# Patient Record
Sex: FEMALE | Race: WHITE | NOT HISPANIC OR LATINO | Employment: OTHER | URBAN - METROPOLITAN AREA
[De-identification: names, ages, dates, MRNs, and addresses within clinical notes are randomized per-mention and may not be internally consistent; named-entity substitution may affect disease eponyms.]

---

## 2017-04-18 ENCOUNTER — ALLSCRIPTS OFFICE VISIT (OUTPATIENT)
Dept: OTHER | Facility: OTHER | Age: 72
End: 2017-04-18

## 2017-04-18 DIAGNOSIS — I51.9 HEART DISEASE: ICD-10-CM

## 2017-04-18 DIAGNOSIS — E78.00 PURE HYPERCHOLESTEROLEMIA: ICD-10-CM

## 2017-04-18 DIAGNOSIS — R06.02 SHORTNESS OF BREATH: ICD-10-CM

## 2017-04-18 DIAGNOSIS — I10 ESSENTIAL (PRIMARY) HYPERTENSION: ICD-10-CM

## 2017-04-18 DIAGNOSIS — I34.0 NONRHEUMATIC MITRAL VALVE INSUFFICIENCY: ICD-10-CM

## 2017-04-20 RX ORDER — PRAVASTATIN SODIUM 40 MG
40 TABLET ORAL DAILY
COMMUNITY
End: 2018-02-23 | Stop reason: SDUPTHER

## 2017-04-20 RX ORDER — OMEPRAZOLE 20 MG/1
20 CAPSULE, DELAYED RELEASE ORAL EVERY MORNING
COMMUNITY
End: 2020-11-20

## 2017-04-20 RX ORDER — ASPIRIN 81 MG/1
81 TABLET ORAL DAILY
COMMUNITY
End: 2019-02-06

## 2017-04-20 RX ORDER — DIPHENOXYLATE HYDROCHLORIDE AND ATROPINE SULFATE 2.5; .025 MG/1; MG/1
1 TABLET ORAL DAILY
COMMUNITY
End: 2019-02-06

## 2017-04-20 RX ORDER — ZOLPIDEM TARTRATE 10 MG/1
10 TABLET ORAL
COMMUNITY
End: 2019-02-06

## 2017-04-20 RX ORDER — BUTALBITAL/ASPIRIN/CAFFEINE 50-325-40
1 CAPSULE ORAL EVERY 4 HOURS PRN
COMMUNITY
End: 2020-11-17 | Stop reason: ALTCHOICE

## 2017-04-20 RX ORDER — MELOXICAM 15 MG/1
15 TABLET ORAL DAILY
Status: ON HOLD | COMMUNITY
End: 2017-04-24 | Stop reason: ALTCHOICE

## 2017-04-20 RX ORDER — CLONIDINE HYDROCHLORIDE 0.2 MG/1
0.1 TABLET ORAL
COMMUNITY
End: 2017-04-29

## 2017-04-20 RX ORDER — NEBIVOLOL 10 MG/1
5 TABLET ORAL 2 TIMES DAILY
COMMUNITY
End: 2019-02-06

## 2017-04-20 RX ORDER — ONDANSETRON 4 MG/1
4 TABLET, FILM COATED ORAL EVERY 8 HOURS PRN
Status: ON HOLD | COMMUNITY
End: 2017-04-24 | Stop reason: ALTCHOICE

## 2017-04-20 RX ORDER — LOSARTAN POTASSIUM 100 MG/1
100 TABLET ORAL DAILY
COMMUNITY
End: 2018-04-27 | Stop reason: SDUPTHER

## 2017-04-20 RX ORDER — PHENOL 1.4 %
600 AEROSOL, SPRAY (ML) MUCOUS MEMBRANE DAILY
COMMUNITY
End: 2020-06-22

## 2017-04-22 ENCOUNTER — ANESTHESIA EVENT (OUTPATIENT)
Dept: PERIOP | Facility: HOSPITAL | Age: 72
End: 2017-04-22
Payer: MEDICARE

## 2017-04-24 ENCOUNTER — ANESTHESIA (OUTPATIENT)
Dept: PERIOP | Facility: HOSPITAL | Age: 72
End: 2017-04-24
Payer: MEDICARE

## 2017-04-24 ENCOUNTER — HOSPITAL ENCOUNTER (OUTPATIENT)
Facility: HOSPITAL | Age: 72
Setting detail: OUTPATIENT SURGERY
Discharge: HOME | End: 2017-04-24
Attending: COLON & RECTAL SURGERY | Admitting: COLON & RECTAL SURGERY
Payer: MEDICARE

## 2017-04-24 VITALS
HEIGHT: 63 IN | SYSTOLIC BLOOD PRESSURE: 156 MMHG | DIASTOLIC BLOOD PRESSURE: 86 MMHG | HEART RATE: 68 BPM | WEIGHT: 165 LBS | RESPIRATION RATE: 16 BRPM | TEMPERATURE: 97.9 F | BODY MASS INDEX: 29.23 KG/M2 | OXYGEN SATURATION: 98 %

## 2017-04-24 RX ORDER — SODIUM CHLORIDE, SODIUM LACTATE, POTASSIUM CHLORIDE, CALCIUM CHLORIDE 600; 310; 30; 20 MG/100ML; MG/100ML; MG/100ML; MG/100ML
125 INJECTION, SOLUTION INTRAVENOUS CONTINUOUS
Status: DISCONTINUED | OUTPATIENT
Start: 2017-04-24 | End: 2017-04-24 | Stop reason: HOSPADM

## 2017-04-24 RX ORDER — ALBUTEROL SULFATE 2.5 MG/3ML
2.5 SOLUTION RESPIRATORY (INHALATION) ONCE AS NEEDED
Status: DISCONTINUED | OUTPATIENT
Start: 2017-04-24 | End: 2017-04-24 | Stop reason: HOSPADM

## 2017-04-24 RX ORDER — MEPERIDINE HYDROCHLORIDE 25 MG/ML
12.5 INJECTION INTRAMUSCULAR; INTRAVENOUS; SUBCUTANEOUS AS NEEDED
Status: DISCONTINUED | OUTPATIENT
Start: 2017-04-24 | End: 2017-04-24 | Stop reason: HOSPADM

## 2017-04-24 RX ORDER — PROPOFOL 10 MG/ML
INJECTION, EMULSION INTRAVENOUS AS NEEDED
Status: DISCONTINUED | OUTPATIENT
Start: 2017-04-24 | End: 2017-04-24 | Stop reason: SURG

## 2017-04-24 RX ADMIN — PROPOFOL 30 MG: 10 INJECTION, EMULSION INTRAVENOUS at 13:41

## 2017-04-24 RX ADMIN — SODIUM CHLORIDE, SODIUM LACTATE, POTASSIUM CHLORIDE, AND CALCIUM CHLORIDE: .6; .31; .03; .02 INJECTION, SOLUTION INTRAVENOUS at 13:30

## 2017-04-24 RX ADMIN — PROPOFOL 30 MG: 10 INJECTION, EMULSION INTRAVENOUS at 13:47

## 2017-04-24 RX ADMIN — PROPOFOL 30 MG: 10 INJECTION, EMULSION INTRAVENOUS at 13:48

## 2017-04-24 RX ADMIN — PROPOFOL 30 MG: 10 INJECTION, EMULSION INTRAVENOUS at 13:46

## 2017-04-24 RX ADMIN — SODIUM CHLORIDE, SODIUM LACTATE, POTASSIUM CHLORIDE, AND CALCIUM CHLORIDE 125 ML/HR: .6; .31; .03; .02 INJECTION, SOLUTION INTRAVENOUS at 12:21

## 2017-04-24 RX ADMIN — PROPOFOL 30 MG: 10 INJECTION, EMULSION INTRAVENOUS at 13:45

## 2017-04-24 RX ADMIN — PROPOFOL 100 MG: 10 INJECTION, EMULSION INTRAVENOUS at 13:38

## 2017-04-24 RX ADMIN — PROPOFOL 30 MG: 10 INJECTION, EMULSION INTRAVENOUS at 13:43

## 2017-04-24 RX ADMIN — PROPOFOL 30 MG: 10 INJECTION, EMULSION INTRAVENOUS at 13:42

## 2017-04-29 ENCOUNTER — HOSPITAL ENCOUNTER (EMERGENCY)
Facility: HOSPITAL | Age: 72
Discharge: HOME | End: 2017-04-29
Attending: EMERGENCY MEDICINE | Admitting: EMERGENCY MEDICINE
Payer: MEDICARE

## 2017-04-29 VITALS
DIASTOLIC BLOOD PRESSURE: 61 MMHG | WEIGHT: 171.1 LBS | HEIGHT: 63 IN | SYSTOLIC BLOOD PRESSURE: 164 MMHG | BODY MASS INDEX: 30.32 KG/M2 | OXYGEN SATURATION: 98 % | HEART RATE: 70 BPM | RESPIRATION RATE: 18 BRPM | TEMPERATURE: 98.7 F

## 2017-04-29 DIAGNOSIS — R51.9 HEADACHE: ICD-10-CM

## 2017-04-29 DIAGNOSIS — I10 HYPERTENSION: Primary | ICD-10-CM

## 2017-04-29 LAB
ALBUMIN SERPL BCP-MCNC: 4 G/DL (ref 3.5–5)
ALP SERPL-CCNC: 132 U/L (ref 46–116)
ALT SERPL W P-5'-P-CCNC: 26 U/L (ref 12–78)
ANION GAP SERPL CALCULATED.3IONS-SCNC: 10 MMOL/L (ref 4–13)
AST SERPL W P-5'-P-CCNC: 21 U/L (ref 5–45)
BASOPHILS # BLD AUTO: 0.01 THOUSANDS/ΜL (ref 0–0.1)
BASOPHILS NFR BLD AUTO: 0 % (ref 0–1)
BILIRUB SERPL-MCNC: 0.3 MG/DL (ref 0.2–1)
BUN SERPL-MCNC: 9 MG/DL (ref 5–25)
CALCIUM SERPL-MCNC: 8.5 MG/DL (ref 8.3–10.1)
CHLORIDE SERPL-SCNC: 92 MMOL/L (ref 100–108)
CO2 SERPL-SCNC: 26 MMOL/L (ref 21–32)
CREAT SERPL-MCNC: 0.82 MG/DL (ref 0.6–1.3)
EOSINOPHIL # BLD AUTO: 0.09 THOUSAND/ΜL (ref 0–0.61)
EOSINOPHIL NFR BLD AUTO: 2 % (ref 0–6)
ERYTHROCYTE [DISTWIDTH] IN BLOOD BY AUTOMATED COUNT: 12.5 % (ref 11.6–15.1)
GFR SERPL CREATININE-BSD FRML MDRD: >60 ML/MIN/1.73SQ M
GLUCOSE SERPL-MCNC: 103 MG/DL (ref 65–140)
HCT VFR BLD AUTO: 35.8 % (ref 34.8–46.1)
HGB BLD-MCNC: 12.7 G/DL (ref 11.5–15.4)
HOLD SPECIMEN: NO
HOLD SPECIMEN: NORMAL
INR PPP: 0.98 (ref 0.86–1.16)
LYMPHOCYTES # BLD AUTO: 0.89 THOUSANDS/ΜL (ref 0.6–4.47)
LYMPHOCYTES NFR BLD AUTO: 16 % (ref 14–44)
MAGNESIUM SERPL-MCNC: 1.9 MG/DL (ref 1.6–2.6)
MCH RBC QN AUTO: 29.8 PG (ref 26.8–34.3)
MCHC RBC AUTO-ENTMCNC: 35.5 G/DL (ref 31.4–37.4)
MCV RBC AUTO: 84 FL (ref 82–98)
MONOCYTES # BLD AUTO: 0.4 THOUSAND/ΜL (ref 0.17–1.22)
MONOCYTES NFR BLD AUTO: 7 % (ref 4–12)
NEUTROPHILS # BLD AUTO: 4.26 THOUSANDS/ΜL (ref 1.85–7.62)
NEUTS SEG NFR BLD AUTO: 75 % (ref 43–75)
PLATELET # BLD AUTO: 189 THOUSANDS/UL (ref 149–390)
PMV BLD AUTO: 10.1 FL (ref 8.9–12.7)
POTASSIUM SERPL-SCNC: 4.2 MMOL/L (ref 3.5–5.3)
PROT SERPL-MCNC: 6.6 G/DL (ref 6.4–8.2)
PROTHROMBIN TIME: 12.9 SECONDS (ref 12.1–14.4)
RBC # BLD AUTO: 4.26 MILLION/UL (ref 3.81–5.12)
SODIUM SERPL-SCNC: 128 MMOL/L (ref 136–145)
TSH SERPL DL<=0.05 MIU/L-ACNC: 1.76 UIU/ML (ref 0.36–3.74)
WBC # BLD AUTO: 5.65 THOUSAND/UL (ref 4.31–10.16)

## 2017-04-29 PROCEDURE — 96361 HYDRATE IV INFUSION ADD-ON: CPT

## 2017-04-29 PROCEDURE — 96375 TX/PRO/DX INJ NEW DRUG ADDON: CPT

## 2017-04-29 PROCEDURE — 85610 PROTHROMBIN TIME: CPT | Performed by: EMERGENCY MEDICINE

## 2017-04-29 PROCEDURE — 80053 COMPREHEN METABOLIC PANEL: CPT | Performed by: EMERGENCY MEDICINE

## 2017-04-29 PROCEDURE — 93005 ELECTROCARDIOGRAM TRACING: CPT | Performed by: EMERGENCY MEDICINE

## 2017-04-29 PROCEDURE — 36415 COLL VENOUS BLD VENIPUNCTURE: CPT

## 2017-04-29 PROCEDURE — 84443 ASSAY THYROID STIM HORMONE: CPT | Performed by: EMERGENCY MEDICINE

## 2017-04-29 PROCEDURE — 83735 ASSAY OF MAGNESIUM: CPT | Performed by: EMERGENCY MEDICINE

## 2017-04-29 PROCEDURE — 85025 COMPLETE CBC W/AUTO DIFF WBC: CPT | Performed by: EMERGENCY MEDICINE

## 2017-04-29 PROCEDURE — 99284 EMERGENCY DEPT VISIT MOD MDM: CPT

## 2017-04-29 PROCEDURE — 96374 THER/PROPH/DIAG INJ IV PUSH: CPT

## 2017-04-29 RX ORDER — CLONIDINE HYDROCHLORIDE 0.1 MG/1
0.1 TABLET ORAL 2 TIMES DAILY
COMMUNITY
End: 2019-02-06

## 2017-04-29 RX ORDER — CLINDAMYCIN HYDROCHLORIDE 300 MG/1
300 CAPSULE ORAL 3 TIMES DAILY
COMMUNITY
End: 2017-08-01 | Stop reason: ALTCHOICE

## 2017-04-29 RX ORDER — ONDANSETRON 2 MG/ML
4 INJECTION INTRAMUSCULAR; INTRAVENOUS ONCE
Status: COMPLETED | OUTPATIENT
Start: 2017-04-29 | End: 2017-04-29

## 2017-04-29 RX ORDER — METHYLPREDNISOLONE 4 MG/1
8 TABLET ORAL SEE ADMIN INSTRUCTIONS
COMMUNITY
End: 2017-08-01 | Stop reason: ALTCHOICE

## 2017-04-29 RX ORDER — ONDANSETRON 2 MG/ML
INJECTION INTRAMUSCULAR; INTRAVENOUS
Status: COMPLETED
Start: 2017-04-29 | End: 2017-04-29

## 2017-04-29 RX ORDER — CLONIDINE HYDROCHLORIDE 0.3 MG/1
0.3 TABLET ORAL EVERY MORNING
Status: ON HOLD | COMMUNITY
End: 2018-02-20 | Stop reason: DRUGHIGH

## 2017-04-29 RX ORDER — KETOROLAC TROMETHAMINE 30 MG/ML
15 INJECTION, SOLUTION INTRAMUSCULAR; INTRAVENOUS ONCE
Status: COMPLETED | OUTPATIENT
Start: 2017-04-29 | End: 2017-04-29

## 2017-04-29 RX ORDER — DIPHENHYDRAMINE HYDROCHLORIDE 50 MG/ML
50 INJECTION INTRAMUSCULAR; INTRAVENOUS ONCE
Status: COMPLETED | OUTPATIENT
Start: 2017-04-29 | End: 2017-04-29

## 2017-04-29 RX ADMIN — DIPHENHYDRAMINE HYDROCHLORIDE 50 MG: 50 INJECTION, SOLUTION INTRAMUSCULAR; INTRAVENOUS at 19:49

## 2017-04-29 RX ADMIN — KETOROLAC TROMETHAMINE 15 MG: 30 INJECTION, SOLUTION INTRAMUSCULAR at 19:49

## 2017-04-29 RX ADMIN — SODIUM CHLORIDE 500 ML: 0.9 INJECTION, SOLUTION INTRAVENOUS at 20:21

## 2017-04-29 RX ADMIN — ONDANSETRON 4 MG: 2 INJECTION INTRAMUSCULAR; INTRAVENOUS at 19:58

## 2017-05-02 LAB
ATRIAL RATE: 71 BPM
P AXIS: 73 DEGREES
PR INTERVAL: 176 MS
QRS AXIS: 75 DEGREES
QRSD INTERVAL: 92 MS
QT INTERVAL: 396 MS
QTC INTERVAL: 430 MS
T WAVE AXIS: 48 DEGREES
VENTRICULAR RATE: 71 BPM

## 2017-07-10 ENCOUNTER — APPOINTMENT (OUTPATIENT)
Dept: RADIOLOGY | Facility: MEDICAL CENTER | Age: 72
End: 2017-07-10
Payer: MEDICARE

## 2017-07-10 ENCOUNTER — TRANSCRIBE ORDERS (OUTPATIENT)
Dept: RADIOLOGY | Facility: MEDICAL CENTER | Age: 72
End: 2017-07-10

## 2017-07-10 DIAGNOSIS — M50.30 DDD (DEGENERATIVE DISC DISEASE), CERVICAL: Primary | ICD-10-CM

## 2017-07-10 DIAGNOSIS — M50.30 DDD (DEGENERATIVE DISC DISEASE), CERVICAL: ICD-10-CM

## 2017-07-10 PROCEDURE — 72050 X-RAY EXAM NECK SPINE 4/5VWS: CPT

## 2017-08-01 ENCOUNTER — HOSPITAL ENCOUNTER (EMERGENCY)
Facility: HOSPITAL | Age: 72
Discharge: HOME | End: 2017-08-01
Attending: EMERGENCY MEDICINE | Admitting: EMERGENCY MEDICINE
Payer: MEDICARE

## 2017-08-01 ENCOUNTER — APPOINTMENT (EMERGENCY)
Dept: CT IMAGING | Facility: HOSPITAL | Age: 72
End: 2017-08-01
Payer: MEDICARE

## 2017-08-01 VITALS
HEART RATE: 66 BPM | TEMPERATURE: 98.1 F | WEIGHT: 167.33 LBS | DIASTOLIC BLOOD PRESSURE: 78 MMHG | SYSTOLIC BLOOD PRESSURE: 165 MMHG | OXYGEN SATURATION: 99 % | RESPIRATION RATE: 16 BRPM | BODY MASS INDEX: 29.64 KG/M2

## 2017-08-01 DIAGNOSIS — E87.1 HYPONATREMIA: Primary | ICD-10-CM

## 2017-08-01 DIAGNOSIS — R51.9 HEADACHE: ICD-10-CM

## 2017-08-01 DIAGNOSIS — M54.2 CERVICALGIA: ICD-10-CM

## 2017-08-01 LAB
ALBUMIN SERPL BCP-MCNC: 4 G/DL (ref 3.5–5)
ALP SERPL-CCNC: 174 U/L (ref 46–116)
ALT SERPL W P-5'-P-CCNC: 23 U/L (ref 12–78)
ANION GAP SERPL CALCULATED.3IONS-SCNC: 6 MMOL/L (ref 4–13)
AST SERPL W P-5'-P-CCNC: 20 U/L (ref 5–45)
ATRIAL RATE: 61 BPM
BASOPHILS # BLD AUTO: 0.02 THOUSANDS/ΜL (ref 0–0.1)
BASOPHILS NFR BLD AUTO: 0 % (ref 0–1)
BILIRUB SERPL-MCNC: 0.4 MG/DL (ref 0.2–1)
BUN SERPL-MCNC: 5 MG/DL (ref 5–25)
CALCIUM SERPL-MCNC: 9.1 MG/DL (ref 8.3–10.1)
CHLORIDE SERPL-SCNC: 93 MMOL/L (ref 100–108)
CO2 SERPL-SCNC: 29 MMOL/L (ref 21–32)
CREAT SERPL-MCNC: 0.84 MG/DL (ref 0.6–1.3)
EOSINOPHIL # BLD AUTO: 0.18 THOUSAND/ΜL (ref 0–0.61)
EOSINOPHIL NFR BLD AUTO: 4 % (ref 0–6)
ERYTHROCYTE [DISTWIDTH] IN BLOOD BY AUTOMATED COUNT: 12.9 % (ref 11.6–15.1)
GFR SERPL CREATININE-BSD FRML MDRD: 71 ML/MIN/1.73SQ M
GLUCOSE SERPL-MCNC: 106 MG/DL (ref 65–140)
HCT VFR BLD AUTO: 38.8 % (ref 34.8–46.1)
HGB BLD-MCNC: 13.9 G/DL (ref 11.5–15.4)
LYMPHOCYTES # BLD AUTO: 0.79 THOUSANDS/ΜL (ref 0.6–4.47)
LYMPHOCYTES NFR BLD AUTO: 16 % (ref 14–44)
MCH RBC QN AUTO: 29.8 PG (ref 26.8–34.3)
MCHC RBC AUTO-ENTMCNC: 35.8 G/DL (ref 31.4–37.4)
MCV RBC AUTO: 83 FL (ref 82–98)
MONOCYTES # BLD AUTO: 0.43 THOUSAND/ΜL (ref 0.17–1.22)
MONOCYTES NFR BLD AUTO: 9 % (ref 4–12)
NEUTROPHILS # BLD AUTO: 3.57 THOUSANDS/ΜL (ref 1.85–7.62)
NEUTS SEG NFR BLD AUTO: 71 % (ref 43–75)
P AXIS: 29 DEGREES
PLATELET # BLD AUTO: 213 THOUSANDS/UL (ref 149–390)
PMV BLD AUTO: 9.7 FL (ref 8.9–12.7)
POTASSIUM SERPL-SCNC: 4.3 MMOL/L (ref 3.5–5.3)
PR INTERVAL: 170 MS
PROT SERPL-MCNC: 7.5 G/DL (ref 6.4–8.2)
QRS AXIS: 67 DEGREES
QRSD INTERVAL: 94 MS
QT INTERVAL: 432 MS
QTC INTERVAL: 434 MS
RBC # BLD AUTO: 4.66 MILLION/UL (ref 3.81–5.12)
SODIUM SERPL-SCNC: 128 MMOL/L (ref 136–145)
T WAVE AXIS: 39 DEGREES
TROPONIN I SERPL-MCNC: <0.02 NG/ML
VENTRICULAR RATE: 61 BPM
WBC # BLD AUTO: 4.99 THOUSAND/UL (ref 4.31–10.16)

## 2017-08-01 PROCEDURE — 99284 EMERGENCY DEPT VISIT MOD MDM: CPT

## 2017-08-01 PROCEDURE — 36415 COLL VENOUS BLD VENIPUNCTURE: CPT | Performed by: EMERGENCY MEDICINE

## 2017-08-01 PROCEDURE — 96361 HYDRATE IV INFUSION ADD-ON: CPT

## 2017-08-01 PROCEDURE — 96375 TX/PRO/DX INJ NEW DRUG ADDON: CPT

## 2017-08-01 PROCEDURE — 93005 ELECTROCARDIOGRAM TRACING: CPT | Performed by: EMERGENCY MEDICINE

## 2017-08-01 PROCEDURE — 84484 ASSAY OF TROPONIN QUANT: CPT | Performed by: EMERGENCY MEDICINE

## 2017-08-01 PROCEDURE — 70498 CT ANGIOGRAPHY NECK: CPT

## 2017-08-01 PROCEDURE — 80053 COMPREHEN METABOLIC PANEL: CPT | Performed by: EMERGENCY MEDICINE

## 2017-08-01 PROCEDURE — 85025 COMPLETE CBC W/AUTO DIFF WBC: CPT | Performed by: EMERGENCY MEDICINE

## 2017-08-01 PROCEDURE — 70496 CT ANGIOGRAPHY HEAD: CPT

## 2017-08-01 PROCEDURE — 96374 THER/PROPH/DIAG INJ IV PUSH: CPT

## 2017-08-01 RX ORDER — MORPHINE SULFATE 4 MG/ML
4 INJECTION, SOLUTION INTRAMUSCULAR; INTRAVENOUS ONCE
Status: COMPLETED | OUTPATIENT
Start: 2017-08-01 | End: 2017-08-01

## 2017-08-01 RX ORDER — FENTANYL CITRATE 50 UG/ML
50 INJECTION, SOLUTION INTRAMUSCULAR; INTRAVENOUS ONCE
Status: COMPLETED | OUTPATIENT
Start: 2017-08-01 | End: 2017-08-01

## 2017-08-01 RX ORDER — METHYLPREDNISOLONE SODIUM SUCCINATE 125 MG/2ML
125 INJECTION, POWDER, LYOPHILIZED, FOR SOLUTION INTRAMUSCULAR; INTRAVENOUS ONCE
Status: COMPLETED | OUTPATIENT
Start: 2017-08-01 | End: 2017-08-01

## 2017-08-01 RX ORDER — PREDNISONE 20 MG/1
60 TABLET ORAL DAILY
Qty: 12 TABLET | Refills: 0 | Status: SHIPPED | OUTPATIENT
Start: 2017-08-01 | End: 2018-02-12 | Stop reason: CLARIF

## 2017-08-01 RX ORDER — ONDANSETRON 2 MG/ML
4 INJECTION INTRAMUSCULAR; INTRAVENOUS ONCE
Status: COMPLETED | OUTPATIENT
Start: 2017-08-01 | End: 2017-08-01

## 2017-08-01 RX ADMIN — SODIUM CHLORIDE 1000 ML: 0.9 INJECTION, SOLUTION INTRAVENOUS at 09:03

## 2017-08-01 RX ADMIN — ONDANSETRON 4 MG: 2 INJECTION INTRAMUSCULAR; INTRAVENOUS at 09:03

## 2017-08-01 RX ADMIN — MORPHINE SULFATE 4 MG: 4 INJECTION, SOLUTION INTRAMUSCULAR; INTRAVENOUS at 09:03

## 2017-08-01 RX ADMIN — IOHEXOL 100 ML: 350 INJECTION, SOLUTION INTRAVENOUS at 10:08

## 2017-08-01 RX ADMIN — SODIUM CHLORIDE 1000 ML: 0.9 INJECTION, SOLUTION INTRAVENOUS at 10:47

## 2017-08-01 RX ADMIN — FENTANYL CITRATE 50 MCG: 50 INJECTION, SOLUTION INTRAMUSCULAR; INTRAVENOUS at 11:06

## 2017-08-01 RX ADMIN — METHYLPREDNISOLONE SODIUM SUCCINATE 125 MG: 125 INJECTION, POWDER, FOR SOLUTION INTRAMUSCULAR; INTRAVENOUS at 09:15

## 2017-09-29 ENCOUNTER — TRANSCRIBE ORDERS (OUTPATIENT)
Dept: ADMINISTRATIVE | Facility: HOSPITAL | Age: 72
End: 2017-09-29

## 2017-09-29 ENCOUNTER — APPOINTMENT (OUTPATIENT)
Dept: PHYSICAL THERAPY | Facility: HOME HEALTHCARE | Age: 72
End: 2017-09-29
Payer: MEDICARE

## 2017-09-29 DIAGNOSIS — I12.9 PARENCHYMAL RENAL HYPERTENSION, STAGE 1-4 OR UNSPECIFIED CHRONIC KIDNEY DISEASE: Primary | ICD-10-CM

## 2017-09-29 DIAGNOSIS — N18.2 CHRONIC KIDNEY DISEASE, STAGE II (MILD): ICD-10-CM

## 2017-09-29 PROCEDURE — 97162 PT EVAL MOD COMPLEX 30 MIN: CPT

## 2017-09-29 PROCEDURE — 97535 SELF CARE MNGMENT TRAINING: CPT

## 2017-09-29 PROCEDURE — 97014 ELECTRIC STIMULATION THERAPY: CPT

## 2017-09-29 PROCEDURE — G8990 OTHER PT/OT CURRENT STATUS: HCPCS

## 2017-09-29 PROCEDURE — G8991 OTHER PT/OT GOAL STATUS: HCPCS

## 2017-10-02 ENCOUNTER — TRANSCRIBE ORDERS (OUTPATIENT)
Dept: ADMINISTRATIVE | Facility: HOSPITAL | Age: 72
End: 2017-10-02

## 2017-10-02 ENCOUNTER — APPOINTMENT (OUTPATIENT)
Dept: PHYSICAL THERAPY | Facility: HOME HEALTHCARE | Age: 72
End: 2017-10-02
Payer: MEDICARE

## 2017-10-02 DIAGNOSIS — Z12.31 ENCOUNTER FOR SCREENING MAMMOGRAM FOR MALIGNANT NEOPLASM OF BREAST: Primary | ICD-10-CM

## 2017-10-02 PROCEDURE — 97140 MANUAL THERAPY 1/> REGIONS: CPT

## 2017-10-02 PROCEDURE — 97110 THERAPEUTIC EXERCISES: CPT

## 2017-10-02 PROCEDURE — 97014 ELECTRIC STIMULATION THERAPY: CPT

## 2017-10-04 ENCOUNTER — APPOINTMENT (OUTPATIENT)
Dept: PHYSICAL THERAPY | Facility: HOME HEALTHCARE | Age: 72
End: 2017-10-04
Payer: MEDICARE

## 2017-10-04 PROCEDURE — 97140 MANUAL THERAPY 1/> REGIONS: CPT

## 2017-10-04 PROCEDURE — 97014 ELECTRIC STIMULATION THERAPY: CPT

## 2017-10-09 ENCOUNTER — APPOINTMENT (OUTPATIENT)
Dept: PHYSICAL THERAPY | Facility: HOME HEALTHCARE | Age: 72
End: 2017-10-09
Payer: MEDICARE

## 2017-10-12 ENCOUNTER — APPOINTMENT (OUTPATIENT)
Dept: PHYSICAL THERAPY | Facility: HOME HEALTHCARE | Age: 72
End: 2017-10-12
Payer: MEDICARE

## 2017-10-13 ENCOUNTER — ALLSCRIPTS OFFICE VISIT (OUTPATIENT)
Dept: OTHER | Facility: OTHER | Age: 72
End: 2017-10-13

## 2017-10-14 NOTE — PROGRESS NOTES
Assessment  Assessed    1  Diastolic dysfunction (428 7) (I51 9)   2  Hypercholesterolemia (272 0) (E78 00)   3  Hypertension (401 9) (I10)   4  Mitral regurgitation (424 0) (I34 0)    Plan  Diastolic dysfunction, Hypercholesterolemia, Hypertension, Mitral regurgitation    · Nocturnal Pulse Oximetry; Status:Active; Requested RJS:[id number];    Perform:Juan; Due:94Waq9642; Ordered; For:Diastolic dysfunction, Hypercholesterolemia, Hypertension, Mitral regurgitation; Ordered By:Williams Blas;   · Follow-up visit in 4 Months Evaluation and Treatment  Follow-up  Status: Hold For -  Scheduling  Requested for: [id number]   Ordered; For: Diastolic dysfunction, Hypercholesterolemia, Hypertension, Mitral regurgitation; Ordered By: Iban Morataya Performed:  Due: 50DCL2167    Discussion/Summary  Cardiology Discussion Summary Free Text Note [facility]:   She has follow-up blood work in renal artery Doppler scheduled for next week we'll discuss with neurology after the findings of the lab work  I've ordered an overnight pulse oximetry to rule out sleep apnea area she is refusing a formal sleep study at this time  I think stress at home is been contributor area she's had no episodes of diastolic congestive heart failure  Lipids have been stable on her current dose of statin she has not tolerated higher intensity statins in the past       History of Present Illness  Cardiology HPI Free Text Note Form [facility]: MRs Shanta Ku is a very pleasant 72-year-old female with a history of chronic diastolic dysfunction, hypertension, mitral regurgitation and prior TIA who presents for followup visit  The combination of bystolic and clonidine have been working to manage her hypertension  We had tried her previously other beta blockers including metoprolol and carvedilol and she did not tolerate them  continues to have episodes of high stress at home   She finally sold her father's house in December and now has been battling with some orthopedic issues over last few months  Her ability to walk and exercise has been limited by lower extremity arthritis and joint pain  Since her last visit she's developed some exertional dyspnea  She tells me climbing up the stairs at home is been getting short of breath over the past few months  She denies any exertional chest pressure heaviness, palpitations, lightheadedness, dizziness, or syncope  She's been taking her medications at home blood pressures average 2:43 to 807 systolic on her home monitor  She has had a difficult time finding medicines that she tolerates over the years  presents today for routine scheduled follow-up visit  I sent her to see nephrology regarding her heart to treat hypertension and prior episodes of hyponatremia  She has some blood work ordered for next week and will be following up with them in a week's time  Blood pressures at home is been averaging 140-150  She's been under a lot of stress recently and I think this is a big contributor to her hypertension  She denies any chest pain, lower extremity edema, PND, orthopnea  There've been no palpitations or flutters  Review of Systems  Cardiology Female ROS:     Cardiac: No complaints of chest pain, no palpitations, no fainting  Skin: No complaints of nonhealing sores or skin rash  Genitourinary: No complaints of recurrent urinary tract infections, frequent urination at night, difficult urination, blood in urine, kidney stones, loss of bladder control, kidney problems, denies any birth control or hormone replacement, is not post menopausal, not currently pregnant  Psychological: No complaints of feeling depressed, anxiety, panic attacks, or difficulty concentrating  General: No complaints of trouble sleeping, lack of energy, fatigue, appetite changes, weight changes, fever, frequent infections, or night sweats  Respiratory: No complaints of shortness of breath, cough with sputum, or wheezing     HEENT: No complaints of serious problems, hearing problems, nose problems, throat problems, or snoring  Gastrointestinal: No complaints of liver problems, nausea, vomiting, heartburn, constipation, bloody stools, diarrhea, problems swallowing, adbominal pain, or rectal bleeding  Hematologic: No complaints of bleeding disorders, anemia, blood clots, or excessive brusing  Neurological: No complaints of numbness, tingling, dizziness, weakness, seizures, headaches, syncope or fainting, AM fatigue, daytime sleepiness, no witnessed apnea episodes  Musculoskeletal: No complaints of arthritis, back pain, or painfull swelling  Active Problems  Problems    1  Arthritis (716 90) (M19 90)   2  Carpal tunnel syndrome (354 0) (G56 00)   3  Cataract (366 9) (H26 9)   4  Chronic pain syndrome (338 4) (G89 4)   5  Diastolic dysfunction (591 5) (I51 9)   6  Dizziness (780 4) (R42)   7  Esophagitis, reflux (530 11) (K21 0)   8  Follow-up examination following surgery (V67 00) (Z09)   9  Headache (784 0) (R51)   10  Hypercholesterolemia (272 0) (E78 00)   11  Hypertension (401 9) (I10)   12  Irritable bowel syndrome (564 1) (K58 9)   13  Lumbago (724 2) (M54 5)   14  Mitral regurgitation (424 0) (I34 0)   15  Nausea with vomiting (787 01) (R11 2)   16  Shortness of breath on exertion (786 05) (R06 02)   17  Surgery, elective (V50 9) (Z41 9)   18  Transient ischemic attack (435 9) (G45 9)    Past Medical History  Problems    1  History of GERD (gastroesophageal reflux disease) (530 81) (K21 9)   2  History of Hyperlipidemia (272 4) (E78 5)   3  History of Irregular heart beat (427 9) (I49 9)   4  History of Migraine (346 90) (G43 909)  Active Problems And Past Medical History Reviewed: The active problems and past medical history were reviewed and updated today  Surgical History  Problems    1  History of Appendectomy   2  History of Arthroscopy Knee   3  History Of Prior Surgery   4  History of Hysterectomy   5   History of Knee Replacement   6  History of Sinus Surgery  Surgical History Reviewed: The surgical history was reviewed and updated today  Family History  Mother    1  Family history of Arthritis (V17 7)   2  Family history of Hypertension (V17 49)   3  Family history of Pure Hypercholesterolemia  Father    4  Family history of Arthritis (V17 7)   5  Family history of Coronary Artery Disease (V17 49)   6  Family history of Hypertension (V17 49)  Family History Reviewed: The family history was reviewed and updated today  Social History  Problems    · Denied: History of Alcohol Use (History)   · Caffeine use (V49 89) (F15 90)   · Completed 12th grade   · Denied: History of Drug Use   · Marital History - Currently    ·    · Never A Smoker   · No drug use  Social History Reviewed: The social history was reviewed and updated today  Current Meds   1  Aspirin 81 MG TABS; TAKE 1 TABLET DAILY; Therapy: (Recorded:04Otx5144) to Recorded   2  Butalbital-ASA-Caff-Codeine -73-30 MG Oral Capsule; TAKE 1 CAPSULE EVERY   4 TO 6 HOURS AS NEEDED; Therapy: 21Apr2014 to Recorded   3  Bystolic 10 MG Oral Tablet; Take 1 tablet daily  Requested for: 70MEA9643; Last   Rx:27Jan2017 Ordered   4  Calcium 600 MG Oral Tablet; Take 2 tablets daily; Therapy: (Recorded:34Gda0840) to Recorded   5  CloNIDine HCl - 0 2 MG Oral Tablet; TAKE 1 5 TABLET Twice daily; Therapy: 01OIB3015 to ([phone] [id number])  Requested for: 65XEF8717; Last   Rx:28Apr2017 Ordered   6  Daily Multivitamin TABS; TAKE 1 TABLET DAILY; Therapy: (Recorded:57Gnn4802) to Recorded   7  Losartan Potassium 100 MG Oral Tablet; take 1 tablet every day; Therapy: 54KHC9937 to (Epifanio Knight)  Requested for: 51ZRF3925; Last   Rx:95Ieq6321 Ordered   8  Meloxicam 15 MG Oral Tablet; TAKE 1 TABLET DAILY WITH FOOD; Therapy: (Recorded:22Eox6507) to Recorded   9  Omeprazole 20 MG Oral Capsule Delayed Release; TAKE 1 CAPSULE DAILY;    Therapy: (Recorded:01Dzt1139) to Recorded   10  Ondansetron HCl - 4 MG Oral Tablet; TAKE 1 TABLET Every 8 hours PRN nausea     Requested for: 30GCT2074; Last Rx:18Mar2016 Ordered   11  Pravastatin Sodium 40 MG Oral Tablet; TAKE 1 TABLET DAILY AS DIRECTED; Therapy: 01Sep2015 to (Keylayohan Jared)  Requested for: 47MNT7360; Last    Rx:48Nev0338 Ordered   12  Zolpidem Tartrate 10 MG Oral Tablet; TAKE 1 TABLET DAILY AT BEDTIME; Therapy: (Recorded:87Fie4182) to Recorded    Allergies  Medication    1  Novocain SOLN   2  Penicillins    Vitals  Vital Signs    Recorded: 10MSD4691 10:35AM Recorded: 49CDQ8247 09:58AM   Heart Rate  64   Systolic 276 459   Diastolic 84 90   Height  5 ft 3 in   Weight  174 lb    BMI Calculated  30 82   BSA Calculated  1 82     Physical Exam    Constitutional   General appearance: No acute distress, well appearing and well nourished  Eyes   Conjunctiva and Sclera examination: Conjunctiva pink, sclera anicteric  Ears, Nose, Mouth, and Throat - Oropharynx: Clear, nares are clear, mucous membranes are moist    Neck   Neck and thyroid: Normal, supple, trachea midline, no thyromegaly  Pulmonary   Respiratory effort: No increased work of breathing or signs of respiratory distress  Auscultation of lungs: Clear to auscultation, no rales, no rhonchi, no wheezing, good air movement  Cardiovascular   Auscultation of heart: Normal rate and rhythm, normal S1 and S2, no murmurs  Carotid pulses: Normal, 2+ bilaterally  Peripheral vascular exam: Normal pulses throughout, no tenderness, erythema or swelling  Pedal pulses: Normal, 2+ bilaterally  Examination of extremities for edema and/or varicosities: Normal     Abdomen   Abdomen: Non-tender and no distention  Liver and spleen: No hepatomegaly or splenomegaly  Musculoskeletal Gait and station: Normal gait  -- Digits and nails: Normal without clubbing or cyanosis  -- Inspection/palpation of joints, bones, and muscles: Normal, ROM normal     Skin - Skin and subcutaneous tissue: Normal without rashes or lesions  Skin is warm and well perfused, normal turgor  Neurologic - Cranial nerves: II - XII intact  -- Speech: Normal     Psychiatric - Orientation to person, place, and time: Normal -- Mood and affect: Normal       Signatures   Electronically signed by : Kendrick Barrios DO; Oct 13 2017 10:39AM EST                       (Author)

## 2017-10-16 ENCOUNTER — HOSPITAL ENCOUNTER (OUTPATIENT)
Dept: ULTRASOUND IMAGING | Facility: HOSPITAL | Age: 72
Discharge: HOME | End: 2017-10-16
Attending: INTERNAL MEDICINE
Payer: MEDICARE

## 2017-10-16 DIAGNOSIS — I12.9 PARENCHYMAL RENAL HYPERTENSION, STAGE 1-4 OR UNSPECIFIED CHRONIC KIDNEY DISEASE: ICD-10-CM

## 2017-10-16 DIAGNOSIS — N18.2 CHRONIC KIDNEY DISEASE, STAGE II (MILD): ICD-10-CM

## 2017-10-16 PROCEDURE — 93975 VASCULAR STUDY: CPT

## 2017-10-17 ENCOUNTER — TRANSCRIBE ORDERS (OUTPATIENT)
Dept: LAB | Facility: MEDICAL CENTER | Age: 72
End: 2017-10-17

## 2017-10-17 ENCOUNTER — APPOINTMENT (OUTPATIENT)
Dept: LAB | Facility: MEDICAL CENTER | Age: 72
End: 2017-10-17
Payer: MEDICARE

## 2017-10-17 DIAGNOSIS — N18.2 CHRONIC KIDNEY DISEASE, STAGE II (MILD): ICD-10-CM

## 2017-10-17 DIAGNOSIS — N18.2 CHRONIC KIDNEY DISEASE, STAGE II (MILD): Primary | ICD-10-CM

## 2017-10-17 DIAGNOSIS — E87.1 HYPOSMOLALITY SYNDROME: ICD-10-CM

## 2017-10-17 DIAGNOSIS — I12.9 PARENCHYMAL RENAL HYPERTENSION, STAGE 1 THROUGH STAGE 4 OR UNSPECIFIED CHRONIC KIDNEY DISEASE: ICD-10-CM

## 2017-10-17 LAB
ANION GAP SERPL CALCULATED.3IONS-SCNC: 6 MMOL/L (ref 4–13)
BACTERIA UR QL AUTO: NORMAL /HPF
BILIRUB UR QL STRIP: NEGATIVE
BUN SERPL-MCNC: 10 MG/DL (ref 5–25)
CALCIUM SERPL-MCNC: 8.9 MG/DL (ref 8.3–10.1)
CHLORIDE SERPL-SCNC: 96 MMOL/L (ref 100–108)
CLARITY UR: ABNORMAL
CO2 SERPL-SCNC: 29 MMOL/L (ref 21–32)
COLOR UR: YELLOW
CREAT SERPL-MCNC: 0.8 MG/DL (ref 0.6–1.3)
CREAT UR-MCNC: 37.2 MG/DL
GFR SERPL CREATININE-BSD FRML MDRD: 75 ML/MIN/1.73SQ M
GLUCOSE P FAST SERPL-MCNC: 83 MG/DL (ref 65–99)
GLUCOSE UR STRIP-MCNC: NEGATIVE MG/DL
HGB UR QL STRIP.AUTO: NEGATIVE
KETONES UR STRIP-MCNC: NEGATIVE MG/DL
LEUKOCYTE ESTERASE UR QL STRIP: ABNORMAL
MICROALBUMIN UR-MCNC: <5 MG/L (ref 0–20)
MICROALBUMIN/CREAT 24H UR: <13 MG/G CREATININE (ref 0–30)
NITRITE UR QL STRIP: NEGATIVE
NON-SQ EPI CELLS URNS QL MICRO: NORMAL /HPF
OSMOLALITY UR: 288 MMOL/KG
PH UR STRIP.AUTO: 7.5 [PH] (ref 4.5–8)
POTASSIUM SERPL-SCNC: 4 MMOL/L (ref 3.5–5.3)
PROT UR STRIP-MCNC: NEGATIVE MG/DL
RBC #/AREA URNS AUTO: NORMAL /HPF
SODIUM SERPL-SCNC: 131 MMOL/L (ref 136–145)
SP GR UR STRIP.AUTO: 1 (ref 1–1.03)
UROBILINOGEN UR QL STRIP.AUTO: 0.2 E.U./DL
WBC #/AREA URNS AUTO: NORMAL /HPF

## 2017-10-17 PROCEDURE — 81001 URINALYSIS AUTO W/SCOPE: CPT | Performed by: INTERNAL MEDICINE

## 2017-10-17 PROCEDURE — 36415 COLL VENOUS BLD VENIPUNCTURE: CPT

## 2017-10-17 PROCEDURE — 84244 ASSAY OF RENIN: CPT

## 2017-10-17 PROCEDURE — 80048 BASIC METABOLIC PNL TOTAL CA: CPT

## 2017-10-17 PROCEDURE — 83835 ASSAY OF METANEPHRINES: CPT

## 2017-10-17 PROCEDURE — 82043 UR ALBUMIN QUANTITATIVE: CPT | Performed by: INTERNAL MEDICINE

## 2017-10-17 PROCEDURE — 82570 ASSAY OF URINE CREATININE: CPT | Performed by: INTERNAL MEDICINE

## 2017-10-17 PROCEDURE — 83935 ASSAY OF URINE OSMOLALITY: CPT | Performed by: INTERNAL MEDICINE

## 2017-10-17 PROCEDURE — 82088 ASSAY OF ALDOSTERONE: CPT

## 2017-10-19 LAB
METANEPH FREE SERPL-MCNC: 40 PG/ML (ref 0–62)
NORMETANEPHRINE SERPL-MCNC: 95 PG/ML (ref 0–145)

## 2017-10-21 LAB
ALDOST SERPL-MCNC: 8.1 NG/DL (ref 0–30)
RENIN PLAS-CCNC: <0.167 NG/ML/HR (ref 0.17–5.38)

## 2017-10-30 ENCOUNTER — HOSPITAL ENCOUNTER (OUTPATIENT)
Dept: MAMMOGRAPHY | Facility: HOSPITAL | Age: 72
Discharge: HOME | End: 2017-10-30
Payer: MEDICARE

## 2017-10-30 ENCOUNTER — TRANSCRIBE ORDERS (OUTPATIENT)
Dept: SLEEP CENTER | Facility: HOSPITAL | Age: 72
End: 2017-10-30

## 2017-10-30 DIAGNOSIS — G47.33 OBSTRUCTIVE SLEEP APNEA: Primary | ICD-10-CM

## 2017-10-30 DIAGNOSIS — Z12.31 ENCOUNTER FOR SCREENING MAMMOGRAM FOR MALIGNANT NEOPLASM OF BREAST: ICD-10-CM

## 2017-10-30 PROCEDURE — 77063 BREAST TOMOSYNTHESIS BI: CPT

## 2017-10-30 PROCEDURE — G0202 SCR MAMMO BI INCL CAD: HCPCS

## 2017-11-04 ENCOUNTER — TRANSCRIBE ORDERS (OUTPATIENT)
Dept: LAB | Facility: MEDICAL CENTER | Age: 72
End: 2017-11-04

## 2017-11-04 ENCOUNTER — APPOINTMENT (OUTPATIENT)
Dept: LAB | Facility: MEDICAL CENTER | Age: 72
End: 2017-11-04
Payer: MEDICARE

## 2017-11-04 DIAGNOSIS — N18.2 CHRONIC KIDNEY DISEASE, STAGE II (MILD): ICD-10-CM

## 2017-11-04 DIAGNOSIS — E26.9 HYPERALDOSTERONISM (HCC): ICD-10-CM

## 2017-11-04 DIAGNOSIS — N18.2 CHRONIC KIDNEY DISEASE, STAGE II (MILD): Primary | ICD-10-CM

## 2017-11-04 LAB
ANION GAP SERPL CALCULATED.3IONS-SCNC: 8 MMOL/L (ref 4–13)
BUN SERPL-MCNC: 9 MG/DL (ref 5–25)
CALCIUM SERPL-MCNC: 9.1 MG/DL (ref 8.3–10.1)
CHLORIDE SERPL-SCNC: 97 MMOL/L (ref 100–108)
CO2 SERPL-SCNC: 26 MMOL/L (ref 21–32)
CREAT SERPL-MCNC: 0.81 MG/DL (ref 0.6–1.3)
GFR SERPL CREATININE-BSD FRML MDRD: 74 ML/MIN/1.73SQ M
GLUCOSE SERPL-MCNC: 91 MG/DL (ref 65–140)
POTASSIUM SERPL-SCNC: 4.2 MMOL/L (ref 3.5–5.3)
SODIUM SERPL-SCNC: 131 MMOL/L (ref 136–145)

## 2017-11-04 PROCEDURE — 36415 COLL VENOUS BLD VENIPUNCTURE: CPT

## 2017-11-04 PROCEDURE — 80048 BASIC METABOLIC PNL TOTAL CA: CPT

## 2017-11-08 ENCOUNTER — TRANSCRIBE ORDERS (OUTPATIENT)
Dept: ADMINISTRATIVE | Facility: HOSPITAL | Age: 72
End: 2017-11-08

## 2017-11-08 DIAGNOSIS — M54.2 CERVICALGIA: Primary | ICD-10-CM

## 2017-11-10 ENCOUNTER — GENERIC CONVERSION - ENCOUNTER (OUTPATIENT)
Dept: OTHER | Facility: OTHER | Age: 72
End: 2017-11-10

## 2017-11-14 ENCOUNTER — HOSPITAL ENCOUNTER (OUTPATIENT)
Dept: CT IMAGING | Facility: HOSPITAL | Age: 72
Discharge: HOME | End: 2017-11-14
Attending: ANESTHESIOLOGY
Payer: MEDICARE

## 2017-11-14 DIAGNOSIS — M54.2 CERVICALGIA: ICD-10-CM

## 2017-11-14 PROCEDURE — 72125 CT NECK SPINE W/O DYE: CPT

## 2017-11-17 ENCOUNTER — TRANSCRIBE ORDERS (OUTPATIENT)
Dept: ADMINISTRATIVE | Facility: HOSPITAL | Age: 72
End: 2017-11-17

## 2017-11-17 ENCOUNTER — APPOINTMENT (OUTPATIENT)
Dept: LAB | Facility: HOSPITAL | Age: 72
End: 2017-11-17
Payer: MEDICARE

## 2017-11-17 DIAGNOSIS — R52 PAIN: ICD-10-CM

## 2017-11-17 DIAGNOSIS — E03.9 HYPOTHYROIDISM, UNSPECIFIED TYPE: Primary | ICD-10-CM

## 2017-11-17 DIAGNOSIS — E03.9 HYPOTHYROIDISM, UNSPECIFIED TYPE: ICD-10-CM

## 2017-11-17 LAB
T3 SERPL-MCNC: 0.9 NG/ML (ref 0.6–1.8)
T4 SERPL-MCNC: 9.1 UG/DL (ref 4.7–13.3)
TSH SERPL DL<=0.05 MIU/L-ACNC: 1.87 UIU/ML (ref 0.36–3.74)

## 2017-11-17 PROCEDURE — 84480 ASSAY TRIIODOTHYRONINE (T3): CPT

## 2017-11-17 PROCEDURE — 84436 ASSAY OF TOTAL THYROXINE: CPT

## 2017-11-17 PROCEDURE — 36415 COLL VENOUS BLD VENIPUNCTURE: CPT

## 2017-11-17 PROCEDURE — 84443 ASSAY THYROID STIM HORMONE: CPT

## 2017-12-08 ENCOUNTER — HOSPITAL ENCOUNTER (OUTPATIENT)
Dept: SLEEP CENTER | Facility: HOSPITAL | Age: 72
Discharge: HOME | End: 2017-12-08
Attending: INTERNAL MEDICINE
Payer: MEDICARE

## 2017-12-08 DIAGNOSIS — G47.33 OBSTRUCTIVE SLEEP APNEA: ICD-10-CM

## 2017-12-08 PROCEDURE — 95810 POLYSOM 6/> YRS 4/> PARAM: CPT

## 2017-12-11 ENCOUNTER — HOSPITAL ENCOUNTER (OUTPATIENT)
Dept: RADIOLOGY | Facility: HOSPITAL | Age: 72
Discharge: HOME | End: 2017-12-11
Payer: MEDICARE

## 2017-12-11 ENCOUNTER — GENERIC CONVERSION - ENCOUNTER (OUTPATIENT)
Dept: OTHER | Facility: OTHER | Age: 72
End: 2017-12-11

## 2017-12-11 DIAGNOSIS — R52 PAIN: ICD-10-CM

## 2017-12-11 PROCEDURE — 73130 X-RAY EXAM OF HAND: CPT

## 2018-01-12 ENCOUNTER — TRANSCRIBE ORDERS (OUTPATIENT)
Dept: ADMINISTRATIVE | Facility: HOSPITAL | Age: 73
End: 2018-01-12

## 2018-01-12 ENCOUNTER — APPOINTMENT (OUTPATIENT)
Dept: LAB | Facility: HOSPITAL | Age: 73
End: 2018-01-12
Attending: INTERNAL MEDICINE
Payer: MEDICARE

## 2018-01-12 DIAGNOSIS — E87.1 HYPOSMOLALITY SYNDROME: Primary | ICD-10-CM

## 2018-01-12 DIAGNOSIS — N18.2 CHRONIC KIDNEY DISEASE, STAGE II (MILD): ICD-10-CM

## 2018-01-12 DIAGNOSIS — E87.1 HYPOSMOLALITY SYNDROME: ICD-10-CM

## 2018-01-12 LAB
ANION GAP SERPL CALCULATED.3IONS-SCNC: 6 MMOL/L (ref 4–13)
BUN SERPL-MCNC: 8 MG/DL (ref 5–25)
CALCIUM SERPL-MCNC: 8.5 MG/DL (ref 8.3–10.1)
CHLORIDE SERPL-SCNC: 96 MMOL/L (ref 100–108)
CO2 SERPL-SCNC: 27 MMOL/L (ref 21–32)
CREAT SERPL-MCNC: 1.05 MG/DL (ref 0.6–1.3)
GFR SERPL CREATININE-BSD FRML MDRD: 54 ML/MIN/1.73SQ M
GLUCOSE SERPL-MCNC: 89 MG/DL (ref 65–140)
POTASSIUM SERPL-SCNC: 4.5 MMOL/L (ref 3.5–5.3)
SODIUM SERPL-SCNC: 129 MMOL/L (ref 136–145)

## 2018-01-12 PROCEDURE — 36415 COLL VENOUS BLD VENIPUNCTURE: CPT

## 2018-01-12 PROCEDURE — 80048 BASIC METABOLIC PNL TOTAL CA: CPT

## 2018-01-13 VITALS
HEIGHT: 63 IN | HEART RATE: 64 BPM | DIASTOLIC BLOOD PRESSURE: 84 MMHG | WEIGHT: 174 LBS | BODY MASS INDEX: 30.83 KG/M2 | SYSTOLIC BLOOD PRESSURE: 160 MMHG

## 2018-01-13 VITALS
HEIGHT: 63 IN | HEART RATE: 58 BPM | WEIGHT: 170 LBS | BODY MASS INDEX: 30.12 KG/M2 | SYSTOLIC BLOOD PRESSURE: 142 MMHG | DIASTOLIC BLOOD PRESSURE: 70 MMHG

## 2018-02-05 ENCOUNTER — TELEPHONE (OUTPATIENT)
Dept: PAIN MEDICINE | Facility: CLINIC | Age: 73
End: 2018-02-05

## 2018-02-08 ENCOUNTER — OFFICE VISIT (OUTPATIENT)
Dept: CARDIOLOGY CLINIC | Facility: HOSPITAL | Age: 73
End: 2018-02-08
Payer: MEDICARE

## 2018-02-08 VITALS
HEART RATE: 57 BPM | DIASTOLIC BLOOD PRESSURE: 74 MMHG | SYSTOLIC BLOOD PRESSURE: 122 MMHG | WEIGHT: 170.8 LBS | BODY MASS INDEX: 29.16 KG/M2 | HEIGHT: 64 IN

## 2018-02-08 DIAGNOSIS — E78.00 HYPERCHOLESTEROLEMIA: ICD-10-CM

## 2018-02-08 DIAGNOSIS — I34.0 NON-RHEUMATIC MITRAL REGURGITATION: ICD-10-CM

## 2018-02-08 DIAGNOSIS — R06.02 SHORTNESS OF BREATH ON EXERTION: ICD-10-CM

## 2018-02-08 DIAGNOSIS — I51.89 DIASTOLIC DYSFUNCTION: Primary | ICD-10-CM

## 2018-02-08 DIAGNOSIS — I10 ESSENTIAL HYPERTENSION: ICD-10-CM

## 2018-02-08 PROCEDURE — 99214 OFFICE O/P EST MOD 30 MIN: CPT | Performed by: INTERNAL MEDICINE

## 2018-02-08 RX ORDER — SPIRONOLACTONE 50 MG/1
50 TABLET, FILM COATED ORAL
COMMUNITY
End: 2019-10-14 | Stop reason: SDUPTHER

## 2018-02-08 NOTE — PROGRESS NOTES
Cardiology Follow Up    Pat Hoffman  1945  [id number]  [address] [place] [address] [facility] [place] [address] [address]    1  Diastolic dysfunction     2  Essential hypertension     3  Non-rheumatic mitral regurgitation     4  Hypercholesterolemia     5  Shortness of breath on exertion         Discussion/Summary:  Overall she has been stable since her last appointment  Blood pressure is much better controlled with the addition of spironolactone  She does get quite anxious at times and this tends to drive up blood pressure  I have asked her to take an extra clonidine when she has these episodes  We talked about her sleep study she refuses to have the final stage done and does not want treatment  I ordered a stress test last year which she never got done I have offered her another [facility] for the exertional dyspnea she will think about it in the spring  Continue current medications  Interval History:   Denies chest pain, palpitations, lightheadedness, dizziness, or syncope  Has some occasional exertional dyspnea that has been mild in nature  Denies lower extremity edema, PND, orthopnea  Had spironolactone added to her medical regimen per Nephrology blood pressures have been improving  Does not get regular exercise  Sleep study showed mild obstructive apnea      Problem List     Diastolic dysfunction    Hypercholesterolemia    Hypertension    Mitral regurgitation    Overview Signed 2/8/2018  8:25 AM by Toribio Matthew DO     Description: Moderate         Shortness of breath on exertion        Past Medical History:   Diagnosis Date    Arthritis     Chronic pain disorder     GERD (gastroesophageal reflux disease)     Hyperlipidemia     Hypertension     IBS (irritable bowel syndrome)     Mitral regurgitation     Stroke [facility])     tia     Social History     Social History    Marital status: /Civil Union     Spouse name: N/A    Number of children: N/A    Years of education: N/A     Occupational History    Not on file  Social History Main Topics    Smoking status: Former Smoker    Smokeless tobacco: Never Used      Comment: quit 40 yrs ago    Alcohol use Yes      Comment: social    Drug use: No    Sexual activity: Not on file     Other Topics Concern    Not on file     Social History Narrative    No narrative on file      No family history on file    Past Surgical History:   Procedure Laterality Date    APPENDECTOMY      HYSTERECTOMY      INSERT / REPLACE PERIPHERAL NEUROSTIMULATOR PULSE GENERATOR /  Left     buttocks    JOINT REPLACEMENT      knee    KNEE ARTHROSCOPY      RI COLONOSCOPY FLX DX W/COLLJ SPEC WHEN PFRMD N/A 4/24/2017    Procedure: Darin Balling;  Surgeon: Nivia Ortega MD;  Location: [facility];  Service: Colorectal    SINUS SURGERY         Current Outpatient Prescriptions:     aspirin (ECOTRIN LOW STRENGTH) 81 mg EC tablet, Take 81 mg by mouth daily, Disp: , Rfl:     butalbital-acetaminophen-caffeine-codeine (FIORICET WITH CODEINE) -04-30 MG per capsule, Take 1 capsule by mouth every 4 (four) hours as needed for headaches, Disp: , Rfl:     calcium carbonate (OS-MARLENY) 600 MG tablet, Take 600 mg by mouth 2 (two) times a day with meals, Disp: , Rfl:     cloNIDine (CATAPRES) 0 1 mg tablet, Take 0 1 mg by mouth 2 (two) times a day  , Disp: , Rfl:     losartan (COZAAR) 100 MG tablet, Take 100 mg by mouth daily, Disp: , Rfl:     multivitamin (THERAGRAN) TABS, Take 1 tablet by mouth daily, Disp: , Rfl:     nebivolol (BYSTOLIC) 10 mg tablet, Take 5 mg by mouth 2 (two) times a day  , Disp: , Rfl:     omeprazole (PriLOSEC) 20 mg delayed release capsule, Take 20 mg by mouth daily, Disp: , Rfl:     pravastatin (PRAVACHOL) 40 mg tablet, Take 40 mg by mouth daily, Disp: , Rfl:     spironolactone (ALDACTONE) 50 mg tablet, Take 50 mg by mouth daily, Disp: , Rfl:     zolpidem (AMBIEN) 10 mg tablet, Take 10 mg by mouth daily at bedtime as needed for sleep, Disp: , Rfl:     cloNIDine (CATAPRES) 0 3 mg tablet, Take 0 3 mg by mouth every morning, Disp: , Rfl:     predniSONE 20 mg tablet, Take 3 tablets by mouth daily, Disp: 12 tablet, Rfl: 0  Allergies   Allergen Reactions    Procaine Hives    Lidocaine Rash    Other Rash     Adhesive tape     Penicillins Rash       Labs:     Chemistry        Component Value Date/Time     (L) 01/12/2018 1223     (L) 09/21/2015 1057    K 4 5 01/12/2018 1223    K 4 3 09/21/2015 1057    CL 96 (L) 01/12/2018 1223    CL 94 (L) 09/21/2015 1057    CO2 27 01/12/2018 1223    CO2 31 2 09/21/2015 1057    BUN 8 01/12/2018 1223    BUN 8 09/21/2015 1057    CREATININE 1 05 01/12/2018 1223    CREATININE 0 74 09/21/2015 1057        Component Value Date/Time    CALCIUM 8 5 01/12/2018 1223    CALCIUM 9 5 09/21/2015 1057    ALKPHOS 174 (H) 08/01/2017 0904    ALKPHOS 123 (H) 09/21/2015 1057    AST 20 08/01/2017 0904    AST 21 09/21/2015 1057    ALT 23 08/01/2017 0904    ALT 34 09/21/2015 1057    BILITOT 0 40 08/01/2017 0904    BILITOT 0 29 09/21/2015 1057            Lab Results   Component Value Date    CHOL 247 (H) 06/08/2016    CHOL 241 (H) 01/27/2016    CHOL 290 06/04/2015     Lab Results   Component Value Date     (H) 06/08/2016     (H) 01/27/2016     06/04/2015     Lab Results   Component Value Date    LDLCALC 121 (H) 06/08/2016    LDLCALC 126 (H) 01/27/2016    LDLCALC 153 (H) 06/04/2015     Lab Results   Component Value Date    TRIG 49 06/08/2016    TRIG 56 01/27/2016    TRIG 44 06/04/2015     No components found for: CHOLHDL    Imaging: No results found  ECG:        Review of Systems   Constitution: Negative  HENT: Negative  Eyes: Negative  Cardiovascular: Negative  Respiratory: Positive for shortness of breath  Endocrine: Negative  Hematologic/Lymphatic: Negative  Skin: Negative  Musculoskeletal: Negative  Gastrointestinal: Negative  Genitourinary: Negative  Neurological: Negative  Psychiatric/Behavioral: Negative  Vitals:    02/08/18 1124   BP: 122/74   Pulse: 57     Vitals:    02/08/18 1124   Weight: 77 5 kg (170 lb 12 8 oz)     Height: 5' 4" (162 6 cm)   Body mass index is 29 32 kg/m²      Physical Exam:  Vital signs reviewed  General appearance:  Appears stated age, alert, well appearing and in no distress  HEENT:  PERRLA, EOMI, no scleral icterus, no conjunctival pallor  NECK:  Supple, No elevated JVP, no thyromegaly, no carotid bruits  HEART:  Regular rate and rhythm, normal S1/S2, no S3/S4, no murmur or rub  LUNGS:  Clear to auscultation bilaterally, no wheezes rales or rhonchi  ABDOMEN:  Soft, non-tender, positive bowel sounds, no rebound or guarding, no organomegaly   EXTREMITIES:  No edema, normal range of motion  VASCULAR:  Normal pedal pulses, good pulse volume   SKIN: No lesions or rashes on exposed skin  NEURO:  CN II-XII intact, no focal deficits

## 2018-02-12 ENCOUNTER — CONSULT (OUTPATIENT)
Dept: PAIN MEDICINE | Facility: CLINIC | Age: 73
End: 2018-02-12
Payer: MEDICARE

## 2018-02-12 VITALS
WEIGHT: 170.4 LBS | DIASTOLIC BLOOD PRESSURE: 68 MMHG | SYSTOLIC BLOOD PRESSURE: 132 MMHG | BODY MASS INDEX: 29.25 KG/M2 | TEMPERATURE: 98.1 F

## 2018-02-12 DIAGNOSIS — M54.2 NECK PAIN: ICD-10-CM

## 2018-02-12 DIAGNOSIS — M47.812 SPONDYLOSIS OF CERVICAL REGION WITHOUT MYELOPATHY OR RADICULOPATHY: ICD-10-CM

## 2018-02-12 DIAGNOSIS — M48.02 CERVICAL SPINAL STENOSIS: Primary | ICD-10-CM

## 2018-02-12 PROCEDURE — 99204 OFFICE O/P NEW MOD 45 MIN: CPT | Performed by: ANESTHESIOLOGY

## 2018-02-12 NOTE — PROGRESS NOTES
Patient presents as a new patient with complaints of neck pain  Assessment:  1  Cervical spinal stenosis    2  Neck pain    3  Spondylosis of cervical region without myelopathy or radiculopathy        Plan:  Patient is a 77-year-old female with history of chronic neck pain and low back pain status post spinal cord stimulator for low back and lumbar radiculopathy presents with facetogenic cervical pain greater on the left side  1  We will schedule the patient for a left-sided  C4,C5, C6 medial branch  2  We will follow-up 2 weeks after injection or within 1 month whichever comes [address] [facility] [place] Monitoring Program report was reviewed and was appropriate     Complete risks and benefits including bleeding, infection, tissue reaction, nerve injury and allergic reaction were discussed  The approach was demonstrated using models and literature was provided  Verbal and written consent was obtained  History of Present Illness: The patient is a 81 y o  female who presents for consultation in regards to Neck Pain  Symptoms have been present for several years  Symptoms began without any precipitating injury or trauma  Pain is reported to be 5 on the numeric rating scale  Symptoms are felt intermittently and worst in the no typical pattern  Symptoms are characterized as shooting, sharp and dull/aching  Symptoms are associated with no weakness  Aggravating factors include lying down and exercise  Relieving factors include none  No change in symptoms with kneeling, standing, bending, leaning forward, leaning bckward, sitting, walking, turning the head, relaxation, coughing/sneezing and bowel movements  Treatments that have been helpful include heat/ice  physical therapy and home exercise have provided no relief  Medications to relieve symptoms include none at this time  Review of Systems:    Review of Systems   Constitutional: Positive for unexpected weight change  Cardiovascular: Positive for palpitations  Gastrointestinal: Positive for constipation  Musculoskeletal: Positive for arthralgias  Decreased ROM, joint stiffness   Hematological: Bruises/bleeds easily  All other systems reviewed and are negative  Past Medical History:   Diagnosis Date    Arthritis     Chronic pain disorder     GERD (gastroesophageal reflux disease)     Hyperlipidemia     Hypertension     IBS (irritable bowel syndrome)     Mitral regurgitation     Stroke ([facility] [address] )     tia       Past Surgical History:   Procedure Laterality Date    APPENDECTOMY      HYSTERECTOMY      INSERT / REPLACE PERIPHERAL NEUROSTIMULATOR PULSE GENERATOR /  Left     buttocks    JOINT REPLACEMENT      knee    KNEE ARTHROSCOPY      AK COLONOSCOPY FLX DX W/COLLJ SPEC WHEN PFRMD N/A 4/24/2017    Procedure: Kel Lopez;  Surgeon: Cristina Chavarria MD;  Location: [facility];  Service: Colorectal    SINUS SURGERY         No family history on file  Social History     Occupational History    Not on file       Social History Main Topics    Smoking status: Former Smoker    Smokeless tobacco: Never Used      Comment: quit 40 yrs ago    Alcohol use Yes      Comment: social    Drug use: No    Sexual activity: Not on file         Current Outpatient Prescriptions:     aspirin (ECOTRIN LOW STRENGTH) 81 mg EC tablet, Take 81 mg by mouth daily, Disp: , Rfl:     butalbital-acetaminophen-caffeine-codeine (FIORICET WITH CODEINE) -08-30 MG per capsule, Take 1 capsule by mouth every 4 (four) hours as needed for headaches, Disp: , Rfl:     calcium carbonate (OS-MARLENY) 600 MG tablet, Take 600 mg by mouth 2 (two) times a day with meals, Disp: , Rfl:     cloNIDine (CATAPRES) 0 1 mg tablet, Take 0 1 mg by mouth 2 (two) times a day  , Disp: , Rfl:     losartan (COZAAR) 100 MG tablet, Take 100 mg by mouth daily, Disp: , Rfl:     multivitamin (THERAGRAN) TABS, Take 1 tablet by mouth daily, Disp: , Rfl:     nebivolol (BYSTOLIC) 10 mg tablet, Take 5 mg by mouth 2 (two) times a day  , Disp: , Rfl:     omeprazole (PriLOSEC) 20 mg delayed release capsule, Take 20 mg by mouth daily, Disp: , Rfl:     spironolactone (ALDACTONE) 50 mg tablet, Take 50 mg by mouth daily, Disp: , Rfl:     zolpidem (AMBIEN) 10 mg tablet, Take 10 mg by mouth daily at bedtime as needed for sleep, Disp: , Rfl:     cloNIDine (CATAPRES) 0 3 mg tablet, Take 0 3 mg by mouth every morning, Disp: , Rfl:     pravastatin (PRAVACHOL) 40 mg tablet, Take 40 mg by mouth daily, Disp: , Rfl:     predniSONE 20 mg tablet, Take 3 tablets by mouth daily, Disp: 12 tablet, Rfl: 0    Allergies   Allergen Reactions    Procaine Hives    Lidocaine Rash    Other Rash     Adhesive tape     Penicillins Rash       Physical Exam:    /68 (BP Location: Right arm, Patient Position: Sitting, Cuff Size: Standard)   Temp 98 1 °F (36 7 °C)   Wt 77 3 kg (170 lb 6 4 oz)   BMI 29 25 kg/m²     Constitutional: normal, well developed, well nourished, alert, in no distress and non-toxic and no overt pain behavior  Eyes: anicteric  HEENT: grossly intact  Neck: supple, symmetric, trachea midline and no masses   Pulmonary:even and unlabored  Cardiovascular:No edema or pitting edema present  Skin:Normal without rashes or lesions and well hydrated  Psychiatric:Mood and affect appropriate  Neurologic:Cranial Nerves II-XII grossly intact  Musculoskeletal:normal    Cervical Sensory Exam:  intact to light touch and pinprick in the upper extremities  ROM:  Decreased left and right rotation secondary to pain    hand strength was normal bilaterally  wrist strength was normal bilaterally  elbow strength was normal bilaterally  shoulder strength was normal bilaterally  Evaluation of Muscle Stretch Reflexes on the right side demonstrates negative Will's Test right upper extremity and negative right ankle clonus    muscle stretch reflexes equal and symmetric in the right upper and lower limbs  Evaluation of Muscle Stretch Reflexes on the left side demonstrates negative left ankle clonus, but muscle stretch reflexes equal and symmetric in the left upper and lower limbs and negative Will's Test left upper extremity  Special Tests: positive Spurling's Maneuver to the right and positive Spurling's Maneuver to the left  Special Tests Shoulder: positive Neer Test on right and positive Taylor Test on right  Imaging  CT scan cervical spine    FINDINGS:     ALIGNMENT:  Normal alignment of the cervical spine  No subluxation      VERTEBRAL BODIES:  No fracture      DEGENERATIVE CHANGES:  At C5-6 there is loss of disc height with mild endplate degenerative change including uncinate joint hypertrophic changes bilaterally  Similar findings to a lesser degree at C6-7  Mild to moderate canal stenosis  Mild left and   moderate right foraminal narrowing at C5-6  At C6-7 there is only minimal left foraminal narrowing      PREVERTEBRAL AND PARASPINAL SOFT TISSUES: Mild vascular calcification of the left distal common carotid artery and proximal cervical internal carotid artery  Heterogeneous appearance of the thyroid gland diffusely              THORACIC INLET:  Normal      IMPRESSION:     Cervical spondylitic degenerative change at C5-6 and C6-7  At C5-6 there is mild to moderate canal stenosis and moderate right foraminal narrowing  No orders of the defined types were placed in this encounter

## 2018-02-13 PROBLEM — M47.812 CERVICAL SPONDYLOSIS WITHOUT MYELOPATHY: Status: ACTIVE | Noted: 2018-02-13

## 2018-02-20 ENCOUNTER — HOSPITAL ENCOUNTER (OUTPATIENT)
Facility: HOSPITAL | Age: 73
Setting detail: OUTPATIENT SURGERY
Discharge: HOME | End: 2018-02-20
Attending: ANESTHESIOLOGY | Admitting: ANESTHESIOLOGY
Payer: MEDICARE

## 2018-02-20 ENCOUNTER — APPOINTMENT (OUTPATIENT)
Dept: RADIOLOGY | Facility: HOSPITAL | Age: 73
End: 2018-02-20
Payer: MEDICARE

## 2018-02-20 VITALS
RESPIRATION RATE: 18 BRPM | WEIGHT: 167 LBS | HEIGHT: 64 IN | BODY MASS INDEX: 28.51 KG/M2 | TEMPERATURE: 96.9 F | DIASTOLIC BLOOD PRESSURE: 70 MMHG | OXYGEN SATURATION: 100 % | SYSTOLIC BLOOD PRESSURE: 165 MMHG | HEART RATE: 60 BPM

## 2018-02-20 PROCEDURE — 64490 INJ PARAVERT F JNT C/T 1 LEV: CPT | Performed by: ANESTHESIOLOGY

## 2018-02-20 PROCEDURE — 64491 INJ PARAVERT F JNT C/T 2 LEV: CPT | Performed by: ANESTHESIOLOGY

## 2018-02-20 PROCEDURE — 72020 X-RAY EXAM OF SPINE 1 VIEW: CPT

## 2018-02-20 RX ORDER — BUPIVACAINE HYDROCHLORIDE 5 MG/ML
INJECTION, SOLUTION EPIDURAL; INTRACAUDAL AS NEEDED
Status: DISCONTINUED | OUTPATIENT
Start: 2018-02-20 | End: 2018-02-20 | Stop reason: HOSPADM

## 2018-02-20 RX ORDER — BUPIVACAINE HYDROCHLORIDE 2.5 MG/ML
INJECTION, SOLUTION EPIDURAL; INFILTRATION; INTRACAUDAL AS NEEDED
Status: DISCONTINUED | OUTPATIENT
Start: 2018-02-20 | End: 2018-02-20 | Stop reason: HOSPADM

## 2018-02-20 NOTE — DISCHARGE INSTRUCTIONS

## 2018-02-20 NOTE — OP NOTE
OPERATIVE REPORT  PATIENT NAME: Jenny Vega    :  1945  MRN: [id number]  Pt Location: [facility] ROOM 01    SURGERY DATE: 2018    Surgeon(s) and Role:     * Michael Hines MD - Primary    Preop Diagnosis:  Cervical spondylosis without myelopathy [M47 812]    Post-Op Diagnosis Codes:     * Cervical spondylosis without myelopathy [M47 812]    Procedure(s) (LRB):  BLOCK MEDIAL BRANCH - C4, C5, C6 (Left)    Specimen(s):  * No specimens in log *    Estimated Blood Loss:   Minimal    Drains:       Anesthesia Type:   Local    Operative Indications:  Cervical spondylosis without myelopathy [M47 812]      Operative Findings:  Cervical spondylosis without myelopathy [K90 098]    Complications:   None    Procedure and Technique:    Cervical Medial Branch Nerve Block    Indication:  Mechanical neck pain  Preoperative diagnosis:    1  Cervical spondylosis without myelopathy  2  Cervicalgia    Postoperative diagnosis:   1  Cervical spondylosis without myelopathy  2  Cervicalgia    Procedure: Fluoroscopically-guided Medial Branch Nerve Blocks of the left C4-5, C5-6 facet joint(s) using 0 5% bupivacaine      After discussing the risks, benefits, and alternatives to the procedure, the patient expressed understanding and wished to proceed  The patient was brought to the fluoroscopy suite and placed in the lateral with affected side facing upwards position  Procedural pause conducted to verify:  correct patient identity, procedure to be performed and as applicable, correct side and site, correct patient position, and availability of implants, special equipment and special requirements  Using fluoroscopy, the mid-body of the articular pillar at the left C4-5, C5-6 levels were identified  The skin was sterilely prepped and draped in the usual fashion using Chloraprep skin prep  Using fluoroscopic guidance, a 3 5 inch 25 gauge spinal needle was advanced to each target    After negative aspiration, 0 5cc of 0 5% bupivacaine was injected at each site and the needles were then removed  The patient tolerated the procedure well and there were no apparent complications  After appropriate observation, the patient was dismissed from the clinic in good condition under their own power  The patient was instructed to keep a pain diary and report the results at her follow up appointment               I was present for the entire procedure    Patient Disposition:  hemodynamically stable    SIGNATURE: Cj Lagos MD  DATE: February 20, 2018  TIME: 8:55 AM

## 2018-02-20 NOTE — H&P
Patient presents as a new patient with complaints of neck pain  Assessment:  1  Cervical spinal stenosis    2  Neck pain    3  Spondylosis of cervical region without myelopathy or radiculopathy          Plan:  Patient is a 74-year-old female with history of chronic neck pain and low back pain status post spinal cord stimulator for low back and lumbar radiculopathy presents with facetogenic cervical pain greater on the left side  1  We will schedule the patient for a left-sided  C4,C5, C6 medial branch  2  We will follow-up 2 weeks after injection or within 1 month whichever comes 1st                    [place] Prescription Drug Monitoring Program report was reviewed and was appropriate      Complete risks and benefits including bleeding, infection, tissue reaction, nerve injury and allergic reaction were discussed  The approach was demonstrated using models and literature was provided  Verbal and written consent was obtained         History of Present Illness: The patient is a 81 y o  female who presents for consultation in regards to Neck Pain  Symptoms have been present for several years  Symptoms began without any precipitating injury or trauma  Pain is reported to be 5 on the numeric rating scale  Symptoms are felt intermittently and worst in the no typical pattern  Symptoms are characterized as shooting, sharp and dull/aching  Symptoms are associated with no weakness  Aggravating factors include lying down and exercise  Relieving factors include none  No change in symptoms with kneeling, standing, bending, leaning forward, leaning bckward, sitting, walking, turning the head, relaxation, coughing/sneezing and bowel movements  Treatments that have been helpful include heat/ice  physical therapy and home exercise have provided no relief  Medications to relieve symptoms include none at this time      Review of Systems:     Review of Systems   Constitutional: Positive for unexpected weight change  Cardiovascular: Positive for palpitations  Gastrointestinal: Positive for constipation  Musculoskeletal: Positive for arthralgias  Decreased ROM, joint stiffness   Hematological: Bruises/bleeds easily     All other systems reviewed and are negative            Medical History        Past Medical History:   Diagnosis Date    Arthritis      Chronic pain disorder      GERD (gastroesophageal reflux disease)      Hyperlipidemia      Hypertension      IBS (irritable bowel syndrome)      Mitral regurgitation      Stroke [facility])       tia            Surgical History         Past Surgical History:   Procedure Laterality Date    APPENDECTOMY        HYSTERECTOMY        INSERT / REPLACE PERIPHERAL NEUROSTIMULATOR PULSE GENERATOR /  Left       buttocks    JOINT REPLACEMENT         knee    KNEE ARTHROSCOPY        MD COLONOSCOPY FLX DX W/COLLJ SPEC WHEN PFRMD N/A 4/24/2017     Procedure: FLEXIBLE COLONOSCOPY;  Surgeon: Patrick Grant MD;  Location: [facility];  Service: Colorectal    SINUS SURGERY                No family history on file      Social History          Occupational History    Not on file              Social History Main Topics    Smoking status: Former Smoker    Smokeless tobacco: Never Used         Comment: quit 40 yrs ago    Alcohol use Yes         Comment: social    Drug use: No    Sexual activity: Not on file            Current Outpatient Prescriptions:     aspirin (ECOTRIN LOW STRENGTH) 81 mg EC tablet, Take 81 mg by mouth daily, Disp: , Rfl:     butalbital-acetaminophen-caffeine-codeine (FIORICET WITH CODEINE) -03-30 MG per capsule, Take 1 capsule by mouth every 4 (four) hours as needed for headaches, Disp: , Rfl:     calcium carbonate (OS-MARLENY) 600 MG tablet, Take 600 mg by mouth 2 (two) times a day with meals, Disp: , Rfl:     cloNIDine (CATAPRES) 0 1 mg tablet, Take 0 1 mg by mouth 2 (two) times a day  , Disp: , Rfl:     losartan (COZAAR) 100 MG tablet, Take 100 mg by mouth daily, Disp: , Rfl:     multivitamin (THERAGRAN) TABS, Take 1 tablet by mouth daily, Disp: , Rfl:     nebivolol (BYSTOLIC) 10 mg tablet, Take 5 mg by mouth 2 (two) times a day  , Disp: , Rfl:     omeprazole (PriLOSEC) 20 mg delayed release capsule, Take 20 mg by mouth daily, Disp: , Rfl:     spironolactone (ALDACTONE) 50 mg tablet, Take 50 mg by mouth daily, Disp: , Rfl:     zolpidem (AMBIEN) 10 mg tablet, Take 10 mg by mouth daily at bedtime as needed for sleep, Disp: , Rfl:     cloNIDine (CATAPRES) 0 3 mg tablet, Take 0 3 mg by mouth every morning, Disp: , Rfl:     pravastatin (PRAVACHOL) 40 mg tablet, Take 40 mg by mouth daily, Disp: , Rfl:     predniSONE 20 mg tablet, Take 3 tablets by mouth daily, Disp: 12 tablet, Rfl: 0           Allergies   Allergen Reactions    Procaine Hives    Lidocaine Rash    Other Rash       Adhesive tape     Penicillins Rash         Physical Exam:     /68 (BP Location: Right arm, Patient Position: Sitting, Cuff Size: Standard)   Temp 98 1 °F (36 7 °C)   Wt 77 3 kg (170 lb 6 4 oz)   BMI 29 25 kg/m²      Constitutional: normal, well developed, well nourished, alert, in no distress and non-toxic and no overt pain behavior  Eyes: anicteric  HEENT: grossly intact  Neck: supple, symmetric, trachea midline and no masses   Pulmonary:even and unlabored  Cardiovascular:No edema or pitting edema present  Skin:Normal without rashes or lesions and well hydrated  Psychiatric:Mood and affect appropriate  Neurologic:Cranial Nerves II-XII grossly intact  Musculoskeletal:normal     Cervical Sensory Exam:  intact to light touch and pinprick in the upper extremities  ROM:  Decreased left and right rotation secondary to pain    hand strength was normal bilaterally  wrist strength was normal bilaterally  elbow strength was normal bilaterally  shoulder strength was normal bilaterally     Evaluation of Muscle Stretch Reflexes on the right side demonstrates negative Will's Test right upper extremity and negative right ankle clonus   muscle stretch reflexes equal and symmetric in the right upper and lower limbs  Evaluation of Muscle Stretch Reflexes on the left side demonstrates negative left ankle clonus, but muscle stretch reflexes equal and symmetric in the left upper and lower limbs and negative Will's Test left upper extremity  Special Tests: positive Spurling's Maneuver to the right and positive Spurling's Maneuver to the left  Special Tests Shoulder: positive Neer Test on right and positive Taylor Test on right       Imaging  CT scan cervical spine     FINDINGS:     ALIGNMENT:  Normal alignment of the cervical spine  No subluxation      VERTEBRAL BODIES:  No fracture      DEGENERATIVE CHANGES:  At C5-6 there is loss of disc height with mild endplate degenerative change including uncinate joint hypertrophic changes bilaterally   Similar findings to a lesser degree at C6-7   Mild to moderate canal stenosis   Mild left and   moderate right foraminal narrowing at C5-6   At C6-7 there is only minimal left foraminal narrowing      PREVERTEBRAL AND PARASPINAL SOFT TISSUES: Mild vascular calcification of the left distal common carotid artery and proximal cervical internal carotid artery   Heterogeneous appearance of the thyroid gland diffusely              THORACIC INLET:  Normal      IMPRESSION:     Cervical spondylitic degenerative change at C5-6 and C6-7   At C5-6 there is mild to moderate canal stenosis and moderate right foraminal narrowing      No orders of the defined types were placed in this encounter

## 2018-02-23 DIAGNOSIS — E78.2 MIXED HYPERLIPIDEMIA: Primary | ICD-10-CM

## 2018-02-23 RX ORDER — PRAVASTATIN SODIUM 40 MG
TABLET ORAL
Qty: 90 TABLET | Refills: 3 | Status: SHIPPED | OUTPATIENT
Start: 2018-02-23 | End: 2019-02-06

## 2018-02-26 ENCOUNTER — TELEPHONE (OUTPATIENT)
Dept: RADIOLOGY | Facility: CLINIC | Age: 73
End: 2018-02-26

## 2018-02-26 NOTE — TELEPHONE ENCOUNTER
----- Message from Morro Cevallos MD sent at 2/23/2018  7:46 AM EST -----  Regarding: Injection  Please schedule pt for second Left C4, C5, C6 Medial branch block   Pt reports 100% relief for 12 hours

## 2018-03-06 ENCOUNTER — APPOINTMENT (OUTPATIENT)
Dept: RADIOLOGY | Facility: HOSPITAL | Age: 73
End: 2018-03-06
Payer: MEDICARE

## 2018-03-06 ENCOUNTER — HOSPITAL ENCOUNTER (OUTPATIENT)
Facility: HOSPITAL | Age: 73
Setting detail: OUTPATIENT SURGERY
Discharge: HOME | End: 2018-03-06
Attending: ANESTHESIOLOGY | Admitting: ANESTHESIOLOGY
Payer: MEDICARE

## 2018-03-06 VITALS
OXYGEN SATURATION: 97 % | TEMPERATURE: 97.1 F | DIASTOLIC BLOOD PRESSURE: 68 MMHG | HEIGHT: 64 IN | RESPIRATION RATE: 18 BRPM | SYSTOLIC BLOOD PRESSURE: 160 MMHG | HEART RATE: 63 BPM | WEIGHT: 165 LBS | BODY MASS INDEX: 28.17 KG/M2

## 2018-03-06 PROCEDURE — 64490 INJ PARAVERT F JNT C/T 1 LEV: CPT | Performed by: ANESTHESIOLOGY

## 2018-03-06 PROCEDURE — 72020 X-RAY EXAM OF SPINE 1 VIEW: CPT

## 2018-03-06 PROCEDURE — 64491 INJ PARAVERT F JNT C/T 2 LEV: CPT | Performed by: ANESTHESIOLOGY

## 2018-03-06 RX ORDER — BUPIVACAINE HYDROCHLORIDE 5 MG/ML
INJECTION, SOLUTION EPIDURAL; INTRACAUDAL AS NEEDED
Status: DISCONTINUED | OUTPATIENT
Start: 2018-03-06 | End: 2018-03-06 | Stop reason: HOSPADM

## 2018-03-06 NOTE — OP NOTE
OPERATIVE REPORT  PATIENT NAME: Jenny Vega    :  1945  MRN: [id number]  Pt Location: [facility] RO[facility] 01    SURGERY DATE: 3/6/2018    Surgeon(s) and Role:     * Michael Hines MD - Primary    Preop Diagnosis:  Cervical spondylosis without myelopathy [M47 812]    Post-Op Diagnosis Codes:     * Cervical spondylosis without myelopathy [M47 812]    Procedure(s) (LRB):  C4, C5, C6 MEDIAL BRANCH BLOCK (Left)    Specimen(s):  * No specimens in log *    Estimated Blood Loss:   Minimal    Drains:       Anesthesia Type:   Local    Operative Indications:  Cervical spondylosis without myelopathy [M47 812]      Operative Findings:  same    Complications:   None    Procedure and Technique:    Cervical Medial Branch Nerve Block    Indication:  Mechanical neck pain  Preoperative diagnosis:    1  Cervical spondylosis without myelopathy  2  Cervicalgia    Postoperative diagnosis:   1  Cervical spondylosis without myelopathy  2  Cervicalgia    Procedure: Fluoroscopically-guided Medial Branch Nerve Blocks of the left C4, C5, C6 facet joint(s) using 0 5% bupivacaine      After discussing the risks, benefits, and alternatives to the procedure, the patient expressed understanding and wished to proceed  The patient was brought to the fluoroscopy suite and placed in the lateral with affected side facing upwards position  Procedural pause conducted to verify:  correct patient identity, procedure to be performed and as applicable, correct side and site, correct patient position, and availability of implants, special equipment and special requirements  Using fluoroscopy, the mid-body of the articular pillar at the left C4, C5, C6 levels were identified  The skin was sterilely prepped and draped in the usual fashion using Chloraprep skin prep  Using fluoroscopic guidance, a 3 5 inch 25 gauge spinal needle was advanced to each target    After negative aspiration, 0 5cc of bupivacaine was injected at each site and the needles were then removed  The patient tolerated the procedure well and there were no apparent complications  After appropriate observation, the patient was dismissed from the clinic in good condition under their own power  The patient was instructed to keep a pain diary and report the results at her follow up appointment               I was present for the entire procedure    Patient Disposition:  hemodynamically stable    SIGNATURE: Marc Vernon MD  DATE: March 6, 2018  TIME: 11:52 AM

## 2018-03-06 NOTE — DISCHARGE INSTRUCTIONS

## 2018-03-06 NOTE — H&P (VIEW-ONLY)
Patient presents as a new patient with complaints of neck pain  Assessment:  1  Cervical spinal stenosis    2  Neck pain    3  Spondylosis of cervical region without myelopathy or radiculopathy          Plan:  Patient is a 82-year-old female with history of chronic neck pain and low back pain status post spinal cord stimulator for low back and lumbar radiculopathy presents with facetogenic cervical pain greater on the left side  1  We will schedule the patient for a left-sided  C4,C5, C6 medial branch  2  We will follow-up 2 weeks after injection or within 1 month whichever comes 1st                    [place] Prescription Drug Monitoring Program report was reviewed and was appropriate      Complete risks and benefits including bleeding, infection, tissue reaction, nerve injury and allergic reaction were discussed  The approach was demonstrated using models and literature was provided  Verbal and written consent was obtained         History of Present Illness: The patient is a 81 y o  female who presents for consultation in regards to Neck Pain  Symptoms have been present for several years  Symptoms began without any precipitating injury or trauma  Pain is reported to be 5 on the numeric rating scale  Symptoms are felt intermittently and worst in the no typical pattern  Symptoms are characterized as shooting, sharp and dull/aching  Symptoms are associated with no weakness  Aggravating factors include lying down and exercise  Relieving factors include none  No change in symptoms with kneeling, standing, bending, leaning forward, leaning bckward, sitting, walking, turning the head, relaxation, coughing/sneezing and bowel movements  Treatments that have been helpful include heat/ice  physical therapy and home exercise have provided no relief  Medications to relieve symptoms include none at this time      Review of Systems:     Review of Systems   Constitutional: Positive for unexpected weight change  Cardiovascular: Positive for palpitations  Gastrointestinal: Positive for constipation  Musculoskeletal: Positive for arthralgias  Decreased ROM, joint stiffness   Hematological: Bruises/bleeds easily     All other systems reviewed and are negative            Medical History        Past Medical History:   Diagnosis Date    Arthritis      Chronic pain disorder      GERD (gastroesophageal reflux disease)      Hyperlipidemia      Hypertension      IBS (irritable bowel syndrome)      Mitral regurgitation      Stroke [facility])       tia            Surgical History         Past Surgical History:   Procedure Laterality Date    APPENDECTOMY        HYSTERECTOMY        INSERT / REPLACE PERIPHERAL NEUROSTIMULATOR PULSE GENERATOR /  Left       buttocks    JOINT REPLACEMENT         knee    KNEE ARTHROSCOPY        [place] COLONOSCOPY FLX DX W/COLLJ SPEC WHEN PFRMD N/A 4/24/2017     Procedure: FLEXIBLE COLONOSCOPY;  Surgeon: Atif Cooper MD;  Location: [facility];  Service: Colorectal    SINUS SURGERY                No family history on file      Social History          Occupational History    Not on file              Social History Main Topics    Smoking status: Former Smoker    Smokeless tobacco: Never Used         Comment: quit 40 yrs ago    Alcohol use Yes         Comment: social    Drug use: No    Sexual activity: Not on file            Current Outpatient Prescriptions:     aspirin (ECOTRIN LOW STRENGTH) 81 mg EC tablet, Take 81 mg by mouth daily, Disp: , Rfl:     butalbital-acetaminophen-caffeine-codeine (FIORICET WITH CODEINE) -89-30 MG per capsule, Take 1 capsule by mouth every 4 (four) hours as needed for headaches, Disp: , Rfl:     calcium carbonate (OS-MARLENY) 600 MG tablet, Take 600 mg by mouth 2 (two) times a day with meals, Disp: , Rfl:     cloNIDine (CATAPRES) 0 1 mg tablet, Take 0 1 mg by mouth 2 (two) times a day  , Disp: , Rfl:     losartan (COZAAR) 100 MG tablet, Take 100 mg by mouth daily, Disp: , Rfl:     multivitamin (THERAGRAN) TABS, Take 1 tablet by mouth daily, Disp: , Rfl:     nebivolol (BYSTOLIC) 10 mg tablet, Take 5 mg by mouth 2 (two) times a day  , Disp: , Rfl:     omeprazole (PriLOSEC) 20 mg delayed release capsule, Take 20 mg by mouth daily, Disp: , Rfl:     spironolactone (ALDACTONE) 50 mg tablet, Take 50 mg by mouth daily, Disp: , Rfl:     zolpidem (AMBIEN) 10 mg tablet, Take 10 mg by mouth daily at bedtime as needed for sleep, Disp: , Rfl:     cloNIDine (CATAPRES) 0 3 mg tablet, Take 0 3 mg by mouth every morning, Disp: , Rfl:     pravastatin (PRAVACHOL) 40 mg tablet, Take 40 mg by mouth daily, Disp: , Rfl:     predniSONE 20 mg tablet, Take 3 tablets by mouth daily, Disp: 12 tablet, Rfl: 0           Allergies   Allergen Reactions    Procaine Hives    Lidocaine Rash    Other Rash       Adhesive tape     Penicillins Rash         Physical Exam:     /68 (BP Location: Right arm, Patient Position: Sitting, Cuff Size: Standard)   Temp 98 1 °F (36 7 °C)   Wt 77 3 kg (170 lb 6 4 oz)   BMI 29 25 kg/m²      Constitutional: normal, well developed, well nourished, alert, in no distress and non-toxic and no overt pain behavior  Eyes: anicteric  HEENT: grossly intact  Neck: supple, symmetric, trachea midline and no masses   Pulmonary:even and unlabored  Cardiovascular:No edema or pitting edema present  Skin:Normal without rashes or lesions and well hydrated  Psychiatric:Mood and affect appropriate  Neurologic:Cranial Nerves II-XII grossly intact  Musculoskeletal:normal     Cervical Sensory Exam:  intact to light touch and pinprick in the upper extremities  ROM:  Decreased left and right rotation secondary to pain    hand strength was normal bilaterally  wrist strength was normal bilaterally  elbow strength was normal bilaterally  shoulder strength was normal bilaterally     Evaluation of Muscle Stretch Reflexes on the right side demonstrates negative Will's Test right upper extremity and negative right ankle clonus   muscle stretch reflexes equal and symmetric in the right upper and lower limbs  Evaluation of Muscle Stretch Reflexes on the left side demonstrates negative left ankle clonus, but muscle stretch reflexes equal and symmetric in the left upper and lower limbs and negative Will's Test left upper extremity  Special Tests: positive Spurling's Maneuver to the right and positive Spurling's Maneuver to the left  Special Tests Shoulder: positive Neer Test on right and positive Taylor Test on right       Imaging  CT scan cervical spine     FINDINGS:     ALIGNMENT:  Normal alignment of the cervical spine  No subluxation      VERTEBRAL BODIES:  No fracture      DEGENERATIVE CHANGES:  At C5-6 there is loss of disc height with mild endplate degenerative change including uncinate joint hypertrophic changes bilaterally   Similar findings to a lesser degree at C6-7   Mild to moderate canal stenosis   Mild left and   moderate right foraminal narrowing at C5-6   At C6-7 there is only minimal left foraminal narrowing      PREVERTEBRAL AND PARASPINAL SOFT TISSUES: Mild vascular calcification of the left distal common carotid artery and proximal cervical internal carotid artery   Heterogeneous appearance of the thyroid gland diffusely              THORACIC INLET:  Normal      IMPRESSION:     Cervical spondylitic degenerative change at C5-6 and C6-7   At C5-6 there is mild to moderate canal stenosis and moderate right foraminal narrowing      No orders of the defined types were placed in this encounter

## 2018-03-12 ENCOUNTER — TELEPHONE (OUTPATIENT)
Dept: PAIN MEDICINE | Facility: CLINIC | Age: 73
End: 2018-03-12

## 2018-03-12 DIAGNOSIS — M47.812 SPONDYLOSIS OF CERVICAL REGION WITHOUT MYELOPATHY OR RADICULOPATHY: Primary | ICD-10-CM

## 2018-03-12 NOTE — TELEPHONE ENCOUNTER
Patient reports 90% relief for 12 hours from a left C4, C5, C6 medial branch blocks 2nd set which was performed on 03/06/2018    Based on the results of the 2 medial branch blocks we will proceed with a left C4, C5, C6 radiofrequency ablation

## 2018-03-13 NOTE — TELEPHONE ENCOUNTER
I called and scheduled patient for RFA, reviewed pre-op instructions, she verbalized understanding   She is scheduled for 4/17 at [facility]

## 2018-04-04 ENCOUNTER — TRANSCRIBE ORDERS (OUTPATIENT)
Dept: RADIOLOGY | Facility: MEDICAL CENTER | Age: 73
End: 2018-04-04

## 2018-04-04 ENCOUNTER — APPOINTMENT (OUTPATIENT)
Dept: LAB | Facility: MEDICAL CENTER | Age: 73
End: 2018-04-04
Payer: MEDICARE

## 2018-04-04 DIAGNOSIS — I12.9 HYPERTENSIVE NEPHROPATHY: ICD-10-CM

## 2018-04-04 DIAGNOSIS — N18.2 CHRONIC KIDNEY DISEASE, STAGE II (MILD): ICD-10-CM

## 2018-04-04 DIAGNOSIS — IMO0002 HYPOTONIC DISORDER: ICD-10-CM

## 2018-04-04 DIAGNOSIS — N18.2 CHRONIC KIDNEY DISEASE, STAGE II (MILD): Primary | ICD-10-CM

## 2018-04-04 LAB
ANION GAP SERPL CALCULATED.3IONS-SCNC: 6 MMOL/L (ref 4–13)
BUN SERPL-MCNC: 13 MG/DL (ref 5–25)
CALCIUM SERPL-MCNC: 9.1 MG/DL (ref 8.3–10.1)
CHLORIDE SERPL-SCNC: 94 MMOL/L (ref 100–108)
CO2 SERPL-SCNC: 29 MMOL/L (ref 21–32)
CREAT SERPL-MCNC: 0.98 MG/DL (ref 0.6–1.3)
GFR SERPL CREATININE-BSD FRML MDRD: 58 ML/MIN/1.73SQ M
GLUCOSE P FAST SERPL-MCNC: 79 MG/DL (ref 65–99)
POTASSIUM SERPL-SCNC: 4.2 MMOL/L (ref 3.5–5.3)
SODIUM SERPL-SCNC: 129 MMOL/L (ref 136–145)

## 2018-04-04 PROCEDURE — 80048 BASIC METABOLIC PNL TOTAL CA: CPT

## 2018-04-04 PROCEDURE — 36415 COLL VENOUS BLD VENIPUNCTURE: CPT

## 2018-04-17 ENCOUNTER — HOSPITAL ENCOUNTER (OUTPATIENT)
Facility: HOSPITAL | Age: 73
Setting detail: OUTPATIENT SURGERY
Discharge: HOME | End: 2018-04-17
Attending: ANESTHESIOLOGY | Admitting: ANESTHESIOLOGY
Payer: MEDICARE

## 2018-04-17 ENCOUNTER — APPOINTMENT (OUTPATIENT)
Dept: RADIOLOGY | Facility: HOSPITAL | Age: 73
End: 2018-04-17
Payer: MEDICARE

## 2018-04-17 VITALS
DIASTOLIC BLOOD PRESSURE: 71 MMHG | BODY MASS INDEX: 28.17 KG/M2 | OXYGEN SATURATION: 100 % | SYSTOLIC BLOOD PRESSURE: 173 MMHG | TEMPERATURE: 97.2 F | WEIGHT: 165 LBS | HEART RATE: 60 BPM | RESPIRATION RATE: 18 BRPM | HEIGHT: 64 IN

## 2018-04-17 PROCEDURE — 72020 X-RAY EXAM OF SPINE 1 VIEW: CPT

## 2018-04-17 PROCEDURE — 64634 DESTROY C/TH FACET JNT ADDL: CPT | Performed by: ANESTHESIOLOGY

## 2018-04-17 PROCEDURE — 64633 DESTROY CERV/THOR FACET JNT: CPT | Performed by: ANESTHESIOLOGY

## 2018-04-17 RX ORDER — BUPIVACAINE HYDROCHLORIDE 5 MG/ML
INJECTION, SOLUTION EPIDURAL; INTRACAUDAL AS NEEDED
Status: DISCONTINUED | OUTPATIENT
Start: 2018-04-17 | End: 2018-04-17 | Stop reason: HOSPADM

## 2018-04-17 NOTE — OP NOTE
OPERATIVE REPORT  PATIENT NAME: Sasha Vásquez    :  1945  MRN: [id number]  Pt Location: [facility] OR ROOM 01    SURGERY DATE: 2018    Surgeon(s) and Role:     * Ranjith Kyle MD - Primary    Preop Diagnosis:  Cervical spondylosis without myelopathy [M47 812]    Post-Op Diagnosis Codes:     * Cervical spondylosis without myelopathy [M47 812]    Procedure(s) (LRB):  ABLATION RADIO FREQUENCY (RFA) - C4, C5, C6 (Left)    Specimen(s):  * No specimens in log *    Estimated Blood Loss:   Minimal    Drains:       Anesthesia Type:   Local    Operative Indications:  Cervical spondylosis without myelopathy [M47 812]      Operative Findings:  same    Complications:   None    Procedure and Technique:      Indication: Mechanical neck pain  Procedure:  Fluoroscopically-guided Radiofrequency denervation of the C4, C5, C6 facet joint(s)     After discussing the risks, benefits, and alternatives to the procedure, the patient expressed understanding and wished to proceed  The patient was brought to the fluoroscopy suite and placed in the prone position  Procedural pause conducted to verify:  correct patient identity, procedure to be performed and as applicable, correct side and site, correct patient position, and availability of implants, special equipment and special requirements  Using fluoroscopy, the mid-body of the articular pillar at the C4, C5, C6 levels were identified and marked  The skin was sterilely prepped and draped in the usual fashion using Chloraprep skin prep  The skin and subcutaneous tissue were anesthetized with 0 5% bupivacaine  Using fluoroscopic guidance, a 50 mm 22 gauge RFK RF cannula with a 10 mm active tip was advanced to each target  This was confirmed using PA, lateral and oblique fluoroscopic views  Motor testing was performed at 2Hz up to 2 5 volts, and measured tissue impedances were satisfactory  With final needle positioning, there was no evidence of radicular stimulation  Prior to lesioning, 1cc of 2% lidocaine was injected at each site  After waiting 2 minutes for local anesthesia to take effect, lesioning was performed at 90 degrees Celsius for 90 seconds  A slight repositioning of the needles was performed an lesioning was again performed at 90 degreees Celsius for 90 seconds  After RF treatment, each site was injected using 1cc of 0 25% bupivacaine  The patient tolerated the procedure well and there were no apparent complications  After appropriate observation, the patient was dismissed from the clinic in good condition under their own power               I was present for the entire procedure    Patient Disposition:  hemodynamically stable    SIGNATURE: Denia Anand MD  DATE: April 17, 2018  TIME: 9:10 AM

## 2018-04-17 NOTE — DISCHARGE INSTRUCTIONS

## 2018-04-18 ENCOUNTER — TELEPHONE (OUTPATIENT)
Dept: PAIN MEDICINE | Facility: MEDICAL CENTER | Age: 73
End: 2018-04-18

## 2018-04-18 DIAGNOSIS — M47.812 SPONDYLOSIS OF CERVICAL REGION WITHOUT MYELOPATHY OR RADICULOPATHY: Primary | ICD-10-CM

## 2018-04-18 RX ORDER — DICLOFENAC POTASSIUM 50 MG/1
50 TABLET, FILM COATED ORAL 3 TIMES DAILY
Qty: 90 TABLET | Refills: 0 | Status: SHIPPED | OUTPATIENT
Start: 2018-04-18 | End: 2019-02-06

## 2018-04-18 NOTE — TELEPHONE ENCOUNTER
[facility]- patient called to schedule f/u appt  Stated she is having a lot of pain after the procedure and is feeling lightheaded   c/b [phone]

## 2018-04-18 NOTE — TELEPHONE ENCOUNTER
Spoke with pt states that she is very light headed since the RfA yesterday and that she had had increased pain  No redness or draining from injection site  Pt is not c/o of a sunburn sensation  Pt has been applying ice and heat with no relief       Please advise

## 2018-04-27 DIAGNOSIS — I10 ESSENTIAL HYPERTENSION: Primary | ICD-10-CM

## 2018-04-28 RX ORDER — LOSARTAN POTASSIUM 100 MG/1
TABLET ORAL
Qty: 90 TABLET | Refills: 3 | Status: SHIPPED | OUTPATIENT
Start: 2018-04-28 | End: 2019-02-11 | Stop reason: SDUPTHER

## 2018-05-16 ENCOUNTER — OFFICE VISIT (OUTPATIENT)
Dept: PAIN MEDICINE | Facility: CLINIC | Age: 73
End: 2018-05-16
Payer: MEDICARE

## 2018-05-16 VITALS — BODY MASS INDEX: 29.35 KG/M2 | SYSTOLIC BLOOD PRESSURE: 130 MMHG | WEIGHT: 171 LBS | DIASTOLIC BLOOD PRESSURE: 60 MMHG

## 2018-05-16 DIAGNOSIS — G89.4 CHRONIC PAIN SYNDROME: ICD-10-CM

## 2018-05-16 DIAGNOSIS — M47.812 CERVICAL SPONDYLOSIS WITHOUT MYELOPATHY: Primary | ICD-10-CM

## 2018-05-16 PROCEDURE — 99213 OFFICE O/P EST LOW 20 MIN: CPT | Performed by: ANESTHESIOLOGY

## 2018-05-16 RX ORDER — FLUTICASONE PROPIONATE 50 MCG
SPRAY, SUSPENSION (ML) NASAL
COMMUNITY
End: 2019-02-06

## 2018-05-16 RX ORDER — NICOTINE POLACRILEX 4 MG/1
GUM, CHEWING ORAL
COMMUNITY
End: 2019-02-06

## 2018-05-16 RX ORDER — ERYTHROMYCIN 5 MG/G
OINTMENT OPHTHALMIC
COMMUNITY
Start: 2018-02-16 | End: 2019-02-06

## 2018-05-16 RX ORDER — NEBIVOLOL 10 MG/1
TABLET ORAL
COMMUNITY
End: 2019-02-06

## 2018-05-16 RX ORDER — METHYLPREDNISOLONE 4 MG/1
TABLET ORAL
Status: ON HOLD | COMMUNITY
Start: 2018-03-12 | End: 2019-02-07 | Stop reason: ALTCHOICE

## 2018-05-16 RX ORDER — ERGOCALCIFEROL (VITAMIN D2) 10 MCG
1 TABLET ORAL DAILY
COMMUNITY
End: 2020-06-22

## 2018-05-16 RX ORDER — VERAPAMIL HYDROCHLORIDE 240 MG/1
TABLET, FILM COATED, EXTENDED RELEASE ORAL
COMMUNITY
End: 2019-02-06

## 2018-05-16 RX ORDER — ALPRAZOLAM 0.25 MG/1
TABLET ORAL
Status: ON HOLD | COMMUNITY
End: 2019-02-07 | Stop reason: ALTCHOICE

## 2018-05-16 RX ORDER — BUTALBITAL, ACETAMINOPHEN AND CAFFEINE 50; 325; 40 MG/1; MG/1; MG/1
TABLET ORAL
COMMUNITY
End: 2019-02-06

## 2018-05-16 RX ORDER — PRAVASTATIN SODIUM 40 MG
TABLET ORAL
COMMUNITY
End: 2019-02-06

## 2018-05-16 RX ORDER — LOSARTAN POTASSIUM 100 MG/1
TABLET ORAL
COMMUNITY
End: 2019-02-06

## 2018-05-16 RX ORDER — CLONIDINE HYDROCHLORIDE 0.2 MG/1
1.5 TABLET ORAL 2 TIMES DAILY
COMMUNITY
Start: 2017-04-04 | End: 2019-02-06

## 2018-05-16 RX ORDER — ZOLPIDEM TARTRATE 10 MG/1
10 TABLET ORAL
COMMUNITY
End: 2020-09-28 | Stop reason: SDUPTHER

## 2018-05-16 RX ORDER — ATORVASTATIN CALCIUM 40 MG/1
TABLET, FILM COATED ORAL
Status: ON HOLD | COMMUNITY
End: 2019-02-07 | Stop reason: ALTCHOICE

## 2018-05-16 NOTE — PROGRESS NOTES
Pt is c/o left sided neck pain  Assessment:  1  Cervical spondylosis without myelopathy    2  Chronic pain syndrome        Plan:  Patient is a 82-year-old female with history of cervical spondylosis presents status post left C4, C5 C6 radiofrequency ablation and reports 85% relief with ability to rotate her head to the left  Patient denies any adverse events since last office visit  She reports good relief from the procedure  She does note some irritation her stomach and has ceased taking diclofenac 50 mg p o  t I d  She does report  Clonidine being weaned by her cardiologist   1  Follow up in 3 month                    [place] [id number] Program report was reviewed and was appropriate       History of Present Illness: The patient is a 72 y o  female who presents for a follow up office visit in regards to Neck Pain (left side)  The patients current symptoms include   4/10 occasional the pain in the left shoulder / trapezius without any particular pain pattern    Current pain medications includes:    The patient reports that this regimen is providing 75% pain relief  The patient is reporting no side effects from this pain medication regimen  I have personally reviewed and/or updated the patient's past medical history, past surgical history, family history, social history, current medications, allergies, and vital signs today  Review of Systems  Review of Systems   Respiratory: Positive for shortness of breath  Cardiovascular: Negative for chest pain  Gastrointestinal: Positive for nausea  Negative for constipation, diarrhea and vomiting  Musculoskeletal: Negative for arthralgias, gait problem, joint swelling and myalgias  Decreased rom  Joint stiffness   Skin: Negative for rash  Neurological: Negative for dizziness, seizures and weakness  All other systems reviewed and are negative          Past Medical History:   Diagnosis Date    Arthritis     Chronic pain disorder     GERD (gastroesophageal reflux disease)     Hyperlipidemia     Hypertension     IBS (irritable bowel syndrome)     Mitral regurgitation     Stroke ([facility] [address] )     tia       Past Surgical History:   Procedure Laterality Date    APPENDECTOMY      HYSTERECTOMY      INSERT / REPLACE PERIPHERAL NEUROSTIMULATOR PULSE GENERATOR /  Left     buttocks    JOINT REPLACEMENT      knee    KNEE ARTHROSCOPY      NERVE BLOCK Left 2/20/2018    Procedure: BLOCK MEDIAL BRANCH - C4, C5, C6;  Surgeon: Pedro Rea MD;  Location: [facility];  Service: Pain Management     NERVE BLOCK Left 3/6/2018    Procedure: C4, C5, C6 MEDIAL BRANCH BLOCK;  Surgeon: Pedro Rea MD;  Location: [facility];  Service: Pain Management     [place] COLONOSCOPY FLX DX W/COLLJ SPEC WHEN PFRMD N/A 4/24/2017    Procedure: Shelbi Tolentino;  Surgeon: Carlyn Peabody, MD;  Location: [facility];  Service: Colorectal    RADIOFREQUENCY ABLATION Left 4/17/2018    Procedure: ABLATION RADIO FREQUENCY (RFA) - C4, C5, C6;  Surgeon: Pedro Rea MD;  Location: [facility];  Service: Pain Management     SINUS SURGERY         No family history on file  Social History     Occupational History    Not on file       Social History Main Topics    Smoking status: Former Smoker    Smokeless tobacco: Never Used      Comment: quit 40 yrs ago    Alcohol use Yes      Comment: social    Drug use: No    Sexual activity: Not on file         Current Outpatient Prescriptions:     cloNIDine (CATAPRES) 0 2 mg tablet, Take 1 5 tablets by mouth 2 (two) times a day, Disp: , Rfl:     ALPRAZolam (XANAX) 0 25 mg tablet, alprazolam 0 25 mg tablet, Disp: , Rfl:     aspirin (ECOTRIN LOW STRENGTH) 81 mg EC tablet, Take 81 mg by mouth daily, Disp: , Rfl:     aspirin 81 MG tablet, Take 1 tablet by mouth daily, Disp: , Rfl:     atorvastatin (LIPITOR) 40 mg tablet, atorvastatin 40 mg tablet, Disp: , Rfl:    butalbital-acetaminophen-caffeine (FIORICET,ESGIC) -40 mg per tablet, butalbital-acetaminophen-caffeine 50 mg-325 mg-40 mg tablet, Disp: , Rfl:     butalbital-acetaminophen-caffeine-codeine (FIORICET WITH CODEINE) -32-30 MG per capsule, Take 1 capsule by mouth every 4 (four) hours as needed for headaches, Disp: , Rfl:     Calcium Carb-Cholecalciferol (CALCIUM 1000 + D PO), calcium, Disp: , Rfl:     calcium carbonate (OS-MARLENY) 600 MG tablet, Take 600 mg by mouth 2 (two) times a day with meals, Disp: , Rfl:     cloNIDine (CATAPRES) 0 1 mg tablet, Take 0 1 mg by mouth 2 (two) times a day  , Disp: , Rfl:     diclofenac potassium (CATAFLAM) 50 mg tablet, Take 1 tablet (50 mg total) by mouth 3 (three) times a day for 30 days, Disp: 90 tablet, Rfl: 0    erythromycin (ILOTYCIN) ophthalmic ointment, , Disp: , Rfl:     fluticasone (FLONASE) 50 mcg/act nasal spray, fluticasone 50 mcg/actuation nasal spray,suspension, Disp: , Rfl:     losartan (COZAAR) 100 MG tablet, TAKE 1 TABLET EVERY DAY, Disp: 90 tablet, Rfl: 3    losartan (COZAAR) 100 MG tablet, losartan 100 mg tablet, Disp: , Rfl:     Methylprednisolone 4 MG TBPK, , Disp: , Rfl:     Multiple Vitamin (DAILY VALUE MULTIVITAMIN) TABS, Take 1 tablet by mouth daily, Disp: , Rfl:     multivitamin (THERAGRAN) TABS, Take 1 tablet by mouth daily, Disp: , Rfl:     nebivolol (BYSTOLIC) 10 mg tablet, Take 5 mg by mouth 2 (two) times a day  , Disp: , Rfl:     nebivolol (BYSTOLIC) 10 mg tablet, Bystolic 10 mg tablet, Disp: , Rfl:     omeprazole (PriLOSEC) 20 mg delayed release capsule, Take 20 mg by mouth daily, Disp: , Rfl:     Omeprazole 20 MG TBEC, omeprazole 20 mg capsule,delayed release, Disp: , Rfl:     pravastatin (PRAVACHOL) 40 mg tablet, TAKE 1 TABLET DAILY AS DIRECTED, Disp: 90 tablet, Rfl: 3    pravastatin (PRAVACHOL) 40 mg tablet, pravastatin 40 mg tablet, Disp: , Rfl:     spironolactone (ALDACTONE) 50 mg tablet, Take 50 mg by mouth daily, Disp: , Rfl:     verapamil (CALAN-SR) 240 mg CR tablet, verapamil ER (HS) 240 mg tablet,extended release 24 hr, Disp: , Rfl:     zolpidem (AMBIEN) 10 mg tablet, Take 10 mg by mouth daily at bedtime as needed for sleep, Disp: , Rfl:     zolpidem (AMBIEN) 10 mg tablet, zolpidem 10 mg tablet, Disp: , Rfl:     Allergies   Allergen Reactions    Procaine Hives    Lidocaine Rash    Other Rash     Adhesive tape     Penicillins Rash       Physical Exam:    /60   Wt 77 6 kg (171 lb)   BMI 29 35 kg/m²     Constitutional:normal, well developed, well nourished, alert, in no distress and non-toxic and no overt pain behavior  Eyes:anicteric  HEENT:grossly intact  Neck:supple, symmetric, trachea midline and no masses   Pulmonary:even and unlabored  Cardiovascular:No edema or pitting edema present  Skin:Normal without rashes or lesions and well hydrated  Psychiatric:Mood and affect appropriate  Neurologic:Cranial Nerves II-XII grossly intact  Musculoskeletal:normal     Imaging  No orders to display       No orders of the defined types were placed in this encounter

## 2018-05-18 ENCOUNTER — HOSPITAL ENCOUNTER (OUTPATIENT)
Dept: NUCLEAR MEDICINE | Facility: HOSPITAL | Age: 73
Discharge: HOME | End: 2018-05-18
Attending: INTERNAL MEDICINE
Payer: MEDICARE

## 2018-05-18 DIAGNOSIS — E78.00 PURE HYPERCHOLESTEROLEMIA: ICD-10-CM

## 2018-05-18 DIAGNOSIS — I10 ESSENTIAL (PRIMARY) HYPERTENSION: ICD-10-CM

## 2018-05-18 DIAGNOSIS — R06.02 SHORTNESS OF BREATH: ICD-10-CM

## 2018-05-18 DIAGNOSIS — I34.0 NONRHEUMATIC MITRAL VALVE INSUFFICIENCY: ICD-10-CM

## 2018-05-18 DIAGNOSIS — I51.9 HEART DISEASE: ICD-10-CM

## 2018-05-18 LAB
CHEST PAIN STATEMENT: NORMAL
MAX DIASTOLIC BP: 70 MMHG
MAX HEART RATE: 81 BPM
MAX PREDICTED HEART RATE: 149 BPM
MAX. SYSTOLIC BP: 152 MMHG
PROTOCOL NAME: NORMAL
REASON FOR TERMINATION: NORMAL
TARGET HR FORMULA: NORMAL
TEST INDICATION: NORMAL
TIME IN EXERCISE PHASE: NORMAL

## 2018-05-18 PROCEDURE — A9502 TC99M TETROFOSMIN: HCPCS

## 2018-05-18 PROCEDURE — 78452 HT MUSCLE IMAGE SPECT MULT: CPT

## 2018-05-18 PROCEDURE — 93017 CV STRESS TEST TRACING ONLY: CPT

## 2018-05-18 RX ADMIN — REGADENOSON 0.4 MG: 0.08 INJECTION, SOLUTION INTRAVENOUS at 09:24

## 2018-06-25 ENCOUNTER — TRANSCRIBE ORDERS (OUTPATIENT)
Dept: ADMINISTRATIVE | Facility: HOSPITAL | Age: 73
End: 2018-06-25

## 2018-06-25 ENCOUNTER — APPOINTMENT (OUTPATIENT)
Dept: RADIOLOGY | Facility: MEDICAL CENTER | Age: 73
End: 2018-06-25
Payer: MEDICARE

## 2018-06-25 DIAGNOSIS — R05.9 COUGH: Primary | ICD-10-CM

## 2018-06-25 DIAGNOSIS — R05.9 COUGH: ICD-10-CM

## 2018-06-25 PROCEDURE — 71046 X-RAY EXAM CHEST 2 VIEWS: CPT

## 2018-08-02 ENCOUNTER — OFFICE VISIT (OUTPATIENT)
Dept: CARDIOLOGY CLINIC | Facility: HOSPITAL | Age: 73
End: 2018-08-02
Payer: MEDICARE

## 2018-08-02 VITALS
BODY MASS INDEX: 29.43 KG/M2 | SYSTOLIC BLOOD PRESSURE: 168 MMHG | WEIGHT: 172.4 LBS | HEART RATE: 62 BPM | DIASTOLIC BLOOD PRESSURE: 68 MMHG | HEIGHT: 64 IN

## 2018-08-02 DIAGNOSIS — I10 ESSENTIAL HYPERTENSION: Primary | ICD-10-CM

## 2018-08-02 DIAGNOSIS — I34.0 NON-RHEUMATIC MITRAL REGURGITATION: ICD-10-CM

## 2018-08-02 DIAGNOSIS — R06.02 SHORTNESS OF BREATH ON EXERTION: ICD-10-CM

## 2018-08-02 DIAGNOSIS — I51.89 DIASTOLIC DYSFUNCTION: ICD-10-CM

## 2018-08-02 DIAGNOSIS — E78.00 HYPERCHOLESTEROLEMIA: ICD-10-CM

## 2018-08-02 PROCEDURE — 99214 OFFICE O/P EST MOD 30 MIN: CPT | Performed by: INTERNAL MEDICINE

## 2018-08-02 NOTE — PROGRESS NOTES
Cardiology Follow Up    Lola Apt  1945  [id number]  [address]    1  Essential hypertension  Echo complete with contrast if indicated   2  Non-rheumatic mitral regurgitation  Echo complete with contrast if indicated   3  Diastolic dysfunction     4  Hypercholesterolemia     5  Shortness of breath on exertion         Discussion/Summary:  Overall she has been doing well since her last appointment  Clonidine was stopped and she was changed to spironolactone  Blood pressures at home have been averaging 485-665 systolic which have been quite good for her  She continues to follow with Nephrology in their help his been appreciated  There has been no evidence of diastolic heart failure  She is due for an echocardiogram to follow mitral regurgitation  I made no changes in her medical regimen  Interval History:  79-year-old female with difficult-to-control hypertension, mitral regurgitation, chronic diastolic congestive heart failure presents for follow-up visit  Overall she has been doing well  She gets some shortness of breath when she pushes it going up the steps  She denies any chest pain or palpitations has been no lightheadedness dizziness or syncope  She denies any lower extremity edema, PND, orthopnea  She tries to remain on a low-sodium intake      Problem List     Diastolic dysfunction    Hypercholesterolemia    Hypertension    Mitral regurgitation    Overview Signed 2/8/2018  8:25 AM by Jesus Marie DO     Description: Moderate         Shortness of breath on exertion    Cervical spondylosis without myelopathy    Overview Signed 2/13/2018  8:13 AM by Sarkis Knapp MA     Added automatically from request for surgery [id number]         Chronic pain syndrome        Past Medical History:   Diagnosis Date    Arthritis     Chronic pain disorder     GERD (gastroesophageal reflux disease)     Hyperlipidemia     Hypertension     IBS (irritable bowel syndrome)     Mitral regurgitation     Stroke [facility])     tia     Social History     Social History    Marital status: /Civil Union     Spouse name: N/A    Number of children: N/A    Years of education: N/A     Occupational History    Not on file  Social History Main Topics    Smoking status: Former Smoker    Smokeless tobacco: Never Used      Comment: quit 40 yrs ago    Alcohol use Yes      Comment: social    Drug use: No    Sexual activity: Not on file     Other Topics Concern    Not on file     Social History Narrative    No narrative on file      No family history on file    Past Surgical History:   Procedure Laterality Date    APPENDECTOMY      HYSTERECTOMY      INSERT / REPLACE PERIPHERAL NEUROSTIMULATOR PULSE GENERATOR /  Left     buttocks    JOINT REPLACEMENT      knee    KNEE ARTHROSCOPY      NERVE BLOCK Left 2/20/2018    Procedure: BLOCK MEDIAL BRANCH - C4, C5, C6;  Surgeon: Reza Valencia MD;  [facility];  Service: Pain Management     NERVE BLOCK Left 3/6/2018    Procedure: C4, C5, C6 MEDIAL BRANCH BLOCK;  Surgeon: Reza Valencia MD;  [facility];  Service: Pain Management     [place] COLONOSCOPY FLX DX W/COLLJ SPEC WHEN PFRMD N/A 4/24/2017    Procedure: Tiff Weeks;  Surgeon: Avril Palomino MD;  [facility];  Service: Colorectal    RADIOFREQUENCY ABLATION Left 4/17/2018    Procedure: ABLATION RADIO FREQUENCY (RFA) - C4, C5, C6;  Surgeon: Reza Valencia MD;  [facility];  Service: Pain Management     SINUS SURGERY         Current Outpatient Prescriptions:     aspirin 81 MG tablet, Take 1 tablet by mouth daily, Disp: , Rfl:     butalbital-acetaminophen-caffeine (FIORICET,ESGIC) -40 mg per tablet, butalbital-acetaminophen-caffeine 50 mg-325 mg-40 mg tablet, Disp: , Rfl:     butalbital-acetaminophen-caffeine-codeine (FIORICET WITH CODEINE) -78-30 MG per capsule, Take 1 capsule by mouth every 4 (four) hours as needed for headaches, Disp: , Rfl:     calcium carbonate (OS-MARLENY) 600 MG tablet, Take 600 mg by mouth 2 (two) times a day with meals, Disp: , Rfl:     erythromycin (ILOTYCIN) ophthalmic ointment, , Disp: , Rfl:     fluticasone (FLONASE) 50 mcg/act nasal spray, fluticasone 50 mcg/actuation nasal spray,suspension, Disp: , Rfl:     losartan (COZAAR) 100 MG tablet, TAKE 1 TABLET EVERY DAY, Disp: 90 tablet, Rfl: 3    Multiple Vitamin (DAILY VALUE MULTIVITAMIN) TABS, Take 1 tablet by mouth daily, Disp: , Rfl:     multivitamin (THERAGRAN) TABS, Take 1 tablet by mouth daily, Disp: , Rfl:     nebivolol (BYSTOLIC) 10 mg tablet, Take 5 mg by mouth 2 (two) times a day  , Disp: , Rfl:     omeprazole (PriLOSEC) 20 mg delayed release capsule, Take 20 mg by mouth daily, Disp: , Rfl:     pravastatin (PRAVACHOL) 40 mg tablet, pravastatin 40 mg tablet, Disp: , Rfl:     spironolactone (ALDACTONE) 50 mg tablet, Take 50 mg by mouth daily, Disp: , Rfl:     zolpidem (AMBIEN) 10 mg tablet, zolpidem 10 mg tablet, Disp: , Rfl:     ALPRAZolam (XANAX) 0 25 mg tablet, alprazolam 0 25 mg tablet, Disp: , Rfl:     aspirin (ECOTRIN LOW STRENGTH) 81 mg EC tablet, Take 81 mg by mouth daily, Disp: , Rfl:     atorvastatin (LIPITOR) 40 mg tablet, atorvastatin 40 mg tablet, Disp: , Rfl:     Calcium Carb-Cholecalciferol (CALCIUM 1000 + D PO), calcium, Disp: , Rfl:     cloNIDine (CATAPRES) 0 1 mg tablet, Take 0 1 mg by mouth 2 (two) times a day  , Disp: , Rfl:     cloNIDine (CATAPRES) 0 2 mg tablet, Take 1 5 tablets by mouth 2 (two) times a day, Disp: , Rfl:     diclofenac potassium (CATAFLAM) 50 mg tablet, Take 1 tablet (50 mg total) by mouth 3 (three) times a day for 30 days, Disp: 90 tablet, Rfl: 0    losartan (COZAAR) 100 MG tablet, losartan 100 mg tablet, Disp: , Rfl:     Methylprednisolone 4 MG TBPK, , Disp: , Rfl:     nebivolol (BYSTOLIC) 10 mg tablet, Bystolic 10 mg tablet, Disp: , Rfl:     Omeprazole 20 MG TBEC, omeprazole 20 mg capsule,delayed release, Disp: , Rfl:     pravastatin (PRAVACHOL) 40 mg tablet, TAKE 1 TABLET DAILY AS DIRECTED, Disp: 90 tablet, Rfl: 3    verapamil (CALAN-SR) 240 mg CR tablet, verapamil ER (HS) 240 mg tablet,extended release 24 hr, Disp: , Rfl:     zolpidem (AMBIEN) 10 mg tablet, Take 10 mg by mouth daily at bedtime as needed for sleep, Disp: , Rfl:   Allergies   Allergen Reactions    Procaine Hives    Lidocaine Rash    Other Rash     Adhesive tape     Penicillins Rash       Labs:     Chemistry        Component Value Date/Time     (L) 04/04/2018 0729     (L) 09/21/2015 1057    K 4 2 04/04/2018 0729    K 4 3 09/21/2015 1057    CL 94 (L) 04/04/2018 0729    CL 94 (L) 09/21/2015 1057    CO2 29 04/04/2018 0729    CO2 31 2 09/21/2015 1057    BUN 13 04/04/2018 0729    BUN 8 09/21/2015 1057    CREATININE 0 98 04/04/2018 0729    CREATININE 0 74 09/21/2015 1057        Component Value Date/Time    CALCIUM 9 1 04/04/2018 0729    CALCIUM 9 5 09/21/2015 1057    ALKPHOS 174 (H) 08/01/2017 0904    ALKPHOS 123 (H) 09/21/2015 1057    AST 20 08/01/2017 0904    AST 21 09/21/2015 1057    ALT 23 08/01/2017 0904    ALT 34 09/21/2015 1057    BILITOT 0 40 08/01/2017 0904    BILITOT 0 29 09/21/2015 1057            Lab Results   Component Value Date    CHOL 247 (H) 06/08/2016    CHOL 241 (H) 01/27/2016    CHOL 290 06/04/2015     Lab Results   Component Value Date     (H) 06/08/2016     (H) 01/27/2016     06/04/2015     Lab Results   Component Value Date    LDLCALC 121 (H) 06/08/2016    LDLCALC 126 (H) 01/27/2016    LDLCALC 153 (H) 06/04/2015     Lab Results   Component Value Date    TRIG 49 06/08/2016    TRIG 56 01/27/2016    TRIG 44 06/04/2015     No components found for: CHOLHDL    Imaging: No results found  ECG:        Review of Systems   Constitution: Negative  HENT: Negative  Eyes: Negative  Cardiovascular: Negative  Respiratory: Negative  Endocrine: Negative  Hematologic/Lymphatic: Negative  Skin: Negative  Musculoskeletal: Negative  Gastrointestinal: Negative  Genitourinary: Negative  Neurological: Negative  Psychiatric/Behavioral: Negative  Vitals:    08/02/18 1434   BP: 168/68   Pulse: 62     Vitals:    08/02/18 1434   Weight: 78 2 kg (172 lb 6 4 oz)     Height: 5' 4" (162 6 cm)   Body mass index is 29 59 kg/m²      Physical Exam:   General appearance:  Appears stated age, alert, well appearing and in no distress  HEENT:  PERRLA, EOMI, no scleral icterus, no conjunctival pallor  NECK:  Supple, No elevated JVP, no thyromegaly, no carotid bruits  HEART:  Regular rate and rhythm, normal S1/S2, no S3/S4, no murmur or rub  LUNGS:  Clear to auscultation bilaterally, no wheezes rales or rhonchi  ABDOMEN:  Soft, non-tender, positive bowel sounds, no rebound or guarding, no organomegaly   EXTREMITIES:  No edema, normal range of motion  VASCULAR:  Normal pedal pulses, good pulse volume   SKIN: No lesions or rashes on exposed skin  NEURO:  CN II-XII intact, no focal deficits

## 2018-08-08 ENCOUNTER — OFFICE VISIT (OUTPATIENT)
Dept: PAIN MEDICINE | Facility: CLINIC | Age: 73
End: 2018-08-08
Payer: MEDICARE

## 2018-08-08 VITALS — WEIGHT: 174 LBS | BODY MASS INDEX: 29.87 KG/M2

## 2018-08-08 DIAGNOSIS — G89.4 CHRONIC PAIN SYNDROME: ICD-10-CM

## 2018-08-08 DIAGNOSIS — M47.812 CERVICAL SPONDYLOSIS WITHOUT MYELOPATHY: Primary | ICD-10-CM

## 2018-08-08 PROCEDURE — 99213 OFFICE O/P EST LOW 20 MIN: CPT | Performed by: ANESTHESIOLOGY

## 2018-08-08 NOTE — PROGRESS NOTES
Assessment:  1  Cervical spondylosis without myelopathy    2  Chronic pain syndrome        Plan:  Patient is a 71-year-old female with history of cervical spondylosis presents status post left C4, C5 C6 radiofrequency ablation and reports 85% relief with ability to rotate her head to the left  Patient denies any adverse events since last office visit  She reports continued good relief from the procedure  No adverse events since LOV  1  Follow up as needed       History of Present Illness: The patient is a 72 y o  female who presents for a follow up office visit in regards to Neck Pain  The patients current symptoms include 0/10 neck pain    Current pain medications includes:    The patient reports that this regimen is providing 80% pain relief  The patient is reporting no side effects from this pain medication regimen  I have personally reviewed and/or updated the patient's past medical history, past surgical history, family history, social history, current medications, allergies, and vital signs today  Review of Systems  Review of Systems   Respiratory: Positive for shortness of breath  Musculoskeletal:        Joint stiffness   All other systems reviewed and are negative          Past Medical History:   Diagnosis Date    Arthritis     Chronic pain disorder     GERD (gastroesophageal reflux disease)     Hyperlipidemia     Hypertension     IBS (irritable bowel syndrome)     Mitral regurgitation     Stroke ([facility] [address] )     tia       Past Surgical History:   Procedure Laterality Date    APPENDECTOMY      HYSTERECTOMY      INSERT / REPLACE PERIPHERAL NEUROSTIMULATOR PULSE GENERATOR /  Left     buttocks    JOINT REPLACEMENT      knee    KNEE ARTHROSCOPY      NERVE BLOCK Left 2/20/2018    Procedure: BLOCK MEDIAL BRANCH - C4, C5, C6;  Surgeon: Gloria Chatterjee MD;  [facility];  Service: Pain Management     NERVE BLOCK Left 3/6/2018    Procedure: C4, C5, C6 MEDIAL BRANCH BLOCK;  Surgeon: Tommy Grace MD;  [facility];  Service: Pain Management     [place] COLONOSCOPY FLX DX W/COLLJ SPEC WHEN PFRMD N/A 4/24/2017    Procedure: Jerald Buerger;  Surgeon: Cheryl Bates MD;  [facility];  Service: Colorectal    RADIOFREQUENCY ABLATION Left 4/17/2018    Procedure: ABLATION RADIO FREQUENCY (RFA) - C4, C5, C6;  Surgeon: Tommy Grace MD;  [facility];  Service: Pain Management     SINUS SURGERY         No family history on file  Social History     Occupational History    Not on file       Social History Main Topics    Smoking status: Former Smoker    Smokeless tobacco: Never Used      Comment: quit 40 yrs ago    Alcohol use Yes      Comment: social    Drug use: No    Sexual activity: Not on file         Current Outpatient Prescriptions:     ALPRAZolam (XANAX) 0 25 mg tablet, alprazolam 0 25 mg tablet, Disp: , Rfl:     aspirin (ECOTRIN LOW STRENGTH) 81 mg EC tablet, Take 81 mg by mouth daily, Disp: , Rfl:     aspirin 81 MG tablet, Take 1 tablet by mouth daily, Disp: , Rfl:     atorvastatin (LIPITOR) 40 mg tablet, atorvastatin 40 mg tablet, Disp: , Rfl:     butalbital-acetaminophen-caffeine (FIORICET,ESGIC) -40 mg per tablet, butalbital-acetaminophen-caffeine 50 mg-325 mg-40 mg tablet, Disp: , Rfl:     butalbital-acetaminophen-caffeine-codeine (FIORICET WITH CODEINE) -65-30 MG per capsule, Take 1 capsule by mouth every 4 (four) hours as needed for headaches, Disp: , Rfl:     Calcium Carb-Cholecalciferol (CALCIUM 1000 + D PO), calcium, Disp: , Rfl:     calcium carbonate (OS-MARLENY) 600 MG tablet, Take 600 mg by mouth 2 (two) times a day with meals, Disp: , Rfl:     diclofenac potassium (CATAFLAM) 50 mg tablet, Take 1 tablet (50 mg total) by mouth 3 (three) times a day for 30 days, Disp: 90 tablet, Rfl: 0    erythromycin (ILOTYCIN) ophthalmic ointment, , Disp: , Rfl:     fluticasone (FLONASE) 50 mcg/act nasal spray, fluticasone 50 mcg/actuation nasal spray,suspension, Disp: , Rfl:     losartan (COZAAR) 100 MG tablet, TAKE 1 TABLET EVERY DAY, Disp: 90 tablet, Rfl: 3    losartan (COZAAR) 100 MG tablet, losartan 100 mg tablet, Disp: , Rfl:     Methylprednisolone 4 MG TBPK, , Disp: , Rfl:     Multiple Vitamin (DAILY VALUE MULTIVITAMIN) TABS, Take 1 tablet by mouth daily, Disp: , Rfl:     multivitamin (THERAGRAN) TABS, Take 1 tablet by mouth daily, Disp: , Rfl:     nebivolol (BYSTOLIC) 10 mg tablet, Take 5 mg by mouth 2 (two) times a day  , Disp: , Rfl:     nebivolol (BYSTOLIC) 10 mg tablet, Bystolic 10 mg tablet, Disp: , Rfl:     omeprazole (PriLOSEC) 20 mg delayed release capsule, Take 20 mg by mouth daily, Disp: , Rfl:     Omeprazole 20 MG TBEC, omeprazole 20 mg capsule,delayed release, Disp: , Rfl:     pravastatin (PRAVACHOL) 40 mg tablet, TAKE 1 TABLET DAILY AS DIRECTED, Disp: 90 tablet, Rfl: 3    pravastatin (PRAVACHOL) 40 mg tablet, pravastatin 40 mg tablet, Disp: , Rfl:     spironolactone (ALDACTONE) 50 mg tablet, Take 50 mg by mouth daily, Disp: , Rfl:     verapamil (CALAN-SR) 240 mg CR tablet, verapamil ER (HS) 240 mg tablet,extended release 24 hr, Disp: , Rfl:     zolpidem (AMBIEN) 10 mg tablet, Take 10 mg by mouth daily at bedtime as needed for sleep, Disp: , Rfl:     zolpidem (AMBIEN) 10 mg tablet, zolpidem 10 mg tablet, Disp: , Rfl:     cloNIDine (CATAPRES) 0 1 mg tablet, Take 0 1 mg by mouth 2 (two) times a day  , Disp: , Rfl:     cloNIDine (CATAPRES) 0 2 mg tablet, Take 1 5 tablets by mouth 2 (two) times a day, Disp: , Rfl:     Allergies   Allergen Reactions    Procaine Hives    Lidocaine Rash    Other Rash     Adhesive tape     Penicillins Rash       Physical Exam:    Wt 78 9 kg (174 lb)   BMI 29 87 kg/m²     Constitutional:normal, well developed, well nourished, alert, in no distress and non-toxic and no overt pain behavior    Eyes:anicteric  HEENT:grossly intact  Neck:supple, symmetric, trachea midline and no masses   Pulmonary:even and unlabored  Cardiovascular:No edema or pitting edema present  Skin:Normal without rashes or lesions and well hydrated  Psychiatric:Mood and affect appropriate  Neurologic:Cranial Nerves II-XII grossly intact  Musculoskeletal:normal    Imaging  No orders to display       No orders of the defined types were placed in this encounter

## 2018-08-13 ENCOUNTER — HOSPITAL ENCOUNTER (OUTPATIENT)
Dept: NON INVASIVE DIAGNOSTICS | Facility: HOSPITAL | Age: 73
Discharge: HOME | End: 2018-08-13
Attending: INTERNAL MEDICINE
Payer: MEDICARE

## 2018-08-13 DIAGNOSIS — I34.0 NON-RHEUMATIC MITRAL REGURGITATION: ICD-10-CM

## 2018-08-13 DIAGNOSIS — I10 ESSENTIAL HYPERTENSION: ICD-10-CM

## 2018-08-13 PROCEDURE — 93306 TTE W/DOPPLER COMPLETE: CPT

## 2018-08-13 PROCEDURE — 93306 TTE W/DOPPLER COMPLETE: CPT | Performed by: INTERNAL MEDICINE

## 2018-09-04 ENCOUNTER — TRANSCRIBE ORDERS (OUTPATIENT)
Dept: ADMINISTRATIVE | Facility: HOSPITAL | Age: 73
End: 2018-09-04

## 2018-09-04 DIAGNOSIS — R51.9 NONINTRACTABLE HEADACHE, UNSPECIFIED CHRONICITY PATTERN, UNSPECIFIED HEADACHE TYPE: Primary | ICD-10-CM

## 2018-09-06 ENCOUNTER — HOSPITAL ENCOUNTER (OUTPATIENT)
Dept: CT IMAGING | Facility: HOSPITAL | Age: 73
Discharge: HOME | End: 2018-09-06
Payer: MEDICARE

## 2018-09-06 DIAGNOSIS — R51.9 NONINTRACTABLE HEADACHE, UNSPECIFIED CHRONICITY PATTERN, UNSPECIFIED HEADACHE TYPE: ICD-10-CM

## 2018-09-06 PROCEDURE — 70486 CT MAXILLOFACIAL W/O DYE: CPT

## 2018-09-06 PROCEDURE — 70450 CT HEAD/BRAIN W/O DYE: CPT

## 2018-10-19 ENCOUNTER — TRANSCRIBE ORDERS (OUTPATIENT)
Dept: ADMINISTRATIVE | Facility: HOSPITAL | Age: 73
End: 2018-10-19

## 2018-10-19 ENCOUNTER — APPOINTMENT (OUTPATIENT)
Dept: LAB | Facility: HOSPITAL | Age: 73
End: 2018-10-19
Attending: INTERNAL MEDICINE
Payer: MEDICARE

## 2018-10-19 DIAGNOSIS — I12.9 PARENCHYMAL RENAL HYPERTENSION, STAGE 1 THROUGH STAGE 4 OR UNSPECIFIED CHRONIC KIDNEY DISEASE: ICD-10-CM

## 2018-10-19 DIAGNOSIS — N18.2 CHRONIC KIDNEY DISEASE, STAGE II (MILD): ICD-10-CM

## 2018-10-19 DIAGNOSIS — N18.2 CHRONIC KIDNEY DISEASE, STAGE II (MILD): Primary | ICD-10-CM

## 2018-10-19 LAB
ALBUMIN SERPL BCP-MCNC: 3.3 G/DL (ref 3.5–5)
ALP SERPL-CCNC: 136 U/L (ref 46–116)
ALT SERPL W P-5'-P-CCNC: 24 U/L (ref 12–78)
ANION GAP SERPL CALCULATED.3IONS-SCNC: 12 MMOL/L (ref 4–13)
AST SERPL W P-5'-P-CCNC: 18 U/L (ref 5–45)
BACTERIA UR QL AUTO: ABNORMAL /HPF
BASOPHILS # BLD AUTO: 0.02 THOUSANDS/ΜL (ref 0–0.1)
BASOPHILS NFR BLD AUTO: 1 % (ref 0–1)
BILIRUB SERPL-MCNC: 0.4 MG/DL (ref 0.2–1)
BILIRUB UR QL STRIP: NEGATIVE
BUN SERPL-MCNC: 7 MG/DL (ref 5–25)
CALCIUM SERPL-MCNC: 8.6 MG/DL (ref 8.3–10.1)
CHLORIDE SERPL-SCNC: 97 MMOL/L (ref 100–108)
CLARITY UR: CLEAR
CO2 SERPL-SCNC: 23 MMOL/L (ref 21–32)
COLOR UR: YELLOW
CREAT SERPL-MCNC: 0.84 MG/DL (ref 0.6–1.3)
CREAT UR-MCNC: 46.3 MG/DL
EOSINOPHIL # BLD AUTO: 0.27 THOUSAND/ΜL (ref 0–0.61)
EOSINOPHIL NFR BLD AUTO: 7 % (ref 0–6)
ERYTHROCYTE [DISTWIDTH] IN BLOOD BY AUTOMATED COUNT: 12.8 % (ref 11.6–15.1)
GFR SERPL CREATININE-BSD FRML MDRD: 70 ML/MIN/1.73SQ M
GLUCOSE P FAST SERPL-MCNC: 91 MG/DL (ref 65–99)
GLUCOSE UR STRIP-MCNC: NEGATIVE MG/DL
HCT VFR BLD AUTO: 34.2 % (ref 34.8–46.1)
HGB BLD-MCNC: 11.8 G/DL (ref 11.5–15.4)
HGB UR QL STRIP.AUTO: ABNORMAL
IMM GRANULOCYTES # BLD AUTO: 0.01 THOUSAND/UL (ref 0–0.2)
IMM GRANULOCYTES NFR BLD AUTO: 0 % (ref 0–2)
KETONES UR STRIP-MCNC: NEGATIVE MG/DL
LEUKOCYTE ESTERASE UR QL STRIP: NEGATIVE
LYMPHOCYTES # BLD AUTO: 1.28 THOUSANDS/ΜL (ref 0.6–4.47)
LYMPHOCYTES NFR BLD AUTO: 32 % (ref 14–44)
MCH RBC QN AUTO: 29.4 PG (ref 26.8–34.3)
MCHC RBC AUTO-ENTMCNC: 34.5 G/DL (ref 31.4–37.4)
MCV RBC AUTO: 85 FL (ref 82–98)
MICROALBUMIN UR-MCNC: <5 MG/L (ref 0–20)
MICROALBUMIN/CREAT 24H UR: <11 MG/G CREATININE (ref 0–30)
MONOCYTES # BLD AUTO: 0.33 THOUSAND/ΜL (ref 0.17–1.22)
MONOCYTES NFR BLD AUTO: 8 % (ref 4–12)
NEUTROPHILS # BLD AUTO: 2.05 THOUSANDS/ΜL (ref 1.85–7.62)
NEUTS SEG NFR BLD AUTO: 52 % (ref 43–75)
NITRITE UR QL STRIP: NEGATIVE
NON-SQ EPI CELLS URNS QL MICRO: ABNORMAL /HPF
NRBC BLD AUTO-RTO: 0 /100 WBCS
PH UR STRIP.AUTO: 7 [PH] (ref 4.5–8)
PLATELET # BLD AUTO: 185 THOUSANDS/UL (ref 149–390)
PMV BLD AUTO: 9 FL (ref 8.9–12.7)
POTASSIUM SERPL-SCNC: 4.5 MMOL/L (ref 3.5–5.3)
PROT SERPL-MCNC: 6.2 G/DL (ref 6.4–8.2)
PROT UR STRIP-MCNC: NEGATIVE MG/DL
RBC # BLD AUTO: 4.02 MILLION/UL (ref 3.81–5.12)
RBC #/AREA URNS AUTO: ABNORMAL /HPF
SODIUM SERPL-SCNC: 132 MMOL/L (ref 136–145)
SP GR UR STRIP.AUTO: 1.01 (ref 1–1.03)
UROBILINOGEN UR QL STRIP.AUTO: 0.2 E.U./DL
WBC # BLD AUTO: 3.96 THOUSAND/UL (ref 4.31–10.16)
WBC #/AREA URNS AUTO: ABNORMAL /HPF

## 2018-10-19 PROCEDURE — 85025 COMPLETE CBC W/AUTO DIFF WBC: CPT

## 2018-10-19 PROCEDURE — 81001 URINALYSIS AUTO W/SCOPE: CPT | Performed by: INTERNAL MEDICINE

## 2018-10-19 PROCEDURE — 36415 COLL VENOUS BLD VENIPUNCTURE: CPT

## 2018-10-19 PROCEDURE — 82570 ASSAY OF URINE CREATININE: CPT | Performed by: INTERNAL MEDICINE

## 2018-10-19 PROCEDURE — 80053 COMPREHEN METABOLIC PANEL: CPT

## 2018-10-19 PROCEDURE — 82043 UR ALBUMIN QUANTITATIVE: CPT | Performed by: INTERNAL MEDICINE

## 2018-11-27 DIAGNOSIS — E78.2 MIXED HYPERLIPIDEMIA: Primary | ICD-10-CM

## 2018-11-27 RX ORDER — PRAVASTATIN SODIUM 40 MG
TABLET ORAL
Qty: 90 TABLET | Refills: 3 | Status: SHIPPED | OUTPATIENT
Start: 2018-11-27 | End: 2019-02-06

## 2018-12-03 ENCOUNTER — TRANSCRIBE ORDERS (OUTPATIENT)
Dept: ADMINISTRATIVE | Facility: HOSPITAL | Age: 73
End: 2018-12-03

## 2018-12-03 DIAGNOSIS — Z12.39 SCREENING BREAST EXAMINATION: Primary | ICD-10-CM

## 2018-12-10 ENCOUNTER — HOSPITAL ENCOUNTER (OUTPATIENT)
Dept: MAMMOGRAPHY | Facility: HOSPITAL | Age: 73
Discharge: HOME | End: 2018-12-10
Payer: MEDICARE

## 2018-12-10 VITALS — HEIGHT: 64 IN | BODY MASS INDEX: 29.71 KG/M2 | WEIGHT: 174 LBS

## 2018-12-10 DIAGNOSIS — Z12.39 SCREENING BREAST EXAMINATION: ICD-10-CM

## 2018-12-10 PROCEDURE — 77067 SCR MAMMO BI INCL CAD: CPT

## 2018-12-10 PROCEDURE — 77063 BREAST TOMOSYNTHESIS BI: CPT

## 2018-12-13 ENCOUNTER — HOSPITAL ENCOUNTER (OUTPATIENT)
Dept: RADIOLOGY | Facility: HOSPITAL | Age: 73
Discharge: HOME | End: 2018-12-13
Payer: MEDICARE

## 2018-12-13 ENCOUNTER — TRANSCRIBE ORDERS (OUTPATIENT)
Dept: ADMINISTRATIVE | Facility: HOSPITAL | Age: 73
End: 2018-12-13

## 2018-12-13 DIAGNOSIS — M19.90 OSTEOARTHRITIS, UNSPECIFIED OSTEOARTHRITIS TYPE, UNSPECIFIED SITE: Primary | ICD-10-CM

## 2018-12-13 DIAGNOSIS — M19.90 OSTEOARTHRITIS, UNSPECIFIED OSTEOARTHRITIS TYPE, UNSPECIFIED SITE: ICD-10-CM

## 2018-12-13 PROCEDURE — 73502 X-RAY EXAM HIP UNI 2-3 VIEWS: CPT

## 2018-12-14 ENCOUNTER — DOCUMENTATION (OUTPATIENT)
Dept: CARDIOLOGY CLINIC | Facility: HOSPITAL | Age: 73
End: 2018-12-14

## 2019-02-04 ENCOUNTER — APPOINTMENT (OUTPATIENT)
Dept: LAB | Facility: HOSPITAL | Age: 74
End: 2019-02-04
Attending: ORTHOPAEDIC SURGERY
Payer: MEDICARE

## 2019-02-04 ENCOUNTER — HOSPITAL ENCOUNTER (OUTPATIENT)
Dept: RADIOLOGY | Facility: HOSPITAL | Age: 74
Discharge: HOME | End: 2019-02-04
Attending: ORTHOPAEDIC SURGERY
Payer: MEDICARE

## 2019-02-04 ENCOUNTER — HOSPITAL ENCOUNTER (OUTPATIENT)
Dept: NON INVASIVE DIAGNOSTICS | Facility: HOSPITAL | Age: 74
Discharge: HOME | End: 2019-02-04
Attending: ORTHOPAEDIC SURGERY
Payer: MEDICARE

## 2019-02-04 ENCOUNTER — TRANSCRIBE ORDERS (OUTPATIENT)
Dept: ADMINISTRATIVE | Facility: HOSPITAL | Age: 74
End: 2019-02-04

## 2019-02-04 DIAGNOSIS — M16.11 PRIMARY OSTEOARTHRITIS OF RIGHT HIP: Primary | ICD-10-CM

## 2019-02-04 DIAGNOSIS — M16.11 PRIMARY OSTEOARTHRITIS OF RIGHT HIP: ICD-10-CM

## 2019-02-04 LAB
ABO GROUP BLD: NORMAL
ANION GAP SERPL CALCULATED.3IONS-SCNC: 6 MMOL/L (ref 4–13)
APTT PPP: 34 SECONDS (ref 26–38)
ATRIAL RATE: 62 BPM
BACTERIA UR QL AUTO: ABNORMAL /HPF
BASOPHILS # BLD AUTO: 0 THOUSANDS/ΜL (ref 0–0.1)
BASOPHILS NFR BLD AUTO: 1 % (ref 0–2)
BILIRUB UR QL STRIP: NEGATIVE
BLD GP AB SCN SERPL QL: NEGATIVE
BUN SERPL-MCNC: 10 MG/DL (ref 7–25)
CALCIUM SERPL-MCNC: 9.3 MG/DL (ref 8.6–10.5)
CHLORIDE SERPL-SCNC: 89 MMOL/L (ref 98–107)
CLARITY UR: CLEAR
CO2 SERPL-SCNC: 27 MMOL/L (ref 21–31)
COLOR UR: YELLOW
CREAT SERPL-MCNC: 0.85 MG/DL (ref 0.6–1.2)
EOSINOPHIL # BLD AUTO: 0.2 THOUSAND/ΜL (ref 0–0.61)
EOSINOPHIL NFR BLD AUTO: 5 % (ref 0–5)
ERYTHROCYTE [DISTWIDTH] IN BLOOD BY AUTOMATED COUNT: 13.3 % (ref 11.5–14.5)
GFR SERPL CREATININE-BSD FRML MDRD: 69 ML/MIN/1.73SQ M
GLUCOSE P FAST SERPL-MCNC: 94 MG/DL (ref 65–99)
GLUCOSE UR STRIP-MCNC: NEGATIVE MG/DL
HCT VFR BLD AUTO: 35.2 % (ref 34.8–46.1)
HGB BLD-MCNC: 12.5 G/DL (ref 12–16)
HGB UR QL STRIP.AUTO: ABNORMAL
INR PPP: 1.02 (ref 0.9–1.5)
KETONES UR STRIP-MCNC: NEGATIVE MG/DL
LEUKOCYTE ESTERASE UR QL STRIP: NEGATIVE
LYMPHOCYTES # BLD AUTO: 1.1 THOUSANDS/ΜL (ref 0.6–4.47)
LYMPHOCYTES NFR BLD AUTO: 31 % (ref 21–51)
MCH RBC QN AUTO: 30.3 PG (ref 26–34)
MCHC RBC AUTO-ENTMCNC: 35.5 G/DL (ref 31–37)
MCV RBC AUTO: 85 FL (ref 81–99)
MONOCYTES # BLD AUTO: 0.3 THOUSAND/ΜL (ref 0.17–1.22)
MONOCYTES NFR BLD AUTO: 10 % (ref 2–12)
NEUTROPHILS # BLD AUTO: 2 THOUSANDS/ΜL (ref 1.4–6.5)
NEUTS SEG NFR BLD AUTO: 54 % (ref 42–75)
NITRITE UR QL STRIP: NEGATIVE
NON-SQ EPI CELLS URNS QL MICRO: ABNORMAL /HPF
NRBC BLD AUTO-RTO: 0 /100 WBCS
P AXIS: 31 DEGREES
PH UR STRIP.AUTO: 7.5 [PH] (ref 5–8)
PLATELET # BLD AUTO: 221 THOUSANDS/UL (ref 149–390)
PMV BLD AUTO: 7.5 FL (ref 8.6–11.7)
POTASSIUM SERPL-SCNC: 4.3 MMOL/L (ref 3.5–5.5)
PR INTERVAL: 184 MS
PROT UR STRIP-MCNC: NEGATIVE MG/DL
PROTHROMBIN TIME: 11.8 SECONDS (ref 10.2–13)
QRS AXIS: 60 DEGREES
QRSD INTERVAL: 90 MS
QT INTERVAL: 388 MS
QTC INTERVAL: 393 MS
RBC # BLD AUTO: 4.13 MILLION/UL (ref 3.9–5.2)
RBC #/AREA URNS AUTO: ABNORMAL /HPF
RH BLD: POSITIVE
SODIUM SERPL-SCNC: 122 MMOL/L (ref 134–143)
SP GR UR STRIP.AUTO: 1.01 (ref 1–1.03)
SPECIMEN EXPIRATION DATE: NORMAL
T WAVE AXIS: 43 DEGREES
UROBILINOGEN UR QL STRIP.AUTO: 0.2 E.U./DL
VENTRICULAR RATE: 62 BPM
WBC # BLD AUTO: 3.7 THOUSAND/UL (ref 4.8–10.8)
WBC #/AREA URNS AUTO: ABNORMAL /HPF

## 2019-02-04 PROCEDURE — 87086 URINE CULTURE/COLONY COUNT: CPT

## 2019-02-04 PROCEDURE — 86901 BLOOD TYPING SEROLOGIC RH(D): CPT

## 2019-02-04 PROCEDURE — 87147 CULTURE TYPE IMMUNOLOGIC: CPT

## 2019-02-04 PROCEDURE — 87077 CULTURE AEROBIC IDENTIFY: CPT

## 2019-02-04 PROCEDURE — 93010 ELECTROCARDIOGRAM REPORT: CPT | Performed by: INTERNAL MEDICINE

## 2019-02-04 PROCEDURE — 86900 BLOOD TYPING SEROLOGIC ABO: CPT

## 2019-02-04 PROCEDURE — 86850 RBC ANTIBODY SCREEN: CPT

## 2019-02-04 PROCEDURE — 81001 URINALYSIS AUTO W/SCOPE: CPT | Performed by: ORTHOPAEDIC SURGERY

## 2019-02-04 PROCEDURE — 85025 COMPLETE CBC W/AUTO DIFF WBC: CPT

## 2019-02-04 PROCEDURE — 36415 COLL VENOUS BLD VENIPUNCTURE: CPT

## 2019-02-04 PROCEDURE — 80048 BASIC METABOLIC PNL TOTAL CA: CPT

## 2019-02-04 PROCEDURE — 85610 PROTHROMBIN TIME: CPT

## 2019-02-04 PROCEDURE — 85730 THROMBOPLASTIN TIME PARTIAL: CPT

## 2019-02-04 PROCEDURE — 71046 X-RAY EXAM CHEST 2 VIEWS: CPT

## 2019-02-04 PROCEDURE — 87186 SC STD MICRODIL/AGAR DIL: CPT

## 2019-02-04 PROCEDURE — 93005 ELECTROCARDIOGRAM TRACING: CPT

## 2019-02-06 ENCOUNTER — OFFICE VISIT (OUTPATIENT)
Dept: CARDIOLOGY CLINIC | Facility: HOSPITAL | Age: 74
End: 2019-02-06
Payer: MEDICARE

## 2019-02-06 ENCOUNTER — APPOINTMENT (EMERGENCY)
Dept: RADIOLOGY | Facility: HOSPITAL | Age: 74
DRG: 641 | End: 2019-02-06
Payer: MEDICARE

## 2019-02-06 ENCOUNTER — APPOINTMENT (EMERGENCY)
Dept: CT IMAGING | Facility: HOSPITAL | Age: 74
DRG: 641 | End: 2019-02-06
Payer: MEDICARE

## 2019-02-06 ENCOUNTER — HOSPITAL ENCOUNTER (INPATIENT)
Facility: HOSPITAL | Age: 74
LOS: 1 days | Discharge: HOME | DRG: 641 | End: 2019-02-07
Attending: EMERGENCY MEDICINE | Admitting: FAMILY MEDICINE
Payer: MEDICARE

## 2019-02-06 VITALS
BODY MASS INDEX: 29.12 KG/M2 | HEIGHT: 64 IN | SYSTOLIC BLOOD PRESSURE: 120 MMHG | HEART RATE: 65 BPM | WEIGHT: 170.6 LBS | DIASTOLIC BLOOD PRESSURE: 74 MMHG

## 2019-02-06 DIAGNOSIS — I10 ESSENTIAL HYPERTENSION: Primary | ICD-10-CM

## 2019-02-06 DIAGNOSIS — E87.1 HYPONATREMIA: Primary | ICD-10-CM

## 2019-02-06 DIAGNOSIS — I34.0 NON-RHEUMATIC MITRAL REGURGITATION: ICD-10-CM

## 2019-02-06 DIAGNOSIS — R06.02 SHORTNESS OF BREATH ON EXERTION: ICD-10-CM

## 2019-02-06 DIAGNOSIS — E78.00 HYPERCHOLESTEROLEMIA: ICD-10-CM

## 2019-02-06 DIAGNOSIS — I51.89 DIASTOLIC DYSFUNCTION: ICD-10-CM

## 2019-02-06 PROBLEM — K21.9 GASTROESOPHAGEAL REFLUX DISEASE WITHOUT ESOPHAGITIS: Status: ACTIVE | Noted: 2019-02-06

## 2019-02-06 LAB
ALBUMIN SERPL BCP-MCNC: 3.8 G/DL (ref 3.5–5)
ALP SERPL-CCNC: 138 U/L (ref 46–116)
ALT SERPL W P-5'-P-CCNC: 34 U/L (ref 12–78)
ANION GAP SERPL CALCULATED.3IONS-SCNC: 7 MMOL/L (ref 4–13)
APTT PPP: 33 SECONDS (ref 26–38)
AST SERPL W P-5'-P-CCNC: 23 U/L (ref 5–45)
BACTERIA UR QL AUTO: ABNORMAL /HPF
BASOPHILS # BLD AUTO: 0.02 THOUSANDS/ΜL (ref 0–0.1)
BASOPHILS NFR BLD AUTO: 0 % (ref 0–1)
BILIRUB SERPL-MCNC: 0.3 MG/DL (ref 0.2–1)
BILIRUB UR QL STRIP: NEGATIVE
BUN SERPL-MCNC: 8 MG/DL (ref 5–25)
CALCIUM SERPL-MCNC: 9.2 MG/DL (ref 8.3–10.1)
CHLORIDE SERPL-SCNC: 90 MMOL/L (ref 100–108)
CLARITY UR: CLEAR
CO2 SERPL-SCNC: 26 MMOL/L (ref 21–32)
COLOR UR: YELLOW
CREAT SERPL-MCNC: 0.82 MG/DL (ref 0.6–1.3)
EOSINOPHIL # BLD AUTO: 0.12 THOUSAND/ΜL (ref 0–0.61)
EOSINOPHIL NFR BLD AUTO: 2 % (ref 0–6)
ERYTHROCYTE [DISTWIDTH] IN BLOOD BY AUTOMATED COUNT: 12.4 % (ref 11.6–15.1)
GFR SERPL CREATININE-BSD FRML MDRD: 72 ML/MIN/1.73SQ M
GLUCOSE SERPL-MCNC: 103 MG/DL (ref 65–140)
GLUCOSE SERPL-MCNC: 111 MG/DL (ref 65–140)
GLUCOSE UR STRIP-MCNC: NEGATIVE MG/DL
HCT VFR BLD AUTO: 34 % (ref 34.8–46.1)
HGB BLD-MCNC: 12.1 G/DL (ref 11.5–15.4)
HGB UR QL STRIP.AUTO: ABNORMAL
IMM GRANULOCYTES # BLD AUTO: 0.01 THOUSAND/UL (ref 0–0.2)
IMM GRANULOCYTES NFR BLD AUTO: 0 % (ref 0–2)
INR PPP: 1 (ref 0.86–1.17)
KETONES UR STRIP-MCNC: NEGATIVE MG/DL
LEUKOCYTE ESTERASE UR QL STRIP: NEGATIVE
LYMPHOCYTES # BLD AUTO: 1.01 THOUSANDS/ΜL (ref 0.6–4.47)
LYMPHOCYTES NFR BLD AUTO: 20 % (ref 14–44)
MAGNESIUM SERPL-MCNC: 1.9 MG/DL (ref 1.6–2.6)
MCH RBC QN AUTO: 30 PG (ref 26.8–34.3)
MCHC RBC AUTO-ENTMCNC: 35.6 G/DL (ref 31.4–37.4)
MCV RBC AUTO: 84 FL (ref 82–98)
MONOCYTES # BLD AUTO: 0.42 THOUSAND/ΜL (ref 0.17–1.22)
MONOCYTES NFR BLD AUTO: 8 % (ref 4–12)
NEUTROPHILS # BLD AUTO: 3.41 THOUSANDS/ΜL (ref 1.85–7.62)
NEUTS SEG NFR BLD AUTO: 70 % (ref 43–75)
NITRITE UR QL STRIP: NEGATIVE
NON-SQ EPI CELLS URNS QL MICRO: ABNORMAL /HPF
NRBC BLD AUTO-RTO: 0 /100 WBCS
PH UR STRIP.AUTO: 7 [PH] (ref 4.5–8)
PLATELET # BLD AUTO: 209 THOUSANDS/UL (ref 149–390)
PMV BLD AUTO: 8.8 FL (ref 8.9–12.7)
POTASSIUM SERPL-SCNC: 4.1 MMOL/L (ref 3.5–5.3)
PROT SERPL-MCNC: 7 G/DL (ref 6.4–8.2)
PROT UR STRIP-MCNC: NEGATIVE MG/DL
PROTHROMBIN TIME: 12.7 SECONDS (ref 11.8–14.2)
RBC # BLD AUTO: 4.03 MILLION/UL (ref 3.81–5.12)
RBC #/AREA URNS AUTO: ABNORMAL /HPF
SODIUM SERPL-SCNC: 123 MMOL/L (ref 136–145)
SP GR UR STRIP.AUTO: <=1.005 (ref 1–1.03)
TROPONIN I SERPL-MCNC: <0.02 NG/ML
TSH SERPL DL<=0.05 MIU/L-ACNC: 1.83 UIU/ML (ref 0.36–3.74)
UROBILINOGEN UR QL STRIP.AUTO: 0.2 E.U./DL
WBC # BLD AUTO: 4.99 THOUSAND/UL (ref 4.31–10.16)
WBC #/AREA URNS AUTO: ABNORMAL /HPF

## 2019-02-06 PROCEDURE — 85730 THROMBOPLASTIN TIME PARTIAL: CPT | Performed by: PHYSICIAN ASSISTANT

## 2019-02-06 PROCEDURE — 84443 ASSAY THYROID STIM HORMONE: CPT | Performed by: PHYSICIAN ASSISTANT

## 2019-02-06 PROCEDURE — 99222 1ST HOSP IP/OBS MODERATE 55: CPT | Performed by: PHYSICIAN ASSISTANT

## 2019-02-06 PROCEDURE — 84484 ASSAY OF TROPONIN QUANT: CPT | Performed by: PHYSICIAN ASSISTANT

## 2019-02-06 PROCEDURE — 85610 PROTHROMBIN TIME: CPT | Performed by: PHYSICIAN ASSISTANT

## 2019-02-06 PROCEDURE — 70450 CT HEAD/BRAIN W/O DYE: CPT

## 2019-02-06 PROCEDURE — 1123F ACP DISCUSS/DSCN MKR DOCD: CPT | Performed by: INTERNAL MEDICINE

## 2019-02-06 PROCEDURE — 83935 ASSAY OF URINE OSMOLALITY: CPT | Performed by: PHYSICIAN ASSISTANT

## 2019-02-06 PROCEDURE — 83735 ASSAY OF MAGNESIUM: CPT | Performed by: PHYSICIAN ASSISTANT

## 2019-02-06 PROCEDURE — 80053 COMPREHEN METABOLIC PANEL: CPT | Performed by: PHYSICIAN ASSISTANT

## 2019-02-06 PROCEDURE — 81001 URINALYSIS AUTO W/SCOPE: CPT | Performed by: PHYSICIAN ASSISTANT

## 2019-02-06 PROCEDURE — 82948 REAGENT STRIP/BLOOD GLUCOSE: CPT

## 2019-02-06 PROCEDURE — 84300 ASSAY OF URINE SODIUM: CPT | Performed by: PHYSICIAN ASSISTANT

## 2019-02-06 PROCEDURE — 93005 ELECTROCARDIOGRAM TRACING: CPT

## 2019-02-06 PROCEDURE — 85025 COMPLETE CBC W/AUTO DIFF WBC: CPT | Performed by: PHYSICIAN ASSISTANT

## 2019-02-06 PROCEDURE — 99214 OFFICE O/P EST MOD 30 MIN: CPT | Performed by: INTERNAL MEDICINE

## 2019-02-06 PROCEDURE — 99285 EMERGENCY DEPT VISIT HI MDM: CPT

## 2019-02-06 PROCEDURE — 71045 X-RAY EXAM CHEST 1 VIEW: CPT

## 2019-02-06 PROCEDURE — 36415 COLL VENOUS BLD VENIPUNCTURE: CPT | Performed by: PHYSICIAN ASSISTANT

## 2019-02-06 PROCEDURE — 93000 ELECTROCARDIOGRAM COMPLETE: CPT | Performed by: INTERNAL MEDICINE

## 2019-02-06 RX ORDER — NEBIVOLOL 10 MG/1
10 TABLET ORAL DAILY
COMMUNITY
End: 2020-01-17

## 2019-02-06 RX ORDER — CLONIDINE HYDROCHLORIDE 0.1 MG/1
0.1 TABLET ORAL 2 TIMES DAILY
Status: DISCONTINUED | OUTPATIENT
Start: 2019-02-06 | End: 2019-02-07 | Stop reason: HOSPADM

## 2019-02-06 RX ORDER — VERAPAMIL HYDROCHLORIDE 240 MG/1
240 TABLET, FILM COATED, EXTENDED RELEASE ORAL DAILY
Status: DISCONTINUED | OUTPATIENT
Start: 2019-02-07 | End: 2019-02-07 | Stop reason: HOSPADM

## 2019-02-06 RX ORDER — PRAVASTATIN SODIUM 20 MG
20 TABLET ORAL 2 TIMES DAILY
COMMUNITY
End: 2021-12-03

## 2019-02-06 RX ORDER — ALPRAZOLAM 0.25 MG/1
0.25 TABLET ORAL
Status: DISCONTINUED | OUTPATIENT
Start: 2019-02-06 | End: 2019-02-07 | Stop reason: HOSPADM

## 2019-02-06 RX ORDER — PANTOPRAZOLE SODIUM 20 MG/1
20 TABLET, DELAYED RELEASE ORAL
Status: DISCONTINUED | OUTPATIENT
Start: 2019-02-07 | End: 2019-02-07 | Stop reason: HOSPADM

## 2019-02-06 RX ORDER — CALCIUM CARBONATE 500(1250)
1 TABLET ORAL
Status: DISCONTINUED | OUTPATIENT
Start: 2019-02-07 | End: 2019-02-07 | Stop reason: HOSPADM

## 2019-02-06 RX ORDER — MECLIZINE HCL 12.5 MG/1
25 TABLET ORAL ONCE
Status: COMPLETED | OUTPATIENT
Start: 2019-02-06 | End: 2019-02-06

## 2019-02-06 RX ORDER — ASPIRIN 81 MG/1
81 TABLET ORAL DAILY
Status: DISCONTINUED | OUTPATIENT
Start: 2019-02-07 | End: 2019-02-07 | Stop reason: HOSPADM

## 2019-02-06 RX ORDER — SODIUM CHLORIDE 9 MG/ML
125 INJECTION, SOLUTION INTRAVENOUS CONTINUOUS
Status: DISCONTINUED | OUTPATIENT
Start: 2019-02-06 | End: 2019-02-06

## 2019-02-06 RX ORDER — FLUTICASONE PROPIONATE 50 MCG
1 SPRAY, SUSPENSION (ML) NASAL DAILY
Status: DISCONTINUED | OUTPATIENT
Start: 2019-02-07 | End: 2019-02-07 | Stop reason: HOSPADM

## 2019-02-06 RX ORDER — ZOLPIDEM TARTRATE 5 MG/1
5 TABLET ORAL
Status: DISCONTINUED | OUTPATIENT
Start: 2019-02-06 | End: 2019-02-07 | Stop reason: HOSPADM

## 2019-02-06 RX ORDER — SPIRONOLACTONE 25 MG/1
50 TABLET ORAL DAILY
Status: DISCONTINUED | OUTPATIENT
Start: 2019-02-07 | End: 2019-02-07

## 2019-02-06 RX ORDER — ACETAMINOPHEN 325 MG/1
650 TABLET ORAL EVERY 6 HOURS PRN
Status: DISCONTINUED | OUTPATIENT
Start: 2019-02-06 | End: 2019-02-07 | Stop reason: HOSPADM

## 2019-02-06 RX ORDER — SODIUM CHLORIDE 9 MG/ML
75 INJECTION, SOLUTION INTRAVENOUS CONTINUOUS
Status: DISCONTINUED | OUTPATIENT
Start: 2019-02-06 | End: 2019-02-07 | Stop reason: HOSPADM

## 2019-02-06 RX ORDER — BUTALBITAL, ACETAMINOPHEN AND CAFFEINE 50; 325; 40 MG/1; MG/1; MG/1
1 TABLET ORAL EVERY 6 HOURS PRN
Status: DISCONTINUED | OUTPATIENT
Start: 2019-02-06 | End: 2019-02-07 | Stop reason: HOSPADM

## 2019-02-06 RX ORDER — NEBIVOLOL 5 MG/1
5 TABLET ORAL 2 TIMES DAILY
Status: DISCONTINUED | OUTPATIENT
Start: 2019-02-06 | End: 2019-02-07 | Stop reason: HOSPADM

## 2019-02-06 RX ORDER — PRAVASTATIN SODIUM 40 MG
40 TABLET ORAL DAILY
Status: DISCONTINUED | OUTPATIENT
Start: 2019-02-07 | End: 2019-02-07 | Stop reason: HOSPADM

## 2019-02-06 RX ORDER — ONDANSETRON 2 MG/ML
4 INJECTION INTRAMUSCULAR; INTRAVENOUS EVERY 8 HOURS PRN
Status: DISCONTINUED | OUTPATIENT
Start: 2019-02-06 | End: 2019-02-07 | Stop reason: HOSPADM

## 2019-02-06 RX ORDER — LOSARTAN POTASSIUM 50 MG/1
100 TABLET ORAL DAILY
Status: DISCONTINUED | OUTPATIENT
Start: 2019-02-07 | End: 2019-02-07 | Stop reason: HOSPADM

## 2019-02-06 RX ORDER — ONDANSETRON 2 MG/ML
4 INJECTION INTRAMUSCULAR; INTRAVENOUS ONCE
Status: COMPLETED | OUTPATIENT
Start: 2019-02-06 | End: 2019-02-06

## 2019-02-06 RX ORDER — OXYCODONE HYDROCHLORIDE AND ACETAMINOPHEN 5; 325 MG/1; MG/1
1 TABLET ORAL EVERY 6 HOURS PRN
Status: DISCONTINUED | OUTPATIENT
Start: 2019-02-06 | End: 2019-02-07 | Stop reason: HOSPADM

## 2019-02-06 RX ORDER — ATORVASTATIN CALCIUM 40 MG/1
40 TABLET, FILM COATED ORAL
Status: DISCONTINUED | OUTPATIENT
Start: 2019-02-07 | End: 2019-02-07

## 2019-02-06 RX ADMIN — ONDANSETRON 4 MG: 2 INJECTION INTRAMUSCULAR; INTRAVENOUS at 19:52

## 2019-02-06 RX ADMIN — MECLIZINE 25 MG: 12.5 TABLET ORAL at 19:52

## 2019-02-06 RX ADMIN — ZOLPIDEM TARTRATE 5 MG: 5 TABLET ORAL at 23:06

## 2019-02-06 RX ADMIN — SODIUM CHLORIDE 75 ML/HR: 0.9 INJECTION, SOLUTION INTRAVENOUS at 21:46

## 2019-02-06 RX ADMIN — NEBIVOLOL HYDROCHLORIDE 5 MG: 5 TABLET ORAL at 21:45

## 2019-02-06 RX ADMIN — SODIUM CHLORIDE 125 ML/HR: 0.9 INJECTION, SOLUTION INTRAVENOUS at 19:54

## 2019-02-06 NOTE — ED NOTES
Pt updated at this time  Comfortable in bed  Call bell within reach  Watching television  Pt still reports dizziness  Hillary Baker PA-C notified         Sofia Randhawa RN  02/06/19 2087

## 2019-02-06 NOTE — PROGRESS NOTES
Cardiology Follow Up    Lucita Pandey  1945  [id number]  [address]    1  Essential hypertension     2  Non-rheumatic mitral regurgitation     3  Diastolic dysfunction     4  Hypercholesterolemia     5  Shortness of breath on exertion         Discussion/Summary:  She presented to the office today with complaints of lightheadedness dizziness and some nausea  She was found on blood work 2 days ago to have a significantly low sodium level of 122  She was not started on any new medications recently and appears euvolemic on exam   I think she needs a more urgent evaluation for symptomatic hyponatremia  I center from my office to the emergency department  From a cardiac standpoint she is stable  Blood pressures been well controlled  She shows no signs decompensated heart failure  Recent echocardiogram was reviewed  Interval History:  42-year-old female with difficult-to-control hypertension, mitral regurgitation, chronic diastolic congestive heart failure presents for follow-up visit  She has been under a lot of stress since her last exam   There have been several deaths in her mediated family and she has had a hard time handling this  From a cardiac standpoint she has been rather comfortable  Breathing has been stable  There has been no palpitations or chest pain  She has been taking all medications as prescribed  She remains well hydrated  There has been no lower extremity edema, PND, orthopnea  She has had no fever chills  There has been no recent cough or illness  She did have a recent dental infection    Problem List     Diastolic dysfunction    Hypercholesterolemia    Hypertension    Mitral regurgitation    Overview Signed 2/8/2018  8:25 AM by Hima Quezada DO     Description: Moderate         Shortness of breath on exertion    Cervical spondylosis without myelopathy    Overview Signed 2/13/2018  8:13 AM by Nupur Dubose MA     Added automatically from request for surgery [id number]         Chronic pain syndrome        Past Medical History:   Diagnosis Date    Arthritis     Chronic pain disorder     GERD (gastroesophageal reflux disease)     Hyperlipidemia     Hypertension     IBS (irritable bowel syndrome)     Mitral regurgitation     Stroke ([facility] [place] [address] )     tia     Social History     Social History    Marital status: /Civil Union     Spouse name: N/A    Number of children: N/A    Years of education: N/A     Occupational History    Not on file  Social History Main Topics    Smoking status: Former Smoker    Smokeless tobacco: Never Used      Comment: quit 40 yrs ago    Alcohol use Yes      Comment: social    Drug use: No    Sexual activity: Not on file     Other Topics Concern    Not on file     Social History Narrative    No narrative on file      History reviewed  No pertinent family history    Past Surgical History:   Procedure Laterality Date    APPENDECTOMY      HYSTERECTOMY      INSERT / REPLACE PERIPHERAL NEUROSTIMULATOR PULSE GENERATOR /  Left     buttocks    JOINT REPLACEMENT      knee    KNEE ARTHROSCOPY      NERVE BLOCK Left 2/20/2018    Procedure: BLOCK MEDIAL BRANCH - C4, C5, C6;  Surgeon: Ez Alva MD;  [facility];  Service: Pain Management     NERVE BLOCK Left 3/6/2018    Procedure: C4, C5, C6 MEDIAL BRANCH BLOCK;  Surgeon: Ez Alva MD;  [facility];  Service: Pain Management     TX COLONOSCOPY FLX DX W/COLLJ SPEC WHEN PFRMD N/A 4/24/2017    Procedure: Amadeo Russell;  Surgeon: Pablo Garza MD;  [facility];  Service: Colorectal    RADIOFREQUENCY ABLATION Left 4/17/2018    Procedure: ABLATION RADIO FREQUENCY (RFA) - C4, C5, C6;  Surgeon: Ez Alav MD;  [facility];  Service: Pain Management     SINUS SURGERY         Current Outpatient Prescriptions:    acetaminophen-codeine (TYLENOL WITH CODEINE) 120-12 mg/5 mL suspension, Take 5 mL by mouth every 6 (six) hours as needed, Disp: , Rfl:     ALPRAZolam (XANAX) 0 25 mg tablet, alprazolam 0 25 mg tablet, Disp: , Rfl:     aspirin (ECOTRIN LOW STRENGTH) 81 mg EC tablet, Take 81 mg by mouth daily, Disp: , Rfl:     aspirin 81 MG tablet, Take 1 tablet by mouth daily, Disp: , Rfl:     atorvastatin (LIPITOR) 40 mg tablet, atorvastatin 40 mg tablet, Disp: , Rfl:     butalbital-acetaminophen-caffeine (FIORICET,ESGIC) -40 mg per tablet, butalbital-acetaminophen-caffeine 50 mg-325 mg-40 mg tablet, Disp: , Rfl:     butalbital-acetaminophen-caffeine-codeine (FIORICET WITH CODEINE) -18-30 MG per capsule, Take 1 capsule by mouth every 4 (four) hours as needed for headaches, Disp: , Rfl:     Calcium Carb-Cholecalciferol (CALCIUM 1000 + D PO), calcium, Disp: , Rfl:     calcium carbonate (OS-MARLENY) 600 MG tablet, Take 600 mg by mouth 2 (two) times a day with meals, Disp: , Rfl:     cloNIDine (CATAPRES) 0 1 mg tablet, Take 0 1 mg by mouth 2 (two) times a day  , Disp: , Rfl:     cloNIDine (CATAPRES) 0 2 mg tablet, Take 1 5 tablets by mouth 2 (two) times a day, Disp: , Rfl:     diclofenac potassium (CATAFLAM) 50 mg tablet, Take 1 tablet (50 mg total) by mouth 3 (three) times a day for 30 days, Disp: 90 tablet, Rfl: 0    fluticasone (FLONASE) 50 mcg/act nasal spray, fluticasone 50 mcg/actuation nasal spray,suspension, Disp: , Rfl:     losartan (COZAAR) 100 MG tablet, TAKE 1 TABLET EVERY DAY, Disp: 90 tablet, Rfl: 3    Multiple Vitamin (DAILY VALUE MULTIVITAMIN) TABS, Take 1 tablet by mouth daily, Disp: , Rfl:     multivitamin (THERAGRAN) TABS, Take 1 tablet by mouth daily, Disp: , Rfl:     nebivolol (BYSTOLIC) 10 mg tablet, Take 5 mg by mouth 2 (two) times a day  , Disp: , Rfl:     omeprazole (PriLOSEC) 20 mg delayed release capsule, Take 20 mg by mouth daily, Disp: , Rfl:     pravastatin (PRAVACHOL) 40 mg tablet, TAKE 1 TABLET DAILY AS DIRECTED, Disp: 90 tablet, Rfl: 3    spironolactone (ALDACTONE) 50 mg tablet, Take 50 mg by mouth daily, Disp: , Rfl:     verapamil (CALAN-SR) 240 mg CR tablet, verapamil ER (HS) 240 mg tablet,extended release 24 hr, Disp: , Rfl:     zolpidem (AMBIEN) 10 mg tablet, Take 10 mg by mouth daily at bedtime as needed for sleep, Disp: , Rfl:     erythromycin (ILOTYCIN) ophthalmic ointment, , Disp: , Rfl:     losartan (COZAAR) 100 MG tablet, losartan 100 mg tablet, Disp: , Rfl:     Methylprednisolone 4 MG TBPK, , Disp: , Rfl:     nebivolol (BYSTOLIC) 10 mg tablet, Bystolic 10 mg tablet, Disp: , Rfl:     Omeprazole 20 MG TBEC, omeprazole 20 mg capsule,delayed release, Disp: , Rfl:     pravastatin (PRAVACHOL) 40 mg tablet, pravastatin 40 mg tablet, Disp: , Rfl:     pravastatin (PRAVACHOL) 40 mg tablet, TAKE 1 TABLET EVERY DAY AS DIRECTED (Patient not taking: Reported on 2/6/2019), Disp: 90 tablet, Rfl: 3    zolpidem (AMBIEN) 10 mg tablet, zolpidem 10 mg tablet, Disp: , Rfl:   Allergies   Allergen Reactions    Procaine Hives    Lidocaine Rash    Other Rash     Adhesive tape     Penicillins Rash       Labs:     Chemistry        Component Value Date/Time     (L) 09/21/2015 1057    K 4 3 02/04/2019 0747    K 4 3 09/21/2015 1057    CL 89 (L) 02/04/2019 0747    CL 94 (L) 09/21/2015 1057    CO2 27 02/04/2019 0747    CO2 31 2 09/21/2015 1057    BUN 10 02/04/2019 0747    BUN 8 09/21/2015 1057    CREATININE 0 85 02/04/2019 0747    CREATININE 0 74 09/21/2015 1057        Component Value Date/Time    CALCIUM 9 3 02/04/2019 0747    CALCIUM 9 5 09/21/2015 1057    ALKPHOS 136 (H) 10/19/2018 0715    ALKPHOS 123 (H) 09/21/2015 1057    AST 18 10/19/2018 0715    AST 21 09/21/2015 1057    ALT 24 10/19/2018 0715    ALT 34 09/21/2015 1057    BILITOT 0 29 09/21/2015 1057            Lab Results   Component Value Date    CHOL 290 06/04/2015    CHOL 240 10/19/2014    CHOL 277 03/12/2014     Lab Results   Component Value Date     (H) 06/08/2016     (H) 01/27/2016     06/04/2015     Lab Results   Component Value Date    LDLCALC 121 (H) 06/08/2016    LDLCALC 126 (H) 01/27/2016    LDLCALC 153 (H) 06/04/2015     Lab Results   Component Value Date    TRIG 49 06/08/2016    TRIG 56 01/27/2016    TRIG 44 06/04/2015     No results found for: CHOLHDL    Imaging: No results found  ECG:  Normal sinus rhythm unremarkable tracing      Review of Systems   Constitution: Negative  HENT: Negative  Eyes: Negative  Cardiovascular: Negative  Respiratory: Negative  Endocrine: Negative  Hematologic/Lymphatic: Negative  Skin: Negative  Musculoskeletal: Negative  Gastrointestinal: Negative  Genitourinary: Negative  Neurological: Positive for dizziness, headaches and loss of balance  Psychiatric/Behavioral: Negative  Vitals:    02/06/19 1454   BP: 120/74   Pulse: 65     Vitals:    02/06/19 1454   Weight: 77 4 kg (170 lb 9 6 oz)     Height: 5' 4" (162 6 cm)   Body mass index is 29 28 kg/m²  Physical Exam:   General appearance:  Appears stated age, alert, well appearing and in no distress  HEENT:  PERRLA, EOMI, no scleral icterus, no conjunctival pallor  NECK:  Supple, No elevated JVP, no thyromegaly, no carotid bruits  HEART:  Regular rate and rhythm positive S1/S2 no murmurs rubs or gallops no S3 no S4 PMI nondisplaced no carotid bruits      LUNGS:  Clear to auscultation bilaterally, no wheezes rales or rhonchi  ABDOMEN:  Soft, non-tender, positive bowel sounds, no rebound or guarding, no organomegaly   EXTREMITIES:  No edema, normal range of motion  VASCULAR:  Normal pedal pulses, good pulse volume   SKIN: No lesions or rashes on exposed skin  NEURO:  CN II-XII intact, no focal deficits

## 2019-02-06 NOTE — ED PROVIDER NOTES
History  Chief Complaint   Patient presents with    Abnormal Lab     Patient was seen by Dr Rudy Sutton today was told to come to ER due to Na level being low around 122, however patient feel light headed and dizzy     Patient presents to the emergency department today offering a chief complaint of lightheadedness and weakness  She initially stated dizziness however she states she has no vertigo  Sent here by Cardiology states 2 days ago had outpatient blood work with a sodium of 122  Sent for likely admission due to symptomatic hyponatremia  She states she has had hyponatremia in the past however is not on any treatment for it  She denies any chest pain or novel shortness of breath  She does admit to nausea without vomiting  Normal appetite  Prior to Admission Medications   Prescriptions Last Dose Informant Patient Reported? Taking?    ALPRAZolam (XANAX) 0 25 mg tablet   Yes No   Sig: alprazolam 0 25 mg tablet   Methylprednisolone 4 MG TBPK   Yes Yes   Multiple Vitamin (DAILY VALUE MULTIVITAMIN) TABS   Yes Yes   Sig: Take 1 tablet by mouth daily   acetaminophen-codeine (TYLENOL WITH CODEINE) 120-12 mg/5 mL suspension   Yes No   Sig: Take 5 mL by mouth every 6 (six) hours as needed   aspirin 81 MG tablet   Yes Yes   Sig: Take 1 tablet by mouth daily   atorvastatin (LIPITOR) 40 mg tablet   Yes No   Sig: atorvastatin 40 mg tablet   butalbital-acetaminophen-caffeine-codeine (FIORICET WITH CODEINE) -29-30 MG per capsule  at  prn  Yes Yes   Sig: Take 1 capsule by mouth every 4 (four) hours as needed for headaches   calcium carbonate (OS-MARLENY) 600 MG tablet   Yes Yes   Sig: Take 600 mg by mouth 2 (two) times a day with meals   losartan (COZAAR) 100 MG tablet   No Yes   Sig: TAKE 1 TABLET EVERY DAY   omeprazole (PriLOSEC) 20 mg delayed release capsule   Yes Yes   Sig: Take 20 mg by mouth daily   spironolactone (ALDACTONE) 50 mg tablet  Self Yes Yes   Sig: Take 50 mg by mouth daily   zolpidem (AMBIEN) 10 mg tablet   Yes Yes   Sig: zolpidem 10 mg tablet      Facility-Administered Medications: None       Past Medical History:   Diagnosis Date    Arthritis     Chronic pain disorder     GERD (gastroesophageal reflux disease)     Hyperlipidemia     Hypertension     IBS (irritable bowel syndrome)     Mitral regurgitation     Stroke ([facility] [address] )     tia       Past Surgical History:   Procedure Laterality Date    APPENDECTOMY      HYSTERECTOMY      INSERT / REPLACE PERIPHERAL NEUROSTIMULATOR PULSE GENERATOR /  Left     buttocks    JOINT REPLACEMENT      knee    KNEE ARTHROSCOPY      NERVE BLOCK Left 2/20/2018    Procedure: BLOCK MEDIAL BRANCH - C4, C5, C6;  Surgeon: Natalie Sands MD;  [facility];  Service: Pain Management     NERVE BLOCK Left 3/6/2018    Procedure: C4, C5, C6 MEDIAL BRANCH BLOCK;  Surgeon: Natalie Sands MD;  [facility];  Service: Pain Management     DE COLONOSCOPY FLX DX W/COLLJ SPEC WHEN PFRMD N/A 4/24/2017    Procedure: Tata Deal;  Surgeon: Patrick Grant MD;  [facility];  Service: Colorectal    RADIOFREQUENCY ABLATION Left 4/17/2018    Procedure: ABLATION RADIO FREQUENCY (RFA) - C4, C5, C6;  Surgeon: Natalie Sands MD;  [facility];  Service: Pain Management     SINUS SURGERY         History reviewed  No pertinent family history  I have reviewed and agree with the history as documented  Social History   Substance Use Topics    Smoking status: Former Smoker    Smokeless tobacco: Never Used      Comment: quit 40 yrs ago    Alcohol use Yes      Comment: social        Review of Systems   HENT: Negative  Eyes: Negative  Respiratory: Negative  Cardiovascular: Negative  Gastrointestinal: Positive for nausea  Negative for abdominal distention, abdominal pain, anal bleeding, blood in stool, constipation, diarrhea, rectal pain and vomiting  Endocrine: Negative  Genitourinary: Negative  Musculoskeletal: Negative  Skin: Negative  Allergic/Immunologic: Negative  Neurological: Positive for weakness and light-headedness  Negative for dizziness, tremors, seizures, syncope, facial asymmetry, speech difficulty, numbness and headaches  Hematological: Negative  Psychiatric/Behavioral: Negative  Physical Exam  Physical Exam   Constitutional: She appears well-developed and well-nourished  No distress  HENT:   Head: Normocephalic  Mouth/Throat: No oropharyngeal exudate  Eyes: Pupils are equal, round, and reactive to light  Conjunctivae and EOM are normal    Neck: Normal range of motion  No JVD present  No tracheal deviation present  No thyromegaly present  Cardiovascular: Normal rate, regular rhythm, normal heart sounds and intact distal pulses  Exam reveals no gallop and no friction rub  No murmur heard  Pulmonary/Chest: Effort normal and breath sounds normal  No stridor  No respiratory distress  She has no wheezes  She has no rales  She exhibits no tenderness  Abdominal: Soft  Bowel sounds are normal  She exhibits no distension and no mass  There is no tenderness  There is no rebound and no guarding  No hernia  Musculoskeletal: Normal range of motion  Lymphadenopathy:     She has no cervical adenopathy  Neurological: She is alert  Skin: Skin is warm  Capillary refill takes less than 2 seconds  She is not diaphoretic  Psychiatric: She has a normal mood and affect  Vitals reviewed        Vital Signs  ED Triage Vitals   Temperature Pulse Respirations Blood Pressure SpO2   02/06/19 1614 02/06/19 1614 02/06/19 1614 02/06/19 1614 02/06/19 1614   98 1 °F (36 7 °C) 70 18 (!) 177/74 95 %      Temp Source Heart Rate Source Patient Position - Orthostatic VS BP Location FiO2 (%)   02/06/19 1614 02/06/19 1614 02/06/19 1614 02/06/19 1614 --   Temporal Monitor Lying Left arm       Pain Score       02/06/19 1630       No Pain           Vitals:    02/06/19 1630 02/06/19 1730 02/06/19 1800 02/06/19 1830   BP: 150/70 150/65 155/67 153/69   Pulse: 62 66 60 62   Patient Position - Orthostatic VS: Lying Lying Lying Lying       Visual Acuity  Visual Acuity      Most Recent Value   L Pupil Size (mm)  3   R Pupil Size (mm)  3          ED Medications  Medications   sodium chloride 0 9 % infusion (125 mL/hr Intravenous New Bag 2/6/19 1954)   ondansetron (ZOFRAN) injection 4 mg (4 mg Intravenous Given 2/6/19 1952)   meclizine (ANTIVERT) tablet 25 mg (25 mg Oral Given 2/6/19 1952)       Diagnostic Studies  Results Reviewed     Procedure Component Value Units Date/Time    BMP Q8 hours X 3 (Hyponatremia monitoring) [[id number]]     Lab Status:  No result Specimen:  Blood     Comprehensive metabolic panel [[id number]]  (Abnormal) Collected:  02/06/19 1701    Lab Status:  Final result Specimen:  Blood from Arm, Right Updated:  02/06/19 1754     Sodium 123 (L) mmol/L      Potassium 4 1 mmol/L      Chloride 90 (L) mmol/L      CO2 26 mmol/L      ANION GAP 7 mmol/L      BUN 8 mg/dL      Creatinine 0 82 mg/dL      Glucose 103 mg/dL      Calcium 9 2 mg/dL      AST 23 U/L      ALT 34 U/L      Alkaline Phosphatase 138 (H) U/L      Total Protein 7 0 g/dL      Albumin 3 8 g/dL      Total Bilirubin 0 30 mg/dL      eGFR 72 ml/min/1 73sq m     Narrative:         National Kidney Disease Education Program recommendations are as follows:  GFR calculation is accurate only with a steady state creatinine  Chronic Kidney disease less than 60 ml/min/1 73 sq  meters  Kidney failure less than 15 ml/min/1 73 sq  meters      Magnesium [[id number]]  (Normal) Collected:  02/06/19 1701    Lab Status:  Final result Specimen:  Blood from Arm, Right Updated:  02/06/19 1754     Magnesium 1 9 mg/dL     TSH [[id number]]  (Normal) Collected:  02/06/19 1701    Lab Status:  Final result Specimen:  Blood from Arm, Right Updated:  02/06/19 1754     TSH 3RD GENERATON 1 827 uIU/mL     Narrative:         Patients undergoing fluorescein dye angiography may retain small amounts of fluorescein in the body for 48-72 hours post procedure  Samples containing fluorescein can produce falsely depressed TSH values  If the patient had this procedure,a specimen should be resubmitted post fluorescein clearance            The recommended reference ranges for TSH during pregnancy are as follows:  First trimester 0 1 to 2 5 uIU/mL  Second trimester  0 2 to 3 0 uIU/mL  Third trimester 0 3 to 3 0 uIU/m      Troponin I [[id number]]  (Normal) Collected:  02/06/19 1701    Lab Status:  Final result Specimen:  Blood from Arm, Right Updated:  02/06/19 1735     Troponin I <0 02 ng/mL     Protime-INR [[id number]]  (Normal) Collected:  02/06/19 1701    Lab Status:  Final result Specimen:  Blood from Arm, Right Updated:  02/06/19 1722     Protime 12 7 seconds      INR 1 00    APTT [[id number]]  (Normal) Collected:  02/06/19 1701    Lab Status:  Final result Specimen:  Blood from Arm, Right Updated:  02/06/19 1722     PTT 33 seconds     CBC and differential [[id number]]  (Abnormal) Collected:  02/06/19 1701    Lab Status:  Final result Specimen:  Blood from Arm, Right Updated:  02/06/19 1711     WBC 4 99 Thousand/uL      RBC 4 03 Million/uL      Hemoglobin 12 1 g/dL      Hematocrit 34 0 (L) %      MCV 84 fL      MCH 30 0 pg      MCHC 35 6 g/dL      RDW 12 4 %      MPV 8 8 (L) fL      Platelets 700 Thousands/uL      nRBC 0 /100 WBCs      Neutrophils Relative 70 %      Immat GRANS % 0 %      Lymphocytes Relative 20 %      Monocytes Relative 8 %      Eosinophils Relative 2 %      Basophils Relative 0 %      Neutrophils Absolute 3 41 Thousands/µL      Immature Grans Absolute 0 01 Thousand/uL      Lymphocytes Absolute 1 01 Thousands/µL      Monocytes Absolute 0 42 Thousand/µL      Eosinophils Absolute 0 12 Thousand/µL      Basophils Absolute 0 02 Thousands/µL     Fingerstick Glucose (POCT) [[id number]]  (Normal) Collected:  02/06/19 1649    Lab Status:  Final result Updated:  02/06/19 1653 POC Glucose 111 mg/dl     UA w Reflex to Microscopic [[id number]]     Lab Status:  No result Specimen:  Urine                  XR chest 1 view portable   ED Interpretation by Gloria Lackey PA-C (02/06 1839)   No evidence of acute cardio/pulmonary process  CT head without contrast   Final Result by Bry Renae MD (02/06 1729)      No acute intracranial abnormality  Workstation performed: [id number]                    Procedures  Procedures       Phone Contacts  ED Phone Contact    ED Course  ED Course as of Feb 06 2046 Wed Feb 06, 2019   1722 INR: 1 00   1722 WBC: 4 99   1722 Hemoglobin: 12 1   1722 Platelet Count: 989   2700 POC Glucose: 287   4533 FINDINGS:    PARENCHYMA:  No intracranial mass, mass effect or midline shift  No CT signs of acute infarction   No acute parenchymal hemorrhage  VENTRICLES AND EXTRA-AXIAL SPACES:  Normal for the patient's age   No extra-axial blood  VISUALIZED ORBITS AND PARANASAL SINUSES:  Postoperative changes in the paranasal sinuses   Orbits unremarkable  CALVARIUM AND EXTRACRANIAL SOFT TISSUES:  Normal   Impression       No acute intracranial abnormality                HEART Risk Score      Most Recent Value   History  0 Filed at: 02/06/2019 1730   ECG  0 Filed at: 02/06/2019 1730   Age  2 Filed at: 02/06/2019 1730   Risk Factors  1 Filed at: 02/06/2019 1730   Troponin  0 Filed at: 02/06/2019 1730   Heart Score Risk Calculator   History  0 Filed at: 02/06/2019 1730   ECG  0 Filed at: 02/06/2019 1730   Age  2 Filed at: 02/06/2019 1730   Risk Factors  1 Filed at: 02/06/2019 1730   Troponin  0 Filed at: 02/06/2019 1730   HEART Score  3 Filed at: 02/06/2019 1730   HEART Score  3 Filed at: 02/06/2019 1730                            MDM    Disposition  Final diagnoses:   Hyponatremia     Time reflects when diagnosis was documented in both MDM as applicable and the Disposition within this note     Time User Action Codes Description Comment 2/6/2019  7:24 PM Magdalena HINOJOSA Add [E87 1] Hyponatremia       ED Disposition     ED Disposition Condition Date/Time Comment    Admit  Wed Feb 6, 2019  7:24 PM Case was discussed with Lizzie Pradhan and the patient's admission status was agreed to be Admission Status: inpatient status to the service of Dr Epifanio Roper   Follow-up Information    None         Patient's Medications   Discharge Prescriptions    No medications on file     No discharge procedures on file      ED Provider  Electronically Signed by           Tracie Romero PA-C  02/06/19 Tristian Lewis 12 Donald Alvarado PA-C  02/06/19 1542

## 2019-02-06 NOTE — ED PROCEDURE NOTE
Procedure  ECG 12 Lead Documentation  Date/Time: 2/6/2019 5:27 PM  Performed by: Karen Reddy  Authorized by: Karen Reddy     Indications / Diagnosis:  Weakness  ECG reviewed by me, the ED Provider: yes    Patient location:  ED  Previous ECG:     Previous ECG:  Compared to current    Comparison to cardiac monitor: Yes    Interpretation:     Interpretation: normal    Rate:     ECG rate:  59    ECG rate assessment: bradycardic    Rhythm:     Rhythm: sinus bradycardia    Ectopy:     Ectopy: none    QRS:     QRS axis:  Normal    QRS intervals:  Normal  Conduction:     Conduction: normal    ST segments:     ST segments:  Normal  T waves:     T waves: normal                       Teena Rodriguez PA-C  02/06/19 1728

## 2019-02-07 VITALS
WEIGHT: 171.3 LBS | RESPIRATION RATE: 18 BRPM | SYSTOLIC BLOOD PRESSURE: 126 MMHG | TEMPERATURE: 98 F | BODY MASS INDEX: 29.24 KG/M2 | HEART RATE: 61 BPM | DIASTOLIC BLOOD PRESSURE: 62 MMHG | OXYGEN SATURATION: 97 % | HEIGHT: 64 IN

## 2019-02-07 LAB
ALBUMIN SERPL BCP-MCNC: 3.2 G/DL (ref 3.5–5)
ALP SERPL-CCNC: 117 U/L (ref 46–116)
ALT SERPL W P-5'-P-CCNC: 22 U/L (ref 12–78)
ANION GAP SERPL CALCULATED.3IONS-SCNC: 5 MMOL/L (ref 4–13)
ANION GAP SERPL CALCULATED.3IONS-SCNC: 7 MMOL/L (ref 4–13)
ANION GAP SERPL CALCULATED.3IONS-SCNC: 8 MMOL/L (ref 4–13)
APTT PPP: 31 SECONDS (ref 26–38)
AST SERPL W P-5'-P-CCNC: 27 U/L (ref 5–45)
BACTERIA UR CULT: ABNORMAL
BACTERIA UR CULT: ABNORMAL
BILIRUB SERPL-MCNC: 0.3 MG/DL (ref 0.2–1)
BUN SERPL-MCNC: 7 MG/DL (ref 5–25)
BUN SERPL-MCNC: 7 MG/DL (ref 5–25)
BUN SERPL-MCNC: 8 MG/DL (ref 5–25)
CALCIUM SERPL-MCNC: 8.6 MG/DL (ref 8.3–10.1)
CALCIUM SERPL-MCNC: 8.6 MG/DL (ref 8.3–10.1)
CALCIUM SERPL-MCNC: 8.8 MG/DL (ref 8.3–10.1)
CHLORIDE SERPL-SCNC: 94 MMOL/L (ref 100–108)
CHLORIDE SERPL-SCNC: 98 MMOL/L (ref 100–108)
CHLORIDE SERPL-SCNC: 98 MMOL/L (ref 100–108)
CO2 SERPL-SCNC: 26 MMOL/L (ref 21–32)
CO2 SERPL-SCNC: 26 MMOL/L (ref 21–32)
CO2 SERPL-SCNC: 30 MMOL/L (ref 21–32)
CREAT SERPL-MCNC: 0.84 MG/DL (ref 0.6–1.3)
CREAT SERPL-MCNC: 0.94 MG/DL (ref 0.6–1.3)
CREAT SERPL-MCNC: 0.99 MG/DL (ref 0.6–1.3)
ERYTHROCYTE [DISTWIDTH] IN BLOOD BY AUTOMATED COUNT: 12.7 % (ref 11.6–15.1)
GFR SERPL CREATININE-BSD FRML MDRD: 58 ML/MIN/1.73SQ M
GFR SERPL CREATININE-BSD FRML MDRD: 61 ML/MIN/1.73SQ M
GFR SERPL CREATININE-BSD FRML MDRD: 70 ML/MIN/1.73SQ M
GLUCOSE SERPL-MCNC: 149 MG/DL (ref 65–140)
GLUCOSE SERPL-MCNC: 85 MG/DL (ref 65–140)
GLUCOSE SERPL-MCNC: 98 MG/DL (ref 65–140)
HCT VFR BLD AUTO: 33.2 % (ref 34.8–46.1)
HGB BLD-MCNC: 11.3 G/DL (ref 11.5–15.4)
INR PPP: 1.09 (ref 0.86–1.17)
MAGNESIUM SERPL-MCNC: 2 MG/DL (ref 1.6–2.6)
MCH RBC QN AUTO: 29.4 PG (ref 26.8–34.3)
MCHC RBC AUTO-ENTMCNC: 34 G/DL (ref 31.4–37.4)
MCV RBC AUTO: 86 FL (ref 82–98)
OSMOLALITY UR: 108 MMOL/KG
PLATELET # BLD AUTO: 196 THOUSANDS/UL (ref 149–390)
PLATELET # BLD AUTO: 205 THOUSANDS/UL (ref 149–390)
PMV BLD AUTO: 8.8 FL (ref 8.9–12.7)
PMV BLD AUTO: 9.1 FL (ref 8.9–12.7)
POTASSIUM SERPL-SCNC: 3.8 MMOL/L (ref 3.5–5.3)
POTASSIUM SERPL-SCNC: 3.9 MMOL/L (ref 3.5–5.3)
POTASSIUM SERPL-SCNC: 4 MMOL/L (ref 3.5–5.3)
PROT SERPL-MCNC: 6.1 G/DL (ref 6.4–8.2)
PROTHROMBIN TIME: 13.6 SECONDS (ref 11.8–14.2)
RBC # BLD AUTO: 3.85 MILLION/UL (ref 3.81–5.12)
SODIUM 24H UR-SCNC: 36 MOL/L
SODIUM SERPL-SCNC: 129 MMOL/L (ref 136–145)
SODIUM SERPL-SCNC: 131 MMOL/L (ref 136–145)
SODIUM SERPL-SCNC: 132 MMOL/L (ref 136–145)
WBC # BLD AUTO: 3.81 THOUSAND/UL (ref 4.31–10.16)

## 2019-02-07 PROCEDURE — 97116 GAIT TRAINING THERAPY: CPT | Performed by: PHYSICAL THERAPIST

## 2019-02-07 PROCEDURE — G8979 MOBILITY GOAL STATUS: HCPCS | Performed by: PHYSICAL THERAPIST

## 2019-02-07 PROCEDURE — G8978 MOBILITY CURRENT STATUS: HCPCS | Performed by: PHYSICAL THERAPIST

## 2019-02-07 PROCEDURE — 80048 BASIC METABOLIC PNL TOTAL CA: CPT | Performed by: PHYSICIAN ASSISTANT

## 2019-02-07 PROCEDURE — 80053 COMPREHEN METABOLIC PANEL: CPT | Performed by: PHYSICIAN ASSISTANT

## 2019-02-07 PROCEDURE — G8987 SELF CARE CURRENT STATUS: HCPCS

## 2019-02-07 PROCEDURE — 85730 THROMBOPLASTIN TIME PARTIAL: CPT | Performed by: PHYSICIAN ASSISTANT

## 2019-02-07 PROCEDURE — 97166 OT EVAL MOD COMPLEX 45 MIN: CPT

## 2019-02-07 PROCEDURE — 85027 COMPLETE CBC AUTOMATED: CPT | Performed by: PHYSICIAN ASSISTANT

## 2019-02-07 PROCEDURE — 97162 PT EVAL MOD COMPLEX 30 MIN: CPT | Performed by: PHYSICAL THERAPIST

## 2019-02-07 PROCEDURE — 85049 AUTOMATED PLATELET COUNT: CPT | Performed by: PHYSICIAN ASSISTANT

## 2019-02-07 PROCEDURE — G8988 SELF CARE GOAL STATUS: HCPCS

## 2019-02-07 PROCEDURE — 99238 HOSP IP/OBS DSCHRG MGMT 30/<: CPT | Performed by: PHYSICIAN ASSISTANT

## 2019-02-07 PROCEDURE — 83735 ASSAY OF MAGNESIUM: CPT | Performed by: PHYSICIAN ASSISTANT

## 2019-02-07 PROCEDURE — 85610 PROTHROMBIN TIME: CPT | Performed by: PHYSICIAN ASSISTANT

## 2019-02-07 RX ADMIN — VERAPAMIL HYDROCHLORIDE 240 MG: 240 TABLET, FILM COATED, EXTENDED RELEASE ORAL at 08:26

## 2019-02-07 RX ADMIN — SODIUM CHLORIDE 75 ML/HR: 0.9 INJECTION, SOLUTION INTRAVENOUS at 10:30

## 2019-02-07 RX ADMIN — LOSARTAN POTASSIUM 100 MG: 50 TABLET, FILM COATED ORAL at 08:19

## 2019-02-07 RX ADMIN — PRAVASTATIN SODIUM 40 MG: 40 TABLET ORAL at 08:26

## 2019-02-07 RX ADMIN — CLONIDINE HYDROCHLORIDE 0.1 MG: 0.1 TABLET ORAL at 08:20

## 2019-02-07 RX ADMIN — NEBIVOLOL HYDROCHLORIDE 5 MG: 5 TABLET ORAL at 08:21

## 2019-02-07 RX ADMIN — PANTOPRAZOLE SODIUM 20 MG: 20 TABLET, DELAYED RELEASE ORAL at 05:08

## 2019-02-07 RX ADMIN — ENOXAPARIN SODIUM 40 MG: 40 INJECTION SUBCUTANEOUS at 08:22

## 2019-02-07 RX ADMIN — SPIRONOLACTONE 50 MG: 25 TABLET ORAL at 08:20

## 2019-02-07 RX ADMIN — Medication 1 TABLET: at 08:20

## 2019-02-07 RX ADMIN — FLUTICASONE PROPIONATE 1 SPRAY: 50 SPRAY, METERED NASAL at 08:22

## 2019-02-07 NOTE — PLAN OF CARE
DISCHARGE PLANNING     Discharge to home or other facility with appropriate resources Progressing        INFECTION - ADULT     Absence or prevention of progression during hospitalization Progressing        Knowledge Deficit     Patient/family/caregiver demonstrates understanding of disease process, treatment plan, medications, and discharge instructions Progressing        METABOLIC, FLUID AND ELECTROLYTES - ADULT     Electrolytes maintained within normal limits Progressing        PAIN - ADULT     Verbalizes/displays adequate comfort level or baseline comfort level Progressing        Potential for Falls     Patient will remain free of falls Progressing        Prexisting or High Potential for Compromised Skin Integrity     Skin integrity is maintained or improved Progressing        SAFETY ADULT     Maintain or return to baseline ADL function Progressing     Maintain or return mobility status to optimal level Progressing     Patient will remain free of falls Progressing

## 2019-02-07 NOTE — PLAN OF CARE
Problem: PHYSICAL THERAPY ADULT  Goal: Performs mobility at highest level of function for planned discharge setting  See evaluation for individualized goals  Outcome: Adequate for Discharge  Prognosis: Good  Problem List: Decreased strength, Decreased endurance, Impaired balance, Decreased mobility  Assessment: Pt is a 72year old female with complex PMH as listed above presenting to [address] [place] [address] at the request of her cardiologist due to low sodium levels at 122  Pt admitted for hyponatremia  Pt seen for moderate complexity PT evaluation to assess pt's mobility level and to assist with discharge planning  Pt presents with mild decrease in bilateral LE strength decreased balance endurance and functional mobility requiring (S) for all transfers and ambulation no A D  Pt with no LOB during evaluation and gait training but requires increased time to complete task due to weakness and fatigue  Pt would benefit from continued activity in PT to improve strength to 5/5 improve balance endurance ambulation and to ensure safe (I) stair negotiation to ensure safe return home on discharge  Recommendation: Home independently     PT - OK to Discharge: Yes (when medically stable for discharge)    See flowsheet documentation for full assessment

## 2019-02-07 NOTE — ASSESSMENT & PLAN NOTE
-sent from her cardiology office due to low sodium levels  -she complains of lightheadedness and nausea  -Sodium levels at 123 in ER   This is increased from 122 2 days ago  -Review of her labs shows that she has had low sodium levels over the past six months  -CT head impression-No acute intracranial abnormality   -Chest X-ray completed official report pending    -Admit to med surg  -Telemetry monitoring  -IV normal saline  -Zofran PRN  -Check urine sodium random, urine osmolality  -Check BMP at midnight  -Nephrology consult if no improvement  -Supportive care

## 2019-02-07 NOTE — H&P
[facility]  [facility]P- Erick Rosas 1945, 72 y o  female MRN: [id number]    Unit/Bed#: 405-01 Encounter: [id number]    Primary Care Provider: Dilcia Bhakta DO   Date and time admitted to hospital: 2/6/2019  4:08 PM        * Hyponatremia   Assessment & Plan    -sent from her cardiology office due to low sodium levels  -she complains of lightheadedness and nausea  -Sodium levels at 123 in ER  This is increased from 122 2 days ago  -Review of her labs shows that she has had low sodium levels over the past six months  -CT head impression-No acute intracranial abnormality   -Chest X-ray completed official report pending    -Admit to med surg  -Telemetry monitoring  -IV normal saline  -Zofran PRN  -Check urine sodium random, urine osmolality  -Check BMP at midnight  -Nephrology consult if no improvement  -Supportive care     Mitral regurgitation   Assessment & Plan    -Continue home medications  -Continue Outpatient cardiology follow up     Hypertension   Assessment & Plan    -Blood pressure initially elevated upon arrival to ER  -Currently Stable  -Continue home medication  -Close monitoring     Gastroesophageal reflux disease without esophagitis   Assessment & Plan    -Currently on Prilosec 20 mg PO daily  -Will give alternative formulary of protonix 20 mg PO daily     Chronic pain syndrome   Assessment & Plan    No complaints of severe pain today  -Continue current home pain medications  -Encourage outpatient pain management follow up     Hypercholesterolemia   Assessment & Plan    -Continue Statins           VTE Prophylaxis: Enoxaparin (Lovenox)  / sequential compression device   Code Status: Level 1- Full Code  POLST: POLST form is not discussed and not completed at this time  Discussion with family: None    Anticipated Length of Stay:  Patient will be admitted on an Inpatient basis with an anticipated length of stay of  > 2 midnights     Justification for Hospital Stay: Hyponatremia    Total Time for Visit, including Counseling / Coordination of Care: 30 minutes  Greater than 50% of this total time spent on direct patient counseling and coordination of care  Chief Complaint:   Lightheadedness    History of Present Illness:    Tahir Onofre is a 72 y o  female who presents to the emergency room from her cardiologist's office with complaints of low sodium levels associated with feeling lightheaded and dizzy  She also complain of feeling very nauseous  She reports that she had blood work 2 days ago with a sodium level of 122  She visited her cardiologist today and was sent for possible admission for symptomatic hyponatremia  Of note she has been hyponatremic in the past review of her labs dating back to 6 months ago she did have low levels of sodium however she states that the last time she was hospitalized follow sodium levels as probably 2 years ago  She denies chest pain, shortness of breath, visual disturbances, vomiting, diarrhea, abdominal pain, paresthesias, weakness  Labs completed in emergency room with results as shown below  CT head completed with results as shown below  Chest x-ray completed with official report pending  She received meclizine 25 mg p o , Zofran 4 mg IV started on IV fluids in emergency room  At bedside she appears comfortable however she reports that she still feels a bit lightheaded with intermittent periods of nausea  Review of Systems:    Review of Systems   Constitutional: Negative for activity change, chills, fatigue, fever and unexpected weight change  HENT: Negative for postnasal drip, sinus pain, sinus pressure and tinnitus  Eyes: Negative for photophobia, pain, redness and visual disturbance  Respiratory: Negative for cough, chest tightness, shortness of breath and wheezing  Cardiovascular: Negative for chest pain, palpitations and leg swelling  Gastrointestinal: Positive for nausea   Negative for abdominal pain, diarrhea and vomiting  Endocrine: Negative for polydipsia  Genitourinary: Negative for difficulty urinating, dysuria, flank pain and frequency  Musculoskeletal: Positive for back pain  Negative for gait problem, myalgias and neck pain  Skin: Negative for color change, pallor, rash and wound  Neurological: Positive for light-headedness and headaches  Negative for dizziness and weakness  Hematological: Negative for adenopathy  Does not bruise/bleed easily  Psychiatric/Behavioral: Negative for agitation, confusion and sleep disturbance  The patient is not nervous/anxious  Past Medical and Surgical History:     Past Medical History:   Diagnosis Date    Arthritis     Chronic pain disorder     GERD (gastroesophageal reflux disease)     Hyperlipidemia     Hypertension     IBS (irritable bowel syndrome)     Mitral regurgitation     Stroke ([facility] [address] )     tia       Past Surgical History:   Procedure Laterality Date    APPENDECTOMY      HYSTERECTOMY      INSERT / REPLACE PERIPHERAL NEUROSTIMULATOR PULSE GENERATOR /  Left     buttocks    JOINT REPLACEMENT      knee    KNEE ARTHROSCOPY      NERVE BLOCK Left 2/20/2018    Procedure: BLOCK MEDIAL BRANCH - C4, C5, C6;  Surgeon: Graeme Reyna MD;  Location: [facility];  Service: Pain Management     NERVE BLOCK Left 3/6/2018    Procedure: C4, C5, C6 MEDIAL BRANCH BLOCK;  Surgeon: Graeme Reyna MD;  [facility];  Service: Pain Management     MT COLONOSCOPY FLX DX W/COLLJ SPEC WHEN PFRMD N/A 4/24/2017    Procedure: Cm Villarrealr;  Surgeon: Leslie Becerril MD;  [facility]: [facility];  Service: Colorectal    RADIOFREQUENCY ABLATION Left 4/17/2018    Procedure: ABLATION RADIO FREQUENCY (RFA) - C4, C5, C6;  Surgeon: Graeme Reyna MD;  Location: [facility];  Service: Pain Management     SINUS SURGERY         Meds/Allergies:    Prior to Admission medications    Medication Sig Start Date End Date Taking?  Authorizing Provider   aspirin 81 MG tablet Take 1 tablet by mouth daily   Yes Historical Provider, MD   butalbital-acetaminophen-caffeine-codeine (FIORICET WITH CODEINE) -78-30 MG per capsule Take 1 capsule by mouth every 4 (four) hours as needed for headaches   Yes Historical Provider, MD   calcium carbonate (OS-MARLENY) 600 MG tablet Take 600 mg by mouth 2 (two) times a day with meals   Yes Historical Provider, MD   losartan (COZAAR) 100 MG tablet TAKE 1 TABLET EVERY DAY 4/28/18  Yes Williams Blas, DO   Methylprednisolone 4 MG TBPK  3/12/18  Yes Historical Provider, MD   Multiple Vitamin (DAILY VALUE MULTIVITAMIN) TABS Take 1 tablet by mouth daily   Yes Historical Provider, MD   omeprazole (PriLOSEC) 20 mg delayed release capsule Take 20 mg by mouth daily   Yes Historical Provider, MD   spironolactone (ALDACTONE) 50 mg tablet Take 50 mg by mouth daily   Yes Historical Provider, MD   zolpidem (AMBIEN) 10 mg tablet zolpidem 10 mg tablet   Yes Historical Provider, MD   Calcium Carb-Cholecalciferol (CALCIUM 1000 + D PO) calcium  2/6/19 Yes Historical Provider, MD   acetaminophen-codeine (TYLENOL WITH CODEINE) 120-12 mg/5 mL suspension Take 5 mL by mouth every 6 (six) hours as needed    Historical Provider, MD   ALPRAZolam (XANAX) 0 25 mg tablet alprazolam 0 25 mg tablet    Historical Provider, MD   atorvastatin (LIPITOR) 40 mg tablet atorvastatin 40 mg tablet    Historical Provider, MD   aspirin (ECOTRIN LOW STRENGTH) 81 mg EC tablet Take 81 mg by mouth daily  2/6/19  Historical Provider, MD   butalbital-acetaminophen-caffeine (FIORICET,ESGIC) -40 mg per tablet butalbital-acetaminophen-caffeine 50 mg-325 mg-40 mg tablet  2/6/19  Historical Provider, MD   cloNIDine (CATAPRES) 0 1 mg tablet Take 0 1 mg by mouth 2 (two) times a day    2/6/19  Historical Provider, MD   cloNIDine (CATAPRES) 0 2 mg tablet Take 1 5 tablets by mouth 2 (two) times a day 4/4/17 2/6/19  Historical Provider, MD   diclofenac potassium (CATAFLAM) 50 mg tablet Take 1 tablet (50 mg total) by mouth 3 (three) times a day for 30 days 4/18/18 2/6/19  Jaycob Cruz MD   erythromycin (ILOTYCIN) ophthalmic ointment  2/16/18 2/6/19  Historical Provider, MD   fluticasone (FLONASE) 50 mcg/act nasal spray fluticasone 50 mcg/actuation nasal spray,suspension  2/6/19  Historical Provider, MD   losartan (COZAAR) 100 MG tablet losartan 100 mg tablet  2/6/19  Historical Provider, MD   multivitamin (THERAGRAN) TABS Take 1 tablet by mouth daily  2/6/19  Historical Provider, MD   nebivolol (BYSTOLIC) 10 mg tablet Take 5 mg by mouth 2 (two) times a day    2/6/19  Historical Provider, MD   nebivolol (BYSTOLIC) 10 mg tablet Bystolic 10 mg tablet  9/8/42  Historical Provider, MD   Omeprazole 20 MG TBEC omeprazole 20 mg capsule,delayed release  2/6/19  Historical Provider, MD   pravastatin (PRAVACHOL) 40 mg tablet TAKE 1 TABLET DAILY AS DIRECTED 2/23/18 2/6/19  Williams Blas DO   pravastatin (PRAVACHOL) 40 mg tablet pravastatin 20 mg tablet  2/6/19  Historical Provider, MD   pravastatin (PRAVACHOL) 40 mg tablet TAKE 1 TABLET EVERY DAY AS DIRECTED  Patient not taking: Reported on 2/6/2019 11/27/18 2/6/19  Girish Blas DO   verapamil (CALAN-SR) 240 mg CR tablet verapamil ER (HS) 240 mg tablet,extended release 24 hr  2/6/19  Historical Provider, MD   zolpidem (AMBIEN) 10 mg tablet Take 10 mg by mouth daily at bedtime as needed for sleep  2/6/19  Historical Provider, MD     I have reviewed home medications with patient personally  Allergies:    Allergies   Allergen Reactions    Procaine Hives    Lidocaine Rash    Other Rash     Adhesive tape     Penicillins Rash       Social History:     Marital Status: /Civil Union   Occupation: Retired  Patient Pre-hospital Living Situation: Lives with   Patient Pre-hospital Level of Mobility: Active  Patient Pre-hospital Diet Restrictions: None reported  Substance Use History:   History   Alcohol Use  Yes     Comment: social     History   Smoking Status    Former Smoker   Smokeless Tobacco    Never Used     Comment: quit 40 yrs ago     History   Drug Use No       Family History:    History reviewed  No pertinent family history  Physical Exam:     Vitals:   Blood Pressure: 122/58 (02/07/19 0039)  Pulse: 72 (02/07/19 0039)  Temperature: 97 9 °F (36 6 °C) (02/07/19 0039)  Temp Source: Temporal (02/07/19 0039)  Respirations: 18 (02/07/19 0039)  Height: 5' 4" (162 6 cm) (02/06/19 2124)  Weight - Scale: 76 2 kg (167 lb 15 9 oz) (02/06/19 2124)  SpO2: 97 % (02/07/19 0039)    Physical Exam   Constitutional: She is oriented to person, place, and time  She appears well-developed and well-nourished  No distress  HENT:   Head: Normocephalic and atraumatic  Mouth/Throat: Oropharynx is clear and moist    Eyes: Pupils are equal, round, and reactive to light  EOM are normal  Right eye exhibits no discharge  Left eye exhibits no discharge  Neck: Normal range of motion  Neck supple  No JVD present  Cardiovascular: Regular rhythm and intact distal pulses  Murmur heard  Bradycardic   Pulmonary/Chest: Effort normal and breath sounds normal  No respiratory distress  She has no wheezes  She has no rales  Abdominal: Soft  Bowel sounds are normal  She exhibits no distension  There is no tenderness  There is no rebound  Musculoskeletal: She exhibits no edema, tenderness or deformity  Lymphadenopathy:     She has no cervical adenopathy  Neurological: She is oriented to person, place, and time  No cranial nerve deficit  Coordination normal    Skin: Skin is warm and dry  No rash noted  She is not diaphoretic  No erythema  No pallor  Psychiatric: She has a normal mood and affect  Vitals reviewed  Additional Data:     Lab Results: I have personally reviewed pertinent reports          Results from last 7 days  Lab Units 02/07/19  0038 02/06/19  1701   WBC Thousand/uL  --  4 99   HEMOGLOBIN g/dL  --  12 1 HEMATOCRIT %  --  34 0*   PLATELETS Thousands/uL 196 209   NEUTROS PCT %  --  70   LYMPHS PCT %  --  20   MONOS PCT %  --  8   EOS PCT %  --  2       Results from last 7 days  Lab Units 02/07/19  0038 02/06/19  1701   SODIUM mmol/L 129* 123*   POTASSIUM mmol/L 3 8 4 1   CHLORIDE mmol/L 94* 90*   CO2 mmol/L 30 26   BUN mg/dL 7 8   CREATININE mg/dL 0 94 0 82   ANION GAP mmol/L 5 7   CALCIUM mg/dL 8 6 9 2   ALBUMIN g/dL  --  3 8   TOTAL BILIRUBIN mg/dL  --  0 30   ALK PHOS U/L  --  138*   ALT U/L  --  34   AST U/L  --  23   GLUCOSE RANDOM mg/dL 98 103       Results from last 7 days  Lab Units 02/06/19  1701   INR  1 00       Results from last 7 days  Lab Units 02/06/19  1649   POC GLUCOSE mg/dl 111               Imaging: I have personally reviewed pertinent reports  XR chest 1 view portable   ED Interpretation by Julia Posadas PA-C (02/06 1839)   No evidence of acute cardio/pulmonary process  CT head without contrast   Final Result by Malik Barrett MD (02/06 1729)      No acute intracranial abnormality  Workstation performed: [id number]             EKG, Pathology, and Other Studies Reviewed on Admission:   · EKG: Sinus Bradycardia at rate of 59 bpm    Allscripts / Epic Records Reviewed: Yes     ** Please Note: This note has been constructed using a voice recognition system   **

## 2019-02-07 NOTE — PLAN OF CARE
Problem: DISCHARGE PLANNING - CARE MANAGEMENT  Goal: Discharge to post-acute care or home with appropriate resources  INTERVENTIONS:  - Conduct assessment to determine patient/family and health care team treatment goals, and need for post-acute services based on payer coverage, community resources, and patient preferences, and barriers to discharge  - Address psychosocial, clinical, and financial barriers to discharge as identified in assessment in conjunction with the patient/family and health care team  - Arrange appropriate level of post-acute services according to patient's   needs and preference and payer coverage in collaboration with the physician and health care team  - Communicate with and update the patient/family, physician, and health care team regarding progress on the discharge plan  - Arrange appropriate transportation to post-acute venues   Outcome: Completed Date Met: 02/07/19  -[facility] with outpatient follow up with PCP and nephrology

## 2019-02-07 NOTE — NURSING NOTE
Patient d/c to home with no homecare  AVS printed and reviewed with patient    Patient verbalized understanding

## 2019-02-07 NOTE — SOCIAL WORK
Discussed with patient preferences on discharge;understanding how to manage health at home; purpose of taking medications; importance of follow up care/appointments; and symptoms to watch out for once discharged home  Pt is being dc'd home with no Cm needs  Pt will be following up with Dr Wilkinson:PCP and Dr Cortes:nephrology after dc

## 2019-02-07 NOTE — ASSESSMENT & PLAN NOTE
-sent from her cardiology office due to sodium level of 122 from labs 2 days ago   -she complains of lightheadedness and nausea  -Sodium levels at 123 in ER  -Review of her labs shows that she has had low sodium levels over the past six months  -CT head impression-No acute intracranial abnormality   -Chest X-ray: No acute cardiopulmonary disease   - the patient was started on IV normal saline and her sodium level improved to 132    -Her complaints of lightheadedness and nausea had resolved  -She was discharged home with instructions to follow up with Dr Jewelene Buerger  Patient already follows with Dr Jewelene Buerger as an outpatient

## 2019-02-07 NOTE — ASSESSMENT & PLAN NOTE
No complaints of severe pain today  -Continue current home pain medications  -Encourage outpatient pain management follow up

## 2019-02-07 NOTE — UTILIZATION REVIEW
Network Utilization Review Department  Phone: [phone]; Fax [phone]  [email or web address]  ATTENTION: Please call with any questions or concerns to [phone]  and carefully listen to the prompts so that you are directed to the right person  Send all requests for admission clinical reviews, approved or denied determinations and any other requests to fax [phone]  All voicemails are confidential   Initial Clinical Review    Admission: Date/Time/Statement: inpatient 2/6/19 @ 1925   Orders Placed This Encounter   Procedures    Inpatient Admission (expected length of stay for this patient Order details is greater than two midnights)     Standing Status:   Standing     Number of Occurrences:   1     Order Specific Question:   Admitting Physician     Answer:   Erica Alexis [[id number]]     Order Specific Question:   Level of Care     Answer:   Med Surg [16]     Order Specific Question:   Estimated length of stay     Answer:   More than 2 Midnights     Order Specific Question:   Certification     Answer:   I certify that inpatient services are medically necessary for this patient for a duration of greater than two midnights  See H&P and MD Progress Notes for additional information about the patient's course of treatment  ED: Date/Time/Mode of Arrival:   ED Arrival Information     Expected Arrival Acuity Means of Arrival Escorted By Service Admission Type    - 2/6/2019 15:48 Urgent Walk-In Spouse General Medicine Urgent    Arrival Complaint    Abnormal Test Results        Chief Complaint:   Chief Complaint   Patient presents with    Abnormal Lab     Patient was seen by Dr Raquel Washington today was told to come to ER due to Na level being low around 122, however patient feel light headed and dizzy     History of Illness: 72 y o  Female presents from Cardiologist office with Low Sodium levels with association of lightheadedness, dizziness and nausea   She reports she had blood work drawn 2 days ago with PK=[id number], visited Cardiologist and was sent for possible admission  ED Vital Signs:   ED Triage Vitals   Temperature Pulse Respirations Blood Pressure SpO2   02/06/19 1614 02/06/19 1614 02/06/19 1614 02/06/19 1614 02/06/19 1614   98 1 °F (36 7 °C) 70 18 (!) 177/74 95 %      Temp Source Heart Rate Source Patient Position - Orthostatic VS BP Location FiO2 (%)   02/06/19 1614 02/06/19 1614 02/06/19 1614 02/06/19 1614 --   Temporal Monitor Lying Left arm       Pain Score       02/06/19 1630       No Pain        Wt Readings from Last 1 Encounters:   02/07/19 77 7 kg (171 lb 4 8 oz)     Vital Signs (abnormal): 2/6/2019 BP: 184/75, 177/74      Pertinent Labs/Diagnostic Test Results: 2/6/2019 , chloride 90, alk phos 138, hct 34, Urinalysis: trace blood, 1-2 rbc, 0-1 wbc; EKG: sinus bradycardia  2/7/2019 , 131, 132, chloride 94, 98, 98, alk phos 117, tot protein 6 1, albumin 3 2, wbc 3 81, hgb 11 3, hct 33 2      ED Treatment:   Medication Administration from 02/06/2019 1548 to 02/06/2019 2116       Date/Time Order Dose Route Action Action by Comments     02/06/2019 1952 ondansetron (ZOFRAN) injection 4 mg 4 mg Intravenous Given Dale Jain RN      02/06/2019 1952 meclizine (ANTIVERT) tablet 25 mg 25 mg Oral Given Dale Jain RN      02/06/2019 1954 sodium chloride 0 9 % infusion 125 mL/hr Intravenous New Bag Dale Jain RN         Past Medical/Surgical History:    Active Ambulatory Problems     Diagnosis Date Noted    Diastolic dysfunction 15/17/6469    Hypercholesterolemia 08/16/2013    Hypertension 08/16/2013    Mitral regurgitation 12/10/2013    Shortness of breath on exertion 04/18/2017    Cervical spondylosis without myelopathy 02/13/2018    Chronic pain syndrome 05/16/2018       Past Medical History:   Diagnosis Date    Arthritis     Chronic pain disorder     GERD (gastroesophageal reflux disease)     Hyperlipidemia     Hypertension     IBS (irritable bowel syndrome)     Mitral regurgitation     Stroke [facility])      Admitting Diagnosis: Hyponatremia [E87 1]  Abnormal blood finding [R79 9]  Age/Sex: 72 y o  female     Assessment/Plan: 2/6/2019 attending note:   Hyponatremia   Assessment & Plan     -sent from her cardiology office due to low sodium levels  -she complains of lightheadedness and nausea  -Sodium levels at 123 in ER   This is increased from 122 2 days ago  -Review of her labs shows that she has had low sodium levels over the past six months  -CT head impression-No acute intracranial abnormality   -Chest X-ray completed official report pending    -Admit to med surg  -Telemetry monitoring  -IV normal saline  -Zofran PRN  -Check urine sodium random, urine osmolality  -Check BMP at midnight  -Nephrology consult if no improvement  -Supportive care   Mitral regurgitation   Assessment & Plan     -Continue home medications  -Continue Outpatient cardiology follow up   Hypertension   Assessment & Plan     -Blood pressure initially elevated upon arrival to ER  -Currently Stable  -Continue home medication  -Close monitoring   Gastroesophageal reflux disease without esophagitis   Assessment & Plan     -Currently on Prilosec 20 mg PO daily  -Will give alternative formulary of protonix 20 mg PO daily     Admission Orders:  48 hr telemetry  Peripheral IV  Bmp Q 8hr  OT/PT eval & treat  Vitals routine  Daily wt  I/O  Regular diet      Scheduled Meds:   Current Facility-Administered Medications:  acetaminophen 650 mg Oral Q6H PRN Joon Dubon PA-C    ALPRAZolam 0 25 mg Oral HS PRN Joon Dubon PA-C    aspirin 81 mg Oral Daily Joon Dubon PA-C    butalbital-acetaminophen-caffeine 1 tablet Oral Q6H PRN Joon Dubon PA-C    calcium carbonate 1 tablet Oral Daily With Breakfast Joon Dubon PA-C    cloNIDine 0 1 mg Oral BID Joon Dubon PA-C    enoxaparin 40 mg Subcutaneous Daily Joon Dubon PA-C    fluticasone 1 spray Each Nare Daily Joon Dubon PA-C    losartan 100 mg Oral Daily Joon Dubon PA-C    nebivolol 5 mg Oral BID Joon Dubon PA-C    ondansetron 4 mg Intravenous Q8H PRN Joon Dubon PA-C    oxyCODONE-acetaminophen 1 tablet Oral Q6H PRN Joon Dubon PA-C    pantoprazole 20 mg Oral Early Morning Joon Dubon PA-C    pravastatin 40 mg Oral Daily Joon Dubon PA-C    sodium chloride 75 mL/hr Intravenous Continuous Joon Dubon PA-C Last Rate: 75 mL/hr (02/07/19 1030)   verapamil 240 mg Oral Daily Joon Dubon PA-C    zolpidem 5 mg Oral HS PRN Joon Dubon PA-C      Continuous Infusions:   sodium chloride 75 mL/hr Last Rate: 75 mL/hr (02/07/19 1030)

## 2019-02-07 NOTE — PLAN OF CARE
Problem: OCCUPATIONAL THERAPY ADULT  Goal: Performs self-care activities at highest level of function for planned discharge setting  See evaluation for individualized goals  Treatment Interventions: ADL retraining, Functional transfer training, UE strengthening/ROM, Endurance training, Patient/family training, Activityengagement          See flowsheet documentation for full assessment, interventions and recommendations  Limitation: Decreased ADL status, Decreased UE strength, Decreased endurance, Decreased self-care trans, Decreased high-level ADLs     Assessment: Pt is a 72 y o  female seen for OT evaluation s/p admit to [facility] on 2/6/2019 w/ Hyponatremia  Comorbidities affecting pt's functional performance at time of assessment include: DM, HTN and CVA, arthritis, IBS, chronic pain  Personal factors affecting pt at time of IE include:steps to enter environment, difficulty performing ADLS, difficulty performing IADLS  and decreased initiation and engagement   Prior to admission, pt was (I) with ADL and IADL performance with no device during functional mobility  Upon evaluation: Pt requires (S) level (A) with no device during functional mobility 2* the following deficits impacting occupational performance: weakness, decreased strength, decreased tolerance and impaired initiation  Pt to benefit from continued skilled OT tx while in the hospital to address deficits as defined above and maximize level of functional independence w ADL's and functional mobility  Occupational Performance areas to address include: grooming, bathing/shower, toilet hygiene, dressing, functional mobility, community mobility and clothing management  From OT standpoint, recommendation at time of d/c would be home with family        OT Discharge Recommendation: Home with family support

## 2019-02-07 NOTE — PHYSICAL THERAPY NOTE
Physical Therapy Evaluation    Patient Name: Anali Casey    OIBZJ'A Date: 2/7/2019     Problem List  Patient Active Problem List   Diagnosis    Diastolic dysfunction    Hypercholesterolemia    Hypertension    Mitral regurgitation    Shortness of breath on exertion    Cervical spondylosis without myelopathy    Chronic pain syndrome    Gastroesophageal reflux disease without esophagitis    Hyponatremia        Past Medical History  Past Medical History:   Diagnosis Date    Arthritis     Chronic pain disorder     GERD (gastroesophageal reflux disease)     Hyperlipidemia     Hypertension     IBS (irritable bowel syndrome)     Mitral regurgitation     Stroke ([facility] [address] )     tia        Past Surgical History  Past Surgical History:   Procedure Laterality Date    APPENDECTOMY      HYSTERECTOMY      INSERT / REPLACE PERIPHERAL NEUROSTIMULATOR PULSE GENERATOR /  Left     buttocks    JOINT REPLACEMENT      knee    KNEE ARTHROSCOPY      NERVE BLOCK Left 2/20/2018    Procedure: BLOCK MEDIAL BRANCH - C4, C5, C6;  Surgeon: Stanley Gonzales MD;  [facility];  Service: Pain Management     NERVE BLOCK Left 3/6/2018    Procedure: C4, C5, C6 MEDIAL BRANCH BLOCK;  Surgeon: Stanley Gonzales MD;  [facility];  Service: Pain Management     [place] COLONOSCOPY FLX DX W/COLLJ SPEC WHEN PFRMD N/A 4/24/2017    Procedure: FLEXIBLE COLONOSCOPY;  Surgeon: Atif Cooepr MD;  [facility];  Service: Colorectal    RADIOFREQUENCY ABLATION Left 4/17/2018    Procedure: ABLATION RADIO FREQUENCY (RFA) - C4, C5, C6;  Surgeon: Stanley Gonzales MD;  [facility];  Service: Pain Management     SINUS SURGERY             02/07/19 0951   Note Type   Note type Eval/Treat   Pain Assessment   Pain Assessment No/denies pain   Pain Score No Pain   Home Living   Type of Home House   Home Layout Multi-level;Bed/bath upstairs;1/2 bath on main level;Stairs to enter without rails  (2 plus 1 plus 1 AARTI no HR, 14 steps to 2nd with HR)   Bathroom Shower/Tub Tub/shower unit   Bathroom Toilet Standard   Bathroom Accessibility Accessible   Home Equipment Walker;Cane;Sock aid;Reacher   Prior Function   Level of [place] Independent with ADLs and functional mobility  ((I) ambulation no A  D )   Lives With Spouse   ADL Assistance Independent   IADLs Independent   Falls in the last 6 months 0   Comments (I) with driving   Restrictions/Precautions   Weight Bearing Precautions Per Order No   Other Precautions Telemetry;Multiple lines   General   Family/Caregiver Present No   Cognition   Arousal/Participation Alert   Orientation Level Oriented X4   Following Commands Follows all commands and directions without difficulty   RLE Assessment   RLE Assessment WFL  (4+/5)   LLE Assessment   LLE Assessment WFL  (4+/5)   Coordination   Sensation WFL   Light Touch   RLE Light Touch Grossly intact   LLE Light Touch Grossly intact   Bed Mobility   Supine to Sit 6  Modified independent   Additional items Bedrails; Increased time required   Sit to Supine (seated in chair at bedside bell in reach)   Additional Comments pt requiring increased time to complete bed mobility transfers and ambulation due to R hip OA which she states she is scheduled for R [place]O the end of the month  Good balance and stability during transfers and ambulation   Transfers   Sit to Stand 5  Supervision   Additional items Increased time required   Stand to Sit 5  Supervision   Stand pivot 5  Supervision   Additional items Increased time required   Additional Comments no LOB in standing or during ambulation  Increased time required for ambulation due to decreased gait speed  Ambulation/Elevation   Gait pattern Short stride;Decreased R stance   Gait Assistance 5  Supervision   Assistive Device None   Distance 450ft no A D  (S) no LOB just fatigue and increased time needed to complete task     Balance Static Sitting Good   Dynamic Sitting Good   Static Standing Good   Dynamic Standing Fair +   Ambulatory Fair +   Endurance Deficit   Endurance Deficit Yes   Endurance Deficit Description limited ambulation tolerance due to fatigue and mild weakness   Activity Tolerance   Activity Tolerance Patient tolerated treatment well;Patient limited by fatigue   Assessment   Prognosis Good   Problem List Decreased strength;Decreased endurance; Impaired balance;Decreased mobility   Assessment Pt is a 72year old female with complex PMH as listed above presenting to [facility] at the request of her cardiologist due to low sodium levels at 122  Pt admitted for hyponatremia  Pt seen for moderate complexity PT evaluation to assess pt's mobility level and to assist with discharge planning  Pt presents with mild decrease in bilateral LE strength decreased balance endurance and functional mobility requiring (S) for all transfers and ambulation no A D  Pt with no LOB during evaluation and gait training but requires increased time to complete task due to weakness and fatigue  Pt would benefit from continued activity in PT to improve strength to 5/5 improve balance endurance ambulation and to ensure safe (I) stair negotiation to ensure safe return home on discharge  Goals   Patient Goals To return home   STG Expiration Date 02/14/19   Short Term Goal #1 Improve all LE strength to 5/5 to improve all transfers to (I)   Short Term Goal #2 Improve standing and ambulatory balance to good no A D  to improve ambulation to 500ft no A D  (I) and to ensure (I) stair negotiation 11 steps with HR no LOB or instability needed to return home safely  Plan   Treatment/Interventions Functional transfer training;LE strengthening/ROM; Elevations; Therapeutic exercise; Endurance training;Patient/family training;Bed mobility;Gait training   PT Frequency (3-5x/wk)   Recommendation   Recommendation Home independently   PT - OK to Discharge Yes  (when medically stable for discharge)   no SCD's  Pt seated in chair at bedside with bell in reach

## 2019-02-07 NOTE — OCCUPATIONAL THERAPY NOTE
Occupational Therapy Evaluation     Patient Name: Julia Gregory  RXVWA'D Date: 2/7/2019  Problem List  Patient Active Problem List   Diagnosis    Diastolic dysfunction    Hypercholesterolemia    Hypertension    Mitral regurgitation    Shortness of breath on exertion    Cervical spondylosis without myelopathy    Chronic pain syndrome    Gastroesophageal reflux disease without esophagitis    Hyponatremia     Past Medical History  Past Medical History:   Diagnosis Date    Arthritis     Chronic pain disorder     GERD (gastroesophageal reflux disease)     Hyperlipidemia     Hypertension     IBS (irritable bowel syndrome)     Mitral regurgitation     Stroke ([facility] [address] )     tia     Past Surgical History  Past Surgical History:   Procedure Laterality Date    APPENDECTOMY      HYSTERECTOMY      INSERT / REPLACE PERIPHERAL NEUROSTIMULATOR PULSE GENERATOR /  Left     buttocks    JOINT REPLACEMENT      knee    KNEE ARTHROSCOPY      NERVE BLOCK Left 2/20/2018    Procedure: BLOCK MEDIAL BRANCH - C4, C5, C6;  Surgeon: Rod Frederick MD;  [facility];  Service: Pain Management     NERVE BLOCK Left 3/6/2018    Procedure: C4, C5, C6 MEDIAL BRANCH BLOCK;  Surgeon: Rod Frederick MD;  [facility];  Service: Pain Management     [place] COLONOSCOPY FLX DX W/COLLJ SPEC WHEN PFRMD N/A 4/24/2017    Procedure: Weld Nikita;  Surgeon: Nancy Isabel MD;  [facility];  Service: Colorectal    RADIOFREQUENCY ABLATION Left 4/17/2018    Procedure: ABLATION RADIO FREQUENCY (RFA) - C4, C5, C6;  Surgeon: Rod Frederick MD;  [facility];  Service: Pain Management     SINUS SURGERY             02/07/19 0901   Note Type   Note type Eval/Treat   Restrictions/Precautions   Weight Bearing Precautions Per Order No   Other Precautions Telemetry; Fall Risk   Pain Assessment   Pain Assessment No/denies pain   Home Living   Type of [address] Two level;1/2 bath on main level;Bed/bath upstairs;Stairs to enter with rails  (2+2 AARTI c HR; FF to 2nd c HR)   Bathroom Shower/Tub Tub/shower unit   Bathroom Toilet Standard   Home Equipment Walker;Cane;Reacher;Sock aid   Additional Comments see PT evaluation for details   Prior Function   Level of [place] Independent with ADLs and functional mobility   Lives With Spouse   ADL Assistance Independent   IADLs Independent   Falls in the last 6 months 0   Comments pt is (I) with driving   Psychosocial   Psychosocial (WDL) WDL   Subjective   Subjective "I am getting my hip done end of the month"   ADL   Where Assessed Edge of bed   LB Dressing Assistance 5  Supervision/Setup   LB Dressing Deficit Increased time to complete; Don/doff R sock; Don/doff L sock   Bed Mobility   Supine to Sit 5  Supervision   Additional items Bedrails   Sit to Supine (seated in chair at end of session)   Additional Comments pt on RA throughout session; no complaints of SOB   Transfers   Sit to Stand 5  Supervision   Stand to Sit 5  Supervision   Stand pivot 5  Supervision   Additional Comments pt with no LOB or instability   Functional Mobility   Functional Mobility 5  Supervision   Additional Comments pt performed ~450ft of functional mobility with no device; slow pace   Balance   Static Sitting Good   Dynamic Sitting Good   Static Standing Good   Dynamic Standing Fair +   Ambulatory Fair +   Activity Tolerance   Activity Tolerance Patient limited by fatigue   RUE Assessment   RUE Assessment X  (4/5 grossly; WFL AROM)   LUE Assessment   LUE Assessment X  (4/5 grossly; WFL AROM)   Hand Function   Gross Motor Coordination Functional   Fine Motor Coordination Functional   Sensation   Light Touch No apparent deficits   Sharp/Dull No apparent deficits   Cognition   Overall Cognitive Status WFL   Arousal/Participation Alert   Attention Within functional limits   Orientation Level Oriented X4   Memory Within functional limits   Following Commands Follows all commands and directions without difficulty   Assessment   Limitation Decreased ADL status; Decreased UE strength;Decreased endurance;Decreased self-care trans;Decreased high-level ADLs   Assessment Pt is a 72 y o  female seen for OT evaluation s/p admit to [facility] on 2/6/2019 w/ Hyponatremia  Comorbidities affecting pt's functional performance at time of assessment include: DM, HTN and CVA, arthritis, IBS, chronic pain  Personal factors affecting pt at time of IE include:steps to enter environment, difficulty performing ADLS, difficulty performing IADLS  and decreased initiation and engagement   Prior to admission, pt was (I) with ADL and IADL performance with no device during functional mobility  Upon evaluation: Pt requires (S) level (A) with no device during functional mobility 2* the following deficits impacting occupational performance: weakness, decreased strength, decreased tolerance and impaired initiation  Pt to benefit from continued skilled OT tx while in the hospital to address deficits as defined above and maximize level of functional independence w ADL's and functional mobility  Occupational Performance areas to address include: grooming, bathing/shower, toilet hygiene, dressing, functional mobility, community mobility and clothing management  From OT standpoint, recommendation at time of d/c would be home with family  Goals   Patient Goals to go home    Short Term Goal  pt will perform UE strengthening exercises while seated in chair or EOB    Long Term Goal #1 pt will perform toilet transfers and toilet hygiene at (I) level    Long Term Goal #2 pt will demonstrate UB/LB bathing and grooming tasks at (I) level    Long Term Goal pt will increase independence with functional mobility with no device at (I) level with increased endurance    Plan   Treatment Interventions ADL retraining;Functional transfer training;UE strengthening/ROM; Endurance training;Patient/family training; Activityengagement   Goal Expiration Date 02/21/19   OT Frequency 3-5x/wk   Recommendation   OT Discharge Recommendation Home with family support   Barthel Index   Feeding 10   Bathing 0   Grooming Score 0   Dressing Score 5   Bladder Score 10   Bowels Score 10   Toilet Use Score 5   Transfers (Bed/Chair) Score 10   Mobility (Level Surface) Score 10   Stairs Score 0   Barthel Index Score 60       Pt will benefit from continued OT services in order to maximize (I) c ADL performance, FM c no device, and improve overall endurance/strength required to complete functional tasks in preparation for d/c  Pt left seated in chair at end of session; all needs within reach; all lines intact; scds connected and turned on

## 2019-02-07 NOTE — ASSESSMENT & PLAN NOTE
-Blood pressure initially elevated upon arrival to ER  -Currently Stable  -Continue home medication  -Close monitoring

## 2019-02-07 NOTE — SOCIAL WORK
Cm met with the patient to evaluate the patients prior function and living situation and any barriers to d/c and form a safe d/c plan  Cm also evaluated the patient for any services in the home or needs for services  Pt resides t home with her  in a house  Has a few AARTI then bedroom/bathromo are on the 2nd floor (14 steps), also has  A toilet on the 1st floor  Pt had been independent with her adls and ambulation  No services and re: DME has a RW that she only uses if necessary  Pt and spouse both drive  PCP is Kristina Lange and pharmacy is [facility] in [place]  Plans att his time are home on dc with outpatient follow up  Cm will follow and assist in dc planning  Patient/caregiver received discharge checklist   Content reviewed  Patient/caregiver encouraged to participate in discharge plan of care prior to discharge home

## 2019-02-07 NOTE — DISCHARGE SUMMARY
Discharge- Marianna Edwards 1945, 72 y o  female MRN: [id number]    Unit/Bed#: 405-01 Encounter: [id number]    Primary Care Provider: Brittani Anne DO   Date and time admitted to hospital: 2/6/2019  4:08 PM        * Hyponatremia   Assessment & Plan    -sent from her cardiology office due to sodium level of 122 from labs 2 days ago   -she complains of lightheadedness and nausea  -Sodium levels at 123 in ER  -Review of her labs shows that she has had low sodium levels over the past six months  -CT head impression-No acute intracranial abnormality   -Chest X-ray: No acute cardiopulmonary disease   - the patient was started on IV normal saline and her sodium level improved to 132    -Her complaints of lightheadedness and nausea had resolved  -She was discharged home with instructions to follow up with Dr Karen Zaman  Patient already follows with Dr Karen Zaman as an outpatient        Mitral regurgitation   Assessment & Plan    -Continue home medications  -Continue Outpatient cardiology follow up     Hypertension   Assessment & Plan    -Blood pressure initially elevated upon arrival to ER  -Currently Stable  -Continue home medication     Gastroesophageal reflux disease without esophagitis   Assessment & Plan    -Continue Prilosec 20 mg PO daily       Chronic pain syndrome   Assessment & Plan    No complaints of severe pain today  -Continue current home pain medications  -Encourage outpatient pain management follow up     Hypercholesterolemia   Assessment & Plan    -Continue Statins         Discharging Physician / Practitioner: Annetta Neil PA-C  PCP: Brittani Anne DO  Admission Date:   Admission Orders     Ordered        02/06/19 1925  Inpatient Admission (expected length of stay for this patient Order details is greater than two midnights)  Once             Discharge Date: 02/07/19    Resolved Problems  Date Reviewed: 2/7/2019    None          Consultations During [facility]:  · none    Procedures Performed:   · none    Significant Findings / Test Results:   · Sodium 122    Incidental Findings:   · none     Test Results Pending at Discharge (will require follow up):   · none     Outpatient Tests Requested:  · none    Complications:  none    Reason for Admission: none    Hospital Course:     Jenny Vega is a 72 y o  female patient who originally presented to the hospital on 2/6/2019 at the recommendations of her cardiologist Dr Viral Justice due to low sodium levels associated with feeling lightheaded and dizzy  She also complain of feeling very nauseous  She reports that she had blood work 2 days ago with a sodium level of 122  She visited her cardiologist today and was sent for possible admission for symptomatic hyponatremia  Of note she has been hyponatremic in the past review of her labs dating back to 6 months ago she did have low levels of sodium however she states that the last time she was hospitalized follow sodium levels as probably 2 years ago  She denies chest pain, shortness of breath, visual disturbances, vomiting, diarrhea, abdominal pain, paresthesias, weakness  Labs completed in emergency room with results as shown below  CT head completed with results as shown below  Chest x-ray completed with official report pending  She received meclizine 25 mg p o , Zofran 4 mg IV started on IV fluids in emergency room  At bedside she appears comfortable however she reports that she still feels a bit lightheaded with intermittent periods of nausea  The patient, initially admitted to the hospital as inpatient, was discharged earlier than expected given the following: the patient's sodium level corrected quicker than expected       Please see above list of diagnoses and related plan for additional information       Condition at Discharge: good     Discharge Day Visit / Exam:     Subjective:    Vitals: Blood Pressure: 126/62 (02/07/19 0700)  Pulse: 61 (02/07/19 0700)  Temperature: 98 °F (36 7 °C) (02/07/19 0700)  Temp Source: Temporal (02/07/19 0700)  Respirations: 18 (02/07/19 0700)  Height: 5' 4" (162 6 cm) (02/06/19 2124)  Weight - Scale: 77 7 kg (171 lb 4 8 oz) (02/07/19 0600)  SpO2: 97 % (02/07/19 0700)  Exam:   Physical Exam   Constitutional: She is oriented to person, place, and time  She appears well-developed and well-nourished  HENT:   Head: Normocephalic and atraumatic  Cardiovascular: Normal rate, regular rhythm and normal heart sounds  Pulmonary/Chest: Effort normal and breath sounds normal  No respiratory distress  She has no wheezes  She has no rales  Abdominal: Soft  Bowel sounds are normal  She exhibits no distension  There is no tenderness  Neurological: She is alert and oriented to person, place, and time  No cranial nerve deficit  Skin: Skin is warm and dry  Nursing note and vitals reviewed  Discharge instructions/Information to patient and family:   See after visit summary for information provided to patient and family  Provisions for Follow-Up Care:  See after visit summary for information related to follow-up care and any pertinent home health orders  Disposition:     Home    For Discharges to [facility] SNF:   · Not Applicable to this Patient - Not Applicable to this Patient    Planned Readmission: no     Discharge Statement:  I spent 25 minutes discharging the patient  This time was spent on the day of discharge  I had direct contact with the patient on the day of discharge  Greater than 50% of the total time was spent examining patient, answering all patient questions, arranging and discussing plan of care with patient as well as directly providing post-discharge instructions  Additional time then spent on discharge activities  Discharge Medications:  See after visit summary for reconciled discharge medications provided to patient and family        ** Please Note: This note has been constructed using a voice recognition system **

## 2019-02-08 RX ORDER — LORAZEPAM 1 MG/1
1 TABLET ORAL 2 TIMES DAILY PRN
COMMUNITY
End: 2020-11-17 | Stop reason: SDUPTHER

## 2019-02-08 RX ORDER — FLUTICASONE PROPIONATE 50 MCG
2 SPRAY, SUSPENSION (ML) NASAL DAILY PRN
COMMUNITY
End: 2019-11-18 | Stop reason: ALTCHOICE

## 2019-02-08 NOTE — PRE-PROCEDURE INSTRUCTIONS
Pre-Surgery Instructions:   Medication Instructions    aspirin 81 MG tablet ASA being held since 2/3    butalbital-acetaminophen-caffeine-codeine (FIORICET WITH CODEINE) -45-30 MG per capsule Instructed patient per Anesthesia Guidelines   calcium carbonate (OS-MARLENY) 600 MG tablet Instructed patient per Anesthesia Guidelines   fluticasone (FLONASE) 50 mcg/act nasal spray May take in am if needed    LORazepam (ATIVAN) 1 mg tablet May take in am if needed    losartan (COZAAR) 100 MG tablet Instructed patient per Anesthesia Guidelines   Multiple Vitamin (DAILY VALUE MULTIVITAMIN) TABS Instructed patient per Anesthesia Guidelines   nebivolol (BYSTOLIC) 10 mg tablet Take am of surgery    omeprazole (PriLOSEC) 20 mg delayed release capsule Take am of surgery    pravastatin (PRAVACHOL) 20 mg tablet Instructed patient per Anesthesia Guidelines   spironolactone (ALDACTONE) 50 mg tablet Instructed patient per Anesthesia Guidelines   zolpidem (AMBIEN) 10 mg tablet Instructed patient per Anesthesia Guidelines  My Surgical Experience    The following information was developed to assist you to prepare for your operation  What do I need to do before coming to the hospital?   Arrange for a responsible person to drive you to and from the hospital    Arrange care for your children at home  Children are not allowed in the recovery areas of the hospital  Plan to wear clothing that is easy to put on and take off  Bathing  o Shower the evening before and the morning of your surgery with Hibiclens antibacterial soap  Use soap from neck down, use clean pajamas, sheets and clothing in the am    o Remove all body piercing and jewelry  o Do not shave any body part for at least 24 hours before surgery-this includes face, arms, legs and upper body  Food  o Nothing to eat or drink after midnight the night before your surgery   This includes candy and chewing gum  o Do not drink alcohol 24 hours before surgery  Medicine  o Follow instructions you received from your surgeon about which medicines you may take on the day of surgery  o If instructed to take medicine on the morning of surgery, take pills with just a small sip of water  Call your prescribing doctor for specific infroamtion on what to do if you take insulin    What should I bring to the hospital?    Bring:    walker, if you have them, for foot or knee surgery   A list of the daily medicines, vitamins, minerals, herbals and nutritional supplements you take  Include the dosages of medicines and the time you take them each day   Glasses, dentures or hearing aids   Minimal clothing; you will be wearing hospital sleepwear   Photo ID; required to verify your identity   If you have a Living Will or Power of , bring a copy of the documents      Do not bring   Medicines or inhalers   Money, valuables or jewelry    What other information should I know about the day of surgery?  Notify your surgeons if you develop a cold, sore throat, cough, fever, rash or any other illness   Report to the Ambulatory Surgical/Same Day Surgery Unit   You will be instructed to stop at Registration only if you have not been pre-registered   Inform your  fi they do not stay that they will be asked by the staff to leave a phone number where they can be reached   Be available to be reached before surgery  In the event the operating room schedule changes, you may be asked to come in earlier or later than expected    *It is important to tell your doctor and others involved in your health care if you are taking or have been taking any non-prescription drugs, vitamins, minerals, herbals or other nutritional supplements   Any of these may interact with some food or medicines and cause a reaction

## 2019-02-10 LAB
ATRIAL RATE: 59 BPM
P AXIS: 68 DEGREES
PR INTERVAL: 164 MS
QRS AXIS: 88 DEGREES
QRSD INTERVAL: 80 MS
QT INTERVAL: 408 MS
QTC INTERVAL: 403 MS
T WAVE AXIS: 73 DEGREES
VENTRICULAR RATE: 59 BPM

## 2019-02-10 PROCEDURE — 93010 ELECTROCARDIOGRAM REPORT: CPT | Performed by: INTERNAL MEDICINE

## 2019-02-11 DIAGNOSIS — I10 ESSENTIAL HYPERTENSION: ICD-10-CM

## 2019-02-11 RX ORDER — LOSARTAN POTASSIUM 100 MG/1
TABLET ORAL
Qty: 90 TABLET | Refills: 3 | Status: SHIPPED | OUTPATIENT
Start: 2019-02-11 | End: 2019-11-25 | Stop reason: SDUPTHER

## 2019-02-14 ENCOUNTER — ANESTHESIA EVENT (INPATIENT)
Dept: PERIOP | Facility: HOSPITAL | Age: 74
DRG: 470 | End: 2019-02-14
Payer: MEDICARE

## 2019-02-14 NOTE — ANESTHESIA PREPROCEDURE EVALUATION
Review of Systems/Medical History  Patient summary reviewed  Chart reviewed      Cardiovascular  EKG reviewed, Hyperlipidemia, Hypertension ,   Comment: EKG SB,  Pulmonary  Not a smoker , Shortness of breath,   Comment: NAPD     GI/Hepatic    GERD ,        Kidney disease CKD, Chronic kidney disease stage 2,        Endo/Other     GYN       Hematology  Anemia ,    Comment: Chronic low H/H Musculoskeletal    Comment: LBP sp cd stim Arthritis     Neurology    TIA,   Comment: TIA 2 years ago no resiudal  Denied CVA Psychology         Lab Results   Component Value Date    WBC 3 81 (L) 02/07/2019    HGB 11 3 (L) 02/07/2019    HCT 33 2 (L) 02/07/2019    MCV 86 02/07/2019     02/07/2019     Lab Results   Component Value Date    GLUCOSE 91 09/21/2015    CALCIUM 8 8 02/07/2019     (L) 09/21/2015    K 3 9 02/07/2019    CO2 26 02/07/2019    CL 98 (L) 02/07/2019    BUN 7 02/07/2019    CREATININE 0 99 02/07/2019     Lab Results   Component Value Date    INR 1 09 02/07/2019    INR 1 00 02/06/2019    INR 1 02 02/04/2019    PROTIME 13 6 02/07/2019    PROTIME 12 7 02/06/2019    PROTIME 11 8 02/04/2019     Lab Results   Component Value Date    PTT 31 02/07/2019       Physical Exam    Airway    Mallampati score: III  TM Distance: >3 FB  Neck ROM: full     Dental   upper dentures,     Cardiovascular  Cardiovascular exam normal    Pulmonary  Pulmonary exam normal     Other Findings        Anesthesia Plan  ASA Score- 3     Anesthesia Type- general with ASA Monitors  Additional Monitors:   Airway Plan:     Comment: I personally discussed risks and benefits to this anesthetic  All patient questions were answered  Pt agrees with anesthesia plan  Pt with LBP and spinal cord stimulator  Refused spinal      Plan Factors-    Induction- intravenous  Postoperative Plan- Plan for postoperative opioid use  Informed Consent- Anesthetic plan and risks discussed with patient  I personally reviewed this patient with the CRNA  Discussed and agreed on the Anesthesia Plan with the CHAR Clay

## 2019-02-18 ENCOUNTER — TRANSCRIBE ORDERS (OUTPATIENT)
Dept: ADMINISTRATIVE | Facility: HOSPITAL | Age: 74
End: 2019-02-18

## 2019-02-18 ENCOUNTER — APPOINTMENT (OUTPATIENT)
Dept: LAB | Facility: HOSPITAL | Age: 74
End: 2019-02-18
Payer: MEDICARE

## 2019-02-18 DIAGNOSIS — N39.0 URINARY TRACT INFECTION WITHOUT HEMATURIA, SITE UNSPECIFIED: ICD-10-CM

## 2019-02-18 DIAGNOSIS — N39.0 URINARY TRACT INFECTION WITHOUT HEMATURIA, SITE UNSPECIFIED: Primary | ICD-10-CM

## 2019-02-18 LAB
ANION GAP SERPL CALCULATED.3IONS-SCNC: 8 MMOL/L (ref 4–13)
BILIRUB UR QL STRIP: NEGATIVE
BUN SERPL-MCNC: 10 MG/DL (ref 5–25)
CALCIUM SERPL-MCNC: 9.3 MG/DL (ref 8.3–10.1)
CHLORIDE SERPL-SCNC: 98 MMOL/L (ref 100–108)
CLARITY UR: CLEAR
CO2 SERPL-SCNC: 28 MMOL/L (ref 21–32)
COLOR UR: YELLOW
CREAT SERPL-MCNC: 0.85 MG/DL (ref 0.6–1.3)
GFR SERPL CREATININE-BSD FRML MDRD: 69 ML/MIN/1.73SQ M
GLUCOSE SERPL-MCNC: 90 MG/DL (ref 65–140)
GLUCOSE UR STRIP-MCNC: NEGATIVE MG/DL
HGB UR QL STRIP.AUTO: NEGATIVE
KETONES UR STRIP-MCNC: NEGATIVE MG/DL
LEUKOCYTE ESTERASE UR QL STRIP: NEGATIVE
NITRITE UR QL STRIP: NEGATIVE
PH UR STRIP.AUTO: 8 [PH] (ref 4.5–8)
POTASSIUM SERPL-SCNC: 4.9 MMOL/L (ref 3.5–5.3)
PROT UR STRIP-MCNC: NEGATIVE MG/DL
SODIUM SERPL-SCNC: 134 MMOL/L (ref 136–145)
SP GR UR STRIP.AUTO: 1.01 (ref 1–1.03)
UROBILINOGEN UR QL STRIP.AUTO: 0.2 E.U./DL

## 2019-02-18 PROCEDURE — 87077 CULTURE AEROBIC IDENTIFY: CPT

## 2019-02-18 PROCEDURE — 87186 SC STD MICRODIL/AGAR DIL: CPT

## 2019-02-18 PROCEDURE — 87086 URINE CULTURE/COLONY COUNT: CPT

## 2019-02-18 PROCEDURE — 81003 URINALYSIS AUTO W/O SCOPE: CPT

## 2019-02-18 PROCEDURE — 36415 COLL VENOUS BLD VENIPUNCTURE: CPT

## 2019-02-18 PROCEDURE — 80048 BASIC METABOLIC PNL TOTAL CA: CPT

## 2019-02-18 PROCEDURE — 87147 CULTURE TYPE IMMUNOLOGIC: CPT

## 2019-02-20 LAB — BACTERIA UR CULT: ABNORMAL

## 2019-02-27 ENCOUNTER — HOSPITAL ENCOUNTER (INPATIENT)
Facility: HOSPITAL | Age: 74
LOS: 3 days | Discharge: NURSING, REHAB OR LONG-TERM CARE | DRG: 470 | End: 2019-03-02
Attending: ORTHOPAEDIC SURGERY | Admitting: ORTHOPAEDIC SURGERY
Payer: MEDICARE

## 2019-02-27 ENCOUNTER — ANESTHESIA (INPATIENT)
Dept: PERIOP | Facility: HOSPITAL | Age: 74
DRG: 470 | End: 2019-02-27
Payer: MEDICARE

## 2019-02-27 ENCOUNTER — APPOINTMENT (INPATIENT)
Dept: RADIOLOGY | Facility: HOSPITAL | Age: 74
DRG: 470 | End: 2019-02-27
Attending: PHYSICIAN ASSISTANT
Payer: MEDICARE

## 2019-02-27 DIAGNOSIS — M17.11 PRIMARY OSTEOARTHRITIS OF RIGHT KNEE: Primary | ICD-10-CM

## 2019-02-27 DIAGNOSIS — M16.11 PRIMARY OSTEOARTHRITIS OF RIGHT HIP: ICD-10-CM

## 2019-02-27 PROBLEM — I10 ESSENTIAL HYPERTENSION: Status: ACTIVE | Noted: 2019-02-27

## 2019-02-27 LAB
ABO GROUP BLD: NORMAL
BLD GP AB SCN SERPL QL: NEGATIVE
RH BLD: POSITIVE
SPECIMEN EXPIRATION DATE: NORMAL

## 2019-02-27 PROCEDURE — C1776 JOINT DEVICE (IMPLANTABLE): HCPCS | Performed by: ORTHOPAEDIC SURGERY

## 2019-02-27 PROCEDURE — 0SR90JZ REPLACEMENT OF RIGHT HIP JOINT WITH SYNTHETIC SUBSTITUTE, OPEN APPROACH: ICD-10-PCS | Performed by: ORTHOPAEDIC SURGERY

## 2019-02-27 PROCEDURE — C1789 PROSTHESIS, BREAST, IMP: HCPCS | Performed by: ORTHOPAEDIC SURGERY

## 2019-02-27 PROCEDURE — C1713 ANCHOR/SCREW BN/BN,TIS/BN: HCPCS | Performed by: ORTHOPAEDIC SURGERY

## 2019-02-27 PROCEDURE — 86901 BLOOD TYPING SEROLOGIC RH(D): CPT | Performed by: ORTHOPAEDIC SURGERY

## 2019-02-27 PROCEDURE — 88311 DECALCIFY TISSUE: CPT | Performed by: PATHOLOGY

## 2019-02-27 PROCEDURE — 88304 TISSUE EXAM BY PATHOLOGIST: CPT | Performed by: PATHOLOGY

## 2019-02-27 PROCEDURE — 86900 BLOOD TYPING SEROLOGIC ABO: CPT | Performed by: ORTHOPAEDIC SURGERY

## 2019-02-27 PROCEDURE — 99222 1ST HOSP IP/OBS MODERATE 55: CPT | Performed by: HOSPITALIST

## 2019-02-27 PROCEDURE — 86850 RBC ANTIBODY SCREEN: CPT | Performed by: ORTHOPAEDIC SURGERY

## 2019-02-27 PROCEDURE — 73501 X-RAY EXAM HIP UNI 1 VIEW: CPT

## 2019-02-27 DEVICE — PINNACLE HIP SOLUTIONS ALTRX POLYETHYLENE ACETABULAR LINER +4 NEUTRAL 32MM ID 48MM OD
Type: IMPLANTABLE DEVICE | Site: HIP | Status: FUNCTIONAL
Brand: PINNACLE ALTRX

## 2019-02-27 DEVICE — PINNACLE GRIPTION ACETABULAR SHELL SECTOR 48MM OD
Type: IMPLANTABLE DEVICE | Site: HIP | Status: FUNCTIONAL
Brand: PINNACLE GRIPTION

## 2019-02-27 DEVICE — ARTICUL/EZE FEMORAL HEAD DIAMETER 32MM +1 12/14 TAPER
Type: IMPLANTABLE DEVICE | Site: HIP | Status: FUNCTIONAL
Brand: ARTICUL/EZE

## 2019-02-27 DEVICE — TRI-LOCK BPS FEMORAL STEM 12/14 TAPER TRI-LOCK BPS W/GRIPTION SIZE 2 STD 99MM
Type: IMPLANTABLE DEVICE | Site: HIP | Status: FUNCTIONAL
Brand: TRI-LOCK GRIPTION

## 2019-02-27 DEVICE — PINNACLE CANCELLOUS BONE SCREW 6.5MM X 30MM
Type: IMPLANTABLE DEVICE | Site: HIP | Status: FUNCTIONAL
Brand: PINNACLE

## 2019-02-27 RX ORDER — PANTOPRAZOLE SODIUM 40 MG/1
40 TABLET, DELAYED RELEASE ORAL
Status: DISCONTINUED | OUTPATIENT
Start: 2019-02-27 | End: 2019-03-02 | Stop reason: HOSPADM

## 2019-02-27 RX ORDER — FENTANYL CITRATE/PF 50 MCG/ML
50 SYRINGE (ML) INJECTION
Status: DISCONTINUED | OUTPATIENT
Start: 2019-02-27 | End: 2019-02-27 | Stop reason: HOSPADM

## 2019-02-27 RX ORDER — PRAVASTATIN SODIUM 20 MG
20 TABLET ORAL
Status: DISCONTINUED | OUTPATIENT
Start: 2019-02-27 | End: 2019-03-02 | Stop reason: HOSPADM

## 2019-02-27 RX ORDER — HYDROMORPHONE HCL/PF 1 MG/ML
0.4 SYRINGE (ML) INJECTION
Status: DISCONTINUED | OUTPATIENT
Start: 2019-02-27 | End: 2019-02-27 | Stop reason: HOSPADM

## 2019-02-27 RX ORDER — DOCUSATE SODIUM 100 MG/1
100 CAPSULE, LIQUID FILLED ORAL 2 TIMES DAILY
Status: DISCONTINUED | OUTPATIENT
Start: 2019-02-27 | End: 2019-03-02 | Stop reason: HOSPADM

## 2019-02-27 RX ORDER — OXYCODONE HYDROCHLORIDE AND ACETAMINOPHEN 5; 325 MG/1; MG/1
1 TABLET ORAL EVERY 4 HOURS PRN
Status: DISCONTINUED | OUTPATIENT
Start: 2019-02-27 | End: 2019-03-02 | Stop reason: HOSPADM

## 2019-02-27 RX ORDER — NEBIVOLOL 5 MG/1
10 TABLET ORAL DAILY
Status: DISCONTINUED | OUTPATIENT
Start: 2019-02-27 | End: 2019-03-02 | Stop reason: HOSPADM

## 2019-02-27 RX ORDER — GENTAMICIN SULFATE 80 MG/50ML
80 INJECTION, SOLUTION INTRAVENOUS EVERY 8 HOURS
Status: COMPLETED | OUTPATIENT
Start: 2019-02-27 | End: 2019-02-28

## 2019-02-27 RX ORDER — FERROUS SULFATE 325(65) MG
325 TABLET ORAL 2 TIMES DAILY WITH MEALS
Status: DISCONTINUED | OUTPATIENT
Start: 2019-02-27 | End: 2019-03-02 | Stop reason: HOSPADM

## 2019-02-27 RX ORDER — METOCLOPRAMIDE HYDROCHLORIDE 5 MG/ML
10 INJECTION INTRAMUSCULAR; INTRAVENOUS ONCE AS NEEDED
Status: DISCONTINUED | OUTPATIENT
Start: 2019-02-27 | End: 2019-02-27 | Stop reason: HOSPADM

## 2019-02-27 RX ORDER — MIDAZOLAM HYDROCHLORIDE 1 MG/ML
INJECTION INTRAMUSCULAR; INTRAVENOUS AS NEEDED
Status: DISCONTINUED | OUTPATIENT
Start: 2019-02-27 | End: 2019-02-27 | Stop reason: SURG

## 2019-02-27 RX ORDER — LOSARTAN POTASSIUM 50 MG/1
100 TABLET ORAL DAILY
Status: DISCONTINUED | OUTPATIENT
Start: 2019-02-27 | End: 2019-03-02 | Stop reason: HOSPADM

## 2019-02-27 RX ORDER — GENTAMICIN SULFATE 80 MG/100ML
80 INJECTION, SOLUTION INTRAVENOUS ONCE
Status: COMPLETED | OUTPATIENT
Start: 2019-02-27 | End: 2019-02-27

## 2019-02-27 RX ORDER — SODIUM CHLORIDE, SODIUM LACTATE, POTASSIUM CHLORIDE, CALCIUM CHLORIDE 600; 310; 30; 20 MG/100ML; MG/100ML; MG/100ML; MG/100ML
100 INJECTION, SOLUTION INTRAVENOUS CONTINUOUS
Status: CANCELLED | OUTPATIENT
Start: 2019-02-27

## 2019-02-27 RX ORDER — POVIDONE-IODINE 10 MG/G
OINTMENT TOPICAL AS NEEDED
Status: DISCONTINUED | OUTPATIENT
Start: 2019-02-27 | End: 2019-02-27 | Stop reason: HOSPADM

## 2019-02-27 RX ORDER — SODIUM CHLORIDE 9 MG/ML
INJECTION, SOLUTION INTRAVENOUS CONTINUOUS PRN
Status: DISCONTINUED | OUTPATIENT
Start: 2019-02-27 | End: 2019-02-27 | Stop reason: SURG

## 2019-02-27 RX ORDER — POLYVINYL ALCOHOL 14 MG/ML
1 SOLUTION/ DROPS OPHTHALMIC
Status: DISCONTINUED | OUTPATIENT
Start: 2019-02-27 | End: 2019-03-02 | Stop reason: HOSPADM

## 2019-02-27 RX ORDER — SPIRONOLACTONE 50 MG/1
50 TABLET, FILM COATED ORAL
Status: DISCONTINUED | OUTPATIENT
Start: 2019-02-27 | End: 2019-03-02 | Stop reason: HOSPADM

## 2019-02-27 RX ORDER — LORAZEPAM 1 MG/1
1 TABLET ORAL 2 TIMES DAILY PRN
Status: DISCONTINUED | OUTPATIENT
Start: 2019-02-27 | End: 2019-03-02 | Stop reason: HOSPADM

## 2019-02-27 RX ORDER — FONDAPARINUX SODIUM 2.5 MG/.5ML
2.5 INJECTION SUBCUTANEOUS DAILY
Status: DISCONTINUED | OUTPATIENT
Start: 2019-02-28 | End: 2019-03-02 | Stop reason: HOSPADM

## 2019-02-27 RX ORDER — MORPHINE SULFATE 10 MG/ML
3 INJECTION, SOLUTION INTRAMUSCULAR; INTRAVENOUS EVERY 4 HOURS PRN
Status: DISCONTINUED | OUTPATIENT
Start: 2019-02-27 | End: 2019-03-02 | Stop reason: HOSPADM

## 2019-02-27 RX ORDER — FENTANYL CITRATE 50 UG/ML
INJECTION, SOLUTION INTRAMUSCULAR; INTRAVENOUS AS NEEDED
Status: DISCONTINUED | OUTPATIENT
Start: 2019-02-27 | End: 2019-02-27 | Stop reason: SURG

## 2019-02-27 RX ORDER — ONDANSETRON 2 MG/ML
INJECTION INTRAMUSCULAR; INTRAVENOUS AS NEEDED
Status: DISCONTINUED | OUTPATIENT
Start: 2019-02-27 | End: 2019-02-27 | Stop reason: SURG

## 2019-02-27 RX ORDER — ROCURONIUM BROMIDE 10 MG/ML
INJECTION, SOLUTION INTRAVENOUS AS NEEDED
Status: DISCONTINUED | OUTPATIENT
Start: 2019-02-27 | End: 2019-02-27 | Stop reason: SURG

## 2019-02-27 RX ORDER — PROPOFOL 10 MG/ML
INJECTION, EMULSION INTRAVENOUS AS NEEDED
Status: DISCONTINUED | OUTPATIENT
Start: 2019-02-27 | End: 2019-02-27 | Stop reason: SURG

## 2019-02-27 RX ORDER — FLUTICASONE PROPIONATE 50 MCG
2 SPRAY, SUSPENSION (ML) NASAL DAILY PRN
Status: DISCONTINUED | OUTPATIENT
Start: 2019-02-27 | End: 2019-03-02 | Stop reason: HOSPADM

## 2019-02-27 RX ORDER — EPHEDRINE SULFATE 50 MG/ML
INJECTION, SOLUTION INTRAVENOUS AS NEEDED
Status: DISCONTINUED | OUTPATIENT
Start: 2019-02-27 | End: 2019-02-27 | Stop reason: SURG

## 2019-02-27 RX ORDER — NEBIVOLOL 10 MG/1
10 TABLET ORAL DAILY
Status: DISCONTINUED | OUTPATIENT
Start: 2019-02-28 | End: 2019-02-27

## 2019-02-27 RX ORDER — BUPIVACAINE HYDROCHLORIDE AND EPINEPHRINE 5; 5 MG/ML; UG/ML
INJECTION, SOLUTION PERINEURAL AS NEEDED
Status: DISCONTINUED | OUTPATIENT
Start: 2019-02-27 | End: 2019-02-27 | Stop reason: HOSPADM

## 2019-02-27 RX ORDER — SODIUM CHLORIDE, SODIUM LACTATE, POTASSIUM CHLORIDE, CALCIUM CHLORIDE 600; 310; 30; 20 MG/100ML; MG/100ML; MG/100ML; MG/100ML
125 INJECTION, SOLUTION INTRAVENOUS CONTINUOUS
Status: DISCONTINUED | OUTPATIENT
Start: 2019-02-27 | End: 2019-02-27

## 2019-02-27 RX ORDER — DEXTROSE MONOHYDRATE AND SODIUM CHLORIDE 5; .45 G/100ML; G/100ML
75 INJECTION, SOLUTION INTRAVENOUS CONTINUOUS
Status: DISCONTINUED | OUTPATIENT
Start: 2019-02-27 | End: 2019-02-28

## 2019-02-27 RX ORDER — ONDANSETRON 2 MG/ML
4 INJECTION INTRAMUSCULAR; INTRAVENOUS EVERY 6 HOURS PRN
Status: DISCONTINUED | OUTPATIENT
Start: 2019-02-27 | End: 2019-03-02 | Stop reason: HOSPADM

## 2019-02-27 RX ORDER — CALCIUM CARBONATE 500(1250)
1 TABLET ORAL
Status: DISCONTINUED | OUTPATIENT
Start: 2019-02-27 | End: 2019-03-02 | Stop reason: HOSPADM

## 2019-02-27 RX ORDER — VANCOMYCIN HYDROCHLORIDE 1 G/200ML
1000 INJECTION, SOLUTION INTRAVENOUS ONCE
Status: COMPLETED | OUTPATIENT
Start: 2019-02-27 | End: 2019-02-27

## 2019-02-27 RX ADMIN — FENTANYL CITRATE 50 MCG: 50 INJECTION INTRAMUSCULAR; INTRAVENOUS at 07:20

## 2019-02-27 RX ADMIN — EPHEDRINE SULFATE 10 MG: 50 INJECTION INTRAMUSCULAR; INTRAVENOUS; SUBCUTANEOUS at 07:57

## 2019-02-27 RX ADMIN — EPHEDRINE SULFATE 10 MG: 50 INJECTION INTRAMUSCULAR; INTRAVENOUS; SUBCUTANEOUS at 08:44

## 2019-02-27 RX ADMIN — FENTANYL CITRATE 50 MCG: 50 INJECTION INTRAMUSCULAR; INTRAVENOUS at 07:25

## 2019-02-27 RX ADMIN — GENTAMICIN SULFATE 80 MG: 80 INJECTION, SOLUTION INTRAVENOUS at 07:39

## 2019-02-27 RX ADMIN — PROPOFOL 150 MG: 10 INJECTION, EMULSION INTRAVENOUS at 07:22

## 2019-02-27 RX ADMIN — EPHEDRINE SULFATE 10 MG: 50 INJECTION INTRAMUSCULAR; INTRAVENOUS; SUBCUTANEOUS at 08:40

## 2019-02-27 RX ADMIN — SODIUM CHLORIDE: 0.9 INJECTION, SOLUTION INTRAVENOUS at 08:12

## 2019-02-27 RX ADMIN — Medication 1 TABLET: at 12:48

## 2019-02-27 RX ADMIN — OXYCODONE HYDROCHLORIDE AND ACETAMINOPHEN 1 TABLET: 5; 325 TABLET ORAL at 15:10

## 2019-02-27 RX ADMIN — ROCURONIUM BROMIDE 10 MG: 10 INJECTION INTRAVENOUS at 08:48

## 2019-02-27 RX ADMIN — CALCIUM 1 TABLET: 500 TABLET ORAL at 12:48

## 2019-02-27 RX ADMIN — FENTANYL CITRATE 50 MCG: 50 INJECTION, SOLUTION INTRAMUSCULAR; INTRAVENOUS at 10:07

## 2019-02-27 RX ADMIN — PANTOPRAZOLE SODIUM 40 MG: 40 TABLET, DELAYED RELEASE ORAL at 12:48

## 2019-02-27 RX ADMIN — MORPHINE SULFATE 3 MG: 10 INJECTION INTRAVENOUS at 23:13

## 2019-02-27 RX ADMIN — PRAVASTATIN SODIUM 20 MG: 20 TABLET ORAL at 23:10

## 2019-02-27 RX ADMIN — ONDANSETRON 4 MG: 2 INJECTION INTRAMUSCULAR; INTRAVENOUS at 18:51

## 2019-02-27 RX ADMIN — MIDAZOLAM HYDROCHLORIDE 2 MG: 1 INJECTION, SOLUTION INTRAMUSCULAR; INTRAVENOUS at 07:13

## 2019-02-27 RX ADMIN — MORPHINE SULFATE 3 MG: 10 INJECTION INTRAVENOUS at 12:15

## 2019-02-27 RX ADMIN — FENTANYL CITRATE 50 MCG: 50 INJECTION INTRAMUSCULAR; INTRAVENOUS at 08:34

## 2019-02-27 RX ADMIN — GENTAMICIN SULFATE 80 MG: 80 INJECTION, SOLUTION INTRAVENOUS at 17:00

## 2019-02-27 RX ADMIN — ROCURONIUM BROMIDE 40 MG: 10 INJECTION INTRAVENOUS at 07:22

## 2019-02-27 RX ADMIN — DOCUSATE SODIUM 100 MG: 100 CAPSULE, LIQUID FILLED ORAL at 12:48

## 2019-02-27 RX ADMIN — SODIUM CHLORIDE, POTASSIUM CHLORIDE, SODIUM LACTATE AND CALCIUM CHLORIDE 125 ML/HR: 600; 310; 30; 20 INJECTION, SOLUTION INTRAVENOUS at 05:36

## 2019-02-27 RX ADMIN — SUGAMMADEX 155 MG: 100 INJECTION, SOLUTION INTRAVENOUS at 09:20

## 2019-02-27 RX ADMIN — VANCOMYCIN HYDROCHLORIDE 500 MG: 500 INJECTION, POWDER, LYOPHILIZED, FOR SOLUTION INTRAVENOUS at 18:46

## 2019-02-27 RX ADMIN — FERROUS SULFATE TAB 325 MG (65 MG ELEMENTAL FE) 325 MG: 325 (65 FE) TAB at 17:00

## 2019-02-27 RX ADMIN — ROCURONIUM BROMIDE 10 MG: 10 INJECTION INTRAVENOUS at 08:13

## 2019-02-27 RX ADMIN — FENTANYL CITRATE 50 MCG: 50 INJECTION INTRAMUSCULAR; INTRAVENOUS at 09:06

## 2019-02-27 RX ADMIN — VANCOMYCIN HYDROCHLORIDE 1000 MG: 1 INJECTION, SOLUTION INTRAVENOUS at 06:13

## 2019-02-27 RX ADMIN — DEXTROSE AND SODIUM CHLORIDE 75 ML/HR: 5; 450 INJECTION, SOLUTION INTRAVENOUS at 12:45

## 2019-02-27 RX ADMIN — ROCURONIUM BROMIDE 10 MG: 10 INJECTION INTRAVENOUS at 07:55

## 2019-02-27 RX ADMIN — LOSARTAN POTASSIUM 100 MG: 50 TABLET, FILM COATED ORAL at 12:48

## 2019-02-27 RX ADMIN — DEXTROSE AND SODIUM CHLORIDE 75 ML/HR: 5; 450 INJECTION, SOLUTION INTRAVENOUS at 10:46

## 2019-02-27 RX ADMIN — DOCUSATE SODIUM 100 MG: 100 CAPSULE, LIQUID FILLED ORAL at 17:00

## 2019-02-27 RX ADMIN — ONDANSETRON HYDROCHLORIDE 4 MG: 2 INJECTION, SOLUTION INTRAVENOUS at 08:03

## 2019-02-27 RX ADMIN — DEXAMETHASONE SODIUM PHOSPHATE 4 MG: 10 INJECTION INTRAMUSCULAR; INTRAVENOUS at 08:04

## 2019-02-27 RX ADMIN — FERROUS SULFATE TAB 325 MG (65 MG ELEMENTAL FE) 325 MG: 325 (65 FE) TAB at 12:48

## 2019-02-27 RX ADMIN — SODIUM CHLORIDE, POTASSIUM CHLORIDE, SODIUM LACTATE AND CALCIUM CHLORIDE 1000 ML: 600; 310; 30; 20 INJECTION, SOLUTION INTRAVENOUS at 22:21

## 2019-02-27 RX ADMIN — ONDANSETRON 4 MG: 2 INJECTION INTRAMUSCULAR; INTRAVENOUS at 12:52

## 2019-02-27 NOTE — ASSESSMENT & PLAN NOTE
· Blood pressure is relatively controlled  · Continue present antihypertensive regiment of losartan, nebivolol and spironolactone  · Patient had some lower blood pressures this morning - will hold medications accordingly

## 2019-02-27 NOTE — ASSESSMENT & PLAN NOTE
· Patient is postop day 0 status post right total hip arthroplasty  · All management including pain control and DVT prophylaxis as per the primary service which is orthopedic surgery

## 2019-02-27 NOTE — OP NOTE
OPERATIVE REPORT  PATIENT NAME: Lucita Pandey    :  1945  MRN: [id number]  Pt Location: [address] [place] [address] OR ROOM 03    SURGERY DATE: 2019    Surgeon(s) and Role:     * Alba Davey DO - Primary     * Sobeida Conn PA-C - Assisting    Preop Diagnosis:  Primary osteoarthritis of right hip [M16 11]    Post-Op Diagnosis Codes:     * Primary osteoarthritis of right hip [M16 11]    Procedure(s) (LRB):  ARTHROPLASTY HIP TOTAL (Right) using the [employer] hip system with the following sizes:  48 mm gription, +4 neutral liner, size  #2 Standard offset Tri Lock femoral component, +1 5 x 32 mm femoral head     Specimen(s):  Femoral head/reamings    Estimated Blood Loss:   200    Drains:  none    Anesthesia Type:   General with local    Operative Indications:  Primary osteoarthritis of right hip [M16 11]      Operative Findings:  TT: 0    Complications:   None    Procedure and Technique:    The patient was properly identified and brought to the operating suite  After successful induction of the general anesthetic, she was placed in the lateral decubitus position with the right side facing up  All areas of possible skin pressurization were well padded to avoid the above  The right lower extremity, hip, and abdominal region were then prepped and draped in the usual sterile fashion  It is medically necessary that he [de-identified] assistant be in the room to aid in positioning and applying the appropriate amount of retraction on the patient    The surgical landmarks were outlined with  A skin marker  Using a  #10 Scalpel blade a posterior lateral incision was made on the patient's left hip  Hemostasis was achieved with the use of electrocautery  This layer was 1st brought down to the subcutaneous fat layer  After further blunt dissection the gluteal tendon was then located  Using a 2nd  #10  Scalpel blade the gluteal tendon was divided in line with its fibers    Next a Charnley tractor was placed in the operative field with the sciatic nerve well-padded well visualized well protected  The trochanteric fat was then excised  The short external rotators were then subperiostally divided off the greater trochanter and tagged for future repair  The femoral head was then dislocated  The soft tissue along the femoral neck was then removed  Using the [employer] hip system an osteotomy did occur along the femoral neck leaving between 10-15 mm of bone above the level of the lesser trochanter  Attention was 1st paid to the acetabulum  The labrum was then removed  A long Hohmann retractor was placed anteriorly to retract the proximal femur  Several retractors were placed posteriorly with the sciatic nerve well-padded well visualized and well protected  At this point there is adequate visualization of the acetabulum  Using sequential reamers the acetabulum was reamed between 35-45 degrees of vertical inclination and 20-30 degrees of anteversion  There is a good bleeding bone base  with a 48 mm Reamer  A trial liner was placed which had nice secure fit  After the trial component was removed, the acetabulum was reamed in the same manner of vertical inclination and anteversion  It was further supplemented with a 6 5 x 30 mm screw placed in the posterior superior quadrant  A trial liner is placed until the conclusion of the procedure  The acetabulum was protected with a sponge  Attention then paid to preparation of the proximal femur  The soft tissue on the femoral proximal femur was then removed  The proximal femur was then prepared in the following fashion  First its canal was opened up with a box osteotome  Followed by a canal finer  Followed by a lateralizing Reamer  Then using sequential broaches the proximal femur was broached between 10-15 degrees of anteversion  There is a good seating with the size #2 Broach  Both standard and high offset high offset necks were trialed    A standard offset neck did have the best fit  with a +1 5 x 32 mm femoral head  All the trial components then removed  The final acetabular cup was placed with the incidence  This confirmed that the tines were well seated  The proximal femur was then irrigated  The final femoral component placed  The femoral heads were trialed once again  There is a good fit with a +1 5 x 32 mm femoral head  After the trunnion was cleaned and dried, the final femoral head was placed and reduced 1 final time and found be quite stable  After it was irrigated, the capsule and short external rotators were then reattached to the greater trochanter using 1  Vicryl  The hip was irrigated again  The gluteal tendon was closed with 1  Vicryl  Irrigated again  The deep layer fascia closed with 0 Vicryl  Superficial layers closed with 2 Vicryl  Skin was approximated staples  The wound was then dressed with ointment Xeroform 4x4s ABDs and foam tape  He was placed in abductor pillow transferred to his hospital bed and went to the recovery room in stable condition  There is no complications during procedure             I was present for the entire procedure    Patient Disposition:  PACU     SIGNATURE: Cameron Pineda DO  DATE: February 27, 2019  TIME: 7:15 AM

## 2019-02-27 NOTE — H&P
H&P Exam - Orthopedics   Linn Morales 72 y o  female MRN: [id number]  Unit/Bed#: OR [facility] Encounter: [id number]    Assessment/Plan     Assessment:  Advanced DJD right hip  Plan:  Elective right total hip replacement    History of Present Illness   HPI:  Linn Morales is a 72 y o  female who presented to our office complaining of pain in the right hip for the past several years  She denies any specific trauma and states that the pain in her groin radiates down her leg  X-rays were performed which showed advanced arthritic changes with no joint space remaining  The risks and benefits of surgery were discussed with the patient, and she decided on the elective right total hip replacement      Review of Systems    Historical Information   Past Medical History:   Diagnosis Date    Arthritis     Chronic pain disorder     GERD (gastroesophageal reflux disease)     Hyperlipidemia     Hypertension     IBS (irritable bowel syndrome)     Mitral regurgitation     [facility]     tia     Past Surgical History:   Procedure Laterality Date    APPENDECTOMY      COLONOSCOPY      several    HAND SURGERY Right     ring finger has pin    HYSTERECTOMY      INSERT / REPLACE PERIPHERAL NEUROSTIMULATOR PULSE GENERATOR /  Left     buttocks    JOINT REPLACEMENT Left     knee    KNEE ARTHROSCOPY Bilateral     NERVE BLOCK Left 2/20/2018    Procedure: BLOCK MEDIAL BRANCH - C4, C5, C6;  Surgeon: Melinda Cardoso MD;  [facility];  Service: Pain Management     NERVE BLOCK Left 3/6/2018    Procedure: C4, C5, C6 MEDIAL BRANCH BLOCK;  Surgeon: Melinda Cardoso MD;  [facility];  Service: Pain Management     NE COLONOSCOPY FLX DX W/COLLJ SPEC WHEN PFRMD N/A 4/24/2017    Procedure: oNah Aguilar;  Surgeon: Viktoriya Luis MD;  [facility];  Service: Colorectal    RADIOFREQUENCY ABLATION Left 4/17/2018    Procedure: ABLATION RADIO FREQUENCY (RFA) - C4, C5, C6;  Surgeon: Rod Frederick MD;  [facility];  Service: Pain Management     SINUS SURGERY      UPPER GASTROINTESTINAL ENDOSCOPY       Social History   Social History     Substance and Sexual Activity   Alcohol Use Yes    Comment: 2 beer a week     Social History     Substance and Sexual Activity   Drug Use No     Social History     Tobacco Use   Smoking Status Former Smoker   Smokeless Tobacco Never Used   Tobacco Comment    quit 40 yrs ago     Family History: non-contributory    Meds/Allergies   all medications and allergies reviewed  Allergies   Allergen Reactions    Procaine Hives    Adhesive [Medical Tape] Rash    Lidocaine Rash    Penicillins Rash       Objective   Vitals: Blood pressure 155/69, pulse 87, temperature 98 3 °F (36 8 °C), temperature source Temporal, resp  rate 16, height 5' 4" (1 626 m), weight 77 6 kg (171 lb), SpO2 98 %  ,Body mass index is 29 35 kg/m²  No intake or output data in the 24 hours ending 02/27/19 0641    No intake/output data recorded  Invasive Devices     Peripheral Intravenous Line            Peripheral IV 02/27/19 Left Forearm less than 1 day                Physical Exam  Ortho Exam  Spine is midline  Right lower extremity neurovascularly intact  Toes are pink and mobile  Compartments are soft  No warmth, erythema or ecchymosis  There is lack of rotation of the hip as compared to the right  There is a painful arc motion throughout her head  Heart Regular rate and rhythm   Lungs CTA  Lab Results: I have personally reviewed pertinent lab results  Imaging: I have personally reviewed pertinent reports  EKG, Pathology, and Other Studies: I have personally reviewed pertinent reports  Code Status: Prior  Advance Directive and Living Will:      Power of :    POLST:      Counseling / Coordination of Care  Total floor / unit time spent today 30 minutes    Greater than 50% of total time was spent with the patient and / or family counseling and / or coordination of care

## 2019-02-27 NOTE — CONSULTS
Consult- Aurora Ivey 1945, 72 y o  female MRN: [id number]    Unit/Bed#: -01 Encounter: [id number]    Primary Care Provider: Yolande Chandra DO   Date and time admitted to hospital: 2/27/2019  4:40 AM      Inpatient consult to Internal Medicine  Consult performed by: Amena Balderas MD  Consult ordered by: Sanjuanita Elder PA-C          * Osteoarthritis of right hip  Assessment & Plan  · Patient is postop day 0 status post right total hip arthroplasty  · All management including pain control and DVT prophylaxis as per the primary service which is orthopedic surgery    Essential hypertension  Assessment & Plan  · Blood pressure is relatively controlled  · Continue present antihypertensive regiment of losartan, nebivolol and spironolactone  · Patient had some lower blood pressures this morning - will hold medications accordingly    Hypercholesterolemia  Assessment & Plan  · Stable-  continue home regiment of pravastatin    Gastroesophageal reflux disease without esophagitis  Assessment & Plan  · Continue Protonix     Patient's medical conditions are chronic and stable, at this time would recommend to continue all of her pre-admission medications at the preadmission dosages  In addition at time of discharge she should also go back home on her pre-admission medications at the preadmission dosages  Hospital Medicine will follow up peripherally and on an as-needed basis  Thank you for allowing us to participate in the care this patient  VTE Prophylaxis: Fondaparinux (Arixtra)      Recommendations for Discharge:  · Per Primary Service    Counseling / Coordination of Care Time: 45 minutes  Greater than 50% of total time spent on patient counseling and coordination of care  History of Present Illness:  Aurora Ivey is a 72 y o  female who is originally admitted to the orthopedic service due to advanced osteoarthritis/DJD of her right hip    She is status post postop day 0 a total right hip arthroplasty  We are consulted for the routine postop medical management  Currently patient has been seen and examined in room 118  She is lying in bed and is currently comfortable  Her pain is well controlled since the anesthesia his completely not worn off  She has a past medical history of hypertension, hyperlipidemia, chronic pain syndrome, acid reflux disease, irritable bowel syndrome and a remote history of a TIA  These of the reasons why we are being consulted  She currently denies any chest pain, nausea, vomiting, diarrhea, fevers, chills, palpitations, shortness of breath, calf tenderness, numbness, tingling and/or weakness  Her only concern at this time is the fear of the pain returning once the anesthesia wears off  Review of Systems:  Review of Systems   Musculoskeletal: Positive for arthralgias, gait problem and myalgias  All other systems reviewed and are negative        Past Medical and Surgical History:   Past Medical History:   Diagnosis Date    Arthritis     Chronic pain disorder     GERD (gastroesophageal reflux disease)     Hyperlipidemia     Hypertension     IBS (irritable bowel syndrome)     Mitral regurgitation     [facility]     tia       Past Surgical History:   Procedure Laterality Date    APPENDECTOMY      COLONOSCOPY      several    HAND SURGERY Right     ring finger has pin    HYSTERECTOMY      INSERT / REPLACE PERIPHERAL NEUROSTIMULATOR PULSE GENERATOR /  Left     buttocks    JOINT REPLACEMENT Left     knee    KNEE ARTHROSCOPY Bilateral     NERVE BLOCK Left 2/20/2018    Procedure: BLOCK MEDIAL BRANCH - C4, C5, C6;  Surgeon: Harshil Peañ MD;  [facility];  Service: Pain Management     NERVE BLOCK Left 3/6/2018    Procedure: C4, C5, C6 MEDIAL BRANCH BLOCK;  Surgeon: Harshil Peña MD;  [facility];  Service: Pain Management     [place] COLONOSCOPY FLX DX W/COLLJ SPEC WHEN PFRMD N/A 4/24/2017    Procedure: Nazanin Castro COLONOSCOPY;  Surgeon: Sean Hamilton MD;  [facility];  Service: Colorectal    RADIOFREQUENCY ABLATION Left 2018    Procedure: ABLATION RADIO FREQUENCY (RFA) - C4, C5, C6;  Surgeon: Bella Isaac MD;  [facility];  Service: Pain Management     SINUS SURGERY      UPPER GASTROINTESTINAL ENDOSCOPY         Meds/Allergies:  PTA meds:   Prior to Admission Medications   Prescriptions Last Dose Informant Patient Reported? Taking? LORazepam (ATIVAN) 1 mg tablet 2019 at 0330  Yes Yes   Sig: Take 1 mg by mouth 2 (two) times a day as needed for anxiety   Multiple Vitamin (DAILY VALUE MULTIVITAMIN) TABS 2019 at 1000  Yes Yes   Sig: Take 1 tablet by mouth daily   aspirin 81 MG tablet 2019  Yes Yes   Sig: Take 1 tablet by mouth daily   butalbital-acetaminophen-caffeine-codeine (FIORICET WITH CODEINE) -62-30 MG per capsule 2019 at 1600  Yes Yes   Sig: Take 1 capsule by mouth every 4 (four) hours as needed for headaches   calcium carbonate (OS-MARLENY) 600 MG tablet 2019 at 1000  Yes Yes   Sig: Take 600 mg by mouth daily     fluticasone (FLONASE) 50 mcg/act nasal spray 2019 at 0330  Yes Yes   Si sprays into each nostril daily as needed for rhinitis   losartan (COZAAR) 100 MG tablet 2019 at 0330  No Yes   Sig: TAKE 1 TABLET EVERY DAY   nebivolol (BYSTOLIC) 10 mg tablet 1666 at 0330  Yes Yes   Sig: Take 10 mg by mouth daily   omeprazole (PriLOSEC) 20 mg delayed release capsule 2019 at 0330  Yes Yes   Sig: Take 20 mg by mouth every morning     pravastatin (PRAVACHOL) 20 mg tablet 2019 at 2200  Yes Yes   Sig: Take 20 mg by mouth daily at bedtime     spironolactone (ALDACTONE) 50 mg tablet 2019 at 1200 Self Yes Yes   Sig: Take 50 mg by mouth daily after lunch     zolpidem (AMBIEN) 10 mg tablet 2019 at 2200  Yes Yes   Sig: zolpidem 10 mg tablet      Facility-Administered Medications: None       Allergies:    Allergies   Allergen Reactions  Procaine Hives    Adhesive [Medical Tape] Rash    Lidocaine Rash    Penicillins Rash       Social History:     Marital Status: /Civil Union    Substance Use History:   Social History     Substance and Sexual Activity   Alcohol Use Yes    Comment: 2 beer a week     Social History     Tobacco Use   Smoking Status Former Smoker   Smokeless Tobacco Never Used   Tobacco Comment    quit 40 yrs ago     Social History     Substance and Sexual Activity   Drug Use No       Family History:  I have reviewed the patients family history    Physical Exam:   Vitals:   Blood Pressure: 104/60 (02/27/19 1500)  Pulse: 63 (02/27/19 1419)  Temperature: (!) 96 5 °F (35 8 °C) (02/27/19 1419)  Temp Source: Tympanic (02/27/19 1419)  Respirations: 16 (02/27/19 1419)  Height: 5' 4" (162 6 cm) (02/27/19 0510)  Weight - Scale: 77 6 kg (171 lb) (02/27/19 0510)  SpO2: 99 % (02/27/19 1419)    Physical Exam   Constitutional: She is oriented to person, place, and time  She appears well-developed and well-nourished  HENT:   Head: Normocephalic and atraumatic  Nose: Nose normal    Mouth/Throat: Oropharynx is clear and moist    Eyes: Pupils are equal, round, and reactive to light  Conjunctivae and EOM are normal    Neck: Normal range of motion  Neck supple  No JVD present  No thyromegaly present  Cardiovascular: Normal rate, regular rhythm and intact distal pulses  Exam reveals no gallop and no friction rub  No murmur heard  Pulmonary/Chest: Effort normal and breath sounds normal  No respiratory distress  Abdominal: Soft  Bowel sounds are normal  She exhibits no distension and no mass  There is no tenderness  There is no guarding  Musculoskeletal: Normal range of motion  She exhibits no edema  Appropriate postop day 0 hip dressings, drains and tubes are in place   Lymphadenopathy:     She has no cervical adenopathy  Neurological: She is alert and oriented to person, place, and time  No cranial nerve deficit     Skin: Skin is warm  No rash noted  No erythema  Psychiatric: She has a normal mood and affect  Her behavior is normal    Vitals reviewed  Additional Data:   Lab Results: I have personally reviewed pertinent reports  Invalid input(s): LABALBU          Lab Results   Component Value Date/Time    HGBA1C 5 2 10/19/2014 06:05 AM           Imaging: I have personally reviewed pertinent reports  XR hip/pelv 1 vw right if performed   Final Result by Germán Napier DO (02/27 1015)   Satisfactory postop alignment on single view available  Workstation performed: [id number]             EKG, Pathology, and Other Studies Reviewed on Admission:   · EKG:  Not reviewed    ** Please Note: This note has been constructed using a voice recognition system   **

## 2019-02-27 NOTE — ANESTHESIA POSTPROCEDURE EVALUATION
Post-Op Assessment Note    CV Status:  Stable  Pain Score: 0    Pain management: adequate     Mental Status:  Alert and awake   Hydration Status:  Euvolemic   PONV Controlled:  Controlled   Airway Patency:  Patent   Post Op Vitals Reviewed: Yes      Staff: CRNA           BP   127/60   Temp   96 3   Pulse  81   Resp   16   SpO2   99

## 2019-02-28 LAB
ANION GAP SERPL CALCULATED.3IONS-SCNC: 4 MMOL/L (ref 4–13)
BUN SERPL-MCNC: 4 MG/DL (ref 7–25)
CALCIUM SERPL-MCNC: 8.5 MG/DL (ref 8.6–10.5)
CHLORIDE SERPL-SCNC: 99 MMOL/L (ref 98–107)
CO2 SERPL-SCNC: 25 MMOL/L (ref 21–31)
CREAT SERPL-MCNC: 0.81 MG/DL (ref 0.6–1.2)
GFR SERPL CREATININE-BSD FRML MDRD: 73 ML/MIN/1.73SQ M
GLUCOSE SERPL-MCNC: 120 MG/DL (ref 65–99)
HCT VFR BLD AUTO: 28.7 % (ref 42–47)
HGB BLD-MCNC: 9.9 G/DL (ref 12–16)
POTASSIUM SERPL-SCNC: 4.3 MMOL/L (ref 3.5–5.5)
SODIUM SERPL-SCNC: 128 MMOL/L (ref 134–143)

## 2019-02-28 PROCEDURE — 97163 PT EVAL HIGH COMPLEX 45 MIN: CPT

## 2019-02-28 PROCEDURE — 97530 THERAPEUTIC ACTIVITIES: CPT

## 2019-02-28 PROCEDURE — G8988 SELF CARE GOAL STATUS: HCPCS

## 2019-02-28 PROCEDURE — G8979 MOBILITY GOAL STATUS: HCPCS

## 2019-02-28 PROCEDURE — G8978 MOBILITY CURRENT STATUS: HCPCS

## 2019-02-28 PROCEDURE — 97167 OT EVAL HIGH COMPLEX 60 MIN: CPT

## 2019-02-28 PROCEDURE — 80048 BASIC METABOLIC PNL TOTAL CA: CPT | Performed by: PHYSICIAN ASSISTANT

## 2019-02-28 PROCEDURE — 97116 GAIT TRAINING THERAPY: CPT

## 2019-02-28 PROCEDURE — 85014 HEMATOCRIT: CPT | Performed by: PHYSICIAN ASSISTANT

## 2019-02-28 PROCEDURE — 85018 HEMOGLOBIN: CPT | Performed by: PHYSICIAN ASSISTANT

## 2019-02-28 PROCEDURE — G8987 SELF CARE CURRENT STATUS: HCPCS

## 2019-02-28 RX ORDER — BUTALBITAL, ACETAMINOPHEN AND CAFFEINE 50; 325; 40 MG/1; MG/1; MG/1
1 TABLET ORAL EVERY 4 HOURS PRN
Status: DISCONTINUED | OUTPATIENT
Start: 2019-02-28 | End: 2019-03-02 | Stop reason: HOSPADM

## 2019-02-28 RX ORDER — ZOLPIDEM TARTRATE 5 MG/1
10 TABLET ORAL
Status: DISCONTINUED | OUTPATIENT
Start: 2019-02-28 | End: 2019-03-02 | Stop reason: HOSPADM

## 2019-02-28 RX ADMIN — OXYCODONE HYDROCHLORIDE AND ACETAMINOPHEN 1 TABLET: 5; 325 TABLET ORAL at 05:10

## 2019-02-28 RX ADMIN — MORPHINE SULFATE 3 MG: 10 INJECTION INTRAVENOUS at 03:27

## 2019-02-28 RX ADMIN — FERROUS SULFATE TAB 325 MG (65 MG ELEMENTAL FE) 325 MG: 325 (65 FE) TAB at 06:38

## 2019-02-28 RX ADMIN — SPIRONOLACTONE 50 MG: 50 TABLET ORAL at 14:03

## 2019-02-28 RX ADMIN — DOCUSATE SODIUM 100 MG: 100 CAPSULE, LIQUID FILLED ORAL at 09:37

## 2019-02-28 RX ADMIN — Medication 1 TABLET: at 09:37

## 2019-02-28 RX ADMIN — ZOLPIDEM TARTRATE 10 MG: 5 TABLET, FILM COATED ORAL at 22:16

## 2019-02-28 RX ADMIN — FERROUS SULFATE TAB 325 MG (65 MG ELEMENTAL FE) 325 MG: 325 (65 FE) TAB at 18:03

## 2019-02-28 RX ADMIN — LORAZEPAM 1 MG: 1 TABLET ORAL at 00:26

## 2019-02-28 RX ADMIN — CALCIUM 1 TABLET: 500 TABLET ORAL at 06:39

## 2019-02-28 RX ADMIN — DEXTROSE AND SODIUM CHLORIDE 75 ML/HR: 5; 450 INJECTION, SOLUTION INTRAVENOUS at 00:10

## 2019-02-28 RX ADMIN — OXYCODONE HYDROCHLORIDE AND ACETAMINOPHEN 1 TABLET: 5; 325 TABLET ORAL at 14:02

## 2019-02-28 RX ADMIN — BUTALBITAL, ACETAMINOPHEN, AND CAFFEINE 1 TABLET: 50; 325; 40 TABLET ORAL at 00:26

## 2019-02-28 RX ADMIN — PRAVASTATIN SODIUM 20 MG: 20 TABLET ORAL at 21:46

## 2019-02-28 RX ADMIN — OXYCODONE HYDROCHLORIDE AND ACETAMINOPHEN 1 TABLET: 5; 325 TABLET ORAL at 09:36

## 2019-02-28 RX ADMIN — BUTALBITAL, ACETAMINOPHEN, AND CAFFEINE 1 TABLET: 50; 325; 40 TABLET ORAL at 20:28

## 2019-02-28 RX ADMIN — GENTAMICIN SULFATE 80 MG: 80 INJECTION, SOLUTION INTRAVENOUS at 11:17

## 2019-02-28 RX ADMIN — ONDANSETRON 4 MG: 2 INJECTION INTRAMUSCULAR; INTRAVENOUS at 18:03

## 2019-02-28 RX ADMIN — VANCOMYCIN HYDROCHLORIDE 500 MG: 500 INJECTION, POWDER, LYOPHILIZED, FOR SOLUTION INTRAVENOUS at 06:40

## 2019-02-28 RX ADMIN — FONDAPARINUX SODIUM 2.5 MG: 2.5 INJECTION, SOLUTION SUBCUTANEOUS at 09:37

## 2019-02-28 RX ADMIN — GENTAMICIN SULFATE 80 MG: 80 INJECTION, SOLUTION INTRAVENOUS at 00:07

## 2019-02-28 RX ADMIN — BUTALBITAL, ACETAMINOPHEN, AND CAFFEINE 1 TABLET: 50; 325; 40 TABLET ORAL at 06:38

## 2019-02-28 RX ADMIN — PANTOPRAZOLE SODIUM 40 MG: 40 TABLET, DELAYED RELEASE ORAL at 05:11

## 2019-02-28 RX ADMIN — DOCUSATE SODIUM 100 MG: 100 CAPSULE, LIQUID FILLED ORAL at 18:03

## 2019-02-28 NOTE — OCCUPATIONAL THERAPY NOTE
Pt is a 69 y o  female seen for OT evaluation s/p admit to [facility] on 2/27/2019 w/ Osteoarthritis of right hip  Comorbidities affecting pt's functional performance at time of assessment include: HTN and GERD, SOB, Chronic Pain Syndrome, Hyponatremia  Personal factors affecting pt at time of IE include:steps to enter environment, difficulty performing ADLS and difficulty performing IADLS   Prior to admission, pt was I with self care, ambulation  Upon evaluation: Pt requires up to a max A with functional transfers and self care r/t the following deficits impacting occupational performance: weakness, decreased tolerance, decreased safety awareness and increased pain  Pt to benefit from continued skilled OT tx while in the hospital to address deficits as defined above and maximize level of functional independence w ADL's and functional mobility  Occupational Performance areas to address include: bathing/shower, dressing and functional mobility  From OT standpoint, recommendation at time of d/c would be STR

## 2019-02-28 NOTE — PLAN OF CARE
Problem: Potential for Falls  Goal: Patient will remain free of falls  Description  INTERVENTIONS:  - Assess patient frequently for physical needs  -  Identify cognitive and physical deficits and behaviors that affect risk of falls    -  [place] fall precautions as indicated by assessment   - Educate patient/family on patient safety including physical limitations  - Instruct patient to call for assistance with activity based on assessment  - Modify environment to reduce risk of injury  - Consider OT/PT consult to assist with strengthening/mobility  Outcome: Progressing     Problem: PAIN - ADULT  Goal: Verbalizes/displays adequate comfort level or baseline comfort level  Description  Interventions:  - Encourage patient to monitor pain and request assistance  - Assess pain using appropriate pain scale  - Administer analgesics based on type and severity of pain and evaluate response  - Implement non-pharmacological measures as appropriate and evaluate response  - Consider cultural and social influences on pain and pain management  - Notify physician/advanced practitioner if interventions unsuccessful or patient reports new pain  Outcome: Progressing     Problem: INFECTION - ADULT  Goal: Absence or prevention of progression during hospitalization  Description  INTERVENTIONS:  - Assess and monitor for signs and symptoms of infection  - Monitor lab/diagnostic results  - Monitor all insertion sites, i e  indwelling lines, tubes, and drains  - Monitor endotracheal (as able) and nasal secretions for changes in amount and color  - [place] appropriate cooling/warming therapies per order  - Administer medications as ordered  - Instruct and encourage patient and family to use good hand hygiene technique  - Identify and instruct in appropriate isolation precautions for identified infection/condition  Outcome: Progressing  Goal: Absence of fever/infection during neutropenic period  Description  INTERVENTIONS:  - Monitor WBC  - Implement neutropenic guidelines  Outcome: Progressing     Problem: SAFETY ADULT  Goal: Maintain or return to baseline ADL function  Description  INTERVENTIONS:  -  Assess patient's ability to carry out ADLs; assess patient's baseline for ADL function and identify physical deficits which impact ability to perform ADLs (bathing, care of mouth/teeth, toileting, grooming, dressing, etc )  - Assess/evaluate cause of self-care deficits   - Assess range of motion  - Assess patient's mobility; develop plan if impaired  - Assess patient's need for assistive devices and provide as appropriate  - Encourage maximum independence but intervene and supervise when necessary  ¯ Involve family in performance of ADLs  ¯ Assess for home care needs following discharge   ¯ Request OT consult to assist with ADL evaluation and planning for discharge  ¯ Provide patient education as appropriate  Outcome: Progressing  Goal: Maintain or return mobility status to optimal level  Description  INTERVENTIONS:  - Assess patient's baseline mobility status (ambulation, transfers, stairs, etc )    - Identify cognitive and physical deficits and behaviors that affect mobility  - Identify mobility aids required to assist with transfers and/or ambulation (gait belt, sit-to-stand, lift, walker, cane, etc )  - [place] fall precautions as indicated by assessment  - Record patient progress and toleration of activity level on Mobility SBAR; progress patient to next Phase/Stage  - Instruct patient to call for assistance with activity based on assessment  - Request Rehabilitation consult to assist with strengthening/weightbearing, etc   Outcome: Progressing     Problem: DISCHARGE PLANNING  Goal: Discharge to home or other facility with appropriate resources  Description  INTERVENTIONS:  - Identify barriers to discharge w/patient and caregiver  - Arrange for needed discharge resources and transportation as appropriate  - Identify discharge learning needs (meds, wound care, etc )  - Arrange for interpretive services to assist at discharge as needed  - Refer to Case Management Department for coordinating discharge planning if the patient needs post-hospital services based on physician/advanced practitioner order or complex needs related to functional status, cognitive ability, or social support system  Outcome: Progressing     Problem: Knowledge Deficit  Goal: Patient/family/caregiver demonstrates understanding of disease process, treatment plan, medications, and discharge instructions  Description  Complete learning assessment and assess knowledge base    Interventions:  - Provide teaching at level of understanding  - Provide teaching via preferred learning methods  Outcome: Progressing

## 2019-02-28 NOTE — PLAN OF CARE
Problem: OCCUPATIONAL THERAPY ADULT  Goal: Performs self-care activities at highest level of function for planned discharge setting  See evaluation for individualized goals  Description    Note:   Limitation: Decreased ADL status, Decreased cognition, Decreased endurance, Decreased self-care trans, Decreased high-level ADLs  Prognosis: Good  Assessment: Pt is a 69 y o  female seen for OT evaluation s/p admit to [address] on 2/27/2019 w/ Osteoarthritis of right hip  Comorbidities affecting pt's functional performance at time of assessment include: HTN and GERD, SOB, Chronic Pain Syndrome, Hyponatremia  Personal factors affecting pt at time of IE include:steps to enter environment, difficulty performing ADLS and difficulty performing IADLS   Prior to admission, pt was I with self care, ambulation  Upon evaluation: Pt requires up to a max A with functional transfers and self care r/t the following deficits impacting occupational performance: weakness, decreased tolerance, decreased safety awareness and increased pain  Pt to benefit from continued skilled OT tx while in the hospital to address deficits as defined above and maximize level of functional independence w ADL's and functional mobility  Occupational Performance areas to address include: bathing/shower, dressing and functional mobility  From OT standpoint, recommendation at time of d/c would be STR         OT Discharge Recommendation: Short Term Rehab  OT - OK to Discharge: No

## 2019-02-28 NOTE — PLAN OF CARE
Problem: Potential for Falls  Goal: Patient will remain free of falls  Description  INTERVENTIONS:  - Assess patient frequently for physical needs  -  Identify cognitive and physical deficits and behaviors that affect risk of falls    -  [place] fall precautions as indicated by assessment   - Educate patient/family on patient safety including physical limitations  - Instruct patient to call for assistance with activity based on assessment  - Modify environment to reduce risk of injury  - Consider OT/PT consult to assist with strengthening/mobility  Outcome: Progressing     Problem: PAIN - ADULT  Goal: Verbalizes/displays adequate comfort level or baseline comfort level  Description  Interventions:  - Encourage patient to monitor pain and request assistance  - Assess pain using appropriate pain scale  - Administer analgesics based on type and severity of pain and evaluate response  - Implement non-pharmacological measures as appropriate and evaluate response  - Consider cultural and social influences on pain and pain management  - Notify physician/advanced practitioner if interventions unsuccessful or patient reports new pain  Outcome: Progressing     Problem: INFECTION - ADULT  Goal: Absence or prevention of progression during hospitalization  Description  INTERVENTIONS:  - Assess and monitor for signs and symptoms of infection  - Monitor lab/diagnostic results  - Monitor all insertion sites, i e  indwelling lines, tubes, and drains  - Monitor endotracheal (as able) and nasal secretions for changes in amount and color  - [place] appropriate cooling/warming therapies per order  - Administer medications as ordered  - Instruct and encourage patient and family to use good hand hygiene technique  - Identify and instruct in appropriate isolation precautions for identified infection/condition  Outcome: Progressing     Problem: SAFETY ADULT  Goal: Maintain or return to baseline ADL function  Description  INTERVENTIONS:  -  Assess patient's ability to carry out ADLs; assess patient's baseline for ADL function and identify physical deficits which impact ability to perform ADLs (bathing, care of mouth/teeth, toileting, grooming, dressing, etc )  - Assess/evaluate cause of self-care deficits   - Assess range of motion  - Assess patient's mobility; develop plan if impaired  - Assess patient's need for assistive devices and provide as appropriate  - Encourage maximum independence but intervene and supervise when necessary  ¯ Involve family in performance of ADLs  ¯ Assess for home care needs following discharge   ¯ Request OT consult to assist with ADL evaluation and planning for discharge  ¯ Provide patient education as appropriate  Outcome: Progressing  Goal: Maintain or return mobility status to optimal level  Description  INTERVENTIONS:  - Assess patient's baseline mobility status (ambulation, transfers, stairs, etc )    - Identify cognitive and physical deficits and behaviors that affect mobility  - Identify mobility aids required to assist with transfers and/or ambulation (gait belt, sit-to-stand, lift, walker, cane, etc )  - [place] fall precautions as indicated by assessment  - Record patient progress and toleration of activity level on Mobility SBAR; progress patient to next Phase/Stage  - Instruct patient to call for assistance with activity based on assessment  - Request Rehabilitation consult to assist with strengthening/weightbearing, etc   Outcome: Progressing     Problem: DISCHARGE PLANNING  Goal: Discharge to home or other facility with appropriate resources  Description  INTERVENTIONS:  - Identify barriers to discharge w/patient and caregiver  - Arrange for needed discharge resources and transportation as appropriate  - Identify discharge learning needs (meds, wound care, etc )  - Arrange for interpretive services to assist at discharge as needed  - Refer to Case Management Department for coordinating discharge planning if the patient needs post-hospital services based on physician/advanced practitioner order or complex needs related to functional status, cognitive ability, or social support system  Outcome: Progressing     Problem: Knowledge Deficit  Goal: Patient/family/caregiver demonstrates understanding of disease process, treatment plan, medications, and discharge instructions  Description  Complete learning assessment and assess knowledge base    Interventions:  - Provide teaching at level of understanding  - Provide teaching via preferred learning methods  Outcome: Progressing

## 2019-02-28 NOTE — PHYSICAL THERAPY NOTE
02/28/19 1345   Pain Assessment   Pain Assessment 0-10   Pain Score 7   Pain Type Surgical pain   Pain Location Hip   Pain Orientation Right   Pain Descriptors Sharp  (with movement)   Pain Frequency Intermittent   Pain Onset Ongoing   Clinical Progression Not changed   Patient's Stated Pain Goal No pain   Hospital Pain Intervention(s) Medication (See MAR); Ambulation/increased activity   Multiple Pain Sites No   Restrictions/Precautions   Weight Bearing Precautions Per Order Yes   RLE Weight Bearing Per Order WBAT   Other Precautions WBS;THR;Multiple lines;O2;Fall Risk;Pain   General   Family/Caregiver Present Yes  ()   Cognition   Overall Cognitive Status WFL   Arousal/Participation Alert; Cooperative  (anxious, required much encouragement)   Attention Attends with cues to redirect  (anxious)   Orientation Level Oriented X4   Memory Within functional limits   Following Commands Follows one step commands with increased time or repetition   Comments "I really have to pee & they took the catheter out  I have a lot of pain when I move this leg "   Subjective   Subjective Pt c/o pain R hip with any movement 7/10   Bed Mobility   Supine to Sit 2  Maximal assistance   Additional items Assist x 2;Bedrails; Increased time required;Verbal cues;LE management;HOB elevated   Sit to Supine 2  Maximal assistance   Additional items Assist x 2; Increased time required;Verbal cues;LE management   Additional Comments pt very tearful with transfers, anxious, transfers were performed within Critical access hospital precautions   Transfers   Sit to Stand 3  Moderate assistance   Additional items Assist x 2;Bedrails; Increased time required;Verbal cues   Stand to Sit 3  Moderate assistance   Additional items Assist x 2; Increased time required; Bedrails;Verbal cues   Stand pivot 3  Moderate assistance   Additional items Assist x 2; Increased time required;Verbal cues   Toilet transfer 3  Moderate assistance   Additional items Assist x 2;Armrests; Increased time required;Verbal cues; Commode   Additional Comments pt sat at EOB several mins prior to standing to calm breathing, decrease anxiety  Stood with RW for pericare post commode use, sat on commode several mins to urinate as it was first time after removal of catheter   Ambulation/Elevation   Gait pattern Improper Weight shift; Antalgic;Narrow NATHAN; Forward Flexion;Decreased foot clearance; Short stride; Step to   Gait Assistance 3  Moderate assist   Additional items Assist x 2;Verbal cues; Tactile cues  (100% cueing throughout session)   Assistive Device Rolling walker   Distance 2 feet bed to commode & reverse   Stair Management Assistance Not tested   Balance   Static Sitting Fair +   Dynamic Sitting Fair -   Static Standing Fair -   Dynamic Standing Poor +   Ambulatory Poor +   Endurance Deficit   Endurance Deficit Yes   Endurance Deficit Description fatigues quickly with activity   Activity Tolerance   Activity Tolerance Patient limited by fatigue;Patient limited by pain  (anxiety)   Exercises   Ankle Pumps Supine;20 reps;AROM; Bilateral   Assessment   Prognosis Good   Problem List Decreased strength;Decreased endurance; Impaired balance;Decreased mobility; Decreased coordination; Impaired judgement;Decreased safety awareness;Decreased skin integrity;Orthopedic restrictions;Pain   Assessment Pt seen for PT treatment session this date with interventions consisting of therapeutic activity consisting of training: supine<>sit transfers, sit<>stand transfers, static sitting tolerance at EOB for 5x 2reps minutes w/ B UE support, static standing tolerance for 1-2 minutes w/ B UE support and stand pivot transfers with RW bed to commode & reverse with mod x 2 assist  Pt agreeable to PT treatment session upon arrival, pt found supine in bed w/ HOB elevated, in no apparent distress  In comparison to previous session, pt with improvements in activity tolerance   Post session: pt returned BTB and all needs in reach, ABD pillow in place, SCDs active,  present, review of R THR precautions with pt verbalizing understanding  Continue to recommend STR at time of d/c in order to maximize pt's functional independence and safety w/ mobility  Pt continues to be functioning below baseline level, and remains limited 2* factors listed above and including decreased strength, endurance & safe functional mobility  PT will continue to see pt while here in order to address the deficits listed above and provide interventions consistent w/ POC in effort to achieve STGs   Goals   Patient Goals to have less pain   Treatment Day 1   Plan   Treatment/Interventions Functional transfer training;LE strengthening/ROM; Therapeutic exercise; Endurance training;Patient/family training;Equipment eval/education; Bed mobility;Gait training   Progress Slow progress, decreased activity tolerance   PT Frequency 7x/wk; Twice a day   Recommendation   Recommendation Short-term skilled PT   Equipment Recommended Denis  (pt has own)   PT - OK to Discharge Yes  (when med cleared to STR)   Additional Comments pt in bed all needs in reach, SCDs active, ABD pillow in place,  present post session   Scot Ann, PTA

## 2019-02-28 NOTE — UTILIZATION REVIEW
Initial Clinical Review ELECTIVE INPATIENT SURGERY  IP [id number] NO AUTH REQ MCR PRIMARY HIGHMARK 2NDRY NO AUTH REQ WHEN MCR IS PRIMARY    Age/Sex: 69 y o  female     Surgery Date: 2/27/19    Procedure: ARTHROPLASTY HIP TOTAL (Right) using the Bubbli hip system with the following sizes:  48 mm gription, +4 neutral liner, size  #2 Standard offset Tri Lock femoral component, +1 5 x 32 mm femoral head     Anesthesia: General with local    Admission Orders: Date/Time/Statement: 2/27/19 @ 0217 INPATIENT  Orders Placed This Encounter   Procedures    Inpatient Admission     Standing Status:   Standing     Number of Occurrences:   1     Order Specific Question:   Admitting Physician     Answer:   Vaughn Olguin [[id number]]     Order Specific Question:   Level of Care     Answer:   Med Surg [[id number]]     Order Specific Question:   Estimated length of stay     Answer:   More than 2 Midnights     Order Specific Question:   Certification     Answer:   I certify that inpatient services are medically necessary for this patient for a duration of greater than two midnights  See H&P and MD Progress Notes for additional information about the patient's course of treatment  Vital Signs:   02/28/19 1147  101 °F (38 3 °C)  88  18  116/49  98 %  None (Room air)   02/27/19 0510  98 3 °F (36 8 °C)  87  16  155/69  98 %  None (Room air)       Diet:        Diet Orders   (From admission, onward)            Start     Ordered    02/28/19 0912  Diet Regular; Regular House; Fluid Restriction 1500 ML  Diet effective now     Question Answer Comment   Diet Type Regular    Regular Regular House    Other Restriction(s): Fluid Restriction 1500 ML    RD to adjust diet per protocol?  Yes        02/28/19 0912        Mobility:   Dangle at bedside day of surgery  WBAT with hip precautions RLE  PT/OT    DVT Prophylaxis:   SCDs  fondaparinux (ARIXTRA) subcutaneous injection 2 5 mg   Dose: 2 5 mg  Freq: Daily Route: SC    Pain Control:   Scheduled Meds:  Current Facility-Administered Medications:  butalbital-acetaminophen-caffeine 1 tablet Oral Q4H PRN x2   calcium carbonate 1 tablet Oral Daily With Breakfast   docusate sodium 100 mg Oral BID   ferrous sulfate 325 mg Oral BID With Meals   fluticasone 2 spray Nasal Daily PRN   fondaparinux 2 5 mg Subcutaneous Daily   LORazepam 1 mg Oral BID PRN x1   losartan 100 mg Oral Daily   morphine injection 3 mg Intravenous Q4H PRN x3   multivitamin-minerals 1 tablet Oral Daily   nebivolol 10 mg Oral Daily   ondansetron 4 mg Intravenous Q6H PRN x2   oxyCODONE-acetaminophen 1 tablet Oral Q4H PRN x4   pantoprazole 40 mg Oral Early Morning   polyvinyl alcohol 1 drop Both Eyes Q3H PRN   pravastatin 20 mg Oral HS   spironolactone 50 mg Oral After [employer]

## 2019-02-28 NOTE — OCCUPATIONAL THERAPY NOTE
Occupational Therapy Evaluation      [place]    2/28/2019    Patient Active Problem List   Diagnosis    Diastolic dysfunction    Hypercholesterolemia    Hypertension    Mitral regurgitation    Shortness of breath on exertion    Cervical spondylosis without myelopathy    Chronic pain syndrome    Gastroesophageal reflux disease without esophagitis    Hyponatremia    Primary osteoarthritis of right knee    Osteoarthritis of right hip    Essential hypertension       Past Medical History:   Diagnosis Date    Arthritis     Chronic pain disorder     GERD (gastroesophageal reflux disease)     Hyperlipidemia     Hypertension     IBS (irritable bowel syndrome)     Mitral regurgitation     Stroke ([facility] [address] )     tia       Past Surgical History:   Procedure Laterality Date    APPENDECTOMY      COLONOSCOPY      several    HAND SURGERY Right     ring finger has pin    HYSTERECTOMY      INSERT / REPLACE PERIPHERAL NEUROSTIMULATOR PULSE GENERATOR /  Left     buttocks    JOINT REPLACEMENT Left     knee    KNEE ARTHROSCOPY Bilateral     NERVE BLOCK Left 2/20/2018    Procedure: BLOCK MEDIAL BRANCH - C4, C5, C6;  Surgeon: Yvonne Waite MD;  [facility];  Service: Pain Management     NERVE BLOCK Left 3/6/2018    Procedure: C4, C5, C6 MEDIAL BRANCH BLOCK;  Surgeon: Yvonne Waite MD;  [facility];  Service: Pain Management     CO COLONOSCOPY FLX DX W/COLLJ SPEC WHEN PFRMD N/A 4/24/2017    Procedure: Darin Dallas;  Surgeon: Nivia Ortega MD;  [facility];  Service: Colorectal    CO TOTAL HIP ARTHROPLASTY Right 2/27/2019    Procedure: ARTHROPLASTY HIP TOTAL;  Surgeon: Jessica Nayak DO;  Location: [address] [place] [address] MAIN OR;  Service: Orthopedics    RADIOFREQUENCY ABLATION Left 4/17/2018    Procedure: ABLATION RADIO FREQUENCY (RFA) - C4, C5, C6;  Surgeon: Yvonne Waite MD;  [facility];  Service: Pain Management     SINUS SURGERY      UPPER GASTROINTESTINAL ENDOSCOPY          02/28/19 0813   Note Type   Note type Eval/Treat   Restrictions/Precautions   Weight Bearing Precautions Per Order Yes   RLE Weight Bearing Per Order WBAT   Other Precautions WBS; Fall Risk;O2;Multiple lines;Pain   Pain Assessment   Pain Assessment 0-10   Pain Score 7   Pain Type Acute pain;Surgical pain   Pain Location Hip   Pain Orientation Right   Pain Descriptors [facility] Pain Intervention(s) Medication (See MAR); Repositioned; Ambulation/increased activity;Cold applied   Home Living   Type of [address] Multi-level;Stairs to enter with rails; Able to live on main level with bedroom/bathroom  (6 AARTI)   Bathroom Shower/Tub Tub/shower unit   Bathroom Toilet Raised   Bathroom Accessibility Accessible   Home Equipment Walker;Cane;Crutches;Sock aid;Reacher   Additional Comments ambulatory w/o AD PTA   Prior Function   Level of [place] Independent with ADLs and functional mobility   Lives With Spouse   Receives Help From Family   ADL Assistance Independent   IADLs Independent   Falls in the last 6 months 0   Vocational Retired   Psychosocial   Psychosocial (WDL) WDL   Subjective   Subjective I can't get OOB today   ADL   Eating Assistance 7  Independent   Eating Deficit Setup   Grooming Assistance 3  Moderate Assistance   UB Bathing Assistance 2  Maximal Assistance   LB Bathing Assistance 1  Total Assistance   [address] 2  Maximal Assistance   LB Dressing Assistance 1  Total [address]  Unable to assess   Bed Mobility   Supine to Sit 2  Maximal assistance   Additional items Assist x 2;Bedrails; Increased time required;Verbal cues;HOB elevated;LE management   Sit to Supine 2  Maximal assistance   Additional items Assist x 2;Bedrails; Increased time required;Verbal cues;LE management   Additional Comments pt c/o lightheadedness, anxious , tearful, fearful   Transfers   Sit to Stand 3  Moderate assistance   Additional items Assist x 2;Bedrails; Increased time required;Verbal cues   Stand to Sit 3  Moderate assistance   Additional items Assist x 2;Bedrails; Increased time required;Verbal cues   Additional items   (pt c/o dizziness/tearful/fearful/anxious)   Toilet transfer Unable to assess   Balance   Static Sitting Fair +   Dynamic Sitting Fair -   Static Standing Fair -   Dynamic Standing Poor +   Ambulatory Poor +   Activity Tolerance   Activity Tolerance Patient limited by fatigue;Patient limited by pain;Treatment limited secondary to medical complications (Comment)  (dizziness)   Nurse Made Aware yes   RUE Assessment   RUE Assessment WFL   LUE Assessment   LUE Assessment WFL   Hand Function   Gross Motor Coordination Functional   Fine Motor Coordination Functional   Cognition   Overall Cognitive Status WFL   Arousal/Participation Alert  (anxious/labile/needed much encouragement)   Attention Attends with cues to redirect  (r/t anxiety)   Orientation Level Oriented X4   Memory Within functional limits   Following Commands Follows one step commands with increased time or repetition   Assessment   Limitation Decreased ADL status; Decreased cognition;Decreased endurance;Decreased self-care trans;Decreased high-level ADLs   Prognosis Good   Assessment Pt is a 69 y o  female seen for OT evaluation s/p admit to [facility] on 2/27/2019 w/ Osteoarthritis of right hip  Comorbidities affecting pt's functional performance at time of assessment include: HTN and GERD, SOB, Chronic Pain Syndrome, Hyponatremia  Personal factors affecting pt at time of IE include:steps to enter environment, difficulty performing ADLS and difficulty performing IADLS   Prior to admission, pt was I with self care, ambulation  Upon evaluation: Pt requires up to a max A with functional transfers and self care r/t the following deficits impacting occupational performance: weakness, decreased tolerance, decreased safety awareness and increased pain   Pt to benefit from continued skilled OT tx while in the hospital to address deficits as defined above and maximize level of functional independence w ADL's and functional mobility  Occupational Performance areas to address include: bathing/shower, dressing and functional mobility  From OT standpoint, recommendation at time of d/c would be STR  Goals   Patient Goals less pain   STG Time Frame 3-5   Short Term Goal #1 increase knowledge re: adaptations to increase ability to participate in self care   Short Term Goal #2 increase bed mobility to Mod A X 2   LTG Time Frame 7-10   Long Term Goal #1 increase bed mobility to Min A X 1-2, to ptrpare for self care ADL's and progress with self toileting   Long Term Goal #2 increase self care ADL's to [facility] following set up and VC's as needed   Plan   Treatment Interventions ADL retraining;Functional transfer training;Patient/family training; Activityengagement   Goal Expiration Date 03/10/19   Treatment Day 0   OT Frequency 5x/wk; Weekend   Recommendation   OT Discharge Recommendation Short Term Rehab   OT - OK to Discharge No   Barthel Index   Feeding 5   Bathing 0   Grooming Score 0   Dressing Score 0   Bladder Score 5   Bowels Score 5   Toilet Use Score 0   Transfers (Bed/Chair) Score 5   Mobility (Level Surface) Score 0   Stairs Score 0   Barthel Index Score 20   Asa Lunch, OT

## 2019-02-28 NOTE — PLAN OF CARE
Problem: DISCHARGE PLANNING - CARE MANAGEMENT  Goal: Discharge to post-acute care or home with appropriate resources  Description  INTERVENTIONS:  - Conduct assessment to determine patient/family and health care team treatment goals, and need for post-acute services based on payer coverage, community resources, and patient preferences, and barriers to discharge  - Address psychosocial, clinical, and financial barriers to discharge as identified in assessment in conjunction with the patient/family and health care team  - Arrange appropriate level of post-acute services according to patient's   needs and preference and payer coverage in collaboration with the physician and health care team  - Communicate with and update the patient/family, physician, and health care team regarding progress on the discharge plan  - Arrange appropriate transportation to post-acute venues    Pt's goal is to return home after some short term rehab   Outcome: Progressing

## 2019-02-28 NOTE — ANESTHESIA POST-OP FOLLOW-UP NOTE
Patient: Leonardo Zarate  Procedure(s):  ARTHROPLASTY HIP TOTAL  Vitals:    02/28/19 1300   BP:    Pulse:    Resp:    Temp: 100 4 °F (38 °C)   SpO2:        Awake alert appears comfortable, has pain with motion   No N/V  OOB earlier  Doing well     PO/IV meds prn

## 2019-02-28 NOTE — PLAN OF CARE
Problem: PHYSICAL THERAPY ADULT  Goal: Performs mobility at highest level of function for planned discharge setting  See evaluation for individualized goals  Description  Treatment/Interventions: Functional transfer training, LE strengthening/ROM, Therapeutic exercise, Bed mobility, Gait training, Spoke to case management, OT(and neuro re-education w/ balance training)  Equipment Recommended: Walker(pt  has own)  See flowsheet documentation for full assessment, interventions and recommendations    Andrés Anaya, PT     Outcome: Progressing

## 2019-02-28 NOTE — PHYSICIAN ADVISOR
Current patient class: Inpatient  The patient is currently on Hospital Day: 2 at [address]        The patient was admitted to the hospital  on 2/27/19 at 4608 for the following diagnosis:  Primary osteoarthritis of right hip [M16 11]       There is documentation in the medical record of an expected length of stay of at least 2 midnights  The patient is therefore expected to satisfy the 2 midnight benchmark and given the 2 midnight presumption is appropriate for INPATIENT ADMISSION  Given this expectation of a satisfying stay, CMS instructs us that the patient is most often appropriate for inpatient admission under part A provided medical necessity is documented in the chart  After review of the relevant documentation, labs, vital signs and test results, the patient is appropriate for INPATIENT ADMISSION  Admission to the hospital as an inpatient is a complex decision making process which requires the practitioner to consider the patients presenting complaint, history and physical examination and all relevant testing  With this in mind, in this case, the patient was deemed appropriate for INPATIENT ADMISSION  After review of the documentation and testing available at the time of the admission I concur with this clinical determination of medical necessity  The patient does have an inpatient admission within the previous 30 days  The patient was admitted on 2/6/19 and discharged on 2/7/19 as an inpatient  The patient therefore required readmission review  In this case the patient should be considered a SEPARATE and UNRELATED INPATIENT ADMISSION  The patient had been discharged in stable condition with a completed care plan  There were no unresolved acute medical issues at the time of discharged which would have reasonably been expected to prompt this readmission  72year old woman admitted to the hospital from February 6, 2019 to February 7, 2019 for hyponatremia    She is now admitted to the hospital for right total hip arthroplasty  Since the patient is admitted for a separate medical condition, this admission should be considered unrelated to the previous admission  Rationale is as follows: The patient is a 72 yrs old   Female who presented to the ED at 2/27/2019  4:40 AM with a chief complaint of No chief complaint on file      The patients vitals on arrival were ED Triage Vitals   Temperature Pulse Respirations Blood Pressure SpO2   02/27/19 0510 02/27/19 0510 02/27/19 0510 02/27/19 0510 02/27/19 0510   98 3 °F (36 8 °C) 87 16 155/69 98 %      Temp Source Heart Rate Source Patient Position - Orthostatic VS BP Location FiO2 (%)   02/27/19 0510 02/27/19 0510 02/27/19 1130 02/27/19 1130 --   Temporal Monitor Lying Left arm       Pain Score       02/27/19 0510       No Pain           Past Medical History:   Diagnosis Date    Arthritis     Chronic pain disorder     GERD (gastroesophageal reflux disease)     Hyperlipidemia     Hypertension     IBS (irritable bowel syndrome)     Mitral regurgitation     Stroke ([facility] [address] )     tia     Past Surgical History:   Procedure Laterality Date    APPENDECTOMY      COLONOSCOPY      several    HAND SURGERY Right     ring finger has pin    HYSTERECTOMY      INSERT / REPLACE PERIPHERAL NEUROSTIMULATOR PULSE GENERATOR /  Left     buttocks    JOINT REPLACEMENT Left     knee    KNEE ARTHROSCOPY Bilateral     NERVE BLOCK Left 2/20/2018    Procedure: BLOCK MEDIAL BRANCH - C4, C5, C6;  Surgeon: Graeme Reyna MD;  [facility];  Service: Pain Management     NERVE BLOCK Left 3/6/2018    Procedure: C4, C5, C6 MEDIAL BRANCH BLOCK;  Surgeon: Graeme Reyna MD;  [facility];  Service: Pain Management     TN COLONOSCOPY FLX DX W/COLLJ SPEC WHEN PFRMD N/A 4/24/2017    Procedure: Cm Highmore;  Surgeon: Leslie Becerril MD;  [facility];  Service: Colorectal    TN TOTAL HIP ARTHROPLASTY Right 2/27/2019    Procedure: ARTHROPLASTY HIP TOTAL;  Surgeon: Arvin Miller DO;  Location: [facility] MAIN OR;  Service: Orthopedics    RADIOFREQUENCY ABLATION Left 4/17/2018    Procedure: ABLATION RADIO FREQUENCY (RFA) - C4, C5, C6;  Surgeon: Kayli Triana MD;  [facility];  Service: Pain Management     SINUS SURGERY      UPPER GASTROINTESTINAL ENDOSCOPY             Consults have been placed to:   IP CONSULT TO INTERNAL MEDICINE  IP CONSULT TO CASE MANAGEMENT    Vitals:    02/28/19 0945 02/28/19 1147 02/28/19 1300 02/28/19 1510   BP: 114/50 (!) 116/49  (!) 97/45   BP Location: Right arm Left arm  Left arm   Pulse:  88  89   Resp:  18  18   Temp:  (!) 101 °F (38 3 °C) 100 4 °F (38 °C) 100 2 °F (37 9 °C)   TempSrc:  Temporal  Temporal   SpO2:  98%  96%   Weight:       Height:           Most recent labs:    Recent Labs     02/28/19  0506   HGB 9 9*   HCT 28 7*   K 4 3   CALCIUM 8 5*   BUN 4*   CREATININE 0 81       Scheduled Meds:  Current Facility-Administered Medications:  butalbital-acetaminophen-caffeine 1 tablet Oral Q4H PRN Maxime Villa PA-C   calcium carbonate 1 tablet Oral Daily With Breakfast Jonny García PA-C   docusate sodium 100 mg Oral BID Jonny García PA-C   ferrous sulfate 325 mg Oral BID With Meals Jonny García PA-C   fluticasone 2 spray Nasal Daily PRN Jonny García PA-C   fondaparinux 2 5 mg Subcutaneous Daily Jonny García PA-C   LORazepam 1 mg Oral BID PRN Jonny García PA-C   losartan 100 mg Oral Daily Jonny García PA-C   morphine injection 3 mg Intravenous Q4H PRN Jonny García PA-C   multivitamin-minerals 1 tablet Oral Daily Jonny García PA-C   nebivolol 10 mg Oral Daily Arvin Miller DO   ondansetron 4 mg Intravenous Q6H PRN Jonny García PA-C   oxyCODONE-acetaminophen 1 tablet Oral Q4H PRN Jonny García PA-C   pantoprazole 40 mg Oral Early Morning Jonny García PA-C   polyvinyl alcohol 1 drop Both Eyes Q3H PRN Alverto Dominguez MD   pravastatin 20 mg Oral HS Ramsey Vasquez YVETTE Mike   spironolactone 50 mg Oral After Lujean Barthel, PA-C     Continuous Infusions:   PRN Meds:   butalbital-acetaminophen-caffeine    fluticasone    LORazepam    morphine injection    ondansetron    oxyCODONE-acetaminophen    polyvinyl alcohol    Surgical procedures (if appropriate):  Procedure(s):  ARTHROPLASTY HIP TOTAL

## 2019-02-28 NOTE — NURSING NOTE
Patient resting in bed comfortably with family at bedside, offers no complaints at this time  BP this AM 98/42 As per PA Adle fluids to be DC and do not give any more IV fluids due to Sodium level decreased, as per PA  notify if patient BP is low and pt becomes confused, BP med's held and BP rechecked and increased to 114/ 50, Vega removed as per order, Neurovascular check remain intact,  Good sensation, color, cap refills and ped pulses to RLE, dressing to R hip remain CDI with Ice applied, will continue to monitor

## 2019-02-28 NOTE — PHYSICAL THERAPY NOTE
Physical Therapy Evaluation     Patient's Name: Jenny Vega    Admitting Diagnosis  Primary osteoarthritis of right hip [M16 11]    Problem List  Patient Active Problem List   Diagnosis    Diastolic dysfunction    Hypercholesterolemia    Hypertension    Mitral regurgitation    Shortness of breath on exertion    Cervical spondylosis without myelopathy    Chronic pain syndrome    Gastroesophageal reflux disease without esophagitis    Hyponatremia    Primary osteoarthritis of right knee    Osteoarthritis of right hip    Essential hypertension       Past Medical History  Past Medical History:   Diagnosis Date    Arthritis     Chronic pain disorder     GERD (gastroesophageal reflux disease)     Hyperlipidemia     Hypertension     IBS (irritable bowel syndrome)     Mitral regurgitation     Stroke ([facility] [address] )     tia       Past Surgical History  Past Surgical History:   Procedure Laterality Date    APPENDECTOMY      COLONOSCOPY      several    HAND SURGERY Right     ring finger has pin    HYSTERECTOMY      INSERT / REPLACE PERIPHERAL NEUROSTIMULATOR PULSE GENERATOR /  Left     buttocks    JOINT REPLACEMENT Left     knee    KNEE ARTHROSCOPY Bilateral     NERVE BLOCK Left 2/20/2018    Procedure: BLOCK MEDIAL BRANCH - C4, C5, C6;  Surgeon: Michael Hines MD;  Location: [facility];  Service: Pain Management     NERVE BLOCK Left 3/6/2018    Procedure: C4, C5, C6 MEDIAL BRANCH BLOCK;  Surgeon: Michael Hines MD;  Location: [facility];  Service: Pain Management     DE COLONOSCOPY FLX DX W/COLLJ SPEC WHEN PFRMD N/A 4/24/2017    Procedure: Andrea Pal;  Surgeon: Rachel Patel MD;  Location: [facility];  Service: Colorectal    DE TOTAL HIP ARTHROPLASTY Right 2/27/2019    Procedure: ARTHROPLASTY HIP TOTAL;  Surgeon: Brooks Cisneros DO;  Location: [address] [place] [address] MAIN OR;  Service: Orthopedics    RADIOFREQUENCY ABLATION Left 4/17/2018    Procedure: ABLATION RADIO FREQUENCY (RFA) - C4, C5, C6;  Surgeon: Haider Burris MD;  Location: [facility];  Service: Pain Management     SINUS SURGERY      UPPER GASTROINTESTINAL ENDOSCOPY          02/28/19 5142   Note Type   Note type Eval/Treat   Pain Assessment   Pain Assessment 0-10   Pain Score 7   Pain Type Acute pain;Surgical pain   Pain Location Hip   Pain Orientation Right   Pain Descriptors Sharp   Pain Frequency Intermittent   Pain Onset Ongoing   Clinical Progression Not changed   Hospital Pain Intervention(s) Medication (See MAR); Cold applied; Ambulation/increased activity   Home Living   Type of [address] Multi-level;Performs ADLs on one level; Able to live on main level with bedroom/bathroom;Stairs to enter with rails  (6 total AARTI w/o HR X 4 steps)   Bathroom Shower/Tub Tub/shower unit  (on a pedastool)   Bathroom Toilet Raised   Bathroom Accessibility Accessible   Home Equipment Walker;Cane;Crutches;Sock aid;Reacher  (PTA, pt  did not utilize AD)   Prior Function   Level of [place] Independent with ADLs and functional mobility   Lives With [facility] Help From Family   ADL Assistance Independent   IADLs Independent   Falls in the last 6 months 0   Vocational Retired   Restrictions/Precautions   [place] Bearing Precautions Per Order Yes   RUE [place] Bearing Per Order FWB   LUE Weight Bearing Per Order FWB   RLE Weight Bearing Per Order WBAT   LLE Weight Bearing Per Order FWB   Other Precautions WBS;THR;Fall Risk;O2;Pain   General   Family/Caregiver Present No   Cognition   Overall Cognitive Status WFL   Arousal/Participation Alert   Orientation Level Oriented X4   Memory Within functional limits   Comments "I can't get out of bed today"   RUE Assessment   RUE Assessment WFL   LUE Assessment   LUE Assessment WFL   RLE Assessment   RLE Assessment X   Strength RLE   R Hip Flexion 2-/5   R Hip Extension 2-/5   R Knee Extension 2+/5   R Ankle Dorsiflexion 3-/5   LLE Assessment   LLE Assessment [facility] Movements are Fluid and Coordinated 0   Coordination and Movement Description antalgic mobility w/ all functional tasks requiring A of 2 consistently   Bed Mobility   Supine to Sit 2  Maximal assistance   Additional items Assist x 2;Bedrails; Increased time required;Verbal cues;LE management;HOB elevated   Sit to Supine 2  Maximal assistance   Additional items Assist x 2;Bedrails; Increased time required;Verbal cues;LE management   Additional Comments Upon initial transfer to bedside, pt  rested at bedside x 10 minutes to modulate pain and lightheadedness  Patient very tearful and difficult to re-direct w/ functional transfer, decreased w/ encouragement  Transfers   Sit to Stand 3  Moderate assistance   Additional items Assist x 2;Bedrails; Increased time required;Verbal cues   Stand to Sit 3  Moderate assistance  (reported increased dizziness w/ progressed time)   Additional items Assist x 2;Bedrails; Increased time required;Verbal cues   Stand pivot Unable to assess  (2/2 inability to maintain standing >30 seconds, lightheaded)   Ambulation/Elevation   Gait pattern Improper Weight shift;Narrow NATHAN; Forward Flexion;Decreased foot clearance; Antalgic; Step to;Short stride   Gait Assistance 3  Moderate assist   Additional items Assist x 2;Verbal cues; Tactile cues  (consistently throughout session, A of 2)   Assistive Device Rolling walker   Distance 1 foot, toward [facility]   Stair Management Assistance Not tested   Balance   Static Sitting Fair +   Dynamic Sitting Fair -   Static Standing Fair -   Dynamic Standing Poor +   Ambulatory Poor +   Endurance Deficit   Endurance Deficit Yes   Activity Tolerance   Activity Tolerance Patient limited by fatigue;Patient limited by pain   Nurse Made Aware yes   Assessment   Prognosis Good   Problem List Decreased strength;Decreased endurance; Impaired balance;Decreased mobility; Decreased coordination; Impaired judgement;Decreased safety awareness;Pain;Orthopedic restrictions;Decreased skin integrity   Assessment Pt is 69 y o  female seen for PT evaluation s/p [facility] on 2/27/2019 w/ Osteoarthritis of right hip  PT consulted to assess pt's functional mobility and d/c needs  Order placed for PT eval and tx, w/ up and OOB as tolerated and WBAT R LE order  Comorbidities affecting pt's physical performance at time of assessment include: weakness, decreased initiation and engagement, pain, lightheadedness, CPS, hyponatremia  PTA, pt was independent w/ all functional mobility w/ o AD  Personal factors affecting pt at time of IE include: inaccessible home environment, ambulating w/ assistive device, stairs to enter home, inability to ambulate household distances, inability to navigate community distances, inability to navigate level surfaces w/o external assistance, unable to perform dynamic tasks in community, decreased initiation and engagement, unable to perform physical activity, limited insight into impairments, inability to perform IADLs and inability to perform ADLs  Please find objective findings from PT assessment regarding body systems outlined above with impairments and limitations including weakness, impaired balance, decreased endurance, impaired coordination, gait deviations, pain, decreased activity tolerance, decreased functional mobility tolerance, decreased safety awareness, impaired judgement, fall risk, orthopedic restrictions and decreased skin integrity  The following objective measures performed on IE also reveal limitations: Barthel Index: 25/100  Pt's clinical presentation is currently unstable/unpredictable  Pt to benefit from continued PT tx to address deficits as defined above and maximize level of functional independent mobility and consistency  From PT/mobility standpoint, recommendation at time of d/c would be STR pending progress in order to facilitate return to PLOF     Goals   Patient Goals have less pain   LTG Expiration Date 03/10/19   Long Term Goal #1 Pt will(dependent on patient's ability to safely participate in interventions) perform bed mobility tasks to min A of 2 to decrease burden of care  Perform transfers to min A of 2 to decrease risk of skin breakdown with change of position  Perform ambulation with RW for 50 feet, min A of 2 to improve activity tolerance  Patient will tolerate AAROM<->AROM BLE's to facilitate joint proprioception  Patient will demonstrate increased static standing balance to Fair,  actile cues <50% to facilitate activation of stabilizing muscles and prepare for safe transfers  Treatment Day 1   Plan   Treatment/Interventions Functional transfer training;LE strengthening/ROM; Therapeutic exercise; Bed mobility;Gait training;Spoke to case management;OT  (and neuro re-education w/ balance training)   PT Frequency 7x/wk; Twice a day   Recommendation   Recommendation Short-term skilled PT   Equipment Recommended Walker  (pt  has own)   PT - OK to Discharge Yes  (if medically stable with STR)   Additional Comments Upon conclusion, patient was resting in bed w/ SCD's active, pillow placed, and all needs within reach  Barthel Index   Feeding 5   Bathing 0   Grooming Score 0   Dressing Score 5   Bladder Score 5   Bowels Score 5   Toilet Use Score 0   Transfers (Bed/Chair) Score 5   Mobility (Level Surface) Score 0   Stairs Score 0   Barthel Index Score 25     Additional skilled interventions: x 25 minutes, gait training and therapeutic activity    S: 7/10 R hip pain prior to session, "I can't get out of bed today"  Patient was verbally encouraged to participate and provided education on the benefits of mobilizing as soon as possible to tolerance  O: therapeutic activity x 12 minutes including bed mobility, sit<-> stand transfer, static &dynamic sitting/standing balance, proper hand/pedal placement, and safety awareness   Gait training x 13 minutes w/ one foot toward HOB w/ RW and A of 2, consistent tactile and verbal cues on gait pattern, proper AD usage, and safety awareness  A: Patient exhibited decreased acuity with RW, decreased safety awarness, required cues to re-direct focus, and severe R hip pain with all functional tasks  P: Continue PT tx with progression of functional tasks as patient tolerates and can safely complete tasks, 7x/ week twice a day        Wes White PT

## 2019-02-28 NOTE — PLAN OF CARE
Problem: PHYSICAL THERAPY ADULT  Goal: Performs mobility at highest level of function for planned discharge setting  See evaluation for individualized goals  Description  Treatment/Interventions: Functional transfer training, LE strengthening/ROM, Therapeutic exercise, Bed mobility, Gait training, Spoke to case management, OT(and neuro re-education w/ balance training)  Equipment Recommended: Walker(pt  has own)  See flowsheet documentation for full assessment, interventions and recommendations  Torey Easton PT     Note:   Prognosis: Good  Problem List: Decreased strength, Decreased endurance, Impaired balance, Decreased mobility, Decreased coordination, Impaired judgement, Decreased safety awareness, Pain, Orthopedic restrictions, Decreased skin integrity  Assessment: Pt is 69 y o  female seen for PT evaluation s/p [facility] w/ Osteoarthritis of right hip  PT consulted to assess pt's functional mobility and d/c needs  Order placed for PT eval and tx, w/ up and OOB as tolerated and WBAT R LE order  Comorbidities affecting pt's physical performance at time of assessment include: weakness, decreased initiation and engagement, pain, lightheadedness, CPS, hyponatremia  PTA, pt was independent w/ all functional mobility w/ o AD  Personal factors affecting pt at time of IE include: inaccessible home environment, ambulating w/ assistive device, stairs to enter home, inability to ambulate household distances, inability to navigate community distances, inability to navigate level surfaces w/o external assistance, unable to perform dynamic tasks in community, decreased initiation and engagement, unable to perform physical activity, limited insight into impairments, inability to perform IADLs and inability to perform ADLs   Please find objective findings from PT assessment regarding body systems outlined above with impairments and limitations including weakness, impaired balance, decreased endurance, impaired coordination, gait deviations, pain, decreased activity tolerance, decreased functional mobility tolerance, decreased safety awareness, impaired judgement, fall risk, orthopedic restrictions and decreased skin integrity  The following objective measures performed on IE also reveal limitations: Barthel Index: 25/100  Pt's clinical presentation is currently unstable/unpredictable  Pt to benefit from continued PT tx to address deficits as defined above and maximize level of functional independent mobility and consistency  From PT/mobility standpoint, recommendation at time of d/c would be STR pending progress in order to facilitate return to PLOF  Recommendation: Short-term skilled PT     PT - OK to Discharge: Yes(if medically stable with STR)    See flowsheet documentation for full assessment     Andrés Anaya, PT

## 2019-02-28 NOTE — SOCIAL WORK
Chart reviewed by case management, assessment was completed at the bedside, pt dept recommended to me that the pt needs snf rehab, I did discuss d/c plan and the pt was given snf list and she requested that I send a referral to [facility], referral was sent , they are able to accept the pt on Saturday they will have a bed Prud[facility] Frank needs to be called tomorrow @ [phone] to discuss time of the d/c for Saturday, pt is independent and lives with her  in a 2story home with a toilet on the 1st floor and br on the 2nd floor, 5 steps outside and 14 steps to the bedroom, pt wears glasses and she is able to read her medications labels, pt has a walker, shower chair and raised toilet seat, pt has rx plan at [facility], pt denies smoking and drinks alcohol on occasions, plan is for the pt to be d/c [facility] when pt is stable and a bed is available, d/c plan was discussed at care coordiantion rounds today, CM reviewed d/c planning process including the following: identifying help at home, patient preference for d/c planning needs, availability of treatment team to discuss questions or concerns patient and/or family may have regarding understanding medications and recognizing signs and symptoms once discharged  CM also encouraged patient to follow up with all recommended appointments after discharge  Patient advised of importance for patient and family to participate in managing patients medical well being

## 2019-03-01 LAB
ANION GAP SERPL CALCULATED.3IONS-SCNC: 6 MMOL/L (ref 4–13)
BUN SERPL-MCNC: 6 MG/DL (ref 7–25)
CALCIUM SERPL-MCNC: 8.4 MG/DL (ref 8.6–10.5)
CHLORIDE SERPL-SCNC: 98 MMOL/L (ref 98–107)
CO2 SERPL-SCNC: 26 MMOL/L (ref 21–31)
CREAT SERPL-MCNC: 0.78 MG/DL (ref 0.6–1.2)
GFR SERPL CREATININE-BSD FRML MDRD: 76 ML/MIN/1.73SQ M
GLUCOSE SERPL-MCNC: 115 MG/DL (ref 65–99)
HCT VFR BLD AUTO: 24.9 % (ref 42–47)
HGB BLD-MCNC: 8.7 G/DL (ref 12–16)
POTASSIUM SERPL-SCNC: 4.1 MMOL/L (ref 3.5–5.5)
SODIUM SERPL-SCNC: 130 MMOL/L (ref 134–143)

## 2019-03-01 PROCEDURE — 85014 HEMATOCRIT: CPT | Performed by: PHYSICIAN ASSISTANT

## 2019-03-01 PROCEDURE — 85018 HEMOGLOBIN: CPT | Performed by: PHYSICIAN ASSISTANT

## 2019-03-01 PROCEDURE — 97530 THERAPEUTIC ACTIVITIES: CPT

## 2019-03-01 PROCEDURE — 80048 BASIC METABOLIC PNL TOTAL CA: CPT | Performed by: PHYSICIAN ASSISTANT

## 2019-03-01 PROCEDURE — 97116 GAIT TRAINING THERAPY: CPT

## 2019-03-01 RX ORDER — DOCUSATE SODIUM 100 MG/1
100 CAPSULE, LIQUID FILLED ORAL 2 TIMES DAILY
Qty: 10 CAPSULE | Refills: 0
Start: 2019-03-01 | End: 2019-11-18 | Stop reason: ALTCHOICE

## 2019-03-01 RX ORDER — FERROUS SULFATE 325(65) MG
325 TABLET ORAL 2 TIMES DAILY WITH MEALS
Refills: 0
Start: 2019-03-01 | End: 2019-11-18 | Stop reason: ALTCHOICE

## 2019-03-01 RX ORDER — CIPROFLOXACIN 500 MG/1
500 TABLET, FILM COATED ORAL EVERY 12 HOURS SCHEDULED
Refills: 0
Start: 2019-03-01 | End: 2019-03-06

## 2019-03-01 RX ORDER — MULTIVIT-MIN/IRON FUM/FOLIC AC 7.5 MG-4
1 TABLET ORAL DAILY
Refills: 0
Start: 2019-03-01 | End: 2020-01-17

## 2019-03-01 RX ORDER — FONDAPARINUX SODIUM 2.5 MG/.5ML
2.5 INJECTION SUBCUTANEOUS DAILY
Refills: 0
Start: 2019-03-02 | End: 2019-11-18 | Stop reason: ALTCHOICE

## 2019-03-01 RX ORDER — OXYCODONE HYDROCHLORIDE AND ACETAMINOPHEN 5; 325 MG/1; MG/1
1 TABLET ORAL EVERY 4 HOURS PRN
Refills: 0
Start: 2019-03-01 | End: 2019-03-11

## 2019-03-01 RX ADMIN — ZOLPIDEM TARTRATE 10 MG: 5 TABLET, FILM COATED ORAL at 23:56

## 2019-03-01 RX ADMIN — DOCUSATE SODIUM 100 MG: 100 CAPSULE, LIQUID FILLED ORAL at 08:19

## 2019-03-01 RX ADMIN — FERROUS SULFATE TAB 325 MG (65 MG ELEMENTAL FE) 325 MG: 325 (65 FE) TAB at 08:19

## 2019-03-01 RX ADMIN — SPIRONOLACTONE 50 MG: 50 TABLET ORAL at 15:04

## 2019-03-01 RX ADMIN — DOCUSATE SODIUM 100 MG: 100 CAPSULE, LIQUID FILLED ORAL at 17:53

## 2019-03-01 RX ADMIN — LOSARTAN POTASSIUM 100 MG: 50 TABLET, FILM COATED ORAL at 08:19

## 2019-03-01 RX ADMIN — FONDAPARINUX SODIUM 2.5 MG: 2.5 INJECTION, SOLUTION SUBCUTANEOUS at 08:19

## 2019-03-01 RX ADMIN — Medication 1 TABLET: at 08:20

## 2019-03-01 RX ADMIN — OXYCODONE HYDROCHLORIDE AND ACETAMINOPHEN 1 TABLET: 5; 325 TABLET ORAL at 22:02

## 2019-03-01 RX ADMIN — CALCIUM 1 TABLET: 500 TABLET ORAL at 08:19

## 2019-03-01 RX ADMIN — PANTOPRAZOLE SODIUM 40 MG: 40 TABLET, DELAYED RELEASE ORAL at 05:58

## 2019-03-01 RX ADMIN — FERROUS SULFATE TAB 325 MG (65 MG ELEMENTAL FE) 325 MG: 325 (65 FE) TAB at 17:53

## 2019-03-01 RX ADMIN — PRAVASTATIN SODIUM 20 MG: 20 TABLET ORAL at 22:03

## 2019-03-01 RX ADMIN — NEBIVOLOL HYDROCHLORIDE 10 MG: 5 TABLET ORAL at 08:20

## 2019-03-01 RX ADMIN — BUTALBITAL, ACETAMINOPHEN, AND CAFFEINE 1 TABLET: 50; 325; 40 TABLET ORAL at 15:04

## 2019-03-01 NOTE — PLAN OF CARE
Problem: PHYSICAL THERAPY ADULT  Goal: Performs mobility at highest level of function for planned discharge setting  See evaluation for individualized goals  Description  Treatment/Interventions: Functional transfer training, LE strengthening/ROM, Therapeutic exercise, Bed mobility, Gait training, Spoke to case management, OT(and neuro re-education w/ balance training)  Equipment Recommended: Walker(pt  has own)  See flowsheet documentation for full assessment, interventions and recommendations    Pola Mendez, PT     Outcome: Progressing

## 2019-03-01 NOTE — SOCIAL WORK
Discussed the POC with the rounding team   TC to [facility], left a message with Director Carlita Francois for a return call to determine transport time

## 2019-03-01 NOTE — PHYSICAL THERAPY NOTE
03/01/19 1220   Pain Assessment   Pain Assessment 0-10   Pain Score 6   Pain Type Surgical pain   Pain Location Hip   Pain Orientation Right   Pain Descriptors Aching   Pain Frequency Intermittent   Pain Onset Ongoing   Clinical Progression Not changed   Patient's Stated Pain Goal No pain   Hospital Pain Intervention(s) Medication (See MAR); Ambulation/increased activity   Multiple Pain Sites No   Restrictions/Precautions   Weight Bearing Precautions Per Order Yes   RLE Weight Bearing Per Order WBAT   Other Precautions WBS; Fall Risk;Pain   General   Family/Caregiver Present Yes  ()   Cognition   Overall Cognitive Status WFL   Arousal/Participation Alert; Cooperative   Attention Attends with cues to redirect   Orientation Level Oriented X4   Memory Within functional limits   Following Commands Follows one step commands without difficulty   Comments pt agreed to PT session   Subjective   Subjective "I'm frustrated  I really want to get out of this bed "   Bed Mobility   Supine to Sit 2  Maximal assistance   Additional items Assist x 2; Increased time required;Verbal cues;LE management   Transfers   Sit to Stand 3  Moderate assistance   Additional items Assist x 2;Bedrails; Increased time required;Verbal cues   Stand to Sit 4  Minimal assistance   Additional items Assist x 2;Armrests; Increased time required;Verbal cues   Stand pivot 4  Minimal assistance   Additional items Assist x 2; Increased time required;Verbal cues   Toilet transfer 4  Minimal assistance   Additional items Assist x 2;Armrests; Increased time required;Verbal cues; Commode   Ambulation/Elevation   Gait pattern Improper Weight shift; Antalgic;Narrow NATHAN; Forward Flexion;Decreased foot clearance; Short stride; Step to   Gait Assistance 4  Minimal assist   Additional items Assist x 2;Verbal cues; Tactile cues   Assistive Device Rolling walker   Distance 2 feet to commode from bed then 36 feet x 1   Stair Management Assistance Not tested   Balance Static Sitting Fair +   Dynamic Sitting Fair -   Static Standing Fair   Dynamic Standing Fair -   Ambulatory Fair -   Endurance Deficit   Endurance Deficit Yes   Endurance Deficit Description fatigues with amb   Activity Tolerance   Activity Tolerance Patient limited by fatigue;Patient limited by pain   Exercises   Ankle Pumps Sitting;20 reps;AROM; Bilateral   Assessment   Prognosis Good   Problem List Decreased strength;Decreased endurance; Impaired balance;Decreased mobility; Decreased coordination; Impaired judgement;Decreased safety awareness;Decreased skin integrity;Orthopedic restrictions;Pain   Assessment Pt seen for PT treatment session this date with interventions consisting of gait training w/ emphasis on improving pt's ability to ambulate level surfaces x 2 feet bed to commode, 36 feet x 1 with min A of 2 provided by therapist with RW  Pt agreeable to PT treatment session upon arrival, pt found supine in bed w/ HOB elevated, in no apparent distress  In comparison to previous session, pt with improvements in amb distance, activity tolerance  Post session: all needs in reach OOB in hip chair, ABD pillow between LEs, SCDs active  Continue to recommend STR at time of d/c in order to maximize pt's functional independence and safety w/ mobility  Pt continues to be functioning below baseline level, and remains limited 2* factors listed above and including decreased strength, endurance, safe functional mobility  PT will continue to see pt while here in order to address the deficits listed above and provide interventions consistent w/ POC in effort to achieve STGs  Goals   Patient Goals to have less pain when I move   Treatment Day 2   Plan   Treatment/Interventions Functional transfer training;LE strengthening/ROM; Therapeutic exercise; Endurance training;Patient/family training;Equipment eval/education; Bed mobility;Gait training;Spoke to nursing   Progress Progressing toward goals   PT Frequency 7x/wk; Twice a day Recommendation   Recommendation Short-term skilled PT   Equipment Recommended Walker   PT - OK to Discharge Yes  (when med cleared to STR)   Additional Comments pt OOB in hip chair, ABD pillow between LEs SCDs active, all needs in reach post session   Garett Vargas, PTA

## 2019-03-01 NOTE — NURSING NOTE
Patient tolerated being oob to hip chair throughout afternoon, hip precautions maintained  Neurovascular status WNL  Denies pain  Afebrile  Patient c/o poor appetite, meal options given  1500cc fluid restriction maintained   at bedside  Patient to be discharged tomorrow to [facility]  Patient aware

## 2019-03-01 NOTE — DISCHARGE SUMMARY
Discharge Summary - Natasha Dean 69 y o  female MRN: [id number]    Unit/Bed#: -01 Encounter: [id number]    Admission Date:   Admission Orders (From admission, onward)    Ordered        02/27/19 0658  Inpatient Admission  Once     Order ID Start Status   [id number] 02/27/19 7109 Completed                Admitting Diagnosis: Primary osteoarthritis of right hip [M16 11]      Procedures Performed:  Right total hip replacement    Summary of Hospital Course:     79-year-old female presents to the hospital for an elective right total hip replacement  The patient tolerated the procedure well  Postop day 1 her hemoglobin was stable, and she was able to plantar flex and dorsiflex her ankle without issue  Patient started physical therapy and occupational therapy weight-bearing as tolerated with hip precautions  She was started on a 1500 cc fluid restriction hyponatremia  Postop day 2 her dressing was removed, and her incision was clean, dry and intact  She was deemed suitable for discharge to rehab on 03/02/2019  The patient was discharged with antibiotics, pain medication, vitamins, DVT prophylaxis, and also an elevated commode, walker, and hip chair  The patient was instructed to paint incision with betadine three times per day  No hip flexion past 90 degrees  Do not adduct past midline  No internal rotation  Discharge Diagnosis:  Primary osteoarthritis of right hip    Resolved Problems  Date Reviewed: 2/7/2019    None          Condition at Discharge: stable         Discharge instructions/Information to patient and family:   See after visit summary for information provided to patient and family  Provisions for Follow-Up Care:  See after visit summary for information related to follow-up care and any pertinent home health orders  PCP: Yoselin Mejias DO    Disposition: Rehab    Planned Readmission: No    Discharge Statement   I spent 30 minutes discharging the patient   This time was spent on the day of discharge  I had direct contact with the patient on the day of discharge  Additional documentation is required if more than 30 minutes were spent on discharge  Discharge Medications:  See after visit summary for reconciled discharge medications provided to patient and family

## 2019-03-01 NOTE — PROGRESS NOTES
Progress Note - Orthopedics   Lyndsey Norton 69 y o  female MRN: [id number]  Unit/Bed#: -01 Encounter: [id number]    Assessment:  POD 2 s/p R THR    Plan:  Continue PT OT weight-bearing as tolerated with hip precautions  Continue Arixtra for DVT prophylaxis  Dressing changed today incision is clean dry and intact  Hyponatremia improving, continue 1500 cc fluid restriction  Acute postop blood-loss anemia continue to monitor  DC to rehab tomorrow    Weight bearing:  Weightbearing as tolerated with hip precautions    VTE Pharmacologic Prophylaxis: Fondaparinux (Arixtra)  VTE Mechanical Prophylaxis: sequential compression device    Subjective: Feels better today  Pain better controlled    Vitals: Blood pressure 104/51, pulse 93, temperature 99 3 °F (37 4 °C), temperature source Temporal, resp  rate 18, height 5' 4" (1 626 m), weight 75 8 kg (167 lb 1 6 oz), SpO2 94 %  ,Body mass index is 28 68 kg/m²        Intake/Output Summary (Last 24 hours) at 3/1/2019 1017  Last data filed at 3/1/2019 0758  Gross per 24 hour   Intake 240 ml   Output 500 ml   Net -260 ml       Invasive Devices     Peripheral Intravenous Line            Peripheral IV 02/27/19 Left Forearm 2 days    Peripheral IV 02/27/19 Right Wrist 2 days                Physical Exam:   Right lower extremity neurovascularly intact  Compartments are soft  Toes are pink and mobile  Good dorsiflexion and plantar flexion of ankle  Incision is clean, dry and intact  Negative Homans    Lab, Imaging and other studies:   CBC:   Lab Results   Component Value Date    HGB 8 7 (L) 03/01/2019    HCT 24 9 (L) 03/01/2019     CMP:   Lab Results   Component Value Date    SODIUM 130 (L) 03/01/2019    CL 98 03/01/2019    CO2 26 03/01/2019    BUN 6 (L) 03/01/2019    CREATININE 0 78 03/01/2019    CALCIUM 8 4 (L) 03/01/2019    EGFR 76 03/01/2019

## 2019-03-01 NOTE — OCCUPATIONAL THERAPY NOTE
Occupational Therapy Treatment Note      [facility]    3/1/2019    Patient Active Problem List   Diagnosis    Diastolic dysfunction    Hypercholesterolemia    Hypertension    Mitral regurgitation    Shortness of breath on exertion    Cervical spondylosis without myelopathy    Chronic pain syndrome    Gastroesophageal reflux disease without esophagitis    Hyponatremia    Primary osteoarthritis of right knee    Osteoarthritis of right hip    Essential hypertension       Past Medical History:   Diagnosis Date    Arthritis     Chronic pain disorder     GERD (gastroesophageal reflux disease)     Hyperlipidemia     Hypertension     IBS (irritable bowel syndrome)     Mitral regurgitation     Stroke ([facility] [address] )     tia       Past Surgical History:   Procedure Laterality Date    APPENDECTOMY      COLONOSCOPY      several    HAND SURGERY Right     ring finger has pin    HYSTERECTOMY      INSERT / REPLACE PERIPHERAL NEUROSTIMULATOR PULSE GENERATOR /  Left     buttocks    JOINT REPLACEMENT Left     knee    KNEE ARTHROSCOPY Bilateral     NERVE BLOCK Left 2/20/2018    Procedure: BLOCK MEDIAL BRANCH - C4, C5, C6;  Surgeon: Lester Kaye MD;  [facility];  Service: Pain Management     NERVE BLOCK Left 3/6/2018    Procedure: C4, C5, C6 MEDIAL BRANCH BLOCK;  Surgeon: Lester Kaye MD;  [facility]: [facility];  Service: Pain Management     VA COLONOSCOPY FLX DX W/COLLJ SPEC WHEN PFRMD N/A 4/24/2017    Procedure: Srikanth Cruz;  Surgeon: Bryan Potts MD;  [facility]: [facility];  Service: Colorectal    VA TOTAL HIP ARTHROPLASTY Right 2/27/2019    Procedure: ARTHROPLASTY HIP TOTAL;  Surgeon: Ethan Stevenson DO;  Location: [address] [place] [address] MAIN OR;  Service: Orthopedics    RADIOFREQUENCY ABLATION Left 4/17/2018    Procedure: ABLATION RADIO FREQUENCY (RFA) - C4, C5, C6;  Surgeon: Lester Kaye MD;  [facility]: [facility];  Service: Pain Management     SINUS SURGERY      UPPER GASTROINTESTINAL ENDOSCOPY          03/01/19 1530   Restrictions/Precautions   Weight Bearing Precautions Per Order Yes   RLE Weight Bearing Per Order WBAT   Other Precautions THR;WBS;Pain; Fall Risk   Pain Assessment   Pain Assessment 0-10   Pain Score 6   Pain Type Surgical pain   Pain Location Hip   Pain Orientation Right   Patient's Stated Pain Goal No pain   Hospital Pain Intervention(s) Medication (See MAR); Repositioned; Ambulation/increased activity   ADL   Toileting Assistance  4  Minimal Assistance   Toileting Deficit Perineal hygiene   Bed Mobility   Supine to Sit 2  Maximal assistance   Additional items Assist x 2; Increased time required;Verbal cues;LE management   Transfers   Sit to Stand 3  Moderate assistance   Additional items Assist x 2;Bedrails; Increased time required;Verbal cues   Stand to Sit 4  Minimal assistance   Additional items Assist x 2;Verbal cues; Increased time required;Armrests   Toilet transfer 4  Minimal assistance   Additional items Assist x 2;Armrests;Commode; Increased time required;Verbal cues   Functional Mobility   Functional Mobility 4  Minimal assistance   Additional items Rolling walker   Cognition   Overall Cognitive Status WFL   Arousal/Participation Alert; Cooperative   Attention Attends with cues to redirect   Orientation Level Oriented X4   Memory Within functional limits   Following Commands Follows one step commands with increased time or repetition   Activity Tolerance   Activity Tolerance Patient limited by fatigue;Patient limited by pain   Assessment   Assessment Pt agreeable to OT tx as outlined  Pt seen for functional transfers/mobility/tioleting in room  Initially s/w negative yet cooperative  Pt less tearful and anxious this date re: transferring  See outline of note for tx session      (Functional mobility in room with min A and RW, VC's for safe)   Plan   Treatment Interventions Functional transfer training;ADL retraining;Patient/family training; Activityengagement   Goal Expiration Date 03/10/19   Treatment Day 1   OT Frequency 5x/wk; Weekend   Recommendation   OT Discharge Recommendation Short Term Rehab   [facility], [place]

## 2019-03-01 NOTE — NURSING NOTE
Pt requested [place] before bed as that is what she takes at home  Hospitalist notified, new order received and noted for ambien 10mg  Pt slept until early this am  Pt is aware of fluid restriction and maintains fluid restriction  Otherwise pt unchanged from shift assessment

## 2019-03-02 ENCOUNTER — HOSPITAL ENCOUNTER (INPATIENT)
Facility: HOSPITAL | Age: 74
LOS: 8 days | Discharge: HOME HEALTH | DRG: 560 | End: 2019-03-10
Attending: INTERNAL MEDICINE | Admitting: INTERNAL MEDICINE
Payer: MEDICARE

## 2019-03-02 VITALS
HEART RATE: 87 BPM | SYSTOLIC BLOOD PRESSURE: 100 MMHG | WEIGHT: 170.7 LBS | TEMPERATURE: 97.6 F | RESPIRATION RATE: 18 BRPM | OXYGEN SATURATION: 98 % | BODY MASS INDEX: 29.14 KG/M2 | HEIGHT: 64 IN | DIASTOLIC BLOOD PRESSURE: 56 MMHG

## 2019-03-02 LAB
HCT VFR BLD AUTO: 24 % (ref 42–47)
HGB BLD-MCNC: 8.4 G/DL (ref 12–16)

## 2019-03-02 PROCEDURE — 85018 HEMOGLOBIN: CPT | Performed by: PHYSICIAN ASSISTANT

## 2019-03-02 PROCEDURE — 85014 HEMATOCRIT: CPT | Performed by: PHYSICIAN ASSISTANT

## 2019-03-02 PROCEDURE — 97110 THERAPEUTIC EXERCISES: CPT

## 2019-03-02 PROCEDURE — 97535 SELF CARE MNGMENT TRAINING: CPT

## 2019-03-02 PROCEDURE — 97530 THERAPEUTIC ACTIVITIES: CPT

## 2019-03-02 PROCEDURE — 97166 OT EVAL MOD COMPLEX 45 MIN: CPT

## 2019-03-02 PROCEDURE — 97116 GAIT TRAINING THERAPY: CPT

## 2019-03-02 RX ADMIN — DOCUSATE SODIUM 100 MG: 100 CAPSULE, LIQUID FILLED ORAL at 08:30

## 2019-03-02 RX ADMIN — FERROUS SULFATE TAB 325 MG (65 MG ELEMENTAL FE) 325 MG: 325 (65 FE) TAB at 08:30

## 2019-03-02 RX ADMIN — FONDAPARINUX SODIUM 2.5 MG: 2.5 INJECTION, SOLUTION SUBCUTANEOUS at 08:29

## 2019-03-02 RX ADMIN — Medication 1 TABLET: at 08:30

## 2019-03-02 RX ADMIN — CALCIUM 1 TABLET: 500 TABLET ORAL at 08:31

## 2019-03-02 RX ADMIN — PANTOPRAZOLE SODIUM 40 MG: 40 TABLET, DELAYED RELEASE ORAL at 06:05

## 2019-03-02 NOTE — PLAN OF CARE
Problem: PHYSICAL THERAPY ADULT  Goal: Performs mobility at highest level of function for planned discharge setting  See evaluation for individualized goals  Description  Treatment/Interventions: Functional transfer training, LE strengthening/ROM, Therapeutic exercise, Bed mobility, Gait training, Spoke to case management, OT(and neuro re-education w/ balance training)  Equipment Recommended: Walker(pt  has own)  See flowsheet documentation for full assessment, interventions and recommendations  Ander Ramos, PT     Outcome: Progressing  Note:   Prognosis: Good  Problem List: Decreased strength, Decreased endurance, Impaired balance, Decreased mobility, Decreased coordination, Impaired judgement, Decreased safety awareness, Decreased skin integrity, Orthopedic restrictions, Pain  Assessment: Pt seen for PT treatment session this date with interventions consisting of gait training w/ emphasis on improving pt's ability to ambulate level surfaces x 100 ft, 15 ft with SBA provided by therapist with RW and therapeutic activity consisting of training: bed mobility, supine<>sit transfers, sit<>stand transfers and vc and tactile cues for static standing posture faciliation  Pt agreeable to PT treatment session upon arrival, pt found seated OOB in hip chair, in no apparent distress, A&O x 4 and responsive  In comparison to previous session, pt with significant improvements in tolerating greater amb trial w/ less physical A required  Pt able to verbalize all THPs at this time  Post session: pt returned BTB, all needs in reach and RN notified of session findings/recommendations  Continue to recommend STR at time of d/c in order to maximize pt's functional independence and safety w/ mobility  Pt continues to be functioning below baseline level, and remains limited 2* factors listed above   PT will continue to see pt while here in order to address the deficits listed above and provide interventions consistent w/ POC in effort to achieve STGs  Recommendation: Short-term skilled PT     PT - OK to Discharge: Yes(when med cleared to STR)    See flowsheet documentation for full assessment

## 2019-03-02 NOTE — PHYSICAL THERAPY NOTE
Physical Therapy Treatment Note       03/02/19 1051   Pain Assessment   Pain Assessment 0-10   Pain Score 4   Pain Type Surgical pain   Pain Location Hip   Pain Orientation Right   Pain Descriptors Aching   Pain Frequency Intermittent   Pain Onset Ongoing   Restrictions/Precautions   Weight Bearing Precautions Per Order Yes   RLE Weight Bearing Per Order WBAT   Other Precautions THR; Fall Risk;Pain;WBS   General   Chart Reviewed Yes   Response to Previous Treatment Patient with no complaints from previous session  Family/Caregiver Present No   Cognition   Overall Cognitive Status WFL   Arousal/Participation Alert; Cooperative   Attention Attends with cues to redirect   Orientation Level Oriented X4   Memory Within functional limits   Following Commands Follows one step commands without difficulty   Comments pt agreeable to PT session   Subjective   Subjective "I am going to rehab for a few days before going home"   Bed Mobility   Sit to Supine 4  Minimal assistance   Additional items Assist x 1;HOB elevated; Bedrails; Increased time required;Verbal cues;LE management   Additional Comments pt able to verbalize 3/3 THPs at this time   Transfers   Sit to Stand 4  Minimal assistance   Additional items Assist x 1; Increased time required;Verbal cues   Stand to Sit 4  Minimal assistance   Additional items Assist x 1; Armrests; Verbal cues   Ambulation/Elevation   Gait pattern Improper Weight shift; Antalgic;Narrow NATHAN; Forward Flexion;Decreased foot clearance; Short stride; Step to   Gait Assistance 5  Supervision  (SBA)   Additional items Assist x 1;Verbal cues   Assistive Device Rolling walker   Distance 100 ft, 15 ft   Balance   Static Sitting Good   Dynamic Sitting Fair +   Static Standing Fair   Dynamic Standing Fair -   Ambulatory Fair -   Endurance Deficit   Endurance Deficit Yes   Activity Tolerance   Activity Tolerance Patient limited by fatigue   Nurse Made Aware Yes, RN verbalized pt appropriate for PT session Assessment   Prognosis Good   Problem List Decreased strength;Decreased endurance; Impaired balance;Decreased mobility; Decreased coordination; Impaired judgement;Decreased safety awareness;Decreased skin integrity;Orthopedic restrictions;Pain   Assessment Pt seen for PT treatment session this date with interventions consisting of gait training w/ emphasis on improving pt's ability to ambulate level surfaces x 100 ft, 15 ft with SBA provided by therapist with RW and therapeutic activity consisting of training: bed mobility, supine<>sit transfers, sit<>stand transfers and vc and tactile cues for static standing posture faciliation  Pt agreeable to PT treatment session upon arrival, pt found seated OOB in hip chair, in no apparent distress, A&O x 4 and responsive  In comparison to previous session, pt with significant improvements in tolerating greater amb trial w/ less physical A required  Pt able to verbalize all THPs at this time  Post session: pt returned BTB, all needs in reach and RN notified of session findings/recommendations  Continue to recommend STR at time of d/c in order to maximize pt's functional independence and safety w/ mobility  Pt continues to be functioning below baseline level, and remains limited 2* factors listed above  PT will continue to see pt while here in order to address the deficits listed above and provide interventions consistent w/ POC in effort to achieve STGs  Goals   Patient Goals "to get stronger and get around better"   LTG Expiration Date 03/10/19   Long Term Goal #1 goals remain appropriate   Treatment Day 3   Plan   Treatment/Interventions Functional transfer training;LE strengthening/ROM; Therapeutic exercise; Endurance training;Patient/family training;Equipment eval/education; Bed mobility;Gait training;Spoke to nursing   Progress Progressing toward goals   PT Frequency Twice a day   Recommendation   Recommendation Short-term skilled PT   Equipment Recommended Victor Hugo Oneil   PT - OK to Discharge Yes  (when med cleared to STR)       Delicia Milligan, PT    Time of PT treatment session: 8920-0034

## 2019-03-02 NOTE — PLAN OF CARE
Problem: OCCUPATIONAL THERAPY ADULT  Goal: Performs self-care activities at highest level of function for planned discharge setting  See evaluation for individualized goals  Outcome: Progressing  Note:   Limitation: Decreased ADL status, Decreased cognition, Decreased endurance, Decreased self-care trans, Decreased high-level ADLs  Prognosis: Good  Assessment: Pt requires Min A for bed and toileting tasks  Pt demonstrates an increase in level of function since THR  Pt pleasant and cooperative with good cognition demonstrated  Pt transfering to [facility] later today       OT Discharge Recommendation: Short Term Rehab  OT - OK to Discharge: No    Ricardo Ramirez MS, OTR/L

## 2019-03-02 NOTE — SOCIAL WORK
Pt has been evaluated by MD and is stable to be discharged  Pt will be transported by [facility]  Spoke to [employer] [facility] [employer] of transport time  Gave JOSHUA Corbin the number ( [phone]) for nurse to nurse report  Pt made aware of same

## 2019-03-02 NOTE — OCCUPATIONAL THERAPY NOTE
Occupational Therapy Treatment Note      Natasha Forth    3/2/2019    Patient Active Problem List   Diagnosis    Diastolic dysfunction    Hypercholesterolemia    Hypertension    Mitral regurgitation    Shortness of breath on exertion    Cervical spondylosis without myelopathy    Chronic pain syndrome    Gastroesophageal reflux disease without esophagitis    Hyponatremia    Primary osteoarthritis of right knee    Osteoarthritis of right hip    Essential hypertension       Past Medical History:   Diagnosis Date    Arthritis     Chronic pain disorder     GERD (gastroesophageal reflux disease)     Hyperlipidemia     Hypertension     IBS (irritable bowel syndrome)     Mitral regurgitation     Stroke ([address] )     tia       Past Surgical History:   Procedure Laterality Date    APPENDECTOMY      COLONOSCOPY      several    HAND SURGERY Right     ring finger has pin    HYSTERECTOMY      INSERT / REPLACE PERIPHERAL NEUROSTIMULATOR PULSE GENERATOR /  Left     buttocks    JOINT REPLACEMENT Left     knee    KNEE ARTHROSCOPY Bilateral     NERVE BLOCK Left 2/20/2018    Procedure: BLOCK MEDIAL BRANCH - C4, C5, C6;  Surgeon: Yahaira Mnotejo MD;  [facility];  Service: Pain Management     NERVE BLOCK Left 3/6/2018    Procedure: C4, C5, C6 MEDIAL BRANCH BLOCK;  Surgeon: Yahaira Montejo MD;  [facility];  Service: Pain Management     SD COLONOSCOPY FLX DX W/COLLJ SPEC WHEN PFRMD N/A 4/24/2017    Procedure: Alok Jansen;  Surgeon: Yamilet Avila MD;  [facility]: [facility];  Service: Colorectal    SD TOTAL HIP ARTHROPLASTY Right 2/27/2019    Procedure: ARTHROPLASTY HIP TOTAL;  Surgeon: Janine Jauregui DO;  Location: [facility] MAIN OR;  Service: Orthopedics    RADIOFREQUENCY ABLATION Left 4/17/2018    Procedure: ABLATION RADIO FREQUENCY (RFA) - C4, C5, C6;  Surgeon: Yahaira Montejo MD;  [facility];  Service: Pain Management     SINUS SURGERY      UPPER GASTROINTESTINAL ENDOSCOPY          03/02/19 1052   Restrictions/Precautions   Weight Bearing Precautions Per Order Yes   RLE Weight Bearing Per Order WBAT   Other Precautions THR; Fall Risk;Pain;WBS   Pain Assessment   Pain Assessment 0-10   Pain Score 4   Pain Type Surgical pain   Pain Location Hip   Pain Orientation Right   Pain Descriptors Aching   Pain Frequency Intermittent   Pain Onset Ongoing   ADL   Toileting Assistance  4  Minimal Assistance   Toileting Deficit Steadying;Verbal cueing;Supervison/safety   Bed Mobility   Sit to Supine 4  Minimal assistance   Additional items Assist x 1;HOB elevated; Bedrails; Increased time required;Verbal cues;LE management   Additional Comments pt able to verbalize 3/3 THPs at this time   Transfers   Sit to Stand 4  Minimal assistance   Additional items Assist x 1; Increased time required;Verbal cues   Stand to Sit 4  Minimal assistance   Additional items Assist x 1; Armrests; Verbal cues   Toilet Transfers   Toilet Transfer From   (hip chair)   Toilet Transfer Type To   Toilet Transfer to Raised toilet seat with rails   Toilet Transfer Technique Ambulating   Toilet Transfers Minimal assistance   Cognition   Overall Cognitive Status WFL   Arousal/Participation Alert; Cooperative   Attention Attends with cues to redirect   Orientation Level Oriented X4   Memory Within functional limits   Following Commands Follows one step commands without difficulty   Activity Tolerance   Activity Tolerance Patient tolerated treatment well   Assessment   Assessment Pt requires Min A for bed and toileting tasks  Pt demonstrates an increase in level of function since THR  Pt pleasant and cooperative with good cognition demonstrated  Pt transfering to [facility] later today     Plan   Goal Expiration Date 03/10/19   Treatment Day 2     Myrisa Georgeana Holstein, MS, OTR/L

## 2019-03-02 NOTE — NURSING NOTE
PT IS AWAKE AND ORIENTED X3  SHE IS R/A STATUS  HER HEART IS REGULAR  HER ABDOMEN IS OBESE AND SOFT, WITH POSITIVE BSX4  HER LBM WAS 2/26/19  SHE VOIDS FREELY  SHE HAS BEEN USING A WALKER TO PIVOT AND MOVE TO A BEDSIDE CHAIR, ON PREVIOUS SHIFT  HER RT HIP DRSG, IS DRY AND INTACT  SHE HAS ADEQ CCAP REFILL TO HER RT FOOT,AND POSITIVE PEDAL PULSES  HER PAIN IS BEING CONTROLLED WITH PERCOCET, AND THIS EVENING SHE WILL BE TAKING A AMBIEN FOR SLEEP PER REQUEST  CALL BELL GIVEN AND 3 RAILS UP FOR SAFETY

## 2019-03-04 PROCEDURE — 97530 THERAPEUTIC ACTIVITIES: CPT

## 2019-03-04 PROCEDURE — 97163 PT EVAL HIGH COMPLEX 45 MIN: CPT

## 2019-03-04 PROCEDURE — 97535 SELF CARE MNGMENT TRAINING: CPT

## 2019-03-04 PROCEDURE — 97110 THERAPEUTIC EXERCISES: CPT

## 2019-03-05 PROCEDURE — 97535 SELF CARE MNGMENT TRAINING: CPT

## 2019-03-05 PROCEDURE — 97530 THERAPEUTIC ACTIVITIES: CPT

## 2019-03-05 PROCEDURE — 97116 GAIT TRAINING THERAPY: CPT

## 2019-03-05 PROCEDURE — 97110 THERAPEUTIC EXERCISES: CPT

## 2019-03-06 PROCEDURE — 97535 SELF CARE MNGMENT TRAINING: CPT

## 2019-03-06 PROCEDURE — 97116 GAIT TRAINING THERAPY: CPT

## 2019-03-06 PROCEDURE — 97110 THERAPEUTIC EXERCISES: CPT

## 2019-03-06 PROCEDURE — 97530 THERAPEUTIC ACTIVITIES: CPT

## 2019-03-07 PROCEDURE — 97530 THERAPEUTIC ACTIVITIES: CPT

## 2019-03-07 PROCEDURE — 97116 GAIT TRAINING THERAPY: CPT

## 2019-03-07 PROCEDURE — 97110 THERAPEUTIC EXERCISES: CPT

## 2019-03-08 PROCEDURE — 97530 THERAPEUTIC ACTIVITIES: CPT

## 2019-03-08 PROCEDURE — 97110 THERAPEUTIC EXERCISES: CPT

## 2019-03-09 PROCEDURE — 97110 THERAPEUTIC EXERCISES: CPT

## 2019-03-09 PROCEDURE — 97530 THERAPEUTIC ACTIVITIES: CPT

## 2019-03-21 NOTE — PROGRESS NOTES
PT Evaluation     Today's date: 3/26/2019  Patient name: Barney Finch  : 1945  MRN: [id number]  Referring provider: Praveen Tong DO  Dx:   Encounter Diagnosis     ICD-10-CM    1  Presence of right artificial hip joint Z96 641                   Assessment  Assessment details: The patient is a 70 y/o female who presents to PT with diagnosis of R hip DJD and she is s/p R THR on 19 by Dr Wesley Constantino  She has complaints of intermittent R hip pain with most pain being along her anterior hip  She also demonstrates deficits with decreased ROM and strength, decreased flexibility, decreased standing tolerance, difficulty with stair negotiation, antalgic gait with use of AD and pain/difficulty with completing her ADLs  She remains I with most of her ADLs though she does have pain with completing them  Most difficulty with putting on/taking off her R shoe/sock and her  has been helping her  She ambulates with use of RW for community distances and with SPC in the house  She ambulates with slow tracee and with decrease weight shift to RLE in stance phase  She has been going up and down the steps with non-reciprocal gait pattern but notes that she has been doing it this way since her knee surgery  She is not driving at this time per doctor orders  Since surgery she has been limited with her overall mobility and function  The patient would benefit from continued PT to address deficits and improve function  Tx to include ROM, stretching, strengthening, modalities, HEP, pt education, postural ed, lifting/body mechanics, neuro re-ed, balance/proprioception Te, MT and equipment      Impairments: abnormal gait, abnormal or restricted ROM, activity intolerance, impaired balance, impaired physical strength, lacks appropriate home exercise program and pain with function  Other impairment: decreased flexibility  Functional limitations: difficulty with stair negotiation, difficulty dressing (R sock) Understanding of Dx/Px/POC: good   Prognosis: good    Goals  STGs:  1  Initiate and complete HEP with verbal cues  2   Improve R hip ROM by 5-10 degrees in 4 weeks  3   Improve RLE strength by 1/2-1 grade in 4 weeks  4   Decrease R hip pain by 25% in 4 weeks  LTGs:  1  Patient to be I with HEP in 6 weeks  2   Improve R Hip ROM to [facility] t/o in 8 weeks to improve function  3   Improve RLE strength to > or = to 4/5 t/o in 8 weeks to improve function  4   Decrease R hip pain to < or = to 1/10 with activity in 8 weeks to improve function  5   Patient to ambulate with normalized gait pattern in 8 weeks without AD  6   Recreational performance is improved to PLOF in 8 weeks  7   ADL performance is improved to PLOF in 8 weeks  Plan  Patient would benefit from: skilled physical therapy  Planned modality interventions: cryotherapy and thermotherapy: hydrocollator packs  Planned therapy interventions: massage, balance, neuromuscular re-education, patient education, self care, strengthening, stretching, therapeutic activities, therapeutic exercise, flexibility and home exercise program  Frequency: 2x week (2-3 times/week)  Duration in weeks: 8  Plan of Care beginning date: 3/26/2019  Plan of Care expiration date: 4/26/2019  Treatment plan discussed with: patient        Subjective Evaluation    History of Present Illness  Date of surgery: 2/27/2019  Mechanism of injury: surgery  Mechanism of injury: The patient states that she developed R hip pain around November 2018  She had gone to [facility] ER and she had x-rays taken and she was told that they thought she had pulled muscles  The pain had not been getting better so she saw the orthopedic doctor  She saw Dr Erick Oswald and per patient he told her that she didn't have cartilage in her hip and she needed a THR  She had surgery (posterior approach) at [facility] on 2/27/19    She was in the hospital from Wednesday to Saturday and then she was transferred to [facility] 5th floor rehab  She was in rehab until the following   She was discharged to home with HHPT and her last visit was this past Friday  She was referred to OPPT and she now presents for her evaluation  She will be going back to see the doctor on 19 for her follow up appointment  Quality of life: good    Pain  Current pain ratin  At best pain ratin  At worst pain ratin  Location: R hip  Quality: dull ache and discomfort  Relieving factors: ice  Aggravating factors: walking  Progression: improved    Social Support  Steps to enter house: yes  Stairs in house: yes   14  Lives in: multiple-level home  Lives with: spouse    Employment status: not working  Patient Goals  Patient goals for therapy: increased motion, decreased pain and increased strength  Patient goal: "To work on my balance and help my walking "          Objective     Observations     Right Hip  Positive for incision  Additional Observation Details  Incision healing, no redness or warmth, no active bleeding or drainage noted  Neurological Testing     Sensation     Hip   Left Hip   Intact: light touch    Right Hip   Intact: light touch    Active Range of Motion   Left Hip   Flexion: 75 degrees   Abduction: 28 degrees     Right Hip   Flexion: 70 degrees   Abduction: 21 degrees     Additional Active Range of Motion Details  L SLR: 50  R SLR: 40  Limited LLE ROM secondary to rods in her leg  Strength/Myotome Testing     Left Hip   Normal muscle strength    Right Hip   Planes of Motion   Flexion: 4-  Abduction: 3+  Adduction: 4  External rotation: 4-  Internal rotation: 4-    Ambulation   Weight-Bearing Status   Weight-Bearing Status (Right): weight-bearing as tolerated    Assistive device used: two-wheeled walker and single point cane    Additional Weight-Bearing Status Details  Uses RW in [facility] for household ambulation        Ambulation: Level Surfaces   Ambulation with assistive device: independent    Ambulation: Stairs   Ascend stairs: independent  Pattern: non-reciprocal  Descend stairs: independent  Pattern: non-reciprocal    Additional Stairs Ambulation Details  She notes that non-reciprocal gait pattern in her normal method since knee surgery  Observational Gait   Gait: antalgic   Decreased walking speed, right stance time and right step length  Precautions: RLE THR precautions, stimulator in back  Re-Eval DUE: 4/26/19  Specialty Daily Treatment Diary     Manual  3/26/19       RLE - PROM NV                                           Exercise Diary  3/26/19       NuStep Level 1  10 minutes       Stand - SLR x 3 ways        Squats        Marches        Ham Curls - Standing        HR/TR        Stepups - F/L        Stepdowns        Lunges on [facility] - For        Tan-Car with TBand        Hams Curls with TBand        LAQ        QSet        SAQ        Bridges        Hip Add with Ball        Hip Abd with TBand        Supine SLR        Heel Slides        S/L Hip Abd        Ham Sets        Prone Hip Ext        Biodex - Limits of Stability        Biodex - Limits of Stability        Biodex - Maze        Biodex - Maze        Biodex - Random Control        Biodex - Random Control            Modalities 3/26/19       CP/HP prn                          Issued and reviewed HEP with patient for stand SLR x 3 ways, standing marches, squats, heel raises, and LAQ  She verbalized understanding  Also reviewed THR precautions with patient

## 2019-03-26 ENCOUNTER — EVALUATION (OUTPATIENT)
Dept: PHYSICAL THERAPY | Facility: HOME HEALTHCARE | Age: 74
End: 2019-03-26
Payer: MEDICARE

## 2019-03-26 DIAGNOSIS — Z96.641 PRESENCE OF RIGHT ARTIFICIAL HIP JOINT: Primary | ICD-10-CM

## 2019-03-26 PROCEDURE — 97110 THERAPEUTIC EXERCISES: CPT | Performed by: PHYSICAL THERAPIST

## 2019-03-26 PROCEDURE — 97535 SELF CARE MNGMENT TRAINING: CPT | Performed by: PHYSICAL THERAPIST

## 2019-03-26 PROCEDURE — 97161 PT EVAL LOW COMPLEX 20 MIN: CPT | Performed by: PHYSICAL THERAPIST

## 2019-03-29 ENCOUNTER — OFFICE VISIT (OUTPATIENT)
Dept: PHYSICAL THERAPY | Facility: HOME HEALTHCARE | Age: 74
End: 2019-03-29
Payer: MEDICARE

## 2019-03-29 DIAGNOSIS — Z96.641 PRESENCE OF RIGHT ARTIFICIAL HIP JOINT: Primary | ICD-10-CM

## 2019-03-29 PROCEDURE — 97140 MANUAL THERAPY 1/> REGIONS: CPT | Performed by: PHYSICAL THERAPIST

## 2019-03-29 PROCEDURE — 97110 THERAPEUTIC EXERCISES: CPT | Performed by: PHYSICAL THERAPIST

## 2019-03-29 NOTE — PROGRESS NOTES
Daily Note     Today's date: 3/29/2019  Patient name: Lucita Pandey  : 1945  MRN: [id number]  Referring provider: Scott Pressley PA-C  Dx:   Encounter Diagnosis     ICD-10-CM    1  Presence of right artificial hip joint Z96 641                   Subjective: Pt with no complaints to begin treatment today  Objective: See treatment diary below      Assessment: Good tolerance to initiation of TE program this date and without complaints of increased pain  Tightness remains in HS and R hip during manual stretching but pt with relief of all soreness to end session following CP  Plan: Continue per plan of care         Precautions: RLE THR precautions, stimulator in back  Re-Eval DUE: 19  Specialty Daily Treatment Diary     Manual  3/26/19 3/29/19      RLE - PROM NV 10 minutes                                          Exercise Diary  3/26/19 3/29/19      NuStep Level 1  10 minutes Level 1  10 minutes       Stand - SLR x 3 ways  2 x 10       Squats        Marches  2 x 10       Ham Curls - Standing        HR/TR  2 x 10       Stepups - F/L        Stepdowns        Lunges on [facility] - For        TKE with TBand        Hams Curls with TBand        LAQ  5" x 20       QSet  5" x 20       SAQ  5" x 20       Bridges  1 x 20       Hip Add with Ball  5" x 20       Hip Abd with TBand  Red x 20       Supine SLR  1 x 20       Heel Slides        S/L Hip Abd        Ham Sets        Prone Hip Ext        Biodex - Limits of Stability        Biodex - Limits of Stability        Biodex - Maze        Biodex - Maze        Biodex - Random Control        Biodex - Random Control            Modalities 3/26/19 3/29/19      CP/HP prn  10 minutes

## 2019-04-03 ENCOUNTER — OFFICE VISIT (OUTPATIENT)
Dept: PHYSICAL THERAPY | Facility: HOME HEALTHCARE | Age: 74
End: 2019-04-03
Payer: MEDICARE

## 2019-04-03 DIAGNOSIS — Z96.641 PRESENCE OF RIGHT ARTIFICIAL HIP JOINT: Primary | ICD-10-CM

## 2019-04-03 PROCEDURE — 97140 MANUAL THERAPY 1/> REGIONS: CPT

## 2019-04-03 PROCEDURE — 97110 THERAPEUTIC EXERCISES: CPT

## 2019-04-05 ENCOUNTER — OFFICE VISIT (OUTPATIENT)
Dept: PHYSICAL THERAPY | Facility: HOME HEALTHCARE | Age: 74
End: 2019-04-05
Payer: MEDICARE

## 2019-04-05 DIAGNOSIS — Z96.641 PRESENCE OF RIGHT ARTIFICIAL HIP JOINT: Primary | ICD-10-CM

## 2019-04-05 PROCEDURE — 97110 THERAPEUTIC EXERCISES: CPT

## 2019-04-05 PROCEDURE — 97140 MANUAL THERAPY 1/> REGIONS: CPT

## 2019-04-10 ENCOUNTER — OFFICE VISIT (OUTPATIENT)
Dept: PHYSICAL THERAPY | Facility: HOME HEALTHCARE | Age: 74
End: 2019-04-10
Payer: MEDICARE

## 2019-04-10 DIAGNOSIS — Z96.641 PRESENCE OF RIGHT ARTIFICIAL HIP JOINT: Primary | ICD-10-CM

## 2019-04-10 PROCEDURE — 97140 MANUAL THERAPY 1/> REGIONS: CPT

## 2019-04-10 PROCEDURE — 97110 THERAPEUTIC EXERCISES: CPT

## 2019-04-12 ENCOUNTER — OFFICE VISIT (OUTPATIENT)
Dept: PHYSICAL THERAPY | Facility: HOME HEALTHCARE | Age: 74
End: 2019-04-12
Payer: MEDICARE

## 2019-04-12 DIAGNOSIS — Z96.641 PRESENCE OF RIGHT ARTIFICIAL HIP JOINT: Primary | ICD-10-CM

## 2019-04-12 PROCEDURE — 97110 THERAPEUTIC EXERCISES: CPT

## 2019-04-12 PROCEDURE — 97140 MANUAL THERAPY 1/> REGIONS: CPT

## 2019-04-17 ENCOUNTER — OFFICE VISIT (OUTPATIENT)
Dept: PHYSICAL THERAPY | Facility: HOME HEALTHCARE | Age: 74
End: 2019-04-17
Payer: MEDICARE

## 2019-04-17 DIAGNOSIS — Z96.641 PRESENCE OF RIGHT ARTIFICIAL HIP JOINT: Primary | ICD-10-CM

## 2019-04-17 PROCEDURE — 97110 THERAPEUTIC EXERCISES: CPT

## 2019-04-17 PROCEDURE — 97140 MANUAL THERAPY 1/> REGIONS: CPT

## 2019-04-19 ENCOUNTER — APPOINTMENT (EMERGENCY)
Dept: RADIOLOGY | Facility: HOSPITAL | Age: 74
End: 2019-04-19
Payer: MEDICARE

## 2019-04-19 ENCOUNTER — OFFICE VISIT (OUTPATIENT)
Dept: PHYSICAL THERAPY | Facility: HOME HEALTHCARE | Age: 74
End: 2019-04-19
Payer: MEDICARE

## 2019-04-19 ENCOUNTER — HOSPITAL ENCOUNTER (EMERGENCY)
Facility: HOSPITAL | Age: 74
Discharge: HOME | End: 2019-04-19
Attending: EMERGENCY MEDICINE
Payer: MEDICARE

## 2019-04-19 VITALS
DIASTOLIC BLOOD PRESSURE: 66 MMHG | BODY MASS INDEX: 29.29 KG/M2 | RESPIRATION RATE: 20 BRPM | TEMPERATURE: 97.7 F | OXYGEN SATURATION: 99 % | WEIGHT: 170.64 LBS | SYSTOLIC BLOOD PRESSURE: 152 MMHG | HEART RATE: 67 BPM

## 2019-04-19 DIAGNOSIS — S93.401A RIGHT ANKLE SPRAIN: Primary | ICD-10-CM

## 2019-04-19 DIAGNOSIS — Z96.641 PRESENCE OF RIGHT ARTIFICIAL HIP JOINT: Primary | ICD-10-CM

## 2019-04-19 PROCEDURE — 73590 X-RAY EXAM OF LOWER LEG: CPT

## 2019-04-19 PROCEDURE — 99282 EMERGENCY DEPT VISIT SF MDM: CPT | Performed by: EMERGENCY MEDICINE

## 2019-04-19 PROCEDURE — 97110 THERAPEUTIC EXERCISES: CPT

## 2019-04-19 PROCEDURE — 73610 X-RAY EXAM OF ANKLE: CPT

## 2019-04-19 PROCEDURE — 73630 X-RAY EXAM OF FOOT: CPT

## 2019-04-19 PROCEDURE — 99283 EMERGENCY DEPT VISIT LOW MDM: CPT

## 2019-04-19 PROCEDURE — 97140 MANUAL THERAPY 1/> REGIONS: CPT

## 2019-04-24 ENCOUNTER — APPOINTMENT (OUTPATIENT)
Dept: PHYSICAL THERAPY | Facility: HOME HEALTHCARE | Age: 74
End: 2019-04-24
Payer: MEDICARE

## 2019-04-26 ENCOUNTER — EVALUATION (OUTPATIENT)
Dept: PHYSICAL THERAPY | Facility: HOME HEALTHCARE | Age: 74
End: 2019-04-26
Payer: MEDICARE

## 2019-04-26 DIAGNOSIS — Z96.641 PRESENCE OF RIGHT ARTIFICIAL HIP JOINT: Primary | ICD-10-CM

## 2019-04-26 PROCEDURE — 97164 PT RE-EVAL EST PLAN CARE: CPT | Performed by: PHYSICAL THERAPIST

## 2019-05-01 ENCOUNTER — APPOINTMENT (OUTPATIENT)
Dept: LAB | Facility: HOSPITAL | Age: 74
End: 2019-05-01
Attending: INTERNAL MEDICINE
Payer: MEDICARE

## 2019-05-01 ENCOUNTER — TRANSCRIBE ORDERS (OUTPATIENT)
Dept: ADMINISTRATIVE | Facility: HOSPITAL | Age: 74
End: 2019-05-01

## 2019-05-01 DIAGNOSIS — E26.9 HYPERALDOSTERONISM, UNSPECIFIED (HCC): Primary | ICD-10-CM

## 2019-05-01 DIAGNOSIS — E87.1 HYPOSMOLALITY SYNDROME: ICD-10-CM

## 2019-05-01 DIAGNOSIS — E26.9 HYPERALDOSTERONISM, UNSPECIFIED (HCC): ICD-10-CM

## 2019-05-01 LAB
ALBUMIN SERPL BCP-MCNC: 3.7 G/DL (ref 3.5–5)
ALP SERPL-CCNC: 134 U/L (ref 46–116)
ALT SERPL W P-5'-P-CCNC: 28 U/L (ref 12–78)
ANION GAP SERPL CALCULATED.3IONS-SCNC: 3 MMOL/L (ref 4–13)
AST SERPL W P-5'-P-CCNC: 16 U/L (ref 5–45)
BACTERIA UR QL AUTO: ABNORMAL /HPF
BILIRUB SERPL-MCNC: 0.3 MG/DL (ref 0.2–1)
BILIRUB UR QL STRIP: NEGATIVE
BUN SERPL-MCNC: 10 MG/DL (ref 5–25)
CALCIUM SERPL-MCNC: 9.1 MG/DL (ref 8.3–10.1)
CHLORIDE SERPL-SCNC: 93 MMOL/L (ref 100–108)
CLARITY UR: CLEAR
CO2 SERPL-SCNC: 28 MMOL/L (ref 21–32)
COLOR UR: YELLOW
CREAT SERPL-MCNC: 0.89 MG/DL (ref 0.6–1.3)
GFR SERPL CREATININE-BSD FRML MDRD: 65 ML/MIN/1.73SQ M
GLUCOSE P FAST SERPL-MCNC: 91 MG/DL (ref 65–99)
GLUCOSE UR STRIP-MCNC: NEGATIVE MG/DL
HGB UR QL STRIP.AUTO: ABNORMAL
KETONES UR STRIP-MCNC: NEGATIVE MG/DL
LEUKOCYTE ESTERASE UR QL STRIP: ABNORMAL
NITRITE UR QL STRIP: NEGATIVE
NON-SQ EPI CELLS URNS QL MICRO: ABNORMAL /HPF
OSMOLALITY UR: 312 MMOL/KG
PH UR STRIP.AUTO: 7 [PH]
POTASSIUM SERPL-SCNC: 4.7 MMOL/L (ref 3.5–5.3)
PROT SERPL-MCNC: 6.8 G/DL (ref 6.4–8.2)
PROT UR STRIP-MCNC: NEGATIVE MG/DL
RBC #/AREA URNS AUTO: ABNORMAL /HPF
SODIUM SERPL-SCNC: 124 MMOL/L (ref 136–145)
SP GR UR STRIP.AUTO: 1.01 (ref 1–1.03)
UROBILINOGEN UR QL STRIP.AUTO: 0.2 E.U./DL
WBC #/AREA URNS AUTO: ABNORMAL /HPF

## 2019-05-01 PROCEDURE — 36415 COLL VENOUS BLD VENIPUNCTURE: CPT

## 2019-05-01 PROCEDURE — 83935 ASSAY OF URINE OSMOLALITY: CPT | Performed by: INTERNAL MEDICINE

## 2019-05-01 PROCEDURE — 81001 URINALYSIS AUTO W/SCOPE: CPT | Performed by: INTERNAL MEDICINE

## 2019-05-01 PROCEDURE — 80053 COMPREHEN METABOLIC PANEL: CPT

## 2019-05-24 ENCOUNTER — TRANSCRIBE ORDERS (OUTPATIENT)
Dept: ADMINISTRATIVE | Facility: HOSPITAL | Age: 74
End: 2019-05-24

## 2019-05-24 ENCOUNTER — APPOINTMENT (OUTPATIENT)
Dept: LAB | Facility: HOSPITAL | Age: 74
End: 2019-05-24
Payer: MEDICARE

## 2019-05-24 DIAGNOSIS — N39.0 URINARY TRACT INFECTION WITHOUT HEMATURIA, SITE UNSPECIFIED: ICD-10-CM

## 2019-05-24 DIAGNOSIS — N39.0 URINARY TRACT INFECTION WITHOUT HEMATURIA, SITE UNSPECIFIED: Primary | ICD-10-CM

## 2019-05-24 LAB
BACTERIA UR QL AUTO: ABNORMAL /HPF
BILIRUB UR QL STRIP: NEGATIVE
CLARITY UR: ABNORMAL
COLOR UR: YELLOW
GLUCOSE UR STRIP-MCNC: NEGATIVE MG/DL
HGB UR QL STRIP.AUTO: ABNORMAL
KETONES UR STRIP-MCNC: NEGATIVE MG/DL
LEUKOCYTE ESTERASE UR QL STRIP: ABNORMAL
NITRITE UR QL STRIP: POSITIVE
NON-SQ EPI CELLS URNS QL MICRO: ABNORMAL /HPF
PH UR STRIP.AUTO: 6 [PH]
PROT UR STRIP-MCNC: ABNORMAL MG/DL
RBC #/AREA URNS AUTO: ABNORMAL /HPF
SP GR UR STRIP.AUTO: 1.01 (ref 1–1.03)
UROBILINOGEN UR QL STRIP.AUTO: 0.2 E.U./DL
WBC #/AREA URNS AUTO: ABNORMAL /HPF

## 2019-05-24 PROCEDURE — 87186 SC STD MICRODIL/AGAR DIL: CPT

## 2019-05-24 PROCEDURE — 81001 URINALYSIS AUTO W/SCOPE: CPT | Performed by: FAMILY MEDICINE

## 2019-05-24 PROCEDURE — 87077 CULTURE AEROBIC IDENTIFY: CPT

## 2019-05-24 PROCEDURE — 87086 URINE CULTURE/COLONY COUNT: CPT

## 2019-05-26 LAB — BACTERIA UR CULT: ABNORMAL

## 2019-06-14 ENCOUNTER — APPOINTMENT (OUTPATIENT)
Dept: LAB | Facility: HOSPITAL | Age: 74
End: 2019-06-14
Attending: INTERNAL MEDICINE
Payer: MEDICARE

## 2019-06-14 ENCOUNTER — TRANSCRIBE ORDERS (OUTPATIENT)
Dept: ADMINISTRATIVE | Facility: HOSPITAL | Age: 74
End: 2019-06-14

## 2019-06-14 DIAGNOSIS — E87.1 HYPOSMOLALITY SYNDROME: ICD-10-CM

## 2019-06-14 DIAGNOSIS — N18.2 CHRONIC KIDNEY DISEASE, STAGE II (MILD): Primary | ICD-10-CM

## 2019-06-14 DIAGNOSIS — N18.2 CHRONIC KIDNEY DISEASE, STAGE II (MILD): ICD-10-CM

## 2019-06-14 LAB
ANION GAP SERPL CALCULATED.3IONS-SCNC: 9 MMOL/L (ref 4–13)
BUN SERPL-MCNC: 11 MG/DL (ref 5–25)
CALCIUM SERPL-MCNC: 9.1 MG/DL (ref 8.3–10.1)
CHLORIDE SERPL-SCNC: 94 MMOL/L (ref 100–108)
CO2 SERPL-SCNC: 26 MMOL/L (ref 21–32)
CREAT SERPL-MCNC: 0.87 MG/DL (ref 0.6–1.3)
GFR SERPL CREATININE-BSD FRML MDRD: 67 ML/MIN/1.73SQ M
GLUCOSE P FAST SERPL-MCNC: 93 MG/DL (ref 65–99)
POTASSIUM SERPL-SCNC: 4.5 MMOL/L (ref 3.5–5.3)
SODIUM SERPL-SCNC: 129 MMOL/L (ref 136–145)

## 2019-06-14 PROCEDURE — 36415 COLL VENOUS BLD VENIPUNCTURE: CPT

## 2019-06-14 PROCEDURE — 80048 BASIC METABOLIC PNL TOTAL CA: CPT

## 2019-06-26 ENCOUNTER — TRANSCRIBE ORDERS (OUTPATIENT)
Dept: ADMINISTRATIVE | Facility: HOSPITAL | Age: 74
End: 2019-06-26

## 2019-06-26 ENCOUNTER — APPOINTMENT (OUTPATIENT)
Dept: LAB | Facility: HOSPITAL | Age: 74
End: 2019-06-26
Payer: MEDICARE

## 2019-06-26 DIAGNOSIS — N39.0 URINARY TRACT INFECTION WITHOUT HEMATURIA, SITE UNSPECIFIED: Primary | ICD-10-CM

## 2019-06-26 DIAGNOSIS — N39.0 URINARY TRACT INFECTION WITHOUT HEMATURIA, SITE UNSPECIFIED: ICD-10-CM

## 2019-06-26 LAB
BILIRUB UR QL STRIP: NEGATIVE
CLARITY UR: CLEAR
COLOR UR: NORMAL
GLUCOSE UR STRIP-MCNC: NEGATIVE MG/DL
HGB UR QL STRIP.AUTO: NEGATIVE
KETONES UR STRIP-MCNC: NEGATIVE MG/DL
LEUKOCYTE ESTERASE UR QL STRIP: NEGATIVE
NITRITE UR QL STRIP: NEGATIVE
PH UR STRIP.AUTO: 7 [PH]
PROT UR STRIP-MCNC: NEGATIVE MG/DL
SP GR UR STRIP.AUTO: <=1.005 (ref 1–1.03)
UROBILINOGEN UR QL STRIP.AUTO: 0.2 E.U./DL

## 2019-06-26 PROCEDURE — 81003 URINALYSIS AUTO W/O SCOPE: CPT | Performed by: FAMILY MEDICINE

## 2019-06-26 PROCEDURE — 87086 URINE CULTURE/COLONY COUNT: CPT

## 2019-06-27 LAB — BACTERIA UR CULT: NORMAL

## 2019-08-14 ENCOUNTER — TRANSCRIBE ORDERS (OUTPATIENT)
Dept: ADMINISTRATIVE | Facility: HOSPITAL | Age: 74
End: 2019-08-14

## 2019-08-14 ENCOUNTER — APPOINTMENT (OUTPATIENT)
Dept: LAB | Facility: HOSPITAL | Age: 74
End: 2019-08-14
Payer: MEDICARE

## 2019-08-14 DIAGNOSIS — Z00.00 ROUTINE MEDICAL EXAM: ICD-10-CM

## 2019-08-14 DIAGNOSIS — E55.9 VITAMIN D DEFICIENCY DISEASE: Primary | ICD-10-CM

## 2019-08-14 DIAGNOSIS — E55.9 VITAMIN D DEFICIENCY DISEASE: ICD-10-CM

## 2019-08-14 LAB
25(OH)D3 SERPL-MCNC: 29.7 NG/ML (ref 30–100)
ALBUMIN SERPL BCP-MCNC: 3.9 G/DL (ref 3.5–5)
ALP SERPL-CCNC: 143 U/L (ref 46–116)
ALT SERPL W P-5'-P-CCNC: 20 U/L (ref 12–78)
ANION GAP SERPL CALCULATED.3IONS-SCNC: 8 MMOL/L (ref 4–13)
AST SERPL W P-5'-P-CCNC: 18 U/L (ref 5–45)
BASOPHILS # BLD AUTO: 0.02 THOUSANDS/ΜL (ref 0–0.1)
BASOPHILS NFR BLD AUTO: 1 % (ref 0–1)
BILIRUB SERPL-MCNC: 0.3 MG/DL (ref 0.2–1)
BUN SERPL-MCNC: 7 MG/DL (ref 5–25)
CALCIUM SERPL-MCNC: 9 MG/DL (ref 8.3–10.1)
CHLORIDE SERPL-SCNC: 91 MMOL/L (ref 100–108)
CO2 SERPL-SCNC: 27 MMOL/L (ref 21–32)
CREAT SERPL-MCNC: 0.87 MG/DL (ref 0.6–1.3)
EOSINOPHIL # BLD AUTO: 0.31 THOUSAND/ΜL (ref 0–0.61)
EOSINOPHIL NFR BLD AUTO: 9 % (ref 0–6)
ERYTHROCYTE [DISTWIDTH] IN BLOOD BY AUTOMATED COUNT: 12.7 % (ref 11.6–15.1)
GFR SERPL CREATININE-BSD FRML MDRD: 67 ML/MIN/1.73SQ M
GLUCOSE P FAST SERPL-MCNC: 93 MG/DL (ref 65–99)
HCT VFR BLD AUTO: 35.6 % (ref 34.8–46.1)
HGB BLD-MCNC: 12.3 G/DL (ref 11.5–15.4)
IMM GRANULOCYTES # BLD AUTO: 0.01 THOUSAND/UL (ref 0–0.2)
IMM GRANULOCYTES NFR BLD AUTO: 0 % (ref 0–2)
LYMPHOCYTES # BLD AUTO: 1.23 THOUSANDS/ΜL (ref 0.6–4.47)
LYMPHOCYTES NFR BLD AUTO: 36 % (ref 14–44)
MCH RBC QN AUTO: 30 PG (ref 26.8–34.3)
MCHC RBC AUTO-ENTMCNC: 34.6 G/DL (ref 31.4–37.4)
MCV RBC AUTO: 87 FL (ref 82–98)
MONOCYTES # BLD AUTO: 0.37 THOUSAND/ΜL (ref 0.17–1.22)
MONOCYTES NFR BLD AUTO: 11 % (ref 4–12)
NEUTROPHILS # BLD AUTO: 1.46 THOUSANDS/ΜL (ref 1.85–7.62)
NEUTS SEG NFR BLD AUTO: 43 % (ref 43–75)
NRBC BLD AUTO-RTO: 0 /100 WBCS
PLATELET # BLD AUTO: 194 THOUSANDS/UL (ref 149–390)
PMV BLD AUTO: 9.1 FL (ref 8.9–12.7)
POTASSIUM SERPL-SCNC: 4.5 MMOL/L (ref 3.5–5.3)
PROT SERPL-MCNC: 7 G/DL (ref 6.4–8.2)
RBC # BLD AUTO: 4.1 MILLION/UL (ref 3.81–5.12)
SODIUM SERPL-SCNC: 126 MMOL/L (ref 136–145)
TSH SERPL DL<=0.05 MIU/L-ACNC: 2.64 UIU/ML (ref 0.36–3.74)
WBC # BLD AUTO: 3.4 THOUSAND/UL (ref 4.31–10.16)

## 2019-08-14 PROCEDURE — 82306 VITAMIN D 25 HYDROXY: CPT

## 2019-08-14 PROCEDURE — 80053 COMPREHEN METABOLIC PANEL: CPT

## 2019-08-14 PROCEDURE — 84443 ASSAY THYROID STIM HORMONE: CPT

## 2019-08-14 PROCEDURE — 36415 COLL VENOUS BLD VENIPUNCTURE: CPT

## 2019-08-14 PROCEDURE — 85025 COMPLETE CBC W/AUTO DIFF WBC: CPT

## 2019-08-27 ENCOUNTER — OFFICE VISIT (OUTPATIENT)
Dept: CARDIOLOGY CLINIC | Facility: HOSPITAL | Age: 74
End: 2019-08-27
Payer: MEDICARE

## 2019-08-27 VITALS
BODY MASS INDEX: 29.19 KG/M2 | WEIGHT: 171 LBS | HEART RATE: 61 BPM | DIASTOLIC BLOOD PRESSURE: 75 MMHG | HEIGHT: 64 IN | SYSTOLIC BLOOD PRESSURE: 180 MMHG

## 2019-08-27 DIAGNOSIS — I10 ESSENTIAL HYPERTENSION: Primary | ICD-10-CM

## 2019-08-27 DIAGNOSIS — I51.89 DIASTOLIC DYSFUNCTION: ICD-10-CM

## 2019-08-27 DIAGNOSIS — I34.0 NON-RHEUMATIC MITRAL REGURGITATION: ICD-10-CM

## 2019-08-27 DIAGNOSIS — R06.02 SHORTNESS OF BREATH ON EXERTION: ICD-10-CM

## 2019-08-27 DIAGNOSIS — E78.00 HYPERCHOLESTEROLEMIA: ICD-10-CM

## 2019-08-27 DIAGNOSIS — G89.4 CHRONIC PAIN SYNDROME: ICD-10-CM

## 2019-08-27 PROCEDURE — 99214 OFFICE O/P EST MOD 30 MIN: CPT | Performed by: INTERNAL MEDICINE

## 2019-08-27 RX ORDER — MELATONIN
1000 DAILY
COMMUNITY
End: 2020-06-22

## 2019-08-27 NOTE — PROGRESS NOTES
Cardiology Follow Up    Jaylen Siddiqui  1945  [id number]  [address]    1  Essential hypertension  Echo complete with contrast if indicated   2  Non-rheumatic mitral regurgitation  Echo complete with contrast if indicated   3  Diastolic dysfunction  Echo complete with contrast if indicated   4  Hypercholesterolemia  Echo complete with contrast if indicated   5  Shortness of breath on exertion     6  Chronic pain syndrome         Discussion/Summary:  Overall she has been doing well from a cardiac standpoint  Functional capacity has been good without significant limitations  She has little upset today in the office and blood pressure was running high on manual recheck came down to 150/70  Continue current medications bisoprolol will be raised to 10 mg  She is due for a follow-up echocardiogram to evaluate mitral regurgitation  Continue current dose of statin  I will follow up with her in 8 months  Interval History:  61-year-old female with difficult-to-control hypertension, mitral regurgitation, chronic diastolic congestive heart failure presents for follow-up visit  She has been under a lot of stress since her last exam   There have been several deaths in her mediated family and she has had a hard time handling this  From a cardiac standpoint she has been rather comfortable  Breathing has been stable  Overall she has been feeling well  She is fairly active around the house with no physical limitations  Denies any chest pain or dyspnea, palpitations, lightheadedness, dizziness, or syncope  She underwent hip replacement surgery with good results      Problem List     Diastolic dysfunction    Hypercholesterolemia    Hypertension    Mitral regurgitation    Overview Signed 2/8/2018  8:25 AM by Cindy Feliz DO     Description: Moderate         Shortness of breath on exertion    Cervical spondylosis without myelopathy    Overview Signed 2/13/2018  8:13 AM by Malissa Delgado MA     Added automatically from request for surgery [id number]         Chronic pain syndrome        Past Medical History:   Diagnosis Date    Arthritis     Chronic pain disorder     GERD (gastroesophageal reflux disease)     Hyperlipidemia     Hypertension     IBS (irritable bowel syndrome)     Mitral regurgitation     Stroke ([facility] )     tia     Social History     Socioeconomic History    Marital status: /Civil Union     Spouse name: Not on file    Number of children: Not on file    Years of education: Not on file    Highest education level: Not on file   Occupational History    Not on file   Social Needs    Financial resource strain: Not on file    Food insecurity:     Worry: Not on file     Inability: Not on file    Transportation needs:     Medical: Not on file     Non-medical: Not on file   Tobacco Use    Smoking status: Former Smoker    Smokeless tobacco: Never Used    Tobacco comment: quit 40 yrs ago   Substance and Sexual Activity    Alcohol use: Never     Frequency: Never     Comment: 2 beer a week    Drug use: Never    Sexual activity: Not on file   Lifestyle    Physical activity:     Days per week: Not on file     Minutes per session: Not on file    Stress: Not on file   Relationships    Social connections:     Talks on phone: Not on file     Gets together: Not on file     Attends Islam service: Not on file     Active member of club or organization: Not on file     Attends meetings of clubs or organizations: Not on file     Relationship status: Not on file    Intimate partner violence:     Fear of current or ex partner: Not on file     Emotionally abused: Not on file     Physically abused: Not on file     Forced sexual activity: Not on file   Other Topics Concern    Not on file   Social History Narrative    Not on file      Family History   Problem Relation Age of Onset    Heart disease Mother  Hypertension Mother     Arthritis Mother     Hypertension Father     Heart disease Father     Cancer Father     Hypertension Sister     Anxiety disorder Sister     Thyroid disease Sister     Cancer Maternal Grandmother     Pseudochol deficiency Neg Hx      Past Surgical History:   Procedure Laterality Date    APPENDECTOMY      COLONOSCOPY      several    HAND SURGERY Right     ring finger has pin    HYSTERECTOMY      INSERT / REPLACE PERIPHERAL NEUROSTIMULATOR PULSE GENERATOR /  Left     buttocks    JOINT REPLACEMENT Left     knee    KNEE ARTHROSCOPY Bilateral     NERVE BLOCK Left 2/20/2018    Procedure: BLOCK MEDIAL BRANCH - C4, C5, C6;  Surgeon: Jody Morales MD;  Location: [facility];  Service: Pain Management     NERVE BLOCK Left 3/6/2018    Procedure: C4, C5, C6 MEDIAL BRANCH BLOCK;  Surgeon: Jody Morales MD;  Location: [facility];  Service: Pain Management     AK COLONOSCOPY FLX DX W/COLLJ SPEC WHEN PFRMD N/A 4/24/2017    Procedure: Jose Del Angel;  Surgeon: Francisca Cody MD;  Location: [facility];  Service: Colorectal    AK TOTAL HIP ARTHROPLASTY Right 2/27/2019    Procedure: ARTHROPLASTY HIP TOTAL;  Surgeon: Tanner Hardwick DO;  Location: [address] [place] [address] MAIN OR;  Service: Orthopedics    RADIOFREQUENCY ABLATION Left 4/17/2018    Procedure: ABLATION RADIO FREQUENCY (RFA) - C4, C5, C6;  Surgeon: Jody Morales MD;  Location: [facility];  Service: Pain Management     SINUS SURGERY      UPPER GASTROINTESTINAL ENDOSCOPY         Current Outpatient Medications:     butalbital-acetaminophen-caffeine-codeine (FIORICET WITH CODEINE) -77-30 MG per capsule, Take 1 capsule by mouth every 4 (four) hours as needed for headaches, Disp: , Rfl:     calcium carbonate (OS-MARLENY) 600 MG tablet, Take 600 mg by mouth daily  , Disp: , Rfl:     cholecalciferol (VITAMIN D3) 1,000 units tablet, Take 50,000 Units by mouth once a week, Disp: , Rfl:     docusate sodium (COLACE) 100 mg capsule, Take 1 capsule (100 mg total) by mouth 2 (two) times a day, Disp: 10 capsule, Rfl: 0    fluticasone (FLONASE) 50 mcg/act nasal spray, 2 sprays into each nostril daily as needed for rhinitis, Disp: , Rfl:     LORazepam (ATIVAN) 1 mg tablet, Take 1 mg by mouth 2 (two) times a day as needed for anxiety, Disp: , Rfl:     losartan (COZAAR) 100 MG tablet, TAKE 1 TABLET EVERY DAY, Disp: 90 tablet, Rfl: 3    Multiple Vitamin (DAILY VALUE MULTIVITAMIN) TABS, Take 1 tablet by mouth daily, Disp: , Rfl:     nebivolol (BYSTOLIC) 10 mg tablet, Take 10 mg by mouth daily, Disp: , Rfl:     omeprazole (PriLOSEC) 20 mg delayed release capsule, Take 20 mg by mouth every morning  , Disp: , Rfl:     pravastatin (PRAVACHOL) 20 mg tablet, Take 20 mg by mouth daily at bedtime  , Disp: , Rfl:     spironolactone (ALDACTONE) 50 mg tablet, Take 50 mg by mouth daily after lunch  , Disp: , Rfl:     zolpidem (AMBIEN) 10 mg tablet, zolpidem 10 mg tablet, Disp: , Rfl:     ferrous sulfate 325 (65 Fe) mg tablet, Take 1 tablet (325 mg total) by mouth 2 (two) times a day with meals (Patient not taking: Reported on 8/27/2019), Disp: , Rfl: 0    fondaparinux (ARIXTRA) 2 5 mg/0 5 mL, Inject 2 5 mg under the skin daily (Patient not taking: Reported on 8/27/2019), Disp: , Rfl: 0    Multiple Vitamins-Minerals (MULTIVITAMIN WITH MINERALS) tablet, Take 1 tablet by mouth daily (Patient not taking: Reported on 8/27/2019), Disp: , Rfl: 0  Allergies   Allergen Reactions    Procaine Hives    Adhesive [Medical Tape] Rash    Lidocaine Rash    Penicillins Rash       Labs:     Chemistry        Component Value Date/Time     (L) 09/21/2015 1057    K 4 5 08/14/2019 0722    K 4 3 09/21/2015 1057    CL 91 (L) 08/14/2019 0722    CL 94 (L) 09/21/2015 1057    CO2 27 08/14/2019 0722    CO2 31 2 09/21/2015 1057    BUN 7 08/14/2019 0722    BUN 8 09/21/2015 1057    CREATININE 0 87 08/14/2019 0722    CREATININE 0 74 09/21/2015 1057        Component Value Date/Time    CALCIUM 9 0 08/14/2019 0722    CALCIUM 9 5 09/21/2015 1057    ALKPHOS 143 (H) 08/14/2019 0722    ALKPHOS 123 (H) 09/21/2015 1057    AST 18 08/14/2019 0722    AST 21 09/21/2015 1057    ALT 20 08/14/2019 0722    ALT 34 09/21/2015 1057    BILITOT 0 29 09/21/2015 1057            Lab Results   Component Value Date    CHOL 290 06/04/2015    CHOL 240 10/19/2014    CHOL 277 03/12/2014     Lab Results   Component Value Date     (H) 06/08/2016     (H) 01/27/2016     06/04/2015     Lab Results   Component Value Date    LDLCALC 121 (H) 06/08/2016    LDLCALC 126 (H) 01/27/2016    LDLCALC 153 (H) 06/04/2015     Lab Results   Component Value Date    TRIG 49 06/08/2016    TRIG 56 01/27/2016    TRIG 44 06/04/2015     No results found for: CHOLHDL    Imaging: No results found  ECG:  Normal sinus rhythm unremarkable tracing      Review of Systems   Constitution: Negative  HENT: Negative  Eyes: Negative  Cardiovascular: Negative  Respiratory: Negative  Endocrine: Negative  Hematologic/Lymphatic: Negative  Skin: Negative  Musculoskeletal: Negative  Gastrointestinal: Negative  Genitourinary: Negative  Neurological: Positive for dizziness, headaches and loss of balance  Psychiatric/Behavioral: Negative  Vitals:    08/27/19 1419   BP: (!) 180/75   Pulse: 61     Vitals:    08/27/19 1419   Weight: 77 6 kg (171 lb)     Height: 5' 4" (162 6 cm)   Body mass index is 29 35 kg/m²  Physical Exam:   General appearance:  Appears stated age, alert, well appearing and in no distress  HEENT:  PERRLA, EOMI, no scleral icterus, no conjunctival pallor  NECK:  Supple, No elevated JVP, no thyromegaly, no carotid bruits  HEART:  Regular rate and rhythm positive S1/S2 no murmurs rubs or gallops no S3 no S4 PMI nondisplaced no carotid bruits      LUNGS:  Clear to auscultation bilaterally, no wheezes rales or rhonchi  ABDOMEN:  Soft, non-tender, positive bowel sounds, no rebound or guarding, no organomegaly   EXTREMITIES:  No edema, normal range of motion  VASCULAR:  Normal pedal pulses, good pulse volume   SKIN: No lesions or rashes on exposed skin  NEURO:  CN II-XII intact, no focal deficits

## 2019-09-03 DIAGNOSIS — I10 ESSENTIAL HYPERTENSION: Primary | ICD-10-CM

## 2019-09-03 RX ORDER — BISOPROLOL FUMARATE 5 MG/1
TABLET ORAL
Qty: 90 TABLET | Refills: 3 | Status: SHIPPED | OUTPATIENT
Start: 2019-09-03 | End: 2019-09-12 | Stop reason: SDUPTHER

## 2019-09-11 DIAGNOSIS — E78.00 HYPERCHOLESTEROLEMIA: Primary | ICD-10-CM

## 2019-09-11 RX ORDER — PRAVASTATIN SODIUM 40 MG
TABLET ORAL
Qty: 90 TABLET | Refills: 3 | Status: SHIPPED | OUTPATIENT
Start: 2019-09-11 | End: 2019-10-08 | Stop reason: ALTCHOICE

## 2019-09-12 ENCOUNTER — HOSPITAL ENCOUNTER (OUTPATIENT)
Dept: NON INVASIVE DIAGNOSTICS | Facility: HOSPITAL | Age: 74
Discharge: HOME | End: 2019-09-12
Attending: INTERNAL MEDICINE
Payer: MEDICARE

## 2019-09-12 DIAGNOSIS — I10 ESSENTIAL HYPERTENSION: ICD-10-CM

## 2019-09-12 DIAGNOSIS — E78.00 HYPERCHOLESTEROLEMIA: ICD-10-CM

## 2019-09-12 DIAGNOSIS — I51.89 DIASTOLIC DYSFUNCTION: ICD-10-CM

## 2019-09-12 DIAGNOSIS — I34.0 NON-RHEUMATIC MITRAL REGURGITATION: ICD-10-CM

## 2019-09-12 PROCEDURE — 93306 TTE W/DOPPLER COMPLETE: CPT

## 2019-09-12 PROCEDURE — 93306 TTE W/DOPPLER COMPLETE: CPT | Performed by: INTERNAL MEDICINE

## 2019-09-12 RX ORDER — BISOPROLOL FUMARATE 10 MG/1
10 TABLET ORAL DAILY
Qty: 90 TABLET | Refills: 3 | Status: SHIPPED | OUTPATIENT
Start: 2019-09-12 | End: 2020-05-26

## 2019-10-02 ENCOUNTER — APPOINTMENT (OUTPATIENT)
Dept: LAB | Facility: HOSPITAL | Age: 74
End: 2019-10-02
Attending: INTERNAL MEDICINE
Payer: MEDICARE

## 2019-10-02 ENCOUNTER — TRANSCRIBE ORDERS (OUTPATIENT)
Dept: ADMINISTRATIVE | Facility: HOSPITAL | Age: 74
End: 2019-10-02

## 2019-10-02 DIAGNOSIS — E87.1 HYPOSMOLALITY SYNDROME: ICD-10-CM

## 2019-10-02 DIAGNOSIS — E26.9 HYPERALDOSTERONISM, UNSPECIFIED (HCC): Primary | ICD-10-CM

## 2019-10-02 DIAGNOSIS — E26.9 HYPERALDOSTERONISM, UNSPECIFIED (HCC): ICD-10-CM

## 2019-10-02 LAB
ALBUMIN SERPL BCP-MCNC: 3.8 G/DL (ref 3.5–5)
ALP SERPL-CCNC: 129 U/L (ref 46–116)
ALT SERPL W P-5'-P-CCNC: 23 U/L (ref 12–78)
ANION GAP SERPL CALCULATED.3IONS-SCNC: 6 MMOL/L (ref 4–13)
AST SERPL W P-5'-P-CCNC: 18 U/L (ref 5–45)
BACTERIA UR QL AUTO: ABNORMAL /HPF
BILIRUB SERPL-MCNC: 0.4 MG/DL (ref 0.2–1)
BILIRUB UR QL STRIP: NEGATIVE
BUN SERPL-MCNC: 12 MG/DL (ref 5–25)
CALCIUM SERPL-MCNC: 8.6 MG/DL (ref 8.3–10.1)
CHLORIDE SERPL-SCNC: 92 MMOL/L (ref 100–108)
CLARITY UR: CLEAR
CO2 SERPL-SCNC: 30 MMOL/L (ref 21–32)
COLOR UR: YELLOW
CREAT SERPL-MCNC: 0.93 MG/DL (ref 0.6–1.3)
GFR SERPL CREATININE-BSD FRML MDRD: 62 ML/MIN/1.73SQ M
GLUCOSE P FAST SERPL-MCNC: 94 MG/DL (ref 65–99)
GLUCOSE UR STRIP-MCNC: NEGATIVE MG/DL
HGB UR QL STRIP.AUTO: NEGATIVE
KETONES UR STRIP-MCNC: NEGATIVE MG/DL
LEUKOCYTE ESTERASE UR QL STRIP: ABNORMAL
NITRITE UR QL STRIP: NEGATIVE
NON-SQ EPI CELLS URNS QL MICRO: ABNORMAL /HPF
OSMOLALITY UR: 168 MMOL/KG
PH UR STRIP.AUTO: 6.5 [PH]
POTASSIUM SERPL-SCNC: 4.6 MMOL/L (ref 3.5–5.3)
PROT SERPL-MCNC: 7 G/DL (ref 6.4–8.2)
PROT UR STRIP-MCNC: NEGATIVE MG/DL
RBC #/AREA URNS AUTO: ABNORMAL /HPF
SODIUM SERPL-SCNC: 128 MMOL/L (ref 136–145)
SP GR UR STRIP.AUTO: <=1.005 (ref 1–1.03)
UROBILINOGEN UR QL STRIP.AUTO: 0.2 E.U./DL
WBC #/AREA URNS AUTO: ABNORMAL /HPF

## 2019-10-02 PROCEDURE — 36415 COLL VENOUS BLD VENIPUNCTURE: CPT

## 2019-10-02 PROCEDURE — 83935 ASSAY OF URINE OSMOLALITY: CPT | Performed by: INTERNAL MEDICINE

## 2019-10-02 PROCEDURE — 80053 COMPREHEN METABOLIC PANEL: CPT

## 2019-10-02 PROCEDURE — 81001 URINALYSIS AUTO W/SCOPE: CPT | Performed by: INTERNAL MEDICINE

## 2019-10-08 ENCOUNTER — OFFICE VISIT (OUTPATIENT)
Dept: OTOLARYNGOLOGY | Facility: CLINIC | Age: 74
End: 2019-10-08
Payer: MEDICARE

## 2019-10-08 VITALS
DIASTOLIC BLOOD PRESSURE: 70 MMHG | BODY MASS INDEX: 30.05 KG/M2 | WEIGHT: 176 LBS | SYSTOLIC BLOOD PRESSURE: 142 MMHG | HEART RATE: 56 BPM | HEIGHT: 64 IN

## 2019-10-08 DIAGNOSIS — R55 POSTURAL DIZZINESS WITH PRESYNCOPE: Primary | ICD-10-CM

## 2019-10-08 DIAGNOSIS — I10 ESSENTIAL HYPERTENSION: ICD-10-CM

## 2019-10-08 DIAGNOSIS — E87.1 HYPONATREMIA: ICD-10-CM

## 2019-10-08 DIAGNOSIS — R42 POSTURAL DIZZINESS WITH PRESYNCOPE: Primary | ICD-10-CM

## 2019-10-08 PROCEDURE — 99203 OFFICE O/P NEW LOW 30 MIN: CPT | Performed by: OTOLARYNGOLOGY

## 2019-10-08 NOTE — PROGRESS NOTES
Review of Systems   Constitutional: Negative  HENT: Positive for ear pain  Eyes: Negative  Respiratory: Negative  Cardiovascular: Negative  Gastrointestinal: Negative  Endocrine: Negative  Genitourinary: Negative  Musculoskeletal: Negative  Skin: Negative  Allergic/Immunologic: Positive for environmental allergies and food allergies (GARLIC)  Neurological: Positive for dizziness, light-headedness and headaches  Hematological: Negative  Psychiatric/Behavioral: Negative

## 2019-10-09 ENCOUNTER — OFFICE VISIT (OUTPATIENT)
Dept: NEPHROLOGY | Facility: CLINIC | Age: 74
End: 2019-10-09
Payer: MEDICARE

## 2019-10-09 VITALS
BODY MASS INDEX: 30.22 KG/M2 | HEIGHT: 64 IN | HEART RATE: 62 BPM | WEIGHT: 177 LBS | DIASTOLIC BLOOD PRESSURE: 74 MMHG | SYSTOLIC BLOOD PRESSURE: 136 MMHG

## 2019-10-09 DIAGNOSIS — E26.9 HYPERALDOSTERONISM (HCC): ICD-10-CM

## 2019-10-09 DIAGNOSIS — E87.1 HYPONATREMIA: ICD-10-CM

## 2019-10-09 DIAGNOSIS — N18.2 STAGE 2 CHRONIC KIDNEY DISEASE: Primary | ICD-10-CM

## 2019-10-09 DIAGNOSIS — I12.9 HYPERTENSIVE NEPHROSCLEROSIS, STAGE 1 THROUGH STAGE 4 OR UNSPECIFIED CHRONIC KIDNEY DISEASE: ICD-10-CM

## 2019-10-09 DIAGNOSIS — G47.33 OSA (OBSTRUCTIVE SLEEP APNEA): ICD-10-CM

## 2019-10-09 PROCEDURE — 99214 OFFICE O/P EST MOD 30 MIN: CPT | Performed by: INTERNAL MEDICINE

## 2019-10-09 NOTE — PROGRESS NOTES
[facility] [place], [place]    Name: Layton Moran  YOB: 1945      Assessment/Plan:    CLARKE (obstructive sleep apnea)  Patient was not able to tolerate CPAP and is not currently on active treatment  Patient has labile hypertension may be due to the uncontrolled obstructive sleep apnea  I brought up the potential for surgical intervention by an ENT physician  Patient will consider it  Stage 2 chronic kidney disease   Renal function is stable at this time  Hyponatremia  Sodium Level reviewed with the patient  Noted to be at 128 millimole/L which is slightly better compared to last time and encouraged the patient continue with minimal fluid intake  Also stressed the importance of continued low-sodium diet at this time  I was surprised to see that the patient's urine osmolality was about 160 millimole/kg    Hypertensive nephrosclerosis  Blood pressure control at this time  Please refer above regarding sleep apnea issue  Patient appears to be having some labile hypertension at times, this may be related to uncontrolled obstructive sleep apnea  Problem List Items Addressed This Visit        Endocrine    Hyperaldosteronism ([address] )       Respiratory    CLARKE (obstructive sleep apnea)     Patient was not able to tolerate CPAP and is not currently on active treatment  Patient has labile hypertension may be due to the uncontrolled obstructive sleep apnea  I brought up the potential for surgical intervention by an ENT physician  Patient will consider it  Cardiovascular and Mediastinum    Hypertensive nephrosclerosis     Blood pressure control at this time  Please refer above regarding sleep apnea issue  Patient appears to be having some labile hypertension at times, this may be related to uncontrolled obstructive sleep apnea              Genitourinary    Stage 2 chronic kidney disease - Primary      Renal function is stable at this time  Relevant Orders    Basic metabolic panel       Other    Hyponatremia     Sodium Level reviewed with the patient  Noted to be at 128 millimole/L which is slightly better compared to last time and encouraged the patient continue with minimal fluid intake  Also stressed the importance of continued low-sodium diet at this time  I was surprised to see that the patient's urine osmolality was about 160 millimole/kg         Relevant Orders    Basic metabolic panel            Patient is stable from renal standpoint  I encouraged her to continue to decrease overall fluid intake down to 1 5 L a day  I also recommended that given her symptoms of lightheadedness which may be related to labile hypertension, that she seek an ENT consultation regarding potential surgical intervention to assist with obstructive sleep apnea in order to see if she is a candidate for a procedure  She is willing to think about an potentially make an appointment at some point in the near future  We will see the patient back in approximately 6 months for regular follow-up appointment  Subjective:      Patient ID: Michelle Rater is a 69 y o  female  Patient has been trying to restrict her fluid intake  She could not describe to me the amount of oz of fluids that she was drinking on a regular basis  I asked her to try to shoot for no more than 1 5 L, about 36 oz  I also advised her another strategies to take regarding help with dry mouth including the use of sour candies  Patient continues to carry lot of water in her purse  Hypertension   This is a chronic problem  The current episode started more than 1 year ago  The problem has been waxing and waning since onset  The problem is uncontrolled  Pertinent negatives include no blurred vision, chest pain or headaches  There are no associated agents to hypertension  Risk factors for coronary artery disease include post-menopausal state   The current treatment provides mild improvement  There are no compliance problems  Hypertensive end-organ damage includes heart failure  The following portions of the patient's history were reviewed and updated as appropriate: allergies, current medications, past family history, past medical history, past social history, past surgical history and problem list     Review of Systems   Eyes: Negative for blurred vision  Cardiovascular: Negative for chest pain  Neurological: Negative for headaches  All other systems reviewed and are negative          Social History     Socioeconomic History    Marital status: /Civil Union     Spouse name: None    Number of children: None    Years of education: None    Highest education level: None   Occupational History    Occupation: Admnistrator    Social Needs    Financial resource strain: None    Food insecurity:     Worry: None     Inability: None    Transportation needs:     Medical: None     Non-medical: None   Tobacco Use    Smoking status: Former Smoker    Smokeless tobacco: Never Used    Tobacco comment: quit 40 yrs ago   Substance and Sexual Activity    Alcohol use: Never     Frequency: Never     Comment: 2 beer a week    Drug use: Never    Sexual activity: None   Lifestyle    Physical activity:     Days per week: None     Minutes per session: None    Stress: None   Relationships    Social connections:     Talks on phone: None     Gets together: None     Attends Sabianist service: None     Active member of club or organization: None     Attends meetings of clubs or organizations: None     Relationship status: None    Intimate partner violence:     Fear of current or ex partner: None     Emotionally abused: None     Physically abused: None     Forced sexual activity: None   Other Topics Concern    None   Social History Narrative    Patient has never smoked - As per [employer]    Consumes 1-2 beers per week - As per [employer]    Consumes on average 1 cup of decaf coffee per day      Past Medical History:   Diagnosis Date    Anxiety     Arthritis     Benign arteriolar nephrosclerosis     Chronic kidney disease in type 2 diabetes mellitus (HCC)     Chronic pain disorder     GERD (gastroesophageal reflux disease)     Hyperaldosteronism (HCC)     Hyperlipidemia     Hypertension     Hypo-osmolality and hyponatremia     IBS (irritable bowel syndrome)     Insomnia     Mitral regurgitation     Obstructive sleep apnea syndrome     Osteoarthritis     Pernicious anemia     Stroke ([facility] [address] )     tia     Past Surgical History:   Procedure Laterality Date    APPENDECTOMY      CARPAL TUNNEL RELEASE Bilateral     COLONOSCOPY      several    HAND SURGERY Right     ring finger has pin    HYSTERECTOMY      Total     INSERT / REPLACE PERIPHERAL NEUROSTIMULATOR PULSE GENERATOR /  Left     buttocks    JOINT REPLACEMENT Left     knee    JOINT REPLACEMENT  04/2019    Hip    KNEE ARTHROSCOPY Bilateral     NASAL SEPTUM SURGERY  2008    NERVE BLOCK Left 2/20/2018    Procedure: BLOCK MEDIAL BRANCH - C4, C5, C6;  Surgeon: Daniella Baig MD;  [facility];  Service: Pain Management     NERVE BLOCK Left 3/6/2018    Procedure: C4, C5, C6 MEDIAL BRANCH BLOCK;  Surgeon: Daniella Baig MD;  [facility];  Service: Pain Management     NM COLONOSCOPY FLX DX W/COLLJ SPEC WHEN PFRMD N/A 4/24/2017    Procedure: Penny Daniels;  Surgeon: Keely Murphy MD;  [facility];  Service: Colorectal    NM TOTAL HIP ARTHROPLASTY Right 2/27/2019    Procedure: ARTHROPLASTY HIP TOTAL;  Surgeon: Janette Hedrick DO;  Location: [address] [place] [address] MAIN OR;  Service: Orthopedics    RADIOFREQUENCY ABLATION Left 4/17/2018    Procedure: ABLATION RADIO FREQUENCY (RFA) - C4, C5, C6;  Surgeon: Daniella Baig MD;  [facility];  Service: Pain Management     SINUS SURGERY      UPPER GASTROINTESTINAL ENDOSCOPY         Current Outpatient Medications:     bisoprolol (ZEBETA) 10 MG tablet, Take 1 tablet (10 mg total) by mouth daily, Disp: 90 tablet, Rfl: 3    butalbital-acetaminophen-caffeine-codeine (FIORICET WITH CODEINE) -03-30 MG per capsule, Take 1 capsule by mouth every 4 (four) hours as needed for headaches, Disp: , Rfl:     calcium carbonate (OS-MARLENY) 600 MG tablet, Take 600 mg by mouth daily  , Disp: , Rfl:     cholecalciferol (VITAMIN D3) 1,000 units tablet, Take 50,000 Units by mouth once a week, Disp: , Rfl:     docusate sodium (COLACE) 100 mg capsule, Take 1 capsule (100 mg total) by mouth 2 (two) times a day, Disp: 10 capsule, Rfl: 0    ferrous sulfate 325 (65 Fe) mg tablet, Take 1 tablet (325 mg total) by mouth 2 (two) times a day with meals, Disp: , Rfl: 0    fluticasone (FLONASE) 50 mcg/act nasal spray, 2 sprays into each nostril daily as needed for rhinitis, Disp: , Rfl:     fondaparinux (ARIXTRA) 2 5 mg/0 5 mL, Inject 2 5 mg under the skin daily, Disp: , Rfl: 0    LORazepam (ATIVAN) 1 mg tablet, Take 1 mg by mouth 2 (two) times a day as needed for anxiety, Disp: , Rfl:     losartan (COZAAR) 100 MG tablet, TAKE 1 TABLET EVERY DAY, Disp: 90 tablet, Rfl: 3    Multiple Vitamin (DAILY VALUE MULTIVITAMIN) TABS, Take 1 tablet by mouth daily, Disp: , Rfl:     Multiple Vitamins-Minerals (MULTIVITAMIN WITH MINERALS) tablet, Take 1 tablet by mouth daily, Disp: , Rfl: 0    nebivolol (BYSTOLIC) 10 mg tablet, Take 10 mg by mouth daily, Disp: , Rfl:     omeprazole (PriLOSEC) 20 mg delayed release capsule, Take 20 mg by mouth every morning  , Disp: , Rfl:     pravastatin (PRAVACHOL) 20 mg tablet, Take 20 mg by mouth 2 (two) times a day , Disp: , Rfl:     spironolactone (ALDACTONE) 50 mg tablet, Take 50 mg by mouth daily after lunch  , Disp: , Rfl:     zolpidem (AMBIEN) 10 mg tablet, zolpidem 10 mg tablet, Disp: , Rfl:     Lab Results   Component Value Date     (L) 09/21/2015    SODIUM 128 (L) 10/02/2019    K 4 6 10/02/2019    CL 92 (L) 10/02/2019 CO2 30 10/02/2019    ANIONGAP 7 09/21/2015    AGAP 6 10/02/2019    BUN 12 10/02/2019    CREATININE 0 93 10/02/2019    GLUC 115 (H) 03/01/2019    GLUF 94 10/02/2019    CALCIUM 8 6 10/02/2019    AST 18 10/02/2019    ALT 23 10/02/2019    ALKPHOS 129 (H) 10/02/2019    PROT 6 9 09/21/2015    TP 7 0 10/02/2019    BILITOT 0 29 09/21/2015    TBILI 0 40 10/02/2019    EGFR 62 10/02/2019     Lab Results   Component Value Date    WBC 3 40 (L) 08/14/2019    HGB 12 3 08/14/2019    HCT 35 6 08/14/2019    MCV 87 08/14/2019     08/14/2019     Lab Results   Component Value Date    CHOLESTEROL 247 (H) 06/08/2016    CHOLESTEROL 241 (H) 01/27/2016     Lab Results   Component Value Date     (H) 06/08/2016     (H) 01/27/2016     06/04/2015     Lab Results   Component Value Date    LDLCALC 121 (H) 06/08/2016    LDLCALC 126 (H) 01/27/2016    LDLCALC 153 (H) 06/04/2015     Lab Results   Component Value Date    TRIG 49 06/08/2016    TRIG 56 01/27/2016    TRIG 44 06/04/2015     No results found for: [place], [place]  Lab Results   Component Value Date    AYG7MBJBRHBF 2 642 08/14/2019     Lab Results   Component Value Date    CALCIUM 8 6 10/02/2019     No results found for: SPEP, UPEP  No results found for: LIEN CARRANZA        Objective:      /74 (BP Location: Left arm, Patient Position: Sitting, Cuff Size: Standard)   Pulse 62   Ht 5' 4" (1 626 m)   Wt 80 3 kg (177 lb)   BMI 30 38 kg/m²          Physical Exam   Constitutional: She is oriented to person, place, and time  She appears well-developed and well-nourished  No distress  HENT:   Head: Normocephalic and atraumatic  Eyes: Conjunctivae are normal    Neck: Neck supple  Cardiovascular: Normal rate and regular rhythm  Pulmonary/Chest: Effort normal and breath sounds normal    Abdominal: Soft  Musculoskeletal: She exhibits no edema  Neurological: She is alert and oriented to person, place, and time  No cranial nerve deficit     Skin: Skin is warm  No rash noted  Psychiatric: She has a normal mood and affect   Her behavior is normal

## 2019-10-09 NOTE — ASSESSMENT & PLAN NOTE
Patient was not able to tolerate CPAP and is not currently on active treatment  Patient has labile hypertension may be due to the uncontrolled obstructive sleep apnea  I brought up the potential for surgical intervention by an ENT physician  Patient will consider it

## 2019-10-09 NOTE — ASSESSMENT & PLAN NOTE
Blood pressure control at this time  Please refer above regarding sleep apnea issue  Patient appears to be having some labile hypertension at times, this may be related to uncontrolled obstructive sleep apnea

## 2019-10-09 NOTE — ASSESSMENT & PLAN NOTE
Sodium Level reviewed with the patient  Noted to be at 128 millimole/L which is slightly better compared to last time and encouraged the patient continue with minimal fluid intake  Also stressed the importance of continued low-sodium diet at this time    I was surprised to see that the patient's urine osmolality was about 160 millimole/kg

## 2019-10-14 DIAGNOSIS — I10 ESSENTIAL HYPERTENSION: Primary | ICD-10-CM

## 2019-10-15 RX ORDER — SPIRONOLACTONE 50 MG/1
50 TABLET, FILM COATED ORAL
Qty: 90 TABLET | Refills: 3 | Status: SHIPPED | OUTPATIENT
Start: 2019-10-15 | End: 2020-08-15

## 2019-11-18 ENCOUNTER — OFFICE VISIT (OUTPATIENT)
Dept: OBGYN CLINIC | Facility: CLINIC | Age: 74
End: 2019-11-18
Payer: MEDICARE

## 2019-11-18 VITALS — BODY MASS INDEX: 30.31 KG/M2 | SYSTOLIC BLOOD PRESSURE: 142 MMHG | DIASTOLIC BLOOD PRESSURE: 70 MMHG | WEIGHT: 176.6 LBS

## 2019-11-18 DIAGNOSIS — Z12.31 ENCOUNTER FOR SCREENING MAMMOGRAM FOR MALIGNANT NEOPLASM OF BREAST: ICD-10-CM

## 2019-11-18 DIAGNOSIS — Z01.419 ENCOUNTER FOR WELL WOMAN EXAM WITH ROUTINE GYNECOLOGICAL EXAM: Primary | ICD-10-CM

## 2019-11-18 PROCEDURE — G0101 CA SCREEN;PELVIC/BREAST EXAM: HCPCS | Performed by: OBSTETRICS & GYNECOLOGY

## 2019-11-18 NOTE — PROGRESS NOTES
ASSESSMENT & PLAN: Rei Dutton is a 69 y o  Martinez Smolder with normal gynecologic exam     1   Routine well woman exam done today  2  Pap and HPV:  Not indicated, s/p hysterectomy  Current ASCCP Guidelines reviewed  3   Mammogram ordered  4  Colorectal cancer screening was notordered  5   The following were reviewed in today's visit: breast self exam and mammography screening ordered  CC:  Annual Gynecologic Examination    HPI: Rei Dutton is a 69 y o  Martinez Smolder who presents for annual gynecologic examination  She has the following concerns:  No GYN complaints;   Declined pelvic exam due to discomfort and bleeding in the past    Health Maintenance:    She wears her seatbelt routinely  She does perform regular monthly self breast exams  She feels safe at home       Last PAP & HPV: not indicated  Last mammogram: 2018   Last colonoscopy: 2017    Past Medical History:   Diagnosis Date    Anxiety     Arthritis     Benign arteriolar nephrosclerosis     Chronic kidney disease in type 2 diabetes mellitus (HCC)     Chronic pain disorder     GERD (gastroesophageal reflux disease)     Hyperaldosteronism (HCC)     Hyperlipidemia     Hypertension     Hypo-osmolality and hyponatremia     IBS (irritable bowel syndrome)     Insomnia     Mitral regurgitation     Obstructive sleep apnea syndrome     Osteoarthritis     Pernicious anemia     Stroke ([facility] [address] )     tia       Past Surgical History:   Procedure Laterality Date    APPENDECTOMY      CARPAL TUNNEL RELEASE Bilateral     COLONOSCOPY      several    HAND SURGERY Right     ring finger has pin    HYSTERECTOMY      Total     INSERT / REPLACE PERIPHERAL NEUROSTIMULATOR PULSE GENERATOR /  Left     buttocks    JOINT REPLACEMENT Left     knee    JOINT REPLACEMENT  04/2019    Hip    KNEE ARTHROSCOPY Bilateral     NASAL SEPTUM SURGERY  2008    NERVE BLOCK Left 2/20/2018    Procedure: BLOCK MEDIAL BRANCH - C4, C5, C6;  Surgeon: Chance Syed MD;  [facility];  Service: Pain Management     NERVE BLOCK Left 3/6/2018    Procedure: C4, C5, C6 MEDIAL BRANCH BLOCK;  Surgeon: Chance Syed MD;  [facility];  Service: Pain Management     NC COLONOSCOPY FLX DX W/COLLJ SPEC WHEN PFRMD N/A 2017    Procedure: Ali End;  Surgeon: Hilary Joshua MD;  [facility];  Service: Colorectal    NC TOTAL HIP ARTHROPLASTY Right 2019    Procedure: ARTHROPLASTY HIP TOTAL;  Surgeon: Ortega Hall DO;  Location: [address] [place] [address] MAIN OR;  Service: Orthopedics    RADIOFREQUENCY ABLATION Left 2018    Procedure: ABLATION RADIO FREQUENCY (RFA) - C4, C5, C6;  Surgeon: Chance Syed MD;  [facility];  Service: Pain Management     SINUS SURGERY      TOTAL HIP ARTHROPLASTY Left     UPPER GASTROINTESTINAL ENDOSCOPY         Past OB/Gyn History:  OB History        1    Para   0    Term                AB   1    Living           SAB   1    TAB        Ectopic        Multiple        Live Births               Obstetric Comments   2 adopted children           History of abnormal pap smears: No        Family History   Problem Relation Age of Onset    Heart disease Mother     Hypertension Mother    Colleen Splinter Arthritis Mother     Dementia Mother     Rheum arthritis Mother     Hypertension Father     Heart disease Father     Cancer Father     Hypertension Sister     Anxiety disorder Sister     Thyroid disease Sister     Cancer Maternal Grandmother     Pseudochol deficiency Neg Hx        Social History:  Social History     Socioeconomic History    Marital status: /Civil Union     Spouse name: Not on file    Number of children: Not on file    Years of education: Not on file    Highest education level: Not on file   Occupational History    Occupation: Admnistrator    Social Needs    Financial resource strain: Not on file    Food insecurity:     Worry: Not on file     Inability: Not on file   Duel needs:     Medical: Not on file     Non-medical: Not on file   Tobacco Use    Smoking status: Former Smoker    Smokeless tobacco: Never Used    Tobacco comment: quit 40 yrs ago   Substance and Sexual Activity    Alcohol use: Yes     Frequency: Monthly or less     Comment: 2 beer a week    Drug use: Never    Sexual activity: Not Currently     Birth control/protection: Post-menopausal   Lifestyle    Physical activity:     Days per week: Not on file     Minutes per session: Not on file    Stress: Not on file   Relationships    Social connections:     Talks on phone: Not on file     Gets together: Not on file     Attends Scientologist service: Not on file     Active member of club or organization: Not on file     Attends meetings of clubs or organizations: Not on file     Relationship status: Not on file    Intimate partner violence:     Fear of current or ex partner: Not on file     Emotionally abused: Not on file     Physically abused: Not on file     Forced sexual activity: Not on file   Other Topics Concern    Not on file   Social History Narrative    Patient has never smoked - As per [employer]    Consumes 1-2 beers per week - As per Jacky Blazer    Consumes on average 1 cup of decaf coffee per day          Allergies   Allergen Reactions    Procaine Hives    Adhesive [Medical Tape] Rash    Lidocaine Rash    Penicillins Rash         Current Outpatient Medications:     bisoprolol (ZEBETA) 10 MG tablet, Take 1 tablet (10 mg total) by mouth daily, Disp: 90 tablet, Rfl: 3    butalbital-acetaminophen-caffeine-codeine (FIORICET WITH CODEINE) -84-30 MG per capsule, Take 1 capsule by mouth every 4 (four) hours as needed for headaches, Disp: , Rfl:     calcium carbonate (OS-MARLENY) 600 MG tablet, Take 600 mg by mouth daily  , Disp: , Rfl:     cholecalciferol (VITAMIN D3) 1,000 units tablet, Take 50,000 Units by mouth once a week, Disp: , Rfl:     LORazepam (ATIVAN) 1 mg tablet, Take 1 mg by mouth 2 (two) times a day as needed for anxiety, Disp: , Rfl:     losartan (COZAAR) 100 MG tablet, TAKE 1 TABLET EVERY DAY, Disp: 90 tablet, Rfl: 3    Multiple Vitamin (DAILY VALUE MULTIVITAMIN) TABS, Take 1 tablet by mouth daily, Disp: , Rfl:     Multiple Vitamins-Minerals (MULTIVITAMIN WITH MINERALS) tablet, Take 1 tablet by mouth daily, Disp: , Rfl: 0    omeprazole (PriLOSEC) 20 mg delayed release capsule, Take 20 mg by mouth every morning  , Disp: , Rfl:     pravastatin (PRAVACHOL) 20 mg tablet, Take 20 mg by mouth 2 (two) times a day , Disp: , Rfl:     zolpidem (AMBIEN) 10 mg tablet, zolpidem 10 mg tablet, Disp: , Rfl:     nebivolol (BYSTOLIC) 10 mg tablet, Take 10 mg by mouth daily, Disp: , Rfl:     spironolactone (ALDACTONE) 50 mg tablet, Take 1 tablet (50 mg total) by mouth daily after lunch (Patient not taking: Reported on 11/18/2019), Disp: 90 tablet, Rfl: 3    Review of Systems  Constitutional :no fever, feels well, no tiredness, no recent weight gain or loss  ENT: no ear ache, no loss of hearing, no nosebleeds or nasal discharge, no sore throat or hoarseness  Cardiovascular: no complaints of slow or fast heart beat, no chest pain, no palpitations, no leg claudication or lower extremity edema  Respiratory: no complaints of shortness of shortness of breath, no RÍOS  Breasts:no complaints of breast pain, breast lump, or nipple discharge  Gastrointestinal: no complaints of abdominal pain, constipation, nausea, vomiting, or diarrhea or bloody stools  Genitourinary : no complaints of dysuria, incontinence, pelvic pain, no dysmenorrhea, vaginal discharge or abnormal vaginal bleeding and as noted in HPI  Musculoskeletal: no complaints of arthralgia, no myalgia, no joint swelling or stiffness, no limb pain or swelling    Integumentary: no complaints of skin rash or lesion, itching or dry skin  Neurological: no complaints of headache, no confusion, no numbness or tingling, no dizziness or fainting    Objective      /70   Wt 80 1 kg (176 lb 9 6 oz)   BMI 30 31 kg/m²   General:   appears stated age, cooperative, alert normal mood and affect   Neck: normal, supple,trachea midline, no masses   Heart: regular rate and rhythm, S1, S2 normal, no murmur, click, rub or gallop   Lungs: clear to auscultation bilaterally   Breasts: normal appearance, no masses or tenderness, Inspection negative, No nipple retraction or dimpling, No nipple discharge or bleeding, No axillary or supraclavicular adenopathy, Normal to palpation without dominant masses   Abdomen: soft, non-tender, without masses or organomegaly   Vulva: Declined exam   Vagina: Declined exam   Urethra: Declined exam   Cervix: Declined exam   Uterus: Declined exam   Adnexa: Declined exam   Lymphatic palpation of lymph nodes in neck, axilla, groin and/or other locations: no lymphadenopathy or masses noted   Skin normal skin turgor and no rashes     Psychiatric orientation to person, place, and time: normal  mood and affect: normal

## 2019-11-25 DIAGNOSIS — I10 ESSENTIAL HYPERTENSION: ICD-10-CM

## 2019-11-25 RX ORDER — LOSARTAN POTASSIUM 100 MG/1
TABLET ORAL
Qty: 90 TABLET | Refills: 0 | Status: SHIPPED | OUTPATIENT
Start: 2019-11-25 | End: 2020-02-03

## 2019-12-10 ENCOUNTER — APPOINTMENT (OUTPATIENT)
Dept: LAB | Facility: HOSPITAL | Age: 74
End: 2019-12-10
Payer: MEDICARE

## 2019-12-10 ENCOUNTER — HOSPITAL ENCOUNTER (OUTPATIENT)
Dept: RADIOLOGY | Facility: HOSPITAL | Age: 74
Discharge: HOME | End: 2019-12-10
Payer: MEDICARE

## 2019-12-10 ENCOUNTER — TRANSCRIBE ORDERS (OUTPATIENT)
Dept: ADMINISTRATIVE | Facility: HOSPITAL | Age: 74
End: 2019-12-10

## 2019-12-10 DIAGNOSIS — J32.9 SINUSITIS, UNSPECIFIED CHRONICITY, UNSPECIFIED LOCATION: ICD-10-CM

## 2019-12-10 DIAGNOSIS — J32.9 SINUSITIS, UNSPECIFIED CHRONICITY, UNSPECIFIED LOCATION: Primary | ICD-10-CM

## 2019-12-10 LAB
BASOPHILS # BLD AUTO: 0.02 THOUSANDS/ΜL (ref 0–0.1)
BASOPHILS NFR BLD AUTO: 0 % (ref 0–1)
EOSINOPHIL # BLD AUTO: 0.21 THOUSAND/ΜL (ref 0–0.61)
EOSINOPHIL NFR BLD AUTO: 5 % (ref 0–6)
ERYTHROCYTE [DISTWIDTH] IN BLOOD BY AUTOMATED COUNT: 12.9 % (ref 11.6–15.1)
HCT VFR BLD AUTO: 34.2 % (ref 34.8–46.1)
HGB BLD-MCNC: 11.5 G/DL (ref 11.5–15.4)
IMM GRANULOCYTES # BLD AUTO: 0.01 THOUSAND/UL (ref 0–0.2)
IMM GRANULOCYTES NFR BLD AUTO: 0 % (ref 0–2)
LYMPHOCYTES # BLD AUTO: 1.31 THOUSANDS/ΜL (ref 0.6–4.47)
LYMPHOCYTES NFR BLD AUTO: 29 % (ref 14–44)
MCH RBC QN AUTO: 30.4 PG (ref 26.8–34.3)
MCHC RBC AUTO-ENTMCNC: 33.6 G/DL (ref 31.4–37.4)
MCV RBC AUTO: 91 FL (ref 82–98)
MONOCYTES # BLD AUTO: 0.48 THOUSAND/ΜL (ref 0.17–1.22)
MONOCYTES NFR BLD AUTO: 10 % (ref 4–12)
NEUTROPHILS # BLD AUTO: 2.57 THOUSANDS/ΜL (ref 1.85–7.62)
NEUTS SEG NFR BLD AUTO: 56 % (ref 43–75)
NRBC BLD AUTO-RTO: 0 /100 WBCS
PLATELET # BLD AUTO: 181 THOUSANDS/UL (ref 149–390)
PMV BLD AUTO: 9.1 FL (ref 8.9–12.7)
RBC # BLD AUTO: 3.78 MILLION/UL (ref 3.81–5.12)
WBC # BLD AUTO: 4.6 THOUSAND/UL (ref 4.31–10.16)

## 2019-12-10 PROCEDURE — 36415 COLL VENOUS BLD VENIPUNCTURE: CPT

## 2019-12-10 PROCEDURE — 70220 X-RAY EXAM OF SINUSES: CPT

## 2019-12-10 PROCEDURE — 85025 COMPLETE CBC W/AUTO DIFF WBC: CPT

## 2019-12-10 PROCEDURE — 86738 MYCOPLASMA ANTIBODY: CPT

## 2019-12-11 LAB
M PNEUMO IGG SER IA-ACNC: <100 U/ML (ref 0–99)
M PNEUMO IGM SER IA-ACNC: <770 U/ML (ref 0–769)

## 2019-12-23 ENCOUNTER — HOSPITAL ENCOUNTER (OUTPATIENT)
Dept: MAMMOGRAPHY | Facility: HOSPITAL | Age: 74
Discharge: HOME | End: 2019-12-23
Attending: OBSTETRICS & GYNECOLOGY
Payer: MEDICARE

## 2019-12-23 VITALS — HEIGHT: 64 IN | WEIGHT: 176 LBS | BODY MASS INDEX: 30.05 KG/M2

## 2019-12-23 DIAGNOSIS — Z12.31 ENCOUNTER FOR SCREENING MAMMOGRAM FOR MALIGNANT NEOPLASM OF BREAST: ICD-10-CM

## 2019-12-23 PROCEDURE — 77067 SCR MAMMO BI INCL CAD: CPT

## 2019-12-23 PROCEDURE — 77063 BREAST TOMOSYNTHESIS BI: CPT

## 2020-01-17 ENCOUNTER — OFFICE VISIT (OUTPATIENT)
Dept: OTOLARYNGOLOGY | Facility: CLINIC | Age: 75
End: 2020-01-17
Payer: MEDICARE

## 2020-01-17 VITALS
SYSTOLIC BLOOD PRESSURE: 100 MMHG | DIASTOLIC BLOOD PRESSURE: 60 MMHG | HEART RATE: 60 BPM | HEIGHT: 64 IN | WEIGHT: 180 LBS | BODY MASS INDEX: 30.73 KG/M2 | OXYGEN SATURATION: 96 %

## 2020-01-17 DIAGNOSIS — J32.4 CHRONIC PANSINUSITIS: Primary | ICD-10-CM

## 2020-01-17 PROCEDURE — 99213 OFFICE O/P EST LOW 20 MIN: CPT | Performed by: OTOLARYNGOLOGY

## 2020-01-17 PROCEDURE — 31231 NASAL ENDOSCOPY DX: CPT | Performed by: OTOLARYNGOLOGY

## 2020-01-17 NOTE — PROGRESS NOTES
Review of Systems   Constitutional: Negative  HENT: Positive for rhinorrhea, sinus pressure and sinus pain  Eyes: Negative  Respiratory: Negative  Cardiovascular: Negative  Gastrointestinal: Negative  Endocrine: Negative  Genitourinary: Negative  Musculoskeletal: Negative  Skin: Negative  Allergic/Immunologic: Negative  Neurological: Negative  Hematological: Negative  Psychiatric/Behavioral: Negative

## 2020-01-17 NOTE — PROGRESS NOTES
Rochelle Perez is a 69 y  o female who presents for re-evaluation of chronic sinusitis  She underwent sinus surgery by Dr Nella Jin are over a decade ago  She notes ongoing problems with chronic sinusitis  She feels facial pressure and facial pain  No purulent drainage  No dental pain  No nasal obstruction  She notes temple, maxillary, and frontal area pain  She has been on 4 rounds of antibiotics and underwent a sinus x-ray which demonstrated an air-fluid level in December  She continues to have symptoms despite using nasal steroids  Most recent antibiotic was just a few weeks ago  She continues to have symptoms despite this  Past Medical History:   Diagnosis Date    Anxiety     Arthritis     Benign arteriolar nephrosclerosis     Chronic kidney disease in type 2 diabetes mellitus (HCC)     Chronic pain disorder     GERD (gastroesophageal reflux disease)     [facility]aldosteronism ([facility])     Hyperlipidemia     Hypertension     Hypo-osmolality and hyponatremia     IBS (irritable bowel syndrome)     Insomnia     Mitral regurgitation     Obstructive sleep apnea syndrome     Osteoarthritis     Pernicious anemia     Stroke ([facility])     tia       /60 (BP Location: Left arm, Cuff Size: Standard)   Pulse 60   Ht 5' 4" (1 626 m)   Wt 81 6 kg (180 lb)   SpO2 96%   BMI 30 90 kg/m²       Physical Exam   Constitutional: Oriented to person, place, and time  Well-developed and well-nourished, no apparent distress, non-toxic appearance  Cooperative, able to hear and answer questions without difficulty  Voice: Normal voice quality  Head: Normocephalic, atraumatic  No scars, masses or lesions  Face: Symmetric, no edema, no sinus tenderness  Eyes: Vision grossly intact, extra-ocular movement intact  Ears: External ear normal   Bilateral tympanic membranes are intact with intact normal landmarks  No post-auricular erythema or tenderness  Nose: Septum midline, nares clear    Mucosa moist, turbinates well appearing  No crusting, polyps or discharge evident  Oral cavity: Dentition intact  Mucosa moist, lips normal   Tongue mobile, floor of mouth normal   Hard palate unremarkable  No masses or lesions  Oropharynx: Uvula is midline, soft palate normal   Unremarkable oropharyngeal inlet  Tonsils unremarkable  Posterior pharyngeal wall clear  No masses or lesions  Salivary glands:  Parotid glands and submandibular glands symmetric, no enlargement or tenderness  Neck: Normal laryngeal elevation with swallow  Trachea midline  No masses or lesions  No palpable adenopathy  Thyroid: normal in size, unremarkable without tenderness or palpable nodules  Pulmonary/Chest: Normal effort and rate  No respiratory distress  Musculoskeletal: Normal range of motion  Neurological: Cranial nerves 2-12 intact  Skin: Skin is warm and dry  Psychiatric: Normal mood and affect  Procedure:  Nasal endoscopy    Indications:  Evaluation for sinusitis    Procedure in detail:  After informed verbal consent was obtained from the patient bilateral nares were anesthetized with 1% lidocaine and oxymetazoline spray  After adequate time for anesthesia patient's bilateral nasal cavities, middle meatus, and sphenoethmoid recesses were evaluated with the following findings  Patient tolerated the procedure well without complications  Findings:   No significant mucoid purulence or polyposis seen in bilateral nasal cavities  Bilateral widely patent maxillary and ethmoid cavities there is no purulence within the cavity as I am able to entirely get the flexible scope into the maxillary sinus bilaterally  A/P:  Chronic sinusitis:  I do not see any acute sinusitis on her previous diagnosis chronic sinusitis  She has not had adequate imaging and despite there being a air fluid level no active purulence is noted    We discussed that this may represent other primary neurologic symptoms as sinusitis does not usually cause temple focal headaches  We will obtain a CT scan and see her back in a few weeks after the CT scan is performed and discuss her sinus health that time  Continue fluticasone

## 2020-01-27 ENCOUNTER — HOSPITAL ENCOUNTER (OUTPATIENT)
Dept: CT IMAGING | Facility: HOSPITAL | Age: 75
Discharge: HOME | End: 2020-01-27
Attending: OTOLARYNGOLOGY
Payer: MEDICARE

## 2020-01-27 DIAGNOSIS — J32.4 CHRONIC PANSINUSITIS: ICD-10-CM

## 2020-01-27 PROCEDURE — 70486 CT MAXILLOFACIAL W/O DYE: CPT

## 2020-02-03 DIAGNOSIS — I10 ESSENTIAL HYPERTENSION: ICD-10-CM

## 2020-02-03 RX ORDER — LOSARTAN POTASSIUM 100 MG/1
TABLET ORAL
Qty: 90 TABLET | Refills: 0 | Status: SHIPPED | OUTPATIENT
Start: 2020-02-03 | End: 2020-05-07

## 2020-02-07 ENCOUNTER — OFFICE VISIT (OUTPATIENT)
Dept: OTOLARYNGOLOGY | Facility: CLINIC | Age: 75
End: 2020-02-07
Payer: MEDICARE

## 2020-02-07 VITALS
DIASTOLIC BLOOD PRESSURE: 70 MMHG | HEIGHT: 64 IN | SYSTOLIC BLOOD PRESSURE: 140 MMHG | HEART RATE: 63 BPM | OXYGEN SATURATION: 98 % | WEIGHT: 180 LBS | BODY MASS INDEX: 30.73 KG/M2

## 2020-02-07 DIAGNOSIS — J32.4 CHRONIC PANSINUSITIS: Primary | ICD-10-CM

## 2020-02-07 PROCEDURE — 31231 NASAL ENDOSCOPY DX: CPT | Performed by: OTOLARYNGOLOGY

## 2020-02-07 PROCEDURE — 99213 OFFICE O/P EST LOW 20 MIN: CPT | Performed by: OTOLARYNGOLOGY

## 2020-02-07 NOTE — PROGRESS NOTES
Agustín You is a 69 y  o female who presents for re-evaluation of chronic sinusitis  She underwent sinus surgery by Dr Alex edge over a decade ago  She notes ongoing problems with chronic sinusitis  She feels facial pressure and facial pain  No purulent drainage  No dental pain  No nasal obstruction  She had CT scan which showed no significant sinus disease  Past Medical History:   Diagnosis Date    Anxiety     Arthritis     Benign arteriolar nephrosclerosis     Chronic kidney disease in type 2 diabetes mellitus (HCC)     Chronic pain disorder     GERD (gastroesophageal reflux disease)     Hyperaldosteronism (HCC)     Hyperlipidemia     Hypertension     Hypo-osmolality and hyponatremia     IBS (irritable bowel syndrome)     Insomnia     Mitral regurgitation     Obstructive sleep apnea syndrome     Osteoarthritis     Pernicious anemia     Stroke ([facility])     tia       /70 (BP Location: Left arm, Cuff Size: Large)   Pulse 63   Ht 5' 4" (1 626 m)   Wt 81 6 kg (180 lb)   SpO2 98%   BMI 30 90 kg/m²       Physical Exam   Constitutional: Oriented to person, place, and time  Well-developed and well-nourished, no apparent distress, non-toxic appearance  Cooperative, able to hear and answer questions without difficulty  Voice: Normal voice quality  Head: Normocephalic, atraumatic  No scars, masses or lesions  Face: Symmetric, no edema, no sinus tenderness  Eyes: Vision grossly intact, extra-ocular movement intact  Ears: External ear normal   Bilateral tympanic membranes are intact with intact normal landmarks  No post-auricular erythema or tenderness  Nose: Septum midline, nares clear  Mucosa moist, turbinates well appearing  No crusting, polyps or discharge evident  Oral cavity: Dentition intact  Mucosa moist, lips normal   Tongue mobile, floor of mouth normal   Hard palate unremarkable  No masses or lesions     Oropharynx: Uvula is midline, soft palate normal  Unremarkable oropharyngeal inlet  Tonsils unremarkable  Posterior pharyngeal wall clear  No masses or lesions  Salivary glands:  Parotid glands and submandibular glands symmetric, no enlargement or tenderness  Neck: Normal laryngeal elevation with swallow  Trachea midline  No masses or lesions  No palpable adenopathy  Thyroid: normal in size, unremarkable without tenderness or palpable nodules  Pulmonary/Chest: Normal effort and rate  No respiratory distress  Musculoskeletal: Normal range of motion  Neurological: Cranial nerves 2-12 intact  Skin: Skin is warm and dry  Psychiatric: Normal mood and affect  Procedure:  Nasal endoscopy    Indications:  Evaluation for sinusitis    Procedure in detail:  After informed verbal consent was obtained from the patient bilateral nares were anesthetized with 1% lidocaine and oxymetazoline spray  After adequate time for anesthesia patient's bilateral nasal cavities, middle meatus, and sphenoethmoid recesses were evaluated with the following findings  Patient tolerated the procedure well without complications  Findings:   No significant mucoid purulence or polyposis seen in bilateral nasal cavities  Bilateral widely patent maxillary and ethmoid cavities there is no purulence within the cavity as I am able to entirely get the flexible scope into the maxillary sinus bilaterally  A/P:  Chronic sinusitis:  I do not see any acute sinusitis on her previous diagnosis chronic sinusitis  No active sinus disease at this time  Discussed that this might be related to her migraine disease  Discussed the ALVIN study  She will discuss this with her PCP and return for any acute sinus infectiosn

## 2020-02-11 ENCOUNTER — OFFICE VISIT (OUTPATIENT)
Dept: CARDIOLOGY CLINIC | Facility: HOSPITAL | Age: 75
End: 2020-02-11
Payer: MEDICARE

## 2020-02-11 VITALS
HEIGHT: 64 IN | DIASTOLIC BLOOD PRESSURE: 80 MMHG | SYSTOLIC BLOOD PRESSURE: 142 MMHG | OXYGEN SATURATION: 100 % | HEART RATE: 58 BPM | BODY MASS INDEX: 30.67 KG/M2 | WEIGHT: 179.68 LBS

## 2020-02-11 DIAGNOSIS — I10 ESSENTIAL HYPERTENSION: ICD-10-CM

## 2020-02-11 DIAGNOSIS — I51.89 DIASTOLIC DYSFUNCTION: Primary | ICD-10-CM

## 2020-02-11 DIAGNOSIS — I34.0 NONRHEUMATIC MITRAL VALVE REGURGITATION: ICD-10-CM

## 2020-02-11 DIAGNOSIS — G47.33 OSA (OBSTRUCTIVE SLEEP APNEA): ICD-10-CM

## 2020-02-11 PROCEDURE — 99214 OFFICE O/P EST MOD 30 MIN: CPT | Performed by: INTERNAL MEDICINE

## 2020-02-11 NOTE — PROGRESS NOTES
Cardiology Follow Up    Rei Dutton  1945  [id number]  [address]    1  Diastolic dysfunction     2  Nonrheumatic mitral valve regurgitation     3  Essential hypertension     4  CLARKE (obstructive sleep apnea)         Discussion/Summary:  Overall she has been doing well and blood pressures been well controlled recently  She has some issues from an orthopedic standpoint with her feet as well as some chronic back pain  Terms of chronic diastolic congestive heart failure things are stable she is euvolemic  Mitral regurgitation has been stable  No further testing I will see her back in 8 months  Interval History:  81-year-old female with difficult-to-control hypertension, mitral regurgitation, chronic diastolic congestive heart failure presents for follow-up visit  She has been under a lot of stress since her last exam   There have been several deaths in her mediated family and she has had a hard time handling this  From a cardiac standpoint she has been rather comfortable  Breathing has been stable  She has been doing well since her last visit  Does some moderate activity around the house  Denies any exertional chest pressure or discomfort  Denies palpitations, lightheadedness, dizziness, or syncope  Overall doing quite well    Problem List     Diastolic dysfunction    Hypercholesterolemia    Hypertension    Mitral regurgitation    Overview Signed 2/8/2018  8:25 AM by Fidencio Ellsworth DO     Description: Moderate         Shortness of breath on exertion    Cervical spondylosis without myelopathy    Overview Signed 2/13/2018  8:13 AM by Jesus Cerda MA     Added automatically from request for surgery [id number]         Chronic pain syndrome        Past Medical History:   Diagnosis Date    Anxiety     Arthritis     Benign arteriolar nephrosclerosis     Chronic kidney disease in type 2 diabetes mellitus (HCC)     Chronic pain disorder     GERD (gastroesophageal reflux disease)     Hyperaldosteronism (HCC)     Hyperlipidemia     Hypertension     Hypo-osmolality and hyponatremia     IBS (irritable bowel syndrome)     Insomnia     Mitral regurgitation     Obstructive sleep apnea syndrome     Osteoarthritis     Pernicious anemia     [facility]     tia     Social History     Socioeconomic History    Marital status: /Civil Union     Spouse name: Not on file    Number of children: Not on file    Years of education: Not on file    Highest education level: Not on file   Occupational History    Occupation: Admnistrator    Social Needs    Financial resource strain: Not on file    Food insecurity:     Worry: Not on file     Inability: Not on file    Transportation needs:     Medical: Not on file     Non-medical: Not on file   Tobacco Use    Smoking status: Former Smoker    Smokeless tobacco: Never Used    Tobacco comment: quit 40 yrs ago   Substance and Sexual Activity    Alcohol use: Yes     Frequency: Monthly or less     Comment: 2 beer a week    Drug use: Never    Sexual activity: Not Currently     Birth control/protection: Post-menopausal   Lifestyle    Physical activity:     Days per week: Not on file     Minutes per session: Not on file    Stress: Not on file   Relationships    Social connections:     Talks on phone: Not on file     Gets together: Not on file     Attends Rastafarian service: Not on file     Active member of club or organization: Not on file     Attends meetings of clubs or organizations: Not on file     Relationship status: Not on file    Intimate partner violence:     Fear of current or ex partner: Not on file     Emotionally abused: Not on file     Physically abused: Not on file     Forced sexual activity: Not on file   Other Topics Concern    Not on file   Social History Narrative    Patient has never smoked - As per [employer]    Consumes 1-2 beers per week - As per [employer]    Consumes on average 1 cup of decaf coffee per day       Family History   Problem Relation Age of Onset    Heart disease Mother     Hypertension Mother    Steve Lucho Arthritis Mother    Steve Lucho Dementia Mother     Rheum arthritis Mother     Hypertension Father     Heart disease Father     Colon cancer Father     Hypertension Sister     Anxiety disorder Sister     Thyroid disease Sister     Prostate cancer Maternal Grandfather     No Known Problems Paternal Grandmother     No Known Problems Paternal Grandfather     No Known Problems Paternal Aunt     Pseudochol deficiency Neg Hx      Past Surgical History:   Procedure Laterality Date    APPENDECTOMY      CARPAL TUNNEL RELEASE Bilateral     COLONOSCOPY      several    HAND SURGERY Right     ring finger has pin    HYSTERECTOMY      32    INSERT / REPLACE PERIPHERAL NEUROSTIMULATOR PULSE GENERATOR /  Left     buttocks    JOINT REPLACEMENT Left     knee    JOINT REPLACEMENT  04/2019    Hip    KNEE ARTHROSCOPY Bilateral     NASAL SEPTUM SURGERY  2008    NERVE BLOCK Left 2/20/2018    Procedure: BLOCK MEDIAL BRANCH - C4, C5, C6;  Surgeon: Bertha Danielson MD;  Location: [facility];  Service: Pain Management     NERVE BLOCK Left 3/6/2018    Procedure: C4, C5, C6 MEDIAL BRANCH BLOCK;  Surgeon: Bertha Danielson MD;  Location: [facility];  Service: Pain Management     OH COLONOSCOPY FLX DX W/COLLJ SPEC WHEN PFRMD N/A 4/24/2017    Procedure: Nestor Stone;  Surgeon: Emelina Potter MD;  Location: [facility];  Service: Colorectal    OH TOTAL HIP ARTHROPLASTY Right 2/27/2019    Procedure: ARTHROPLASTY HIP TOTAL;  Surgeon: Guille Hills DO;  Location: [address] [place] [address] MAIN OR;  Service: Orthopedics    RADIOFREQUENCY ABLATION Left 4/17/2018    Procedure: ABLATION RADIO FREQUENCY (RFA) - C4, C5, C6;  Surgeon: Bertha Danielson MD;  Location: [facility];  Service: Pain Management     SINUS SURGERY      TOTAL HIP ARTHROPLASTY Left     UPPER GASTROINTESTINAL ENDOSCOPY         Current Outpatient Medications:     bisoprolol (ZEBETA) 10 MG tablet, Take 1 tablet (10 mg total) by mouth daily, Disp: 90 tablet, Rfl: 3    butalbital-acetaminophen-caffeine-codeine (FIORICET WITH CODEINE) -57-30 MG per capsule, Take 1 capsule by mouth every 4 (four) hours as needed for headaches, Disp: , Rfl:     calcium carbonate (OS-MARLENY) 600 MG tablet, Take 600 mg by mouth daily  , Disp: , Rfl:     cholecalciferol (VITAMIN D3) 1,000 units tablet, Take 1,000 Units by mouth daily , Disp: , Rfl:     LORazepam (ATIVAN) 1 mg tablet, Take 1 mg by mouth 2 (two) times a day as needed for anxiety, Disp: , Rfl:     losartan (COZAAR) 100 MG tablet, TAKE 1 TABLET EVERY DAY, Disp: 90 tablet, Rfl: 0    Multiple Vitamin (DAILY VALUE MULTIVITAMIN) TABS, Take 1 tablet by mouth daily, Disp: , Rfl:     omeprazole (PriLOSEC) 20 mg delayed release capsule, Take 20 mg by mouth every morning  , Disp: , Rfl:     pravastatin (PRAVACHOL) 20 mg tablet, Take 20 mg by mouth 2 (two) times a day , Disp: , Rfl:     spironolactone (ALDACTONE) 50 mg tablet, Take 1 tablet (50 mg total) by mouth daily after lunch, Disp: 90 tablet, Rfl: 3    zolpidem (AMBIEN) 10 mg tablet, zolpidem 10 mg tablet, Disp: , Rfl:   Allergies   Allergen Reactions    Procaine Hives    Adhesive [Medical Tape] Rash    Lidocaine Rash    Penicillins Rash       Labs:     Chemistry        Component Value Date/Time     (L) 09/21/2015 1057    K 4 6 10/02/2019 0705    K 4 3 09/21/2015 1057    CL 92 (L) 10/02/2019 0705    CL 94 (L) 09/21/2015 1057    CO2 30 10/02/2019 0705    CO2 31 2 09/21/2015 1057    BUN 12 10/02/2019 0705    BUN 8 09/21/2015 1057    CREATININE 0 93 10/02/2019 0705    CREATININE 0 74 09/21/2015 1057        Component Value Date/Time    CALCIUM 8 6 10/02/2019 0705    CALCIUM 9 5 09/21/2015 1057    ALKPHOS 129 (H) 10/02/2019 0705    ALKPHOS 123 (H) 09/21/2015 1057    AST 18 10/02/2019 0705    AST 21 09/21/2015 1057    ALT 23 10/02/2019 0705    ALT 34 09/21/2015 1057    BILITOT 0 29 09/21/2015 1057            Lab Results   Component Value Date    CHOL 290 06/04/2015    CHOL 240 10/19/2014    CHOL 277 03/12/2014     Lab Results   Component Value Date     (H) 06/08/2016     (H) 01/27/2016     06/04/2015     Lab Results   Component Value Date    LDLCALC 121 (H) 06/08/2016    LDLCALC 126 (H) 01/27/2016    LDLCALC 153 (H) 06/04/2015     Lab Results   Component Value Date    TRIG 49 06/08/2016    TRIG 56 01/27/2016    TRIG 44 06/04/2015     No results found for: CHOLHDL    Imaging: No results found  ECG:  Normal sinus rhythm unremarkable tracing      Review of Systems   Constitution: Negative  HENT: Negative  Eyes: Negative  Cardiovascular: Negative  Respiratory: Negative  Endocrine: Negative  Hematologic/Lymphatic: Negative  Skin: Negative  Musculoskeletal: Negative  Gastrointestinal: Negative  Genitourinary: Negative  Neurological: Positive for dizziness, headaches and loss of balance  Psychiatric/Behavioral: Negative  Vitals:    02/11/20 1502   BP: 142/80   Pulse: 58   SpO2: 100%     Vitals:    02/11/20 1502   Weight: 81 5 kg (179 lb 10 8 oz)     Height: 5' 3 5" (161 3 cm)   Body mass index is 31 33 kg/m²  Physical Exam:  Vital signs reviewed  General:  Alert and cooperative, appears stated age, no acute distress  HEENT:  PERRLA, EOMI, no scleral icterus, no conjunctival pallor  Neck:  No lymphadenopathy, no thyromegaly, no carotid bruits, no elevated JVP  Heart:  Regular rate and rhythm, normal S1/S2, no S3/S4, no murmur, rubs or gallops  PMI nondisplaced  Lungs:  Clear to auscultation bilaterally, no wheezes rales or rhonchi  Abdomen:  Soft, non-tender, positive bowel sounds, no rebound or guarding,   no organomegaly   Extremities:  Normal range of motion    No clubbing, cyanosis or edema   Vascular:  2+ pedal pulses  Skin:  No rashes or lesions on exposed skin  Neurologic:  Cranial nerves II-XII grossly intact without focal deficits

## 2020-04-10 ENCOUNTER — TELEMEDICINE (OUTPATIENT)
Dept: NEPHROLOGY | Facility: CLINIC | Age: 75
End: 2020-04-10
Payer: MEDICARE

## 2020-04-10 VITALS
SYSTOLIC BLOOD PRESSURE: 147 MMHG | WEIGHT: 170 LBS | BODY MASS INDEX: 25.18 KG/M2 | HEIGHT: 69 IN | DIASTOLIC BLOOD PRESSURE: 61 MMHG | HEART RATE: 54 BPM

## 2020-04-10 DIAGNOSIS — E55.9 VITAMIN D DEFICIENCY: ICD-10-CM

## 2020-04-10 DIAGNOSIS — E26.9 HYPERALDOSTERONISM (HCC): ICD-10-CM

## 2020-04-10 DIAGNOSIS — E03.9 ACQUIRED HYPOTHYROIDISM: ICD-10-CM

## 2020-04-10 DIAGNOSIS — E87.1 HYPONATREMIA: ICD-10-CM

## 2020-04-10 DIAGNOSIS — N18.2 STAGE 2 CHRONIC KIDNEY DISEASE: ICD-10-CM

## 2020-04-10 DIAGNOSIS — I12.9 HYPERTENSIVE NEPHROSCLEROSIS, STAGE 1 THROUGH STAGE 4 OR UNSPECIFIED CHRONIC KIDNEY DISEASE: Primary | ICD-10-CM

## 2020-04-10 PROCEDURE — 99214 OFFICE O/P EST MOD 30 MIN: CPT | Performed by: INTERNAL MEDICINE

## 2020-05-06 DIAGNOSIS — I10 ESSENTIAL HYPERTENSION: ICD-10-CM

## 2020-05-07 RX ORDER — LOSARTAN POTASSIUM 100 MG/1
TABLET ORAL
Qty: 90 TABLET | Refills: 0 | Status: SHIPPED | OUTPATIENT
Start: 2020-05-07 | End: 2020-06-29

## 2020-05-25 DIAGNOSIS — I10 ESSENTIAL HYPERTENSION: ICD-10-CM

## 2020-05-26 RX ORDER — BISOPROLOL FUMARATE 10 MG/1
TABLET ORAL
Qty: 90 TABLET | Refills: 3 | Status: SHIPPED | OUTPATIENT
Start: 2020-05-26 | End: 2020-09-09

## 2020-06-02 ENCOUNTER — APPOINTMENT (OUTPATIENT)
Dept: RADIOLOGY | Facility: MEDICAL CENTER | Age: 75
End: 2020-06-02
Payer: MEDICARE

## 2020-06-02 ENCOUNTER — OFFICE VISIT (OUTPATIENT)
Dept: OBGYN CLINIC | Facility: CLINIC | Age: 75
End: 2020-06-02
Payer: MEDICARE

## 2020-06-02 VITALS
HEART RATE: 60 BPM | SYSTOLIC BLOOD PRESSURE: 154 MMHG | TEMPERATURE: 98.2 F | DIASTOLIC BLOOD PRESSURE: 83 MMHG | BODY MASS INDEX: 25.1 KG/M2 | HEIGHT: 69 IN

## 2020-06-02 DIAGNOSIS — M17.11 PRIMARY OSTEOARTHRITIS OF RIGHT KNEE: ICD-10-CM

## 2020-06-02 DIAGNOSIS — M17.11 PRIMARY OSTEOARTHRITIS OF RIGHT KNEE: Primary | ICD-10-CM

## 2020-06-02 PROCEDURE — 73562 X-RAY EXAM OF KNEE 3: CPT

## 2020-06-02 PROCEDURE — 99213 OFFICE O/P EST LOW 20 MIN: CPT | Performed by: ORTHOPAEDIC SURGERY

## 2020-06-02 RX ORDER — CEFAZOLIN SODIUM 2 G/50ML
2000 SOLUTION INTRAVENOUS ONCE
Status: CANCELLED | OUTPATIENT
Start: 2020-06-24 | End: 2020-06-02

## 2020-06-02 RX ORDER — MULTIVIT-MIN/IRON FUM/FOLIC AC 7.5 MG-4
1 TABLET ORAL DAILY
Qty: 30 TABLET | Refills: 0 | Status: SHIPPED | OUTPATIENT
Start: 2020-06-02 | End: 2020-06-22

## 2020-06-02 RX ORDER — ASCORBIC ACID 500 MG
500 TABLET ORAL 2 TIMES DAILY
Qty: 60 TABLET | Refills: 0 | Status: SHIPPED | OUTPATIENT
Start: 2020-06-02 | End: 2020-06-22

## 2020-06-02 RX ORDER — FOLIC ACID 1 MG/1
1 TABLET ORAL DAILY
Qty: 30 TABLET | Refills: 0 | Status: SHIPPED | OUTPATIENT
Start: 2020-06-02 | End: 2020-07-31

## 2020-06-02 RX ORDER — CHLORHEXIDINE GLUCONATE 0.12 MG/ML
15 RINSE ORAL ONCE
Status: CANCELLED | OUTPATIENT
Start: 2020-06-24 | End: 2020-06-02

## 2020-06-02 RX ORDER — FERROUS SULFATE TAB EC 324 MG (65 MG FE EQUIVALENT) 324 (65 FE) MG
324 TABLET DELAYED RESPONSE ORAL
Qty: 60 TABLET | Refills: 0 | Status: SHIPPED | OUTPATIENT
Start: 2020-06-02 | End: 2020-06-22

## 2020-06-10 ENCOUNTER — TRANSCRIBE ORDERS (OUTPATIENT)
Dept: ADMINISTRATIVE | Facility: HOSPITAL | Age: 75
End: 2020-06-10

## 2020-06-10 ENCOUNTER — HOSPITAL ENCOUNTER (OUTPATIENT)
Dept: NON INVASIVE DIAGNOSTICS | Facility: HOSPITAL | Age: 75
Discharge: HOME | End: 2020-06-10
Attending: ORTHOPAEDIC SURGERY
Payer: MEDICARE

## 2020-06-10 ENCOUNTER — APPOINTMENT (OUTPATIENT)
Dept: LAB | Facility: HOSPITAL | Age: 75
End: 2020-06-10
Attending: ORTHOPAEDIC SURGERY
Payer: MEDICARE

## 2020-06-10 ENCOUNTER — HOSPITAL ENCOUNTER (OUTPATIENT)
Dept: RADIOLOGY | Facility: HOSPITAL | Age: 75
Discharge: HOME | End: 2020-06-10
Attending: ORTHOPAEDIC SURGERY
Payer: MEDICARE

## 2020-06-10 DIAGNOSIS — Z01.812 PRE-OPERATIVE LABORATORY EXAMINATION: ICD-10-CM

## 2020-06-10 DIAGNOSIS — Z01.818 PREOP EXAMINATION: Primary | ICD-10-CM

## 2020-06-10 DIAGNOSIS — Z01.818 PREOP EXAMINATION: ICD-10-CM

## 2020-06-10 LAB
ABO GROUP BLD: NORMAL
ALBUMIN SERPL BCP-MCNC: 3.9 G/DL (ref 3.5–5)
ALP SERPL-CCNC: 130 U/L (ref 46–116)
ALT SERPL W P-5'-P-CCNC: 24 U/L (ref 12–78)
ANION GAP SERPL CALCULATED.3IONS-SCNC: 9 MMOL/L (ref 4–13)
APTT PPP: 32 SECONDS (ref 23–37)
AST SERPL W P-5'-P-CCNC: 18 U/L (ref 5–45)
ATRIAL RATE: 60 BPM
BASOPHILS # BLD AUTO: 0.02 THOUSANDS/ΜL (ref 0–0.1)
BASOPHILS NFR BLD AUTO: 1 % (ref 0–1)
BILIRUB SERPL-MCNC: 0.4 MG/DL (ref 0.2–1)
BLD GP AB SCN SERPL QL: NEGATIVE
BUN SERPL-MCNC: 8 MG/DL (ref 5–25)
CALCIUM SERPL-MCNC: 8.7 MG/DL (ref 8.3–10.1)
CHLORIDE SERPL-SCNC: 89 MMOL/L (ref 100–108)
CO2 SERPL-SCNC: 26 MMOL/L (ref 21–32)
CREAT SERPL-MCNC: 0.79 MG/DL (ref 0.6–1.3)
CRP SERPL QL: 0.8 MG/L
EOSINOPHIL # BLD AUTO: 0.15 THOUSAND/ΜL (ref 0–0.61)
EOSINOPHIL NFR BLD AUTO: 4 % (ref 0–6)
ERYTHROCYTE [DISTWIDTH] IN BLOOD BY AUTOMATED COUNT: 12.7 % (ref 11.6–15.1)
EST. AVERAGE GLUCOSE BLD GHB EST-MCNC: 103 MG/DL
FERRITIN SERPL-MCNC: 35 NG/ML (ref 8–388)
GFR SERPL CREATININE-BSD FRML MDRD: 74 ML/MIN/1.73SQ M
GLUCOSE P FAST SERPL-MCNC: 95 MG/DL (ref 65–99)
HBA1C MFR BLD: 5.2 %
HCT VFR BLD AUTO: 36.7 % (ref 34.8–46.1)
HGB BLD-MCNC: 12.6 G/DL (ref 11.5–15.4)
IMM GRANULOCYTES # BLD AUTO: 0.01 THOUSAND/UL (ref 0–0.2)
IMM GRANULOCYTES NFR BLD AUTO: 0 % (ref 0–2)
INR PPP: 0.97 (ref 0.84–1.19)
IRON SATN MFR SERPL: 26 %
IRON SERPL-MCNC: 93 UG/DL (ref 50–170)
LYMPHOCYTES # BLD AUTO: 1.29 THOUSANDS/ΜL (ref 0.6–4.47)
LYMPHOCYTES NFR BLD AUTO: 37 % (ref 14–44)
MCH RBC QN AUTO: 29.9 PG (ref 26.8–34.3)
MCHC RBC AUTO-ENTMCNC: 34.3 G/DL (ref 31.4–37.4)
MCV RBC AUTO: 87 FL (ref 82–98)
MONOCYTES # BLD AUTO: 0.39 THOUSAND/ΜL (ref 0.17–1.22)
MONOCYTES NFR BLD AUTO: 11 % (ref 4–12)
NEUTROPHILS # BLD AUTO: 1.6 THOUSANDS/ΜL (ref 1.85–7.62)
NEUTS SEG NFR BLD AUTO: 47 % (ref 43–75)
NRBC BLD AUTO-RTO: 0 /100 WBCS
P AXIS: 65 DEGREES
PLATELET # BLD AUTO: 247 THOUSANDS/UL (ref 149–390)
PMV BLD AUTO: 9.2 FL (ref 8.9–12.7)
POTASSIUM SERPL-SCNC: 4.2 MMOL/L (ref 3.5–5.3)
PR INTERVAL: 192 MS
PROT SERPL-MCNC: 7.3 G/DL (ref 6.4–8.2)
PROTHROMBIN TIME: 12.9 SECONDS (ref 11.6–14.5)
QRS AXIS: 82 DEGREES
QRSD INTERVAL: 84 MS
QT INTERVAL: 406 MS
QTC INTERVAL: 406 MS
RBC # BLD AUTO: 4.21 MILLION/UL (ref 3.81–5.12)
RH BLD: POSITIVE
SODIUM SERPL-SCNC: 124 MMOL/L (ref 136–145)
SPECIMEN EXPIRATION DATE: NORMAL
T WAVE AXIS: 74 DEGREES
TIBC SERPL-MCNC: 353 UG/DL (ref 250–450)
VENTRICULAR RATE: 60 BPM
WBC # BLD AUTO: 3.46 THOUSAND/UL (ref 4.31–10.16)

## 2020-06-10 PROCEDURE — 85025 COMPLETE CBC W/AUTO DIFF WBC: CPT

## 2020-06-10 PROCEDURE — 80053 COMPREHEN METABOLIC PANEL: CPT

## 2020-06-10 PROCEDURE — 86901 BLOOD TYPING SEROLOGIC RH(D): CPT | Performed by: ORTHOPAEDIC SURGERY

## 2020-06-10 PROCEDURE — 83036 HEMOGLOBIN GLYCOSYLATED A1C: CPT

## 2020-06-10 PROCEDURE — 93010 ELECTROCARDIOGRAM REPORT: CPT | Performed by: INTERNAL MEDICINE

## 2020-06-10 PROCEDURE — 86900 BLOOD TYPING SEROLOGIC ABO: CPT | Performed by: ORTHOPAEDIC SURGERY

## 2020-06-10 PROCEDURE — 83540 ASSAY OF IRON: CPT

## 2020-06-10 PROCEDURE — 85610 PROTHROMBIN TIME: CPT

## 2020-06-10 PROCEDURE — 86850 RBC ANTIBODY SCREEN: CPT | Performed by: ORTHOPAEDIC SURGERY

## 2020-06-10 PROCEDURE — 71046 X-RAY EXAM CHEST 2 VIEWS: CPT

## 2020-06-10 PROCEDURE — 36415 COLL VENOUS BLD VENIPUNCTURE: CPT

## 2020-06-10 PROCEDURE — 83550 IRON BINDING TEST: CPT

## 2020-06-10 PROCEDURE — 82728 ASSAY OF FERRITIN: CPT

## 2020-06-10 PROCEDURE — 85730 THROMBOPLASTIN TIME PARTIAL: CPT

## 2020-06-10 PROCEDURE — 86140 C-REACTIVE PROTEIN: CPT

## 2020-06-10 PROCEDURE — 93005 ELECTROCARDIOGRAM TRACING: CPT

## 2020-06-12 DIAGNOSIS — E78.00 HYPERCHOLESTEROLEMIA: Primary | ICD-10-CM

## 2020-06-15 RX ORDER — PRAVASTATIN SODIUM 40 MG
TABLET ORAL
Qty: 90 TABLET | Refills: 3 | Status: SHIPPED | OUTPATIENT
Start: 2020-06-15 | End: 2020-06-22

## 2020-06-18 DIAGNOSIS — M17.11 PRIMARY OSTEOARTHRITIS OF RIGHT KNEE: ICD-10-CM

## 2020-06-18 PROCEDURE — U0003 INFECTIOUS AGENT DETECTION BY NUCLEIC ACID (DNA OR RNA); SEVERE ACUTE RESPIRATORY SYNDROME CORONAVIRUS 2 (SARS-COV-2) (CORONAVIRUS DISEASE [COVID-19]), AMPLIFIED PROBE TECHNIQUE, MAKING USE OF HIGH THROUGHPUT TECHNOLOGIES AS DESCRIBED BY CMS-2020-01-R: HCPCS

## 2020-06-19 LAB — SARS-COV-2 RNA SPEC QL NAA+PROBE: NOT DETECTED

## 2020-06-22 ENCOUNTER — EVALUATION (OUTPATIENT)
Dept: PHYSICAL THERAPY | Facility: HOME HEALTHCARE | Age: 75
End: 2020-06-22
Payer: MEDICARE

## 2020-06-22 ENCOUNTER — CONSULT (OUTPATIENT)
Dept: FAMILY MEDICINE CLINIC | Facility: CLINIC | Age: 75
End: 2020-06-22
Payer: MEDICARE

## 2020-06-22 ENCOUNTER — ANESTHESIA EVENT (OUTPATIENT)
Dept: PERIOP | Facility: HOSPITAL | Age: 75
DRG: 470 | End: 2020-06-22
Payer: MEDICARE

## 2020-06-22 ENCOUNTER — TELEPHONE (OUTPATIENT)
Dept: OBGYN CLINIC | Facility: CLINIC | Age: 75
End: 2020-06-22

## 2020-06-22 VITALS
HEIGHT: 63 IN | DIASTOLIC BLOOD PRESSURE: 76 MMHG | RESPIRATION RATE: 20 BRPM | HEART RATE: 76 BPM | TEMPERATURE: 97.7 F | SYSTOLIC BLOOD PRESSURE: 136 MMHG | WEIGHT: 177 LBS | BODY MASS INDEX: 31.36 KG/M2

## 2020-06-22 DIAGNOSIS — M17.11 PRIMARY OSTEOARTHRITIS OF RIGHT KNEE: Primary | ICD-10-CM

## 2020-06-22 DIAGNOSIS — E78.00 HYPERCHOLESTEROLEMIA: ICD-10-CM

## 2020-06-22 DIAGNOSIS — N18.2 STAGE 2 CHRONIC KIDNEY DISEASE: ICD-10-CM

## 2020-06-22 DIAGNOSIS — G47.33 OSA (OBSTRUCTIVE SLEEP APNEA): ICD-10-CM

## 2020-06-22 DIAGNOSIS — Z01.812 PRE-OPERATIVE LABORATORY EXAMINATION: ICD-10-CM

## 2020-06-22 DIAGNOSIS — I12.9 HYPERTENSIVE NEPHROSCLEROSIS, STAGE 1 THROUGH STAGE 4 OR UNSPECIFIED CHRONIC KIDNEY DISEASE: ICD-10-CM

## 2020-06-22 DIAGNOSIS — I34.0 NONRHEUMATIC MITRAL VALVE REGURGITATION: ICD-10-CM

## 2020-06-22 PROCEDURE — 99214 OFFICE O/P EST MOD 30 MIN: CPT | Performed by: FAMILY MEDICINE

## 2020-06-22 PROCEDURE — 3008F BODY MASS INDEX DOCD: CPT | Performed by: FAMILY MEDICINE

## 2020-06-22 PROCEDURE — 3078F DIAST BP <80 MM HG: CPT | Performed by: FAMILY MEDICINE

## 2020-06-22 PROCEDURE — 3075F SYST BP GE 130 - 139MM HG: CPT | Performed by: FAMILY MEDICINE

## 2020-06-22 PROCEDURE — 97161 PT EVAL LOW COMPLEX 20 MIN: CPT | Performed by: PHYSICAL THERAPIST

## 2020-06-22 PROCEDURE — 97535 SELF CARE MNGMENT TRAINING: CPT | Performed by: PHYSICAL THERAPIST

## 2020-06-22 PROCEDURE — 1036F TOBACCO NON-USER: CPT | Performed by: FAMILY MEDICINE

## 2020-06-22 PROCEDURE — 1160F RVW MEDS BY RX/DR IN RCRD: CPT | Performed by: FAMILY MEDICINE

## 2020-06-22 RX ORDER — MULTIVIT-MIN/IRON FUM/FOLIC AC 7.5 MG-4
1 TABLET ORAL EVERY MORNING
COMMUNITY

## 2020-06-22 RX ORDER — ASPIRIN 81 MG/1
81 TABLET, CHEWABLE ORAL EVERY MORNING
COMMUNITY
End: 2020-06-26 | Stop reason: HOSPADM

## 2020-06-22 RX ORDER — PHENOL 1.4 %
600 AEROSOL, SPRAY (ML) MUCOUS MEMBRANE 2 TIMES DAILY WITH MEALS
COMMUNITY

## 2020-06-22 RX ORDER — CARBOXYMETHYLCELLULOSE SODIUM 5 MG/ML
1 SOLUTION/ DROPS OPHTHALMIC 3 TIMES DAILY PRN
COMMUNITY
End: 2020-08-18

## 2020-06-24 ENCOUNTER — HOSPITAL ENCOUNTER (INPATIENT)
Facility: HOSPITAL | Age: 75
LOS: 2 days | Discharge: HOME HEALTH | DRG: 470 | End: 2020-06-26
Attending: ORTHOPAEDIC SURGERY | Admitting: ORTHOPAEDIC SURGERY
Payer: MEDICARE

## 2020-06-24 ENCOUNTER — ANESTHESIA (OUTPATIENT)
Dept: PERIOP | Facility: HOSPITAL | Age: 75
DRG: 470 | End: 2020-06-24
Payer: MEDICARE

## 2020-06-24 ENCOUNTER — APPOINTMENT (OUTPATIENT)
Dept: RADIOLOGY | Facility: HOSPITAL | Age: 75
DRG: 470 | End: 2020-06-24
Payer: MEDICARE

## 2020-06-24 DIAGNOSIS — Z96.651 S/P TKR (TOTAL KNEE REPLACEMENT) USING CEMENT, RIGHT: Primary | ICD-10-CM

## 2020-06-24 DIAGNOSIS — M17.11 PRIMARY OSTEOARTHRITIS OF RIGHT KNEE: Primary | ICD-10-CM

## 2020-06-24 DIAGNOSIS — I10 ESSENTIAL HYPERTENSION: ICD-10-CM

## 2020-06-24 PROCEDURE — 0SRC0J9 REPLACEMENT OF RIGHT KNEE JOINT WITH SYNTHETIC SUBSTITUTE, CEMENTED, OPEN APPROACH: ICD-10-PCS | Performed by: ORTHOPAEDIC SURGERY

## 2020-06-24 PROCEDURE — NC001 PR NO CHARGE: Performed by: PHYSICIAN ASSISTANT

## 2020-06-24 PROCEDURE — C1776 JOINT DEVICE (IMPLANTABLE): HCPCS | Performed by: ORTHOPAEDIC SURGERY

## 2020-06-24 PROCEDURE — 99222 1ST HOSP IP/OBS MODERATE 55: CPT | Performed by: NURSE PRACTITIONER

## 2020-06-24 PROCEDURE — G9197 ORDER FOR CEPH: HCPCS | Performed by: ORTHOPAEDIC SURGERY

## 2020-06-24 PROCEDURE — 73560 X-RAY EXAM OF KNEE 1 OR 2: CPT

## 2020-06-24 PROCEDURE — 27447 TOTAL KNEE ARTHROPLASTY: CPT | Performed by: PHYSICIAN ASSISTANT

## 2020-06-24 PROCEDURE — C1713 ANCHOR/SCREW BN/BN,TIS/BN: HCPCS | Performed by: ORTHOPAEDIC SURGERY

## 2020-06-24 PROCEDURE — 99024 POSTOP FOLLOW-UP VISIT: CPT | Performed by: ORTHOPAEDIC SURGERY

## 2020-06-24 PROCEDURE — C9290 INJ, BUPIVACAINE LIPOSOME: HCPCS | Performed by: ANESTHESIOLOGY

## 2020-06-24 PROCEDURE — 27447 TOTAL KNEE ARTHROPLASTY: CPT | Performed by: ORTHOPAEDIC SURGERY

## 2020-06-24 PROCEDURE — 88305 TISSUE EXAM BY PATHOLOGIST: CPT | Performed by: PATHOLOGY

## 2020-06-24 PROCEDURE — 88311 DECALCIFY TISSUE: CPT | Performed by: PATHOLOGY

## 2020-06-24 DEVICE — IMPLANTABLE DEVICE
Type: IMPLANTABLE DEVICE | Site: KNEE | Status: FUNCTIONAL
Brand: NEXGEN®

## 2020-06-24 DEVICE — IMPLANTABLE DEVICE: Type: IMPLANTABLE DEVICE | Site: KNEE | Status: FUNCTIONAL

## 2020-06-24 DEVICE — IMPLANTABLE DEVICE
Type: IMPLANTABLE DEVICE | Site: KNEE | Status: FUNCTIONAL
Brand: NEXGEN® LEGACY®

## 2020-06-24 DEVICE — IMPLANTABLE DEVICE
Type: IMPLANTABLE DEVICE | Site: KNEE | Status: FUNCTIONAL
Brand: NEXGEN® LEGACY® GENDER SOLUTIONS™

## 2020-06-24 RX ORDER — FENTANYL CITRATE 50 UG/ML
INJECTION, SOLUTION INTRAMUSCULAR; INTRAVENOUS AS NEEDED
Status: DISCONTINUED | OUTPATIENT
Start: 2020-06-24 | End: 2020-06-24 | Stop reason: SURG

## 2020-06-24 RX ORDER — ZOLPIDEM TARTRATE 5 MG/1
10 TABLET ORAL
Status: DISCONTINUED | OUTPATIENT
Start: 2020-06-24 | End: 2020-06-26 | Stop reason: HOSPADM

## 2020-06-24 RX ORDER — ACETAMINOPHEN 325 MG/1
650 TABLET ORAL EVERY 4 HOURS PRN
Status: DISCONTINUED | OUTPATIENT
Start: 2020-06-24 | End: 2020-06-26 | Stop reason: HOSPADM

## 2020-06-24 RX ORDER — ROCURONIUM BROMIDE 10 MG/ML
INJECTION, SOLUTION INTRAVENOUS AS NEEDED
Status: DISCONTINUED | OUTPATIENT
Start: 2020-06-24 | End: 2020-06-24 | Stop reason: SURG

## 2020-06-24 RX ORDER — SPIRONOLACTONE 50 MG/1
50 TABLET, FILM COATED ORAL
Status: DISCONTINUED | OUTPATIENT
Start: 2020-06-24 | End: 2020-06-26 | Stop reason: HOSPADM

## 2020-06-24 RX ORDER — ONDANSETRON 2 MG/ML
4 INJECTION INTRAMUSCULAR; INTRAVENOUS ONCE AS NEEDED
Status: DISCONTINUED | OUTPATIENT
Start: 2020-06-24 | End: 2020-06-24 | Stop reason: HOSPADM

## 2020-06-24 RX ORDER — TRAMADOL HYDROCHLORIDE 50 MG/1
50 TABLET ORAL EVERY 6 HOURS PRN
Status: DISCONTINUED | OUTPATIENT
Start: 2020-06-24 | End: 2020-06-26 | Stop reason: HOSPADM

## 2020-06-24 RX ORDER — LOSARTAN POTASSIUM 50 MG/1
100 TABLET ORAL DAILY
Status: DISCONTINUED | OUTPATIENT
Start: 2020-06-25 | End: 2020-06-26 | Stop reason: HOSPADM

## 2020-06-24 RX ORDER — DOCUSATE SODIUM 100 MG/1
100 CAPSULE, LIQUID FILLED ORAL 2 TIMES DAILY
Status: DISCONTINUED | OUTPATIENT
Start: 2020-06-24 | End: 2020-06-26 | Stop reason: HOSPADM

## 2020-06-24 RX ORDER — CEFAZOLIN SODIUM 2 G/50ML
SOLUTION INTRAVENOUS AS NEEDED
Status: DISCONTINUED | OUTPATIENT
Start: 2020-06-24 | End: 2020-06-24 | Stop reason: SURG

## 2020-06-24 RX ORDER — CHLORHEXIDINE GLUCONATE 0.12 MG/ML
15 RINSE ORAL ONCE
Status: COMPLETED | OUTPATIENT
Start: 2020-06-24 | End: 2020-06-24

## 2020-06-24 RX ORDER — POVIDONE-IODINE 10 MG/G
OINTMENT TOPICAL AS NEEDED
Status: DISCONTINUED | OUTPATIENT
Start: 2020-06-24 | End: 2020-06-24 | Stop reason: HOSPADM

## 2020-06-24 RX ORDER — CEFAZOLIN SODIUM 1 G/50ML
1000 SOLUTION INTRAVENOUS EVERY 8 HOURS
Status: COMPLETED | OUTPATIENT
Start: 2020-06-24 | End: 2020-06-25

## 2020-06-24 RX ORDER — FOLIC ACID 1 MG/1
1 TABLET ORAL DAILY
Status: DISCONTINUED | OUTPATIENT
Start: 2020-06-24 | End: 2020-06-26 | Stop reason: HOSPADM

## 2020-06-24 RX ORDER — CEFAZOLIN SODIUM 2 G/50ML
2000 SOLUTION INTRAVENOUS ONCE
Status: DISCONTINUED | OUTPATIENT
Start: 2020-06-24 | End: 2020-06-24

## 2020-06-24 RX ORDER — LORAZEPAM 1 MG/1
1 TABLET ORAL 2 TIMES DAILY PRN
Status: DISCONTINUED | OUTPATIENT
Start: 2020-06-24 | End: 2020-06-26 | Stop reason: HOSPADM

## 2020-06-24 RX ORDER — OXYCODONE HYDROCHLORIDE AND ACETAMINOPHEN 5; 325 MG/1; MG/1
1 TABLET ORAL EVERY 4 HOURS PRN
Status: DISCONTINUED | OUTPATIENT
Start: 2020-06-24 | End: 2020-06-26 | Stop reason: HOSPADM

## 2020-06-24 RX ORDER — MORPHINE SULFATE 4 MG/ML
3 INJECTION, SOLUTION INTRAMUSCULAR; INTRAVENOUS EVERY 4 HOURS PRN
Status: DISCONTINUED | OUTPATIENT
Start: 2020-06-24 | End: 2020-06-24

## 2020-06-24 RX ORDER — FONDAPARINUX SODIUM 2.5 MG/.5ML
2.5 INJECTION SUBCUTANEOUS DAILY
Status: DISCONTINUED | OUTPATIENT
Start: 2020-06-25 | End: 2020-06-26 | Stop reason: HOSPADM

## 2020-06-24 RX ORDER — PROPOFOL 10 MG/ML
INJECTION, EMULSION INTRAVENOUS AS NEEDED
Status: DISCONTINUED | OUTPATIENT
Start: 2020-06-24 | End: 2020-06-24 | Stop reason: SURG

## 2020-06-24 RX ORDER — MAGNESIUM HYDROXIDE/ALUMINUM HYDROXICE/SIMETHICONE 120; 1200; 1200 MG/30ML; MG/30ML; MG/30ML
30 SUSPENSION ORAL EVERY 6 HOURS PRN
Status: DISCONTINUED | OUTPATIENT
Start: 2020-06-24 | End: 2020-06-26 | Stop reason: HOSPADM

## 2020-06-24 RX ORDER — PRAVASTATIN SODIUM 20 MG
20 TABLET ORAL 2 TIMES DAILY
Status: DISCONTINUED | OUTPATIENT
Start: 2020-06-24 | End: 2020-06-26 | Stop reason: HOSPADM

## 2020-06-24 RX ORDER — SODIUM CHLORIDE, SODIUM LACTATE, POTASSIUM CHLORIDE, CALCIUM CHLORIDE 600; 310; 30; 20 MG/100ML; MG/100ML; MG/100ML; MG/100ML
50 INJECTION, SOLUTION INTRAVENOUS CONTINUOUS
Status: DISCONTINUED | OUTPATIENT
Start: 2020-06-24 | End: 2020-06-24

## 2020-06-24 RX ORDER — CALCIUM CARBONATE 500(1250)
1 TABLET ORAL 2 TIMES DAILY WITH MEALS
Status: DISCONTINUED | OUTPATIENT
Start: 2020-06-24 | End: 2020-06-26 | Stop reason: HOSPADM

## 2020-06-24 RX ORDER — FONDAPARINUX SODIUM 2.5 MG/.5ML
2.5 INJECTION SUBCUTANEOUS EVERY 24 HOURS
Qty: 7 ML | Refills: 0 | Status: SHIPPED | OUTPATIENT
Start: 2020-06-24 | End: 2020-06-26 | Stop reason: HOSPADM

## 2020-06-24 RX ORDER — ONDANSETRON 2 MG/ML
INJECTION INTRAMUSCULAR; INTRAVENOUS AS NEEDED
Status: DISCONTINUED | OUTPATIENT
Start: 2020-06-24 | End: 2020-06-24 | Stop reason: SURG

## 2020-06-24 RX ORDER — FENTANYL CITRATE/PF 50 MCG/ML
25 SYRINGE (ML) INJECTION
Status: DISCONTINUED | OUTPATIENT
Start: 2020-06-24 | End: 2020-06-24 | Stop reason: HOSPADM

## 2020-06-24 RX ORDER — MINERAL OIL AND PETROLATUM 150; 830 MG/G; MG/G
OINTMENT OPHTHALMIC
Status: DISCONTINUED | OUTPATIENT
Start: 2020-06-24 | End: 2020-06-26 | Stop reason: HOSPADM

## 2020-06-24 RX ORDER — PANTOPRAZOLE SODIUM 40 MG/1
40 TABLET, DELAYED RELEASE ORAL
Status: DISCONTINUED | OUTPATIENT
Start: 2020-06-25 | End: 2020-06-26 | Stop reason: HOSPADM

## 2020-06-24 RX ORDER — MIDAZOLAM HYDROCHLORIDE 2 MG/2ML
INJECTION, SOLUTION INTRAMUSCULAR; INTRAVENOUS AS NEEDED
Status: DISCONTINUED | OUTPATIENT
Start: 2020-06-24 | End: 2020-06-24 | Stop reason: SURG

## 2020-06-24 RX ORDER — BISOPROLOL FUMARATE 5 MG/1
10 TABLET ORAL DAILY
Status: DISCONTINUED | OUTPATIENT
Start: 2020-06-25 | End: 2020-06-26 | Stop reason: HOSPADM

## 2020-06-24 RX ORDER — OXYCODONE HYDROCHLORIDE AND ACETAMINOPHEN 5; 325 MG/1; MG/1
1 TABLET ORAL EVERY 4 HOURS PRN
Status: DISCONTINUED | OUTPATIENT
Start: 2020-06-24 | End: 2020-06-24

## 2020-06-24 RX ORDER — DEXAMETHASONE SODIUM PHOSPHATE 10 MG/ML
INJECTION, SOLUTION INTRAMUSCULAR; INTRAVENOUS AS NEEDED
Status: DISCONTINUED | OUTPATIENT
Start: 2020-06-24 | End: 2020-06-24 | Stop reason: SURG

## 2020-06-24 RX ADMIN — FENTANYL CITRATE 100 MCG: 50 INJECTION INTRAMUSCULAR; INTRAVENOUS at 09:21

## 2020-06-24 RX ADMIN — TRAMADOL HYDROCHLORIDE 50 MG: 50 TABLET, FILM COATED ORAL at 21:00

## 2020-06-24 RX ADMIN — FENTANYL CITRATE 50 MCG: 50 INJECTION INTRAMUSCULAR; INTRAVENOUS at 08:51

## 2020-06-24 RX ADMIN — SPIRONOLACTONE 50 MG: 50 TABLET ORAL at 13:47

## 2020-06-24 RX ADMIN — CHLORHEXIDINE GLUCONATE 0.12% ORAL RINSE 15 ML: 1.2 LIQUID ORAL at 08:16

## 2020-06-24 RX ADMIN — CALCIUM 1 TABLET: 500 TABLET ORAL at 16:24

## 2020-06-24 RX ADMIN — CEFAZOLIN SODIUM 2000 MG: 2 SOLUTION INTRAVENOUS at 09:00

## 2020-06-24 RX ADMIN — MIDAZOLAM HYDROCHLORIDE 2 MG: 1 INJECTION, SOLUTION INTRAMUSCULAR; INTRAVENOUS at 08:49

## 2020-06-24 RX ADMIN — PRAVASTATIN SODIUM 20 MG: 20 TABLET ORAL at 13:46

## 2020-06-24 RX ADMIN — Medication 1 TABLET: at 13:47

## 2020-06-24 RX ADMIN — ONDANSETRON 4 MG: 2 INJECTION INTRAMUSCULAR; INTRAVENOUS at 09:42

## 2020-06-24 RX ADMIN — ACETAMINOPHEN 650 MG: 325 TABLET ORAL at 20:52

## 2020-06-24 RX ADMIN — ROCURONIUM BROMIDE 50 MG: 10 INJECTION INTRAVENOUS at 09:19

## 2020-06-24 RX ADMIN — DOCUSATE SODIUM 100 MG: 100 CAPSULE, LIQUID FILLED ORAL at 13:46

## 2020-06-24 RX ADMIN — SODIUM CHLORIDE, SODIUM LACTATE, POTASSIUM CHLORIDE, AND CALCIUM CHLORIDE: .6; .31; .03; .02 INJECTION, SOLUTION INTRAVENOUS at 08:48

## 2020-06-24 RX ADMIN — FOLIC ACID 1 MG: 1 TABLET ORAL at 13:46

## 2020-06-24 RX ADMIN — DOCUSATE SODIUM 100 MG: 100 CAPSULE, LIQUID FILLED ORAL at 17:27

## 2020-06-24 RX ADMIN — PRAVASTATIN SODIUM 20 MG: 20 TABLET ORAL at 17:27

## 2020-06-24 RX ADMIN — IRON SUCROSE 300 MG: 20 INJECTION, SOLUTION INTRAVENOUS at 14:43

## 2020-06-24 RX ADMIN — SODIUM CHLORIDE, SODIUM LACTATE, POTASSIUM CHLORIDE, AND CALCIUM CHLORIDE 50 ML/HR: .6; .31; .03; .02 INJECTION, SOLUTION INTRAVENOUS at 08:17

## 2020-06-24 RX ADMIN — ZOLPIDEM TARTRATE 10 MG: 5 TABLET, FILM COATED ORAL at 22:09

## 2020-06-24 RX ADMIN — OXYCODONE HYDROCHLORIDE AND ACETAMINOPHEN 1 TABLET: 5; 325 TABLET ORAL at 13:47

## 2020-06-24 RX ADMIN — DEXAMETHASONE SODIUM PHOSPHATE 4 MG: 10 INJECTION, SOLUTION INTRAMUSCULAR; INTRAVENOUS at 09:43

## 2020-06-24 RX ADMIN — PROPOFOL 150 MG: 10 INJECTION, EMULSION INTRAVENOUS at 09:18

## 2020-06-24 RX ADMIN — CEFAZOLIN SODIUM 1000 MG: 1 SOLUTION INTRAVENOUS at 19:36

## 2020-06-24 RX ADMIN — ALUMINUM HYDROXIDE, MAGNESIUM HYDROXIDE, AND SIMETHICONE 30 ML: 200; 200; 20 SUSPENSION ORAL at 20:38

## 2020-06-24 RX ADMIN — MORPHINE SULFATE 3 MG: 4 INJECTION INTRAVENOUS at 16:24

## 2020-06-24 RX ADMIN — FENTANYL CITRATE 50 MCG: 50 INJECTION INTRAMUSCULAR; INTRAVENOUS at 09:14

## 2020-06-25 LAB
ANION GAP SERPL CALCULATED.3IONS-SCNC: 6 MMOL/L (ref 4–13)
BASOPHILS # BLD AUTO: 0 THOUSANDS/ΜL (ref 0–0.1)
BASOPHILS NFR BLD AUTO: 0 % (ref 0–2)
BUN SERPL-MCNC: 5 MG/DL (ref 7–25)
CALCIUM SERPL-MCNC: 8.5 MG/DL (ref 8.6–10.5)
CHLORIDE SERPL-SCNC: 91 MMOL/L (ref 98–107)
CO2 SERPL-SCNC: 26 MMOL/L (ref 21–31)
CREAT SERPL-MCNC: 0.74 MG/DL (ref 0.6–1.2)
EOSINOPHIL # BLD AUTO: 0 THOUSAND/ΜL (ref 0–0.61)
EOSINOPHIL NFR BLD AUTO: 0 % (ref 0–5)
ERYTHROCYTE [DISTWIDTH] IN BLOOD BY AUTOMATED COUNT: 13.2 % (ref 11.5–14.5)
GFR SERPL CREATININE-BSD FRML MDRD: 81 ML/MIN/1.73SQ M
GLUCOSE SERPL-MCNC: 123 MG/DL (ref 65–99)
HCT VFR BLD AUTO: 28.7 % (ref 42–47)
HGB BLD-MCNC: 10.2 G/DL (ref 12–16)
LYMPHOCYTES # BLD AUTO: 1.2 THOUSANDS/ΜL (ref 0.6–4.47)
LYMPHOCYTES NFR BLD AUTO: 16 % (ref 21–51)
MCH RBC QN AUTO: 31 PG (ref 26–34)
MCHC RBC AUTO-ENTMCNC: 35.5 G/DL (ref 31–37)
MCV RBC AUTO: 87 FL (ref 81–99)
MONOCYTES # BLD AUTO: 0.9 THOUSAND/ΜL (ref 0.17–1.22)
MONOCYTES NFR BLD AUTO: 13 % (ref 2–12)
NEUTROPHILS # BLD AUTO: 5.4 THOUSANDS/ΜL (ref 1.4–6.5)
NEUTS SEG NFR BLD AUTO: 71 % (ref 42–75)
PLATELET # BLD AUTO: 179 THOUSANDS/UL (ref 149–390)
PMV BLD AUTO: 7.4 FL (ref 8.6–11.7)
POTASSIUM SERPL-SCNC: 4.2 MMOL/L (ref 3.5–5.5)
RBC # BLD AUTO: 3.3 MILLION/UL (ref 3.9–5.2)
SODIUM SERPL-SCNC: 123 MMOL/L (ref 134–143)
WBC # BLD AUTO: 7.6 THOUSAND/UL (ref 4.8–10.8)

## 2020-06-25 PROCEDURE — 83935 ASSAY OF URINE OSMOLALITY: CPT | Performed by: PHYSICIAN ASSISTANT

## 2020-06-25 PROCEDURE — 99232 SBSQ HOSP IP/OBS MODERATE 35: CPT | Performed by: PHYSICIAN ASSISTANT

## 2020-06-25 PROCEDURE — 97163 PT EVAL HIGH COMPLEX 45 MIN: CPT

## 2020-06-25 PROCEDURE — 97167 OT EVAL HIGH COMPLEX 60 MIN: CPT

## 2020-06-25 PROCEDURE — 80048 BASIC METABOLIC PNL TOTAL CA: CPT | Performed by: PHYSICIAN ASSISTANT

## 2020-06-25 PROCEDURE — 97110 THERAPEUTIC EXERCISES: CPT

## 2020-06-25 PROCEDURE — 84300 ASSAY OF URINE SODIUM: CPT | Performed by: PHYSICIAN ASSISTANT

## 2020-06-25 PROCEDURE — 97530 THERAPEUTIC ACTIVITIES: CPT

## 2020-06-25 PROCEDURE — 85025 COMPLETE CBC W/AUTO DIFF WBC: CPT | Performed by: NURSE PRACTITIONER

## 2020-06-25 PROCEDURE — 99024 POSTOP FOLLOW-UP VISIT: CPT | Performed by: PHYSICIAN ASSISTANT

## 2020-06-25 RX ORDER — ONDANSETRON 2 MG/ML
4 INJECTION INTRAMUSCULAR; INTRAVENOUS EVERY 6 HOURS PRN
Status: DISCONTINUED | OUTPATIENT
Start: 2020-06-25 | End: 2020-06-26 | Stop reason: HOSPADM

## 2020-06-25 RX ORDER — BUTALBITAL, ACETAMINOPHEN AND CAFFEINE 50; 325; 40 MG/1; MG/1; MG/1
1 TABLET ORAL ONCE
Status: COMPLETED | OUTPATIENT
Start: 2020-06-25 | End: 2020-06-25

## 2020-06-25 RX ADMIN — TRAMADOL HYDROCHLORIDE 50 MG: 50 TABLET, FILM COATED ORAL at 19:30

## 2020-06-25 RX ADMIN — SPIRONOLACTONE 50 MG: 50 TABLET ORAL at 14:28

## 2020-06-25 RX ADMIN — ZOLPIDEM TARTRATE 10 MG: 5 TABLET, FILM COATED ORAL at 22:00

## 2020-06-25 RX ADMIN — CEFAZOLIN SODIUM 1000 MG: 1 SOLUTION INTRAVENOUS at 11:37

## 2020-06-25 RX ADMIN — PANTOPRAZOLE SODIUM 40 MG: 40 TABLET, DELAYED RELEASE ORAL at 05:09

## 2020-06-25 RX ADMIN — DOCUSATE SODIUM 100 MG: 100 CAPSULE, LIQUID FILLED ORAL at 17:07

## 2020-06-25 RX ADMIN — PRAVASTATIN SODIUM 20 MG: 20 TABLET ORAL at 09:02

## 2020-06-25 RX ADMIN — FONDAPARINUX SODIUM 2.5 MG: 2.5 INJECTION, SOLUTION SUBCUTANEOUS at 08:50

## 2020-06-25 RX ADMIN — BUTALBITAL, ACETAMINOPHEN, AND CAFFEINE 1 TABLET: 50; 325; 40 TABLET ORAL at 14:26

## 2020-06-25 RX ADMIN — LOSARTAN POTASSIUM 100 MG: 50 TABLET, FILM COATED ORAL at 08:51

## 2020-06-25 RX ADMIN — IRON SUCROSE 300 MG: 20 INJECTION, SOLUTION INTRAVENOUS at 15:21

## 2020-06-25 RX ADMIN — CALCIUM 1 TABLET: 500 TABLET ORAL at 17:07

## 2020-06-25 RX ADMIN — CEFAZOLIN SODIUM 1000 MG: 1 SOLUTION INTRAVENOUS at 03:13

## 2020-06-25 RX ADMIN — CALCIUM 1 TABLET: 500 TABLET ORAL at 07:57

## 2020-06-25 RX ADMIN — BISOPROLOL FUMARATE 10 MG: 5 TABLET ORAL at 08:59

## 2020-06-25 RX ADMIN — OXYCODONE HYDROCHLORIDE AND ACETAMINOPHEN 1 TABLET: 5; 325 TABLET ORAL at 14:27

## 2020-06-25 RX ADMIN — PRAVASTATIN SODIUM 20 MG: 20 TABLET ORAL at 17:07

## 2020-06-25 RX ADMIN — OXYCODONE HYDROCHLORIDE AND ACETAMINOPHEN 1 TABLET: 5; 325 TABLET ORAL at 08:51

## 2020-06-25 RX ADMIN — FOLIC ACID 1 MG: 1 TABLET ORAL at 08:50

## 2020-06-25 RX ADMIN — DOCUSATE SODIUM 100 MG: 100 CAPSULE, LIQUID FILLED ORAL at 08:50

## 2020-06-25 RX ADMIN — ONDANSETRON 4 MG: 2 INJECTION INTRAMUSCULAR; INTRAVENOUS at 22:01

## 2020-06-25 RX ADMIN — Medication 1 TABLET: at 08:51

## 2020-06-26 VITALS
BODY MASS INDEX: 30.48 KG/M2 | TEMPERATURE: 98.3 F | OXYGEN SATURATION: 96 % | SYSTOLIC BLOOD PRESSURE: 122 MMHG | DIASTOLIC BLOOD PRESSURE: 58 MMHG | WEIGHT: 172 LBS | RESPIRATION RATE: 17 BRPM | HEIGHT: 63 IN | HEART RATE: 70 BPM

## 2020-06-26 DIAGNOSIS — I10 ESSENTIAL HYPERTENSION: ICD-10-CM

## 2020-06-26 LAB
ANION GAP SERPL CALCULATED.3IONS-SCNC: 5 MMOL/L (ref 4–13)
BUN SERPL-MCNC: 6 MG/DL (ref 7–25)
CALCIUM SERPL-MCNC: 8.6 MG/DL (ref 8.6–10.5)
CHLORIDE SERPL-SCNC: 90 MMOL/L (ref 98–107)
CO2 SERPL-SCNC: 29 MMOL/L (ref 21–31)
CREAT SERPL-MCNC: 0.8 MG/DL (ref 0.6–1.2)
GFR SERPL CREATININE-BSD FRML MDRD: 73 ML/MIN/1.73SQ M
GLUCOSE SERPL-MCNC: 115 MG/DL (ref 65–99)
HCT VFR BLD AUTO: 29.2 % (ref 42–47)
HGB BLD-MCNC: 10 G/DL (ref 12–16)
OSMOLALITY UR/SERPL-RTO: 261 MMOL/KG (ref 282–298)
OSMOLALITY UR: 73 MMOL/KG
POTASSIUM SERPL-SCNC: 4.7 MMOL/L (ref 3.5–5.5)
SODIUM 24H UR-SCNC: 17 MOL/L
SODIUM SERPL-SCNC: 124 MMOL/L (ref 134–143)

## 2020-06-26 PROCEDURE — 97116 GAIT TRAINING THERAPY: CPT

## 2020-06-26 PROCEDURE — 97530 THERAPEUTIC ACTIVITIES: CPT

## 2020-06-26 PROCEDURE — 85014 HEMATOCRIT: CPT | Performed by: PHYSICIAN ASSISTANT

## 2020-06-26 PROCEDURE — 80048 BASIC METABOLIC PNL TOTAL CA: CPT | Performed by: PHYSICIAN ASSISTANT

## 2020-06-26 PROCEDURE — 99024 POSTOP FOLLOW-UP VISIT: CPT | Performed by: ORTHOPAEDIC SURGERY

## 2020-06-26 PROCEDURE — 85018 HEMOGLOBIN: CPT | Performed by: PHYSICIAN ASSISTANT

## 2020-06-26 PROCEDURE — 99232 SBSQ HOSP IP/OBS MODERATE 35: CPT | Performed by: PHYSICIAN ASSISTANT

## 2020-06-26 PROCEDURE — 99024 POSTOP FOLLOW-UP VISIT: CPT | Performed by: PHYSICIAN ASSISTANT

## 2020-06-26 PROCEDURE — 97110 THERAPEUTIC EXERCISES: CPT

## 2020-06-26 PROCEDURE — 97535 SELF CARE MNGMENT TRAINING: CPT

## 2020-06-26 PROCEDURE — 83930 ASSAY OF BLOOD OSMOLALITY: CPT | Performed by: PHYSICIAN ASSISTANT

## 2020-06-26 RX ORDER — TRAMADOL HYDROCHLORIDE 50 MG/1
50 TABLET ORAL EVERY 6 HOURS PRN
Qty: 30 TABLET | Refills: 0 | Status: SHIPPED | OUTPATIENT
Start: 2020-06-26 | End: 2020-07-06

## 2020-06-26 RX ORDER — ASPIRIN 325 MG
325 TABLET, DELAYED RELEASE (ENTERIC COATED) ORAL 2 TIMES DAILY
Qty: 30 TABLET | Refills: 0 | Status: SHIPPED | OUTPATIENT
Start: 2020-06-26 | End: 2020-09-09

## 2020-06-26 RX ORDER — CEPHALEXIN 500 MG/1
500 CAPSULE ORAL EVERY 8 HOURS SCHEDULED
Qty: 15 CAPSULE | Refills: 0 | Status: SHIPPED | OUTPATIENT
Start: 2020-06-26 | End: 2020-07-01

## 2020-06-26 RX ORDER — DOCUSATE SODIUM 100 MG/1
100 CAPSULE, LIQUID FILLED ORAL 2 TIMES DAILY
Qty: 60 CAPSULE | Refills: 0 | Status: SHIPPED | OUTPATIENT
Start: 2020-06-26 | End: 2020-07-31

## 2020-06-26 RX ADMIN — BISOPROLOL FUMARATE 10 MG: 5 TABLET ORAL at 08:10

## 2020-06-26 RX ADMIN — PANTOPRAZOLE SODIUM 40 MG: 40 TABLET, DELAYED RELEASE ORAL at 05:19

## 2020-06-26 RX ADMIN — PRAVASTATIN SODIUM 20 MG: 20 TABLET ORAL at 08:13

## 2020-06-26 RX ADMIN — Medication 1 TABLET: at 08:12

## 2020-06-26 RX ADMIN — FONDAPARINUX SODIUM 2.5 MG: 2.5 INJECTION, SOLUTION SUBCUTANEOUS at 08:11

## 2020-06-26 RX ADMIN — LOSARTAN POTASSIUM 100 MG: 50 TABLET, FILM COATED ORAL at 08:12

## 2020-06-26 RX ADMIN — FOLIC ACID 1 MG: 1 TABLET ORAL at 08:11

## 2020-06-26 RX ADMIN — OXYCODONE HYDROCHLORIDE AND ACETAMINOPHEN 1 TABLET: 5; 325 TABLET ORAL at 08:12

## 2020-06-26 RX ADMIN — OXYCODONE HYDROCHLORIDE AND ACETAMINOPHEN 1 TABLET: 5; 325 TABLET ORAL at 12:14

## 2020-06-26 RX ADMIN — DOCUSATE SODIUM 100 MG: 100 CAPSULE, LIQUID FILLED ORAL at 08:11

## 2020-06-26 RX ADMIN — CALCIUM 1 TABLET: 500 TABLET ORAL at 08:11

## 2020-06-29 ENCOUNTER — TELEPHONE (OUTPATIENT)
Dept: OBGYN CLINIC | Facility: HOSPITAL | Age: 75
End: 2020-06-29

## 2020-06-29 RX ORDER — LOSARTAN POTASSIUM 100 MG/1
100 TABLET ORAL EVERY MORNING
Qty: 90 TABLET | Refills: 3 | Status: SHIPPED | OUTPATIENT
Start: 2020-06-29 | End: 2021-04-08

## 2020-06-30 ENCOUNTER — TELEPHONE (OUTPATIENT)
Dept: OBGYN CLINIC | Facility: HOSPITAL | Age: 75
End: 2020-06-30

## 2020-07-01 DIAGNOSIS — M17.11 PRIMARY OSTEOARTHRITIS OF RIGHT KNEE: Primary | ICD-10-CM

## 2020-07-07 ENCOUNTER — APPOINTMENT (OUTPATIENT)
Dept: RADIOLOGY | Facility: MEDICAL CENTER | Age: 75
End: 2020-07-07
Payer: MEDICARE

## 2020-07-07 ENCOUNTER — OFFICE VISIT (OUTPATIENT)
Dept: OBGYN CLINIC | Facility: CLINIC | Age: 75
End: 2020-07-07

## 2020-07-07 VITALS
TEMPERATURE: 97.9 F | HEART RATE: 67 BPM | DIASTOLIC BLOOD PRESSURE: 79 MMHG | SYSTOLIC BLOOD PRESSURE: 172 MMHG | BODY MASS INDEX: 30.47 KG/M2 | HEIGHT: 63 IN

## 2020-07-07 DIAGNOSIS — Z96.651 STATUS POST TOTAL RIGHT KNEE REPLACEMENT: Primary | ICD-10-CM

## 2020-07-07 DIAGNOSIS — Z96.651 STATUS POST TOTAL RIGHT KNEE REPLACEMENT: ICD-10-CM

## 2020-07-07 DIAGNOSIS — M17.11 PRIMARY OSTEOARTHRITIS OF RIGHT KNEE: ICD-10-CM

## 2020-07-07 PROCEDURE — 73560 X-RAY EXAM OF KNEE 1 OR 2: CPT

## 2020-07-07 PROCEDURE — 99024 POSTOP FOLLOW-UP VISIT: CPT | Performed by: ORTHOPAEDIC SURGERY

## 2020-07-07 PROCEDURE — 1160F RVW MEDS BY RX/DR IN RCRD: CPT | Performed by: ORTHOPAEDIC SURGERY

## 2020-07-07 PROCEDURE — 3078F DIAST BP <80 MM HG: CPT | Performed by: ORTHOPAEDIC SURGERY

## 2020-07-07 PROCEDURE — 3077F SYST BP >= 140 MM HG: CPT | Performed by: ORTHOPAEDIC SURGERY

## 2020-07-07 RX ORDER — OXYCODONE HYDROCHLORIDE AND ACETAMINOPHEN 5; 325 MG/1; MG/1
1 TABLET ORAL EVERY 4 HOURS PRN
Qty: 30 TABLET | Refills: 0 | Status: SHIPPED | OUTPATIENT
Start: 2020-07-07 | End: 2020-08-18

## 2020-07-07 NOTE — PROGRESS NOTES
ASSESSMENT/PLAN:    Diagnoses and all orders for this visit:    Status post total right knee replacement  -     XR knee 3 vw right non injury; Future  -     Ambulatory referral to Physical Therapy; Future  -     oxyCODONE-acetaminophen (PERCOCET) 5-325 mg per tablet; Take 1 tablet by mouth every 4 (four) hours as needed for moderate painMax Daily Amount: 6 tablets    Primary osteoarthritis of right knee      Ms Eric Pierce is doing well since surgery  She will start outpatient therapy now  She can also transition to  mg daily for thirty days  She will follow up in our office in one month  Return in about 1 month (around 8/7/2020)  _____________________________________________________  CHIEF COMPLAINT:  Chief Complaint   Patient presents with    Right Knee - Follow-up, Post-op         SUBJECTIVE:  Erick Rosas is a 70 y o  female who presents to our office for a two week follow up  She is status post right total knee replacement on 6/24/20  She has been doing well since the procedure and has been working with therapy weight bearing as tolerated  Her pain has improved and her range of motion is improving  She has been taking  mg BID for DVT prophylaxis      PAST MEDICAL HISTORY:  Past Medical History:   Diagnosis Date    Anxiety     Arthritis     Benign arteriolar nephrosclerosis     Chronic kidney disease in type 2 diabetes mellitus (HCC)     Chronic pain disorder     GERD (gastroesophageal reflux disease)     Heart murmur     Hyperaldosteronism (HCC)     Hyperlipidemia     Hypertension     Hypo-osmolality and hyponatremia     IBS (irritable bowel syndrome)     Insomnia     Mitral regurgitation     Obstructive sleep apnea syndrome     Osteoarthritis     Pernicious anemia     Stroke ([facility] [address] )     tia       PAST SURGICAL HISTORY:  Past Surgical History:   Procedure Laterality Date    APPENDECTOMY      CARPAL TUNNEL RELEASE Bilateral     COLONOSCOPY      several    HAND SURGERY Right     ring finger has pin    HYSTERECTOMY      32    INSERT / REPLACE PERIPHERAL NEUROSTIMULATOR PULSE GENERATOR /  Left     buttocks    JOINT REPLACEMENT Left     knee    JOINT REPLACEMENT Right 04/2019    Hip    KNEE ARTHROSCOPY Bilateral     NASAL SEPTUM SURGERY  2008    NERVE BLOCK Left 2/20/2018    Procedure: BLOCK MEDIAL BRANCH - C4, C5, C6;  Surgeon: Antione Mendoza MD;  Location: [facility];  Service: Pain Management     NERVE BLOCK Left 3/6/2018    Procedure: C4, C5, C6 MEDIAL BRANCH BLOCK;  Surgeon: Antione Mendoza MD;  Location: [facility];  Service: Pain Management     CO COLONOSCOPY FLX DX W/COLLJ SPEC WHEN PFRMD N/A 4/24/2017    Procedure: Aidee Crown;  Surgeon: Felisha Cavanaugh MD;  Location: [facility];  Service: Colorectal    CO TOTAL HIP ARTHROPLASTY Right 2/27/2019    Procedure: ARTHROPLASTY HIP TOTAL;  Surgeon: Katy Rincon DO;  Location: [facility] MAIN OR;  Service: Orthopedics    CO TOTAL KNEE ARTHROPLASTY Right 6/24/2020    Procedure: KNEE TOTAL ARTHROPLASTY;  Surgeon: Katy Rincno DO;  Location: [facility] MAIN OR;  Service: Orthopedics    RADIOFREQUENCY ABLATION Left 4/17/2018    Procedure: ABLATION RADIO FREQUENCY (RFA) - C4, C5, C6;  Surgeon: Antione Mendoza MD;  Location: [facility];  Service: Pain Management     SINUS SURGERY      TONSILLECTOMY      TOTAL HIP ARTHROPLASTY Left     UPPER GASTROINTESTINAL ENDOSCOPY         FAMILY HISTORY:  Family History   Problem Relation Age of Onset    Heart disease Mother     Hypertension Mother    Delcie Jcarlos Arthritis Mother     Dementia Mother     Rheum arthritis Mother     Hypertension Father     Heart disease Father     Colon cancer Father     Hypertension Sister     Anxiety disorder Sister     Thyroid disease Sister     Prostate cancer Maternal Grandfather     No Known Problems Paternal Grandmother     No Known Problems Paternal Grandfather     No Known Problems Paternal Aunt     Pseudochol deficiency Neg Hx        SOCIAL HISTORY:  Social History     Tobacco Use    Smoking status: Former Smoker    Smokeless tobacco: Never Used    Tobacco comment: quit 40 yrs ago   Substance Use Topics    Alcohol use: Yes     Frequency: Monthly or less     Comment: 2 beer a week    Drug use: Never       MEDICATIONS:    Current Outpatient Medications:     aspirin (ECOTRIN) 325 mg EC tablet, Take 1 tablet (325 mg total) by mouth 2 (two) times a day, Disp: 30 tablet, Rfl: 0    bisoprolol (ZEBETA) 10 MG tablet, TAKE 1 TABLET EVERY DAY (Patient taking differently: Take 10 mg by mouth every morning ), Disp: 90 tablet, Rfl: 3    butalbital-acetaminophen-caffeine-codeine (FIORICET WITH CODEINE) -56-30 MG per capsule, Take 1 capsule by mouth every 4 (four) hours as needed for headaches, Disp: , Rfl:     calcium carbonate (OS-MARLENY) 600 MG tablet, Take 600 mg by mouth 2 (two) times a day with meals, Disp: , Rfl:     carboxymethylcellulose 0 5 % SOLN, 1 drop 3 (three) times a day as needed for dry eyes, Disp: , Rfl:     docusate sodium (COLACE) 100 mg capsule, Take 1 capsule (100 mg total) by mouth 2 (two) times a day, Disp: 60 capsule, Rfl: 0    folic acid (FOLVITE) 1 mg tablet, Take 1 tablet (1 mg total) by mouth daily, Disp: 30 tablet, Rfl: 0    LORazepam (ATIVAN) 1 mg tablet, Take 1 mg by mouth 2 (two) times a day as needed for anxiety, Disp: , Rfl:     losartan (COZAAR) 100 MG tablet, Take 1 tablet (100 mg total) by mouth every morning, Disp: 90 tablet, Rfl: 3    Multiple Vitamins-Minerals (MULTIVITAMIN WITH MINERALS) tablet, Take 1 tablet by mouth every morning, Disp: , Rfl:     omeprazole (PriLOSEC) 20 mg delayed release capsule, Take 20 mg by mouth every morning  , Disp: , Rfl:     pravastatin (PRAVACHOL) 20 mg tablet, Take 20 mg by mouth 2 (two) times a day , Disp: , Rfl:     spironolactone (ALDACTONE) 50 mg tablet, Take 1 tablet (50 mg total) by mouth daily after lunch, Disp: 90 tablet, Rfl: 3   zolpidem (AMBIEN) 10 mg tablet, Take 10 mg by mouth daily at bedtime as needed , Disp: , Rfl:     oxyCODONE-acetaminophen (PERCOCET) 5-325 mg per tablet, Take 1 tablet by mouth every 4 (four) hours as needed for moderate painMax Daily Amount: 6 tablets, Disp: 30 tablet, Rfl: 0    ALLERGIES:  Allergies   Allergen Reactions    Procaine Hives    Adhesive [Medical Tape] Rash    Lidocaine Rash    Penicillins Rash       ROS:  Review of Systems     Constitutional: Negative for fatigue, fever or loss of apetite  HENT: Negative  Respiratory: Negative for shortness of breath, dyspnea  Cardiovascular: Negative for chest pain/tightness  Gastrointestinal: Negative for abdominal pain, N/V  Endocrine: Negative for cold/heat intolerance, unexplained weight loss/gain  Genitourinary: Negative for flank pain, dysuria, hematuria  Musculoskeletal: Positive for arthralgia   Skin: Negative for rash  Neurological: Negative for numbness or tingling  Psychiatric/Behavioral: Negative for agitation  _____________________________________________________  PHYSICAL EXAMINATION:    Blood pressure (!) 172/79, pulse 67, temperature 97 9 °F (36 6 °C), height 5' 3" (1 6 m)  Constitutional: Oriented to person, place, and time  Appears well-developed and well-nourished  No distress  HENT:   Head: Normocephalic  Eyes: Conjunctivae are normal  Right eye exhibits no discharge  Left eye exhibits no discharge  No scleral icterus  Cardiovascular: Normal rate  Pulmonary/Chest: Effort normal    Neurological: Alert and oriented to person, place, and time  Skin: Skin is warm and dry  No rash noted  Not diaphoretic  No erythema  No pallor  Psychiatric: Normal mood and affect   Behavior is normal  Judgment and thought content normal       MUSCULOSKELETAL EXAMINATION:   Physical Exam  Ortho Exam    Right lower extremity is neurovascularly intact  Toes are pink and mobile  Compartments are soft  No warmth, erythema or ecchymosis  Incision is healing well   Range of motion is improved  Negative Homans  Objective:  BP Readings from Last 1 Encounters:   07/07/20 (!) 172/79      Wt Readings from Last 1 Encounters:   06/24/20 78 kg (172 lb)        BMI:   Estimated body mass index is 30 47 kg/m² as calculated from the following:    Height as of this encounter: 5' 3" (1 6 m)  Weight as of 6/24/20: 78 kg (172 lb)  Radiographs:  _____________________________________________________  STUDIES REVIEWED:  I have personally reviewed pertinent films and reports in PACS  two views of the right knee show a well seated knee replacement  The prosthesis is in good alignment, No fractures, loosening or dislocations seen        Mayra Dyer PA-C

## 2020-07-08 ENCOUNTER — TELEPHONE (OUTPATIENT)
Dept: OBGYN CLINIC | Facility: HOSPITAL | Age: 75
End: 2020-07-08

## 2020-07-08 NOTE — TELEPHONE ENCOUNTER
Sandhya Ledesma from [address] called stating that they will extend home care PT until patient gets to OT         [phone]

## 2020-07-12 NOTE — PROGRESS NOTES
PT Re-Evaluation     Today's date: 2020  Patient name: Devyn Chaves  : 1945  MRN: [id number]  Referring provider: Conrad Kwok DO  Dx:   Encounter Diagnosis     ICD-10-CM    1  Primary osteoarthritis of right knee M17 11                   Assessment  Assessment details: The patient is a 71 y/o female who presents to PT s/p R TKR on 20 by Dr Mathieu Goodwin  Since surgery she has complaints of constant pain in her R knee along with intermittent pain into her shin and thigh  She also demonstrates deficits with decreased A/PROM and strength, decreased flexibility, antalgic gait with use of AD, decreased balance and proprioception, difficulty with stair negotiation and pain with completing her ADLs  She remains I with most of her ADLs though pain with completing them and increased time needed to do them  She is I with dressing though her  has been tying her shoes for her  She ambulates with use of  for community distances and [facility] for household distances  She ambulates with slow tracee and with decreased weight shift to RLE in stance phase  She lacks TKE with heel strike on R  She has been going up and down the steps with non-reciprocal gait pattern with use of HR and QC  She is not driving at this time  Secondary to complaints of pain and above deficits she is limited with her overall mobility and function  The patient would benefit from continued PT to address deficits and improve function  Tx to include ROM, stretching, strengthening, modalities, HEP, pt education, postural ed, lifting/body mechanics, neuro re-ed, balance/proprioception Te, MT and equipment              Impairments: abnormal gait, abnormal or restricted ROM, activity intolerance, impaired balance, impaired physical strength, lacks appropriate home exercise program, pain with function and weight-bearing intolerance  Other impairment: decreased flexibility  Functional limitations: difficulty with stair negotiationUnderstanding of Dx/Px/POC: good   Prognosis: good    Goals  STGs:  1  Initiate and complete HEP with verbal cues  2   Decrease R knee pain by > 25% in 4 weeks  3   Improve R knee ROM by 10-15 degrees in 4 weeks  4   Improve RLE strength by 1 grade in 4 weeks  LTGs:  1  Patient to be I with HEP in 12 weeks  2   Improve R knee ROM to 0-115 degrees in 12 weeks to improve function  3   Improve RLE strength to 4+ to 5/5 t/o in 12 weeks to improve function  4   Decrease R knee pain to < or = to 2-3/10 with activity in 12 weeks to improve function  5   Patient to ambulate with normalized gait pattern with least restrictive AD in 12 weeks  6   Stair negotiation is improved to PLOF in 12 weeks  7   Recreational performance is improved to PLOF in 12 weeks  8   ADL performance is improved to PLOF in 12 weeks  9   Decrease edema R knee by 2-3 cm in 12 weeks to help improve flexibility  Plan  Plan details: Modalities and therapy interventions prn  Patient would benefit from: skilled physical therapy  Planned modality interventions: cryotherapy and thermotherapy: hydrocollator packs  Planned therapy interventions: balance/weight bearing training, manual therapy, neuromuscular re-education, patient education, postural training, self care, strengthening, stretching, therapeutic activities, therapeutic exercise, flexibility, gait training and home exercise program  Frequency: 2x week  Duration in weeks: 12  Plan of Care beginning date: 7/16/2020  Plan of Care expiration date: 10/16/2020  Treatment plan discussed with: patient        Subjective Evaluation    History of Present Illness  Date of surgery: 6/24/2020  Mechanism of injury: surgery  Mechanism of injury: The patient with h/o arthritis in her R knee  She had elective R TKR on 6/24/20 by Dr Dejuan Valdez at [facility]  She was in the hospital until Friday and then she was discharged to home with HHPT    She had HHPT up until this past Tuesday  She had been back to see Dr Nelly Marie prior to this and she did have her staples removed  She is now referred to OPPT and she presents for her evaluation  She will be going back to see the doctor on 20 for her next appointment with him  Quality of life: good    Pain  At best pain rating: 3  At worst pain ratin  Location: R Knee - front of knee, into thigh and shin  Quality: dull ache, discomfort and sharp  Relieving factors: ice, rest and medications  Aggravating factors: standing, walking and stair climbing    Social Support  Steps to enter house: yes  Stairs in house: yes   Lives in: multiple-level home  Lives with: spouse    Employment status: not working    Diagnostic Tests  X-ray: abnormal (arthrits - per pt )  Treatments  Previous treatment: injection treatment  Discharged from (in last 30 days): home health care  Patient Goals  Patient goals for therapy: increased motion, decreased pain, increased strength and improved balance  Patient goal: "To help with the pain in my knee, to do better after surgery "         Objective     Observations     Right Knee   Positive for edema and incision  Additional Observation Details  Incision present along anterior knee  Small scab present along distal knee  No redness or warmth, no active bleeding or drainage noted  Bruising along medial knee and lateral shin  Tenderness     Additional Tenderness Details  TTP along incision        Neurological Testing     Sensation     Knee   Left Knee   Intact: light touch    Right Knee   Intact: light touch     Active Range of Motion   Left Knee   Flexion: 115 degrees   Extension: 0 degrees     Right Knee   Flexion: 90 degrees with pain  Extension: -8 degrees with pain    Additional Active Range of Motion Details  L SLR: 50  R SLR: 45    Passive Range of Motion     Right Knee   Flexion: 94 degrees with pain  Extension: -5 degrees with pain    Mobility   Patellar Mobility:     Right Knee   Hypomobile: medial and lateral     Strength/Myotome Testing     Left Knee   Normal strength    Right Knee   Flexion: 3-  Extension: 3-  Quadriceps contraction: fair    Swelling     Left Knee Girth Measurement (cm)   Joint line: 41 5 cm    Right Knee Girth Measurement (cm)   Joint line: 43 7 cm    Ambulation   Weight-Bearing Status   Assistive device used: two-wheeled walker    Additional Weight-Bearing Status Details  Has SPC and QC at home, using [facility] for household ambulation  Also has grab bars in the shower, shower chair and raised toilet seat  Ambulation: Level Surfaces   Ambulation with assistive device: independent    Ambulation: Stairs   Ascend stairs: independent  Pattern: non-reciprocal  Railings: one rail  Descend stairs: independent  Pattern: non-reciprocal  Railings: one rail    Additional Stairs Ambulation Details  Using QC on the steps to go up and down  Observational Gait   Gait: antalgic   Decreased walking speed and right stance time  Additional Observational Gait Details  Lacks TKE on R with heel strike                 Precautions: None  Re-Eval DUE: 8/16/20    Manuals 6/22/20 7/16/20      RLE  NV                              Neuro Re-Ed        BOSU Lunges        SLS        Tandem Stance        Biodex - LOS        Biodex - LOS        Biodex - Maze        Biodex - Maze        Biodex - Random Cont        Biodex - Random Cont        Ther Ex        NuStep  Level 1  8 minutes      Stand SLR x 3  1 x 10 each       Marches  1 x 10      Squats  1 x 10       HR/TR  1 x 10 each      Ham Curls        TKE w/TBand        Stepups F/L  For C 1 x 10       LAQ        Hams Curl with TBand        QSet        SAQ        SLR        Hip Add w/Ball        Hip Abd with TBand        Bridges        Heel Slides        S/L Hip Abd        Ther Activity                        Gait Training                        Modalities        CP prn  Seated w/foot on stool 10 mins                  Reviewed HEP with patient for Stand SLR x 3 ways, squats, marches, heel raises, LAQ, QSets and heel slides  She verbalized understanding    Did not issue patient pictures as she had them from home care therapist

## 2020-07-16 ENCOUNTER — EVALUATION (OUTPATIENT)
Dept: PHYSICAL THERAPY | Facility: HOME HEALTHCARE | Age: 75
End: 2020-07-16
Payer: MEDICARE

## 2020-07-16 DIAGNOSIS — M17.11 PRIMARY OSTEOARTHRITIS OF RIGHT KNEE: ICD-10-CM

## 2020-07-16 DIAGNOSIS — Z96.651 STATUS POST TOTAL RIGHT KNEE REPLACEMENT: Primary | ICD-10-CM

## 2020-07-16 PROCEDURE — 97164 PT RE-EVAL EST PLAN CARE: CPT | Performed by: PHYSICAL THERAPIST

## 2020-07-16 PROCEDURE — 97110 THERAPEUTIC EXERCISES: CPT | Performed by: PHYSICAL THERAPIST

## 2020-07-16 PROCEDURE — 97535 SELF CARE MNGMENT TRAINING: CPT | Performed by: PHYSICAL THERAPIST

## 2020-07-20 ENCOUNTER — OFFICE VISIT (OUTPATIENT)
Dept: PHYSICAL THERAPY | Facility: HOME HEALTHCARE | Age: 75
End: 2020-07-20
Payer: MEDICARE

## 2020-07-20 DIAGNOSIS — Z01.812 PRE-OPERATIVE LABORATORY EXAMINATION: ICD-10-CM

## 2020-07-20 DIAGNOSIS — M17.11 PRIMARY OSTEOARTHRITIS OF RIGHT KNEE: Primary | ICD-10-CM

## 2020-07-20 DIAGNOSIS — Z96.651 STATUS POST TOTAL RIGHT KNEE REPLACEMENT: ICD-10-CM

## 2020-07-20 PROCEDURE — 97140 MANUAL THERAPY 1/> REGIONS: CPT

## 2020-07-20 PROCEDURE — 97150 GROUP THERAPEUTIC PROCEDURES: CPT

## 2020-07-20 NOTE — PROGRESS NOTES
Daily Note     Today's date: 2020  Patient name: Pat Hoffman  : 1945  MRN: [id number]  Referring provider: Thien Ramirez DO  Dx:   Encounter Diagnosis     ICD-10-CM    1  Primary osteoarthritis of right knee M17 11    2  Status post total right knee replacement Z96 651    3  Pre-operative laboratory examination Z01 812               Subjective: Pt reports she feels shaky today so she went back to using her RW today  Objective: See treatment diary below      Assessment: Tolerated treatment fairly well  Verbal cues needed t/o TE to perform correctly  Pt reports pain R knee t/o TE  ROM and strength deficits noted R knee/LE t/o session  Patient would benefit from continued PT      Plan: Continue per plan of care        Precautions: None  Re-Eval DUE: 20    Manuals 20     RLE  NV 10 min                              Neuro Re-Ed        BOSU Lunges        SLS        Tandem Stance        Biodex - LOS        Biodex - LOS        Biodex - Maze        Biodex - Maze        Biodex - Random Cont        Biodex - Random Cont        Ther Ex        NuStep  Level 1  8 minutes Level 1   10 min      Stand SLR x 3  1 x 10 each  1 x 10 ea      Marches  1 x 10 1 x 10      Squats  1 x 10  1 x 10      HR/TR  1 x 10 each 1 x 10 ea      Ham Curls   1 x 10      TKE w/TBand        Stepups F/L  For C 1 x 10  For C 1 x 10      LAQ   5" 1 x 15      Hams Curl with TBand        QSet   5" 1 x 15      SAQ   5" 1 x 15      SLR   1 x 15      Hip Add w/Ball   1x 15 5"     Hip Abd with TBand   Red 1 x 15      Bridges        Heel Slides   1 x 15      S/L Hip Abd        Ther Activity                        Gait Training                        Modalities        CP prn  Seated w/foot on stool 10 mins Seated on elevated high mat table   10 min

## 2020-07-22 ENCOUNTER — OFFICE VISIT (OUTPATIENT)
Dept: PHYSICAL THERAPY | Facility: HOME HEALTHCARE | Age: 75
End: 2020-07-22
Payer: MEDICARE

## 2020-07-22 ENCOUNTER — APPOINTMENT (OUTPATIENT)
Dept: LAB | Facility: HOSPITAL | Age: 75
End: 2020-07-22
Attending: INTERNAL MEDICINE
Payer: MEDICARE

## 2020-07-22 ENCOUNTER — TRANSCRIBE ORDERS (OUTPATIENT)
Dept: ADMINISTRATIVE | Facility: HOSPITAL | Age: 75
End: 2020-07-22

## 2020-07-22 ENCOUNTER — APPOINTMENT (OUTPATIENT)
Dept: LAB | Facility: HOSPITAL | Age: 75
End: 2020-07-22
Attending: FAMILY MEDICINE
Payer: MEDICARE

## 2020-07-22 DIAGNOSIS — I10 ESSENTIAL HYPERTENSION, MALIGNANT: Primary | ICD-10-CM

## 2020-07-22 DIAGNOSIS — N18.2 STAGE 2 CHRONIC KIDNEY DISEASE: ICD-10-CM

## 2020-07-22 DIAGNOSIS — E55.9 VITAMIN D DEFICIENCY: ICD-10-CM

## 2020-07-22 DIAGNOSIS — E03.9 ACQUIRED HYPOTHYROIDISM: ICD-10-CM

## 2020-07-22 DIAGNOSIS — Z13.9 SCREENING FOR UNSPECIFIED CONDITION: ICD-10-CM

## 2020-07-22 DIAGNOSIS — Z96.651 STATUS POST TOTAL RIGHT KNEE REPLACEMENT: ICD-10-CM

## 2020-07-22 DIAGNOSIS — R73.03 PREDIABETES: ICD-10-CM

## 2020-07-22 DIAGNOSIS — E87.1 HYPONATREMIA: ICD-10-CM

## 2020-07-22 DIAGNOSIS — I10 ESSENTIAL HYPERTENSION, MALIGNANT: ICD-10-CM

## 2020-07-22 DIAGNOSIS — M17.11 PRIMARY OSTEOARTHRITIS OF RIGHT KNEE: Primary | ICD-10-CM

## 2020-07-22 LAB
25(OH)D3 SERPL-MCNC: 34.6 NG/ML (ref 30–100)
ANION GAP SERPL CALCULATED.3IONS-SCNC: 5 MMOL/L (ref 4–13)
BACTERIA UR QL AUTO: ABNORMAL /HPF
BASOPHILS # BLD AUTO: 0.02 THOUSANDS/ΜL (ref 0–0.1)
BASOPHILS NFR BLD AUTO: 1 % (ref 0–1)
BILIRUB UR QL STRIP: NEGATIVE
BUN SERPL-MCNC: 6 MG/DL (ref 5–25)
CALCIUM SERPL-MCNC: 9.5 MG/DL (ref 8.3–10.1)
CHLORIDE SERPL-SCNC: 91 MMOL/L (ref 100–108)
CHOLEST SERPL-MCNC: 201 MG/DL (ref 50–200)
CLARITY UR: ABNORMAL
CO2 SERPL-SCNC: 28 MMOL/L (ref 21–32)
COLOR UR: YELLOW
CREAT SERPL-MCNC: 0.93 MG/DL (ref 0.6–1.3)
CREAT UR-MCNC: 15.3 MG/DL
EOSINOPHIL # BLD AUTO: 0.2 THOUSAND/ΜL (ref 0–0.61)
EOSINOPHIL NFR BLD AUTO: 6 % (ref 0–6)
ERYTHROCYTE [DISTWIDTH] IN BLOOD BY AUTOMATED COUNT: 13.2 % (ref 11.6–15.1)
EST. AVERAGE GLUCOSE BLD GHB EST-MCNC: 97 MG/DL
GFR SERPL CREATININE-BSD FRML MDRD: 61 ML/MIN/1.73SQ M
GLUCOSE P FAST SERPL-MCNC: 99 MG/DL (ref 65–99)
GLUCOSE UR STRIP-MCNC: NEGATIVE MG/DL
HBA1C MFR BLD: 5 %
HCT VFR BLD AUTO: 34.8 % (ref 34.8–46.1)
HGB BLD-MCNC: 11.6 G/DL (ref 11.5–15.4)
HGB UR QL STRIP.AUTO: NEGATIVE
IMM GRANULOCYTES # BLD AUTO: 0.01 THOUSAND/UL (ref 0–0.2)
IMM GRANULOCYTES NFR BLD AUTO: 0 % (ref 0–2)
KETONES UR STRIP-MCNC: NEGATIVE MG/DL
LDLC SERPL DIRECT ASSAY-MCNC: 86 MG/DL (ref 0–100)
LEUKOCYTE ESTERASE UR QL STRIP: NEGATIVE
LYMPHOCYTES # BLD AUTO: 0.88 THOUSANDS/ΜL (ref 0.6–4.47)
LYMPHOCYTES NFR BLD AUTO: 27 % (ref 14–44)
MCH RBC QN AUTO: 30 PG (ref 26.8–34.3)
MCHC RBC AUTO-ENTMCNC: 33.3 G/DL (ref 31.4–37.4)
MCV RBC AUTO: 90 FL (ref 82–98)
MICROALBUMIN UR-MCNC: <5 MG/L (ref 0–20)
MICROALBUMIN/CREAT 24H UR: <33 MG/G CREATININE (ref 0–30)
MONOCYTES # BLD AUTO: 0.37 THOUSAND/ΜL (ref 0.17–1.22)
MONOCYTES NFR BLD AUTO: 11 % (ref 4–12)
NEUTROPHILS # BLD AUTO: 1.84 THOUSANDS/ΜL (ref 1.85–7.62)
NEUTS SEG NFR BLD AUTO: 55 % (ref 43–75)
NITRITE UR QL STRIP: NEGATIVE
NON-SQ EPI CELLS URNS QL MICRO: ABNORMAL /HPF
NRBC BLD AUTO-RTO: 0 /100 WBCS
PH UR STRIP.AUTO: 7.5 [PH]
PLATELET # BLD AUTO: 208 THOUSANDS/UL (ref 149–390)
PMV BLD AUTO: 8.8 FL (ref 8.9–12.7)
POTASSIUM SERPL-SCNC: 4.6 MMOL/L (ref 3.5–5.3)
PROT UR STRIP-MCNC: NEGATIVE MG/DL
RBC # BLD AUTO: 3.87 MILLION/UL (ref 3.81–5.12)
RBC #/AREA URNS AUTO: ABNORMAL /HPF
SODIUM SERPL-SCNC: 124 MMOL/L (ref 136–145)
SP GR UR STRIP.AUTO: 1.01 (ref 1–1.03)
T4 FREE SERPL-MCNC: 1.05 NG/DL (ref 0.76–1.46)
TSH SERPL DL<=0.05 MIU/L-ACNC: 3.82 UIU/ML (ref 0.36–3.74)
URATE CRY URNS QL MICRO: ABNORMAL /HPF
UROBILINOGEN UR QL STRIP.AUTO: 0.2 E.U./DL
WBC # BLD AUTO: 3.32 THOUSAND/UL (ref 4.31–10.16)
WBC #/AREA URNS AUTO: ABNORMAL /HPF

## 2020-07-22 PROCEDURE — 82306 VITAMIN D 25 HYDROXY: CPT

## 2020-07-22 PROCEDURE — 82465 ASSAY BLD/SERUM CHOLESTEROL: CPT

## 2020-07-22 PROCEDURE — 80048 BASIC METABOLIC PNL TOTAL CA: CPT

## 2020-07-22 PROCEDURE — 85025 COMPLETE CBC W/AUTO DIFF WBC: CPT

## 2020-07-22 PROCEDURE — 97110 THERAPEUTIC EXERCISES: CPT

## 2020-07-22 PROCEDURE — 82570 ASSAY OF URINE CREATININE: CPT | Performed by: INTERNAL MEDICINE

## 2020-07-22 PROCEDURE — 83721 ASSAY OF BLOOD LIPOPROTEIN: CPT

## 2020-07-22 PROCEDURE — 84439 ASSAY OF FREE THYROXINE: CPT

## 2020-07-22 PROCEDURE — 97140 MANUAL THERAPY 1/> REGIONS: CPT

## 2020-07-22 PROCEDURE — 81001 URINALYSIS AUTO W/SCOPE: CPT | Performed by: INTERNAL MEDICINE

## 2020-07-22 PROCEDURE — 83036 HEMOGLOBIN GLYCOSYLATED A1C: CPT

## 2020-07-22 PROCEDURE — 84443 ASSAY THYROID STIM HORMONE: CPT

## 2020-07-22 PROCEDURE — 82043 UR ALBUMIN QUANTITATIVE: CPT | Performed by: INTERNAL MEDICINE

## 2020-07-22 PROCEDURE — 36415 COLL VENOUS BLD VENIPUNCTURE: CPT

## 2020-07-22 NOTE — PROGRESS NOTES
Daily Note     Today's date: 2020  Patient name: Leonardo Zarate  : 1945  MRN: [id number]  Referring provider: Rickie Siemens, DO  Dx:   Encounter Diagnosis     ICD-10-CM    1  Primary osteoarthritis of right knee M17 11    2  Status post total right knee replacement Z96 651               Subjective: Pt reports she is sore today and has 6/10 pain in her R knee  Objective: See treatment diary below      Assessment: Tolerated treatment fairly well  Verbal cues needed t/o TE to perform correctly  ROM and strength deficits noted R Knee/LE  No increased pain reported t/o session  Patient would benefit from continued PT      Plan: Continue per plan of care        Precautions: None  Re-Eval DUE: 20    Manuals 20    RLE  NV 10 min  10 min                             Neuro Re-Ed        BOSU Lunges        SLS        Tandem Stance        Biodex - LOS        Biodex - LOS        Biodex - Maze        Biodex - Maze        Biodex - Random Cont        Biodex - Random Cont        Ther Ex        NuStep  Level 1  8 minutes Level 1   10 min  Level 1   10 min     Stand SLR x 3  1 x 10 each  1 x 10 ea  1 x 10 ea     Marches  1 x 10 1 x 10  1 x 10     Squats  1 x 10  1 x 10  1 x 10     HR/TR  1 x 10 each 1 x 10 ea  1 x 10 ea     Ham Curls   1 x 10  1 x 10     TKE w/TBand        Stepups F/L  For C 1 x 10  For C 1 x 10  For C 1 x 10     LAQ   5" 1 x 15  5" 1 x 15     Hams Curl with TBand        QSet   5" 1 x 15  5" 1 x 15     SAQ   5" 1 x 15  5" 1 x 15     SLR   1 x 15  1 x 15     Hip Add w/Ball   1x 15 5" 1 x 15 5"    Hip Abd with TBand   Red 1 x 15  Red 1 x 15     Bridges        Heel Slides   1 x 15  1 x 15     S/L Hip Abd        Ther Activity                        Gait Training                        Modalities        CP prn  Seated w/foot on stool 10 mins Seated on elevated high mat table   10 min  Seated on elevated high mat table   10 min

## 2020-07-27 ENCOUNTER — OFFICE VISIT (OUTPATIENT)
Dept: PHYSICAL THERAPY | Facility: HOME HEALTHCARE | Age: 75
End: 2020-07-27
Payer: MEDICARE

## 2020-07-27 DIAGNOSIS — Z96.651 STATUS POST TOTAL RIGHT KNEE REPLACEMENT: ICD-10-CM

## 2020-07-27 DIAGNOSIS — E87.1 HYPONATREMIA: Primary | ICD-10-CM

## 2020-07-27 DIAGNOSIS — M17.11 PRIMARY OSTEOARTHRITIS OF RIGHT KNEE: Primary | ICD-10-CM

## 2020-07-27 PROCEDURE — 97140 MANUAL THERAPY 1/> REGIONS: CPT

## 2020-07-27 PROCEDURE — 97110 THERAPEUTIC EXERCISES: CPT

## 2020-07-27 NOTE — PROGRESS NOTES
Daily Note     Today's date: 2020  Patient name: Marianna Edwards  : 1945  MRN: [id number]  Referring provider: Barbra Herrera DO  Dx:   Encounter Diagnosis     ICD-10-CM    1  Primary osteoarthritis of right knee M17 11    2  Status post total right knee replacement Z96 651               Subjective: Pt reports she is not good today, her R knee is sore and painful  Objective: See treatment diary below      Assessment: Tolerated treatment fair  Pt reports some increased pain R knee with exercise  Verbal cues needed t/o TE to perform correctly  ROM and strength deficits noted R knee/LE  Patient would benefit from continued PT      Plan: Continue per plan of care        Precautions: None  Re-Eval DUE: 20    Manuals 20   RLE  NV 10 min  10 min  10 min                            Neuro Re-Ed        BOSU Lunges        SLS        Tandem Stance        Biodex - LOS        Biodex - LOS        Biodex - Maze        Biodex - Maze        Biodex - Random Cont        Biodex - Random Cont        Ther Ex        NuStep  Level 1  8 minutes Level 1   10 min  Level 1   10 min  L2  10 min   Stand SLR x 3  1 x 10 each  1 x 10 ea  1 x 10 ea  1 x 10 ea    Marches  1 x 10 1 x 10  1 x 10  1 x 10    Squats  1 x 10  1 x 10  1 x 10  1 x 10    HR/TR  1 x 10 each 1 x 10 ea  1 x 10 ea  1 x 10 ea    Ham Curls   1 x 10  1 x 10  1 x 10    TKE w/TBand        Stepups F/L  For C 1 x 10  For C 1 x 10  For C 1 x 10  For C 1 x 10    LAQ   5" 1 x 15  5" 1 x 15  5" 1 x 15    Hams Curl with TBand        QSet   5" 1 x 15  5" 1 x 15  5" 1 x 15    SAQ   5" 1 x 15  5" 1 x 15  5" 1 x 15    SLR   1 x 15  1 x 15  1 x 15    Hip Add w/Ball   1x 15 5" 1 x 15 5" 1 x 15 5"    Hip Abd with TBand   Red 1 x 15  Red 1 x 15  Red 1 x 15    Bridges        Heel Slides   1 x 15  1 x 15  1 x 15    S/L Hip Abd        Ther Activity                        Gait Training                        Modalities        CP prn  Seated w/foot on stool 10 mins Seated on elevated high mat table   10 min  Seated on elevated high mat table   10 min  Seated on elevated high mat table  10 min

## 2020-07-29 ENCOUNTER — OFFICE VISIT (OUTPATIENT)
Dept: PHYSICAL THERAPY | Facility: HOME HEALTHCARE | Age: 75
End: 2020-07-29
Payer: MEDICARE

## 2020-07-29 DIAGNOSIS — M17.11 PRIMARY OSTEOARTHRITIS OF RIGHT KNEE: Primary | ICD-10-CM

## 2020-07-29 PROCEDURE — 97140 MANUAL THERAPY 1/> REGIONS: CPT

## 2020-07-29 PROCEDURE — 97110 THERAPEUTIC EXERCISES: CPT

## 2020-07-29 NOTE — PROGRESS NOTES
Daily Note     Today's date: 2020  Patient name: Wendy Lai  : 1945  MRN: [id number]  Referring provider: Steph Andrade DO  Dx:   Encounter Diagnosis     ICD-10-CM    1  Primary osteoarthritis of right knee M17 11               Subjective: I have pain of 6/10 today  I am not happy with this knee  I am not improving as quickly as I thought I would  Objective: See treatment diary below    Assessment: Tolerated treatment well  Added BOSU lunge and T-band HS curl without incident  Pt progressing slowly and consistently with PT  Pt reports same pain of 6/10 t/o session  Patient would benefit from continued PT    Plan: Continue per plan of care        Precautions: None  Re-Eval DUE: 20    Manuals 20   RLE 10' NV 10 min  10 min  10 min                            Neuro Re-Ed        BOSU Lunges 1 x 10       SLS        Tandem Stance        Biodex - LOS        Biodex - LOS        Biodex - Maze        Biodex - Maze        Biodex - Random Cont        Biodex - Random Cont        Ther Ex 20       NuStep L 2  10' Level 1  8 minutes Level 1   10 min  Level 1   10 min  L2  10 min   Stand SLR x 3 1 x 10 ea 1 x 10 each  1 x 10 ea  1 x 10 ea  1 x 10 ea    Marches 1 x 15 1 x 10 1 x 10  1 x 10  1 x 10    Squats 1 x 15 1 x 10  1 x 10  1 x 10  1 x 10    HR/TR 1 x 15 1 x 10 each 1 x 10 ea  1 x 10 ea  1 x 10 ea    Ham Curls 1 x 15  1 x 10  1 x 10  1 x 10    TKE w/TBand        Stepups F/L Fwd C 1 x 10 For C 1 x 10  For C 1 x 10  For C 1 x 10  For C 1 x 10    LAQ 5" 1  x15  5" 1 x 15  5" 1 x 15  5" 1 x 15    Hams Curl with TBand Red 1 x 15       QSet 5" 1 x 15  5" 1 x 15  5" 1 x 15  5" 1 x 15    SAQ 5" 1 x 15  5" 1 x 15  5" 1 x 15  5" 1 x 15    SLR 1 x 15  1 x 15  1 x 15  1 x 15    Hip Add w/Ball 5" 1  x15  1x 15 5" 1 x 15 5" 1 x 15 5"    Hip Abd with TBand Red 1 x 15  Red 1 x 15  Red 1 x 15  Red 1 x 15    Bridges        Heel Slides 1 x 15  1 x 15  1 x 15  1 x 15    S/L Hip Abd        Ther Activity                        Gait Training                        Modalities        CP prn 10' Seated w/foot on stool 10 mins Seated on elevated high mat table   10 min  Seated on elevated high mat table   10 min  Seated on elevated high mat table  10 min

## 2020-07-30 NOTE — PROGRESS NOTES
Nephrology   Office [address] Garcia 70 y o  female MRN: [id number]    Encounter: [id number]        [facility] Rod Aponte was seen in the [place] office  Diagnoses and all orders for this visit:    1  Stage 2 chronic kidney disease  · Record review indicates baseline creatinine 0 8-0 9 mg/dL  She is within her known baseline  Etiology likely hypertensive nephrosclerosis and blood pressure controlled with current regimen including Cozaar  She remains on PPI  Mac ratio less than 33 milligrams/grams  · Continue to avoid nephrotoxins and NSAIDs  2  Hyperaldosteronism (HCC)  · Blood pressure stable at this time  Continue spironolactone 50 mg daily  3  Hypertensive nephrosclerosis, stage 1 through stage 4 or unspecified chronic kidney disease  · Blood pressure well controlled on current regimen  4  Euvolemic Hyponatremia  · Due to low solute intake and not restricting fluid  I have advised her to restrict her fluid to 60 mL per day  Will attempt to increase her protein intake with yogurt, protein supplements, eggs  She has been having nausea and Zofran will be prescribed for her  · Urine osmolality was checked in hospital stay and showed appropriate ability to dilute urine  She does have chronic pain for the right knee which is new since surgical intervention done in June and is at risk for ADH over secretion   -     Comprehensive metabolic panel; Future   -     Sodium, urine, random; Future   -     Osmolality, urine; Future  5  Non-intractable vomiting with nausea, unspecified vomiting type  · Patient is suffering from nausea and vomiting causing low oral intake which is attributing to hyponatremia  She requires anti nausea medication   -     ondansetron (ZOFRAN-ODT) 4 mg disintegrating tablet; Take 1 tablet (4 mg total) by mouth every 6 (six) hours as needed for nausea or vomiting  6   Osteoarthritis of right knee  · Status post right total knee replacement in June by Dr Isadora Shankar now receiving physical therapy      HPI: Italia Canas is a 70 y o  female with an active problem list significant for hypertension, hyperaldosteronism, CKD 2, hyponatremia, who is here for follow-up  Jacklyn's primary nephrologist is Dr Gurmeet Rodgers and their last appointment was April of this year  Lenora Mcdaniel was then hospitalized at [address] 6/24-6/26 due to osteoarthritis right knee s/p elective right total knee replacement  She now receives physical therapy  Discussion with the patient she states that she has been having a lot of pain in her right knee at the surgical site  She does have opioid pain medication but can only tail tolerate half of a tablet prior to physical therapy  She has been having a lot of nausea which is making it difficult for her to eat  We discussed trying to increase her protein intake with a eggs or yogurt  We discussed staying away from simple carbs  We discussed the importance of a 60 oz fluid restriction per day and the mechanism of low sodium in this case  Will prescribe Zofran oral disintegrating tablets as she was treated with this in the hospital for her nausea  Otherwise, she is doing okay  Will have her get repeat blood work in 1 month and follow up with primary nephrologist in 3 months to ensure Sodium stability  The medical record, including Care Everywhere and media tabs were reviewed  ROS:   All the systems were reviewed and were negative except as documented on the HPI  Allergies: Procaine; Adhesive [medical tape];  Lidocaine; and Penicillins    Medications:   Current Outpatient Medications:     bisoprolol (ZEBETA) 10 MG tablet, TAKE 1 TABLET EVERY DAY (Patient taking differently: Take 10 mg by mouth every morning ), Disp: 90 tablet, Rfl: 3    butalbital-acetaminophen-caffeine-codeine (FIORICET WITH CODEINE) -70-30 MG per capsule, Take 1 capsule by mouth every 4 (four) hours as needed for headaches, Disp: , Rfl:     calcium carbonate (OS-MARLENY) 600 MG tablet, Take 600 mg by mouth 2 (two) times a day with meals, Disp: , Rfl:     carboxymethylcellulose 0 5 % SOLN, 1 drop 3 (three) times a day as needed for dry eyes, Disp: , Rfl:     LORazepam (ATIVAN) 1 mg tablet, Take 1 mg by mouth 2 (two) times a day as needed for anxiety, Disp: , Rfl:     losartan (COZAAR) 100 MG tablet, Take 1 tablet (100 mg total) by mouth every morning, Disp: 90 tablet, Rfl: 3    Multiple Vitamins-Minerals (MULTIVITAMIN WITH MINERALS) tablet, Take 1 tablet by mouth every morning, Disp: , Rfl:     omeprazole (PriLOSEC) 20 mg delayed release capsule, Take 20 mg by mouth every morning  , Disp: , Rfl:     oxyCODONE-acetaminophen (PERCOCET) 5-325 mg per tablet, Take 1 tablet by mouth every 4 (four) hours as needed for moderate painMax Daily Amount: 6 tablets, Disp: 30 tablet, Rfl: 0    pravastatin (PRAVACHOL) 20 mg tablet, Take 20 mg by mouth 2 (two) times a day , Disp: , Rfl:     spironolactone (ALDACTONE) 50 mg tablet, Take 1 tablet (50 mg total) by mouth daily after lunch, Disp: 90 tablet, Rfl: 3    zolpidem (AMBIEN) 10 mg tablet, Take 10 mg by mouth daily at bedtime as needed , Disp: , Rfl:     aspirin (ECOTRIN) 325 mg EC tablet, Take 1 tablet (325 mg total) by mouth 2 (two) times a day (Patient taking differently: Take 325 mg by mouth daily ), Disp: 30 tablet, Rfl: 0    ondansetron (ZOFRAN-ODT) 4 mg disintegrating tablet, Take 1 tablet (4 mg total) by mouth every 6 (six) hours as needed for nausea or vomiting, Disp: 20 tablet, Rfl: 0    Past Medical History:   Diagnosis Date    Anxiety     Arthritis     Benign arteriolar nephrosclerosis     Chronic kidney disease in type 2 diabetes mellitus (HCC)     Chronic pain disorder     GERD (gastroesophageal reflux disease)     Heart murmur     Hyperaldosteronism (HCC)     Hyperlipidemia     Hypertension     Hypo-osmolality and hyponatremia     IBS (irritable bowel syndrome)     Insomnia     Mitral regurgitation     Obstructive sleep apnea syndrome     Osteoarthritis     Pernicious anemia     Stroke ([facility] [address] )     tia     Past Surgical History:   Procedure Laterality Date    APPENDECTOMY      CARPAL TUNNEL RELEASE Bilateral     COLONOSCOPY      several    HAND SURGERY Right     ring finger has pin    HYSTERECTOMY      32    INSERT / REPLACE PERIPHERAL NEUROSTIMULATOR PULSE GENERATOR /  Left     buttocks    JOINT REPLACEMENT Left     knee    JOINT REPLACEMENT Right 04/2019    Hip    KNEE ARTHROSCOPY Bilateral     NASAL SEPTUM SURGERY  2008    NERVE BLOCK Left 2/20/2018    Procedure: BLOCK MEDIAL BRANCH - C4, C5, C6;  Surgeon: Jana Crockett MD;  Location: [facility];  Service: Pain Management     NERVE BLOCK Left 3/6/2018    Procedure: C4, C5, C6 MEDIAL BRANCH BLOCK;  Surgeon: Jana Crockett MD;  Location: [facility];  Service: Pain Management     CO COLONOSCOPY FLX DX W/COLLJ SPEC WHEN PFRMD N/A 4/24/2017    Procedure: Kel Lopez;  Surgeon: Cristina Chavarria MD;  Location: [facility];  Service: Colorectal    CO TOTAL HIP ARTHROPLASTY Right 2/27/2019    Procedure: ARTHROPLASTY HIP TOTAL;  Surgeon: Turner Dakin, DO;  Location: [address] [place] [address] MAIN OR;  Service: Orthopedics    CO TOTAL KNEE ARTHROPLASTY Right 6/24/2020    Procedure: KNEE TOTAL ARTHROPLASTY;  Surgeon: Turner Dakin, DO;  Location: [address] [place] [address] MAIN OR;  Service: Orthopedics    RADIOFREQUENCY ABLATION Left 4/17/2018    Procedure: ABLATION RADIO FREQUENCY (RFA) - C4, C5, C6;  Surgeon: Jana Crockett MD;  Location: [facility];  Service: Pain Management     SINUS SURGERY      TONSILLECTOMY      TOTAL HIP ARTHROPLASTY Left     UPPER GASTROINTESTINAL ENDOSCOPY       Family History   Problem Relation Age of Onset    Heart disease Mother     Hypertension Mother    Cassandra  Arthritis Mother     Dementia Mother     Rheum arthritis Mother     Hypertension Father     Heart disease Father     Colon cancer Father     Hypertension Sister     Anxiety disorder Sister     Thyroid disease Sister     Prostate cancer Maternal Grandfather     No Known Problems Paternal Grandmother     No Known Problems Paternal Grandfather     No Known Problems Paternal Aunt     Pseudochol deficiency Neg Hx       reports that she has quit smoking  She has never used smokeless tobacco  She reports that she drinks alcohol  She reports that she does not use drugs  Physical Exam:   Vitals:    07/31/20 1027   BP: 136/62   Pulse: 62   Temp: 97 6 °F (36 4 °C)   TempSrc: Tympanic   SpO2: 99%   Weight: 78 9 kg (174 lb)   Height: 5' 3" (1 6 m)     Body mass index is 30 82 kg/m²  General: conscious, cooperative, in no acute distress  Eyes: conjunctivae pink, anicteric sclerae  ENT: lips and mucous membranes moist  Neck: no masses, flat neck veins  Chest: clear breath sounds bilateral, no crackles, ronchus or wheezings  CVS: distinct S1 & S2, normal rate, regular rhythm  Abdomen: soft, non-tender, non-distended, normoactive bowel sounds  Extremities:  Right knee surgical site incision clean dry intact  Edema 2 right knee  Skin: no rash  Neuro: awake, alert, oriented      Diagnostic Data:  Lab: I have personally reviewed pertinent lab results  ,   CBC:       CMP: No results found for: SODIUM, K, CL, CO2, ANIONGAP, BUN, CREATININE, GLUCOSE, CALCIUM, AST, ALT, ALKPHOS, PROT, BILITOT, EGFR,   PT/INR: No results found for: PT, INR,   Magnesium: No components found for: MAG,  Phosphorous: No results found for: PHOS    Discussion:    Patient Instructions   Try to increase protein in diet  - try eggs, yogurt, meat if you can  Try boost or ensure  You can try the protein bars from the grocery store  Fluid restrict to 60 ounces per day  Get blood work and urine done next month  Follow up in 3 months      Portions of the record may have been created with voice recognition software   Occasional wrong word or "sound a like" substitutions may have occurred due to the inherent limitations of voice recognition software  Read the chart carefully and recognize, using context, where substitutions have occurred  If you have any questions, please contact the dictating provider

## 2020-07-31 ENCOUNTER — OFFICE VISIT (OUTPATIENT)
Dept: NEPHROLOGY | Facility: CLINIC | Age: 75
End: 2020-07-31
Payer: MEDICARE

## 2020-07-31 VITALS
HEIGHT: 63 IN | DIASTOLIC BLOOD PRESSURE: 62 MMHG | BODY MASS INDEX: 30.83 KG/M2 | OXYGEN SATURATION: 99 % | HEART RATE: 62 BPM | SYSTOLIC BLOOD PRESSURE: 136 MMHG | TEMPERATURE: 97.6 F | WEIGHT: 174 LBS

## 2020-07-31 DIAGNOSIS — N18.2 STAGE 2 CHRONIC KIDNEY DISEASE: Primary | ICD-10-CM

## 2020-07-31 DIAGNOSIS — E26.9 HYPERALDOSTERONISM (HCC): ICD-10-CM

## 2020-07-31 DIAGNOSIS — I12.9 HYPERTENSIVE NEPHROSCLEROSIS, STAGE 1 THROUGH STAGE 4 OR UNSPECIFIED CHRONIC KIDNEY DISEASE: ICD-10-CM

## 2020-07-31 DIAGNOSIS — E87.1 HYPONATREMIA: ICD-10-CM

## 2020-07-31 DIAGNOSIS — R11.2 NON-INTRACTABLE VOMITING WITH NAUSEA, UNSPECIFIED VOMITING TYPE: ICD-10-CM

## 2020-07-31 PROCEDURE — 3075F SYST BP GE 130 - 139MM HG: CPT | Performed by: NURSE PRACTITIONER

## 2020-07-31 PROCEDURE — 99213 OFFICE O/P EST LOW 20 MIN: CPT | Performed by: NURSE PRACTITIONER

## 2020-07-31 PROCEDURE — 1160F RVW MEDS BY RX/DR IN RCRD: CPT | Performed by: NURSE PRACTITIONER

## 2020-07-31 PROCEDURE — 3078F DIAST BP <80 MM HG: CPT | Performed by: NURSE PRACTITIONER

## 2020-07-31 PROCEDURE — 3008F BODY MASS INDEX DOCD: CPT | Performed by: NURSE PRACTITIONER

## 2020-07-31 PROCEDURE — 1111F DSCHRG MED/CURRENT MED MERGE: CPT | Performed by: NURSE PRACTITIONER

## 2020-07-31 PROCEDURE — 1036F TOBACCO NON-USER: CPT | Performed by: NURSE PRACTITIONER

## 2020-07-31 RX ORDER — ONDANSETRON 4 MG/1
4 TABLET, ORALLY DISINTEGRATING ORAL EVERY 6 HOURS PRN
Qty: 20 TABLET | Refills: 0 | Status: SHIPPED | OUTPATIENT
Start: 2020-07-31 | End: 2020-07-31

## 2020-07-31 RX ORDER — ONDANSETRON 4 MG/1
4 TABLET, ORALLY DISINTEGRATING ORAL EVERY 6 HOURS PRN
Qty: 20 TABLET | Refills: 0 | Status: SHIPPED | OUTPATIENT
Start: 2020-07-31 | End: 2020-08-18

## 2020-07-31 NOTE — PATIENT INSTRUCTIONS
Try to increase protein in diet  - try eggs, yogurt, meat if you can  Try boost or ensure   You can try the protein bars from the grocery store  Fluid restrict to 60 ounces per day  Get blood work and urine done next month  Follow up in 3 months

## 2020-08-03 ENCOUNTER — TELEPHONE (OUTPATIENT)
Dept: OBGYN CLINIC | Facility: CLINIC | Age: 75
End: 2020-08-03

## 2020-08-03 ENCOUNTER — OFFICE VISIT (OUTPATIENT)
Dept: PHYSICAL THERAPY | Facility: HOME HEALTHCARE | Age: 75
End: 2020-08-03
Payer: MEDICARE

## 2020-08-03 DIAGNOSIS — Z96.651 STATUS POST TOTAL RIGHT KNEE REPLACEMENT: ICD-10-CM

## 2020-08-03 DIAGNOSIS — M17.11 PRIMARY OSTEOARTHRITIS OF RIGHT KNEE: Primary | ICD-10-CM

## 2020-08-03 PROCEDURE — 97140 MANUAL THERAPY 1/> REGIONS: CPT

## 2020-08-03 PROCEDURE — 97110 THERAPEUTIC EXERCISES: CPT

## 2020-08-03 NOTE — PROGRESS NOTES
Daily Note     Today's date: 8/3/2020  Patient name: Max Valerio  : 1945  MRN: [id number]  Referring provider: Kel Luz DO  Dx:   Encounter Diagnosis     ICD-10-CM    1  Primary osteoarthritis of right knee  M17 11    2  Status post total right knee replacement  Z96 651    3  Pre-operative laboratory examination  Z01 812                   Subjective: Pt reports she has pain and stiffness in her R knee  Objective: See treatment diary below      Assessment: Tolerated treatment fairly well  Verbal cues needed t/o TE to perform correctly  ROM and strength deficits remain noted R knee / LE  Patient would benefit from continued PT      Plan: Continue per plan of care        Precautions: None  Re-Eval DUE: 20    Manuals 7-29-20 8/3/20 7/20/20 7/22/20 7/27/20   RLE 10' 10 min 10 min  10 min  10 min                            Neuro Re-Ed        BOSU Lunges 1 x 10 1 x 15      SLS        Tandem Stance        Biodex - LOS        Biodex - LOS        Biodex - Maze        Biodex - Maze        Biodex - Random Cont        Biodex - Random Cont        Ther Ex 7-29-20 8/3/20      NuStep L 2  10' Level 2  10 minutes Level 1   10 min  Level 1   10 min  L2  10 min   Stand SLR x 3 1 x 10 ea 1 x 10 each  1 x 10 ea  1 x 10 ea  1 x 10 ea    Marches 1 x 15 1 x 15 1 x 10  1 x 10  1 x 10    Squats 1 x 15 1 x 15 1 x 10  1 x 10  1 x 10    HR/TR 1 x 15 1 x 15 each 1 x 10 ea  1 x 10 ea  1 x 10 ea    Ham Curls 1 x 15 1 x 15  1 x 10  1 x 10  1 x 10    TKE w/TBand        Stepups F/L Fwd C 1 x 10 For C 1 x 10  For C 1 x 10  For C 1 x 10  For C 1 x 10    LAQ 5" 1  x15 5" 1 x 15 5" 1 x 15  5" 1 x 15  5" 1 x 15    Hams Curl with TBand Red 1 x 15 Red 1 x 15       QSet 5" 1 x 15 5" 1 x 15  5" 1 x 15  5" 1 x 15  5" 1 x 15    SAQ 5" 1 x 15 5" 1 x 15  5" 1 x 15  5" 1 x 15  5" 1 x 15    SLR 1 x 15 1 x 15  1 x 15  1 x 15  1 x 15    Hip Add w/Ball 5" 1  x15 5" 1 x 15  1x 15 5" 1 x 15 5" 1 x 15 5"    Hip Abd with TBand Red 1 x 15 Red 1 x 15  Red 1 x 15  Red 1 x 15  Red 1 x 15    Bridges        Heel Slides 1 x 15 1 x 15  1 x 15  1 x 15  1 x 15    S/L Hip Abd        Ther Activity                        Gait Training                        Modalities        CP prn 10' Seated on elevated high mat table   10 min  Seated on elevated high mat table   10 min  Seated on elevated high mat table   10 min  Seated on elevated high mat table  10 min

## 2020-08-03 NOTE — TELEPHONE ENCOUNTER
F: [phone]  P [phone] [id number] [facility]   Darryl Demarco was calling to see if she could get the signed PT order faxed to her order was laced 07/08

## 2020-08-03 NOTE — TELEPHONE ENCOUNTER
Ronn Spencer called stating that the order placed for this patient is dated 07/08 for PT  The patient was discharged on 07/14  This order was placed prior to Outpatient Therapy      Fax #[phone]

## 2020-08-04 NOTE — TELEPHONE ENCOUNTER
I spoke to Cristina Gomez she is refaxing the order to me @ [phone] I will then have Dr Easton Cardenas sign and fax back

## 2020-08-05 ENCOUNTER — OFFICE VISIT (OUTPATIENT)
Dept: PHYSICAL THERAPY | Facility: HOME HEALTHCARE | Age: 75
End: 2020-08-05
Payer: MEDICARE

## 2020-08-05 DIAGNOSIS — Z96.651 STATUS POST TOTAL RIGHT KNEE REPLACEMENT: ICD-10-CM

## 2020-08-05 DIAGNOSIS — M17.11 PRIMARY OSTEOARTHRITIS OF RIGHT KNEE: Primary | ICD-10-CM

## 2020-08-05 PROCEDURE — 97110 THERAPEUTIC EXERCISES: CPT

## 2020-08-05 PROCEDURE — 97112 NEUROMUSCULAR REEDUCATION: CPT

## 2020-08-05 PROCEDURE — 97140 MANUAL THERAPY 1/> REGIONS: CPT

## 2020-08-05 NOTE — PROGRESS NOTES
Daily Note     Today's date: 2020  Patient name: Barney Finch  : 1945  MRN: [id number]  Referring provider: Praveen Tong DO  Dx:   Encounter Diagnosis     ICD-10-CM    1  Primary osteoarthritis of right knee  M17 11    2  Status post total right knee replacement  Z96 651               Subjective: Pt reports her R knee feels a little better today  Objective: See treatment diary below      Assessment: Tolerated treatment well  No increased pain reported t/o session  Added SLS and tandem stance to program today with balance deficits noted and requires assistance of parallel bars to complete  ROM and strength deficits still noted R knee/LE  Patient would benefit from continued PT      Plan: Continue per plan of care        Precautions: None  Re-Eval DUE: 20    Manuals 7-29-20 8/3/20 8/5/20 7/22/20 7/27/20   RLE 10' 10 min 10 min  10 min  10 min                            Neuro Re-Ed        BOSU Lunges 1 x 10 1 x 15 1 x 15      SLS   15" x 3     Tandem Stance   15" x 3     Biodex - LOS        Biodex - LOS        Biodex - Maze        Biodex - Maze        Biodex - Random Cont        Biodex - Random Cont        Ther Ex 7-29-20 8/3/20 8/5/20     NuStep L 2  10' Level 2  10 minutes Level 2   10 min  Level 1   10 min  L2  10 min   Stand SLR x 3 1 x 10 ea 1 x 10 each  1 x 15 ea  1 x 10 ea  1 x 10 ea    Marches 1 x 15 1 x 15 1 x 15 1 x 10  1 x 10    Squats 1 x 15 1 x 15 1 x 15 1 x 10  1 x 10    HR/TR 1 x 15 1 x 15 each 1 x 15 ea  1 x 10 ea  1 x 10 ea    Ham Curls 1 x 15 1 x 15  1 x 15  1 x 10  1 x 10    TKE w/TBand        Stepups F/L Fwd C 1 x 10 For C 1 x 10  For C 1 x 10  For C 1 x 10  For C 1 x 10    LAQ 5" 1  x15 5" 1 x 15 5" 1 x 15  5" 1 x 15  5" 1 x 15    Hams Curl with TBand Red 1 x 15 Red 1 x 15  Red 1 x 15      QSet 5" 1 x 15 5" 1 x 15  5" 1 x 15  5" 1 x 15  5" 1 x 15    SAQ 5" 1 x 15 5" 1 x 15  5" 1 x 15  5" 1 x 15  5" 1 x 15    SLR 1 x 15 1 x 15  1 x 15  1 x 15  1 x 15    Hip Add w/Ball 5" 1  x15 5" 1 x 15  1x 15 5" 1 x 15 5" 1 x 15 5"    Hip Abd with TBand Red 1 x 15 Red 1 x 15  Red 1 x 15  Red 1 x 15  Red 1 x 15    Bridges        Heel Slides 1 x 15 1 x 15  1 x 15  1 x 15  1 x 15    S/L Hip Abd        Ther Activity                        Gait Training                        Modalities        CP prn 10' Seated on elevated high mat table   10 min  Seated on elevated high mat table   10 min  Seated on elevated high mat table   10 min  Seated on elevated high mat table  10 min

## 2020-08-06 ENCOUNTER — OFFICE VISIT (OUTPATIENT)
Dept: OBGYN CLINIC | Facility: CLINIC | Age: 75
End: 2020-08-06

## 2020-08-06 VITALS
DIASTOLIC BLOOD PRESSURE: 83 MMHG | HEIGHT: 63 IN | TEMPERATURE: 97.3 F | BODY MASS INDEX: 30.83 KG/M2 | HEART RATE: 56 BPM | WEIGHT: 174 LBS | SYSTOLIC BLOOD PRESSURE: 159 MMHG

## 2020-08-06 DIAGNOSIS — Z96.651 STATUS POST TOTAL RIGHT KNEE REPLACEMENT: Primary | ICD-10-CM

## 2020-08-06 PROCEDURE — 3079F DIAST BP 80-89 MM HG: CPT | Performed by: ORTHOPAEDIC SURGERY

## 2020-08-06 PROCEDURE — 1160F RVW MEDS BY RX/DR IN RCRD: CPT | Performed by: ORTHOPAEDIC SURGERY

## 2020-08-06 PROCEDURE — 3008F BODY MASS INDEX DOCD: CPT | Performed by: ORTHOPAEDIC SURGERY

## 2020-08-06 PROCEDURE — 99024 POSTOP FOLLOW-UP VISIT: CPT | Performed by: ORTHOPAEDIC SURGERY

## 2020-08-06 PROCEDURE — 1111F DSCHRG MED/CURRENT MED MERGE: CPT | Performed by: ORTHOPAEDIC SURGERY

## 2020-08-06 PROCEDURE — 3077F SYST BP >= 140 MM HG: CPT | Performed by: ORTHOPAEDIC SURGERY

## 2020-08-06 NOTE — PROGRESS NOTES
Assessment/Plan:    No problem-specific Assessment & Plan notes found for this encounter  Diagnoses and all orders for this visit:    Status post total right knee replacement          The patient is doing well  Continue home exercise program   Continue stretching  She may go back to 81 mg of aspirin a day  Return back in 6 weeks for re-evaluation with new x-rays of right knee-three views  Subjective:      Patient ID: Emil Thomas is a 70 y o  female  HPI      The patient is 6 weeks status post right total knee replacement  She offers no complaints of pain  She denies any numbness or tingling  She denies any fever chills  She wants to start driving and stop physical therapy  The following portions of the patient's history were reviewed and updated as appropriate: allergies, current medications, past family history, past medical history, past social history, past surgical history and problem list     Review of Systems   Constitutional: Negative for chills, fever and unexpected weight change  HENT: Negative for hearing loss, nosebleeds and sore throat  Eyes: Negative for pain, redness and visual disturbance  Respiratory: Negative for cough, shortness of breath and wheezing  Cardiovascular: Negative for chest pain, palpitations and leg swelling  Gastrointestinal: Negative for abdominal pain, nausea and vomiting  Endocrine: Negative for polydipsia and polyuria  Genitourinary: Negative for dysuria and hematuria  Musculoskeletal: Positive for gait problem  Negative for arthralgias, back pain, joint swelling, myalgias, neck pain and neck stiffness  As noted in HPI   Skin: Negative for rash and wound  Neurological: Negative for dizziness, numbness and headaches  Psychiatric/Behavioral: Negative for decreased concentration and suicidal ideas  The patient is not nervous/anxious            Objective:      /83 (BP Location: Right arm, Patient Position: Sitting, Cuff Size: Standard)   Pulse 56   Temp (!) 97 3 °F (36 3 °C)   Ht 5' 3" (1 6 m)   Wt 78 9 kg (174 lb)   BMI 30 82 kg/m²          Physical Exam      Right lower extremity is neurovascular intact  Toes are pink and mobile  Compartments are soft  Incision is clean, dry, intact  Range of motion of her right knee is from 0-110 degrees  No gross ligament dysfunction  Quadriceps strength is intact  There is a painless arc of motion throughout her knee    Negative Homans

## 2020-08-07 DIAGNOSIS — I10 ESSENTIAL HYPERTENSION: ICD-10-CM

## 2020-08-10 ENCOUNTER — OFFICE VISIT (OUTPATIENT)
Dept: PHYSICAL THERAPY | Facility: HOME HEALTHCARE | Age: 75
End: 2020-08-10
Payer: MEDICARE

## 2020-08-10 DIAGNOSIS — M17.11 PRIMARY OSTEOARTHRITIS OF RIGHT KNEE: Primary | ICD-10-CM

## 2020-08-10 DIAGNOSIS — Z96.651 STATUS POST TOTAL RIGHT KNEE REPLACEMENT: ICD-10-CM

## 2020-08-10 PROCEDURE — 97110 THERAPEUTIC EXERCISES: CPT

## 2020-08-10 PROCEDURE — 97140 MANUAL THERAPY 1/> REGIONS: CPT

## 2020-08-10 NOTE — PROGRESS NOTES
Daily Note     Today's date: 8/10/2020  Patient name: Marianna Edwards  : 1945  MRN: [id number]  Referring provider: Barbra Herrera DO  Dx:   Encounter Diagnosis     ICD-10-CM    1  Primary osteoarthritis of right knee  M17 11    2  Status post total right knee replacement  Z96 651               Subjective: Pt reports she saw the Dr last week and Dr told her she is doing good and can be done with PT        Objective: See treatment diary below      Assessment: Tolerated treatment well  A few verbal cues needed to perform exercises correctly  Pt with no c/o pain R knee t/o session  ROM improving R knee  Patient reports feeling good at end of session with no pain  Plan: Re-eval with possible DC next session        Precautions: None  Re-Eval DUE: 20    Manuals 7-29-20 8/3/20 8/5/20 8/10/20 7/27/20   RLE 10' 10 min 10 min  10 min  10 min                            Neuro Re-Ed        BOSU Lunges 1 x 10 1 x 15 1 x 15  1 x 15    SLS   15" x 3 15" x 3    Tandem Stance   15" x 3 15" x 3    Biodex - LOS        Biodex - LOS        Biodex - Maze        Biodex - Maze        Biodex - Random Cont        Biodex - Random Cont        Ther Ex 7-29-20 8/3/20 8/5/20 8/10/20    NuStep L 2  10' Level 2  10 minutes Level 2   10 min  Level 2  10 min  L2  10 min   Stand SLR x 3 1 x 10 ea 1 x 10 each  1 x 15 ea  1 x 15 ea  1 x 10 ea    Marches 1 x 15 1 x 15 1 x 15 1 x 15  1 x 10    Squats 1 x 15 1 x 15 1 x 15 1 x 15 1 x 10    HR/TR 1 x 15 1 x 15 each 1 x 15 ea  1 x 15 ea  1 x 10 ea    Ham Curls 1 x 15 1 x 15  1 x 15  1 x 15  1 x 10    TKE w/TBand        Stepups F/L Fwd C 1 x 10 For C 1 x 10  For C 1 x 10  For C 1 x 10  For C 1 x 10    LAQ 5" 1  x15 5" 1 x 15 5" 1 x 15  5" 1 x 15  5" 1 x 15    Hams Curl with TBand Red 1 x 15 Red 1 x 15  Red 1 x 15  Red 1 x 15    QSet 5" 1 x 15 5" 1 x 15  5" 1 x 15  5" 1 x 15  5" 1 x 15    SAQ 5" 1 x 15 5" 1 x 15  5" 1 x 15  5" 1 x 15  5" 1 x 15    SLR 1 x 15 1 x 15  1 x 15  1 x 15  1 x 15 Hip Add w/Ball 5" 1  x15 5" 1 x 15  1x 15 5" 1 x 15 5" 1 x 15 5"    Hip Abd with TBand Red 1 x 15 Red 1 x 15  Red 1 x 15  Red 1 x 15  Red 1 x 15    Bridges        Heel Slides 1 x 15 1 x 15  1 x 15  1 x 15  1 x 15    S/L Hip Abd        Ther Activity                        Gait Training                        Modalities        CP prn 10' Seated on elevated high mat table   10 min  Seated on elevated high mat table   10 min  Seated on elevated high mat table   10 min  Seated on elevated high mat table  10 min

## 2020-08-12 ENCOUNTER — APPOINTMENT (OUTPATIENT)
Dept: PHYSICAL THERAPY | Facility: HOME HEALTHCARE | Age: 75
End: 2020-08-12
Payer: MEDICARE

## 2020-08-15 RX ORDER — SPIRONOLACTONE 50 MG/1
TABLET, FILM COATED ORAL
Qty: 90 TABLET | Refills: 3 | Status: SHIPPED | OUTPATIENT
Start: 2020-08-15 | End: 2021-05-13

## 2020-08-17 ENCOUNTER — OFFICE VISIT (OUTPATIENT)
Dept: PHYSICAL THERAPY | Facility: HOME HEALTHCARE | Age: 75
End: 2020-08-17
Payer: MEDICARE

## 2020-08-17 DIAGNOSIS — Z96.651 STATUS POST TOTAL RIGHT KNEE REPLACEMENT: Primary | ICD-10-CM

## 2020-08-17 PROCEDURE — 97164 PT RE-EVAL EST PLAN CARE: CPT | Performed by: PHYSICAL THERAPIST

## 2020-08-17 NOTE — PROGRESS NOTES
PT Discharge    Today's date: 2020  Patient name: Nedra Suero  : 1945  MRN: [id number]  Referring provider: Bethanie Beckman DO  Dx:   Encounter Diagnosis     ICD-10-CM    1  Status post total right knee replacement  [id number]                   Assessment  Assessment details: The patient has been seen in PT for a total of 10 visits since [facility] with good PT attendance noted  She has complaints of intermittent pain in her R knee  She has made improvements since [facility] and met her goals  Her knee ROM and strength is now [facility]  She now ambulates without AD with improved gait mechanics noted  She can now go up and down the steps with reciprocal gait pattern but is still coming down the steps with non-reciprocal gait pattern  She notes that she has returned to PLOF at home without difficulty - she has returned to cooking and cleaning,  is no longer assisting her  She has also returned to driving  At this time she is happy with her progress in PT and has met most of her goals  She would like to continue with her HEP  She has demonstrated understanding of HEP for ROM, stretching, strengthening and flexibility  She may continue to show improvements through HEP  D/C PT  Impairments: abnormal gait, abnormal or restricted ROM, activity intolerance, impaired balance, impaired physical strength, lacks appropriate home exercise program, pain with function and weight-bearing intolerance  Other impairment: decreased flexibility  Functional limitations: difficulty with stair negotiationUnderstanding of Dx/Px/POC: good   Prognosis: good    Goals  STGs:  1  Initiate and complete HEP with verbal cues  - met  2  Decrease R knee pain by > 25% in 4 weeks  - met  3  Improve R knee ROM by 10-15 degrees in 4 weeks  - met  4  Improve RLE strength by 1 grade in 4 weeks  - met  LTGs:  1  Patient to be I with HEP in 12 weeks  - met  2  Improve R knee ROM to 0-115 degrees in 12 weeks to improve function  - partially met   3  Improve RLE strength to 4+ to 5/5 t/o in 12 weeks to improve function  - met  4  Decrease R knee pain to < or = to 2-3/10 with activity in 12 weeks to improve function  - met  5  Patient to ambulate with normalized gait pattern with least restrictive AD in 12 weeks  - met  6  Stair negotiation is improved to PLOF in 12 weeks  - met  7  Recreational performance is improved to PLOF in 12 weeks  - met  8  ADL performance is improved to PLOF in 12 weeks  - met  9  Decrease edema R knee by 2-3 cm in 12 weeks to help improve flexibility  - met      Plan  Plan details: At this time she is happy with her progress in PT and has met most of her goals  She would like to continue with her HEP  She has demonstrated understanding of HEP for ROM, stretching, strengthening and flexibility  She may continue to show improvements through HEP  D/C PT  Patient would benefit from: skilled physical therapy  Planned modality interventions: cryotherapy and thermotherapy: hydrocollator packs  Planned therapy interventions: balance/weight bearing training, manual therapy, neuromuscular re-education, patient education, postural training, self care, strengthening, stretching, therapeutic activities, therapeutic exercise, flexibility, gait training and home exercise program  Plan of Care beginning date: 2020  Plan of Care expiration date: 2020  Treatment plan discussed with: patient        Subjective Evaluation    History of Present Illness  Date of surgery: 2020  Mechanism of injury: surgery  Mechanism of injury: The patient had seen the doctor recently and he was happy with how she was doing  She is having minimal pain in her knee  She notes that she is back to her normal activities without difficulty  She will be going back to see the doctor in six more weeks for her next appointment with him      Quality of life: good    Pain  At best pain ratin  At worst pain rating: 3  Location: R Knee Quality: dull ache and discomfort    Social Support  Steps to enter house: yes  Stairs in house: yes   Lives in: multiple-level home  Lives with: spouse    Employment status: not working    Diagnostic Tests  X-ray: abnormal (arthrits - per pt )  Treatments  Previous treatment: injection treatment  Discharged from (in last 30 days): home health care  Patient Goals  Patient goals for therapy: increased motion, decreased pain, increased strength and improved balance  Patient goal: "To help with the pain in my knee, to do better after surgery "         Objective     Observations     Additional Observation Details  Incision present along anterior knee, well healed  No redness or warmth, no active bleeding or drainage noted  Tenderness     Additional Tenderness Details  No TTP noted  Neurological Testing     Sensation     Knee   Left Knee   Intact: light touch    Right Knee   Intact: light touch     Active Range of Motion   Left Knee   Flexion: 115 degrees   Extension: 0 degrees     Right Knee   Flexion: 110 degrees   Extension: -2 degrees     Additional Active Range of Motion Details  L SLR: 50  R SLR: 50    Mobility   Patellar Mobility:     Right Knee   WFL: medial and lateral    Strength/Myotome Testing     Left Knee   Normal strength    Right Knee   Flexion: WFL  Extension: WFL  Quadriceps contraction: good    Swelling     Left Knee Girth Measurement (cm)   Joint line: 41 5 cm    Right Knee Girth Measurement (cm)   Joint line: 42 cm    Ambulation   Weight-Bearing Status     Additional Weight-Bearing Status Details  Ambulating without AD in the house and community  Also has grab bars in the shower, shower chair and raised toilet seat       Ambulation: Level Surfaces   Ambulation without assistive device: independent    Ambulation: Stairs   Ascend stairs: independent  Pattern: reciprocal  Descend stairs: independent  Pattern: non-reciprocal    Observational Gait   Gait: within functional limits Precautions: None  Re-Eval DUE: 8/16/20     Manuals 7-29-20 8/3/20 8/5/20 8/10/20 8/17/20   RLE 10' 10 min 10 min  10 min  NP                                             Neuro Re-Ed             BOSU Lunges 1 x 10 1 x 15 1 x 15  1 x 15 NP   SLS     15" x 3 15" x 3 NP   Tandem Stance     15" x 3 15" x 3 NP   Biodex - LOS             Biodex - LOS             Biodex - Maze             Biodex - Maze             Biodex - Random Cont             Biodex - Random Cont             Ther Ex 7-29-20 8/3/20 8/5/20 8/10/20 8/17/20   NuStep L 2  10' Level 2  10 minutes Level 2   10 min  Level 2  10 min  TE NP today, pt requested   Stand SLR x 3 1 x 10 ea 1 x 10 each  1 x 15 ea  1 x 15 ea  To complete   Marches 1 x 15 1 x 15 1 x 15 1 x 15  Re-eval only   Squats 1 x 15 1 x 15 1 x 15 1 x 15 Today       HR/TR 1 x 15 1 x 15 each 1 x 15 ea  1 x 15 ea     Ham Curls 1 x 15 1 x 15  1 x 15  1 x 15     TKE w/TBand            Stepups F/L Fwd C 1 x 10 For C 1 x 10  For C 1 x 10  For C 1 x 10     LAQ 5" 1  x15 5" 1 x 15 5" 1 x 15  5" 1 x 15     Hams Curl with TBand Red 1 x 15 Red 1 x 15  Red 1 x 15  Red 1 x 15    QSet 5" 1 x 15 5" 1 x 15  5" 1 x 15  5" 1 x 15     SAQ 5" 1 x 15 5" 1 x 15  5" 1 x 15  5" 1 x 15     SLR 1 x 15 1 x 15  1 x 15  1 x 15     Hip Add w/Ball 5" 1  x15 5" 1 x 15  1x 15 5" 1 x 15 5"    Hip Abd with TBand Red 1 x 15 Red 1 x 15  Red 1 x 15  Red 1 x 15     Bridges            Heel Slides 1 x 15 1 x 15  1 x 15  1 x 15     S/L Hip Abd            Ther Activity                                         Gait Training                                         Modalities             CP prn 10' Seated on elevated high mat table   10 min  Seated on elevated high mat table   10 min  Seated on elevated high mat table   10 min

## 2020-08-18 ENCOUNTER — OFFICE VISIT (OUTPATIENT)
Dept: FAMILY MEDICINE CLINIC | Facility: CLINIC | Age: 75
End: 2020-08-18
Payer: MEDICARE

## 2020-08-18 VITALS
HEART RATE: 68 BPM | WEIGHT: 173 LBS | SYSTOLIC BLOOD PRESSURE: 142 MMHG | RESPIRATION RATE: 20 BRPM | DIASTOLIC BLOOD PRESSURE: 68 MMHG | BODY MASS INDEX: 30.65 KG/M2

## 2020-08-18 DIAGNOSIS — Z96.651 STATUS POST TOTAL RIGHT KNEE REPLACEMENT: ICD-10-CM

## 2020-08-18 DIAGNOSIS — I12.9 HYPERTENSIVE NEPHROSCLEROSIS, STAGE 1 THROUGH STAGE 4 OR UNSPECIFIED CHRONIC KIDNEY DISEASE: ICD-10-CM

## 2020-08-18 DIAGNOSIS — E87.1 HYPONATREMIA: ICD-10-CM

## 2020-08-18 DIAGNOSIS — M47.812 CERVICAL SPONDYLOSIS WITHOUT MYELOPATHY: ICD-10-CM

## 2020-08-18 DIAGNOSIS — N18.2 STAGE 2 CHRONIC KIDNEY DISEASE: ICD-10-CM

## 2020-08-18 DIAGNOSIS — I10 ESSENTIAL HYPERTENSION: Primary | ICD-10-CM

## 2020-08-18 DIAGNOSIS — M17.11 PRIMARY OSTEOARTHRITIS OF RIGHT KNEE: ICD-10-CM

## 2020-08-18 DIAGNOSIS — G89.4 CHRONIC PAIN SYNDROME: ICD-10-CM

## 2020-08-18 DIAGNOSIS — M16.11 PRIMARY OSTEOARTHRITIS OF RIGHT HIP: ICD-10-CM

## 2020-08-18 DIAGNOSIS — I34.0 NONRHEUMATIC MITRAL VALVE REGURGITATION: ICD-10-CM

## 2020-08-18 PROCEDURE — 1160F RVW MEDS BY RX/DR IN RCRD: CPT | Performed by: FAMILY MEDICINE

## 2020-08-18 PROCEDURE — 3078F DIAST BP <80 MM HG: CPT | Performed by: FAMILY MEDICINE

## 2020-08-18 PROCEDURE — 1036F TOBACCO NON-USER: CPT | Performed by: FAMILY MEDICINE

## 2020-08-18 PROCEDURE — 99214 OFFICE O/P EST MOD 30 MIN: CPT | Performed by: FAMILY MEDICINE

## 2020-08-18 PROCEDURE — 1111F DSCHRG MED/CURRENT MED MERGE: CPT | Performed by: FAMILY MEDICINE

## 2020-08-18 PROCEDURE — 3077F SYST BP >= 140 MM HG: CPT | Performed by: FAMILY MEDICINE

## 2020-08-18 NOTE — PROGRESS NOTES
Assessment/Plan:  Essential hypertension controlled currently with Zebeta and losartan  Non rheumatic mitral valve regurgitation  Hypertensive nephrosclerosis    Cervical spondylosis without myelopathy  Primary osteoarthritis  Stage 2 chronic kidney disease under care of Nephrology    Problem List Items Addressed This Visit        Cardiovascular and Mediastinum    Mitral regurgitation    Essential hypertension - Primary    Hypertensive nephrosclerosis       Musculoskeletal and Integument    Cervical spondylosis without myelopathy    Primary osteoarthritis of right knee    Relevant Medications    diclofenac sodium (VOLTAREN) 1 %    Osteoarthritis of right hip       Genitourinary    Stage 2 chronic kidney disease       Other    Chronic pain syndrome    Hyponatremia    Status post total right knee replacement           Diagnoses and all orders for this visit:    Essential hypertension    Nonrheumatic mitral valve regurgitation    Hypertensive nephrosclerosis, stage 1 through stage 4 or unspecified chronic kidney disease    Cervical spondylosis without myelopathy    Primary osteoarthritis of right knee  -     diclofenac sodium (VOLTAREN) 1 %; Apply 2 g topically 4 (four) times a day    Primary osteoarthritis of right hip    Stage 2 chronic kidney disease    Status post total right knee replacement    Hyponatremia    Chronic pain syndrome        No problem-specific Assessment & Plan notes found for this encounter  PHQ-9 Depression Screening    PHQ-9:    Frequency of the following problems over the past two weeks: Body mass index is 30 65 kg/m²  BMI Counseling: Body mass index is 30 65 kg/m²   The BMI is above normal  Nutrition recommendations include reducing portion sizes, decreasing overall calorie intake, 3-5 servings of fruits/vegetables daily, reducing fast food intake, consuming healthier snacks, decreasing soda and/or juice intake, moderation in carbohydrate intake, increasing intake of lean protein, reducing intake of saturated fat and trans fat and reducing intake of cholesterol  Subjective:      Patient ID: Eldridge Olszewski is a 70 y o  female  Patient presents for her routine checkup      The following portions of the patient's history were reviewed and updated as appropriate:   She has a past medical history of Anxiety, Arthritis, Benign arteriolar nephrosclerosis, Chronic kidney disease in type 2 diabetes mellitus ([facility] [address] ), Chronic pain disorder, Depression, GERD (gastroesophageal reflux disease), Heart murmur, Hyperaldosteronism ([facility] [address] ), Hyperlipidemia, Hypertension, Hypo-osmolality and hyponatremia, IBS (irritable bowel syndrome), Insomnia, Migraines, Mitral regurgitation, Obstructive sleep apnea syndrome, Osteoarthritis, Pernicious anemia, Right knee DJD, and [facility]  ,  does not have any pertinent problems on file  ,   has a past surgical history that includes Appendectomy; Sinus surgery; Insert / replace peripheral neurostimulator pulse generator /  (Left); pr colonoscopy flx dx w/collj spec when pfrmd (N/A, 4/24/2017); NERVE BLOCK (Left, 2/20/2018); NERVE BLOCK (Left, 3/6/2018); Radiofrequency ablation (Left, 4/17/2018); Colonoscopy; Upper gastrointestinal endoscopy; Knee arthroscopy (Bilateral); Hand surgery (Right); pr total hip arthroplasty (Right, 2/27/2019); Nasal septum surgery (2008); Carpal tunnel release (Bilateral); Total hip arthroplasty (Left); Hysterectomy; Tonsillectomy; Joint replacement (Left); Joint replacement (Right, 04/2019); pr total knee arthroplasty (Right, 6/24/2020); and Wrist surgery (Right)  ,  family history includes Anxiety disorder in her sister; Arthritis in her mother; Colon cancer in her father; Dementia in her mother; Heart disease in her father and mother; Hypertension in her father, mother, and sister;  No Known Problems in her paternal aunt, paternal grandfather, and paternal grandmother; Prostate cancer in her maternal grandfather; Rheum arthritis in her mother; Thyroid disease in her sister  ,   reports that she has quit smoking  Her smoking use included cigarettes  She has never used smokeless tobacco  She reports current alcohol use  She reports that she does not use drugs  ,  is allergic to procaine; adhesive [medical tape]; lidocaine; and penicillins     Current Outpatient Medications   Medication Sig Dispense Refill    aspirin (ECOTRIN) 325 mg EC tablet Take 1 tablet (325 mg total) by mouth 2 (two) times a day (Patient taking differently: Take 325 mg by mouth daily ) 30 tablet 0    bisoprolol (ZEBETA) 10 MG tablet TAKE 1 TABLET EVERY DAY (Patient taking differently: Take 10 mg by mouth every morning ) 90 tablet 3    butalbital-acetaminophen-caffeine-codeine (FIORICET WITH CODEINE) -68-30 MG per capsule Take 1 capsule by mouth every 4 (four) hours as needed for headaches      calcium carbonate (OS-MARLENY) 600 MG tablet Take 600 mg by mouth 2 (two) times a day with meals      LORazepam (ATIVAN) 1 mg tablet Take 1 mg by mouth 2 (two) times a day as needed for anxiety      losartan (COZAAR) 100 MG tablet Take 1 tablet (100 mg total) by mouth every morning 90 tablet 3    Multiple Vitamins-Minerals (MULTIVITAMIN WITH MINERALS) tablet Take 1 tablet by mouth every morning      omeprazole (PriLOSEC) 20 mg delayed release capsule Take 20 mg by mouth every morning        pravastatin (PRAVACHOL) 20 mg tablet Take 20 mg by mouth 2 (two) times a day       spironolactone (ALDACTONE) 50 mg tablet TAKE 1 TABLET DAILY AFTER LUNCH 90 tablet 3    zolpidem (AMBIEN) 10 mg tablet Take 10 mg by mouth daily at bedtime as needed       diclofenac sodium (VOLTAREN) 1 % Apply 2 g topically 4 (four) times a day 50 g 5     No current facility-administered medications for this visit  Review of Systems   Constitutional: Positive for fatigue  HENT: Negative  Eyes: Negative  Respiratory: Negative  Cardiovascular: Negative  Gastrointestinal: Negative  Endocrine: Negative  Genitourinary: Negative  Musculoskeletal: Positive for arthralgias, gait problem, joint swelling, myalgias and neck pain  Skin: Negative  Allergic/Immunologic: Negative  Neurological: Positive for headaches  Hematological: Negative  Psychiatric/Behavioral: Negative  Objective:    /68   Pulse 68   Resp 20   Wt 78 5 kg (173 lb)   BMI 30 65 kg/m²   Body mass index is 30 65 kg/m²  Physical Exam  Constitutional:       Appearance: She is well-developed  She is obese  HENT:      Head: Normocephalic  Eyes:      Pupils: Pupils are equal, round, and reactive to light  Neck:      Musculoskeletal: Normal range of motion  Cardiovascular:      Rate and Rhythm: Normal rate and regular rhythm  Heart sounds: Normal heart sounds  Pulmonary:      Effort: Pulmonary effort is normal       Breath sounds: Normal breath sounds  Abdominal:      General: Bowel sounds are normal       Palpations: Abdomen is soft  Tenderness: There is no abdominal tenderness  Musculoskeletal:      Comments: Post right knee replacement multiple arthritic joint defects   Skin:     General: Skin is warm  Neurological:      Mental Status: She is alert and oriented to person, place, and time

## 2020-08-19 ENCOUNTER — APPOINTMENT (OUTPATIENT)
Dept: PHYSICAL THERAPY | Facility: HOME HEALTHCARE | Age: 75
End: 2020-08-19
Payer: MEDICARE

## 2020-08-21 NOTE — PROGRESS NOTES
Cardiology Follow Up    Erick Rosas  1945  [id number]  [id number]  [phone]  [phone]    1  Palpitations  POCT ECG    AMB extended holter monitor   2  Nonrheumatic mitral valve regurgitation     3  Essential hypertension     4  Chronic diastolic (congestive) heart failure ([address] )     5  Dyslipidemia     6  CLARKE (obstructive sleep apnea)         Discussion/Summary: We will check a 1 week zio patch for symptom-rhythm correlation of her palpitations  EKG in the office shows normal sinus rhythm  I advised her to make sure she is drinking as much as her fluid restriction allots  Limit caffeine and alcohol  She may try magnesium supplementation  Recent TSH was mildly elevated but free T4 was normal  Other labs were unremarkable  Blood pressure is well controlled  She denies symptoms of angina  She appears euvolemic on exam  Further recommendations will be made based on results of zio patch  She will RTO in 2 months for her routine f/u with Dr Bahena Daily or sooner if necessary  She will call with any concerns  Interval History:   Erick Rosas is a 70 y o  female with mitral regurgitation, chronic diastolic congestive heart failure, hypertension, dyslipidemia, and obstructive sleep apnea who presents to the office today to discuss palpitations  For the past 6-8 weeks the patient has noticed palpitations  She notes them specifically when she exerts herself  She describes the sensation as her heart "racing " Occasionally she will have associated lightheadedness  These episodes occur a few times per week  She has chronic dyspnea on exertion which is unchanged  She denies chest pain/pressure/discomfort  She denies syncope  She denies lower extremity edema, orthopnea, and PND  She only drinks decaffeinated beverages  She is on a 60 ounce fluid restriction daily per her nephrologist secondary to hyponatremia  Problem List     Diastolic dysfunction    Hypercholesterolemia    Hypertension    Mitral regurgitation    Overview Signed 2/8/2018  8:25 AM by Selvin Gusman DO     Description: Moderate         Shortness of breath on exertion    Cervical spondylosis without myelopathy    Overview Signed 2/13/2018  8:13 AM by Sixto Long MA     Added automatically from request for surgery [id number]         Chronic pain syndrome    Gastroesophageal reflux disease without esophagitis    Hyponatremia    Primary osteoarthritis of right knee    Osteoarthritis of right hip    Essential hypertension    Stage 2 chronic kidney disease    Hyperaldosteronism (HCC)    Hypertensive nephrosclerosis    CLARKE (obstructive sleep apnea)    Status post total right knee replacement        Past Medical History:   Diagnosis Date    Anxiety     Arthritis     Benign arteriolar nephrosclerosis     Chronic kidney disease in type 2 diabetes mellitus (HCC)     Chronic pain disorder     Depression     GERD (gastroesophageal reflux disease)     Heart murmur     Hyperaldosteronism (HCC)     Hyperlipidemia     Hypertension     Hypo-osmolality and hyponatremia     IBS (irritable bowel syndrome)     Insomnia     Migraines     Mitral regurgitation     Obstructive sleep apnea syndrome     Osteoarthritis     Pernicious anemia     Right knee DJD     Stroke ([facility] [address] )     tia     Social History     Socioeconomic History    Marital status: /Civil Union     Spouse name: Not on file    Number of children: Not on file    Years of education: Not on file    Highest education level: Not on file   Occupational History    Occupation: Admnistrator    Social Needs    Financial resource strain: Not on file    Food insecurity     Worry: Not on file     Inability: Not on file   [employer] needs     Medical: Not on file     Non-medical: Not on file   Tobacco Use    Smoking status: Former Smoker     Types: Cigarettes    Smokeless tobacco: Never Used    Tobacco comment: quit 40 yrs ago   Substance and Sexual Activity    Alcohol use: Yes     Frequency: Monthly or less     Comment: 2 beer a week    Drug use: Never    Sexual activity: Not Currently     Birth control/protection: Post-menopausal   Lifestyle    Physical activity     Days per week: Not on file     Minutes per session: Not on file    Stress: Not on file   Relationships    Social connections     Talks on phone: Not on file     Gets together: Not on file     Attends Jew service: Not on file     Active member of club or organization: Not on file     Attends meetings of clubs or organizations: Not on file     Relationship status: Not on file    Intimate partner violence     Fear of current or ex partner: Not on file     Emotionally abused: Not on file     Physically abused: Not on file     Forced sexual activity: Not on file   Other Topics Concern    Not on file   Social History Narrative    Patient has never smoked - As per [address] [place]    Consumes 1-2 beers per week - As per [address] [place]    Consumes on average 1 cup of decaf coffee per day       Family History   Problem Relation Age of Onset    Heart disease Mother     Hypertension Mother    Tomie Coop Arthritis Mother     Dementia Mother     Rheum arthritis Mother     Hypertension Father     Heart disease Father     Colon cancer Father     Hypertension Sister     Anxiety disorder Sister     Thyroid disease Sister     Prostate cancer Maternal Grandfather     No Known Problems Paternal Grandmother     No Known Problems Paternal Grandfather     No Known Problems Paternal Aunt     Pseudochol deficiency Neg Hx      Past Surgical History:   Procedure Laterality Date    APPENDECTOMY      CARPAL TUNNEL RELEASE Bilateral     COLONOSCOPY      several    HAND SURGERY Right     ring finger has pin    HYSTERECTOMY      32    INSERT / REPLACE PERIPHERAL NEUROSTIMULATOR PULSE GENERATOR /  Left     buttocks    JOINT REPLACEMENT Left     knee    JOINT REPLACEMENT Right 04/2019    Hip    KNEE ARTHROSCOPY Bilateral     NASAL SEPTUM SURGERY  2008    NERVE BLOCK Left 2/20/2018    Procedure: BLOCK MEDIAL BRANCH - C4, C5, C6;  Surgeon: Hillary Matta MD;  [facility];  Service: Pain Management     NERVE BLOCK Left 3/6/2018    Procedure: C4, C5, C6 MEDIAL BRANCH BLOCK;  Surgeon: Hillary Matta MD;  [facility];  Service: Pain Management     ME COLONOSCOPY FLX DX W/COLLJ SPEC WHEN PFRMD N/A 4/24/2017    Procedure: Mirian Medina;  Surgeon: Krista Hess MD;  [facility];  Service: Colorectal    ME TOTAL HIP ARTHROPLASTY Right 2/27/2019    Procedure: ARTHROPLASTY HIP TOTAL;  Surgeon: Liang Davis DO;  Location: [facility] MAIN OR;  Service: Orthopedics    ME TOTAL KNEE ARTHROPLASTY Right 6/24/2020    Procedure: KNEE TOTAL ARTHROPLASTY;  Surgeon: Liang Davis DO;  Location: [facility] MAIN OR;  Service: Orthopedics    RADIOFREQUENCY ABLATION Left 4/17/2018    Procedure: ABLATION RADIO FREQUENCY (RFA) - C4, C5, C6;  Surgeon: Hillary Matta MD;  [facility];  Service: Pain Management     SINUS SURGERY      TONSILLECTOMY      TOTAL HIP ARTHROPLASTY Left     UPPER GASTROINTESTINAL ENDOSCOPY      WRIST SURGERY Right        Current Outpatient Medications:     aspirin (ECOTRIN) 325 mg EC tablet, Take 1 tablet (325 mg total) by mouth 2 (two) times a day (Patient taking differently: Take 81 mg by mouth daily ), Disp: 30 tablet, Rfl: 0    bisoprolol (ZEBETA) 10 MG tablet, TAKE 1 TABLET EVERY DAY (Patient taking differently: Take 10 mg by mouth every morning ), Disp: 90 tablet, Rfl: 3    butalbital-acetaminophen-caffeine-codeine (FIORICET WITH CODEINE) -87-30 MG per capsule, Take 1 capsule by mouth every 4 (four) hours as needed for headaches, Disp: , Rfl:     calcium carbonate (OS-MARLENY) 600 MG tablet, Take 600 mg by mouth 2 (two) times a day with meals, Disp: , Rfl:     diclofenac sodium (VOLTAREN) 1 %, Apply 2 g topically 4 (four) times a day, Disp: 50 g, Rfl: 5    LORazepam (ATIVAN) 1 mg tablet, Take 1 mg by mouth 2 (two) times a day as needed for anxiety, Disp: , Rfl:     losartan (COZAAR) 100 MG tablet, Take 1 tablet (100 mg total) by mouth every morning, Disp: 90 tablet, Rfl: 3    Multiple Vitamins-Minerals (MULTIVITAMIN WITH MINERALS) tablet, Take 1 tablet by mouth every morning, Disp: , Rfl:     omeprazole (PriLOSEC) 20 mg delayed release capsule, Take 20 mg by mouth every morning  , Disp: , Rfl:     pravastatin (PRAVACHOL) 20 mg tablet, Take 20 mg by mouth 2 (two) times a day , Disp: , Rfl:     spironolactone (ALDACTONE) 50 mg tablet, TAKE 1 TABLET DAILY AFTER LUNCH, Disp: 90 tablet, Rfl: 3    zolpidem (AMBIEN) 10 mg tablet, Take 10 mg by mouth daily at bedtime as needed , Disp: , Rfl:   Allergies   Allergen Reactions    Procaine Hives    Adhesive [Medical Tape] Rash    Lidocaine Rash    Penicillins Rash       Labs:     Chemistry        Component Value Date/Time     (L) 09/21/2015 1057    K 4 6 07/22/2020 0703    K 4 3 09/21/2015 1057    CL 91 (L) 07/22/2020 0703    CL 94 (L) 09/21/2015 1057    CO2 28 07/22/2020 0703    CO2 31 2 09/21/2015 1057    BUN 6 07/22/2020 0703    BUN 8 09/21/2015 1057    CREATININE 0 93 07/22/2020 0703    CREATININE 0 74 09/21/2015 1057        Component Value Date/Time    CALCIUM 9 5 07/22/2020 0703    CALCIUM 9 5 09/21/2015 1057    ALKPHOS 130 (H) 06/10/2020 0717    ALKPHOS 123 (H) 09/21/2015 1057    AST 18 06/10/2020 0717    AST 21 09/21/2015 1057    ALT 24 06/10/2020 0717    ALT 34 09/21/2015 1057    BILITOT 0 29 09/21/2015 1057            Lab Results   Component Value Date    CHOL 290 06/04/2015    CHOL 240 10/19/2014    CHOL 277 03/12/2014     Lab Results   Component Value Date     (H) 06/08/2016     (H) 01/27/2016     06/04/2015     Lab Results   Component Value Date    LDLCALC 121 (H) 06/08/2016    LDLCALC 126 (H) 01/27/2016    LDLCALC 153 (H) 06/04/2015     Lab Results   Component Value Date    TRIG 49 06/08/2016    TRIG 56 01/27/2016    TRIG 44 06/04/2015     No results found for: CHOLHDL    Imaging: No results found  ECG: normal sinus rhythm      Review of Systems   Constitution: Negative for chills and fever  Cardiovascular: Positive for dyspnea on exertion (chronic) and palpitations  Negative for chest pain, leg swelling, orthopnea, paroxysmal nocturnal dyspnea and syncope  Respiratory: Negative for cough and shortness of breath  Gastrointestinal: Negative for diarrhea, nausea and vomiting  Neurological: Negative for dizziness and light-headedness  All other systems reviewed and are negative  Vitals:    08/24/20 1322   BP: 132/76   Pulse: 60     Vitals:    08/24/20 1322   Weight: 78 9 kg (174 lb)     Height: 5' 3" (160 cm)   Body mass index is 30 82 kg/m²  Physical Exam:  Physical Exam  Vitals signs reviewed  Constitutional:       General: She is not in acute distress  Appearance: She is obese  She is not diaphoretic  HENT:      Head: Normocephalic and atraumatic  Eyes:      Pupils: Pupils are equal, round, and reactive to light  Neck:      Musculoskeletal: Normal range of motion  Vascular: No carotid bruit  Cardiovascular:      Rate and Rhythm: Normal rate and regular rhythm  Pulses:           Radial pulses are 2+ on the right side and 2+ on the left side  Dorsalis pedis pulses are 2+ on the right side and 2+ on the left side  Heart sounds: S1 normal and S2 normal  No murmur  Pulmonary:      Effort: Pulmonary effort is normal  No respiratory distress  Breath sounds: Normal breath sounds  No wheezing or rales  Abdominal:      General: There is no distension  Palpations: Abdomen is soft  Musculoskeletal: Normal range of motion  General: No deformity  Skin:     General: Skin is warm and dry  Findings: No erythema     Neurological: General: No focal deficit present  Mental Status: She is alert and oriented to person, place, and time     Psychiatric:         Mood and Affect: Mood normal          Behavior: Behavior normal

## 2020-08-24 ENCOUNTER — OFFICE VISIT (OUTPATIENT)
Dept: CARDIOLOGY CLINIC | Facility: HOSPITAL | Age: 75
End: 2020-08-24
Payer: MEDICARE

## 2020-08-24 ENCOUNTER — CLINICAL SUPPORT (OUTPATIENT)
Dept: CARDIOLOGY CLINIC | Facility: HOSPITAL | Age: 75
End: 2020-08-24
Payer: MEDICARE

## 2020-08-24 ENCOUNTER — APPOINTMENT (OUTPATIENT)
Dept: PHYSICAL THERAPY | Facility: HOME HEALTHCARE | Age: 75
End: 2020-08-24
Payer: MEDICARE

## 2020-08-24 VITALS
HEART RATE: 60 BPM | HEIGHT: 63 IN | SYSTOLIC BLOOD PRESSURE: 132 MMHG | WEIGHT: 174 LBS | BODY MASS INDEX: 30.83 KG/M2 | DIASTOLIC BLOOD PRESSURE: 76 MMHG

## 2020-08-24 DIAGNOSIS — G47.33 OSA (OBSTRUCTIVE SLEEP APNEA): ICD-10-CM

## 2020-08-24 DIAGNOSIS — I34.0 NONRHEUMATIC MITRAL VALVE REGURGITATION: ICD-10-CM

## 2020-08-24 DIAGNOSIS — R00.2 PALPITATIONS: Primary | ICD-10-CM

## 2020-08-24 DIAGNOSIS — R00.2 PALPITATION: ICD-10-CM

## 2020-08-24 DIAGNOSIS — E78.5 DYSLIPIDEMIA: ICD-10-CM

## 2020-08-24 DIAGNOSIS — I10 ESSENTIAL HYPERTENSION: ICD-10-CM

## 2020-08-24 DIAGNOSIS — I50.32 CHRONIC DIASTOLIC (CONGESTIVE) HEART FAILURE (HCC): ICD-10-CM

## 2020-08-24 PROCEDURE — 1160F RVW MEDS BY RX/DR IN RCRD: CPT | Performed by: PHYSICIAN ASSISTANT

## 2020-08-24 PROCEDURE — 93000 ELECTROCARDIOGRAM COMPLETE: CPT | Performed by: PHYSICIAN ASSISTANT

## 2020-08-24 PROCEDURE — 3008F BODY MASS INDEX DOCD: CPT | Performed by: PHYSICIAN ASSISTANT

## 2020-08-24 PROCEDURE — 3075F SYST BP GE 130 - 139MM HG: CPT | Performed by: PHYSICIAN ASSISTANT

## 2020-08-24 PROCEDURE — 1111F DSCHRG MED/CURRENT MED MERGE: CPT | Performed by: PHYSICIAN ASSISTANT

## 2020-08-24 PROCEDURE — 1036F TOBACCO NON-USER: CPT | Performed by: PHYSICIAN ASSISTANT

## 2020-08-24 PROCEDURE — 99214 OFFICE O/P EST MOD 30 MIN: CPT | Performed by: PHYSICIAN ASSISTANT

## 2020-08-24 PROCEDURE — 0296T PR EXT ECG > 48HR TO 21 DAY RCRD W/CONECT INTL RCRD: CPT | Performed by: INTERNAL MEDICINE

## 2020-08-24 PROCEDURE — 3078F DIAST BP <80 MM HG: CPT | Performed by: PHYSICIAN ASSISTANT

## 2020-08-26 ENCOUNTER — APPOINTMENT (OUTPATIENT)
Dept: PHYSICAL THERAPY | Facility: HOME HEALTHCARE | Age: 75
End: 2020-08-26
Payer: MEDICARE

## 2020-08-26 ENCOUNTER — APPOINTMENT (OUTPATIENT)
Dept: LAB | Facility: HOSPITAL | Age: 75
End: 2020-08-26
Payer: MEDICARE

## 2020-08-26 DIAGNOSIS — E87.1 HYPONATREMIA: ICD-10-CM

## 2020-08-26 LAB
ALBUMIN SERPL BCP-MCNC: 3.9 G/DL (ref 3.5–5)
ALP SERPL-CCNC: 165 U/L (ref 46–116)
ALT SERPL W P-5'-P-CCNC: 22 U/L (ref 12–78)
ANION GAP SERPL CALCULATED.3IONS-SCNC: 9 MMOL/L (ref 4–13)
AST SERPL W P-5'-P-CCNC: 19 U/L (ref 5–45)
BILIRUB SERPL-MCNC: 0.3 MG/DL (ref 0.2–1)
BUN SERPL-MCNC: 7 MG/DL (ref 5–25)
CALCIUM SERPL-MCNC: 9.2 MG/DL (ref 8.3–10.1)
CHLORIDE SERPL-SCNC: 96 MMOL/L (ref 100–108)
CO2 SERPL-SCNC: 26 MMOL/L (ref 21–32)
CREAT SERPL-MCNC: 0.83 MG/DL (ref 0.6–1.3)
GFR SERPL CREATININE-BSD FRML MDRD: 70 ML/MIN/1.73SQ M
GLUCOSE P FAST SERPL-MCNC: 98 MG/DL (ref 65–99)
OSMOLALITY UR: 113 MMOL/KG
POTASSIUM SERPL-SCNC: 4.4 MMOL/L (ref 3.5–5.3)
PROT SERPL-MCNC: 7 G/DL (ref 6.4–8.2)
SODIUM 24H UR-SCNC: 30 MOL/L
SODIUM SERPL-SCNC: 131 MMOL/L (ref 136–145)

## 2020-08-26 PROCEDURE — 83935 ASSAY OF URINE OSMOLALITY: CPT

## 2020-08-26 PROCEDURE — 80053 COMPREHEN METABOLIC PANEL: CPT

## 2020-08-26 PROCEDURE — 36415 COLL VENOUS BLD VENIPUNCTURE: CPT

## 2020-08-26 PROCEDURE — 84300 ASSAY OF URINE SODIUM: CPT

## 2020-09-01 ENCOUNTER — TELEPHONE (OUTPATIENT)
Dept: NEPHROLOGY | Facility: CLINIC | Age: 75
End: 2020-09-01

## 2020-09-01 NOTE — TELEPHONE ENCOUNTER
----- Message from Ilana Nichols, [address] sent at 9/1/2020  2:35 PM EDT -----  Hi  Please call Lucy Barillas and let her know her sodium looks great  Increased from 124 to 131  Normal is 135-145   Keep up the great work

## 2020-09-03 ENCOUNTER — OFFICE VISIT (OUTPATIENT)
Dept: FAMILY MEDICINE CLINIC | Facility: CLINIC | Age: 75
End: 2020-09-03
Payer: MEDICARE

## 2020-09-03 VITALS
WEIGHT: 178 LBS | RESPIRATION RATE: 20 BRPM | TEMPERATURE: 98 F | HEART RATE: 84 BPM | DIASTOLIC BLOOD PRESSURE: 64 MMHG | BODY MASS INDEX: 31.53 KG/M2 | SYSTOLIC BLOOD PRESSURE: 132 MMHG

## 2020-09-03 DIAGNOSIS — I10 ESSENTIAL HYPERTENSION: ICD-10-CM

## 2020-09-03 DIAGNOSIS — K21.9 GASTROESOPHAGEAL REFLUX DISEASE WITHOUT ESOPHAGITIS: ICD-10-CM

## 2020-09-03 DIAGNOSIS — I34.0 NONRHEUMATIC MITRAL VALVE REGURGITATION: ICD-10-CM

## 2020-09-03 DIAGNOSIS — J01.01 ACUTE RECURRENT MAXILLARY SINUSITIS: Primary | ICD-10-CM

## 2020-09-03 PROCEDURE — 99213 OFFICE O/P EST LOW 20 MIN: CPT | Performed by: FAMILY MEDICINE

## 2020-09-03 RX ORDER — METHYLPREDNISOLONE 4 MG/1
TABLET ORAL
Qty: 21 EACH | Refills: 0 | Status: SHIPPED | OUTPATIENT
Start: 2020-09-03 | End: 2020-09-09

## 2020-09-03 RX ORDER — CLINDAMYCIN HYDROCHLORIDE 300 MG/1
300 CAPSULE ORAL 3 TIMES DAILY
Qty: 30 CAPSULE | Refills: 0 | Status: SHIPPED | OUTPATIENT
Start: 2020-09-03 | End: 2020-09-13

## 2020-09-08 ENCOUNTER — TELEPHONE (OUTPATIENT)
Dept: FAMILY MEDICINE CLINIC | Facility: CLINIC | Age: 75
End: 2020-09-08

## 2020-09-08 DIAGNOSIS — J01.01 ACUTE RECURRENT MAXILLARY SINUSITIS: Primary | ICD-10-CM

## 2020-09-08 DIAGNOSIS — J01.01 ACUTE RECURRENT MAXILLARY SINUSITIS: ICD-10-CM

## 2020-09-08 RX ORDER — DOXYCYCLINE HYCLATE 100 MG
100 TABLET ORAL 2 TIMES DAILY
Qty: 28 TABLET | Refills: 0 | Status: SHIPPED | OUTPATIENT
Start: 2020-09-08 | End: 2020-09-09

## 2020-09-08 RX ORDER — DOXYCYCLINE HYCLATE 100 MG
100 TABLET ORAL 2 TIMES DAILY
Qty: 28 TABLET | Refills: 0 | Status: SHIPPED | OUTPATIENT
Start: 2020-09-08 | End: 2020-09-08 | Stop reason: SDUPTHER

## 2020-09-08 NOTE — TELEPHONE ENCOUNTER
C/O CLINDAMYACIN IS NOT HELPING - FINISED MEDROL DOSE SAUMYA THIS AM - STILL HAS THE HEADACHE - SHOULD ANTIBIOTIC BE CHANGED?  ALLERGY TO PENICILLIN

## 2020-09-09 ENCOUNTER — OFFICE VISIT (OUTPATIENT)
Dept: NEPHROLOGY | Facility: CLINIC | Age: 75
End: 2020-09-09
Payer: MEDICARE

## 2020-09-09 VITALS
HEART RATE: 73 BPM | WEIGHT: 176 LBS | HEIGHT: 63 IN | DIASTOLIC BLOOD PRESSURE: 68 MMHG | OXYGEN SATURATION: 98 % | TEMPERATURE: 97.8 F | SYSTOLIC BLOOD PRESSURE: 130 MMHG | BODY MASS INDEX: 31.18 KG/M2

## 2020-09-09 DIAGNOSIS — N18.2 STAGE 2 CHRONIC KIDNEY DISEASE: ICD-10-CM

## 2020-09-09 DIAGNOSIS — E26.9 HYPERALDOSTERONISM (HCC): Primary | ICD-10-CM

## 2020-09-09 DIAGNOSIS — M17.11 PRIMARY OSTEOARTHRITIS OF RIGHT KNEE: ICD-10-CM

## 2020-09-09 DIAGNOSIS — E87.1 HYPONATREMIA: ICD-10-CM

## 2020-09-09 DIAGNOSIS — I10 ESSENTIAL HYPERTENSION: ICD-10-CM

## 2020-09-09 DIAGNOSIS — I12.9 HYPERTENSIVE NEPHROSCLEROSIS, STAGE 1 THROUGH STAGE 4 OR UNSPECIFIED CHRONIC KIDNEY DISEASE: ICD-10-CM

## 2020-09-09 PROCEDURE — 99214 OFFICE O/P EST MOD 30 MIN: CPT | Performed by: INTERNAL MEDICINE

## 2020-09-09 RX ORDER — BISOPROLOL FUMARATE 10 MG/1
10 TABLET ORAL EVERY MORNING
Start: 2020-09-09 | End: 2021-02-26

## 2020-09-09 RX ORDER — ASPIRIN 81 MG/1
81 TABLET ORAL DAILY
Start: 2020-09-09

## 2020-09-09 NOTE — PROGRESS NOTES
[facility] [place], [place]    Name: Janiya Carlos  YOB: 1945      Assessment/Plan:    Stage 2 chronic kidney disease  Kidney function stable, continue controlled blood pressures as best as possible  Hyperaldosteronism ([address] )  Blood pressure is well controlled, continue with spironolactone 50 mg once daily  Hyponatremia  Serum sodium level significantly improved status post mild reduction of fluid intake  The the patient questioned if spironolactone is contributing to her hyponatremia, the short answer this is not in any significant fashion at this time  Although there is a very small dilutional defect component to the patient's distal tubule, however, the vast majority of physiology behind hyponatremia is due to ADH  Problem List Items Addressed This Visit        Endocrine    Hyperaldosteronism ([address] ) - Primary     Blood pressure is well controlled, continue with spironolactone 50 mg once daily  Relevant Orders    Comprehensive metabolic panel    Magnesium    Microalbumin / creatinine urine ratio    Urinalysis with microscopic       Cardiovascular and Mediastinum    Hypertension    Relevant Medications    bisoprolol (ZEBETA) 10 MG tablet    aspirin (ECOTRIN LOW STRENGTH) 81 mg EC tablet    Hypertensive nephrosclerosis       Musculoskeletal and Integument    Primary osteoarthritis of right knee       Genitourinary    Stage 2 chronic kidney disease     Kidney function stable, continue controlled blood pressures as best as possible  Relevant Orders    Comprehensive metabolic panel    Magnesium    Microalbumin / creatinine urine ratio    Urinalysis with microscopic       Other    Hyponatremia     Serum sodium level significantly improved status post mild reduction of fluid intake    The the patient questioned if spironolactone is contributing to her hyponatremia, the short answer this is not in any significant fashion at this time  Although there is a very small dilutional defect component to the patient's distal tubule, however, the vast majority of physiology behind hyponatremia is due to ADH  Patient is doing well at this time, we will see her back in approximately 6 months for regular appointment  No refills were needed, medications were updated  Subjective:      Patient ID: Natasha Dean is a 70 y o  female  Patient here for follow up for hyponatremia  Patient's repeat serum sodium levels appropriate  She reduced the fluid intake over the course of the day  She also increased total protein intake  Urine osmolality was lower than previous times, perhaps the impact of the increased protein intake  Hypertension   This is a chronic problem  The current episode started more than 1 year ago  The problem is unchanged  The problem is controlled  Pertinent negatives include no chest pain, orthopnea or peripheral edema  There are no associated agents to hypertension  Risk factors for coronary artery disease include obesity and post-menopausal state  Past treatments include diuretics, calcium channel blockers and angiotensin blockers  Compliance problems include diet  The following portions of the patient's history were reviewed and updated as appropriate: allergies, current medications, past family history, past medical history, past social history, past surgical history and problem list     Review of Systems   Constitutional: Positive for fatigue  Cardiovascular: Negative for chest pain and orthopnea  All other systems reviewed and are negative          Social History     Socioeconomic History    Marital status: /Civil Union     Spouse name: None    Number of children: None    Years of education: None    Highest education level: None   Occupational History    Occupation: Admnistrator    Social Needs    Financial resource strain: None    Food insecurity     Worry: None Inability: None    Transportation needs     Medical: None     Non-medical: None   Tobacco Use    Smoking status: Former Smoker     Types: Cigarettes    Smokeless tobacco: Never Used    Tobacco comment: quit 40 yrs ago   Substance and Sexual Activity    Alcohol use: Yes     Frequency: Monthly or less     Comment: 2 beer a week    Drug use: Never    Sexual activity: Not Currently     Birth control/protection: Post-menopausal   Lifestyle    Physical activity     Days per week: None     Minutes per session: None    Stress: None   Relationships    Social connections     Talks on phone: None     Gets together: None     Attends Christian service: None     Active member of club or organization: None     Attends meetings of clubs or organizations: None     Relationship status: None    Intimate partner violence     Fear of current or ex partner: None     Emotionally abused: None     Physically abused: None     Forced sexual activity: None   Other Topics Concern    None   Social History Narrative    Patient has never smoked - As per [employer]    Consumes 1-2 beers per week - As per [facility]    Consumes on average 1 cup of decaf coffee per day      Past Medical History:   Diagnosis Date    Anxiety     Arthritis     Benign arteriolar nephrosclerosis     Chronic kidney disease in type 2 diabetes mellitus (HCC)     Chronic pain disorder     Depression     GERD (gastroesophageal reflux disease)     Heart murmur     Hyperaldosteronism ([address] )     Hyperlipidemia     Hypertension     Hypo-osmolality and hyponatremia     IBS (irritable bowel syndrome)     Insomnia     Migraines     Mitral regurgitation     Obstructive sleep apnea syndrome     Osteoarthritis     Pernicious anemia     Right knee DJD     St[address] ([facility] [address] )     tia     Past Surgical History:   Procedure Laterality Date    APPENDECTOMY      CARPAL TUNNEL RELEASE Bilateral     COLONOSCOPY      several    HAND SURGERY Right     ring finger has pin    HYSTERECTOMY      32    INSERT / REPLACE PERIPHERAL NEUROSTIMULATOR PULSE GENERATOR /  Left     buttocks    JOINT REPLACEMENT Left     knee    JOINT REPLACEMENT Right 04/2019    Hip    KNEE ARTHROSCOPY Bilateral     NASAL SEPTUM SURGERY  2008    NERVE BLOCK Left 2/20/2018    Procedure: BLOCK MEDIAL BRANCH - C4, C5, C6;  Surgeon: Deedee Parra MD;  [facility];  Service: Pain Management     NERVE BLOCK Left 3/6/2018    Procedure: C4, C5, C6 MEDIAL BRANCH BLOCK;  Surgeon: Deedee Parra MD;  [facility];  Service: Pain Management     CA COLONOSCOPY FLX DX W/COLLJ SPEC WHEN PFRMD N/A 4/24/2017    Procedure: FLEXIBLE COLONOSCOPY;  Surgeon: Mekhi Moody MD;  [facility];  Service: Colorectal    CA TOTAL HIP ARTHROPLASTY Right 2/27/2019    Procedure: ARTHROPLASTY HIP TOTAL;  Surgeon: Cameron Pineda DO;  Location: [facility] MAIN OR;  Service: Orthopedics    CA TOTAL KNEE ARTHROPLASTY Right 6/24/2020    Procedure: KNEE TOTAL ARTHROPLASTY;  Surgeon: Cameron Pineda DO;  Location: [facility] MAIN OR;  Service: Orthopedics    RADIOFREQUENCY ABLATION Left 4/17/2018    Procedure: ABLATION RADIO FREQUENCY (RFA) - C4, C5, C6;  Surgeon: Deedee Parra MD;  [facility];  Service: Pain Management     SINUS SURGERY      TONSILLECTOMY      TOTAL HIP ARTHROPLASTY Left     UPPER GASTROINTESTINAL ENDOSCOPY      WRIST SURGERY Right        Current Outpatient Medications:     bisoprolol (ZEBETA) 10 MG tablet, Take 1 tablet (10 mg total) by mouth every morning, Disp: , Rfl:     butalbital-acetaminophen-caffeine-codeine (FIORICET WITH CODEINE) -25-30 MG per capsule, Take 1 capsule by mouth every 4 (four) hours as needed for headaches, Disp: , Rfl:     calcium carbonate (OS-MARLENY) 600 MG tablet, Take 600 mg by mouth 2 (two) times a day with meals, Disp: , Rfl:     clindamycin (CLEOCIN) 300 MG capsule, Take 1 capsule (300 mg total) by mouth 3 (three) times a day for 10 days, Disp: 30 capsule, Rfl: 0    diclofenac sodium (VOLTAREN) 1 %, Apply 2 g topically 4 (four) times a day, Disp: 50 g, Rfl: 5    LORazepam (ATIVAN) 1 mg tablet, Take 1 mg by mouth 2 (two) times a day as needed for anxiety, Disp: , Rfl:     losartan (COZAAR) 100 MG tablet, Take 1 tablet (100 mg total) by mouth every morning, Disp: 90 tablet, Rfl: 3    Multiple Vitamins-Minerals (MULTIVITAMIN WITH MINERALS) tablet, Take 1 tablet by mouth every morning, Disp: , Rfl:     omeprazole (PriLOSEC) 20 mg delayed release capsule, Take 20 mg by mouth every morning  , Disp: , Rfl:     pravastatin (PRAVACHOL) 20 mg tablet, Take 20 mg by mouth 2 (two) times a day , Disp: , Rfl:     spironolactone (ALDACTONE) 50 mg tablet, TAKE 1 TABLET DAILY AFTER LUNCH, Disp: 90 tablet, Rfl: 3    zolpidem (AMBIEN) 10 mg tablet, Take 10 mg by mouth daily at bedtime as needed , Disp: , Rfl:     aspirin (ECOTRIN LOW STRENGTH) 81 mg EC tablet, Take 1 tablet (81 mg total) by mouth daily, Disp:  , Rfl:     Lab Results   Component Value Date     (L) 09/21/2015    SODIUM 131 (L) 08/26/2020    K 4 4 08/26/2020    CL 96 (L) 08/26/2020    CO2 26 08/26/2020    ANIONGAP 7 09/21/2015    AGAP 9 08/26/2020    BUN 7 08/26/2020    CREATININE 0 83 08/26/2020    GLUC 115 (H) 06/26/2020    GLUF 98 08/26/2020    CALCIUM 9 2 08/26/2020    AST 19 08/26/2020    ALT 22 08/26/2020    ALKPHOS 165 (H) 08/26/2020    PROT 6 9 09/21/2015    TP 7 0 08/26/2020    BILITOT 0 29 09/21/2015    TBILI 0 30 08/26/2020    EGFR 70 08/26/2020     Lab Results   Component Value Date    WBC 3 32 (L) 07/22/2020    HGB 11 6 07/22/2020    HCT 34 8 07/22/2020    MCV 90 07/22/2020     07/22/2020     Lab Results   Component Value Date    CHOLESTEROL 201 (H) 07/22/2020    CHOLESTEROL 247 (H) 06/08/2016    CHOLESTEROL 241 (H) 01/27/2016     Lab Results   Component Value Date     (H) 06/08/2016     (H) 01/27/2016     06/04/2015     Lab Results   Component Value Date    LDLCALC 121 (H) 06/08/2016    LDLCALC 126 (H) 01/27/2016    LDLCALC 153 (H) 06/04/2015     Lab Results   Component Value Date    TRIG 49 06/08/2016    TRIG 56 01/27/2016    TRIG 44 06/04/2015     No results found for: [place], [place]  Lab Results   Component Value Date    YIH2LQNOJONI 3 822 (H) 07/22/2020     Lab Results   Component Value Date    CALCIUM 9 2 08/26/2020     No results found for: SPEP, UPEP  No results found for: JOE CARRANZA4HUR        Objective:      /68   Pulse 73   Temp 97 8 °F (36 6 °C)   Ht 5' 3" (1 6 m)   Wt 79 8 kg (176 lb)   SpO2 98%   BMI 31 18 kg/m²          Physical Exam  Vitals signs reviewed  Constitutional:       General: She is not in acute distress  Appearance: She is well-developed  HENT:      Head: Normocephalic and atraumatic  Eyes:      Conjunctiva/sclera: Conjunctivae normal    Neck:      Musculoskeletal: Neck supple  Cardiovascular:      Rate and Rhythm: Normal rate and regular rhythm  Pulmonary:      Effort: Pulmonary effort is normal       Breath sounds: Normal breath sounds  Abdominal:      Palpations: Abdomen is soft  Skin:     General: Skin is warm  Findings: No rash  Neurological:      Mental Status: She is alert and oriented to person, place, and time  Cranial Nerves: No cranial nerve deficit     Psychiatric:         Behavior: Behavior normal

## 2020-09-09 NOTE — ASSESSMENT & PLAN NOTE
Serum sodium level significantly improved status post mild reduction of fluid intake  The the patient questioned if spironolactone is contributing to her hyponatremia, the short answer this is not in any significant fashion at this time  Although there is a very small dilutional defect component to the patient's distal tubule, however, the vast majority of physiology behind hyponatremia is due to ADH

## 2020-09-10 ENCOUNTER — OFFICE VISIT (OUTPATIENT)
Dept: OBGYN CLINIC | Facility: CLINIC | Age: 75
End: 2020-09-10

## 2020-09-10 ENCOUNTER — CLINICAL SUPPORT (OUTPATIENT)
Dept: CARDIOLOGY CLINIC | Facility: HOSPITAL | Age: 75
End: 2020-09-10
Payer: MEDICARE

## 2020-09-10 ENCOUNTER — APPOINTMENT (OUTPATIENT)
Dept: RADIOLOGY | Facility: MEDICAL CENTER | Age: 75
End: 2020-09-10
Payer: MEDICARE

## 2020-09-10 VITALS
BODY MASS INDEX: 30.76 KG/M2 | TEMPERATURE: 97.6 F | HEIGHT: 63 IN | DIASTOLIC BLOOD PRESSURE: 73 MMHG | SYSTOLIC BLOOD PRESSURE: 127 MMHG | HEART RATE: 56 BPM | WEIGHT: 173.6 LBS

## 2020-09-10 DIAGNOSIS — Z96.651 STATUS POST TOTAL RIGHT KNEE REPLACEMENT: Primary | ICD-10-CM

## 2020-09-10 DIAGNOSIS — R00.2 PALPITATIONS: ICD-10-CM

## 2020-09-10 DIAGNOSIS — Z96.651 STATUS POST TOTAL RIGHT KNEE REPLACEMENT: ICD-10-CM

## 2020-09-10 PROCEDURE — 0298T PR EXT ECG > 48HR TO 21 DAY REVIEW AND INTERPRETATN: CPT

## 2020-09-10 PROCEDURE — 99024 POSTOP FOLLOW-UP VISIT: CPT | Performed by: ORTHOPAEDIC SURGERY

## 2020-09-10 PROCEDURE — 73562 X-RAY EXAM OF KNEE 3: CPT

## 2020-09-10 NOTE — PROGRESS NOTES
Assessment/Plan:    No problem-specific Assessment & Plan notes found for this encounter  Diagnoses and all orders for this visit:    Status post total right knee replacement  -     XR knee 3 vw right non injury; Future          The patient is doing quite well  Continue home exercise program   Continue stretching  Return back office 3 months with new x-rays of right knee-three views    Subjective:      Patient ID: Max Child is a 70 y o  female  HPI    The patient is nearly 3 months status post right total knee replacement  She offers no complaints of pain  She denies any numbness or tingling  She denies any fever or chills  Physical therapy is now stopped  She is very pleased with her results  She is walking the office without any assistive devices and without an antalgic gait    The following portions of the patient's history were reviewed and updated as appropriate: allergies, current medications, past family history, past medical history, past social history, past surgical history and problem list     Review of Systems   Constitutional: Negative for chills, fever and unexpected weight change  HENT: Negative for hearing loss, nosebleeds and sore throat  Eyes: Negative for pain, redness and visual disturbance  Respiratory: Negative for cough, shortness of breath and wheezing  Cardiovascular: Negative for chest pain, palpitations and leg swelling  Gastrointestinal: Negative for abdominal pain, nausea and vomiting  Endocrine: Negative for polydipsia and polyuria  Genitourinary: Negative for dysuria and hematuria  Musculoskeletal: Negative for arthralgias, back pain, gait problem, joint swelling, myalgias, neck pain and neck stiffness  As noted in HPI   Skin: Negative for rash and wound  Neurological: Negative for dizziness, numbness and headaches  Psychiatric/Behavioral: Negative for decreased concentration and suicidal ideas  The patient is not nervous/anxious  Objective:      /73 (BP Location: Left arm, Patient Position: Sitting, Cuff Size: Standard)   Pulse 56   Temp 97 6 °F (36 4 °C)   Ht 5' 3" (1 6 m)   Wt 78 7 kg (173 lb 9 6 oz)   BMI 30 75 kg/m²          Physical Exam      Right lower extremity is neurovascular intact  Toes are pink and mobile  Compartments are soft  Incision is clean, dry, and intact  Range of motion her knee is from 0-125 degrees  There is no instability  Extensor mechanism was intact  Good quad tone  Negative Homans      X-rays show a well-seated right total knee replacement without any loosening the prosthesis

## 2020-09-25 ENCOUNTER — OFFICE VISIT (OUTPATIENT)
Dept: FAMILY MEDICINE CLINIC | Facility: CLINIC | Age: 75
End: 2020-09-25
Payer: MEDICARE

## 2020-09-25 VITALS — TEMPERATURE: 97.4 F

## 2020-09-25 DIAGNOSIS — Z23 NEED FOR VACCINATION: Primary | ICD-10-CM

## 2020-09-25 PROCEDURE — 90662 IIV NO PRSV INCREASED AG IM: CPT

## 2020-09-25 PROCEDURE — G0008 ADMIN INFLUENZA VIRUS VAC: HCPCS

## 2020-09-28 DIAGNOSIS — F51.01 PRIMARY INSOMNIA: Primary | ICD-10-CM

## 2020-09-28 RX ORDER — ZOLPIDEM TARTRATE 10 MG/1
5 TABLET ORAL
Qty: 90 TABLET | Refills: 2 | Status: SHIPPED | OUTPATIENT
Start: 2020-09-28 | End: 2020-10-02 | Stop reason: CLARIF

## 2020-10-02 DIAGNOSIS — G47.00 INSOMNIA, UNSPECIFIED TYPE: Primary | ICD-10-CM

## 2020-10-02 RX ORDER — ZOLPIDEM TARTRATE 10 MG/1
10 TABLET ORAL
Qty: 90 TABLET | Refills: 1 | OUTPATIENT
Start: 2020-10-02

## 2020-10-08 ENCOUNTER — OFFICE VISIT (OUTPATIENT)
Dept: FAMILY MEDICINE CLINIC | Facility: CLINIC | Age: 75
End: 2020-10-08
Payer: MEDICARE

## 2020-10-08 VITALS
HEIGHT: 63 IN | SYSTOLIC BLOOD PRESSURE: 132 MMHG | DIASTOLIC BLOOD PRESSURE: 68 MMHG | WEIGHT: 179 LBS | RESPIRATION RATE: 20 BRPM | TEMPERATURE: 96 F | BODY MASS INDEX: 31.71 KG/M2 | HEART RATE: 68 BPM

## 2020-10-08 DIAGNOSIS — Z13.1 SCREENING FOR DIABETES MELLITUS: ICD-10-CM

## 2020-10-08 DIAGNOSIS — J01.00 SUBACUTE MAXILLARY SINUSITIS: ICD-10-CM

## 2020-10-08 DIAGNOSIS — Z11.59 NEED FOR HEPATITIS B SCREENING TEST: ICD-10-CM

## 2020-10-08 DIAGNOSIS — Z23 NEED FOR VACCINATION: Primary | ICD-10-CM

## 2020-10-08 DIAGNOSIS — G47.33 OSA (OBSTRUCTIVE SLEEP APNEA): ICD-10-CM

## 2020-10-08 DIAGNOSIS — K21.9 GASTROESOPHAGEAL REFLUX DISEASE WITHOUT ESOPHAGITIS: ICD-10-CM

## 2020-10-08 DIAGNOSIS — F51.01 PRIMARY INSOMNIA: ICD-10-CM

## 2020-10-08 DIAGNOSIS — M47.812 CERVICAL SPONDYLOSIS WITHOUT MYELOPATHY: ICD-10-CM

## 2020-10-08 DIAGNOSIS — Z13.6 SCREENING FOR CARDIOVASCULAR CONDITION: ICD-10-CM

## 2020-10-08 DIAGNOSIS — I34.0 NONRHEUMATIC MITRAL VALVE REGURGITATION: ICD-10-CM

## 2020-10-08 DIAGNOSIS — E26.9 HYPERALDOSTERONISM (HCC): ICD-10-CM

## 2020-10-08 DIAGNOSIS — Z11.4 SCREENING FOR HIV (HUMAN IMMUNODEFICIENCY VIRUS): ICD-10-CM

## 2020-10-08 DIAGNOSIS — Z11.59 NEED FOR HEPATITIS C SCREENING TEST: ICD-10-CM

## 2020-10-08 DIAGNOSIS — I10 ESSENTIAL HYPERTENSION: ICD-10-CM

## 2020-10-08 PROCEDURE — G0438 PPPS, INITIAL VISIT: HCPCS | Performed by: FAMILY MEDICINE

## 2020-10-08 PROCEDURE — 90732 PPSV23 VACC 2 YRS+ SUBQ/IM: CPT

## 2020-10-08 PROCEDURE — G0009 ADMIN PNEUMOCOCCAL VACCINE: HCPCS

## 2020-10-08 RX ORDER — CLINDAMYCIN HYDROCHLORIDE 300 MG/1
300 CAPSULE ORAL 3 TIMES DAILY
Qty: 30 CAPSULE | Refills: 0 | Status: SHIPPED | OUTPATIENT
Start: 2020-10-08 | End: 2020-10-18

## 2020-10-08 RX ORDER — METHYLPREDNISOLONE 4 MG/1
TABLET ORAL
Qty: 21 EACH | Refills: 0 | Status: SHIPPED | OUTPATIENT
Start: 2020-10-08 | End: 2020-10-08 | Stop reason: SDUPTHER

## 2020-10-08 RX ORDER — METHYLPREDNISOLONE 4 MG/1
TABLET ORAL
Qty: 21 EACH | Refills: 0 | Status: SHIPPED | OUTPATIENT
Start: 2020-10-08 | End: 2020-11-17

## 2020-10-08 RX ORDER — CLINDAMYCIN HYDROCHLORIDE 300 MG/1
300 CAPSULE ORAL 3 TIMES DAILY
Qty: 30 CAPSULE | Refills: 0 | Status: SHIPPED | OUTPATIENT
Start: 2020-10-08 | End: 2020-10-08 | Stop reason: SDUPTHER

## 2020-10-08 RX ORDER — QUETIAPINE FUMARATE 25 MG/1
25 TABLET, FILM COATED ORAL
Qty: 90 TABLET | Refills: 0 | Status: SHIPPED | OUTPATIENT
Start: 2020-10-08 | End: 2021-01-15

## 2020-10-29 ENCOUNTER — TELEPHONE (OUTPATIENT)
Dept: FAMILY MEDICINE CLINIC | Facility: CLINIC | Age: 75
End: 2020-10-29

## 2020-11-17 ENCOUNTER — OFFICE VISIT (OUTPATIENT)
Dept: FAMILY MEDICINE CLINIC | Facility: CLINIC | Age: 75
End: 2020-11-17
Payer: MEDICARE

## 2020-11-17 VITALS
SYSTOLIC BLOOD PRESSURE: 128 MMHG | WEIGHT: 176 LBS | BODY MASS INDEX: 31.18 KG/M2 | RESPIRATION RATE: 20 BRPM | DIASTOLIC BLOOD PRESSURE: 74 MMHG | HEART RATE: 84 BPM | HEIGHT: 63 IN | TEMPERATURE: 97 F

## 2020-11-17 DIAGNOSIS — M17.11 PRIMARY OSTEOARTHRITIS OF RIGHT KNEE: ICD-10-CM

## 2020-11-17 DIAGNOSIS — R11.0 NAUSEA: ICD-10-CM

## 2020-11-17 DIAGNOSIS — I34.0 NONRHEUMATIC MITRAL VALVE REGURGITATION: ICD-10-CM

## 2020-11-17 DIAGNOSIS — F51.01 PRIMARY INSOMNIA: ICD-10-CM

## 2020-11-17 DIAGNOSIS — Z23 NEED FOR VACCINATION: ICD-10-CM

## 2020-11-17 DIAGNOSIS — Z12.31 ENCOUNTER FOR SCREENING MAMMOGRAM FOR MALIGNANT NEOPLASM OF BREAST: Primary | ICD-10-CM

## 2020-11-17 DIAGNOSIS — I10 ESSENTIAL HYPERTENSION: ICD-10-CM

## 2020-11-17 DIAGNOSIS — F41.9 ANXIETY: ICD-10-CM

## 2020-11-17 PROCEDURE — 90715 TDAP VACCINE 7 YRS/> IM: CPT

## 2020-11-17 PROCEDURE — 90471 IMMUNIZATION ADMIN: CPT

## 2020-11-17 PROCEDURE — 99214 OFFICE O/P EST MOD 30 MIN: CPT | Performed by: FAMILY MEDICINE

## 2020-11-17 RX ORDER — PROCHLORPERAZINE MALEATE 5 MG/1
5 TABLET ORAL EVERY 6 HOURS PRN
Qty: 30 TABLET | Refills: 0 | Status: SHIPPED | OUTPATIENT
Start: 2020-11-17 | End: 2021-08-11 | Stop reason: SDUPTHER

## 2020-11-17 RX ORDER — ZOLPIDEM TARTRATE 5 MG/1
5 TABLET ORAL
Qty: 30 TABLET | Refills: 0 | Status: CANCELLED | OUTPATIENT
Start: 2020-11-17

## 2020-11-17 RX ORDER — LORAZEPAM 1 MG/1
1 TABLET ORAL 2 TIMES DAILY PRN
Qty: 90 TABLET | Refills: 2 | Status: SHIPPED | OUTPATIENT
Start: 2020-11-17 | End: 2020-11-20 | Stop reason: DRUGHIGH

## 2020-11-17 RX ORDER — PROCHLORPERAZINE MALEATE 5 MG/1
5 TABLET ORAL EVERY 6 HOURS PRN
Qty: 30 TABLET | Refills: 0 | Status: CANCELLED | OUTPATIENT
Start: 2020-11-17

## 2020-11-17 RX ORDER — LORAZEPAM 1 MG/1
1 TABLET ORAL 2 TIMES DAILY PRN
Qty: 90 TABLET | Refills: 2 | Status: CANCELLED | OUTPATIENT
Start: 2020-11-17

## 2020-11-18 ENCOUNTER — OFFICE VISIT (OUTPATIENT)
Dept: CARDIOLOGY CLINIC | Facility: HOSPITAL | Age: 75
End: 2020-11-18
Payer: MEDICARE

## 2020-11-18 VITALS
DIASTOLIC BLOOD PRESSURE: 62 MMHG | SYSTOLIC BLOOD PRESSURE: 136 MMHG | OXYGEN SATURATION: 100 % | BODY MASS INDEX: 31.33 KG/M2 | WEIGHT: 176.8 LBS | HEIGHT: 63 IN | HEART RATE: 57 BPM

## 2020-11-18 DIAGNOSIS — I51.89 DIASTOLIC DYSFUNCTION: ICD-10-CM

## 2020-11-18 DIAGNOSIS — I10 ESSENTIAL HYPERTENSION: Primary | ICD-10-CM

## 2020-11-18 DIAGNOSIS — R06.02 SHORTNESS OF BREATH ON EXERTION: ICD-10-CM

## 2020-11-18 DIAGNOSIS — N18.2 STAGE 2 CHRONIC KIDNEY DISEASE: ICD-10-CM

## 2020-11-18 DIAGNOSIS — E78.00 HYPERCHOLESTEROLEMIA: ICD-10-CM

## 2020-11-18 DIAGNOSIS — G47.33 OSA (OBSTRUCTIVE SLEEP APNEA): ICD-10-CM

## 2020-11-18 DIAGNOSIS — I34.0 NONRHEUMATIC MITRAL VALVE REGURGITATION: ICD-10-CM

## 2020-11-18 PROCEDURE — 99214 OFFICE O/P EST MOD 30 MIN: CPT | Performed by: INTERNAL MEDICINE

## 2020-11-18 RX ORDER — BUTALBITAL/ASPIRIN/CAFFEINE 50-325-40
1 CAPSULE ORAL EVERY 4 HOURS PRN
COMMUNITY
End: 2020-12-11 | Stop reason: CLARIF

## 2020-11-18 RX ORDER — ZOLPIDEM TARTRATE 10 MG/1
10 TABLET ORAL
COMMUNITY
End: 2021-01-15

## 2020-11-19 ENCOUNTER — TELEPHONE (OUTPATIENT)
Dept: PSYCHIATRY | Facility: CLINIC | Age: 75
End: 2020-11-19

## 2020-11-20 ENCOUNTER — OFFICE VISIT (OUTPATIENT)
Dept: SLEEP CENTER | Facility: CLINIC | Age: 75
End: 2020-11-20
Payer: MEDICARE

## 2020-11-20 VITALS
WEIGHT: 176.6 LBS | DIASTOLIC BLOOD PRESSURE: 72 MMHG | TEMPERATURE: 97.9 F | RESPIRATION RATE: 17 BRPM | HEIGHT: 63 IN | HEART RATE: 58 BPM | SYSTOLIC BLOOD PRESSURE: 138 MMHG | BODY MASS INDEX: 31.29 KG/M2

## 2020-11-20 DIAGNOSIS — G47.33 OSA (OBSTRUCTIVE SLEEP APNEA): ICD-10-CM

## 2020-11-20 DIAGNOSIS — K21.9 GASTROESOPHAGEAL REFLUX DISEASE WITHOUT ESOPHAGITIS: Primary | ICD-10-CM

## 2020-11-20 DIAGNOSIS — F51.01 PRIMARY INSOMNIA: Primary | ICD-10-CM

## 2020-11-20 PROCEDURE — 99204 OFFICE O/P NEW MOD 45 MIN: CPT | Performed by: PSYCHIATRY & NEUROLOGY

## 2020-11-20 RX ORDER — MIRTAZAPINE 7.5 MG/1
7.5 TABLET, FILM COATED ORAL
Qty: 30 TABLET | Refills: 1 | Status: SHIPPED | OUTPATIENT
Start: 2020-11-20 | End: 2020-12-10 | Stop reason: SDUPTHER

## 2020-11-20 RX ORDER — MIRTAZAPINE 7.5 MG/1
7.5 TABLET, FILM COATED ORAL
Qty: 30 TABLET | Refills: 1 | Status: SHIPPED | OUTPATIENT
Start: 2020-11-20 | End: 2020-11-20 | Stop reason: SDUPTHER

## 2020-11-20 RX ORDER — OMEPRAZOLE 20 MG/1
CAPSULE, DELAYED RELEASE ORAL
Qty: 180 CAPSULE | Refills: 4 | Status: SHIPPED | OUTPATIENT
Start: 2020-11-20 | End: 2020-12-17

## 2020-11-20 RX ORDER — BUTALBITAL, ACETAMINOPHEN AND CAFFEINE 50; 325; 40 MG/1; MG/1; MG/1
TABLET ORAL
COMMUNITY
Start: 2020-11-03 | End: 2020-12-11 | Stop reason: SDUPTHER

## 2020-11-20 RX ORDER — LORAZEPAM 0.5 MG/1
0.5 TABLET ORAL 3 TIMES DAILY PRN
COMMUNITY
Start: 2020-10-12 | End: 2021-01-15

## 2020-11-24 ENCOUNTER — TELEPHONE (OUTPATIENT)
Dept: OTOLARYNGOLOGY | Facility: CLINIC | Age: 75
End: 2020-11-24

## 2020-12-09 ENCOUNTER — TELEPHONE (OUTPATIENT)
Dept: SLEEP CENTER | Facility: CLINIC | Age: 75
End: 2020-12-09

## 2020-12-09 DIAGNOSIS — F51.01 PRIMARY INSOMNIA: ICD-10-CM

## 2020-12-09 DIAGNOSIS — G47.33 OSA (OBSTRUCTIVE SLEEP APNEA): ICD-10-CM

## 2020-12-09 RX ORDER — MIRTAZAPINE 15 MG/1
15 TABLET, FILM COATED ORAL
Qty: 30 TABLET | Refills: 1 | Status: CANCELLED | OUTPATIENT
Start: 2020-12-09

## 2020-12-10 ENCOUNTER — APPOINTMENT (OUTPATIENT)
Dept: RADIOLOGY | Facility: MEDICAL CENTER | Age: 75
End: 2020-12-10
Payer: MEDICARE

## 2020-12-10 ENCOUNTER — OFFICE VISIT (OUTPATIENT)
Dept: OBGYN CLINIC | Facility: CLINIC | Age: 75
End: 2020-12-10
Payer: MEDICARE

## 2020-12-10 VITALS
BODY MASS INDEX: 31.54 KG/M2 | SYSTOLIC BLOOD PRESSURE: 166 MMHG | DIASTOLIC BLOOD PRESSURE: 83 MMHG | HEART RATE: 61 BPM | HEIGHT: 63 IN | WEIGHT: 178 LBS

## 2020-12-10 DIAGNOSIS — F51.01 PRIMARY INSOMNIA: ICD-10-CM

## 2020-12-10 DIAGNOSIS — Z96.651 STATUS POST TOTAL RIGHT KNEE REPLACEMENT: Primary | ICD-10-CM

## 2020-12-10 DIAGNOSIS — Z96.651 STATUS POST TOTAL RIGHT KNEE REPLACEMENT: ICD-10-CM

## 2020-12-10 DIAGNOSIS — G47.33 OSA (OBSTRUCTIVE SLEEP APNEA): ICD-10-CM

## 2020-12-10 PROCEDURE — 73562 X-RAY EXAM OF KNEE 3: CPT

## 2020-12-10 PROCEDURE — 99213 OFFICE O/P EST LOW 20 MIN: CPT | Performed by: ORTHOPAEDIC SURGERY

## 2020-12-10 RX ORDER — MIRTAZAPINE 15 MG/1
15 TABLET, FILM COATED ORAL
Qty: 30 TABLET | Refills: 2 | Status: SHIPPED | OUTPATIENT
Start: 2020-12-10 | End: 2021-01-15 | Stop reason: SDUPTHER

## 2020-12-11 DIAGNOSIS — G44.209 MUSCLE TENSION HEADACHE: Primary | ICD-10-CM

## 2020-12-11 RX ORDER — BUTALBITAL, ACETAMINOPHEN AND CAFFEINE 50; 325; 40 MG/1; MG/1; MG/1
1 TABLET ORAL EVERY 4 HOURS PRN
Qty: 100 TABLET | Refills: 1 | Status: SHIPPED | OUTPATIENT
Start: 2020-12-11 | End: 2021-02-04 | Stop reason: SDUPTHER

## 2020-12-17 DIAGNOSIS — K21.9 GASTROESOPHAGEAL REFLUX DISEASE WITHOUT ESOPHAGITIS: ICD-10-CM

## 2020-12-17 RX ORDER — OMEPRAZOLE 20 MG/1
CAPSULE, DELAYED RELEASE ORAL
Qty: 180 CAPSULE | Refills: 4 | Status: SHIPPED | OUTPATIENT
Start: 2020-12-17 | End: 2021-12-15

## 2021-01-04 ENCOUNTER — HOSPITAL ENCOUNTER (OUTPATIENT)
Dept: MAMMOGRAPHY | Facility: HOSPITAL | Age: 76
Discharge: HOME | End: 2021-01-04
Attending: FAMILY MEDICINE
Payer: MEDICARE

## 2021-01-04 VITALS — HEIGHT: 63 IN | BODY MASS INDEX: 31.54 KG/M2 | WEIGHT: 178 LBS

## 2021-01-04 DIAGNOSIS — Z12.31 ENCOUNTER FOR SCREENING MAMMOGRAM FOR MALIGNANT NEOPLASM OF BREAST: ICD-10-CM

## 2021-01-04 PROCEDURE — 77067 SCR MAMMO BI INCL CAD: CPT

## 2021-01-04 PROCEDURE — 77063 BREAST TOMOSYNTHESIS BI: CPT

## 2021-01-15 ENCOUNTER — OFFICE VISIT (OUTPATIENT)
Dept: SLEEP CENTER | Facility: CLINIC | Age: 76
End: 2021-01-15
Payer: MEDICARE

## 2021-01-15 VITALS
RESPIRATION RATE: 16 BRPM | SYSTOLIC BLOOD PRESSURE: 130 MMHG | HEIGHT: 64 IN | HEART RATE: 60 BPM | WEIGHT: 185.6 LBS | DIASTOLIC BLOOD PRESSURE: 84 MMHG | BODY MASS INDEX: 31.69 KG/M2

## 2021-01-15 DIAGNOSIS — G47.33 OSA (OBSTRUCTIVE SLEEP APNEA): ICD-10-CM

## 2021-01-15 DIAGNOSIS — F51.01 PRIMARY INSOMNIA: Primary | ICD-10-CM

## 2021-01-15 PROCEDURE — 99213 OFFICE O/P EST LOW 20 MIN: CPT | Performed by: PSYCHIATRY & NEUROLOGY

## 2021-01-15 RX ORDER — CLINDAMYCIN HYDROCHLORIDE 150 MG/1
CAPSULE ORAL
COMMUNITY
Start: 2021-01-09

## 2021-01-15 RX ORDER — MIRTAZAPINE 15 MG/1
15 TABLET, FILM COATED ORAL
Qty: 90 TABLET | Refills: 1 | Status: SHIPPED | OUTPATIENT
Start: 2021-01-15 | End: 2021-08-02 | Stop reason: SDUPTHER

## 2021-01-15 NOTE — PATIENT INSTRUCTIONS
Recommendations:    1) continue remeron 15mg at nighttime, but take it earlier at 10-1030pm  2) Safe driving reviewed  3) Follow-up 6 months  4) Call with any questions or concerns

## 2021-01-15 NOTE — PROGRESS NOTES
Sleep Medicine Follow-Up Note    HPI: 81yo F with CLARKE and insomnia is being seen for a follow up  Treatment Summary: 2017 PSlbs    (AHI) of 5  events per hour of sleep  The AHI in the supine position was 8 5  The AHI during REM sleep was 38  Intermittent snoring of moderate intensity was noted  OXIMETRY:   The baseline oxygen saturation with the patient awake was 98%  The mean oxygen saturation throughout the study was 94%  The percentage of sleep time below 90% was 4 %  The lowest oxygen saturation was 83%  HPI:   Today, patient presents unaccompanied  She has not been retested for CLARKE and not being treated with a CPAP as per the patients deferment  She was started on Remeron last visit, but stated it has not been working well  She stated that it takes her up to 1 5 hours to fall asleep  The 7 5mg did not help her ad she is now on 15mg  She denied any side effects and usually doesn't wake up in the middle of the night  She takes it at 1115pm   She was on Ambein 10mg nightly, but her PCP wanted he to be tried on something else  She declines seroquel due to potential side effects  She is also on Fioricet for headaches  She was able to wean off of the Ativan and the Ambien  She has less anxiety now, but sometimes with everything going on has some trouble  Respiratory:  -Ongoing Snoring: denied  -Mouth Breathing: no  -Dry Mouth: no  -Nocturnal Gasping: no    ROS:   Genitourinary none   Cardiology palpitations/fluttering feeling in the chest   Gastrointestinal frequent heartburn/acid reflux   Neurology frequent headaches, muscle weakness and balance problems   Constitutional weight change   Integumentary none   Psychiatry anxiety   Musculoskeletal joint pain, muscle aches and back pain   Pulmonary none   ENT none   Endocrine none   Hematological none       Sleep Pattern:  -Position: side  -Bedtime: 11pm  -Lights Out: same time  -Environment: timer on TV for 30 mins  -Latency: 1-1  5h with taking Remeron at 1115pm  -Awakenings: 0-1  -Wake Time: 630-7am  -Rise Time: same time  -Patient's estimate of Sleep Time: 6 5-7h    Daytime Symptoms:  -Upon Awakening: stiff, not tired  -Daytime fatigue/sleepiness: denied  -Naps: no  -Involuntary Dozing: no  -Cognitive Symptoms: no  -Driving: Difficulty with sleepiness and driving:  no   -- Close calls related to sleepiness: no   -- Accidents related to sleepiness: no    Substance Use:  -Caffeine: decaf tea, caffeinated tea once in a while  -Alcohol: occasionally  -THC: no    --> Denies any significant medical changes since last visit  --> Supplemental Oxygen Use: denies    Questionnaire:  Sitting and reading: Would never doze  Watching TV: Would never doze  Sitting, inactive in a public place (e g  a theatre or a meeting): Would never doze  As a passenger in a car for an hour without a break: Slight chance of dozing  Lying down to rest in the afternoon when circumstances permit: Would never doze  Sitting and talking to someone: Would never doze  Sitting quietly after a lunch without alcohol: Would never doze  In a car, while stopped for a few minutes in traffic: Would never doze  Total score: 1      PE:    /84 (BP Location: Right arm, Patient Position: Sitting, Cuff Size: Standard)   Pulse 60   Resp 16   Ht 5' 4" (1 626 m)   Wt 84 2 kg (185 lb 9 6 oz)   BMI 31 86 kg/m²     General:  In NAD  Pul: Respirations: unlaboured  MS: No atrophy  Neuro: No resting tremor  Gait normal turning & station; unremarkable overall  Psych: Socially appropriate  Pleasant  No overt dysphoria  Assessment: she has been able to wean off of Ambien (no fills since end of September 2020) and Ativan (no fills since November 2020)  She has been sleeping well with Remeron 15mg, but it takes about an hour or more to fall asleep  She takes it only once in bed  She denied any side effects   We discussed her taking it at 10-1030pm so that it can work by the time she gets into bed  She agreed to this  No resp symptoms  Recommendations:    1) continue remeron 15mg at nighttime, but take it earlier at 10-1030pm  2) Safe driving reviewed  3) Follow-up 6 months  4) Call with any questions or concerns  All questions answered for the patient, who indicated understanding and agreed with the plan       Michell Rangel MD  Psychiatry/ Sleep medicine

## 2021-02-04 DIAGNOSIS — G44.209 MUSCLE TENSION HEADACHE: ICD-10-CM

## 2021-02-04 RX ORDER — BUTALBITAL, ACETAMINOPHEN AND CAFFEINE 50; 325; 40 MG/1; MG/1; MG/1
1 TABLET ORAL EVERY 4 HOURS PRN
Qty: 100 TABLET | Refills: 2 | Status: SHIPPED | OUTPATIENT
Start: 2021-02-04 | End: 2021-03-25 | Stop reason: SDUPTHER

## 2021-02-15 ENCOUNTER — TELEPHONE (OUTPATIENT)
Dept: OTHER | Facility: OTHER | Age: 76
End: 2021-02-15

## 2021-02-15 NOTE — TELEPHONE ENCOUNTER
Patient called to cancel appointment for 02/16 @10am due to the inclement weather we are to have  Please call patient to reschedule

## 2021-02-26 ENCOUNTER — TELEPHONE (OUTPATIENT)
Dept: FAMILY MEDICINE CLINIC | Facility: CLINIC | Age: 76
End: 2021-02-26

## 2021-02-26 DIAGNOSIS — I10 ESSENTIAL HYPERTENSION: ICD-10-CM

## 2021-02-26 RX ORDER — BISOPROLOL FUMARATE 10 MG/1
TABLET ORAL
Qty: 90 TABLET | Refills: 3 | Status: SHIPPED | OUTPATIENT
Start: 2021-02-26 | End: 2021-11-29

## 2021-02-26 NOTE — TELEPHONE ENCOUNTER
[facility] on [facility] to call office to reschedule 03/11/2021 apt due to doctor not going to be in office

## 2021-03-04 ENCOUNTER — LAB (OUTPATIENT)
Dept: LAB | Facility: HOSPITAL | Age: 76
End: 2021-03-04
Attending: INTERNAL MEDICINE
Payer: MEDICARE

## 2021-03-04 DIAGNOSIS — E26.9 HYPERALDOSTERONISM (HCC): ICD-10-CM

## 2021-03-04 DIAGNOSIS — N18.2 STAGE 2 CHRONIC KIDNEY DISEASE: ICD-10-CM

## 2021-03-04 LAB
ALBUMIN SERPL BCP-MCNC: 3.8 G/DL (ref 3.5–5)
ALP SERPL-CCNC: 151 U/L (ref 46–116)
ALT SERPL W P-5'-P-CCNC: 29 U/L (ref 12–78)
ANION GAP SERPL CALCULATED.3IONS-SCNC: 9 MMOL/L (ref 4–13)
AST SERPL W P-5'-P-CCNC: 23 U/L (ref 5–45)
BACTERIA UR QL AUTO: ABNORMAL /HPF
BILIRUB SERPL-MCNC: 0.3 MG/DL (ref 0.2–1)
BILIRUB UR QL STRIP: NEGATIVE
BUN SERPL-MCNC: 15 MG/DL (ref 5–25)
CALCIUM SERPL-MCNC: 9.1 MG/DL (ref 8.3–10.1)
CHLORIDE SERPL-SCNC: 95 MMOL/L (ref 100–108)
CLARITY UR: CLEAR
CO2 SERPL-SCNC: 26 MMOL/L (ref 21–32)
COLOR UR: ABNORMAL
CREAT SERPL-MCNC: 0.98 MG/DL (ref 0.6–1.3)
CREAT UR-MCNC: 29.4 MG/DL
GFR SERPL CREATININE-BSD FRML MDRD: 57 ML/MIN/1.73SQ M
GLUCOSE P FAST SERPL-MCNC: 96 MG/DL (ref 65–99)
GLUCOSE UR STRIP-MCNC: NEGATIVE MG/DL
HGB UR QL STRIP.AUTO: NEGATIVE
KETONES UR STRIP-MCNC: NEGATIVE MG/DL
LEUKOCYTE ESTERASE UR QL STRIP: NEGATIVE
MAGNESIUM SERPL-MCNC: 2 MG/DL (ref 1.6–2.6)
MICROALBUMIN UR-MCNC: <5 MG/L (ref 0–20)
MICROALBUMIN/CREAT 24H UR: <17 MG/G CREATININE (ref 0–30)
NITRITE UR QL STRIP: NEGATIVE
NON-SQ EPI CELLS URNS QL MICRO: ABNORMAL /HPF
PH UR STRIP.AUTO: 6.5 [PH]
POTASSIUM SERPL-SCNC: 4.3 MMOL/L (ref 3.5–5.3)
PROT SERPL-MCNC: 7.4 G/DL (ref 6.4–8.2)
PROT UR STRIP-MCNC: NEGATIVE MG/DL
RBC #/AREA URNS AUTO: ABNORMAL /HPF
SODIUM SERPL-SCNC: 130 MMOL/L (ref 136–145)
SP GR UR STRIP.AUTO: <=1.005 (ref 1–1.03)
UROBILINOGEN UR QL STRIP.AUTO: 0.2 E.U./DL
WBC #/AREA URNS AUTO: ABNORMAL /HPF

## 2021-03-04 PROCEDURE — 83735 ASSAY OF MAGNESIUM: CPT

## 2021-03-04 PROCEDURE — 80053 COMPREHEN METABOLIC PANEL: CPT

## 2021-03-04 PROCEDURE — 81001 URINALYSIS AUTO W/SCOPE: CPT | Performed by: INTERNAL MEDICINE

## 2021-03-04 PROCEDURE — 82043 UR ALBUMIN QUANTITATIVE: CPT | Performed by: INTERNAL MEDICINE

## 2021-03-04 PROCEDURE — 36415 COLL VENOUS BLD VENIPUNCTURE: CPT

## 2021-03-04 PROCEDURE — 82570 ASSAY OF URINE CREATININE: CPT | Performed by: INTERNAL MEDICINE

## 2021-03-09 DIAGNOSIS — Z23 ENCOUNTER FOR IMMUNIZATION: ICD-10-CM

## 2021-03-11 NOTE — PROGRESS NOTES
Nephrology   [address] 78 y o  female MRN: [id number]    Encounter: [id number]        [address] was seen in the [facility] office today  All diagnoses and orders for visit:     1  Stage 2 chronic kidney disease  · Renal function remains stable  Continue to avoid nephrotoxins, NSAIDs  Blood work to be done 2 weeks after lasix initiation  -     TSH + Free T4; Future; Expected date: 03/26/2021  -     Basic metabolic panel; Future; Expected date: 03/26/2021  2  Hyperaldosteronism (HCC)  · Continue current dose of spironolactone  Potassium is appropriate  No hormonal side effects noted  3  Hyponatremia  · Continue fluid restriction and sodium restriction  Will initiate on lasix 20 mg daily  4  Essential hypertension  · Blood pressure is well-controlled  5  Lower extremity edema, Chronic Diastolic CHF  · Start on lasix 20 mg daily  Continue fluid and sodium restriction  Recommend daily weights with empty bladder     -     furosemide (LASIX) 20 mg tablet; Take 1 tablet (20 mg total) by mouth daily      HPI: Rei Dutton is a 78 y o  female with an active problem list significant for stage 2 chronic kidney disease, hyperaldosteronism, chronic hyponatremia, hypertension, who is here for scheduled follow-up  Her biggest complaint during exam today is that she has to leave and also weight gain  Weight gain is associated with abdominal bloating, lower extremity edema and dyspnea on exertion (going up steps)  She remains mildly hyponatremic  Sometimes over drinks fluid restriction  Will initiate on lasix 20 mg daily and lab work to be done 2 weeks after first dose  She will weigh herself daily, continue salt and fluid restriction  She will RTO with primary nephrologist in 6 months  The medical record, including Care Everywhere and media tabs were reviewed  ROS:   Review of Systems   Constitutional: Negative  HENT: Negative  Eyes: Negative  Respiratory: Positive for shortness of breath  Dyspnea on exertion   Cardiovascular: Positive for leg swelling  Gastrointestinal: Positive for abdominal distention  Endocrine: Negative  Genitourinary: Negative  Musculoskeletal: Negative  Allergic/Immunologic: Negative  Neurological: Negative  Hematological: Negative  Psychiatric/Behavioral: Negative  All other systems reviewed and are negative        Allergies: Procaine, Adhesive [medical tape], Lidocaine, and Penicillins    Medications:   Current Outpatient Medications:     aspirin (ECOTRIN LOW STRENGTH) 81 mg EC tablet, Take 1 tablet (81 mg total) by mouth daily, Disp:  , Rfl:     bisoprolol (ZEBETA) 10 MG tablet, TAKE 1 TABLET EVERY DAY, Disp: 90 tablet, Rfl: 3    butalbital-acetaminophen-caffeine (FIORICET,ESGIC) -40 mg per tablet, Take 1 tablet by mouth every 4 (four) hours as needed for headaches, Disp: 100 tablet, Rfl: 2    calcium carbonate (OS-MARLENY) 600 MG tablet, Take 600 mg by mouth 2 (two) times a day with meals, Disp: , Rfl:     clindamycin (CLEOCIN) 150 mg capsule, take 4 capsules by mouth 1 hour prior to appointment, Disp: , Rfl:     diclofenac sodium (VOLTAREN) 1 %, Apply 2 g topically 4 (four) times a day, Disp: 50 g, Rfl: 5    Diclofenac Sodium (VOLTAREN) 1 %, APPLY 2 GRAMS TOPICALLY FOUR TIMES A DAY, Disp: , Rfl:     losartan (COZAAR) 100 MG tablet, Take 1 tablet (100 mg total) by mouth every morning, Disp: 90 tablet, Rfl: 3    mirtazapine (REMERON) 15 mg tablet, Take 1 tablet (15 mg total) by mouth daily at bedtime, Disp: 90 tablet, Rfl: 1    Multiple Vitamins-Minerals (MULTIVITAMIN WITH MINERALS) tablet, Take 1 tablet by mouth every morning, Disp: , Rfl:     omeprazole (PriLOSEC) 20 mg delayed release capsule, TAKE 1 CAPSULE TWICE DAILY, Disp: 180 capsule, Rfl: 4    pravastatin (PRAVACHOL) 20 mg tablet, Take 20 mg by mouth 2 (two) times a day , Disp: , Rfl:     prochlorperazine (COMPAZINE) 5 mg tablet, Take 1 tablet (5 mg total) by mouth every 6 (six) hours as needed for nausea or vomiting, Disp: 30 tablet, Rfl: 0    spironolactone (ALDACTONE) 50 mg tablet, TAKE 1 TABLET DAILY AFTER LUNCH, Disp: 90 tablet, Rfl: 3    furosemide (LASIX) 20 mg tablet, Take 1 tablet (20 mg total) by mouth daily, Disp: 90 tablet, Rfl: 1    Past Medical History:   Diagnosis Date    Anxiety     Arthritis     Benign arteriolar nephrosclerosis     Chronic kidney disease in type 2 diabetes mellitus (HCC)     Chronic pain disorder     Depression     GERD (gastroesophageal reflux disease)     Heart murmur     Hyperaldosteronism (HCC)     Hyperlipidemia     Hypertension     Hypo-osmolality and hyponatremia     IBS (irritable bowel syndrome)     Insomnia     Migraines     Mitral regurgitation     Obstructive sleep apnea syndrome     Osteoarthritis     Pernicious anemia     Right knee DJD     Stroke ([facility] [address] )     tia     Past Surgical History:   Procedure Laterality Date    APPENDECTOMY      CARPAL TUNNEL RELEASE Bilateral     COLONOSCOPY      several    HAND SURGERY Right     ring finger has pin    HYSTERECTOMY      32    INSERT / REPLACE PERIPHERAL NEUROSTIMULATOR PULSE GENERATOR /  Left     buttocks    JOINT REPLACEMENT Left     knee    JOINT REPLACEMENT Right 04/2019    Hip    KNEE ARTHROSCOPY Bilateral     NASAL SEPTUM SURGERY  2008    NERVE BLOCK Left 2/20/2018    Procedure: BLOCK MEDIAL BRANCH - C4, C5, C6;  Surgeon: Stephen Youssef MD;  Location: [facility];  Service: Pain Management     NERVE BLOCK Left 3/6/2018    Procedure: C4, C5, C6 MEDIAL BRANCH BLOCK;  Surgeon: Stephen Youssef MD;  Location: [facility];  Service: Pain Management     ID COLONOSCOPY FLX DX W/COLLJ SPEC WHEN PFRMD N/A 4/24/2017    Procedure: FLEXIBLE COLONOSCOPY;  Surgeon: Shanique Sullivan MD;  Location: [facility];  Service: Colorectal    ID TOTAL HIP ARTHROPLASTY Right 2/27/2019    Procedure: ARTHROPLASTY HIP TOTAL;  Surgeon: Suhail Ohara DO;  Location: [facility] MAIN OR;  Service: Orthopedics    AR TOTAL KNEE ARTHROPLASTY Right 6/24/2020    Procedure: KNEE TOTAL ARTHROPLASTY;  Surgeon: Suhail Ohara DO;  Location: [facility] MAIN OR;  Service: Orthopedics    RADIOFREQUENCY ABLATION Left 4/17/2018    Procedure: ABLATION RADIO FREQUENCY (RFA) - C4, C5, C6;  Surgeon: Laura Humphries MD;  Location: [facility];  Service: Pain Management     SINUS SURGERY      TONSILLECTOMY      TOTAL HIP ARTHROPLASTY Left     UPPER GASTROINTESTINAL ENDOSCOPY      WRIST SURGERY Right      Family History   Problem Relation Age of Onset    Heart disease Mother     Hypertension Mother    [place] Arthritis Mother     Dementia Mother     Rheum arthritis Mother     Hypertension Father     Heart disease Father     Colon cancer Father     Hypertension Sister     Anxiety disorder Sister     Thyroid disease Sister     Prostate cancer Maternal Grandfather     No Known Problems Paternal Grandmother     No Known Problems Paternal Grandfather     No Known Problems Paternal Aunt     Pseudochol deficiency Neg Hx       reports that she has quit smoking  Her smoking use included cigarettes  She has never used smokeless tobacco  She reports current alcohol use  She reports that she does not use drugs  Physical Exam:   Vitals:    03/12/21 0954   BP: 118/64   BP Location: Left arm   Patient Position: Sitting   Cuff Size: Large   Pulse: 55   Temp: (!) 97 4 °F (36 3 °C)   TempSrc: Tympanic   SpO2: 98%   Weight: 86 6 kg (191 lb)   Height: 5' 3 5" (1 613 m)     Body mass index is 33 3 kg/m²      General: conscious, cooperative, in no acute distress, appears stated age  Eyes: conjunctivae pale, anicteric sclerae  ENT: lips and mucous membranes moist  Neck: supple, no JVD, no masses  Chest:  essentially clear breath sounds bilaterally, no crackles, ronchus or wheezings  CVS: S1 & S2, normal rate, regular rhythm  Abdomen: soft, non-tender, non-distended, normoactive bowel sounds, rounded  Extremities: moderate edema of both legs  Skin: no rash   Neuro: awake, alert, oriented       Diagnostic Data:  Lab: I have personally reviewed pertinent lab results  ,   CBC:       CMP: No results found for: SODIUM, K, CL, CO2, ANIONGAP, BUN, CREATININE, GLUCOSE, CALCIUM, AST, ALT, ALKPHOS, PROT, BILITOT, EGFR,   PT/INR: No results found for: PT, INR,   Magnesium: No components found for: MAG,  Phosphorous: No results found for: PHOS    Patient Instructions   Start lasix 20 mg daily  Continue low sodium diet and fluid restriction  Blood work 2 weeks after starting lasix       Portions of the record may have been created with voice recognition software  Occasional wrong word or "sound a like" substitutions may have occurred due to the inherent limitations of voice recognition software  Read the chart carefully and recognize, using context, where substitutions have occurred  If you have any questions, please contact the dictating provider

## 2021-03-12 ENCOUNTER — OFFICE VISIT (OUTPATIENT)
Dept: NEPHROLOGY | Facility: CLINIC | Age: 76
End: 2021-03-12
Payer: MEDICARE

## 2021-03-12 VITALS
DIASTOLIC BLOOD PRESSURE: 64 MMHG | BODY MASS INDEX: 32.61 KG/M2 | SYSTOLIC BLOOD PRESSURE: 118 MMHG | HEART RATE: 55 BPM | OXYGEN SATURATION: 98 % | TEMPERATURE: 97.4 F | HEIGHT: 64 IN | WEIGHT: 191 LBS

## 2021-03-12 DIAGNOSIS — E87.1 HYPONATREMIA: ICD-10-CM

## 2021-03-12 DIAGNOSIS — N18.2 STAGE 2 CHRONIC KIDNEY DISEASE: Primary | ICD-10-CM

## 2021-03-12 DIAGNOSIS — I10 ESSENTIAL HYPERTENSION: ICD-10-CM

## 2021-03-12 DIAGNOSIS — R60.0 LOWER EXTREMITY EDEMA: ICD-10-CM

## 2021-03-12 DIAGNOSIS — E26.9 HYPERALDOSTERONISM (HCC): ICD-10-CM

## 2021-03-12 PROCEDURE — 99214 OFFICE O/P EST MOD 30 MIN: CPT | Performed by: NURSE PRACTITIONER

## 2021-03-12 RX ORDER — FUROSEMIDE 20 MG/1
20 TABLET ORAL DAILY
Qty: 90 TABLET | Refills: 1 | Status: SHIPPED | OUTPATIENT
Start: 2021-03-12 | End: 2022-04-04

## 2021-03-12 NOTE — PATIENT INSTRUCTIONS
Start lasix 20 mg daily  Continue low sodium diet and fluid restriction  Blood work 2 weeks after starting lasix

## 2021-03-25 ENCOUNTER — OFFICE VISIT (OUTPATIENT)
Dept: FAMILY MEDICINE CLINIC | Facility: CLINIC | Age: 76
End: 2021-03-25
Payer: MEDICARE

## 2021-03-25 VITALS
TEMPERATURE: 97.4 F | WEIGHT: 191 LBS | RESPIRATION RATE: 20 BRPM | SYSTOLIC BLOOD PRESSURE: 130 MMHG | DIASTOLIC BLOOD PRESSURE: 66 MMHG | HEART RATE: 68 BPM | HEIGHT: 64 IN | BODY MASS INDEX: 32.61 KG/M2

## 2021-03-25 DIAGNOSIS — I50.40 COMBINED SYSTOLIC AND DIASTOLIC CONGESTIVE HEART FAILURE, UNSPECIFIED HF CHRONICITY (HCC): ICD-10-CM

## 2021-03-25 DIAGNOSIS — K21.9 GASTROESOPHAGEAL REFLUX DISEASE WITHOUT ESOPHAGITIS: ICD-10-CM

## 2021-03-25 DIAGNOSIS — G44.209 MUSCLE TENSION HEADACHE: ICD-10-CM

## 2021-03-25 DIAGNOSIS — E66.09 CLASS 1 OBESITY DUE TO EXCESS CALORIES WITH SERIOUS COMORBIDITY AND BODY MASS INDEX (BMI) OF 33.0 TO 33.9 IN ADULT: ICD-10-CM

## 2021-03-25 DIAGNOSIS — M47.812 CERVICAL SPONDYLOSIS WITHOUT MYELOPATHY: ICD-10-CM

## 2021-03-25 DIAGNOSIS — I10 ESSENTIAL HYPERTENSION: Primary | ICD-10-CM

## 2021-03-25 DIAGNOSIS — M17.11 PRIMARY OSTEOARTHRITIS OF RIGHT KNEE: ICD-10-CM

## 2021-03-25 DIAGNOSIS — I34.0 NONRHEUMATIC MITRAL VALVE REGURGITATION: ICD-10-CM

## 2021-03-25 PROCEDURE — 99214 OFFICE O/P EST MOD 30 MIN: CPT | Performed by: FAMILY MEDICINE

## 2021-03-26 RX ORDER — BUTALBITAL, ACETAMINOPHEN AND CAFFEINE 50; 325; 40 MG/1; MG/1; MG/1
1 TABLET ORAL EVERY 4 HOURS PRN
Qty: 100 TABLET | Refills: 2 | Status: SHIPPED | OUTPATIENT
Start: 2021-03-26 | End: 2021-05-10 | Stop reason: SDUPTHER

## 2021-03-26 NOTE — PROGRESS NOTES
Assessment/Plan:  Muscle tension headache Fioricet effective therapy PDMP reviewed no evidence of divergence or abuse medication improves function  Essential hypertension with blood pressure well controlled on current regimen    Cervical spondylosis without myopathy    Combined systolic and diastolic congestive heart failure stable and controlled on current regimen  Also mitral regurgitation    Gastroesophageal reflux disease symptoms minimized on current therapy    Problem List Items Addressed This Visit        Digestive    Gastroesophageal reflux disease without esophagitis       Cardiovascular and Mediastinum    Essential hypertension - Primary       Musculoskeletal and Integument    Cervical spondylosis without myelopathy    Primary osteoarthritis of right knee      Other Visit Diagnoses     Muscle tension headache        Combined systolic and diastolic congestive heart failure, unspecified HF chronicity ([address] )               Diagnoses and all orders for this visit:    Essential hypertension    Muscle tension headache  -     butalbital-acetaminophen-caffeine (FIORICET,ESGIC) -40 mg per tablet; Take 1 tablet by mouth every 4 (four) hours as needed for headaches    Primary osteoarthritis of right knee    Cervical spondylosis without myelopathy    Combined systolic and diastolic congestive heart failure, unspecified HF chronicity (HCC)    Gastroesophageal reflux disease without esophagitis        No problem-specific Assessment & Plan notes found for this encounter  PHQ-9 Depression Screening    PHQ-9:   Frequency of the following problems over the past two weeks: Body mass index is 33 3 kg/m²  BMI Counseling: Body mass index is 33 3 kg/m²   The BMI is above normal  Nutrition recommendations include reducing portion sizes, decreasing overall calorie intake, 3-5 servings of fruits/vegetables daily, reducing fast food intake, consuming healthier snacks, decreasing soda and/or juice intake, moderation in carbohydrate intake and increasing intake of lean protein  Subjective:      Patient ID: Jackie Hall is a 78 y o  female  Patient presents for checkup to go over her esophageal reflux muscle tension headache hypertension      The following portions of the patient's history were reviewed and updated as appropriate:   She has a past medical history of Anxiety, Arthritis, Benign arteriolar nephrosclerosis, Chronic kidney disease in type 2 diabetes mellitus ([facility] [address] ), Chronic pain disorder, Depression, GERD (gastroesophageal reflux disease), Heart murmur, Hyperaldosteronism ([facility] [address] ), Hyperlipidemia, Hypertension, Hypo-osmolality and hyponatremia, IBS (irritable bowel syndrome), Insomnia, Migraines, Mitral regurgitation, Obstructive sleep apnea syndrome, Osteoarthritis, Pernicious anemia, Right knee DJD, and Stroke ([facility] [address] )  ,  does not have any pertinent problems on file  ,   has a past surgical history that includes Appendectomy; Sinus surgery; Insert / replace peripheral neurostimulator pulse generator /  (Left); pr colonoscopy flx dx w/collj spec when pfrmd (N/A, 4/24/2017); NERVE BLOCK (Left, 2/20/2018); NERVE BLOCK (Left, 3/6/2018); Radiofrequency ablation (Left, 4/17/2018); Colonoscopy; Upper gastrointestinal endoscopy; Knee arthroscopy (Bilateral); Hand surgery (Right); pr total hip arthroplasty (Right, 2/27/2019); Nasal septum surgery (2008); Carpal tunnel release (Bilateral); Total hip arthroplasty (Left); Hysterectomy; Tonsillectomy; Joint replacement (Left); Joint replacement (Right, 04/2019); pr total knee arthroplasty (Right, 6/24/2020); and Wrist surgery (Right)  ,  family history includes Anxiety disorder in her sister; Arthritis in her mother; Colon cancer in her father; Dementia in her mother; Heart disease in her father and mother; Hypertension in her father, mother, and sister;  No Known Problems in her paternal aunt, paternal grandfather, and paternal grandmother; Prostate cancer in her maternal grandfather; Rheum arthritis in her mother; Thyroid disease in her sister  ,   reports that she has quit smoking  Her smoking use included cigarettes  She has never used smokeless tobacco  She reports current alcohol use  She reports that she does not use drugs  ,  is allergic to procaine; adhesive [medical tape]; lidocaine; and penicillins     Current Outpatient Medications   Medication Sig Dispense Refill    aspirin (ECOTRIN LOW STRENGTH) 81 mg EC tablet Take 1 tablet (81 mg total) by mouth daily      bisoprolol (ZEBETA) 10 MG tablet TAKE 1 TABLET EVERY DAY 90 tablet 3    butalbital-acetaminophen-caffeine (FIORICET,ESGIC) -40 mg per tablet Take 1 tablet by mouth every 4 (four) hours as needed for headaches 100 tablet 2    calcium carbonate (OS-MARLENY) 600 MG tablet Take 600 mg by mouth 2 (two) times a day with meals      clindamycin (CLEOCIN) 150 mg capsule take 4 capsules by mouth 1 hour prior to appointment      diclofenac sodium (VOLTAREN) 1 % Apply 2 g topically 4 (four) times a day 50 g 5    furosemide (LASIX) 20 mg tablet Take 1 tablet (20 mg total) by mouth daily 90 tablet 1    losartan (COZAAR) 100 MG tablet Take 1 tablet (100 mg total) by mouth every morning 90 tablet 3    mirtazapine (REMERON) 15 mg tablet Take 1 tablet (15 mg total) by mouth daily at bedtime 90 tablet 1    Multiple Vitamins-Minerals (MULTIVITAMIN WITH MINERALS) tablet Take 1 tablet by mouth every morning      omeprazole (PriLOSEC) 20 mg delayed release capsule TAKE 1 CAPSULE TWICE DAILY 180 capsule 4    pravastatin (PRAVACHOL) 20 mg tablet Take 20 mg by mouth 2 (two) times a day       prochlorperazine (COMPAZINE) 5 mg tablet Take 1 tablet (5 mg total) by mouth every 6 (six) hours as needed for nausea or vomiting 30 tablet 0    spironolactone (ALDACTONE) 50 mg tablet TAKE 1 TABLET DAILY AFTER LUNCH 90 tablet 3     No current facility-administered medications for this visit          Review of Systems Constitutional: Negative  HENT: Negative  Eyes: Negative  Respiratory: Negative  Cardiovascular: Negative  Gastrointestinal: Negative  Endocrine: Negative  Genitourinary: Negative  Musculoskeletal: Positive for arthralgias, back pain and myalgias  Skin: Negative  Allergic/Immunologic: Negative  Neurological: Negative  Hematological: Negative  Psychiatric/Behavioral: Negative  Objective:    /66   Pulse 68   Temp (!) 97 4 °F (36 3 °C)   Resp 20   Ht 5' 3 5" (1 613 m)   Wt 86 6 kg (191 lb)   BMI 33 30 kg/m²   Body mass index is 33 3 kg/m²  Physical Exam  Constitutional:       Appearance: She is well-developed  HENT:      Head: Normocephalic  Eyes:      Pupils: Pupils are equal, round, and reactive to light  Neck:      Musculoskeletal: Normal range of motion  Cardiovascular:      Rate and Rhythm: Normal rate and regular rhythm  Heart sounds: Normal heart sounds  Pulmonary:      Effort: Pulmonary effort is normal       Breath sounds: Normal breath sounds  Abdominal:      General: Bowel sounds are normal       Palpations: Abdomen is soft  Tenderness: There is no abdominal tenderness  Skin:     General: Skin is warm  Neurological:      Mental Status: She is alert and oriented to person, place, and time

## 2021-03-29 ENCOUNTER — TELEPHONE (OUTPATIENT)
Dept: NEPHROLOGY | Facility: CLINIC | Age: 76
End: 2021-03-29

## 2021-03-29 ENCOUNTER — APPOINTMENT (OUTPATIENT)
Dept: LAB | Facility: HOSPITAL | Age: 76
End: 2021-03-29
Payer: MEDICARE

## 2021-03-29 DIAGNOSIS — N18.2 STAGE 2 CHRONIC KIDNEY DISEASE: ICD-10-CM

## 2021-03-29 LAB
ANION GAP SERPL CALCULATED.3IONS-SCNC: 9 MMOL/L (ref 4–13)
BUN SERPL-MCNC: 14 MG/DL (ref 5–25)
CALCIUM SERPL-MCNC: 9 MG/DL (ref 8.3–10.1)
CHLORIDE SERPL-SCNC: 95 MMOL/L (ref 100–108)
CO2 SERPL-SCNC: 27 MMOL/L (ref 21–32)
CREAT SERPL-MCNC: 1.09 MG/DL (ref 0.6–1.3)
GFR SERPL CREATININE-BSD FRML MDRD: 50 ML/MIN/1.73SQ M
GLUCOSE P FAST SERPL-MCNC: 96 MG/DL (ref 65–99)
POTASSIUM SERPL-SCNC: 4.5 MMOL/L (ref 3.5–5.3)
SODIUM SERPL-SCNC: 131 MMOL/L (ref 136–145)
T4 FREE SERPL-MCNC: 0.97 NG/DL (ref 0.76–1.46)
TSH SERPL DL<=0.05 MIU/L-ACNC: 4.28 UIU/ML (ref 0.36–3.74)

## 2021-03-29 PROCEDURE — 84443 ASSAY THYROID STIM HORMONE: CPT

## 2021-03-29 PROCEDURE — 36415 COLL VENOUS BLD VENIPUNCTURE: CPT

## 2021-03-29 PROCEDURE — 84439 ASSAY OF FREE THYROXINE: CPT

## 2021-03-29 PROCEDURE — 80048 BASIC METABOLIC PNL TOTAL CA: CPT

## 2021-03-29 NOTE — TELEPHONE ENCOUNTER
----- Message from Rao Hinson [address] Ai  sent at 3/29/2021 12:28 PM EDT -----  Please call Janki Neumann with blood work results  Kidney function decreased a bit from 57% to 50%  Sodium improved from 130 to 131  Thyroid function is stable    Ask her if she lost any weight or noticed any changes with lasix

## 2021-03-29 NOTE — TELEPHONE ENCOUNTER
Patient aware of results  She states that she hasn't had any weight changes and no change at all with the Lasix

## 2021-03-30 DIAGNOSIS — E87.1 HYPONATREMIA: Primary | ICD-10-CM

## 2021-03-30 NOTE — TELEPHONE ENCOUNTER
Okay, recommend continuing low sodium, heart healthy diet and fluid restriction  She can continue the lasix daily to see if she does eventually lose some fluid from this  Will place order for bmp in 4-6 weeks to make sure her potassium, sodium, and kidney function is still stable

## 2021-04-07 DIAGNOSIS — I10 ESSENTIAL HYPERTENSION: ICD-10-CM

## 2021-04-08 RX ORDER — LOSARTAN POTASSIUM 100 MG/1
TABLET ORAL
Qty: 90 TABLET | Refills: 3 | Status: SHIPPED | OUTPATIENT
Start: 2021-04-08 | End: 2022-02-09

## 2021-04-30 ENCOUNTER — APPOINTMENT (OUTPATIENT)
Dept: LAB | Facility: HOSPITAL | Age: 76
End: 2021-04-30
Payer: MEDICARE

## 2021-04-30 DIAGNOSIS — E87.1 HYPONATREMIA: ICD-10-CM

## 2021-04-30 LAB
ANION GAP SERPL CALCULATED.3IONS-SCNC: 8 MMOL/L (ref 4–13)
BUN SERPL-MCNC: 18 MG/DL (ref 5–25)
CALCIUM SERPL-MCNC: 8.7 MG/DL (ref 8.3–10.1)
CHLORIDE SERPL-SCNC: 95 MMOL/L (ref 100–108)
CO2 SERPL-SCNC: 24 MMOL/L (ref 21–32)
CREAT SERPL-MCNC: 0.9 MG/DL (ref 0.6–1.3)
GFR SERPL CREATININE-BSD FRML MDRD: 63 ML/MIN/1.73SQ M
GLUCOSE P FAST SERPL-MCNC: 91 MG/DL (ref 65–99)
POTASSIUM SERPL-SCNC: 4.8 MMOL/L (ref 3.5–5.3)
SODIUM SERPL-SCNC: 127 MMOL/L (ref 136–145)

## 2021-04-30 PROCEDURE — 36415 COLL VENOUS BLD VENIPUNCTURE: CPT

## 2021-04-30 PROCEDURE — 80048 BASIC METABOLIC PNL TOTAL CA: CPT

## 2021-05-10 DIAGNOSIS — G44.209 MUSCLE TENSION HEADACHE: ICD-10-CM

## 2021-05-10 RX ORDER — BUTALBITAL, ACETAMINOPHEN AND CAFFEINE 50; 325; 40 MG/1; MG/1; MG/1
1 TABLET ORAL EVERY 4 HOURS PRN
Qty: 100 TABLET | Refills: 2 | Status: SHIPPED | OUTPATIENT
Start: 2021-05-10 | End: 2021-06-22 | Stop reason: SDUPTHER

## 2021-05-10 NOTE — TELEPHONE ENCOUNTER
Received fax from [facility] requesting refill on But/APAP/Caff -90

## 2021-05-12 DIAGNOSIS — I10 ESSENTIAL HYPERTENSION: ICD-10-CM

## 2021-05-13 ENCOUNTER — OFFICE VISIT (OUTPATIENT)
Dept: OBGYN CLINIC | Facility: CLINIC | Age: 76
End: 2021-05-13
Payer: MEDICARE

## 2021-05-13 VITALS
HEART RATE: 64 BPM | BODY MASS INDEX: 32.61 KG/M2 | DIASTOLIC BLOOD PRESSURE: 80 MMHG | HEIGHT: 64 IN | SYSTOLIC BLOOD PRESSURE: 164 MMHG | WEIGHT: 191 LBS

## 2021-05-13 DIAGNOSIS — M70.61 TROCHANTERIC BURSITIS OF RIGHT HIP: Primary | ICD-10-CM

## 2021-05-13 PROCEDURE — 99213 OFFICE O/P EST LOW 20 MIN: CPT | Performed by: ORTHOPAEDIC SURGERY

## 2021-05-13 PROCEDURE — 20610 DRAIN/INJ JOINT/BURSA W/O US: CPT | Performed by: ORTHOPAEDIC SURGERY

## 2021-05-13 RX ORDER — METHYLPREDNISOLONE ACETATE 40 MG/ML
2 INJECTION, SUSPENSION INTRA-ARTICULAR; INTRALESIONAL; INTRAMUSCULAR; SOFT TISSUE
Status: COMPLETED | OUTPATIENT
Start: 2021-05-13 | End: 2021-05-13

## 2021-05-13 RX ORDER — SPIRONOLACTONE 50 MG/1
TABLET, FILM COATED ORAL
Qty: 90 TABLET | Refills: 3 | Status: SHIPPED | OUTPATIENT
Start: 2021-05-13 | End: 2022-02-21

## 2021-05-13 RX ORDER — BUPIVACAINE HYDROCHLORIDE 2.5 MG/ML
2 INJECTION, SOLUTION INFILTRATION; PERINEURAL
Status: COMPLETED | OUTPATIENT
Start: 2021-05-13 | End: 2021-05-13

## 2021-05-13 RX ADMIN — METHYLPREDNISOLONE ACETATE 2 ML: 40 INJECTION, SUSPENSION INTRA-ARTICULAR; INTRALESIONAL; INTRAMUSCULAR; SOFT TISSUE at 09:48

## 2021-05-13 RX ADMIN — BUPIVACAINE HYDROCHLORIDE 2 ML: 2.5 INJECTION, SOLUTION INFILTRATION; PERINEURAL at 09:48

## 2021-05-13 NOTE — PROGRESS NOTES
Assessment/Plan:    No problem-specific Assessment & Plan notes found for this encounter  Diagnoses and all orders for this visit:    Trochanteric bursitis of right hip           the right hip was injected with Depo-Medrol and Marcaine  She tolerated seizure quite well  Return back in 6 weeks for evaluation, at that point will be an annual examination with new x-rays of right hip -two views and bilateral knees -three views each  Subjective:      Patient ID: Marianna Edwards is a 78 y o  female  HPI      The patient is having pain along the lateral aspect of her right hip  She desires to have a corticosteroid injection  She feels it may be bursitis  She denies any groin pain  She does have a history of a right total hip replacement from approximately 2 years ago  She offers no pain along the hip joint itself  She also has a history of low back pain, and which her spinal cord stimulator has failed    The following portions of the patient's history were reviewed and updated as appropriate: allergies, current medications, past family history, past medical history, past social history, past surgical history and problem list     Review of Systems   Constitutional: Negative for chills, fever and unexpected weight change  HENT: Negative for hearing loss, nosebleeds and sore throat  Eyes: Negative for pain, redness and visual disturbance  Respiratory: Negative for cough, shortness of breath and wheezing  Cardiovascular: Negative for chest pain, palpitations and leg swelling  Gastrointestinal: Negative for abdominal pain, nausea and vomiting  Endocrine: Negative for polydipsia and polyuria  Genitourinary: Negative for dysuria and hematuria  Musculoskeletal: Positive for arthralgias, back pain and myalgias  Negative for gait problem, joint swelling, neck pain and neck stiffness  As noted in HPI   Skin: Negative for rash and wound     Neurological: Negative for dizziness, numbness and headaches  Psychiatric/Behavioral: Negative for decreased concentration and suicidal ideas  The patient is not nervous/anxious  Objective:      /80   Pulse 64   Ht 5' 3 5" (1 613 m)   Wt 86 6 kg (191 lb)   BMI 33 30 kg/m²          Physical Exam          Right lower extremity is neurovascular intact  Toes are pink and mobile  Compartments are soft  Incision is clean, dry, intact  There is tenderness along the trochanteric bursa  The pain did radiate down the iliotibial band  There is no groin pain  There is no tenderness along the short external rotators  Neurologically intact distally    Large joint arthrocentesis: R greater trochanteric bursa  Universal Protocol:  Consent: Verbal consent obtained  Risks and benefits: risks, benefits and alternatives were discussed  Consent given by: patient  Time out: Immediately prior to procedure a "time out" was called to verify the correct patient, procedure, equipment, support staff and site/side marked as required  Patient understanding: patient states understanding of the procedure being performed  Test results: test results available and properly labeled  Site marked: the operative site was marked  Radiology Images displayed and confirmed   If images not available, report reviewed: imaging studies available  Patient identity confirmed: verbally with patient    Supporting Documentation  Indications: pain   Procedure Details  Location: hip - R greater trochanteric bursa  Needle size: 22 G  Ultrasound guidance: no  Approach: lateral  Medications administered: 2 mL bupivacaine 0 25 %; 2 mL methylPREDNISolone acetate 40 mg/mL    Patient tolerance: patient tolerated the procedure well with no immediate complications  Dressing:  Sterile dressing applied

## 2021-06-04 ENCOUNTER — OFFICE VISIT (OUTPATIENT)
Dept: PAIN MEDICINE | Facility: CLINIC | Age: 76
End: 2021-06-04
Payer: MEDICARE

## 2021-06-04 ENCOUNTER — APPOINTMENT (OUTPATIENT)
Dept: RADIOLOGY | Facility: MEDICAL CENTER | Age: 76
End: 2021-06-04
Payer: MEDICARE

## 2021-06-04 VITALS
BODY MASS INDEX: 32.95 KG/M2 | HEART RATE: 65 BPM | SYSTOLIC BLOOD PRESSURE: 143 MMHG | DIASTOLIC BLOOD PRESSURE: 83 MMHG | WEIGHT: 193 LBS | HEIGHT: 64 IN

## 2021-06-04 DIAGNOSIS — M51.36 LUMBAR DEGENERATIVE DISC DISEASE: ICD-10-CM

## 2021-06-04 DIAGNOSIS — M54.50 CHRONIC BILATERAL LOW BACK PAIN WITHOUT SCIATICA: Primary | ICD-10-CM

## 2021-06-04 DIAGNOSIS — M54.50 CHRONIC BILATERAL LOW BACK PAIN WITHOUT SCIATICA: ICD-10-CM

## 2021-06-04 DIAGNOSIS — G89.29 CHRONIC BILATERAL LOW BACK PAIN WITHOUT SCIATICA: Primary | ICD-10-CM

## 2021-06-04 DIAGNOSIS — M47.816 LUMBAR SPONDYLOSIS: ICD-10-CM

## 2021-06-04 DIAGNOSIS — G89.29 CHRONIC BILATERAL LOW BACK PAIN WITHOUT SCIATICA: ICD-10-CM

## 2021-06-04 PROCEDURE — 99214 OFFICE O/P EST MOD 30 MIN: CPT | Performed by: ANESTHESIOLOGY

## 2021-06-04 PROCEDURE — 72110 X-RAY EXAM L-2 SPINE 4/>VWS: CPT

## 2021-06-04 NOTE — PROGRESS NOTES
Assessment:  1  Chronic bilateral low back pain without sciatica    2  Lumbar degenerative disc disease    3  Lumbar spondylosis        Plan:    Patient is a 70-year-old female with a history of cervical spondylosis presents to office with complaints of low back pain  Patient does have a [employer] spinal cord stimulator which he has had for several years after a battery change  She has been reprogrammed several times and it continues to be unable to cover the low back pain  There may be concern of increasing pathology or worsening pathology of degenerative changes of the lumbar spine  Patient does present with a discogenic low back pain  1  We will order a x-ray of the lumbar spine assess the degenerative changes that correlate patient's current presentation  2  We will order an MRI of the lumbar spine to better assess the discogenic pathology that correlate patient's low back pain  History of Present Illness: The patient is a 78 y o  female who presents for a follow up office visit in regards to Back Pain  The patients current symptoms include 6/10 intermittent dull/ aching pain which worse in morning time  Current pain medications includes: none  I have personally reviewed and/or updated the patient's past medical history, past surgical history, family history, social history, current medications, allergies, and vital signs today  Review of Systems  Review of Systems   Constitutional: Negative for fatigue  HENT: Negative for ear pain and hearing loss  Eyes: Negative for redness  Respiratory: Positive for shortness of breath  Negative for cough  Cardiovascular: Negative for leg swelling  Gastrointestinal: Negative for anal bleeding and rectal pain  Endocrine: Negative for polydipsia  Genitourinary: Negative for hematuria  Musculoskeletal: Positive for arthralgias, back pain and gait problem  Negative for joint swelling  Skin: Negative for rash  Neurological: Negative for headaches  Hematological: Does not bruise/bleed easily  Psychiatric/Behavioral: Negative for behavioral problems and hallucinations           Past Medical History:   Diagnosis Date    Allergies     Anxiety     Arthritis     Benign arteriolar nephrosclerosis     Bereavement     Cataract     Chronic kidney disease     Chronic kidney disease in type 2 diabetes mellitus ([facility])     Chronic kidney disease, stage 2 (mild)     Chronic pain disorder     Chronic pain syndrome     COPD (chronic obstructive pulmonary disease) ([facility])     DDD (degenerative disc disease), cervical     DDD (degenerative disc disease), lumbar     Degenerative joint disease of right hip     Depression     Diastolic dysfunction     DJD (degenerative joint disease), ankle and foot, right     DJD of right shoulder     Edema     Fracture of left calcaneus     Generalized anxiety disorder     GERD (gastroesophageal reflux disease)     Heart murmur     Hyperaldosteronism ([facility])     Hyperlipidemia     Hypertension     Hypertensive nephrosclerosis     Hypo-osmolality and hyponatremia     IBS (irritable bowel syndrome)     IBS (irritable bowel syndrome)     Insomnia     Migraines     Mitral regurgitation     MVP (mitral valve prolapse)     Obstructive sleep apnea syndrome     Osteoarthritis     Osteoarthritis     Pernicious anemia     Plantar fascia rupture     Rheumatoid arthritis ([address] )     Right knee DJD     Shingles     Sinobronchitis     Sleep apnea     Stroke ([address] )     tia    TIA (transient ischemic attack)     Vertigo        Past Surgical History:   Procedure Laterality Date    APPENDECTOMY      CARPAL TUNNEL RELEASE Bilateral     COLONOSCOPY      several    COLONOSCOPY  04/02/2012    by Dr Dom Hooker (HISTORICAL)  10/26/2016, 9/17/2014, 6/18/2007    HAND SURGERY Right     ring finger has pin    HYSTERECTOMY      32    INSERT / REPLACE PERIPHERAL NEUROSTIMULATOR PULSE GENERATOR /  Left     buttocks    JOINT REPLACEMENT Left     knee    JOINT REPLACEMENT Right 04/2019    Hip    KNEE ARTHROPLASTY Left 01/15/2009    by Dr Emmanuel Alex at [address] [facility] ARTHROSCOPY Bilateral    [address]  09/10/2009    by Dr Emmanuel Alex at [address] [facility] (HISTORICAL)  12/10/2018, 10/30/2017, 10/26/2016    NASAL SEPTUM SURGERY  2008    NERVE BLOCK Left 2/20/2018    Procedure: BLOCK MEDIAL BRANCH - C4, C5, C6;  Surgeon: Suresh Braun MD;  Location: [facility];  Service: Pain Management     NERVE BLOCK Left 3/6/2018    Procedure: C4, C5, C6 MEDIAL BRANCH BLOCK;  Surgeon: Suresh Barun MD;  Location: [facility];  Service: Pain Management     [place] COLONOSCOPY FLX DX W/COLLJ SPEC WHEN PFRMD N/A 4/24/2017    Procedure: Rita Villanueva;  Surgeon: Willy Lovett MD;  Location: [facility];  Service: Colorectal    NJ TOTAL HIP ARTHROPLASTY Right 2/27/2019    Procedure: ARTHROPLASTY HIP TOTAL;  Surgeon: John Mckeon DO;  Location: [facility] MAIN OR;  Service: Orthopedics    [place] TOTAL KNEE ARTHROPLASTY Right 6/24/2020    Procedure: KNEE TOTAL ARTHROPLASTY;  Surgeon: John Mckeon DO;  Location: [facility] MAIN OR;  Service: Orthopedics    RADIOFREQUENCY ABLATION Left 4/17/2018    Procedure: ABLATION RADIO FREQUENCY (RFA) - C4, C5, C6;  Surgeon: Suresh Braun MD;  Location: [facility];  Service: Pain Management     REPLACEMENT TOTAL KNEE Left 07/05/2011    SINUS SURGERY      TONSILLECTOMY      TOTAL HIP ARTHROPLASTY Left     TRIGGER FINGER RELEASE      R 4th     UPPER GASTROINTESTINAL ENDOSCOPY  01/12/2007    with Sanjuanita vines by Dr Cyndy Matute at [address] [facility]        Family History   Problem Relation Age of Onset    Heart disease Mother     Hypertension Mother    Samaria Nine Arthritis Mother     Dementia Mother     Rheum arthritis Mother     Hypertension Father    Samaria Nine Heart disease Father     Colon cancer Father     Hypertension Sister     Anxiety disorder Sister     Thyroid disease Sister     Prostate cancer Maternal Grandfather     No Known Problems Paternal Grandmother     No Known Problems Paternal Grandfather     No Known Problems Paternal Aunt     Pseudochol deficiency Neg Hx        Social History     Occupational History    Occupation: Admnistrator    Tobacco Use    Smoking status: Former Smoker     Types: Cigarettes    Smokeless tobacco: Never Used    Tobacco comment: quit 40 yrs ago   Substance and Sexual Activity    Alcohol use: Yes     Frequency: Monthly or less     Comment: 2 beer a week    Drug use: Never    Sexual activity: Not Currently     Birth control/protection: Post-menopausal         Current Outpatient Medications:     aspirin (ECOTRIN LOW STRENGTH) 81 mg EC tablet, Take 1 tablet (81 mg total) by mouth daily, Disp:  , Rfl:     bisoprolol (ZEBETA) 10 MG tablet, TAKE 1 TABLET EVERY DAY, Disp: 90 tablet, Rfl: 3    butalbital-acetaminophen-caffeine (FIORICET,ESGIC) -40 mg per tablet, Take 1 tablet by mouth every 4 (four) hours as needed for headaches, Disp: 100 tablet, Rfl: 2    calcium carbonate (OS-MARLENY) 600 MG tablet, Take 600 mg by mouth 2 (two) times a day with meals, Disp: , Rfl:     clindamycin (CLEOCIN) 150 mg capsule, take 4 capsules by mouth 1 hour prior to appointment, Disp: , Rfl:     diclofenac sodium (VOLTAREN) 1 %, Apply 2 g topically 4 (four) times a day, Disp: 50 g, Rfl: 5    furosemide (LASIX) 20 mg tablet, Take 1 tablet (20 mg total) by mouth daily, Disp: 90 tablet, Rfl: 1    losartan (COZAAR) 100 MG tablet, TAKE 1 TABLET EVERY MORNING, Disp: 90 tablet, Rfl: 3    mirtazapine (REMERON) 15 mg tablet, Take 1 tablet (15 mg total) by mouth daily at bedtime, Disp: 90 tablet, Rfl: 1    Multiple Vitamins-Minerals (MULTIVITAMIN WITH MINERALS) tablet, Take 1 tablet by mouth every morning, Disp: , Rfl:     omeprazole (PriLOSEC) 20 mg delayed release capsule, TAKE 1 CAPSULE TWICE DAILY, Disp: 180 capsule, Rfl: 4    pravastatin (PRAVACHOL) 20 mg tablet, Take 20 mg by mouth 2 (two) times a day , Disp: , Rfl:     prochlorperazine (COMPAZINE) 5 mg tablet, Take 1 tablet (5 mg total) by mouth every 6 (six) hours as needed for nausea or vomiting, Disp: 30 tablet, Rfl: 0    spironolactone (ALDACTONE) 50 mg tablet, TAKE 1 TABLET DAILY AFTER LUNCH, Disp: 90 tablet, Rfl: 3    Allergies   Allergen Reactions    Procaine Hives    Adhesive [Medical Tape] Rash    Lidocaine Rash    Penicillins Rash       Physical Exam:    /83 (BP Location: Left arm, Patient Position: Sitting, Cuff Size: Standard)   Pulse 65   Ht 5' 3 5" (1 613 m)   Wt 87 5 kg (193 lb)   BMI 33 65 kg/m²     Constitutional:normal, well developed, well nourished, alert, in no distress and non-toxic and no overt pain behavior  and obese  Eyes:anicteric  HEENT:grossly intact  Neck:supple, symmetric, trachea midline and no masses   Pulmonary:even and unlabored  Cardiovascular:No edema or pitting edema present  Skin:Normal without rashes or lesions and well hydrated  Psychiatric:Mood and affect appropriate  Neurologic:Cranial Nerves II-XII grossly intact  Musculoskeletal:antalgic     Lumbar/Sacral Spine examination demonstrates  Decreased range of motion lumbar spine with pain upon: flexion, lateral rotation to the left/right, and bending to the left/right  Bilateral lumbar paraspinals tender to palpation  Muscle spasms noted in the lumbar area bilaterally  4/5 lower extremity strength in all muscle groups bilaterally  Positive seated straight leg raise for bilateral lower extremities  Sensitivity to light touch intact bilateral lower extremities  2+ reflexes in the patella and Achilles  No ankle clonus     Imaging  No orders to display       No orders of the defined types were placed in this encounter

## 2021-06-04 NOTE — PATIENT INSTRUCTIONS

## 2021-06-15 ENCOUNTER — OFFICE VISIT (OUTPATIENT)
Dept: CARDIOLOGY CLINIC | Facility: HOSPITAL | Age: 76
End: 2021-06-15
Payer: MEDICARE

## 2021-06-15 VITALS
OXYGEN SATURATION: 97 % | SYSTOLIC BLOOD PRESSURE: 126 MMHG | DIASTOLIC BLOOD PRESSURE: 86 MMHG | BODY MASS INDEX: 33.22 KG/M2 | HEART RATE: 61 BPM | HEIGHT: 64 IN | WEIGHT: 194.6 LBS

## 2021-06-15 DIAGNOSIS — I34.0 NONRHEUMATIC MITRAL VALVE REGURGITATION: ICD-10-CM

## 2021-06-15 DIAGNOSIS — I10 ESSENTIAL HYPERTENSION: Primary | ICD-10-CM

## 2021-06-15 DIAGNOSIS — R06.02 SHORTNESS OF BREATH ON EXERTION: ICD-10-CM

## 2021-06-15 DIAGNOSIS — G47.33 OSA (OBSTRUCTIVE SLEEP APNEA): ICD-10-CM

## 2021-06-15 DIAGNOSIS — I51.89 DIASTOLIC DYSFUNCTION: ICD-10-CM

## 2021-06-15 DIAGNOSIS — L30.9 DERMATITIS: ICD-10-CM

## 2021-06-15 DIAGNOSIS — E78.00 HYPERCHOLESTEROLEMIA: ICD-10-CM

## 2021-06-15 PROCEDURE — 99214 OFFICE O/P EST MOD 30 MIN: CPT | Performed by: INTERNAL MEDICINE

## 2021-06-15 NOTE — PROGRESS NOTES
Cardiology Follow Up    Barney Finch  1945  [id number]  [address]    1  Essential hypertension  Echo complete with contrast if indicated   2  CLARKE (obstructive sleep apnea)     3  Diastolic dysfunction     4  Hypercholesterolemia     5  Shortness of breath on exertion  Echo complete with contrast if indicated   6  Dermatitis  hydrocortisone 2 5 % cream   7  Nonrheumatic mitral valve regurgitation  Echo complete with contrast if indicated       Discussion/Summary:   Overall she has been doing excellent since her last visit  From a heart failure standpoint she is euvolemic and doing well  Blood pressure has been very well controlled recently  Continue current medical regimen  She is due for an echocardiogram to temper to follow-up on mitral regurgitation  Overall I have made no changes she did need a hydrocortisone cream refilled for a contact dermatitis on her face  Interval History:  24-year-old female with difficult-to-control hypertension, mitral regurgitation, chronic diastolic congestive heart failure presents for follow-up visit  She has been under a lot of stress since her last exam   There have been several deaths in her mediated family and she has had a hard time handling this  From a cardiac standpoint she has been rather comfortable  Breathing has been stable  since her last visit she has been doing well  Denies any chest pain, shortness of breath, palpitations, lightheadedness, dizziness, or syncope  There has been no lower extremity edema, PND, orthopnea  Overall she has been doing excellent    Problem List     Diastolic dysfunction    Hypercholesterolemia    Hypertension    Mitral regurgitation    Overview Signed 2/8/2018  8:25 AM by Devin Bob DO     Description: Moderate         Shortness of breath on exertion    Cervical spondylosis without myelopathy    Overview Signed 2/13/2018  8:13 AM by Crista Mahoney MA     Added automatically from request for surgery [id number]         Chronic pain syndrome        Past Medical History:   Diagnosis Date    Allergies     Anxiety     Arthritis     Benign arteriolar nephrosclerosis     Bereavement     Cataract     Chronic kidney disease     Chronic kidney disease in type 2 diabetes mellitus (HCC)     Chronic kidney disease, stage 2 (mild)     Chronic pain disorder     Chronic pain syndrome     COPD [facility]ch[facility] disease) ([facility] )     DDD (degenerative disc disease), cervical     DDD (degenerative disc disease), lumbar     Degenerative joint disease of right hip     Depression     Diastolic dysfunction     DJD (degenerative joint disease), ankle and foot, right     DJD of right shoulder     Edema     Fracture of left calcaneus     Generalized anxiety disorder     GERD (gastroesophageal reflux disease)     Heart murmur     [facility]ism ([facility])     Hyperlipidemia     Hypertension     Hypertensive nephrosclerosis     Hypo-osmolality and hyponatremia     IBS (irritable bowel syndrome)     IBS (irritable bowel syndrome)     Insomnia     Migraines     Mitral regurgitation     MVP (mitral valve prolapse)     Obstructive sleep apnea syndrome     Osteoarthritis     Osteoarthritis     Pernicious anemia     Plantar fascia rupture     Rheumatoid arthritis ([facility])     Right knee DJD     Shingles     Sinobronchitis     Sleep apnea     Stroke ([place] [address] )     tia    TIA (transient ischemic attack)     Vertigo      Social History     Socioeconomic History    Marital status: /Civil Union     Spouse name: Not on file    Number of children: Not on file    Years of education: Not on file    Highest education level: Not on file   Occupational History    Occupation: Admnistrator    Tobacco Use    Smoking status: Former Smoker     Types: Cigarettes    Smokeless tobacco: Never Used    Tobacco comment: quit 40 yrs ago   Vaping Use    Vaping Use: Never used   Substance and Sexual Activity    Alcohol use: Yes     Comment: 2 beer a week    Drug use: Never    Sexual activity: Not Currently     Birth control/protection: Post-menopausal   Other Topics Concern    Not on file   Social History Narrative    Patient has never smoked - As per [employer]    Consumes 1-2 beers per week - As per Ramsey Noel    Consumes on average 1 cup of decaf coffee per day      Social Determinants of Health     Financial Resource Strain:     Difficulty of Paying Living Expenses:    Food Insecurity:     Worried About Running Out of Food in the Last Year:     [address] in the Last Year:    Transportation Needs:     Lack of Transportation (Medical):      Lack of Transportation (Non-Medical):    Physical Activity:     Days of Exercise per Week:     Minutes of Exercise per Session:    Stress:     Feeling of Stress :    Social Connections:     Frequency of Communication with Friends and Family:     Frequency of Social Gatherings with Friends and Family:     Attends [employer] Services:     Active Member of Clubs or Organizations:     Attends Club or Organization Meetings:     Marital Status:    Intimate Partner Violence:     Fear of Current or Ex-Partner:     Emotionally Abused:     Physically Abused:     Sexually Abused:       Family History   Problem Relation Age of Onset    Heart disease Mother     Hypertension Mother     Arthritis Mother     Dementia Mother     Rheum arthritis Mother     Hypertension Father     Heart disease Father     Colon cancer Father     Hypertension Sister     Anxiety disorder Sister     Thyroid disease Sister     Prostate cancer Maternal Grandfather     No Known Problems Paternal Grandmother     No Known Problems Paternal Grandfather     No Known Problems Paternal Aunt     Pseudochol deficiency Neg Hx      Past Surgical History:   Procedure Laterality Date    APPENDECTOMY      CARPAL TUNNEL RELEASE Bilateral     COLONOSCOPY      several    COLONOSCOPY  04/02/2012    by Dr Veronica Buckley    DXA PROCEDURE (HISTORICAL)  10/26/2016, 9/17/2014, 6/18/2007    HAND SURGERY Right     ring finger has pin    HYSTERECTOMY      32    INSERT / REPLACE PERIPHERAL NEUROSTIMULATOR PULSE GENERATOR /  Left     buttocks    JOINT REPLACEMENT Left     knee    JOINT REPLACEMENT Right 04/2019    Hip    KNEE ARTHROPLASTY Left 01/15/2009    by Dr Solange Ortiz at [address] [facility] ARTHROSCOPY Bilateral    [address]  09/10/2009    by Dr Solange Ortiz at [address] [facility] (HISTORICAL)  12/10/2018, 10/30/2017, 10/26/2016    NASAL SEPTUM SURGERY  2008    NERVE BLOCK Left 2/20/2018    Procedure: BLOCK MEDIAL BRANCH - C4, C5, C6;  Surgeon: Emely Yarbrough MD;  [facility];  Service: Pain Management     NERVE BLOCK Left 3/6/2018    Procedure: C4, C5, C6 MEDIAL BRANCH BLOCK;  Surgeon: Emely Yarbrough MD;  [facility];  Service: Pain Management     [place] COLONOSCOPY FLX DX W/COLLJ SPEC WHEN PFRMD N/A 4/24/2017    Procedure: George Stewart;  Surgeon: Veronica Buckley MD;  [facility];  Service: Colorectal    WI TOTAL HIP ARTHROPLASTY Right 2/27/2019    Procedure: ARTHROPLASTY HIP TOTAL;  Surgeon: Joyce Chen DO;  Location: [address] [place] [address] MAIN OR;  Service: Orthopedics    WI TOTAL KNEE ARTHROPLASTY Right 6/24/2020    Procedure: KNEE TOTAL ARTHROPLASTY;  Surgeon: Joyce Chen DO;  Location: [address] [place] [address] MAIN OR;  Service: Orthopedics    RADIOFREQUENCY ABLATION Left 4/17/2018    Procedure: ABLATION RADIO FREQUENCY (RFA) - C4, C5, C6;  Surgeon: Emely Yarbrough MD;  [facility];  Service: Pain Management     REPLACEMENT TOTAL KNEE Left 07/05/2011    SINUS SURGERY      TONSILLECTOMY      TOTAL HIP ARTHROPLASTY Left     TRIGGER FINGER RELEASE      R 4th     UPPER GASTROINTESTINAL ENDOSCOPY  01/12/2007    with Anika vines by Dr Charlee Tapia at [address] [place] [address] WRIST SURGERY Right        Current Outpatient Medications:     aspirin (ECOTRIN LOW STRENGTH) 81 mg EC tablet, Take 1 tablet (81 mg total) by mouth daily, Disp:  , Rfl:     bisoprolol (ZEBETA) 10 MG tablet, TAKE 1 TABLET EVERY DAY, Disp: 90 tablet, Rfl: 3    butalbital-acetaminophen-caffeine (FIORICET,ESGIC) -40 mg per tablet, Take 1 tablet by mouth every 4 (four) hours as needed for headaches, Disp: 100 tablet, Rfl: 2    calcium carbonate (OS-MARLENY) 600 MG tablet, Take 600 mg by mouth 2 (two) times a day with meals, Disp: , Rfl:     diclofenac sodium (VOLTAREN) 1 %, Apply 2 g topically 4 (four) times a day, Disp: 50 g, Rfl: 5    losartan (COZAAR) 100 MG tablet, TAKE 1 TABLET EVERY MORNING, Disp: 90 tablet, Rfl: 3    mirtazapine (REMERON) 15 mg tablet, Take 1 tablet (15 mg total) by mouth daily at bedtime, Disp: 90 tablet, Rfl: 1    Multiple Vitamins-Minerals (MULTIVITAMIN WITH MINERALS) tablet, Take 1 tablet by mouth every morning, Disp: , Rfl:     omeprazole (PriLOSEC) 20 mg delayed release capsule, TAKE 1 CAPSULE TWICE DAILY, Disp: 180 capsule, Rfl: 4    pravastatin (PRAVACHOL) 20 mg tablet, Take 20 mg by mouth 2 (two) times a day , Disp: , Rfl:     prochlorperazine (COMPAZINE) 5 mg tablet, Take 1 tablet (5 mg total) by mouth every 6 (six) hours as needed for nausea or vomiting, Disp: 30 tablet, Rfl: 0    spironolactone (ALDACTONE) 50 mg tablet, TAKE 1 TABLET DAILY AFTER LUNCH, Disp: 90 tablet, Rfl: 3    clindamycin (CLEOCIN) 150 mg capsule, take 4 capsules by mouth 1 hour prior to appointment, Disp: , Rfl:     furosemide (LASIX) 20 mg tablet, Take 1 tablet (20 mg total) by mouth daily (Patient not taking: Reported on 6/4/2021), Disp: 90 tablet, Rfl: 1    hydrocortisone 2 5 % cream, Apply topically 2 (two) times a day as needed for irritation or rash, Disp: 30 g, Rfl: 0  Allergies   Allergen Reactions    Procaine Hives    Adhesive [Medical Tape] Rash    Lidocaine Rash    Penicillins Rash       Labs:     Chemistry        Component Value Date/Time     (L) 09/21/2015 1057    K 4 8 04/30/2021 0718    K 4 3 09/21/2015 1057    CL 95 (L) 04/30/2021 0718    CL 94 (L) 09/21/2015 1057    CO2 24 04/30/2021 0718    CO2 31 2 09/21/2015 1057    BUN 18 04/30/2021 0718    BUN 8 09/21/2015 1057    CREATININE 0 90 04/30/2021 0718    CREATININE 0 74 09/21/2015 1057        Component Value Date/Time    CALCIUM 8 7 04/30/2021 0718    CALCIUM 9 5 09/21/2015 1057    ALKPHOS 151 (H) 03/04/2021 0712    ALKPHOS 123 (H) 09/21/2015 1057    AST 23 03/04/2021 0712    AST 21 09/21/2015 1057    ALT 29 03/04/2021 0712    ALT 34 09/21/2015 1057    BILITOT 0 29 09/21/2015 1057            Lab Results   Component Value Date    CHOL 290 06/04/2015    CHOL 240 10/19/2014    CHOL 277 03/12/2014     Lab Results   Component Value Date     (H) 06/08/2016     (H) 01/27/2016     06/04/2015     Lab Results   Component Value Date    LDLCALC 121 (H) 06/08/2016    LDLCALC 126 (H) 01/27/2016    LDLCALC 153 (H) 06/04/2015     Lab Results   Component Value Date    TRIG 49 06/08/2016    TRIG 56 01/27/2016    TRIG 44 06/04/2015     No results found for: CHOLHDL    Imaging: No results found  ECG:  Normal sinus rhythm unremarkable tracing      Review of Systems   Constitutional: Negative  HENT: Negative  Eyes: Negative  Cardiovascular: Negative  Respiratory: Negative  Endocrine: Negative  Hematologic/Lymphatic: Negative  Skin: Negative  Musculoskeletal: Negative  Gastrointestinal: Negative  Genitourinary: Negative  Neurological: Positive for dizziness, headaches and loss of balance  Psychiatric/Behavioral: Negative  Vitals:    06/15/21 1309   BP: 126/86   Pulse: 61   SpO2: 97%     Vitals:    06/15/21 1309   Weight: 88 3 kg (194 lb 9 6 oz)     Height: 5' 3 5" (161 3 cm)   Body mass index is 33 93 kg/m²      Physical Exam:  Vital signs reviewed  General:  Alert and cooperative, appears stated age, no acute distress  HEENT:  PERRLA, EOMI, no scleral icterus, no conjunctival pallor  Neck:  No lymphadenopathy, no thyromegaly, no carotid bruits, no elevated JVP  Heart:  Regular rate and rhythm, normal S1/S2, no S3/S4, no murmur, rubs or gallops  PMI nondisplaced  Lungs:  Clear to auscultation bilaterally, no wheezes rales or rhonchi  Abdomen:  Soft, non-tender, positive bowel sounds, no rebound or guarding,   no organomegaly   Extremities:  Normal range of motion    No clubbing, cyanosis or edema   Vascular:  2+ pedal pulses  Skin:  No rashes or lesions on exposed skin  Neurologic:  Cranial nerves II-XII grossly intact without focal deficits  Psych:  Normal mood and affect

## 2021-06-22 DIAGNOSIS — G44.209 MUSCLE TENSION HEADACHE: ICD-10-CM

## 2021-06-22 RX ORDER — BUTALBITAL, ACETAMINOPHEN AND CAFFEINE 50; 325; 40 MG/1; MG/1; MG/1
1 TABLET ORAL EVERY 4 HOURS PRN
Qty: 100 TABLET | Refills: 0 | Status: SHIPPED | OUTPATIENT
Start: 2021-06-22 | End: 2021-07-27 | Stop reason: SDUPTHER

## 2021-06-22 NOTE — PROGRESS NOTES
The [place] prescription drug monitoring program was queried  There were no red flags  Safe to proceed

## 2021-06-22 NOTE — TELEPHONE ENCOUNTER
Received fax requesting refill on patient's  BUT/APAP/CAFF  Last Rx written 05/10/2021 as #100 x 2 additional refills  As per [facility], Rx filled 05/11/2021, 05/26/2021, and 06/10/2021  Pended #100 x 2 additional refills  Routed to Dr Derik Valderrama

## 2021-06-24 ENCOUNTER — APPOINTMENT (OUTPATIENT)
Dept: RADIOLOGY | Facility: MEDICAL CENTER | Age: 76
End: 2021-06-24
Payer: MEDICARE

## 2021-06-24 ENCOUNTER — TELEPHONE (OUTPATIENT)
Dept: OBGYN CLINIC | Facility: CLINIC | Age: 76
End: 2021-06-24

## 2021-06-24 ENCOUNTER — OFFICE VISIT (OUTPATIENT)
Dept: OBGYN CLINIC | Facility: CLINIC | Age: 76
End: 2021-06-24
Payer: MEDICARE

## 2021-06-24 VITALS
SYSTOLIC BLOOD PRESSURE: 177 MMHG | HEIGHT: 64 IN | DIASTOLIC BLOOD PRESSURE: 81 MMHG | BODY MASS INDEX: 33.12 KG/M2 | WEIGHT: 194 LBS | HEART RATE: 58 BPM

## 2021-06-24 DIAGNOSIS — Z96.653 S/P TOTAL KNEE REPLACEMENT, BILATERAL: Primary | ICD-10-CM

## 2021-06-24 DIAGNOSIS — Z96.653 S/P TOTAL KNEE REPLACEMENT, BILATERAL: ICD-10-CM

## 2021-06-24 PROCEDURE — 73562 X-RAY EXAM OF KNEE 3: CPT

## 2021-06-24 PROCEDURE — 99213 OFFICE O/P EST LOW 20 MIN: CPT | Performed by: ORTHOPAEDIC SURGERY

## 2021-06-24 NOTE — PROGRESS NOTES
ASSESSMENT/PLAN:    Diagnoses and all orders for this visit:    S/p total knee replacement, bilateral  -     XR knee 3 vw right non injury; Future  -     XR knee 3 vw left non injury; Future          X-rays of the patient's bilateral knees show well-seated total knee replacements  The prostheses are in good alignment and there are no signs of loosening  There are no fractures or dislocations  The patient should continue home exercise program for strengthening, stretching and range of motion  She can follow up with our office in 1 year with new x-rays of both knees three views  The left knee should be a long plate as it was a revision  The patient is acceptable to this plan  Return in about 1 year (around 6/24/2022)  _____________________________________________________  CHIEF COMPLAINT:  Chief Complaint   Patient presents with    Left Knee - Follow-up    Right Knee - Follow-up         SUBJECTIVE:  Obinna Cosme is a 78 y o  female   Status post right total knee replacement and revision of left total knee replacement presents to our office for a 1 year visit  The patient is ambulating without an antalgic gait or assist device  She denies any significant pain  She denies any numbness or tingling  She denies any fever or chills  She is very pleased with the results of both surgeries      The following portions of the patient's history were reviewed and updated as appropriate: allergies, current medications, past family history, past medical history, past social history, past surgical history and problem list     PAST MEDICAL HISTORY:  Past Medical History:   Diagnosis Date    Allergies     Anxiety     Arthritis     Benign arteriolar nephrosclerosis     Bereavement     Cataract     Chronic kidney disease     Chronic kidney disease in type 2 diabetes mellitus (HCC)     Chronic kidney disease, stage 2 (mild)     Chronic pain disorder     Chronic pain syndrome     COPD [facility]chronic obstructive [facility]) [facility]     DDD (degenerative disc disease), cervical     DDD (degenerative disc disease), lumbar     Degenerative joint disease of right hip     Depression     Diastolic dysfunction     DJD (degenerative joint disease), ankle and foot, right     DJD of right shoulder     Edema     Fracture of left calcaneus     Generalized anxiety disorder     GERD (gastroesophageal reflux disease)     Heart murmur     Hyperaldosteronism (HCC)     Hyperlipidemia     Hypertension     Hypertensive nephrosclerosis     Hypo-osmolality and hyponatremia     IBS (irritable bowel syndrome)     IBS (irritable bowel syndrome)     Insomnia     Migraines     Mitral regurgitation     MVP (mitral valve prolapse)     Obstructive sleep apnea syndrome     Osteoarthritis     Osteoarthritis     Pernicious anemia     Plantar fascia rupture     Rheumatoid arthritis ([facility] )     Right knee DJD     Shingles     Sinobronchitis     Sleep apnea     Stroke ([facility] )     tia    TIA (transient ischemic attack)     Vertigo        PAST SURGICAL HISTORY:  Past Surgical History:   Procedure Laterality Date    APPENDECTOMY      CARPAL TUNNEL RELEASE Bilateral     COLONOSCOPY      several    COLONOSCOPY  04/02/2012    by Dr Abilio Barraza (HISTORICAL)  10/26/2016, 9/17/2014, 6/18/2007    HAND SURGERY Right     ring finger has pin    HYSTERECTOMY      32    INSERT / REPLACE PERIPHERAL NEUROSTIMULATOR PULSE GENERATOR /  Left     buttocks    JOINT REPLACEMENT Left     knee    JOINT REPLACEMENT Right 04/2019    Hip    KNEE ARTHROPLASTY Left 01/15/2009    by Dr Saqib Walter at [address] [facility] ARTHROSCOPY Bilateral    [facility]  09/10/2009    by Dr Saqib Walter at [address] [facility] (HISTORICAL)  12/10/2018, 10/30/2017, 10/26/2016    NASAL SEPTUM SURGERY  2008    NERVE BLOCK Left 2/20/2018    Procedure: BLOCK MEDIAL BRANCH - C4, C5, C6;  Surgeon: Suresh Braun MD;  [facility];  Service: Pain Management     NERVE BLOCK Left 3/6/2018    Procedure: C4, C5, C6 MEDIAL BRANCH BLOCK;  Surgeon: Suresh Braun MD;  [facility];  Service: Pain Management     [place] COLONOSCOPY FLX DX W/COLLJ SPEC WHEN PFRMD N/A 4/24/2017    Procedure: FLEXIBLE COLONOSCOPY;  Surgeon: Willy Lovett MD;  [facility]: [facility];  Service: Colorectal    [place] TOTAL HIP ARTHROPLASTY Right 2/27/2019    Procedure: ARTHROPLASTY HIP TOTAL;  Surgeon: John Mckeon DO;  Location: [facility] MAIN OR;  Service: Orthopedics    [place] TOTAL KNEE ARTHROPLASTY Right 6/24/2020    Procedure: KNEE TOTAL ARTHROPLASTY;  Surgeon: John Mckeon DO;  Location: [facility] MAIN OR;  Service: Orthopedics    RADIOFREQUENCY ABLATION Left 4/17/2018    Procedure: ABLATION RADIO FREQUENCY (RFA) - C4, C5, C6;  Surgeon: Suresh Braun MD;  [facility];  Service: Pain Management     REPLACEMENT TOTAL KNEE Left 07/05/2011    SINUS SURGERY      TONSILLECTOMY      TOTAL HIP ARTHROPLASTY Left     TRIGGER FINGER RELEASE      R 4th     UPPER GASTROINTESTINAL ENDOSCOPY  01/12/2007    with Sanjuanita Lopez dilatation by Dr Cyndy Matute at [address] Right        FAMILY HISTORY:  Family History   Problem Relation Age of Onset    Heart disease Mother     Hypertension Mother    Samaria Nine Arthritis Mother     Dementia Mother     Rheum arthritis Mother     Hypertension Father     Heart disease Father     Colon cancer Father     Hypertension Sister     Anxiety disorder Sister     Thyroid disease Sister     Prostate cancer Maternal Grandfather     No Known Problems Paternal Grandmother     No Known Problems Paternal Grandfather     No Known Problems Paternal Aunt     Pseudochol deficiency Neg Hx        SOCIAL HISTORY:  Social History     Tobacco Use    Smoking status: Former Smoker     Types: Cigarettes    Smokeless tobacco: Never Used    Tobacco comment: quit 40 yrs ago   Vaping Use    Vaping Use: Never used   Substance Use Topics    Alcohol use: Yes     Comment: 2 beer a week    Drug use: Never       MEDICATIONS:    Current Outpatient Medications:     aspirin (ECOTRIN LOW STRENGTH) 81 mg EC tablet, Take 1 tablet (81 mg total) by mouth daily, Disp:  , Rfl:     bisoprolol (ZEBETA) 10 MG tablet, TAKE 1 TABLET EVERY DAY, Disp: 90 tablet, Rfl: 3    butalbital-acetaminophen-caffeine (FIORICET,ESGIC) -40 mg per tablet, Take 1 tablet by mouth every 4 (four) hours as needed for headaches, Disp: 100 tablet, Rfl: 0    calcium carbonate (OS-MARLENY) 600 MG tablet, Take 600 mg by mouth 2 (two) times a day with meals, Disp: , Rfl:     clindamycin (CLEOCIN) 150 mg capsule, take 4 capsules by mouth 1 hour prior to appointment, Disp: , Rfl:     diclofenac sodium (VOLTAREN) 1 %, Apply 2 g topically 4 (four) times a day, Disp: 50 g, Rfl: 5    hydrocortisone 2 5 % cream, Apply topically 2 (two) times a day as needed for irritation or rash, Disp: 30 g, Rfl: 0    losartan (COZAAR) 100 MG tablet, TAKE 1 TABLET EVERY MORNING, Disp: 90 tablet, Rfl: 3    mirtazapine (REMERON) 15 mg tablet, Take 1 tablet (15 mg total) by mouth daily at bedtime, Disp: 90 tablet, Rfl: 1    Multiple Vitamins-Minerals (MULTIVITAMIN WITH MINERALS) tablet, Take 1 tablet by mouth every morning, Disp: , Rfl:     omeprazole (PriLOSEC) 20 mg delayed release capsule, TAKE 1 CAPSULE TWICE DAILY, Disp: 180 capsule, Rfl: 4    pravastatin (PRAVACHOL) 20 mg tablet, Take 20 mg by mouth 2 (two) times a day , Disp: , Rfl:     prochlorperazine (COMPAZINE) 5 mg tablet, Take 1 tablet (5 mg total) by mouth every 6 (six) hours as needed for nausea or vomiting, Disp: 30 tablet, Rfl: 0    spironolactone (ALDACTONE) 50 mg tablet, TAKE 1 TABLET DAILY AFTER LUNCH, Disp: 90 tablet, Rfl: 3    furosemide (LASIX) 20 mg tablet, Take 1 tablet (20 mg total) by mouth daily (Patient not taking: Reported on 6/24/2021), Disp: 90 tablet, Rfl: 1    ALLERGIES:  Allergies   Allergen Reactions    Procaine Hives    Adhesive [Medical Tape] Rash    Lidocaine Rash    Penicillins Rash       ROS:  Review of Systems     Constitutional: Negative for fatigue, fever or loss of appetite  HENT: Negative  Respiratory: Negative for shortness of breath, dyspnea  Cardiovascular: Negative for chest pain/tightness  Gastrointestinal: Negative for abdominal pain, N/V  Endocrine: Negative for cold/heat intolerance, unexplained weight loss/gain  Genitourinary: Negative for flank pain, dysuria, hematuria  Musculoskeletal:   Negative for arthralgia   Skin: Negative for rash  Neurological: Negative for numbness or tingling  Psychiatric/Behavioral: Negative for agitation  _____________________________________________________  PHYSICAL EXAMINATION:    Blood pressure (!) 177/81, pulse 58, height 5' 3 5" (1 613 m), weight 88 kg (194 lb)  Constitutional: Oriented to person, place, and time  Appears well-developed and well-nourished  No distress  HENT:   Head: Normocephalic  Eyes: Conjunctivae are normal  Right eye exhibits no discharge  Left eye exhibits no discharge  No scleral icterus  Cardiovascular: Normal rate  Pulmonary/Chest: Effort normal    Neurological: Alert and oriented to person, place, and time  Skin: Skin is warm and dry  No rash noted  Not diaphoretic  No erythema  No pallor  Psychiatric: Normal mood and affect   Behavior is normal  Judgment and thought content normal       MUSCULOSKELETAL EXAMINATION:   Physical Exam  Ortho Exam     bilateral lower extremities are neurovascularly intact   Toes are pink and mobile   Compartments are soft  Range of motion of both knees is 0-120 degrees   No ligament laxity  No warmth, erythema or ecchymosis  Brisk cap refill and sensation intact   incisions are well healed  Objective:  BP Readings from Last 1 Encounters:   06/24/21 (!) 177/81      Wt Readings from Last 1 Encounters:   06/24/21 88 kg (194 lb)        BMI:   Estimated body mass index is 33 83 kg/m² as calculated from the following:    Height as of this encounter: 5' 3 5" (1 613 m)  Weight as of this encounter: 88 kg (194 lb)  Radiographs:  _____________________________________________________  STUDIES REVIEWED:  I have personally reviewed pertinent films and reports in PACS  X-rays of the patient's bilateral knees show well-seated total knee replacements  The prostheses are in good alignment and there are no signs of loosening  There are no fractures or dislocations         NIMESH MarshC

## 2021-07-27 ENCOUNTER — OFFICE VISIT (OUTPATIENT)
Dept: PAIN MEDICINE | Facility: CLINIC | Age: 76
End: 2021-07-27
Payer: MEDICARE

## 2021-07-27 ENCOUNTER — OFFICE VISIT (OUTPATIENT)
Dept: FAMILY MEDICINE CLINIC | Facility: CLINIC | Age: 76
End: 2021-07-27
Payer: MEDICARE

## 2021-07-27 VITALS
BODY MASS INDEX: 33.36 KG/M2 | DIASTOLIC BLOOD PRESSURE: 72 MMHG | HEIGHT: 64 IN | HEART RATE: 55 BPM | SYSTOLIC BLOOD PRESSURE: 136 MMHG | TEMPERATURE: 98.1 F | WEIGHT: 195.4 LBS

## 2021-07-27 VITALS
SYSTOLIC BLOOD PRESSURE: 132 MMHG | TEMPERATURE: 96.3 F | HEART RATE: 80 BPM | RESPIRATION RATE: 20 BRPM | BODY MASS INDEX: 33.29 KG/M2 | HEIGHT: 64 IN | DIASTOLIC BLOOD PRESSURE: 70 MMHG | WEIGHT: 195 LBS

## 2021-07-27 DIAGNOSIS — J43.9 PULMONARY EMPHYSEMA, UNSPECIFIED EMPHYSEMA TYPE (HCC): ICD-10-CM

## 2021-07-27 DIAGNOSIS — N18.2 STAGE 2 CHRONIC KIDNEY DISEASE: ICD-10-CM

## 2021-07-27 DIAGNOSIS — E55.9 VITAMIN D DEFICIENCY: ICD-10-CM

## 2021-07-27 DIAGNOSIS — G89.29 CHRONIC BILATERAL LOW BACK PAIN, UNSPECIFIED WHETHER SCIATICA PRESENT: ICD-10-CM

## 2021-07-27 DIAGNOSIS — M47.816 LUMBAR SPONDYLOSIS: ICD-10-CM

## 2021-07-27 DIAGNOSIS — E66.09 CLASS 1 OBESITY DUE TO EXCESS CALORIES WITH SERIOUS COMORBIDITY AND BODY MASS INDEX (BMI) OF 34.0 TO 34.9 IN ADULT: ICD-10-CM

## 2021-07-27 DIAGNOSIS — G62.9 PERIPHERAL POLYNEUROPATHY: ICD-10-CM

## 2021-07-27 DIAGNOSIS — M54.50 CHRONIC BILATERAL LOW BACK PAIN, UNSPECIFIED WHETHER SCIATICA PRESENT: ICD-10-CM

## 2021-07-27 DIAGNOSIS — I34.0 NONRHEUMATIC MITRAL VALVE REGURGITATION: ICD-10-CM

## 2021-07-27 DIAGNOSIS — G44.209 MUSCLE TENSION HEADACHE: Primary | ICD-10-CM

## 2021-07-27 DIAGNOSIS — I50.40 COMBINED SYSTOLIC AND DIASTOLIC CONGESTIVE HEART FAILURE, UNSPECIFIED HF CHRONICITY (HCC): ICD-10-CM

## 2021-07-27 DIAGNOSIS — M51.26 LUMBAR DISC HERNIATION: ICD-10-CM

## 2021-07-27 DIAGNOSIS — G89.4 CHRONIC PAIN SYNDROME: ICD-10-CM

## 2021-07-27 DIAGNOSIS — I10 ESSENTIAL HYPERTENSION: ICD-10-CM

## 2021-07-27 DIAGNOSIS — M47.812 CERVICAL SPONDYLOSIS WITHOUT MYELOPATHY: ICD-10-CM

## 2021-07-27 DIAGNOSIS — M54.9 MID BACK PAIN: Primary | ICD-10-CM

## 2021-07-27 DIAGNOSIS — Z13.29 SCREENING FOR THYROID DISORDER: ICD-10-CM

## 2021-07-27 PROCEDURE — 99214 OFFICE O/P EST MOD 30 MIN: CPT | Performed by: ANESTHESIOLOGY

## 2021-07-27 PROCEDURE — 99214 OFFICE O/P EST MOD 30 MIN: CPT | Performed by: FAMILY MEDICINE

## 2021-07-27 RX ORDER — BUTALBITAL, ACETAMINOPHEN AND CAFFEINE 50; 325; 40 MG/1; MG/1; MG/1
1 TABLET ORAL EVERY 4 HOURS PRN
Qty: 100 TABLET | Refills: 2 | Status: SHIPPED | OUTPATIENT
Start: 2021-07-27 | End: 2021-09-07 | Stop reason: SDUPTHER

## 2021-07-27 RX ORDER — PREGABALIN 50 MG/1
50 CAPSULE ORAL 3 TIMES DAILY
Qty: 90 CAPSULE | Refills: 1 | Status: SHIPPED | OUTPATIENT
Start: 2021-07-27 | End: 2021-08-31

## 2021-07-27 NOTE — PROGRESS NOTES
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 regurgitation the patient is due for an echocardiogram in September  Cervical spondylosis without radiculopathy under the care of pain management she will be following with pain management this afternoon she states that her spinal stimulator has stop working  Chronic kidney disease stage 2 stable laboratories pending will follow with a GFR    Morbid obesity with a BMI of 34 diet has been discussed    Problem List Items Addressed This Visit     None      Visit Diagnoses     Muscle tension headache               Diagnoses and all orders for this visit:    Muscle tension headache  -     butalbital-acetaminophen-caffeine (FIORICET,ESGIC) -40 mg per tablet; Take 1 tablet by mouth every 4 (four) hours as needed for headaches        No problem-specific Assessment & Plan notes found for this encounter  PHQ-9 Depression Screening    PHQ-9:   Frequency of the following problems over the past two weeks:      Little interest or pleasure in doing things: 0 - not at all  Feeling down, depressed, or hopeless: 0 - not at all  PHQ-2 Score: 0          Body mass index is 34 kg/m²  BMI Counseling: Body mass index is 34 kg/m²  The BMI is above normal  Nutrition recommendations include reducing portion sizes, decreasing overall calorie intake, 3-5 servings of fruits/vegetables daily, reducing fast food intake, consuming healthier snacks, decreasing soda and/or juice intake, moderation in carbohydrate intake, increasing intake of lean protein, reducing intake of saturated fat and trans fat and reducing intake of cholesterol  Subjective:      Patient ID: Andres Lopez is a 78 y o  female      HPI    The following portions of the patient's history were reviewed and updated as appropriate:   She has a past medical history of Allergies, Anxiety, Arthritis, Benign arteriolar nephrosclerosis, Bereavement, Cataract, Chronic kidney disease, Chronic kidney disease in type 2 diabetes mellitus ([facility] [address] ), Chronic kidney disease, stage 2 (mild), Chronic pain disorder, Chronic pain syndrome, COPD (chronic obstructive pulmonary disease) ([address] ), DDD (degenerative disc disease), cervical, DDD (degenerative disc disease), lumbar, Degenerative joint disease of right hip, Depression, Diastolic dysfunction, DJD (degenerative joint disease), ankle and foot, right, DJD of right shoulder, Edema, Fracture of left calcaneus, Generalized anxiety disorder, GERD (gastroesophageal reflux disease), Heart murmur, Hyperaldosteronism ([facility] [address] ), Hyperlipidemia, Hypertension, Hypertensive nephrosclerosis, Hypo-osmolality and hyponatremia, IBS (irritable bowel syndrome), IBS (irritable bowel syndrome), Insomnia, Migraines, Mitral regurgitation, MVP (mitral valve prolapse), Obstructive sleep apnea syndrome, Osteoarthritis, Osteoarthritis, Pernicious anemia, Plantar fascia rupture, Rheumatoid arthritis ([facility] [address] ), Right knee DJD, Shingles, Sinobronchitis, Sleep apnea, Stroke ([facility] [address] ), TIA (transient ischemic attack), and Vertigo  ,  does not have any pertinent problems on file  ,   has a past surgical history that includes Appendectomy; Sinus surgery; Insert / replace peripheral neurostimulator pulse generator /  (Left); pr colonoscopy flx dx w/collj spec when pfrmd (N/A, 4/24/2017); NERVE BLOCK (Left, 2/20/2018); NERVE BLOCK (Left, 3/6/2018); Radiofrequency ablation (Left, 4/17/2018); Colonoscopy; Upper gastrointestinal endoscopy (01/12/2007); Knee arthroscopy (Bilateral); Hand surgery (Right); pr total hip arthroplasty (Right, 2/27/2019); Nasal septum surgery (2008); Carpal tunnel release (Bilateral); Total hip arthroplasty (Left); Hysterectomy; Tonsillectomy; Joint replacement (Left);  Joint replacement (Right, 04/2019); pr total knee arthroplasty (Right, 6/24/2020); Wrist surgery (Right); Knee cartilage surgery (09/10/2009); Replacement total knee (Left, 07/05/2011); Knee Arthroplasty (Left, 01/15/2009); Colonoscopy (04/02/2012); DXA procedure(historical) (10/26/2016, 9/17/2014, 6/18/2007); Mammo (historical) (12/10/2018, 10/30/2017, 10/26/2016); and Trigger finger release  ,  family history includes Anxiety disorder in her sister; Arthritis in her mother; Colon cancer in her father; Dementia in her mother; Heart disease in her father and mother; Hypertension in her father, mother, and sister; No Known Problems in her paternal aunt, paternal grandfather, and paternal grandmother; Prostate cancer in her maternal grandfather; Rheum arthritis in her mother; Thyroid disease in her sister  ,   reports that she has quit smoking  Her smoking use included cigarettes  She has never used smokeless tobacco  She reports current alcohol use  She reports that she does not use drugs  ,  is allergic to procaine, adhesive [medical tape], lidocaine, and penicillins     Current Outpatient Medications   Medication Sig Dispense Refill    aspirin (ECOTRIN LOW STRENGTH) 81 mg EC tablet Take 1 tablet (81 mg total) by mouth daily      bisoprolol (ZEBETA) 10 MG tablet TAKE 1 TABLET EVERY DAY 90 tablet 3    butalbital-acetaminophen-caffeine (FIORICET,ESGIC) -40 mg per tablet Take 1 tablet by mouth every 4 (four) hours as needed for headaches 100 tablet 0    calcium carbonate (OS-MARLENY) 600 MG tablet Take 600 mg by mouth 2 (two) times a day with meals      clindamycin (CLEOCIN) 150 mg capsule take 4 capsules by mouth 1 hour prior to appointment      diclofenac sodium (VOLTAREN) 1 % Apply 2 g topically 4 (four) times a day 50 g 5    furosemide (LASIX) 20 mg tablet Take 1 tablet (20 mg total) by mouth daily (Patient not taking: Reported on 6/24/2021) 90 tablet 1    hydrocortisone 2 5 % cream Apply topically 2 (two) times a day as needed for irritation or rash 30 g 0    losartan (COZAAR) 100 MG tablet TAKE 1 TABLET EVERY MORNING 90 tablet 3    mirtazapine (REMERON) 15 mg tablet Take 1 tablet (15 mg total) by mouth daily at bedtime 90 tablet 1    Multiple Vitamins-Minerals (MULTIVITAMIN WITH MINERALS) tablet Take 1 tablet by mouth every morning      omeprazole (PriLOSEC) 20 mg delayed release capsule TAKE 1 CAPSULE TWICE DAILY 180 capsule 4    pravastatin (PRAVACHOL) 20 mg tablet Take 20 mg by mouth 2 (two) times a day       prochlorperazine (COMPAZINE) 5 mg tablet Take 1 tablet (5 mg total) by mouth every 6 (six) hours as needed for nausea or vomiting 30 tablet 0    spironolactone (ALDACTONE) 50 mg tablet TAKE 1 TABLET DAILY AFTER LUNCH 90 tablet 3     No current facility-administered medications for this visit  Review of Systems   Constitutional: Negative for chills and fever  HENT: Negative for ear pain and sore throat  Eyes: Negative for pain and visual disturbance  Respiratory: Negative for cough and shortness of breath  Cardiovascular: Negative for chest pain and palpitations  Gastrointestinal: Negative for abdominal pain and vomiting  Genitourinary: Negative for dysuria and hematuria  Musculoskeletal: Positive for arthralgias and back pain  Skin: Negative for color change and rash  Neurological: Negative for seizures and syncope  All other systems reviewed and are negative  Objective:    /70   Pulse 80   Temp (!) 96 3 °F (35 7 °C)   Resp 20   Ht 5' 3 5" (1 613 m)   Wt 88 5 kg (195 lb)   BMI 34 00 kg/m²   Body mass index is 34 kg/m²  Physical Exam  Constitutional:       Appearance: She is well-developed  She is obese  HENT:      Head: Normocephalic  Eyes:      Pupils: Pupils are equal, round, and reactive to light  Cardiovascular:      Rate and Rhythm: Normal rate and regular rhythm  Heart sounds: Normal heart sounds     Pulmonary:      Effort: Pulmonary effort is normal       Breath sounds: Normal breath sounds  Abdominal:      General: Bowel sounds are normal       Palpations: Abdomen is soft  Tenderness: There is no abdominal tenderness  Musculoskeletal:      Cervical back: Normal range of motion  Skin:     General: Skin is warm  Neurological:      Mental Status: She is alert and oriented to person, place, and time

## 2021-07-27 NOTE — PATIENT INSTRUCTIONS

## 2021-07-27 NOTE — PROGRESS NOTES
Assessment:  1  Mid back pain    2  Chronic bilateral low back pain, unspecified whether sciatica present    3  Lumbar spondylosis    4  Lumbar disc herniation        Plan:  And 44-year-old female complaints of low back pain with chronic pain syndrome secondary to lumbar degenerative disc disease presents office for follow-up visit  At this time patient like to forego any further surgical intervention  After discussion patient has received multiple epidural steroid injections with minimal relief and then went straight to a spinal cord stimulator  We would like to try to exhaust any conservative therapy at this time  1  We will trial patient on Lyrica 50 mg p o  T i d  for peripheral neuropathy  2  We will provide patient with some exercise that she can perform at home to help strengthen the muscles of the lumbar spine  3  We will follow up in 1 month to gauge response for medication      History of Present Illness: The patient is a 78 y o  female who presents for a follow up office visit in regards to Back Pain  The patients current symptoms include 5/10 intermittent dull/aching pain in the low back which worse in morning time  Current pain medications includes:  None  I have personally reviewed and/or updated the patient's past medical history, past surgical history, family history, social history, current medications, allergies, and vital signs today  Review of Systems  Review of Systems   Respiratory: Positive for shortness of breath  Cardiovascular: Negative for chest pain  Gastrointestinal: Negative for constipation, diarrhea, nausea and vomiting  Musculoskeletal: Negative for arthralgias, gait problem, joint swelling and myalgias  Decreased range of motion  Joint stiffness  Pain in extremity - back   Neurological: Negative for dizziness, seizures and weakness  All other systems reviewed and are negative          Past Medical History:   Diagnosis Date    Allergies     Anxiety     Arthritis     Benign arteriolar nephrosclerosis     Bereavement     Cataract     Chronic kidney disease     Chronic kidney disease in type 2 diabetes mellitus (HCC)     Chronic kidney disease, stage 2 (mild)     Chronic pain disorder     Chronic pain syndrome     [facility]     DDD (degenerative disc disease), cervical     DDD (degenerative disc disease), lumbar     Degenerative joint disease of right hip     Depression     Diastolic dysfunction     DJD (degenerative joint disease), ankle and foot, right     DJD of right shoulder     Edema     Fracture of left calcaneus     Generalized anxiety disorder     GERD (gastroesophageal reflux disease)     Heart murmur     Hyperaldosteronism ([facility])     Hyperlipidemia     Hypertension     Hypertensive nephrosclerosis     Hypo-osmolality and hyponatremia     IBS (irritable bowel syndrome)     IBS (irritable bowel syndrome)     Insomnia     Migraines     Mitral regurgitation     MVP (mitral valve prolapse)     Obstructive sleep apnea syndrome     Osteoarthritis     Osteoarthritis     Pernicious anemia     Plantar fascia rupture     Rheumatoid arthritis ([facility] [address] [facility]     Right knee DJD     Shingles     Sinobronchitis     Sleep apnea     Stroke ([facility] [address] )     tia    TIA (transient ischemic attack)     Vertigo        Past Surgical History:   Procedure Laterality Date    APPENDECTOMY      CARPAL TUNNEL RELEASE Bilateral     COLONOSCOPY      several    COLONOSCOPY  04/02/2012    by Dr Anika Acosta (HISTORICAL)  10/26/2016, 9/17/2014, 6/18/2007    HAND SURGERY Right     ring finger has pin    HYSTERECTOMY      32    INSERT / REPLACE PERIPHERAL NEUROSTIMULATOR PULSE GENERATOR /  Left     buttocks    JOINT REPLACEMENT Left     knee    JOINT REPLACEMENT Right 04/2019    Hip    KNEE ARTHROPLASTY Left 01/15/2009    by Dr Governor Lind at [facility]  KNEE ARTHROSCOPY Bilateral     KNEE CARTILAGE SURGERY  09/10/2009    by Dr Bianca Murphy at [facility] (HISTORICAL)  12/10/2018, 10/30/2017, 10/26/2016    NASAL SEPTUM SURGERY  2008    NERVE BLOCK Left 2/20/2018    Procedure: BLOCK MEDIAL BRANCH - C4, C5, C6;  Surgeon: Magalie Oliveros MD;  Location: [facility];  Service: Pain Management     NERVE BLOCK Left 3/6/2018    Procedure: C4, C5, C6 MEDIAL BRANCH BLOCK;  Surgeon: Magalie Oliveros MD;  Location: [facility];  Service: Pain Management     HI COLONOSCOPY FLX DX W/COLLJ SPEC WHEN PFRMD N/A 4/24/2017    Procedure: Louise Bejason;  Surgeon: Bossman Fry MD;  Location: [facility];  Service: Colorectal    HI TOTAL HIP ARTHROPLASTY Right 2/27/2019    Procedure: ARTHROPLASTY HIP TOTAL;  Surgeon: Tatyana Wise DO;  [facility];  Service: Orthopedics    HI TOTAL KNEE ARTHROPLASTY Right 6/24/2020    Procedure: KNEE TOTAL ARTHROPLASTY;  Surgeon: Tatyana Wise DO;  [facility];  Service: Orthopedics    RADIOFREQUENCY ABLATION Left 4/17/2018    Procedure: ABLATION RADIO FREQUENCY (RFA) - C4, C5, C6;  Surgeon: Magalie Oliveros MD;  Location: [facility];  Service: Pain Management     REPLACEMENT TOTAL KNEE Left 07/05/2011    SINUS SURGERY      TONSILLECTOMY      TOTAL HIP ARTHROPLASTY Left     TRIGGER FINGER RELEASE      R 4th     UPPER GASTROINTESTINAL ENDOSCOPY  01/12/2007    with Christel Whitley dilatation by Dr Bethany Grande at [facility] Right        Family History   Problem Relation Age of Onset    Heart disease Mother     Hypertension Mother    Elwyn Serge Arthritis Mother     Dementia Mother     Rheum arthritis Mother     Hypertension Father     Heart disease Father     Colon cancer Father     Hypertension Sister     Anxiety disorder Sister     Thyroid disease Sister     Prostate cancer Maternal Grandfather     No Known Problems Paternal Grandmother     No Known Problems Paternal Grandfather     No Known Problems Paternal Aunt     Pseudochol deficiency Neg Hx        Social History     Occupational History    Occupation: Admnistrator    Tobacco Use    Smoking status: Former Smoker     Types: Cigarettes    Smokeless tobacco: Never Used    Tobacco comment: quit 40 yrs ago   Vaping Use    Vaping Use: Never used   Substance and Sexual Activity    Alcohol use: Yes     Comment: 2 beer a week    Drug use: Never    Sexual activity: Not Currently     Birth control/protection: Post-menopausal         Current Outpatient Medications:     aspirin (ECOTRIN LOW STRENGTH) 81 mg EC tablet, Take 1 tablet (81 mg total) by mouth daily, Disp:  , Rfl:     bisoprolol (ZEBETA) 10 MG tablet, TAKE 1 TABLET EVERY DAY, Disp: 90 tablet, Rfl: 3    butalbital-acetaminophen-caffeine (FIORICET,ESGIC) -40 mg per tablet, Take 1 tablet by mouth every 4 (four) hours as needed for headaches, Disp: 100 tablet, Rfl: 2    calcium carbonate (OS-MARLENY) 600 MG tablet, Take 600 mg by mouth 2 (two) times a day with meals, Disp: , Rfl:     clindamycin (CLEOCIN) 150 mg capsule, take 4 capsules by mouth 1 hour prior to appointment, Disp: , Rfl:     diclofenac sodium (VOLTAREN) 1 %, Apply 2 g topically 4 (four) times a day, Disp: 50 g, Rfl: 5    furosemide (LASIX) 20 mg tablet, Take 1 tablet (20 mg total) by mouth daily (Patient not taking: Reported on 6/24/2021), Disp: 90 tablet, Rfl: 1    hydrocortisone 2 5 % cream, Apply topically 2 (two) times a day as needed for irritation or rash, Disp: 30 g, Rfl: 0    losartan (COZAAR) 100 MG tablet, TAKE 1 TABLET EVERY MORNING, Disp: 90 tablet, Rfl: 3    mirtazapine (REMERON) 15 mg tablet, Take 1 tablet (15 mg total) by mouth daily at bedtime, Disp: 90 tablet, Rfl: 1    Multiple Vitamins-Minerals (MULTIVITAMIN WITH MINERALS) tablet, Take 1 tablet by mouth every morning, Disp: , Rfl:     omeprazole (PriLOSEC) 20 mg delayed release capsule, TAKE 1 CAPSULE TWICE DAILY, Disp: 180 capsule, Rfl: 4    pravastatin (PRAVACHOL) 20 mg tablet, Take 20 mg by mouth 2 (two) times a day , Disp: , Rfl:     prochlorperazine (COMPAZINE) 5 mg tablet, Take 1 tablet (5 mg total) by mouth every 6 (six) hours as needed for nausea or vomiting, Disp: 30 tablet, Rfl: 0    spironolactone (ALDACTONE) 50 mg tablet, TAKE 1 TABLET DAILY AFTER LUNCH, Disp: 90 tablet, Rfl: 3    Allergies   Allergen Reactions    Procaine Hives    Adhesive [Medical Tape] Rash    Lidocaine Rash    Penicillins Rash       Physical Exam:    /72 (BP Location: Left arm, Patient Position: Sitting, Cuff Size: Large)   Pulse 55   Temp 98 1 °F (36 7 °C)   Ht 5' 3 5" (1 613 m)   Wt 88 6 kg (195 lb 6 4 oz)   BMI 34 07 kg/m²     Constitutional:normal, well developed, well nourished, alert, in no distress and non-toxic and no overt pain behavior  Eyes:anicteric  HEENT:grossly intact  Neck:supple, symmetric, trachea midline and no masses   Pulmonary:even and unlabored  Cardiovascular:No edema or pitting edema present  Skin:Normal without rashes or lesions and well hydrated  Psychiatric:Mood and affect appropriate  Neurologic:Cranial Nerves II-XII grossly intact  Musculoskeletal:normal     Lumbar/Sacral Spine examination demonstrates  Full range of motion lumbar spine with pain upon: flexion, lateral rotation to the left/right, and bending to the left/right  Bilateral lumbar paraspinals tender to palpation  Muscle spasms noted in the lumbar area bilaterally  5/5 lower extremity strength in all muscle groups bilaterally  Positive seated straight leg raise for bilateral lower extremities  Sensitivity to light touch intact bilateral lower extremities  2+ reflexes in the patella and Achilles  No ankle clonus     Imaging  No orders to display       No orders of the defined types were placed in this encounter

## 2021-08-02 ENCOUNTER — OFFICE VISIT (OUTPATIENT)
Dept: SLEEP CENTER | Facility: CLINIC | Age: 76
End: 2021-08-02
Payer: MEDICARE

## 2021-08-02 VITALS
HEIGHT: 64 IN | BODY MASS INDEX: 33.12 KG/M2 | WEIGHT: 194 LBS | OXYGEN SATURATION: 98 % | TEMPERATURE: 97.6 F | HEART RATE: 59 BPM | DIASTOLIC BLOOD PRESSURE: 80 MMHG | SYSTOLIC BLOOD PRESSURE: 130 MMHG

## 2021-08-02 DIAGNOSIS — G47.00 INSOMNIA, UNSPECIFIED TYPE: Primary | ICD-10-CM

## 2021-08-02 DIAGNOSIS — G47.33 OSA (OBSTRUCTIVE SLEEP APNEA): ICD-10-CM

## 2021-08-02 DIAGNOSIS — F51.01 PRIMARY INSOMNIA: ICD-10-CM

## 2021-08-02 PROCEDURE — 99213 OFFICE O/P EST LOW 20 MIN: CPT | Performed by: PSYCHIATRY & NEUROLOGY

## 2021-08-02 RX ORDER — MIRTAZAPINE 15 MG/1
15 TABLET, FILM COATED ORAL
Qty: 90 TABLET | Refills: 4 | Status: SHIPPED | OUTPATIENT
Start: 2021-08-02

## 2021-08-02 NOTE — PROGRESS NOTES
PT Evaluation     Today's date: 8/3/2021  Patient name: Todd Ronquillo  : 1945  MRN: [id number]  Referring provider: Melchor Harris MD  Dx:   Encounter Diagnosis     ICD-10-CM    1  Mid back pain  M54 9 Ambulatory referral to Physical Therapy   2  Chronic bilateral low back pain, unspecified whether sciatica present  M54 5 Ambulatory referral to Physical Therapy    G89 29    3  Lumbar spondylosis  M47 816 Ambulatory referral to Physical Therapy   4  Lumbar disc herniation  M51 26 Ambulatory referral to Physical Therapy                  Assessment  Assessment details: Pt Shyam Marroquin is a 78 y  o  who presents to OPPT with chronic L/S pain due to DJD  Pt presents with limited L/S mobility and core strength deficits, decreased B LE strength, impaired soft tissue mobility/limited flexibility, decreased postural awareness, and gait/balance dysfunctions  Pt with limitations with prolonged ambulation, difficulty with stair negotiation, disrupted sleep patterns, and difficulty with lifting/carrying  Pt has become more sedentary with completing daily activities at home due to increased pain back with functional tasks  Pt would benefit from skilled therapy services to address outlined impairments, work towards goals, and restore pts PLOF  Thank you!    Impairments: abnormal gait, abnormal or restricted ROM, abnormal movement, activity intolerance, impaired balance, impaired physical strength, lacks appropriate home exercise program, pain with function, weight-bearing intolerance, poor posture  and poor body mechanics    Goals  STGs to be achieved in 4 weeks:  -Pt to demonstrate reduced subjective pain rating "at worst" by at least 2-3 points from Initial Eval to allow for reduced pain with ADLs and improved functional activity tolerance    -Pt to demonstrate L/S ROM with minimal limitations in order to maximize joint mobility and function and allow for progression of exercise program and achievement of goals    -Pt to demonstrate increased MMT of BLE by at least 1/2 grade in order to improve safety and stability with ADLs and functional mobility  LTGs to be achieved upon discharge:   -Pt will be I with HEP in order to continue to improve quality of life and independence and reduce risk for re-injury    -Pt to demonstrate return to activities of daily living without limiations or restrictions    -Pt will return to ambulation > 1 hour to help facilitate return to community activities independently   -Pt to demonstrate improved function as noted by achieving or exceeding predicted score on FOTO outcomes assessment tool  Plan  Plan details: PT provided pt with detailed HEP program; pt verbalized understanding of program    Patient would benefit from: skilled physical therapy  Planned modality interventions: cryotherapy and thermotherapy: hydrocollator packs  Planned therapy interventions: abdominal trunk stabilization, balance, manual therapy, neuromuscular re-education, patient education, postural training, therapeutic activities, therapeutic exercise, home exercise program, flexibility and functional ROM exercises  Frequency: 2x week  Duration in weeks: 4  Plan of Care beginning date: 8/3/2021  Plan of Care expiration date: 9/3/2021  Treatment plan discussed with: patient        Subjective Evaluation    History of Present Illness  Mechanism of injury: Pt reporting that she had a lumbar stimulator placed in  which was helping to manage her back pain  She notes that several months ago she noticed that the stimulator stopped working and she decided she isn't going to have it replaced  She is following up with pain management at this time  Updated x-rays several months ago (+) for DJD  She has deferred injections at this time  MD referral to OPPT for conservative management of s/s  She will return to see MD again on 21     Quality of life: good    Pain  At best pain ratin  At worst pain rating: 7  Quality: dull ache, pressure, tight and throbbing  Relieving factors: medications and rest  Aggravating factors: standing, walking and stair climbing    Social Support  Steps to enter house: yes  Stairs in house: yes   Lives in: multiple-level home    Employment status: not working    Diagnostic Tests  X-ray: abnormal  Treatments  Previous treatment: medication  Current treatment: physical therapy  Patient Goals  Patient goals for therapy: increased strength, independence with ADLs/IADLs, increased motion, improved balance and decreased pain  Patient goal: "be able to function without constant pain"         Objective     Postural Observations  Seated posture: fair  Standing posture: fair        Palpation   Left   Tenderness of the erector spinae, lumbar paraspinals and quadratus lumborum  Right   Tenderness of the erector spinae, lumbar paraspinals and quadratus lumborum  Active Range of Motion     Lumbar   Flexion:  with pain Restriction level: moderate  Extension:  with pain Restriction level: maximal  Left lateral flexion:  with pain Restriction level: moderate  Right lateral flexion:  with pain Restriction level: moderate    Strength/Myotome Testing     Left Hip   Planes of Motion   Flexion: 3+  Abduction: 3+  Adduction: 3+    Right Hip   Planes of Motion   Flexion: 4-  Abduction: 4-  Adduction: 4-    Left Knee   Flexion: 4-  Extension: 4-    Right Knee   Flexion: 4  Extension: 4    Tests     Lumbar     Left   Positive passive SLR  Negative crossed SLR  Right   Positive passive SLR  Negative crossed SLR  Left Hip   Negative long sit  Right Hip   Negative long sit       Additional Tests Details  (+) bilateral HS and piriformis tightness     General Comments:    Lower quarter screen   Hips: unremarkable             Re-eval Date: 9/3/21 - every 10 visits     Date 8/3       Visit Count 1/10       FOTO Completed              Precautions: spinal stimulator * NO STIM/US *        Manuals 8/3 Neuro Re-Ed         Postural education  HZ        MTP/LTP        Chris tband ER         Palloff press                 Ther Ex        NuStep         Standing hip flex/abd/ext        Squats         Step-ups         PPT        PPT with marchcady        LTR HEP        SKTC        TA        SLR        S/L SLR        Faby         Heel walk outs         Self piriformis stretch seated  HEP        Self HS stretch                         Ther Activity                        Gait Training                        Modalities

## 2021-08-02 NOTE — PROGRESS NOTES
Sleep Medicine Follow-Up Note    HPI: 68yo F with CLARKE and insomnia is being seen for a follow up  Treatment Summary: 2017 PSlbs    (AHI) of 5  events per hour of sleep  The AHI in the supine position was 8 5  The AHI during REM sleep was 38  Intermittent snoring of moderate intensity was noted  OXIMETRY:   The baseline oxygen saturation with the patient awake was 98%  The mean oxygen saturation throughout the study was 94%  The percentage of sleep time below 90% was 4 %  The lowest oxygen saturation was 83%  She is on Remeron 15mg for insomnia  HPI:   Today, patient presents unaccompanied  She is doing okay  She takes the remeron at 10pm and by midnight it kicks in which is when she gets into bed  This is also helping her stay asleep and she wakes up at 630am  She denied increased appetite or other symptoms  She has gained about 10 lbs, but thinks this is due to increased cookie intake during the pandemic       Respiratory:  -Ongoing Snoring: denied  -Mouth Breathing: no  -Dry Mouth: no  -Nocturnal Gasping: no    ROS:   Genitourinary none   Cardiology palpitations/fluttering feeling in the chest   Gastrointestinal none   Neurology frequent headaches   Constitutional none   Integumentary none   Psychiatry none   Musculoskeletal joint pain and back pain   Pulmonary none   ENT none   Endocrine none   Hematological none       Sleep Pattern:  -Position: side  -Bedtime: 12am  -Lights Out: same time  -Environment: timer on TV for 30 mins  -Latency: few mins  -Awakenings: 0-1  -Wake Time: 630-7am  -Rise Time: same time  -Patient's estimate of Sleep Time: 6h    Daytime Symptoms:  -Upon Awakening: pretty good, just aches and pains   -Daytime fatigue/sleepiness: denied  -Naps: no  -Involuntary Dozing: no  -Cognitive Symptoms: no  -Driving: Difficulty with sleepiness and driving:  no   -- Close calls related to sleepiness: no   -- Accidents related to sleepiness: no    Substance Use:  -Caffeine: decaf tea, caffeinated tea once in a while  -Alcohol: occasionally  -THC: no    --> Denies any significant medical changes since last visit  --> Supplemental Oxygen Use: denies    Questionnaire:  Sitting and reading: Slight chance of dozing  Watching TV: Slight chance of dozing  Sitting, inactive in a public place (e g  a theatre or a meeting): Would never doze  As a passenger in a car for an hour without a break: Would never doze  Lying down to rest in the afternoon when circumstances permit: Would never doze  Sitting and talking to someone: Would never doze  Sitting quietly after a lunch without alcohol: Would never doze  In a car, while stopped for a few minutes in traffic: Would never doze  Total score: 2    Labs:  Results for Marleni Valdes (MRN [id number]) as of 8/2/2021 10:49   Ref  Range 4/30/2021 07:18   Sodium Latest Ref Range: 136 - 145 mmol/L 127 (L)   Potassium Latest Ref Range: 3 5 - 5 3 mmol/L 4 8   Chloride Latest Ref Range: 100 - 108 mmol/L 95 (L)   CO2 Latest Ref Range: 21 - 32 mmol/L 24   Anion Gap Latest Ref Range: 4 - 13 mmol/L 8   BUN Latest Ref Range: 5 - 25 mg/dL 18   Creatinine Latest Ref Range: 0 60 - 1 30 mg/dL 0 90   GLUCOSE FASTING Latest Ref Range: 65 - 99 mg/dL 91   Calcium Latest Ref Range: 8 3 - 10 1 mg/dL 8 7   eGFR Latest Units: ml/min/1 73sq m 63     PE:    /80 (BP Location: Left arm, Cuff Size: Large)   Pulse 59   Temp 97 6 °F (36 4 °C) (Temporal)   Ht 5' 3 5" (1 613 m)   Wt 88 kg (194 lb)   SpO2 98%   BMI 33 83 kg/m²     General:  In NAD  Pul: Respirations: unlaboured  MS: No atrophy  Neuro: No resting tremor  Gait normal turning & station; unremarkable overall  Psych: Socially appropriate  Pleasant  No overt dysphoria  Assessment:She has been sleeping well with Remeron 15mg and this helps her fall asleep 2 hours after taking it and stay asleep  She denied any side effects  No resp symptoms  No daytime tiredness or sleepiness  Will continue  Recommendations:    1) continue remeron 15mg at nighttime  2) Safe driving reviewed  3) Follow-up 12 months  4) Call with any questions or concerns  All questions answered for the patient, who indicated understanding and agreed with the plan       Jose Prasad MD  Psychiatry/ Sleep medicine

## 2021-08-02 NOTE — PATIENT INSTRUCTIONS
Recommendations:    1) continue remeron 15mg at nighttime  2) Safe driving reviewed  3) Follow-up 12 months  4) Call with any questions or concerns

## 2021-08-03 ENCOUNTER — TELEPHONE (OUTPATIENT)
Dept: CARDIOLOGY CLINIC | Facility: HOSPITAL | Age: 76
End: 2021-08-03

## 2021-08-03 ENCOUNTER — EVALUATION (OUTPATIENT)
Dept: PHYSICAL THERAPY | Facility: HOME HEALTHCARE | Age: 76
End: 2021-08-03
Payer: MEDICARE

## 2021-08-03 DIAGNOSIS — M51.26 LUMBAR DISC HERNIATION: ICD-10-CM

## 2021-08-03 DIAGNOSIS — M54.50 CHRONIC BILATERAL LOW BACK PAIN, UNSPECIFIED WHETHER SCIATICA PRESENT: ICD-10-CM

## 2021-08-03 DIAGNOSIS — M54.9 MID BACK PAIN: ICD-10-CM

## 2021-08-03 DIAGNOSIS — M47.816 LUMBAR SPONDYLOSIS: ICD-10-CM

## 2021-08-03 DIAGNOSIS — G89.29 CHRONIC BILATERAL LOW BACK PAIN, UNSPECIFIED WHETHER SCIATICA PRESENT: ICD-10-CM

## 2021-08-03 PROCEDURE — 97112 NEUROMUSCULAR REEDUCATION: CPT | Performed by: PHYSICAL THERAPIST

## 2021-08-03 PROCEDURE — 97110 THERAPEUTIC EXERCISES: CPT | Performed by: PHYSICAL THERAPIST

## 2021-08-03 PROCEDURE — 97162 PT EVAL MOD COMPLEX 30 MIN: CPT | Performed by: PHYSICAL THERAPIST

## 2021-08-04 DIAGNOSIS — E78.00 HYPERCHOLESTEROLEMIA: Primary | ICD-10-CM

## 2021-08-04 RX ORDER — PRAVASTATIN SODIUM 40 MG
40 TABLET ORAL DAILY
Qty: 90 TABLET | Refills: 3 | Status: SHIPPED | OUTPATIENT
Start: 2021-08-04 | End: 2022-05-25

## 2021-08-05 ENCOUNTER — OFFICE VISIT (OUTPATIENT)
Dept: PHYSICAL THERAPY | Facility: HOME HEALTHCARE | Age: 76
End: 2021-08-05
Payer: MEDICARE

## 2021-08-05 DIAGNOSIS — M54.9 MID BACK PAIN: Primary | ICD-10-CM

## 2021-08-05 PROCEDURE — 97110 THERAPEUTIC EXERCISES: CPT

## 2021-08-05 PROCEDURE — 97112 NEUROMUSCULAR REEDUCATION: CPT

## 2021-08-05 NOTE — PROGRESS NOTES
Daily Note     Today's date: 2021  Patient name: Swetha Jett  : 1945  MRN: [id number]  Referring provider: Dylan King MD  Dx: No diagnosis found  Start Time: 745          Subjective: Pain of 6/10 at LB  My stimulator stopped working and I don't want to have it  replaced    Objective: See treatment diary below    Assessment: Pt padmini initiation of TE as per low sheet well    Pt with poor standing padmini 2* LBP whicjh increased 2 levels    Pain returned to baseline with supine and seated ex and self stretch  Patient would benefit from continued PT    Plan: Continue per plan of care        Re-eval Date: 9/3/21 - every 10 visits     Date 8/3 8-5-21      Visit Count 1/10 2/10      FOTO Completed              Precautions: spinal stimulator * NO STIM/US *        Manuals 8/3 8-5-21              Neuro Re-Ed         Postural education  HZ        MTP/LTP        Chris tband ER   Seated Red   1 x 10      Palloff press                 Ther Ex  21      NuStep         Standing hip flex/abd/ext  1 x 5 ea Chris      Squats   1 x 5      Step-ups         PPT  5" x 10      PPT with marches  1 x 10      LTR HEP  1 x10      SKTC        TA  5" x 10      SLR  1 x 10      S/L SLR        Clamshells         Heel walk outs   1 x 5      Self piriformis stretch seated  HEP  15" x 3      Self HS stretch   NV                      Ther Activity                        Gait Training                        Modalities

## 2021-08-09 ENCOUNTER — OFFICE VISIT (OUTPATIENT)
Dept: PHYSICAL THERAPY | Facility: HOME HEALTHCARE | Age: 76
End: 2021-08-09
Payer: MEDICARE

## 2021-08-09 DIAGNOSIS — M54.9 MID BACK PAIN: Primary | ICD-10-CM

## 2021-08-09 PROCEDURE — 97112 NEUROMUSCULAR REEDUCATION: CPT

## 2021-08-09 PROCEDURE — 97110 THERAPEUTIC EXERCISES: CPT

## 2021-08-09 NOTE — PROGRESS NOTES
Daily Note     Today's date: 2021  Patient name: Marcela Miller  : 1945  MRN: [id number]  Referring provider: Sunny Anderson MD  Dx: No diagnosis found  Start Time: 745          Subjective: I didn't sleep well because of my LBP  Average pain of 6/10 today and over the weekend  Objective: See treatment diary below    Assessment: Tolerated treatment well  Pt remains with poor standing padmini  She was able to padmini MTP/LTP in seated position without incident  Pt with average pain of 5-6/10 t/o Tx  Encourage consistency with HEP and self stretch  Patient would benefit from continued PT    Plan: Continue per plan of care        Re-eval Date: 9/3/21 - every 10 visits     Date 8/3 8-5-21 8-9-21     Visit Count 1/10 2/10 3/10     FOTO Completed              Precautions: spinal stimulator * NO STIM/US *        Manuals 8/3 8-5-21 8-9-21             Neuro Re-Ed         Postural education  HZ        MTP/LTP   Seated Red   1 x 10     Chris tband ER   Seated Red   1 x 10 Seated Red  1 x 15     Palloff press                 Ther Ex  21     NuStep   L 1  8' L 1  8'     Standing hip flex/abd/ext  1 x 5 ea Chris 1 x 5 ea Chris     Squats   1 x 5 1 x 5     Step-ups         PPT  5" x 10 5" x 10     PPT with marches  1 x 10 1 x 10     LTR HEP  1 x10 1 x 10     SKTC        TA  5" x 10      SLR  1 x 10 1 x 10     S/L SLR        Clamshells         Heel walk outs   1 x 5 1 x 5     Self piriformis stretch seated  HEP  15" x 3 15" x 3     Self HS stretch   NV NV                     Ther Activity                        Gait Training                        Modalities

## 2021-08-11 DIAGNOSIS — R11.0 NAUSEA: ICD-10-CM

## 2021-08-11 RX ORDER — PROCHLORPERAZINE MALEATE 5 MG/1
5 TABLET ORAL EVERY 6 HOURS PRN
Qty: 30 TABLET | Refills: 5 | Status: SHIPPED | OUTPATIENT
Start: 2021-08-11

## 2021-08-13 ENCOUNTER — OFFICE VISIT (OUTPATIENT)
Dept: PHYSICAL THERAPY | Facility: HOME HEALTHCARE | Age: 76
End: 2021-08-13
Payer: MEDICARE

## 2021-08-13 DIAGNOSIS — M54.9 MID BACK PAIN: Primary | ICD-10-CM

## 2021-08-13 PROCEDURE — 97110 THERAPEUTIC EXERCISES: CPT

## 2021-08-13 PROCEDURE — 97112 NEUROMUSCULAR REEDUCATION: CPT

## 2021-08-13 NOTE — PROGRESS NOTES
Daily Note     Today's date: 2021  Patient name: Felicia Griffith  : 1945  MRN: [id number]  Referring provider: Obdulia Montano MD  Dx: No diagnosis found  Start Time:           Subjective:  I will be having eye surg on the   Not much improvement noticed with my back    Objective: See treatment diary below    Assessment: Tolerated treatment well  Pt was able to padmini increased reps with a few supine ex today  She remains with poor standing padmini  2* increased pain at mid/LB during wt bearing  Pt instructed with self stretch with good padmini  Patient would benefit from continued PT    Plan: Continue per plan of care        Re-eval Date: 9/3/21 - every 10 visits     Date 8/3 8-5-21 8-9-21 8-13-21    Visit Count 1/10 2/10 3/10 4/10    FOTO Completed              Precautions: spinal stimulator * NO STIM/US *        Manuals 8/3 8-5-21 8-9-21 8-13-21            Neuro Re-Ed         Postural education  HZ        MTP/LTP   Seated Red   1 x 10 Seated Red  1 x 15 ea    Chris tband ER   Seated Red   1 x 10 Seated Red  1 x 15 Seated Red  1 x 15    Palloff press                 Ther Ex  21    NuStep   L 1  8' L 1  8' L 1  8'    Standing hip flex/abd/ext  1 x 5 ea Chris 1 x 5 ea Chris 1 x 5 ea Chris    Squats   1 x 5 1 x 5 1 x 5    Step-ups         PPT  5" x 10 5" x 10 5" x 15    PPT with marches  1 x 10 1 x 10 1 x 15    LTR HEP  1 x10 1 x 10 1  x10    SKTC        TA  5" x 10      SLR  1 x 10 1 x 10 1 x 10    S/L SLR        Clamshells         Heel walk outs   1 x 5 1 x 5 1 x  10    Self piriformis stretch seated  HEP  15" x 3 15" x 3     Self HS stretch   NV NV Self stretch  10'                    Ther Activity                        Gait Training                        Modalities

## 2021-08-16 ENCOUNTER — OFFICE VISIT (OUTPATIENT)
Dept: PHYSICAL THERAPY | Facility: HOME HEALTHCARE | Age: 76
End: 2021-08-16
Payer: MEDICARE

## 2021-08-16 DIAGNOSIS — M54.50 CHRONIC BILATERAL LOW BACK PAIN, UNSPECIFIED WHETHER SCIATICA PRESENT: ICD-10-CM

## 2021-08-16 DIAGNOSIS — M54.9 MID BACK PAIN: Primary | ICD-10-CM

## 2021-08-16 DIAGNOSIS — G89.29 CHRONIC BILATERAL LOW BACK PAIN, UNSPECIFIED WHETHER SCIATICA PRESENT: ICD-10-CM

## 2021-08-16 DIAGNOSIS — M51.26 LUMBAR DISC HERNIATION: ICD-10-CM

## 2021-08-16 DIAGNOSIS — M47.816 LUMBAR SPONDYLOSIS: ICD-10-CM

## 2021-08-16 PROCEDURE — 97110 THERAPEUTIC EXERCISES: CPT

## 2021-08-16 PROCEDURE — 97112 NEUROMUSCULAR REEDUCATION: CPT

## 2021-08-16 NOTE — PROGRESS NOTES
Daily Note     Today's date: 2021  Patient name: Jaylen Siddiqui  : 1945  MRN: [id number]  Referring provider: Oniel Archuleta MD  Dx:   Encounter Diagnosis     ICD-10-CM    1  Mid back pain  M54 9    2  Chronic bilateral low back pain, unspecified whether sciatica present  M54 5     G89 29    3  Lumbar spondylosis  M47 816    4  Lumbar disc herniation  M51 26               Subjective: Pt reports her back is the same  Objective: See treatment diary below      Assessment: Tolerated treatment well  Some verbal cues needed t/o exercise to perform correctly  Progressed to increased time on Nustep and and increased reps with standing SLR three way and squats  Pt reports a little increased pain LB with exercise  Patient would benefit from continued PT      Plan: Continue per plan of care        Re-eval Date: 9/3/21 - every 10 visits     Date 8/3 8-5-21 8-9-21 8-13-21 8/16/21   Visit Count 1/10 2/10 3/10 4/10 5/10   FOTO Completed              Precautions: spinal stimulator * NO STIM/US *        Manuals 8/3 8-5-21 8-9-21 8-13-21 8/16/21           Neuro Re-Ed         Postural education  HZ        MTP/LTP   Seated Red   1 x 10 Seated Red  1 x 15 ea Seated Red   1 x 15 ea    Chris tband ER   Seated Red   1 x 10 Seated Red  1 x 15 Seated Red  1 x 15 Seated red   1 x 15    Palloff press                 Ther Ex  21    NuStep   L 1  8' L 1  8' L 1  8' L 2  10 min    Standing hip flex/abd/ext  1 x 5 ea Chris 1 x 5 ea Chris 1 x 5 ea Chris 1 x 10 ea Chris    Squats   1 x 5 1 x 5 1 x 5 1 x 10    Step-ups         PPT  5" x 10 5" x 10 5" x 15 5" x 15    PPT with marches  1 x 10 1 x 10 1 x 15 1 x 15    LTR HEP  1 x10 1 x 10 1  x10 1 x 10    SKTC        TA  5" x 10      SLR  1 x 10 1 x 10 1 x 10 1 x 10    S/L SLR        Clamshells         Heel walk outs   1 x 5 1 x 5 1 x  10 1 x 10    Self piriformis stretch seated  HEP  15" x 3 15" x 3     Self HS stretch   NV NV Self stretch  10' Self HS stretch  20" x 3 Chris                    Ther Activity                        Gait Training                        Modalities

## 2021-08-20 ENCOUNTER — OFFICE VISIT (OUTPATIENT)
Dept: PHYSICAL THERAPY | Facility: HOME HEALTHCARE | Age: 76
End: 2021-08-20
Payer: MEDICARE

## 2021-08-20 DIAGNOSIS — G89.29 CHRONIC BILATERAL LOW BACK PAIN, UNSPECIFIED WHETHER SCIATICA PRESENT: ICD-10-CM

## 2021-08-20 DIAGNOSIS — M54.50 CHRONIC BILATERAL LOW BACK PAIN, UNSPECIFIED WHETHER SCIATICA PRESENT: ICD-10-CM

## 2021-08-20 DIAGNOSIS — M51.26 LUMBAR DISC HERNIATION: ICD-10-CM

## 2021-08-20 DIAGNOSIS — M47.816 LUMBAR SPONDYLOSIS: ICD-10-CM

## 2021-08-20 DIAGNOSIS — M54.9 MID BACK PAIN: Primary | ICD-10-CM

## 2021-08-20 PROCEDURE — 97110 THERAPEUTIC EXERCISES: CPT

## 2021-08-20 PROCEDURE — 97112 NEUROMUSCULAR REEDUCATION: CPT

## 2021-08-20 NOTE — PROGRESS NOTES
Daily Note     Today's date: 2021  Patient name: Sasha Vásquez  : 1945  MRN: [id number]  Referring provider: Josiah Burgos MD  Dx:   Encounter Diagnosis     ICD-10-CM    1  Mid back pain  M54 9    2  Chronic bilateral low back pain, unspecified whether sciatica present  M54 5     G89 29    3  Lumbar spondylosis  M47 816    4  Lumbar disc herniation  M51 26                   Subjective: Pt reports she has pain on the R side of her back  Objective: See treatment diary below      Assessment: Tolerated treatment well  Some verbal cues needed t/o exercise to perform correctly  Progressed to [facility] today  Pt reports a  little increased pain R LB with supine SLR  Patient would benefit from continued PT to improve ROM, strength and overall function  Plan: Continue per plan of care        Re-eval Date: 9/3/21 - every 10 visits     Date 21   Visit Count 6/10 2/10 3/10 4/10 5/10   FOTO Completed              Precautions: spinal stimulator * NO STIM/US *        Manuals 21           Neuro Re-Ed         Postural education         MTP/LTP Seated red   1 x 15 ea   Seated Red   1 x 10 Seated Red  1 x 15 ea Seated Red   1 x 15 ea    Chris tband ER  Seated red   1 x 15  Seated Red   1 x 10 Seated Red  1 x 15 Seated Red  1 x 15 Seated red   1 x 15    Palloff press                 Ther Ex  21    NuStep  L2 10 min L 1  8' L 1  8' L 1  8' L 2  10 min    Standing hip flex/abd/ext 1 x 10 ea Chris  1 x 5 ea Chris 1 x 5 ea Chris 1 x 5 ea Chris 1 x 10 ea Chris    Squats  1 x 10  1 x 5 1 x 5 1 x 5 1 x 10    Step-ups         PPT 5" x 15  5" x 10 5" x 10 5" x 15 5" x 15    PPT with marches 1 x 15  1 x 10 1 x 10 1 x 15 1 x 15    LTR 1 x 10  1 x10 1 x 10 1  x10 1 x 10    SKTC 5" x 5 Chris       TA  5" x 10      SLR 1 x 10  1 x 10 1 x 10 1 x 10 1 x 10    S/L SLR        Clamshells         Heel walk outs  1 x 10  1 x 5 1 x 5 1 x  10 1 x 10 Self piriformis stretch seated   15" x 3 15" x 3     Self HS stretch  Self HS stretch   20" x 3 Chris NV NV Self stretch  10' Self HS stretch  20" x 3 Chris                    Ther Activity                        Gait Training                        Modalities

## 2021-08-23 ENCOUNTER — OFFICE VISIT (OUTPATIENT)
Dept: PHYSICAL THERAPY | Facility: HOME HEALTHCARE | Age: 76
End: 2021-08-23
Payer: MEDICARE

## 2021-08-23 DIAGNOSIS — M54.9 MID BACK PAIN: Primary | ICD-10-CM

## 2021-08-23 PROCEDURE — 97110 THERAPEUTIC EXERCISES: CPT

## 2021-08-23 PROCEDURE — 97112 NEUROMUSCULAR REEDUCATION: CPT

## 2021-08-23 NOTE — PROGRESS NOTES
Daily Note     Today's date: 2021  Patient name: Daisy Torres  : 1945  MRN: [id number]  Referring provider: Edwar Bob MD  Dx: No diagnosis found  Start Time: 750          Subjective: Pain of about 6/10  It is never going to go away  I have notices some improvements with flexibility since [facility]  Objective: See treatment diary below    Assessment: Tolerated treatment well  Pt with average pain of 6/10 t/o Tx  Pt with vc's needed for correct form  Pt slow to progress but was able to increase reps of supine march with encouragement  Patient would benefit from continued PT    Plan: Continue per plan of care        Re-eval Date: 9/3/21 - every 10 visits     Date 21   Visit Count 6/10 2/10 3/10 4/10 5/10   FOTO Completed              Precautions: spinal stimulator * NO STIM/US *        Manuals 21           Neuro Re-Ed         Postural education         MTP/LTP Seated red   1 x 15 ea  Seated red  1 x 15 ea Seated Red   1 x 10 Seated Red  1 x 15 ea Seated Red   1 x 15 ea    Chris tband ER  Seated red   1 x 15  Seated Red   1 x 15 Seated Red  1 x 15 Seated Red  1 x 15 Seated red   1 x 15    Palloff press                 Ther Ex  21    NuStep  L2 10 min L 2  10' L 1  8' L 1  8' L 2  10 min    Standing hip flex/abd/ext 1 x 10 ea Chris  1 x 10 ea Chris 1 x 5 ea Chris 1 x 5 ea Chris 1 x 10 ea Chris    Squats  1 x 10  1 x 15 1 x 5 1 x 5 1 x 10    Step-ups         PPT 5" x 15  5" x 15 5" x 10 5" x 15 5" x 15    PPT with marches 1 x 15  1 x 20 1 x 10 1 x 15 1 x 15    LTR 1 x 10  1 x10 1 x 10 1  x10 1 x 10    SKTC 5" x 5 Chris       TA        SLR 1 x 10  1 x 10 1 x 10 1 x 10 1 x 10    S/L SLR        Clamshells         Heel walk outs  1 x 10  1 x 10 1 x 5 1 x  10 1 x 10    Self piriformis stretch seated    15" x 3     Self HS stretch  Self HS stretch   20" x 3 Chris Self HS stretch  20" x 3 Chris NV Self stretch  10' Self HS stretch  20" x 3 Chris                    Ther Activity                        Gait Training                        Modalities

## 2021-08-27 ENCOUNTER — APPOINTMENT (OUTPATIENT)
Dept: PHYSICAL THERAPY | Facility: HOME HEALTHCARE | Age: 76
End: 2021-08-27
Payer: MEDICARE

## 2021-08-30 ENCOUNTER — APPOINTMENT (OUTPATIENT)
Dept: PHYSICAL THERAPY | Facility: HOME HEALTHCARE | Age: 76
End: 2021-08-30
Payer: MEDICARE

## 2021-08-31 ENCOUNTER — OFFICE VISIT (OUTPATIENT)
Dept: PAIN MEDICINE | Facility: CLINIC | Age: 76
End: 2021-08-31
Payer: MEDICARE

## 2021-08-31 VITALS
SYSTOLIC BLOOD PRESSURE: 167 MMHG | DIASTOLIC BLOOD PRESSURE: 81 MMHG | BODY MASS INDEX: 33.29 KG/M2 | HEART RATE: 56 BPM | WEIGHT: 195 LBS | HEIGHT: 64 IN

## 2021-08-31 DIAGNOSIS — M51.36 DDD (DEGENERATIVE DISC DISEASE), LUMBAR: ICD-10-CM

## 2021-08-31 DIAGNOSIS — M47.816 LUMBAR SPONDYLOSIS: ICD-10-CM

## 2021-08-31 DIAGNOSIS — G62.9 PERIPHERAL POLYNEUROPATHY: ICD-10-CM

## 2021-08-31 DIAGNOSIS — Z96.89 S/P INSERTION OF SPINAL CORD STIMULATOR: ICD-10-CM

## 2021-08-31 DIAGNOSIS — G89.4 CHRONIC PAIN SYNDROME: Primary | ICD-10-CM

## 2021-08-31 DIAGNOSIS — M51.26 LUMBAR DISC HERNIATION: ICD-10-CM

## 2021-08-31 PROCEDURE — 99214 OFFICE O/P EST MOD 30 MIN: CPT | Performed by: NURSE PRACTITIONER

## 2021-08-31 RX ORDER — OFLOXACIN 3 MG/ML
SOLUTION/ DROPS OPHTHALMIC
COMMUNITY
Start: 2021-08-26

## 2021-08-31 RX ORDER — PREDNISOLONE ACETATE 10 MG/ML
SUSPENSION/ DROPS OPHTHALMIC
COMMUNITY
Start: 2021-08-26

## 2021-08-31 NOTE — PROGRESS NOTES
history, social history, current medications, allergies, and vital signs today  Review of Systems  Review of Systems   Constitutional: Negative for fatigue  HENT: Negative for ear pain and hearing loss  Eyes: Negative for redness  Respiratory: Negative for cough  Cardiovascular: Negative for leg swelling  Gastrointestinal: Negative for anal bleeding and rectal pain  Endocrine: Negative for polydipsia  Genitourinary: Negative for hematuria  Musculoskeletal: Positive for arthralgias  Negative for joint swelling  Skin: Negative for rash  Neurological: Negative for headaches  Hematological: Does not bruise/bleed easily  Psychiatric/Behavioral: Negative for behavioral problems and hallucinations           Past Medical History:   Diagnosis Date    Allergies     Anxiety     Arthritis     Benign arteriolar nephrosclerosis     Bereavement     Cataract     Chronic kidney disease     Chronic kidney disease in type 2 diabetes mellitus (HCC)     Chronic kidney disease, stage 2 (mild)     Chronic pain disorder     Chronic pain syndrome     COPD [facility]ronic obstructive [facility] disease) ([facility] [address] [facility]     DDD (degenerative disc disease), cervical     DDD (degenerative disc disease), lumbar     Degenerative joint disease of right hip     Depression     Diastolic dysfunction     DJD (degenerative joint disease), ankle and foot, right     DJD of right shoulder     Edema     Fracture of left calcaneus     Generalized anxiety disorder     GERD (gastroesophageal reflux disease)     Heart murmur     Hyperaldosteronism (HCC)     Hyperlipidemia     Hypertension     Hypertensive nephrosclerosis     Hypo-osmolality and hyponatremia     IBS (irritable bowel syndrome)     IBS (irritable bowel syndrome)     Insomnia     Migraines     Mitral regurgitation     MVP (mitral valve prolapse)     Obstructive sleep apnea syndrome     Osteoarthritis     Osteoarthritis     Pernicious anemia  Plantar fascia rupture     Rheumatoid arthritis ([facility] [address] [facility]     Right knee DJD     Shingles     Sinobronchitis     Sleep apnea     Stroke ([facility] [address] )     tia    TIA (transient ischemic attack)     Vertigo        Past Surgical History:   Procedure Laterality Date    APPENDECTOMY      CARPAL TUNNEL RELEASE Bilateral     COLONOSCOPY      several    COLONOSCOPY  04/02/2012    by Dr Virgilio Cabrera    DXA PROCEDURE (HISTORICAL)  10/26/2016, 9/17/2014, 6/18/2007    HAND SURGERY Right     ring finger has pin    HYSTERECTOMY      32    INSERT / REPLACE PERIPHERAL NEUROSTIMULATOR PULSE GENERATOR /  Left     buttocks    JOINT REPLACEMENT Left     knee    JOINT REPLACEMENT Right 04/2019    Hip    KNEE ARTHROPLASTY Left 01/15/2009    by Dr Montse Gee at [address] [facility] ARTHROSCOPY Bilateral    [facility]  09/10/2009    by Dr Montse Gee at [address] [facility] (HISTORICAL)  12/10/2018, 10/30/2017, 10/26/2016    NASAL SEPTUM SURGERY  2008    NERVE BLOCK Left 2/20/2018    Procedure: BLOCK MEDIAL BRANCH - C4, C5, C6;  Surgeon: Erik Connelly MD;  Location: [facility];  Service: Pain Management     NERVE BLOCK Left 3/6/2018    Procedure: C4, C5, C6 MEDIAL BRANCH BLOCK;  Surgeon: Erik Connelly MD;  Location: [facility];  Service: Pain Management     DE COLONOSCOPY FLX DX W/COLLJ SPEC WHEN PFRMD N/A 4/24/2017    Procedure: Lambert Campos;  Surgeon: Virgilio Cabrera MD;  Location: [facility];  Service: Colorectal    DE TOTAL HIP ARTHROPLASTY Right 2/27/2019    Procedure: ARTHROPLASTY HIP TOTAL;  Surgeon: Bam Villanueva DO;  [facility];  Service: Orthopedics    DE TOTAL KNEE ARTHROPLASTY Right 6/24/2020    Procedure: KNEE TOTAL ARTHROPLASTY;  Surgeon: Bam Villanueva DO;  [facility];  Service: Orthopedics    RADIOFREQUENCY ABLATION Left 4/17/2018    Procedure: ABLATION RADIO FREQUENCY (RFA) - C4, C5, C6;  Surgeon: Tejas Parikh MD;  Location: [facility];  Service: Pain Management     REPLACEMENT TOTAL KNEE Left 07/05/2011    SINUS SURGERY      TONSILLECTOMY      TOTAL HIP ARTHROPLASTY Left     TRIGGER FINGER RELEASE      R 4th     UPPER GASTROINTESTINAL ENDOSCOPY  01/12/2007    with Sami Vargas dilatation by Dr Ros Tejeda at [address] [facility] Right        Family History   Problem Relation Age of Onset    Heart disease Mother     Hypertension Mother    [facility] Arthritis Mother     Dementia Mother     Rheum arthritis Mother     Hypertension Father     Heart disease Father     Colon cancer Father     Hypertension Sister     Anxiety disorder Sister     Thyroid disease Sister     Prostate cancer Maternal Grandfather     No Known Problems Paternal Grandmother     No Known Problems Paternal Grandfather     No Known Problems Paternal Aunt     Pseudochol deficiency Neg Hx        Social History     Occupational History    Occupation: Admnistrator    Tobacco Use    Smoking status: Former Smoker     Types: Cigarettes    Smokeless tobacco: Never Used    Tobacco comment: quit 36 yrs ago   Vaping Use    Vaping Use: Never used   Substance and Sexual Activity    Alcohol use: Yes     Comment: 2 beer a week    Drug use: Never    Sexual activity: Not Currently     Birth control/protection: Post-menopausal         Current Outpatient Medications:     aspirin (ECOTRIN LOW STRENGTH) 81 mg EC tablet, Take 1 tablet (81 mg total) by mouth daily, Disp:  , Rfl:     bisoprolol (ZEBETA) 10 MG tablet, TAKE 1 TABLET EVERY DAY, Disp: 90 tablet, Rfl: 3    butalbital-acetaminophen-caffeine (FIORICET,ESGIC) -40 mg per tablet, Take 1 tablet by mouth every 4 (four) hours as needed for headaches, Disp: 100 tablet, Rfl: 2    calcium carbonate (OS-MARLENY) 600 MG tablet, Take 600 mg by mouth 2 (two) times a day with meals, Disp: , Rfl:     clindamycin (CLEOCIN) 150 mg capsule, take 4 capsules by mouth 1 hour prior to appointment, Disp: , Rfl:     diclofenac sodium (VOLTAREN) 1 %, Apply 2 g topically 4 (four) times a day, Disp: 50 g, Rfl: 5    losartan (COZAAR) 100 MG tablet, TAKE 1 TABLET EVERY MORNING, Disp: 90 tablet, Rfl: 3    mirtazapine (REMERON) 15 mg tablet, Take 1 tablet (15 mg total) by mouth daily at bedtime, Disp: 90 tablet, Rfl: 4    Multiple Vitamins-Minerals (MULTIVITAMIN WITH MINERALS) tablet, Take 1 tablet by mouth every morning, Disp: , Rfl:     nystatin (MYCOSTATIN) 500,000 units/5 mL suspension, RINSE BY MOUTH WITH 5 MILLILITERS FOR 2 MINUTES 4-5 TIMES DAILY THEN SPIT, Disp: , Rfl:     ofloxacin (OCUFLOX) 0 3 % ophthalmic solution, INSTILL 1 DROP INTO AFFECTED EYE 4 TIMES DAILY, Disp: , Rfl:     omeprazole (PriLOSEC) 20 mg delayed release capsule, TAKE 1 CAPSULE TWICE DAILY, Disp: 180 capsule, Rfl: 4    pravastatin (PRAVACHOL) 20 mg tablet, Take 20 mg by mouth 2 (two) times a day , Disp: , Rfl:     pravastatin (PRAVACHOL) 40 mg tablet, Take 1 tablet (40 mg total) by mouth daily, Disp: 90 tablet, Rfl: 3    prednisoLONE acetate (PRED FORTE) 1 % ophthalmic suspension, INSTILL 1 DROP INTO AFFECTED  EYE(S) FOUR TIMES A DAY, Disp: , Rfl:     pregabalin (LYRICA) 50 mg capsule, Take 1 capsule (50 mg total) by mouth 3 (three) times a day, Disp: 90 capsule, Rfl: 1    prochlorperazine (COMPAZINE) 5 mg tablet, Take 1 tablet (5 mg total) by mouth every 6 (six) hours as needed for nausea or vomiting, Disp: 30 tablet, Rfl: 5    spironolactone (ALDACTONE) 50 mg tablet, TAKE 1 TABLET DAILY AFTER LUNCH, Disp: 90 tablet, Rfl: 3    furosemide (LASIX) 20 mg tablet, Take 1 tablet (20 mg total) by mouth daily (Patient not taking: Reported on 6/24/2021), Disp: 90 tablet, Rfl: 1    hydrocortisone 2 5 % cream, Apply topically 2 (two) times a day as needed for irritation or rash (Patient not taking: Reported on 8/31/2021), Disp: 30 g, Rfl: 0    Allergies   Allergen Reactions    Procaine Hives    Adhesive [facility] Tape] Rash    Lidocaine Rash    Penicillins Rash       Physical Exam:    /81   Pulse 56   Ht 5' 3 5" (1 613 m)   Wt 88 5 kg (195 lb)   BMI 34 00 kg/m²     Constitutional:normal, well developed, well nourished, alert, in no distress and non-toxic and no overt pain behavior  Eyes:anicteric  HEENT:grossly intact  Neck:supple, symmetric, trachea midline and no masses   Pulmonary:even and unlabored  Cardiovascular:No edema or pitting edema present  Skin:Normal without rashes or lesions and well hydrated  Psychiatric:Mood and affect appropriate  Neurologic:Cranial Nerves II-XII grossly intact  Musculoskeletal:normal    Imaging  No orders to display       No orders of the defined types were placed in this encounter

## 2021-09-07 DIAGNOSIS — G44.209 MUSCLE TENSION HEADACHE: ICD-10-CM

## 2021-09-07 RX ORDER — BUTALBITAL, ACETAMINOPHEN AND CAFFEINE 50; 325; 40 MG/1; MG/1; MG/1
1 TABLET ORAL EVERY 4 HOURS PRN
Qty: 120 TABLET | Refills: 2 | Status: SHIPPED | OUTPATIENT
Start: 2021-09-07 | End: 2021-11-02 | Stop reason: SDUPTHER

## 2021-09-09 ENCOUNTER — HOSPITAL ENCOUNTER (OUTPATIENT)
Dept: NON INVASIVE DIAGNOSTICS | Facility: HOSPITAL | Age: 76
Discharge: HOME | End: 2021-09-09
Attending: INTERNAL MEDICINE
Payer: MEDICARE

## 2021-09-09 DIAGNOSIS — I34.0 NONRHEUMATIC MITRAL VALVE REGURGITATION: ICD-10-CM

## 2021-09-09 DIAGNOSIS — I10 ESSENTIAL HYPERTENSION: ICD-10-CM

## 2021-09-09 DIAGNOSIS — R06.02 SHORTNESS OF BREATH ON EXERTION: ICD-10-CM

## 2021-09-09 PROCEDURE — 93306 TTE W/DOPPLER COMPLETE: CPT

## 2021-09-09 PROCEDURE — 93306 TTE W/DOPPLER COMPLETE: CPT | Performed by: INTERNAL MEDICINE

## 2021-09-09 NOTE — TELEPHONE ENCOUNTER
Patient aware, lab slip mailed along with a low sodium diet to the patient  [___ inches] : [unfilled] inches [FreeTextEntry5] : 13

## 2021-09-13 NOTE — PROGRESS NOTES
PT Discharge    Today's date: 2021  Patient name: Sung Kolb  : 1945  MRN: [id number]  Referring provider: Juanjose Berry MD  Dx:   Encounter Diagnosis     ICD-10-CM    1  Mid back pain  M54 9        Start Time:   Stop Time: 830  Total time in clinic (min): 40 minutes    Assessment  Assessment details: Pt Suhas Sarmiento is a 78 y  o  who presents to OPPT with chronic L/S pain due to DJD  Pt presents with limited L/S mobility and core strength deficits, decreased B LE strength, impaired soft tissue mobility/limited flexibility, decreased postural awareness, and gait/balance dysfunctions  Pt with limitations with prolonged ambulation, difficulty with stair negotiation, disrupted sleep patterns, and difficulty with lifting/carrying  Pt has become more sedentary with completing daily activities at home due to increased pain back with functional tasks  Pt would benefit from skilled therapy services to address outlined impairments, work towards goals, and restore pts PLOF  Thank you! UPDATE: pt was last treated in PT on 21  She was returning to pain management following appt to discuss continued symptoms; pt never phoned PT clinic with status update or to return to therapy  She will be DC from OPPT 2* to script expiration at this time   Thank you for this referral    Impairments: abnormal gait, abnormal or restricted ROM, abnormal movement, activity intolerance, impaired balance, impaired physical strength, lacks appropriate home exercise program, pain with function, weight-bearing intolerance, poor posture  and poor body mechanics    Goals  STGs to be achieved in 4 weeks: - ONGOING   -Pt to demonstrate reduced subjective pain rating "at worst" by at least 2-3 points from Initial Eval to allow for reduced pain with ADLs and improved functional activity tolerance    -Pt to demonstrate L/S ROM with minimal limitations in order to maximize joint mobility and function and allow for progression of exercise program and achievement of goals    -Pt to demonstrate increased MMT of BLE by at least 1/2 grade in order to improve safety and stability with ADLs and functional mobility  LTGs to be achieved upon discharge: - ONGOING   -Pt will be I with HEP in order to continue to improve quality of life and independence and reduce risk for re-injury    -Pt to demonstrate return to activities of daily living without limiations or restrictions    -Pt will return to ambulation > 1 hour to help facilitate return to community activities independently   -Pt to demonstrate improved function as noted by achieving or exceeding predicted score on FOTO outcomes assessment tool  Plan  Plan details: DC from OPPT   Treatment plan discussed with: patient        Subjective Evaluation    History of Present Illness  Mechanism of injury: Pt reporting that she had a lumbar stimulator placed in  which was helping to manage her back pain  She notes that several months ago she noticed that the stimulator stopped working and she decided she isn't going to have it replaced  She is following up with pain management at this time  Updated x-rays several months ago (+) for DJD  She has deferred injections at this time  MD referral to OPPT for conservative management of s/s  She will return to see MD again on 21     Quality of life: good    Pain  At best pain ratin  At worst pain ratin  Quality: dull ache, pressure, tight and throbbing  Relieving factors: medications and rest  Aggravating factors: standing, walking and stair climbing    Social Support  Steps to enter house: yes  Stairs in house: yes   Lives in: multiple-level home    Employment status: not working    Diagnostic Tests  X-ray: abnormal  Treatments  Previous treatment: medication  Current treatment: physical therapy  Patient Goals  Patient goals for therapy: increased strength, independence with ADLs/IADLs, increased motion, improved balance and decreased pain  Patient goal: "be able to function without constant pain"         Objective     Postural Observations  Seated posture: fair  Standing posture: fair        Palpation   Left   Tenderness of the erector spinae, lumbar paraspinals and quadratus lumborum  Right   Tenderness of the erector spinae, lumbar paraspinals and quadratus lumborum  Active Range of Motion     Lumbar   Flexion:  with pain Restriction level: moderate  Extension:  with pain Restriction level: maximal  Left lateral flexion:  with pain Restriction level: moderate  Right lateral flexion:  with pain Restriction level: moderate    Strength/Myotome Testing     Left Hip   Planes of Motion   Flexion: 3+  Abduction: 3+  Adduction: 3+    Right Hip   Planes of Motion   Flexion: 4-  Abduction: 4-  Adduction: 4-    Left Knee   Flexion: 4-  Extension: 4-    Right Knee   Flexion: 4  Extension: 4    Tests     Lumbar     Left   Positive passive SLR  Negative crossed SLR  Right   Positive passive SLR  Negative crossed SLR  Left Hip   Negative long sit  Right Hip   Negative long sit       Additional Tests Details  (+) bilateral HS and piriformis tightness     General Comments:    Lower quarter screen   Hips: unremarkable

## 2021-11-02 DIAGNOSIS — G44.209 MUSCLE TENSION HEADACHE: ICD-10-CM

## 2021-11-02 RX ORDER — BUTALBITAL, ACETAMINOPHEN AND CAFFEINE 50; 325; 40 MG/1; MG/1; MG/1
1 TABLET ORAL EVERY 4 HOURS PRN
Qty: 120 TABLET | Refills: 2 | Status: SHIPPED | OUTPATIENT
Start: 2021-11-02 | End: 2021-12-21 | Stop reason: SDUPTHER

## 2021-11-18 ENCOUNTER — APPOINTMENT (OUTPATIENT)
Dept: LAB | Facility: HOSPITAL | Age: 76
End: 2021-11-18
Attending: FAMILY MEDICINE
Payer: MEDICARE

## 2021-11-18 DIAGNOSIS — E55.9 VITAMIN D DEFICIENCY: ICD-10-CM

## 2021-11-18 DIAGNOSIS — N30.90 CYSTITIS: Primary | ICD-10-CM

## 2021-11-18 DIAGNOSIS — Z13.29 SCREENING FOR THYROID DISORDER: ICD-10-CM

## 2021-11-18 DIAGNOSIS — I10 ESSENTIAL HYPERTENSION: ICD-10-CM

## 2021-11-18 LAB
25(OH)D3 SERPL-MCNC: 31.4 NG/ML (ref 30–100)
ALBUMIN SERPL BCP-MCNC: 3.9 G/DL (ref 3.5–5)
ALP SERPL-CCNC: 133 U/L (ref 46–116)
ALT SERPL W P-5'-P-CCNC: 27 U/L (ref 12–78)
ANION GAP SERPL CALCULATED.3IONS-SCNC: 8 MMOL/L (ref 4–13)
AST SERPL W P-5'-P-CCNC: 19 U/L (ref 5–45)
BACTERIA UR QL AUTO: ABNORMAL /HPF
BASOPHILS # BLD AUTO: 0.03 THOUSANDS/ΜL (ref 0–0.1)
BASOPHILS NFR BLD AUTO: 1 % (ref 0–1)
BILIRUB SERPL-MCNC: 0.46 MG/DL (ref 0.2–1)
BILIRUB UR QL STRIP: NEGATIVE
BUN SERPL-MCNC: 11 MG/DL (ref 5–25)
CALCIUM SERPL-MCNC: 9.2 MG/DL (ref 8.3–10.1)
CHLORIDE SERPL-SCNC: 95 MMOL/L (ref 100–108)
CHOLEST SERPL-MCNC: 220 MG/DL (ref 50–200)
CLARITY UR: ABNORMAL
CO2 SERPL-SCNC: 28 MMOL/L (ref 21–32)
COLOR UR: ABNORMAL
CREAT SERPL-MCNC: 0.97 MG/DL (ref 0.6–1.3)
EOSINOPHIL # BLD AUTO: 0.32 THOUSAND/ΜL (ref 0–0.61)
EOSINOPHIL NFR BLD AUTO: 7 % (ref 0–6)
ERYTHROCYTE [DISTWIDTH] IN BLOOD BY AUTOMATED COUNT: 14.2 % (ref 11.6–15.1)
GFR SERPL CREATININE-BSD FRML MDRD: 58 ML/MIN/1.73SQ M
GLUCOSE P FAST SERPL-MCNC: 93 MG/DL (ref 65–99)
GLUCOSE UR STRIP-MCNC: NEGATIVE MG/DL
HCT VFR BLD AUTO: 36.8 % (ref 34.8–46.1)
HDLC SERPL-MCNC: 86 MG/DL
HGB BLD-MCNC: 12.5 G/DL (ref 11.5–15.4)
HGB UR QL STRIP.AUTO: NEGATIVE
IMM GRANULOCYTES # BLD AUTO: 0.02 THOUSAND/UL (ref 0–0.2)
IMM GRANULOCYTES NFR BLD AUTO: 0 % (ref 0–2)
KETONES UR STRIP-MCNC: NEGATIVE MG/DL
LDLC SERPL CALC-MCNC: 121 MG/DL (ref 0–100)
LEUKOCYTE ESTERASE UR QL STRIP: ABNORMAL
LYMPHOCYTES # BLD AUTO: 1.48 THOUSANDS/ΜL (ref 0.6–4.47)
LYMPHOCYTES NFR BLD AUTO: 31 % (ref 14–44)
MCH RBC QN AUTO: 30.7 PG (ref 26.8–34.3)
MCHC RBC AUTO-ENTMCNC: 34 G/DL (ref 31.4–37.4)
MCV RBC AUTO: 90 FL (ref 82–98)
MONOCYTES # BLD AUTO: 0.44 THOUSAND/ΜL (ref 0.17–1.22)
MONOCYTES NFR BLD AUTO: 9 % (ref 4–12)
NEUTROPHILS # BLD AUTO: 2.52 THOUSANDS/ΜL (ref 1.85–7.62)
NEUTS SEG NFR BLD AUTO: 52 % (ref 43–75)
NITRITE UR QL STRIP: POSITIVE
NON-SQ EPI CELLS URNS QL MICRO: ABNORMAL /HPF
NRBC BLD AUTO-RTO: 0 /100 WBCS
PH UR STRIP.AUTO: 6 [PH]
PLATELET # BLD AUTO: 228 THOUSANDS/UL (ref 149–390)
PMV BLD AUTO: 9.4 FL (ref 8.9–12.7)
POTASSIUM SERPL-SCNC: 4.1 MMOL/L (ref 3.5–5.3)
PROT SERPL-MCNC: 7.6 G/DL (ref 6.4–8.2)
PROT UR STRIP-MCNC: NEGATIVE MG/DL
RBC # BLD AUTO: 4.07 MILLION/UL (ref 3.81–5.12)
RBC #/AREA URNS AUTO: ABNORMAL /HPF
SODIUM SERPL-SCNC: 131 MMOL/L (ref 136–145)
SP GR UR STRIP.AUTO: <=1.005 (ref 1–1.03)
TRIGL SERPL-MCNC: 67 MG/DL
TSH SERPL DL<=0.05 MIU/L-ACNC: 4.27 UIU/ML (ref 0.36–3.74)
UROBILINOGEN UR QL STRIP.AUTO: 0.2 E.U./DL
WBC # BLD AUTO: 4.81 THOUSAND/UL (ref 4.31–10.16)
WBC #/AREA URNS AUTO: ABNORMAL /HPF

## 2021-11-18 PROCEDURE — 85025 COMPLETE CBC W/AUTO DIFF WBC: CPT

## 2021-11-18 PROCEDURE — 84443 ASSAY THYROID STIM HORMONE: CPT

## 2021-11-18 PROCEDURE — 80061 LIPID PANEL: CPT

## 2021-11-18 PROCEDURE — 87086 URINE CULTURE/COLONY COUNT: CPT | Performed by: FAMILY MEDICINE

## 2021-11-18 PROCEDURE — 87186 SC STD MICRODIL/AGAR DIL: CPT | Performed by: FAMILY MEDICINE

## 2021-11-18 PROCEDURE — 36415 COLL VENOUS BLD VENIPUNCTURE: CPT

## 2021-11-18 PROCEDURE — 87077 CULTURE AEROBIC IDENTIFY: CPT | Performed by: FAMILY MEDICINE

## 2021-11-18 PROCEDURE — 82306 VITAMIN D 25 HYDROXY: CPT

## 2021-11-18 PROCEDURE — 81001 URINALYSIS AUTO W/SCOPE: CPT | Performed by: FAMILY MEDICINE

## 2021-11-18 PROCEDURE — 80053 COMPREHEN METABOLIC PANEL: CPT

## 2021-11-18 RX ORDER — CIPROFLOXACIN 250 MG/1
250 TABLET, FILM COATED ORAL EVERY 12 HOURS SCHEDULED
Qty: 10 TABLET | Refills: 0 | Status: SHIPPED | OUTPATIENT
Start: 2021-11-18 | End: 2021-11-19

## 2021-11-19 RX ORDER — CIPROFLOXACIN 250 MG/1
250 TABLET, FILM COATED ORAL EVERY 12 HOURS SCHEDULED
Qty: 10 TABLET | Refills: 0 | Status: SHIPPED | OUTPATIENT
Start: 2021-11-19 | End: 2021-11-24

## 2021-11-21 LAB
BACTERIA UR CULT: ABNORMAL

## 2021-11-29 DIAGNOSIS — I10 ESSENTIAL HYPERTENSION: ICD-10-CM

## 2021-11-29 RX ORDER — BISOPROLOL FUMARATE 10 MG/1
TABLET ORAL
Qty: 90 TABLET | Refills: 3 | Status: SHIPPED | OUTPATIENT
Start: 2021-11-29

## 2021-12-03 ENCOUNTER — OFFICE VISIT (OUTPATIENT)
Dept: FAMILY MEDICINE CLINIC | Facility: CLINIC | Age: 76
End: 2021-12-03
Payer: MEDICARE

## 2021-12-03 VITALS
RESPIRATION RATE: 20 BRPM | HEIGHT: 63 IN | BODY MASS INDEX: 34.02 KG/M2 | HEART RATE: 84 BPM | DIASTOLIC BLOOD PRESSURE: 84 MMHG | WEIGHT: 192 LBS | SYSTOLIC BLOOD PRESSURE: 138 MMHG | TEMPERATURE: 96.9 F

## 2021-12-03 DIAGNOSIS — K21.9 GASTROESOPHAGEAL REFLUX DISEASE WITHOUT ESOPHAGITIS: ICD-10-CM

## 2021-12-03 DIAGNOSIS — I34.0 NONRHEUMATIC MITRAL VALVE REGURGITATION: ICD-10-CM

## 2021-12-03 DIAGNOSIS — Z11.59 NEED FOR HEPATITIS C SCREENING TEST: ICD-10-CM

## 2021-12-03 DIAGNOSIS — G89.4 CHRONIC PAIN DISORDER: ICD-10-CM

## 2021-12-03 DIAGNOSIS — J01.00 SUBACUTE MAXILLARY SINUSITIS: ICD-10-CM

## 2021-12-03 DIAGNOSIS — Z11.4 SCREENING FOR HIV (HUMAN IMMUNODEFICIENCY VIRUS): ICD-10-CM

## 2021-12-03 DIAGNOSIS — Z11.59 NEED FOR HEPATITIS B SCREENING TEST: ICD-10-CM

## 2021-12-03 DIAGNOSIS — N18.31 STAGE 3A CHRONIC KIDNEY DISEASE (HCC): ICD-10-CM

## 2021-12-03 DIAGNOSIS — I10 ESSENTIAL HYPERTENSION: ICD-10-CM

## 2021-12-03 DIAGNOSIS — I10 PRIMARY HYPERTENSION: ICD-10-CM

## 2021-12-03 DIAGNOSIS — E78.00 HYPERCHOLESTEROLEMIA: ICD-10-CM

## 2021-12-03 DIAGNOSIS — Z00.00 MEDICARE ANNUAL WELLNESS VISIT, SUBSEQUENT: Primary | ICD-10-CM

## 2021-12-03 DIAGNOSIS — I51.89 DIASTOLIC DYSFUNCTION: ICD-10-CM

## 2021-12-03 DIAGNOSIS — Z12.31 ENCOUNTER FOR SCREENING MAMMOGRAM FOR MALIGNANT NEOPLASM OF BREAST: ICD-10-CM

## 2021-12-03 PROCEDURE — 1123F ACP DISCUSS/DSCN MKR DOCD: CPT | Performed by: FAMILY MEDICINE

## 2021-12-03 PROCEDURE — G0439 PPPS, SUBSEQ VISIT: HCPCS | Performed by: FAMILY MEDICINE

## 2021-12-03 PROCEDURE — 99213 OFFICE O/P EST LOW 20 MIN: CPT | Performed by: FAMILY MEDICINE

## 2021-12-03 RX ORDER — METHYLPREDNISOLONE 4 MG/1
TABLET ORAL
Qty: 21 EACH | Refills: 0 | Status: SHIPPED | OUTPATIENT
Start: 2021-12-03 | End: 2021-12-03

## 2021-12-03 RX ORDER — METHYLPREDNISOLONE 4 MG/1
TABLET ORAL
Qty: 21 EACH | Refills: 0 | Status: SHIPPED | OUTPATIENT
Start: 2021-12-03

## 2021-12-03 RX ORDER — AZITHROMYCIN 250 MG/1
TABLET, FILM COATED ORAL
Qty: 6 TABLET | Refills: 0 | Status: SHIPPED | OUTPATIENT
Start: 2021-12-03 | End: 2021-12-03

## 2021-12-03 RX ORDER — AZITHROMYCIN 250 MG/1
TABLET, FILM COATED ORAL
Qty: 6 TABLET | Refills: 0 | Status: SHIPPED | OUTPATIENT
Start: 2021-12-03 | End: 2021-12-08

## 2021-12-21 DIAGNOSIS — G44.209 MUSCLE TENSION HEADACHE: ICD-10-CM

## 2021-12-21 RX ORDER — BUTALBITAL, ACETAMINOPHEN AND CAFFEINE 50; 325; 40 MG/1; MG/1; MG/1
1 TABLET ORAL EVERY 4 HOURS PRN
Qty: 120 TABLET | Refills: 5 | Status: SHIPPED | OUTPATIENT
Start: 2021-12-21 | End: 2022-04-13

## 2022-01-04 ENCOUNTER — TELEPHONE (OUTPATIENT)
Dept: FAMILY MEDICINE CLINIC | Facility: CLINIC | Age: 77
End: 2022-01-04

## 2022-01-12 ENCOUNTER — APPOINTMENT (OUTPATIENT)
Dept: LAB | Facility: HOSPITAL | Age: 77
End: 2022-01-12
Attending: FAMILY MEDICINE
Payer: MEDICARE

## 2022-01-12 ENCOUNTER — HOSPITAL ENCOUNTER (OUTPATIENT)
Dept: MAMMOGRAPHY | Facility: HOSPITAL | Age: 77
Discharge: HOME | End: 2022-01-12
Attending: FAMILY MEDICINE
Payer: MEDICARE

## 2022-01-12 VITALS — WEIGHT: 192.02 LBS | BODY MASS INDEX: 34.02 KG/M2 | HEIGHT: 63 IN

## 2022-01-12 DIAGNOSIS — Z11.4 SCREENING FOR HIV (HUMAN IMMUNODEFICIENCY VIRUS): ICD-10-CM

## 2022-01-12 DIAGNOSIS — Z11.59 NEED FOR HEPATITIS C SCREENING TEST: ICD-10-CM

## 2022-01-12 DIAGNOSIS — Z12.31 ENCOUNTER FOR SCREENING MAMMOGRAM FOR MALIGNANT NEOPLASM OF BREAST: ICD-10-CM

## 2022-01-12 DIAGNOSIS — Z00.00 MEDICARE ANNUAL WELLNESS VISIT, SUBSEQUENT: ICD-10-CM

## 2022-01-12 DIAGNOSIS — Z11.59 NEED FOR HEPATITIS B SCREENING TEST: ICD-10-CM

## 2022-01-12 LAB
HBV CORE AB SER QL: NORMAL
HBV SURFACE AB SER-ACNC: <3.1 MIU/ML
HBV SURFACE AG SER QL: NORMAL
HCV AB SER QL: NORMAL

## 2022-01-12 PROCEDURE — 36415 COLL VENOUS BLD VENIPUNCTURE: CPT

## 2022-01-12 PROCEDURE — 77067 SCR MAMMO BI INCL CAD: CPT

## 2022-01-12 PROCEDURE — 86803 HEPATITIS C AB TEST: CPT

## 2022-01-12 PROCEDURE — 86706 HEP B SURFACE ANTIBODY: CPT

## 2022-01-12 PROCEDURE — 87340 HEPATITIS B SURFACE AG IA: CPT

## 2022-01-12 PROCEDURE — 87389 HIV-1 AG W/HIV-1&-2 AB AG IA: CPT

## 2022-01-12 PROCEDURE — 77063 BREAST TOMOSYNTHESIS BI: CPT

## 2022-01-12 PROCEDURE — 86704 HEP B CORE ANTIBODY TOTAL: CPT

## 2022-01-13 LAB — HIV 1+2 AB+HIV1 P24 AG SERPL QL IA: NORMAL

## 2022-02-09 DIAGNOSIS — I10 ESSENTIAL HYPERTENSION: ICD-10-CM

## 2022-02-09 RX ORDER — LOSARTAN POTASSIUM 100 MG/1
TABLET ORAL
Qty: 90 TABLET | Refills: 3 | Status: SHIPPED | OUTPATIENT
Start: 2022-02-09

## 2022-02-21 DIAGNOSIS — I10 ESSENTIAL HYPERTENSION: ICD-10-CM

## 2022-02-21 RX ORDER — SPIRONOLACTONE 50 MG/1
TABLET, FILM COATED ORAL
Qty: 90 TABLET | Refills: 3 | Status: SHIPPED | OUTPATIENT
Start: 2022-02-21

## 2022-03-15 ENCOUNTER — OFFICE VISIT (OUTPATIENT)
Dept: FAMILY MEDICINE CLINIC | Facility: CLINIC | Age: 77
End: 2022-03-15
Payer: MEDICARE

## 2022-03-15 VITALS
TEMPERATURE: 97 F | BODY MASS INDEX: 34.73 KG/M2 | RESPIRATION RATE: 20 BRPM | WEIGHT: 196 LBS | HEIGHT: 63 IN | DIASTOLIC BLOOD PRESSURE: 74 MMHG | HEART RATE: 84 BPM | SYSTOLIC BLOOD PRESSURE: 126 MMHG

## 2022-03-15 DIAGNOSIS — G89.4 CHRONIC PAIN DISORDER: ICD-10-CM

## 2022-03-15 DIAGNOSIS — K21.9 GASTROESOPHAGEAL REFLUX DISEASE WITHOUT ESOPHAGITIS: ICD-10-CM

## 2022-03-15 DIAGNOSIS — E66.09 CLASS 1 OBESITY DUE TO EXCESS CALORIES WITH SERIOUS COMORBIDITY AND BODY MASS INDEX (BMI) OF 34.0 TO 34.9 IN ADULT: ICD-10-CM

## 2022-03-15 DIAGNOSIS — I51.89 DIASTOLIC DYSFUNCTION: ICD-10-CM

## 2022-03-15 DIAGNOSIS — I34.0 NONRHEUMATIC MITRAL VALVE REGURGITATION: ICD-10-CM

## 2022-03-15 DIAGNOSIS — I10 PRIMARY HYPERTENSION: Primary | ICD-10-CM

## 2022-03-15 DIAGNOSIS — N18.31 STAGE 3A CHRONIC KIDNEY DISEASE (HCC): ICD-10-CM

## 2022-03-15 DIAGNOSIS — J43.9 PULMONARY EMPHYSEMA, UNSPECIFIED EMPHYSEMA TYPE (HCC): ICD-10-CM

## 2022-03-15 DIAGNOSIS — G44.209 MUSCLE TENSION HEADACHE: ICD-10-CM

## 2022-03-15 PROCEDURE — 99214 OFFICE O/P EST MOD 30 MIN: CPT | Performed by: FAMILY MEDICINE

## 2022-03-15 NOTE — PROGRESS NOTES
Assessment/Plan:  The patient is cleared for corneal transplant    Primary hypertension with blood pressure controlled on the current regimen    Gastroesophageal reflux disease with symptoms minimized on Prilosec 20 mg    Muscle tension headache Fioricet is effective therapy    Nonrheumatic mitral valve regurgitation    Diastolic dysfunction treated with Aldactone Cozaar and Zebeta    Class 1 obesity with BMI of 34 72    Hyperlipidemia treated with Pravachol 40 mg laboratories pending    Stable pulmonary emphysema    Chronic kidney disease stage IIIA stable    Chronic pain disorder treated by pain management    Problem List Items Addressed This Visit     None           There are no diagnoses linked to this encounter  No problem-specific Assessment & Plan notes found for this encounter  PHQ-2/9 Depression Screening    Little interest or pleasure in doing things: 0 - not at all  Feeling down, depressed, or hopeless: 0 - not at all  PHQ-2 Score: 0  PHQ-2 Interpretation: Negative depression screen          Body mass index is 34 72 kg/m²  BMI Counseling: Body mass index is 34 72 kg/m²  The BMI is above normal  Nutrition recommendations include reducing portion sizes, decreasing overall calorie intake, 3-5 servings of fruits/vegetables daily, reducing fast food intake, consuming healthier snacks, decreasing soda and/or juice intake, moderation in carbohydrate intake, increasing intake of lean protein, reducing intake of saturated fat and trans fat and reducing intake of cholesterol  Subjective:      Patient ID: Henrene Libman is a 78 y o  female      Patient presents for preoperative clearance for a left eye corneal transplant      The following portions of the patient's history were reviewed and updated as appropriate:   She has a past medical history of Allergies, Anxiety, Arthritis, Benign arteriolar nephrosclerosis, Bereavement, Cataract, Chronic kidney disease, Chronic kidney disease in type 2 diabetes mellitus ([facility] [address] ), Chronic kidney disease, stage 2 (mild), Chronic pain disorder, Chronic pain syndrome, COPD (chronic obstructive pulmonary disease) (HCC), DDD (degenerative disc disease), cervical, DDD (degenerative disc disease), lumbar, Degenerative joint disease of right hip, Depression, Diastolic dysfunction, DJD (degenerative joint disease), ankle and foot, right, DJD of right shoulder, Edema, Fracture of left calcaneus, Generalized anxiety disorder, GERD (gastroesophageal reflux disease), Heart murmur, Hyperaldosteronism ([facility] [address] ), Hyperlipidemia, Hypertension, Hypertensive nephrosclerosis, Hypo-osmolality and hyponatremia, IBS (irritable bowel syndrome), IBS (irritable bowel syndrome), Insomnia, Migraines, Mitral regurgitation, MVP (mitral valve prolapse), Obstructive sleep apnea syndrome, Osteoarthritis, Osteoarthritis, Pernicious anemia, Plantar fascia rupture, Rheumatoid arthritis ([facility] [address] ), Right knee DJD, Shingles, Sinobronchitis, Sleep apnea, Stroke ([facility] ), TIA (transient ischemic attack), and Vertigo  ,  does not have any pertinent problems on file  ,   has a past surgical history that includes Appendectomy; Sinus surgery; Insert / replace peripheral neurostimulator pulse generator /  (Left); pr colonoscopy flx dx w/collj spec when pfrmd (N/A, 4/24/2017); NERVE BLOCK (Left, 2/20/2018); NERVE BLOCK (Left, 3/6/2018); Radiofrequency ablation (Left, 4/17/2018); Colonoscopy; Upper gastrointestinal endoscopy (01/12/2007); Knee arthroscopy (Bilateral); Hand surgery (Right); pr total hip arthroplasty (Right, 2/27/2019); Nasal septum surgery (2008); Carpal tunnel release (Bilateral); Total hip arthroplasty (Left); Hysterectomy; Tonsillectomy; Joint replacement (Left); Joint replacement (Right, 04/2019); pr total knee arthroplasty (Right, 6/24/2020); Wrist surgery (Right); Knee cartilage surgery (09/10/2009); Replacement total knee (Left, 07/05/2011); Knee Arthroplasty (Left, 01/15/2009);  Colonoscopy (04/02/2012); DXA procedure(historical) (10/26/2016, 9/17/2014, 6/18/2007); Mammo (historical) (12/10/2018, 10/30/2017, 10/26/2016); and Trigger finger release  ,  family history includes Anxiety disorder in her sister; Arthritis in her mother; Colon cancer in her father; Dementia in her mother; Heart disease in her father and mother; Hypertension in her father, mother, and sister; No Known Problems in her paternal aunt, paternal grandfather, and paternal grandmother; Prostate cancer in her maternal grandfather; Rheum arthritis in her mother; Thyroid disease in her sister  ,   reports that she has quit smoking  Her smoking use included cigarettes  She has never used smokeless tobacco  She reports current alcohol use  She reports that she does not use drugs  ,  is allergic to procaine, adhesive [medical tape], lidocaine, and penicillins     Current Outpatient Medications   Medication Sig Dispense Refill    aspirin (ECOTRIN LOW STRENGTH) 81 mg EC tablet Take 1 tablet (81 mg total) by mouth daily      bisoprolol (ZEBETA) 10 MG tablet TAKE 1 TABLET EVERY DAY 90 tablet 3    butalbital-acetaminophen-caffeine (FIORICET,ESGIC) -40 mg per tablet Take 1 tablet by mouth every 4 (four) hours as needed for headaches 120 tablet 5    calcium carbonate (OS-MARLENY) 600 MG tablet Take 600 mg by mouth 2 (two) times a day with meals      clindamycin (CLEOCIN) 150 mg capsule take 4 capsules by mouth 1 hour prior to appointment      diclofenac sodium (VOLTAREN) 1 % Apply 2 g topically 4 (four) times a day 50 g 5    furosemide (LASIX) 20 mg tablet Take 1 tablet (20 mg total) by mouth daily (Patient not taking: Reported on 6/24/2021) 90 tablet 1    hydrocortisone 2 5 % cream Apply topically 2 (two) times a day as needed for irritation or rash (Patient not taking: Reported on 8/31/2021) 30 g 0    losartan (COZAAR) 100 MG tablet TAKE 1 TABLET EVERY MORNING 90 tablet 3    methylPREDNISolone 4 MG tablet therapy pack Use as directed on package 21 each 0    mirtazapine (REMERON) 15 mg tablet Take 1 tablet (15 mg total) by mouth daily at bedtime 90 tablet 4    Multiple Vitamins-Minerals (MULTIVITAMIN WITH MINERALS) tablet Take 1 tablet by mouth every morning      nystatin (MYCOSTATIN) 500,000 units/5 mL suspension RINSE BY MOUTH WITH 5 MILLILITERS FOR 2 MINUTES 4-5 TIMES DAILY THEN SPIT      ofloxacin (OCUFLOX) 0 3 % ophthalmic solution INSTILL 1 DROP INTO AFFECTED EYE 4 TIMES DAILY      omeprazole (PriLOSEC) 20 mg delayed release capsule TAKE 1 CAPSULE TWICE DAILY 180 capsule 1    pravastatin (PRAVACHOL) 40 mg tablet Take 1 tablet (40 mg total) by mouth daily 90 tablet 3    prednisoLONE acetate (PRED FORTE) 1 % ophthalmic suspension INSTILL 1 DROP INTO AFFECTED  EYE(S) FOUR TIMES A DAY      pregabalin (LYRICA) 50 mg capsule Take 1 capsule (50 mg total) by mouth 3 (three) times a day 90 capsule 1    prochlorperazine (COMPAZINE) 5 mg tablet Take 1 tablet (5 mg total) by mouth every 6 (six) hours as needed for nausea or vomiting 30 tablet 5    spironolactone (ALDACTONE) 50 mg tablet TAKE 1 TABLET DAILY AFTER LUNCH 90 tablet 3     No current facility-administered medications for this visit  Review of Systems   Constitutional: Negative for chills and fever  HENT: Negative for ear pain and sore throat  Eyes: Positive for visual disturbance  Negative for pain  Respiratory: Negative for cough and shortness of breath  Cardiovascular: Negative for chest pain and palpitations  Gastrointestinal: Negative for abdominal pain and vomiting  Genitourinary: Negative for dysuria and hematuria  Musculoskeletal: Positive for arthralgias and back pain  Skin: Negative for color change and rash  Neurological: Negative for seizures and syncope  All other systems reviewed and are negative          Objective:    /74   Pulse 84   Temp (!) 97 °F (36 1 °C)   Resp 20   Ht 5' 3" (1 6 m)   Wt 88 9 kg (196 lb)   BMI 34 72 kg/m² Body mass index is 34 72 kg/m²  Physical Exam  Constitutional:       Appearance: She is well-developed  She is obese  HENT:      Head: Normocephalic  Eyes:      Pupils: Pupils are equal, round, and reactive to light  Cardiovascular:      Rate and Rhythm: Normal rate and regular rhythm  Heart sounds: Murmur heard  Pulmonary:      Effort: Pulmonary effort is normal       Breath sounds: Normal breath sounds  Abdominal:      General: Bowel sounds are normal       Palpations: Abdomen is soft  Tenderness: There is no abdominal tenderness  Musculoskeletal:      Cervical back: Normal range of motion  Skin:     General: Skin is warm  Neurological:      Mental Status: She is alert and oriented to person, place, and time

## 2022-04-04 ENCOUNTER — ANNUAL EXAM (OUTPATIENT)
Dept: OBGYN CLINIC | Facility: CLINIC | Age: 77
End: 2022-04-04
Payer: MEDICARE

## 2022-04-04 VITALS
BODY MASS INDEX: 35.26 KG/M2 | WEIGHT: 199 LBS | DIASTOLIC BLOOD PRESSURE: 70 MMHG | SYSTOLIC BLOOD PRESSURE: 123 MMHG | HEIGHT: 63 IN

## 2022-04-04 DIAGNOSIS — Z01.419 ENCOUNTER FOR WELL WOMAN EXAM WITH ROUTINE GYNECOLOGICAL EXAM: Primary | ICD-10-CM

## 2022-04-04 DIAGNOSIS — Z78.0 ENCOUNTER FOR OSTEOPOROSIS SCREENING IN ASYMPTOMATIC POSTMENOPAUSAL PATIENT: ICD-10-CM

## 2022-04-04 DIAGNOSIS — Z13.820 ENCOUNTER FOR OSTEOPOROSIS SCREENING IN ASYMPTOMATIC POSTMENOPAUSAL PATIENT: ICD-10-CM

## 2022-04-04 PROCEDURE — G0101 CA SCREEN;PELVIC/BREAST EXAM: HCPCS | Performed by: OBSTETRICS & GYNECOLOGY

## 2022-04-04 NOTE — PROGRESS NOTES
ASSESSMENT & PLAN: Mahsa Woodruff is a 78 y o  Vir[place] Hawk with normal gynecologic exam     1   Routine well woman exam done today  2  Pap and HPV:  Not indicated, s/p hysterectomy  Current ASCCP Guidelines reviewed  3   Mammogram ordered  4  Colorectal cancer screening was notordered  5   The following were reviewed in today's visit: breast self exam and mammography screening ordered  CC:  Annual Gynecologic Examination    HPI: Mahsa Woodruff is a 78 y o  Virgil Hawk who presents for annual gynecologic examination  She has the following concerns:  No GYN complaints;   Declined pelvic exam due to discomfort and bleeding in the past    Having corneal transplant next week  Health Maintenance:    She wears her seatbelt routinely  She does perform regular monthly self breast exams  She feels safe at home       Last PAP & HPV: not indicated  Last mammogram: 2022   Last colonoscopy: 2017  DEXA 2016    Past Medical History:   Diagnosis Date    Allergies     Anxiety     Arthritis     Benign arteriolar nephrosclerosis     Bereavement     Cataract     Chronic kidney disease     Chronic kidney disease in type 2 diabetes mellitus (HCC)     Chronic kidney disease, stage 2 (mild)     Chronic pain disorder     Chronic pain syndrome     COPD (chronic obstructive pulmonary disease) ([facility] [address]     DDD (degenerative disc disease), cervical     DDD (degenerative disc disease), lumbar     Degenerative joint disease of right hip     Depression     Diastolic dysfunction     DJD (degenerative joint disease), ankle and foot, right     DJD of right shoulder     Edema     Fracture of left calcaneus     Generalized anxiety disorder     GERD (gastroesophageal reflux disease)     Heart murmur     Hyperaldosteronism (HCC)     Hyperlipidemia     Hypertension     Hypertensive nephrosclerosis     Hypo-osmolality and hyponatremia     IBS (irritable bowel syndrome)     Insomnia     Migraines     Mitral regurgitation     MVP (mitral valve prolapse)     Obstructive sleep apnea syndrome     Osteoarthritis     Osteoarthritis     Pernicious anemia     Plantar fascia rupture     Rheumatoid arthritis (HCC)     Right knee DJD     Shingles     Sinobronchitis     Sleep apnea     Stroke ([address] )     tia    TIA (transient ischemic attack)     Vertigo        Past Surgical History:   Procedure Laterality Date    APPENDECTOMY      CARPAL TUNNEL RELEASE Bilateral     COLONOSCOPY      several    COLONOSCOPY  04/02/2012    by Dr Kartik Casanova    DXA PROCEDURE (HISTORICAL)  10/26/2016, 9/17/2014, 6/18/2007    HAND SURGERY Right     ring finger has pin    HYSTERECTOMY      32    INSERT / REPLACE PERIPHERAL NEUROSTIMULATOR PULSE GENERATOR /  Left     buttocks    JOINT REPLACEMENT Left     knee    JOINT REPLACEMENT Right 04/2019    Hip    KNEE ARTHROPLASTY Left 01/15/2009    by Dr Annemarie Russell at [address] [facility] ARTHROSCOPY Bilateral    [address]  09/10/2009    by Dr Annemarie Russell at [address] [facility] (HISTORICAL)  12/10/2018, 10/30/2017, 10/26/2016    NASAL SEPTUM SURGERY  2008    NERVE BLOCK Left 2/20/2018    Procedure: BLOCK MEDIAL BRANCH - C4, C5, C6;  Surgeon: Corey Arshad MD;  [facility]: [facility];  Service: Pain Management     NERVE BLOCK Left 3/6/2018    Procedure: C4, C5, C6 MEDIAL BRANCH BLOCK;  Surgeon: Corey Arshad MD;  [facility];  Service: Pain Management     CA COLONOSCOPY FLX DX W/COLLJ SPEC WHEN PFRMD N/A 4/24/2017    Procedure: Tracie Hammer;  Surgeon: Kartik Casanova MD;  [facility];  Service: Colorectal    CA TOTAL HIP ARTHROPLASTY Right 2/27/2019    Procedure: ARTHROPLASTY HIP TOTAL;  Surgeon: Shahab Platt DO;  Location: [address] [place] [address] MAIN OR;  Service: Orthopedics    CA TOTAL KNEE ARTHROPLASTY Right 6/24/2020    Procedure: KNEE TOTAL ARTHROPLASTY;  Surgeon: Shahab Platt DO;  Location: [address] [place] [address] MAIN OR;  Service: Orthopedics    RADIOFREQUENCY ABLATION Left 2018    Procedure: ABLATION RADIO FREQUENCY (RFA) - C4, C5, C6;  Surgeon: Magy Doyle MD;  [facility];  Service: Pain Management     REPLACEMENT TOTAL KNEE Left 2011    SINUS SURGERY      TONSILLECTOMY      TOTAL HIP ARTHROPLASTY Left     TRIGGER FINGER RELEASE      R 4th     UPPER GASTROINTESTINAL ENDOSCOPY  2007    with Alverto Escamilla dilatation by Dr Vern Gauthier at [address] [facility] Right        Past OB/Gyn History:  OB History        1    Para   0    Term   0            AB   1    Living           SAB   1    IAB        Ectopic        Multiple        Live Births               Obstetric Comments   2 adopted children           History of abnormal pap smears: No        Family History   Problem Relation Age of Onset    Heart disease Mother     Hypertension Mother    Alma Rosa Moreel Arthritis Mother     Dementia Mother     Rheum arthritis Mother     Hypertension Father     Heart disease Father     Colon cancer Father     Hypertension Sister     Anxiety disorder Sister     Thyroid disease Sister     Prostate cancer Maternal Grandfather     No Known Problems Paternal Grandmother     No Known Problems Paternal Grandfather     No Known Problems Paternal Aunt     Pseudochol deficiency Neg Hx        Social History:  Social History     Socioeconomic History    Marital status: /Civil Union     Spouse name: Not on file    Number of children: Not on file    Years of education: Not on file    Highest education level: Not on file   Occupational History    Occupation: Admnistrator    Tobacco Use    Smoking status: Former Smoker     Types: Cigarettes    Smokeless tobacco: Never Used    Tobacco comment: quit 40 yrs ago   Vaping Use    Vaping Use: Never used   Substance and Sexual Activity    Alcohol use: Yes     Comment: 2 beer a week    Drug use: Never    Sexual activity: Not Currently     Birth control/protection: Post-menopausal   Other Topics Concern    Not on file   Social History Narrative    Patient has never smoked - As per [employer]    Consumes 1-2 beers per week - As per Reddy Rosamaria    Consumes on average 1 cup of decaf coffee per day      Social Determinants of Health     Financial Resource Strain: Not on file   Food Insecurity: Not on file   Transportation Needs: Not on file   Physical Activity: Not on file   Stress: Not on file   Social Connections: Not on file   Intimate Partner Violence: Not on file   Housing Stability: Not on file         Allergies   Allergen Reactions    Procaine Hives    Adhesive [Medical Tape] Rash    Lidocaine Rash    Penicillins Rash         Current Outpatient Medications:     aspirin (ECOTRIN LOW STRENGTH) 81 mg EC tablet, Take 1 tablet (81 mg total) by mouth daily, Disp:  , Rfl:     bisoprolol (ZEBETA) 10 MG tablet, TAKE 1 TABLET EVERY DAY, Disp: 90 tablet, Rfl: 3    butalbital-acetaminophen-caffeine (FIORICET,ESGIC) -40 mg per tablet, Take 1 tablet by mouth every 4 (four) hours as needed for headaches, Disp: 120 tablet, Rfl: 5    calcium carbonate (OS-MARLENY) 600 MG tablet, Take 600 mg by mouth 2 (two) times a day with meals, Disp: , Rfl:     clindamycin (CLEOCIN) 150 mg capsule, take 4 capsules by mouth 1 hour prior to appointment, Disp: , Rfl:     diclofenac sodium (VOLTAREN) 1 %, Apply 2 g topically 4 (four) times a day, Disp: 50 g, Rfl: 5    losartan (COZAAR) 100 MG tablet, TAKE 1 TABLET EVERY MORNING, Disp: 90 tablet, Rfl: 3    methylPREDNISolone 4 MG tablet therapy pack, Use as directed on package, Disp: 21 each, Rfl: 0    mirtazapine (REMERON) 15 mg tablet, Take 1 tablet (15 mg total) by mouth daily at bedtime, Disp: 90 tablet, Rfl: 4    Multiple Vitamins-Minerals (MULTIVITAMIN WITH MINERALS) tablet, Take 1 tablet by mouth every morning, Disp: , Rfl:     nystatin (MYCOSTATIN) 500,000 units/5 mL suspension, RINSE BY MOUTH WITH 5 MILLILITERS FOR 2 MINUTES 4-5 TIMES DAILY THEN SPIT, Disp: , Rfl:     ofloxacin (OCUFLOX) 0 3 % ophthalmic solution, INSTILL 1 DROP INTO AFFECTED EYE 4 TIMES DAILY, Disp: , Rfl:     omeprazole (PriLOSEC) 20 mg delayed release capsule, TAKE 1 CAPSULE TWICE DAILY, Disp: 180 capsule, Rfl: 1    pravastatin (PRAVACHOL) 40 mg tablet, Take 1 tablet (40 mg total) by mouth daily, Disp: 90 tablet, Rfl: 3    prednisoLONE acetate (PRED FORTE) 1 % ophthalmic suspension, INSTILL 1 DROP INTO AFFECTED  EYE(S) FOUR TIMES A DAY, Disp: , Rfl:     prochlorperazine (COMPAZINE) 5 mg tablet, Take 1 tablet (5 mg total) by mouth every 6 (six) hours as needed for nausea or vomiting, Disp: 30 tablet, Rfl: 5    spironolactone (ALDACTONE) 50 mg tablet, TAKE 1 TABLET DAILY AFTER LUNCH, Disp: 90 tablet, Rfl: 3    pregabalin (LYRICA) 50 mg capsule, Take 1 capsule (50 mg total) by mouth 3 (three) times a day, Disp: 90 capsule, Rfl: 1    Review of Systems  Constitutional :no fever, feels well, no tiredness, no recent weight gain or loss  ENT: no ear ache, no loss of hearing, no nosebleeds or nasal discharge, no sore throat or hoarseness  Cardiovascular: no complaints of slow or fast heart beat, no chest pain, no palpitations, no leg claudication or lower extremity edema  Respiratory: no complaints of shortness of shortness of breath, no RÍOS  Breasts:no complaints of breast pain, breast lump, or nipple discharge  Gastrointestinal: no complaints of abdominal pain, constipation, nausea, vomiting, or diarrhea or bloody stools  Genitourinary : no complaints of dysuria, incontinence, pelvic pain, no dysmenorrhea, vaginal discharge or abnormal vaginal bleeding and as noted in HPI  Musculoskeletal: no complaints of arthralgia, no myalgia, no joint swelling or stiffness, no limb pain or swelling    Integumentary: no complaints of skin rash or lesion, itching or dry skin  Neurological: no complaints of headache, no confusion, no numbness or tingling, no dizziness or fainting    Objective      /70 (BP Location: Left arm, Patient Position: Sitting, Cuff Size: Adult)   Ht 5' 3" (1 6 m)   Wt 90 3 kg (199 lb)   BMI 35 25 kg/m²   General:   appears stated age, cooperative, alert normal mood and affect   Neck: normal, supple,trachea midline, no masses   Heart: regular rate and rhythm, S1, S2 normal, no murmur, click, rub or gallop   Lungs: clear to auscultation bilaterally   Breasts: normal appearance, no masses or tenderness, Inspection negative, No nipple retraction or dimpling, No nipple discharge or bleeding, No axillary or supraclavicular adenopathy, Normal to palpation without dominant masses   Abdomen: soft, non-tender, without masses or organomegaly   Vulva: Declined exam   Vagina: Declined exam   Urethra: Declined exam   Cervix: Declined exam   Uterus: Declined exam   Adnexa: Declined exam   Lymphatic palpation of lymph nodes in neck, axilla, groin and/or other locations: no lymphadenopathy or masses noted   Skin normal skin turgor and no rashes     Psychiatric orientation to person, place, and time: normal  mood and affect: normal

## 2022-04-04 NOTE — PROGRESS NOTES
ASSESSMENT & PLAN: Thelma Dickinson is a 78 y o  Vidya Rustam with normal gynecologic exam     1   Routine well woman exam done today  2  Pap and HPV:  Not indicated, s/p hysterectomy  Current ASCCP Guidelines reviewed  3   Mammogram ordered  4  Colorectal cancer screening was notordered  5   The following were reviewed in today's visit: breast self exam and mammography screening ordered  CC:  Annual Gynecologic Examination    HPI: Thelma Dickinson is a 78 y o  Vidya Easton who presents for annual gynecologic examination  She has the following concerns:  No GYN complaints;   Declined pelvic exam due to discomfort and bleeding in the past    Health Maintenance:    She wears her seatbelt routinely  She does perform regular monthly self breast exams  She feels safe at home       Last PAP & HPV: not indicated  Last mammogram: 2018   Last colonoscopy: 2017    Past Medical History:   Diagnosis Date    Allergies     Anxiety     Arthritis     Benign arteriolar nephrosclerosis     Bereavement     Cataract     Chronic kidney disease     Chronic kidney disease in type 2 diabetes mellitus (HCC)     Chronic kidney disease, stage 2 (mild)     Chronic pain disorder     Chronic pain syndrome     COPD (chronic obstructive pulmonary disease) ([address] )     DDD (degenerative disc disease), cervical     DDD (degenerative disc disease), lumbar     Degenerative joint disease of right hip     Depression     Diastolic dysfunction     DJD (degenerative joint disease), ankle and foot, right     DJD of right shoulder     Edema     Fracture of left calcaneus     Generalized anxiety disorder     GERD (gastroesophageal reflux disease)     Heart murmur     Hyperaldosteronism ([address] )     Hyperlipidemia     Hypertension     Hypertensive nephrosclerosis     Hypo-osmolality and hyponatremia     IBS (irritable bowel syndrome)     Insomnia     Migraines     Mitral regurgitation     MVP (mitral valve prolapse)     Obstructive sleep apnea syndrome     Osteoarthritis     Osteoarthritis     Pernicious anemia     Plantar fascia rupture     Rheumatoid arthritis (HCC)     Right knee DJD     Shingles     Sinobronchitis     Sleep apnea     Stroke ([address] )     tia    TIA (transient ischemic attack)     Vertigo        Past Surgical History:   Procedure Laterality Date    APPENDECTOMY      CARPAL TUNNEL RELEASE Bilateral     COLONOSCOPY      several    COLONOSCOPY  04/02/2012    by Dr J Carlos Rivera    DXA PROCEDURE (HISTORICAL)  10/26/2016, 9/17/2014, 6/18/2007    HAND SURGERY Right     ring finger has pin    HYSTERECTOMY      32    INSERT / REPLACE PERIPHERAL NEUROSTIMULATOR PULSE GENERATOR /  Left     buttocks    JOINT REPLACEMENT Left     knee    JOINT REPLACEMENT Right 04/2019    Hip    KNEE ARTHROPLASTY Left 01/15/2009    by Dr Sha Mims at [address] [facility] ARTHROSCOPY Bilateral    [facility]  09/10/2009    by Dr Sha Mims at [address] [facility] (HISTORICAL)  12/10/2018, 10/30/2017, 10/26/2016    NASAL SEPTUM SURGERY  2008    NERVE BLOCK Left 2/20/2018    Procedure: BLOCK MEDIAL BRANCH - C4, C5, C6;  Surgeon: Kay Sims MD;  [facility];  Service: Pain Management     NERVE BLOCK Left 3/6/2018    Procedure: C4, C5, C6 MEDIAL BRANCH BLOCK;  Surgeon: Kay Sims MD;  [facility];  Service: Pain Management     CO COLONOSCOPY FLX DX W/COLLJ SPEC WHEN PFRMD N/A 4/24/2017    Procedure: Heather Vargas;  Surgeon: J Carlos Rivera MD;  [facility];  Service: Colorectal    CO TOTAL HIP ARTHROPLASTY Right 2/27/2019    Procedure: ARTHROPLASTY HIP TOTAL;  Surgeon: Kaden Nuñez DO;  Location: [address] [place] [address] MAIN OR;  Service: Orthopedics    CO TOTAL KNEE ARTHROPLASTY Right 6/24/2020    Procedure: KNEE TOTAL ARTHROPLASTY;  Surgeon: Kaden Nuñez DO;  Location: [address] [place] [address] MAIN OR;  Service: Orthopedics   [facility] ABLATION Left 2018    Procedure: ABLATION RADIO FREQUENCY (RFA) - C4, C5, C6;  Surgeon: Piotr Hurley MD;  [facility];  Service: Pain Management     REPLACEMENT TOTAL KNEE Left 2011    SINUS SURGERY      TONSILLECTOMY      TOTAL HIP ARTHROPLASTY Left     TRIGGER FINGER RELEASE      R 4th     UPPER GASTROINTESTINAL ENDOSCOPY  2007    with Norma Calle dilatation by Dr Aydee Zuñiga at [address] [facility] Right        Past OB/Gyn History:  OB History        1    Para   0    Term   0            AB   1    Living           SAB   1    IAB        Ectopic        Multiple        Live Births               Obstetric Comments   2 adopted children           History of abnormal pap smears: No        Family History   Problem Relation Age of Onset    Heart disease Mother     Hypertension Mother    Phill Flower Arthritis Mother     Dementia Mother     Rheum arthritis Mother     Hypertension Father     Heart disease Father     Colon cancer Father     Hypertension Sister     Anxiety disorder Sister     Thyroid disease Sister     Prostate cancer Maternal Grandfather     No Known Problems Paternal Grandmother     No Known Problems Paternal Grandfather     No Known Problems Paternal Aunt     Pseudochol deficiency Neg Hx        Social History:  Social History     Socioeconomic History    Marital status: /Civil Union     Spouse name: Not on file    Number of children: Not on file    Years of education: Not on file    Highest education level: Not on file   Occupational History    Occupation: Admnistrator    Tobacco Use    Smoking status: Former Smoker     Types: Cigarettes    Smokeless tobacco: Never Used    Tobacco comment: quit 40 yrs ago   Vaping Use    Vaping Use: Never used   Substance and Sexual Activity    Alcohol use: Yes     Comment: 2 beer a week    Drug use: Never    Sexual activity: Not Currently     Birth control/protection: Post-menopausal Other Topics Concern    Not on file   Social History Narrative    Patient has never smoked - As per [employer]    Consumes 1-2 beers per week - As per Rg Yakelin    Consumes on average 1 cup of decaf coffee per day      Social Determinants of Health     Financial Resource Strain: Not on file   Food Insecurity: Not on file   Transportation Needs: Not on file   Physical Activity: Not on file   Stress: Not on file   Social Connections: Not on file   Intimate Partner Violence: Not on file   Housing Stability: Not on file         Allergies   Allergen Reactions    Procaine Hives    Adhesive [Medical Tape] Rash    Lidocaine Rash    Penicillins Rash         Current Outpatient Medications:     aspirin (ECOTRIN LOW STRENGTH) 81 mg EC tablet, Take 1 tablet (81 mg total) by mouth daily, Disp:  , Rfl:     bisoprolol (ZEBETA) 10 MG tablet, TAKE 1 TABLET EVERY DAY, Disp: 90 tablet, Rfl: 3    butalbital-acetaminophen-caffeine (FIORICET,ESGIC) -40 mg per tablet, Take 1 tablet by mouth every 4 (four) hours as needed for headaches, Disp: 120 tablet, Rfl: 5    calcium carbonate (OS-MARLNEY) 600 MG tablet, Take 600 mg by mouth 2 (two) times a day with meals, Disp: , Rfl:     clindamycin (CLEOCIN) 150 mg capsule, take 4 capsules by mouth 1 hour prior to appointment, Disp: , Rfl:     diclofenac sodium (VOLTAREN) 1 %, Apply 2 g topically 4 (four) times a day, Disp: 50 g, Rfl: 5    losartan (COZAAR) 100 MG tablet, TAKE 1 TABLET EVERY MORNING, Disp: 90 tablet, Rfl: 3    methylPREDNISolone 4 MG tablet therapy pack, Use as directed on package, Disp: 21 each, Rfl: 0    mirtazapine (REMERON) 15 mg tablet, Take 1 tablet (15 mg total) by mouth daily at bedtime, Disp: 90 tablet, Rfl: 4    Multiple Vitamins-Minerals (MULTIVITAMIN WITH MINERALS) tablet, Take 1 tablet by mouth every morning, Disp: , Rfl:     nystatin (MYCOSTATIN) 500,000 units/5 mL suspension, RINSE BY MOUTH WITH 5 MILLILITERS FOR 2 MINUTES 4-5 TIMES DAILY THEN SPIT, Disp: , Rfl:     ofloxacin (OCUFLOX) 0 3 % ophthalmic solution, INSTILL 1 DROP INTO AFFECTED EYE 4 TIMES DAILY, Disp: , Rfl:     omeprazole (PriLOSEC) 20 mg delayed release capsule, TAKE 1 CAPSULE TWICE DAILY, Disp: 180 capsule, Rfl: 1    pravastatin (PRAVACHOL) 40 mg tablet, Take 1 tablet (40 mg total) by mouth daily, Disp: 90 tablet, Rfl: 3    prednisoLONE acetate (PRED FORTE) 1 % ophthalmic suspension, INSTILL 1 DROP INTO AFFECTED  EYE(S) FOUR TIMES A DAY, Disp: , Rfl:     prochlorperazine (COMPAZINE) 5 mg tablet, Take 1 tablet (5 mg total) by mouth every 6 (six) hours as needed for nausea or vomiting, Disp: 30 tablet, Rfl: 5    spironolactone (ALDACTONE) 50 mg tablet, TAKE 1 TABLET DAILY AFTER LUNCH, Disp: 90 tablet, Rfl: 3    pregabalin (LYRICA) 50 mg capsule, Take 1 capsule (50 mg total) by mouth 3 (three) times a day, Disp: 90 capsule, Rfl: 1    Review of Systems  Constitutional :no fever, feels well, no tiredness, no recent weight gain or loss  ENT: no ear ache, no loss of hearing, no nosebleeds or nasal discharge, no sore throat or hoarseness  Cardiovascular: no complaints of slow or fast heart beat, no chest pain, no palpitations, no leg claudication or lower extremity edema  Respiratory: no complaints of shortness of shortness of breath, no RÍOS  Breasts:no complaints of breast pain, breast lump, or nipple discharge  Gastrointestinal: no complaints of abdominal pain, constipation, nausea, vomiting, or diarrhea or bloody stools  Genitourinary : no complaints of dysuria, incontinence, pelvic pain, no dysmenorrhea, vaginal discharge or abnormal vaginal bleeding and as noted in HPI  Musculoskeletal: no complaints of arthralgia, no myalgia, no joint swelling or stiffness, no limb pain or swelling    Integumentary: no complaints of skin rash or lesion, itching or dry skin  Neurological: no complaints of headache, no confusion, no numbness or tingling, no dizziness or fainting    Objective      /70 (BP Location: Left arm, Patient Position: Sitting, Cuff Size: Adult)   Ht 5' 3" (1 6 m)   Wt 90 3 kg (199 lb)   BMI 35 25 kg/m²   General:   appears stated age, cooperative, alert normal mood and affect   Neck: normal, supple,trachea midline, no masses   Heart: regular rate and rhythm, S1, S2 normal, no murmur, click, rub or gallop   Lungs: clear to auscultation bilaterally   Breasts: normal appearance, no masses or tenderness, Inspection negative, No nipple retraction or dimpling, No nipple discharge or bleeding, No axillary or supraclavicular adenopathy, Normal to palpation without dominant masses   Abdomen: soft, non-tender, without masses or organomegaly   Vulva: Declined exam   Vagina: Declined exam   Urethra: Declined exam   Cervix: Declined exam   Uterus: Declined exam   Adnexa: Declined exam   Lymphatic palpation of lymph nodes in neck, axilla, groin and/or other locations: no lymphadenopathy or masses noted   Skin normal skin turgor and no rashes     Psychiatric orientation to person, place, and time: normal  mood and affect: normal

## 2022-04-13 DIAGNOSIS — G44.209 MUSCLE TENSION HEADACHE: ICD-10-CM

## 2022-04-13 RX ORDER — BUTALBITAL, ACETAMINOPHEN AND CAFFEINE 50; 325; 40 MG/1; MG/1; MG/1
TABLET ORAL
Qty: 120 TABLET | Refills: 2 | Status: SHIPPED | OUTPATIENT
Start: 2022-04-13

## 2022-05-03 ENCOUNTER — HOSPITAL ENCOUNTER (OUTPATIENT)
Dept: BONE DENSITY | Facility: HOSPITAL | Age: 77
Discharge: HOME | End: 2022-05-03
Attending: OBSTETRICS & GYNECOLOGY
Payer: MEDICARE

## 2022-05-03 DIAGNOSIS — Z78.0 ENCOUNTER FOR OSTEOPOROSIS SCREENING IN ASYMPTOMATIC POSTMENOPAUSAL PATIENT: ICD-10-CM

## 2022-05-03 DIAGNOSIS — Z13.820 ENCOUNTER FOR OSTEOPOROSIS SCREENING IN ASYMPTOMATIC POSTMENOPAUSAL PATIENT: ICD-10-CM

## 2022-05-03 PROCEDURE — 77080 DXA BONE DENSITY AXIAL: CPT

## 2022-05-20 ENCOUNTER — OFFICE VISIT (OUTPATIENT)
Dept: CARDIOLOGY CLINIC | Facility: HOSPITAL | Age: 77
End: 2022-05-20
Payer: MEDICARE

## 2022-05-20 VITALS
BODY MASS INDEX: 35.26 KG/M2 | WEIGHT: 199 LBS | SYSTOLIC BLOOD PRESSURE: 134 MMHG | DIASTOLIC BLOOD PRESSURE: 84 MMHG | HEIGHT: 63 IN | HEART RATE: 58 BPM

## 2022-05-20 DIAGNOSIS — I05.1 RHEUMATIC MITRAL REGURGITATION: ICD-10-CM

## 2022-05-20 DIAGNOSIS — E78.00 HYPERCHOLESTEROLEMIA: ICD-10-CM

## 2022-05-20 DIAGNOSIS — I10 PRIMARY HYPERTENSION: Primary | ICD-10-CM

## 2022-05-20 DIAGNOSIS — E66.01 OBESITY, MORBID (HCC): ICD-10-CM

## 2022-05-20 DIAGNOSIS — I50.40 COMBINED SYSTOLIC AND DIASTOLIC CONGESTIVE HEART FAILURE, UNSPECIFIED HF CHRONICITY (HCC): ICD-10-CM

## 2022-05-20 DIAGNOSIS — I51.89 DIASTOLIC DYSFUNCTION: ICD-10-CM

## 2022-05-20 DIAGNOSIS — G47.33 OSA (OBSTRUCTIVE SLEEP APNEA): ICD-10-CM

## 2022-05-20 PROCEDURE — 93000 ELECTROCARDIOGRAM COMPLETE: CPT | Performed by: INTERNAL MEDICINE

## 2022-05-20 PROCEDURE — 99214 OFFICE O/P EST MOD 30 MIN: CPT | Performed by: INTERNAL MEDICINE

## 2022-05-20 NOTE — PROGRESS NOTES
Cardiology Follow Up    Thelma Dickinson  1945  [id number]  [address]    1  Primary hypertension  POCT ECG   2  Rheumatic mitral regurgitation     3  CLARKE (obstructive sleep apnea)     4  Hypercholesterolemia     5  Diastolic dysfunction     6  Combined systolic and diastolic congestive heart failure, unspecified HF chronicity (HCC)     7  Obesity, morbid (HCC)         Discussion/Summary:   Overall she has been doing well from a cardiac standpoint blood pressure is much better controlled and it has been historically  She is euvolemic on exam   Denies any symptoms  Continue current surveillance recent echocardiogram was reviewed with the patient mitral regurgitation is only mild  No signs of volume overload  I will see her back in 6 months  Interval History:  72-year-old female with difficult-to-control hypertension, mitral regurgitation, chronic diastolic congestive heart failure presents for follow-up visit  She has been under a lot of stress since her last exam   There have been several deaths in her mediated family and she has had a hard time handling this  From a cardiac standpoint she has been rather comfortable  Breathing has been stable  Since last office visit she underwent cataract surgery and had a complication requiring cornea transplant  Overall she has been doing well cardiac-wise and denies any complaints  There has been no exertional chest pain or pressure, shortness of breath, palpitations, lightheadedness, dizziness, or syncope  There has been no lower extremity edema, PND, orthopnea    Problem List     Diastolic dysfunction    Hypercholesterolemia    Hypertension    Mitral regurgitation    Overview Signed 2/8/2018  8:25 AM by Milo Alba DO     Description: Moderate         Shortness of breath on exertion    Cervical spondylosis without myelopathy    Overview Signed 2/13/2018 8:13 AM by Bill Sol MA     Added automatically from request for surgery [id number]         Chronic pain syndrome        Past Medical History:   Diagnosis Date    Allergies     Anxiety     Arthritis     Benign arteriolar nephrosclerosis     Bereavement     Cataract     Chronic kidney disease     Chronic kidney disease in type 2 diabetes mellitus (HCC)     Chronic kidney disease, stage 2 (mild)     Chronic pain disorder     Chronic pain syndrome     COPD [facility]ch[facility] disease) ([facility] )     DDD (degenerative disc disease), cervical     DDD (degenerative disc disease), lumbar     Degenerative joint disease of right hip     Depression     Diastolic dysfunction     DJD (degenerative joint disease), ankle and foot, right     DJD of right shoulder     Edema     Fracture of left calcaneus     Generalized anxiety disorder     GERD (gastroesophageal reflux disease)     Heart murmur     Hyperaldosteronism (HCC)     Hyperlipidemia     Hypertension     Hypertensive nephrosclerosis     Hypo-osmolality and hyponatremia     IBS (irritable bowel syndrome)     Insomnia     Migraines     Mitral regurgitation     MVP (mitral valve prolapse)     Obstructive sleep apnea syndrome     Osteoarthritis     Osteoarthritis     Pernicious anemia     Plantar fascia rupture     [facility]     Right knee DJD     Shingles     Sinobronchitis     Sleep apnea     Stroke ([place] [address] )     tia    TIA (transient ischemic attack)     Vertigo      Social History     Socioeconomic History    Marital status: /Civil Union     Spouse name: Not on file    Number of children: Not on file    Years of education: Not on file    Highest education level: Not on file   Occupational History    Occupation: Admnistrator    Tobacco Use    Smoking status: Former Smoker     Types: Cigarettes    Smokeless tobacco: Never Used    Tobacco comment: quit 40 yrs ago   Vaping Use    Vaping Use: Never used   Substance and Sexual Activity    Alcohol use: Yes     Comment: 2 beer a week    Drug use: Never    Sexual activity: Not Currently     Birth control/protection: Post-menopausal   Other Topics Concern    Not on file   Social History Narrative    Patient has never smoked - As per [employer]    Consumes 1-2 beers per week - As per [employer]    Consumes on average 1 cup of decaf coffee per day      Social Determinants of Health     Financial Resource Strain: Not on file   Food Insecurity: Not on file   Transportation Needs: Not on file   Physical Activity: Not on file   Stress: Not on file   Social Connections: Not on file   Intimate Partner Violence: Not on file   Housing Stability: Not on file      Family History   Problem Relation Age of Onset    Heart disease Mother     Hypertension Mother    Holger Flower Arthritis Mother     Dementia Mother     Rheum arthritis Mother     Hypertension Father     Heart disease Father     Colon cancer Father     Hypertension Sister     Anxiety disorder Sister     Thyroid disease Sister     Prostate cancer Maternal Grandfather     No Known Problems Paternal Grandmother     No Known Problems Paternal Grandfather     No Known Problems Paternal Aunt     Pseudochol deficiency Neg Hx      Past Surgical History:   Procedure Laterality Date    APPENDECTOMY      CARPAL TUNNEL RELEASE Bilateral     COLONOSCOPY      several    COLONOSCOPY  04/02/2012    by Dr Erika Faith (HISTORICAL)  10/26/2016, 9/17/2014, 6/18/2007    HAND SURGERY Right     ring finger has pin    HYSTERECTOMY      32    INSERT / REPLACE PERIPHERAL NEUROSTIMULATOR PULSE GENERATOR /  Left     buttocks    JOINT REPLACEMENT Left     knee    JOINT REPLACEMENT Right 04/2019    Hip    KNEE ARTHROPLASTY Left 01/15/2009    by Dr Sha Mims at [address] [place] [address] ARTHROSCOPY Bilateral    [address]  09/10/2009    by Dr Sha Mims at [place] [address] (HISTORICAL)  12/10/2018, 10/30/2017, 10/26/2016    NASAL SEPTUM SURGERY  2008    NERVE BLOCK Left 2/20/2018    Procedure: BLOCK MEDIAL BRANCH - C4, C5, C6;  Surgeon: Ricky Hobson MD;  Location: [facility];  Service: Pain Management     NERVE BLOCK Left 3/6/2018    Procedure: C4, C5, C6 MEDIAL BRANCH BLOCK;  Surgeon: Ricky Hobson MD;  [facility]: [facility];  Service: Pain Management     NE COLONOSCOPY FLX DX W/COLLJ SPEC WHEN PFRMD N/A 4/24/2017    Procedure: Obi Meter;  Surgeon: Dank Perez MD;  Location: [facility];  Service: Colorectal    NE TOTAL HIP ARTHROPLASTY Right 2/27/2019    Procedure: ARTHROPLASTY HIP TOTAL;  Surgeon: Arcenio Gil DO;  Location: [address] [place] [address] MAIN OR;  Service: Orthopedics    NE TOTAL KNEE ARTHROPLASTY Right 6/24/2020    Procedure: KNEE TOTAL ARTHROPLASTY;  Surgeon: Arcenio Gil DO;  Location: [address] [place] [address] MAIN OR;  Service: Orthopedics    RADIOFREQUENCY ABLATION Left 4/17/2018    Procedure: ABLATION RADIO FREQUENCY (RFA) - C4, C5, C6;  Surgeon: Ricky Hobson MD;  [facility]: [facility];  Service: Pain Management     REPLACEMENT TOTAL KNEE Left 07/05/2011    SINUS SURGERY      TONSILLECTOMY      TOTAL HIP ARTHROPLASTY Left     TRIGGER FINGER RELEASE      R 4th     UPPER GASTROINTESTINAL ENDOSCOPY  01/12/2007    with Purnima Ya dilatation by Dr Aman John at [address] Right        Current Outpatient Medications:     aspirin (ECOTRIN LOW STRENGTH) 81 mg EC tablet, Take 1 tablet (81 mg total) by mouth daily, Disp:  , Rfl:     bisoprolol (ZEBETA) 10 MG tablet, TAKE 1 TABLET EVERY DAY, Disp: 90 tablet, Rfl: 3    butalbital-acetaminophen-caffeine (FIORICET,ESGIC) -40 mg per tablet, TAKE 1 TABLET EVERY 4 HOURS AS NEEDED FOR HEADACHES, Disp: 120 tablet, Rfl: 2    calcium carbonate (OS-MARLENY) 600 MG tablet, Take 600 mg by mouth 2 (two) times a day with meals, Disp: , Rfl:     clindamycin (CLEOCIN) 150 mg capsule, take 4 capsules by mouth 1 hour prior to appointment, Disp: , Rfl:     losartan (COZAAR) 100 MG tablet, TAKE 1 TABLET EVERY MORNING, Disp: 90 tablet, Rfl: 3    methylPREDNISolone 4 MG tablet therapy pack, Use as directed on package, Disp: 21 each, Rfl: 0    mirtazapine (REMERON) 15 mg tablet, Take 1 tablet (15 mg total) by mouth daily at bedtime, Disp: 90 tablet, Rfl: 4    Multiple Vitamins-Minerals (MULTIVITAMIN WITH MINERALS) tablet, Take 1 tablet by mouth every morning, Disp: , Rfl:     nystatin (MYCOSTATIN) 500,000 units/5 mL suspension, RINSE BY MOUTH WITH 5 MILLILITERS FOR 2 MINUTES 4-5 TIMES DAILY THEN SPIT, Disp: , Rfl:     ofloxacin (OCUFLOX) 0 3 % ophthalmic solution, INSTILL 1 DROP INTO AFFECTED EYE 4 TIMES DAILY, Disp: , Rfl:     omeprazole (PriLOSEC) 20 mg delayed release capsule, TAKE 1 CAPSULE TWICE DAILY, Disp: 180 capsule, Rfl: 1    pravastatin (PRAVACHOL) 40 mg tablet, Take 1 tablet (40 mg total) by mouth daily, Disp: 90 tablet, Rfl: 3    prednisoLONE acetate (PRED FORTE) 1 % ophthalmic suspension, INSTILL 1 DROP INTO AFFECTED  EYE(S) FOUR TIMES A DAY, Disp: , Rfl:     prochlorperazine (COMPAZINE) 5 mg tablet, Take 1 tablet (5 mg total) by mouth every 6 (six) hours as needed for nausea or vomiting, Disp: 30 tablet, Rfl: 5    spironolactone (ALDACTONE) 50 mg tablet, TAKE 1 TABLET DAILY AFTER LUNCH, Disp: 90 tablet, Rfl: 3    diclofenac sodium (VOLTAREN) 1 %, Apply 2 g topically 4 (four) times a day (Patient not taking: Reported on 5/20/2022), Disp: 50 g, Rfl: 5    pregabalin (LYRICA) 50 mg capsule, Take 1 capsule (50 mg total) by mouth 3 (three) times a day (Patient not taking: Reported on 5/20/2022), Disp: 90 capsule, Rfl: 1  Allergies   Allergen Reactions    Procaine Hives    Adhesive [Medical Tape] Rash    Lidocaine Rash    Penicillins Rash       Labs:     Chemistry        Component Value Date/Time     (L) 09/21/2015 1057    K 4 1 11/18/2021 0709    K 4 3 09/21/2015 1057    CL 95 (L) 11/18/2021 0709    CL 94 (L) 09/21/2015 1057    CO2 28 11/18/2021 0709    CO2 31 2 09/21/2015 1057    BUN 11 11/18/2021 0709    BUN 8 09/21/2015 1057    CREATININE 0 97 11/18/2021 0709    CREATININE 0 74 09/21/2015 1057        Component Value Date/Time    CALCIUM 9 2 11/18/2021 0709    CALCIUM 9 5 09/21/2015 1057    ALKPHOS 133 (H) 11/18/2021 0709    ALKPHOS 123 (H) 09/21/2015 1057    AST 19 11/18/2021 0709    AST 21 09/21/2015 1057    ALT 27 11/18/2021 0709    ALT 34 09/21/2015 1057    BILITOT 0 29 09/21/2015 1057            Lab Results   Component Value Date    CHOL 290 06/04/2015    CHOL 240 10/19/2014    CHOL 277 03/12/2014     Lab Results   Component Value Date    HDL 86 11/18/2021     (H) 06/08/2016     (H) 01/27/2016     Lab Results   Component Value Date    LDLCALC 121 (H) 11/18/2021    LDLCALC 121 (H) 06/08/2016    LDLCALC 126 (H) 01/27/2016     Lab Results   Component Value Date    TRIG 67 11/18/2021    TRIG 49 06/08/2016    TRIG 56 01/27/2016     No results found for: CHOLHDL    Imaging: No results found  ECG:  Normal sinus rhythm unremarkable tracing      Review of Systems   Constitutional: Negative  HENT: Negative  Eyes: Negative  Cardiovascular: Negative  Respiratory: Negative  Endocrine: Negative  Hematologic/Lymphatic: Negative  Skin: Negative  Musculoskeletal: Negative  Gastrointestinal: Negative  Genitourinary: Negative  Neurological: Positive for dizziness, headaches and loss of balance  Psychiatric/Behavioral: Negative  Vitals:    05/20/22 1450   BP: 134/84   Pulse: 58     Vitals:    05/20/22 1450   Weight: 90 3 kg (199 lb)     Height: 5' 3" (160 cm)   Body mass index is 35 25 kg/m²      Physical Exam:  Vital signs reviewed  General:  Alert and cooperative, appears stated age, no acute distress  HEENT:  PERRLA, EOMI, no scleral icterus, no conjunctival pallor  Neck:  No lymphadenopathy, no thyromegaly, no carotid bruits, no elevated JVP  Heart:  Regular rate and rhythm, normal S1/S2, no S3/S4, no murmur, rubs or gallops  PMI nondisplaced  Lungs:  Clear to auscultation bilaterally, no wheezes rales or rhonchi  Abdomen:  Soft, non-tender, positive bowel sounds, no rebound or guarding,   no organomegaly   Extremities:  Normal range of motion    No clubbing, cyanosis or edema   Vascular:  2+ pedal pulses  Skin:  No rashes or lesions on exposed skin  Neurologic:  Cranial nerves II-XII grossly intact without focal deficits  Psych:  Normal mood and affect

## 2022-05-25 DIAGNOSIS — E78.00 HYPERCHOLESTEROLEMIA: ICD-10-CM

## 2022-05-25 RX ORDER — PRAVASTATIN SODIUM 40 MG
40 TABLET ORAL DAILY
Qty: 90 TABLET | Refills: 3 | Status: SHIPPED | OUTPATIENT
Start: 2022-05-25

## 2022-05-30 DIAGNOSIS — K21.9 GASTROESOPHAGEAL REFLUX DISEASE WITHOUT ESOPHAGITIS: ICD-10-CM

## 2022-05-30 RX ORDER — OMEPRAZOLE 20 MG/1
CAPSULE, DELAYED RELEASE ORAL
Qty: 180 CAPSULE | Refills: 1 | Status: SHIPPED | OUTPATIENT
Start: 2022-05-30

## 2022-07-07 ENCOUNTER — TELEPHONE (OUTPATIENT)
Dept: NEPHROLOGY | Facility: CLINIC | Age: 77
End: 2022-07-07

## 2022-07-07 DIAGNOSIS — E87.1 HYPONATREMIA: ICD-10-CM

## 2022-07-07 DIAGNOSIS — N18.2 STAGE 2 CHRONIC KIDNEY DISEASE: Primary | ICD-10-CM

## 2022-07-07 DIAGNOSIS — I10 ESSENTIAL HYPERTENSION: ICD-10-CM

## 2022-07-11 ENCOUNTER — RA CDI HCC (OUTPATIENT)
Dept: OTHER | Facility: HOSPITAL | Age: 77
End: 2022-07-11

## 2022-07-12 ENCOUNTER — APPOINTMENT (OUTPATIENT)
Dept: LAB | Facility: HOSPITAL | Age: 77
End: 2022-07-12
Payer: MEDICARE

## 2022-07-12 DIAGNOSIS — I10 ESSENTIAL HYPERTENSION: ICD-10-CM

## 2022-07-12 DIAGNOSIS — N18.2 STAGE 2 CHRONIC KIDNEY DISEASE: ICD-10-CM

## 2022-07-12 DIAGNOSIS — E87.1 HYPONATREMIA: ICD-10-CM

## 2022-07-12 LAB
ANION GAP SERPL CALCULATED.3IONS-SCNC: 6 MMOL/L (ref 4–13)
BACTERIA UR QL AUTO: ABNORMAL /HPF
BILIRUB UR QL STRIP: NEGATIVE
BUN SERPL-MCNC: 11 MG/DL (ref 5–25)
CALCIUM SERPL-MCNC: 8.8 MG/DL (ref 8.3–10.1)
CHLORIDE SERPL-SCNC: 93 MMOL/L (ref 100–108)
CLARITY UR: CLEAR
CO2 SERPL-SCNC: 29 MMOL/L (ref 21–32)
COLOR UR: YELLOW
CREAT SERPL-MCNC: 1.06 MG/DL (ref 0.6–1.3)
CREAT UR-MCNC: 21.2 MG/DL
CREAT UR-MCNC: 21.2 MG/DL
GFR SERPL CREATININE-BSD FRML MDRD: 51 ML/MIN/1.73SQ M
GLUCOSE P FAST SERPL-MCNC: 93 MG/DL (ref 65–99)
GLUCOSE UR STRIP-MCNC: NEGATIVE MG/DL
HGB UR QL STRIP.AUTO: NEGATIVE
KETONES UR STRIP-MCNC: NEGATIVE MG/DL
LEUKOCYTE ESTERASE UR QL STRIP: ABNORMAL
MICROALBUMIN UR-MCNC: <5 MG/L (ref 0–20)
MICROALBUMIN/CREAT 24H UR: <24 MG/G CREATININE (ref 0–30)
NITRITE UR QL STRIP: NEGATIVE
NON-SQ EPI CELLS URNS QL MICRO: ABNORMAL /HPF
PH UR STRIP.AUTO: 6.5 [PH]
POTASSIUM SERPL-SCNC: 4.4 MMOL/L (ref 3.5–5.3)
PROT UR STRIP-MCNC: NEGATIVE MG/DL
PROT UR-MCNC: <6 MG/DL
PROT/CREAT UR: <0.28 MG/G{CREAT} (ref 0–0.1)
RBC #/AREA URNS AUTO: ABNORMAL /HPF
SODIUM SERPL-SCNC: 128 MMOL/L (ref 136–145)
SP GR UR STRIP.AUTO: <=1.005 (ref 1–1.03)
UROBILINOGEN UR QL STRIP.AUTO: 0.2 E.U./DL
WBC #/AREA URNS AUTO: ABNORMAL /HPF

## 2022-07-12 PROCEDURE — 84156 ASSAY OF PROTEIN URINE: CPT

## 2022-07-12 PROCEDURE — 36415 COLL VENOUS BLD VENIPUNCTURE: CPT

## 2022-07-12 PROCEDURE — 80048 BASIC METABOLIC PNL TOTAL CA: CPT

## 2022-07-12 PROCEDURE — 82570 ASSAY OF URINE CREATININE: CPT

## 2022-07-12 PROCEDURE — 81001 URINALYSIS AUTO W/SCOPE: CPT

## 2022-07-12 PROCEDURE — 82043 UR ALBUMIN QUANTITATIVE: CPT

## 2022-07-13 DIAGNOSIS — E87.1 HYPONATREMIA: Primary | ICD-10-CM

## 2022-07-15 ENCOUNTER — OFFICE VISIT (OUTPATIENT)
Dept: FAMILY MEDICINE CLINIC | Facility: CLINIC | Age: 77
End: 2022-07-15
Payer: MEDICARE

## 2022-07-15 ENCOUNTER — APPOINTMENT (OUTPATIENT)
Dept: LAB | Facility: HOSPITAL | Age: 77
End: 2022-07-15
Payer: MEDICARE

## 2022-07-15 VITALS
DIASTOLIC BLOOD PRESSURE: 80 MMHG | SYSTOLIC BLOOD PRESSURE: 124 MMHG | BODY MASS INDEX: 35.08 KG/M2 | HEIGHT: 63 IN | WEIGHT: 198 LBS | RESPIRATION RATE: 16 BRPM | TEMPERATURE: 96.3 F | HEART RATE: 84 BPM

## 2022-07-15 DIAGNOSIS — E87.1 HYPONATREMIA: ICD-10-CM

## 2022-07-15 DIAGNOSIS — N18.31 STAGE 3A CHRONIC KIDNEY DISEASE (HCC): ICD-10-CM

## 2022-07-15 DIAGNOSIS — E26.9 HYPERALDOSTERONISM (HCC): ICD-10-CM

## 2022-07-15 DIAGNOSIS — M25.559 HIP PAIN: Primary | ICD-10-CM

## 2022-07-15 DIAGNOSIS — G89.4 CHRONIC PAIN DISORDER: ICD-10-CM

## 2022-07-15 DIAGNOSIS — Z96.651 HISTORY OF TOTAL KNEE ARTHROPLASTY, RIGHT: ICD-10-CM

## 2022-07-15 DIAGNOSIS — G44.209 MUSCLE TENSION HEADACHE: ICD-10-CM

## 2022-07-15 DIAGNOSIS — Z96.651 STATUS POST TOTAL RIGHT KNEE REPLACEMENT: ICD-10-CM

## 2022-07-15 DIAGNOSIS — Z96.641 HISTORY OF TOTAL HIP ARTHROPLASTY, RIGHT: ICD-10-CM

## 2022-07-15 DIAGNOSIS — E66.01 CLASS 2 SEVERE OBESITY DUE TO EXCESS CALORIES WITH SERIOUS COMORBIDITY AND BODY MASS INDEX (BMI) OF 35.0 TO 35.9 IN ADULT (HCC): ICD-10-CM

## 2022-07-15 DIAGNOSIS — M51.36 DDD (DEGENERATIVE DISC DISEASE), LUMBAR: ICD-10-CM

## 2022-07-15 DIAGNOSIS — Z96.642 HISTORY OF TOTAL HIP ARTHROPLASTY, LEFT: ICD-10-CM

## 2022-07-15 DIAGNOSIS — I10 PRIMARY HYPERTENSION: ICD-10-CM

## 2022-07-15 DIAGNOSIS — J43.9 PULMONARY EMPHYSEMA, UNSPECIFIED EMPHYSEMA TYPE (HCC): ICD-10-CM

## 2022-07-15 DIAGNOSIS — I50.40 COMBINED SYSTOLIC AND DIASTOLIC CONGESTIVE HEART FAILURE, UNSPECIFIED HF CHRONICITY (HCC): ICD-10-CM

## 2022-07-15 LAB
ANION GAP SERPL CALCULATED.3IONS-SCNC: 7 MMOL/L (ref 4–13)
BUN SERPL-MCNC: 12 MG/DL (ref 5–25)
CALCIUM SERPL-MCNC: 9.2 MG/DL (ref 8.3–10.1)
CHLORIDE SERPL-SCNC: 94 MMOL/L (ref 100–108)
CO2 SERPL-SCNC: 28 MMOL/L (ref 21–32)
CREAT SERPL-MCNC: 1.03 MG/DL (ref 0.6–1.3)
GFR SERPL CREATININE-BSD FRML MDRD: 53 ML/MIN/1.73SQ M
GLUCOSE P FAST SERPL-MCNC: 94 MG/DL (ref 65–99)
POTASSIUM SERPL-SCNC: 4.6 MMOL/L (ref 3.5–5.3)
SODIUM SERPL-SCNC: 129 MMOL/L (ref 136–145)

## 2022-07-15 PROCEDURE — 99214 OFFICE O/P EST MOD 30 MIN: CPT | Performed by: FAMILY MEDICINE

## 2022-07-15 PROCEDURE — 36415 COLL VENOUS BLD VENIPUNCTURE: CPT

## 2022-07-15 PROCEDURE — 80048 BASIC METABOLIC PNL TOTAL CA: CPT

## 2022-07-15 NOTE — PROGRESS NOTES
Assessment/Plan:  Hip pain and lower back pain will perform an x-ray of the right hip and the lumbar spine    Muscle tension headache medically the treated with Fioricet PDMP has been reviewed no issues found no evidence of divergence or abuse    Primary hypertension with blood pressure controlled on the current regimen    Class 1 obesity due to excess calories with a BMI of 35 07 diet has been discussed    Combined systolic and diastolic congestive heart failure currently treated with Zebeta 10 mg Cozaar 100 mg and Aldactone 50 mg    Dyslipidemia treated with Pravachol with the lipid panel in a desirable profile    Chronic kidney disease stage IIIA patient was advised to avoid nonsteroidal anti-inflammatories    History of bilateral knee arthroplasties and knee arthroplasty right patient was advised to have a re-evaluation of her hip and back pain done by Orthopedics x-rays have been ordered prior to the visit    History of hyper aldosteronism on Aldactone by Nephrology    Problem List Items Addressed This Visit        Endocrine    Hyperaldosteronism ([address] )       Respiratory    Pulmonary emphysema, unspecified emphysema type ([address] )       Cardiovascular and Mediastinum    Hypertension    Combined systolic and diastolic congestive heart failure, unspecified HF chronicity ([address] )       Musculoskeletal and Integument    DDD (degenerative disc disease), lumbar    Relevant Orders    XR spine lumbar minimum 4 views non injury       Genitourinary    Stage 3a chronic kidney disease ([address] )       Other    Chronic pain disorder    Status post total right knee replacement      Other Visit Diagnoses     Hip pain    -  Primary    Relevant Orders    XR hip/pelv 2-3 vws right    Muscle tension headache        Class 2 severe obesity due to excess calories with serious comorbidity and body mass index (BMI) of 35 0 to 35 9 in adult [facility])        History of total hip arthroplasty, right        History of total knee arthroplasty, right History of total hip arthroplasty, left               Diagnoses and all orders for this visit:    Hip pain  -     Cancel: XR hip/pelv 2-3 vws left; Future  -     XR hip/pelv 2-3 vws right; Future    Muscle tension headache    Primary hypertension    Stage 3a chronic kidney disease (HCC)    Pulmonary emphysema, unspecified emphysema type (HCC)    Chronic pain disorder    Combined systolic and diastolic congestive heart failure, unspecified HF chronicity (HCC)    Class 2 severe obesity due to excess calories with serious comorbidity and body mass index (BMI) of 35 0 to 35 9 in adult [facility])    Status post total right knee replacement    DDD (degenerative disc disease), lumbar  -     XR spine lumbar minimum 4 views non injury; Future    History of total hip arthroplasty, right    History of total knee arthroplasty, right    History of total hip arthroplasty, left    Hyperaldosteronism (HCC)      No problem-specific Assessment & Plan notes found for this encounter  PHQ-2/9 Depression Screening            Body mass index is 35 07 kg/m²  BMI Counseling: Body mass index is 35 07 kg/m²  The BMI is above normal  Nutrition recommendations include reducing portion sizes, decreasing overall calorie intake, 3-5 servings of fruits/vegetables daily, reducing fast food intake, consuming healthier snacks, decreasing soda and/or juice intake, moderation in carbohydrate intake, increasing intake of lean protein, reducing intake of saturated fat and trans fat and reducing intake of cholesterol  Subjective:      Patient ID: Lexie Sinclair is a 78 y o  female  Patient presents with a chief complaint of right lower back pain and right hip pain states that her spinal cord stimulator his nonfunctioning and that she has discussed this with pain management  She states she does not want replacement because the original did not help        The following portions of the patient's history were reviewed and updated as appropriate: She has a past medical history of Allergies, Anxiety, Arthritis, Benign arteriolar nephrosclerosis, Bereavement, Cataract, Chronic kidney disease, Chronic kidney disease in type 2 diabetes mellitus ([facility] ), Chronic kidney disease, stage 2 (mild), Chronic pain disorder, Chronic pain syndrome, COPD (chronic obstructive pulmonary disease) ([facility] ), DDD (degenerative disc disease), cervical, DDD (degenerative disc disease), lumbar, Degenerative joint disease of right hip, Depression, Diastolic dysfunction, DJD (degenerative joint disease), ankle and foot, right, DJD of right shoulder, Edema, Fracture of left calcaneus, Generalized anxiety disorder, GERD (gastroesophageal reflux disease), Heart murmur, Hyperaldosteronism ([facility] ), Hyperlipidemia, Hypertension, Hypertensive nephrosclerosis, Hypo-osmolality and hyponatremia, IBS (irritable bowel syndrome), Insomnia, Migraines, Mitral regurgitation, MVP (mitral valve prolapse), Obstructive sleep apnea syndrome, Osteoarthritis, Osteoarthritis, Pernicious anemia, Plantar fascia rupture, Rheumatoid arthritis ([facility] ), Right knee DJD, Shingles, Sinobronchitis, Sleep apnea, Stroke ([facility] ), TIA (transient ischemic attack), and Vertigo  ,  does not have any pertinent problems on file  ,   has a past surgical history that includes Appendectomy; Sinus surgery; Insert / replace peripheral neurostimulator pulse generator /  (Left); pr colonoscopy flx dx w/collj spec when pfrmd (N/A, 4/24/2017); NERVE BLOCK (Left, 2/20/2018); NERVE BLOCK (Left, 3/6/2018); Radiofrequency ablation (Left, 4/17/2018); Colonoscopy; Upper gastrointestinal endoscopy (01/12/2007); Knee arthroscopy (Bilateral); Hand surgery (Right); pr total hip arthroplasty (Right, 2/27/2019); Nasal septum surgery (2008); Carpal tunnel release (Bilateral); Total hip arthroplasty (Left); Hysterectomy; Tonsillectomy; Joint replacement (Left); Joint replacement (Right, 04/2019); pr total knee arthroplasty (Right, 6/24/2020);  Wrist surgery (Right); Knee cartilage surgery (09/10/2009); Replacement total knee (Left, 07/05/2011); Knee Arthroplasty (Left, 01/15/2009); Colonoscopy (04/02/2012); DXA procedure(historical) (10/26/2016, 9/17/2014, 6/18/2007); Mammo (historical) (12/10/2018, 10/30/2017, 10/26/2016); and Trigger finger release  ,  family history includes Anxiety disorder in her sister; Arthritis in her mother; Colon cancer in her father; Dementia in her mother; Heart disease in her father and mother; Hypertension in her father, mother, and sister; No Known Problems in her paternal aunt, paternal grandfather, and paternal grandmother; Prostate cancer in her maternal grandfather; Rheum arthritis in her mother; Thyroid disease in her sister  ,   reports that she has quit smoking  Her smoking use included cigarettes  She has never used smokeless tobacco  She reports current alcohol use  She reports that she does not use drugs  ,  is allergic to procaine, adhesive [medical tape], lidocaine, and penicillins     Current Outpatient Medications   Medication Sig Dispense Refill    aspirin (ECOTRIN LOW STRENGTH) 81 mg EC tablet Take 1 tablet (81 mg total) by mouth daily      bisoprolol (ZEBETA) 10 MG tablet TAKE 1 TABLET EVERY DAY 90 tablet 3    butalbital-acetaminophen-caffeine (FIORICET,ESGIC) -40 mg per tablet TAKE 1 TABLET EVERY 4 HOURS AS NEEDED FOR HEADACHES 120 tablet 2    calcium carbonate (OS-MARLENY) 600 MG tablet Take 600 mg by mouth 2 (two) times a day with meals      clindamycin (CLEOCIN) 150 mg capsule take 4 capsules by mouth 1 hour prior to appointment      diclofenac sodium (VOLTAREN) 1 % Apply 2 g topically 4 (four) times a day (Patient not taking: Reported on 5/20/2022) 50 g 5    losartan (COZAAR) 100 MG tablet TAKE 1 TABLET EVERY MORNING 90 tablet 3    methylPREDNISolone 4 MG tablet therapy pack Use as directed on package (Patient not taking: Reported on 7/15/2022) 21 each 0    mirtazapine (REMERON) 15 mg tablet Take 1 tablet (15 mg total) by mouth daily at bedtime 90 tablet 4    Multiple Vitamins-Minerals (MULTIVITAMIN WITH MINERALS) tablet Take 1 tablet by mouth every morning      nystatin (MYCOSTATIN) 500,000 units/5 mL suspension RINSE BY MOUTH WITH 5 MILLILITERS FOR 2 MINUTES 4-5 TIMES DAILY THEN SPIT      ofloxacin (OCUFLOX) 0 3 % ophthalmic solution INSTILL 1 DROP INTO AFFECTED EYE 4 TIMES DAILY      omeprazole (PriLOSEC) 20 mg delayed release capsule TAKE 1 CAPSULE TWICE DAILY 180 capsule 1    pravastatin (PRAVACHOL) 40 mg tablet TAKE 1 TABLET (40 MG TOTAL) BY MOUTH DAILY 90 tablet 3    prednisoLONE acetate (PRED FORTE) 1 % ophthalmic suspension INSTILL 1 DROP INTO AFFECTED  EYE(S) FOUR TIMES A DAY      pregabalin (LYRICA) 50 mg capsule Take 1 capsule (50 mg total) by mouth 3 (three) times a day (Patient not taking: Reported on 5/20/2022) 90 capsule 1    prochlorperazine (COMPAZINE) 5 mg tablet Take 1 tablet (5 mg total) by mouth every 6 (six) hours as needed for nausea or vomiting 30 tablet 5    spironolactone (ALDACTONE) 50 mg tablet TAKE 1 TABLET DAILY AFTER LUNCH 90 tablet 3     No current facility-administered medications for this visit  Review of Systems   Constitutional: Negative for chills and fever  HENT: Negative for ear pain and sore throat  Eyes: Negative for pain and visual disturbance  Respiratory: Negative for cough and shortness of breath  Cardiovascular: Negative for chest pain and palpitations  Gastrointestinal: Negative for abdominal pain and vomiting  Genitourinary: Negative for dysuria and hematuria  Musculoskeletal: Positive for arthralgias and gait problem  Negative for back pain  Right lower back and right hip pain   Skin: Negative for color change and rash  Neurological: Negative for seizures and syncope  All other systems reviewed and are negative          Objective:    /80   Pulse 84   Temp (!) 96 3 °F (35 7 °C)   Resp 16   Ht 5' 3" (1 6 m)   Wt 89 8 kg (198 lb)   BMI 35 07 kg/m²   Body mass index is 35 07 kg/m²  Physical Exam  Constitutional:       Appearance: She is well-developed  She is obese  HENT:      Head: Normocephalic  Eyes:      Pupils: Pupils are equal, round, and reactive to light  Cardiovascular:      Rate and Rhythm: Normal rate and regular rhythm  Heart sounds: Normal heart sounds  Pulmonary:      Effort: Pulmonary effort is normal       Breath sounds: Normal breath sounds  Abdominal:      General: Bowel sounds are normal       Palpations: Abdomen is soft  Tenderness: There is no abdominal tenderness  Musculoskeletal:      Cervical back: Normal range of motion  Comments: Lumbar paraspinal spasm with right SI tenderness   Skin:     General: Skin is warm  Neurological:      Mental Status: She is alert and oriented to person, place, and time

## 2022-07-18 ENCOUNTER — HOSPITAL ENCOUNTER (OUTPATIENT)
Dept: RADIOLOGY | Facility: HOSPITAL | Age: 77
Discharge: HOME | End: 2022-07-18
Attending: FAMILY MEDICINE
Payer: MEDICARE

## 2022-07-18 DIAGNOSIS — M25.559 HIP PAIN: ICD-10-CM

## 2022-07-18 DIAGNOSIS — M51.36 DDD (DEGENERATIVE DISC DISEASE), LUMBAR: ICD-10-CM

## 2022-07-18 PROCEDURE — 73502 X-RAY EXAM HIP UNI 2-3 VIEWS: CPT

## 2022-07-18 PROCEDURE — 72110 X-RAY EXAM L-2 SPINE 4/>VWS: CPT

## 2022-07-19 ENCOUNTER — OFFICE VISIT (OUTPATIENT)
Dept: NEPHROLOGY | Facility: CLINIC | Age: 77
End: 2022-07-19
Payer: MEDICARE

## 2022-07-19 VITALS
DIASTOLIC BLOOD PRESSURE: 72 MMHG | SYSTOLIC BLOOD PRESSURE: 130 MMHG | BODY MASS INDEX: 35.12 KG/M2 | HEART RATE: 55 BPM | WEIGHT: 198.2 LBS | OXYGEN SATURATION: 97 % | HEIGHT: 63 IN

## 2022-07-19 DIAGNOSIS — I50.40 COMBINED SYSTOLIC AND DIASTOLIC CONGESTIVE HEART FAILURE, UNSPECIFIED HF CHRONICITY (HCC): ICD-10-CM

## 2022-07-19 DIAGNOSIS — I12.9 HYPERTENSIVE NEPHROSCLEROSIS, STAGE 1 THROUGH STAGE 4 OR UNSPECIFIED CHRONIC KIDNEY DISEASE: ICD-10-CM

## 2022-07-19 DIAGNOSIS — I10 PRIMARY HYPERTENSION: ICD-10-CM

## 2022-07-19 DIAGNOSIS — I10 ESSENTIAL HYPERTENSION: ICD-10-CM

## 2022-07-19 DIAGNOSIS — E66.01 OBESITY, MORBID (HCC): ICD-10-CM

## 2022-07-19 DIAGNOSIS — E87.1 HYPONATREMIA: ICD-10-CM

## 2022-07-19 DIAGNOSIS — E26.9 HYPERALDOSTERONISM (HCC): Primary | ICD-10-CM

## 2022-07-19 DIAGNOSIS — N18.31 STAGE 3A CHRONIC KIDNEY DISEASE (HCC): ICD-10-CM

## 2022-07-19 PROCEDURE — 99214 OFFICE O/P EST MOD 30 MIN: CPT | Performed by: PHYSICIAN ASSISTANT

## 2022-07-19 NOTE — ASSESSMENT & PLAN NOTE
Lab Results   Component Value Date    EGFR 53 07/15/2022    EGFR 51 07/12/2022    EGFR 58 11/18/2021    CREATININE 1 03 07/15/2022    CREATININE 1 06 07/12/2022    CREATININE 0 97 11/18/2021   Renal function is stable  Will continue losartan 100 mg daily  Avoid nephrotoxins  Renally dose medications  Return to clinic in 4 months with labs prior

## 2022-07-19 NOTE — PATIENT INSTRUCTIONS
Low sodium diet, do not exceed 2 g (or 2,000 mg) of sodium daily  Avoid processed, canned, frozen foods unless "no salt added " Opt for fresh meats, produce  Utilize compression socks during the day, remove before bed  Watch fluid intake (this includes water, tea, coffee soda, soups etc )   Aim for 50 ounce fluid restriction  Please avoid NSAIDs (nonsteroidal anti-inflammatory drugs), such as Advil (ibuprofen), Aleve (naproxen), Naprosyn, BCs, Goody's powder  Tylenol (acetaminophen) is a safer option for the kidneys  Do not exceed the maximum dose of acetaminophen  Note that this may be in combination with other drugs NSAID medications  Please avoid herbal supplements, such as turmeric  Please consult your nephrologist before taking any over-the-counter medications

## 2022-07-19 NOTE — ASSESSMENT & PLAN NOTE
Wt Readings from Last 3 Encounters:   07/19/22 89 9 kg (198 lb 3 2 oz)   07/15/22 89 8 kg (198 lb)   05/20/22 90 3 kg (199 lb)   Examines euvolemic  Advised low-sodium diet, fluid restriction  Patient is on Arb, beta-blocker, spironolactone

## 2022-07-19 NOTE — PROGRESS NOTES
Assessment & Plan:    1  Hyperaldosteronism ([address] )  Assessment & Plan:  Continue spironolactone  2  Primary hypertension  Assessment & Plan:  At goal   Continue current antihypertensive regimen  3  Essential hypertension  Assessment & Plan:  Continue current antihypertensive regimen, as above  4  Hypertensive nephrosclerosis, stage 1 through stage 4 or unspecified chronic kidney disease  Assessment & Plan:  Continue management of hypertension  On Arb losartan 100 mg daily  5  Combined systolic and diastolic congestive heart failure, unspecified HF chronicity (HCC)  Assessment & Plan:  Wt Readings from Last 3 Encounters:   07/19/22 89 9 kg (198 lb 3 2 oz)   07/15/22 89 8 kg (198 lb)   05/20/22 90 3 kg (199 lb)   Examines euvolemic  Advised low-sodium diet, fluid restriction  Patient is on Arb, beta-blocker, spironolactone  6  Hyponatremia  Assessment & Plan:  Not quite at goal   Patient is struggling with fluid restriction  Did advise focus on low-sodium diet to prevent thirst to allow patient to restrict fluids a bit further  She has been estimating her fluid intake around 67 oz per day  Recommend trialing reduction to around target of 50 oz per day  7  Stage 3a chronic kidney disease [facility])  Assessment & Plan:  Lab Results   Component Value Date    EGFR 53 07/15/2022    EGFR 51 07/12/2022    EGFR 58 11/18/2021    CREATININE 1 03 07/15/2022    CREATININE 1 06 07/12/2022    CREATININE 0 97 11/18/2021   Renal function is stable  Will continue losartan 100 mg daily  Avoid nephrotoxins  Renally dose medications  Return to clinic in 4 months with labs prior  Orders:  -     CBC and differential; Future; Expected date: 11/14/2022  -     Comprehensive metabolic panel; Future; Expected date: 11/14/2022  -     Magnesium; Future; Expected date: 11/14/2022  -     Microalbumin / creatinine urine ratio; Future; Expected date: 11/14/2022  -     Phosphorus;  Future; Expected date: 11/14/2022  -     Protein / creatinine ratio, urine; Future; Expected date: 11/14/2022  -     PTH, intact; Future; Expected date: 11/14/2022  -     Urinalysis with microscopic; Future; Expected date: 11/14/2022    8  Obesity, morbid ([facility] [address] )  Assessment & Plan:  BMI greater than 40 is associated with greater risk of progression of renal disease secondary to glomerular hyperfiltration  Recommend weight loss  The benefits, risks and alternatives to the treatment plan were discussed at this visit  Patient was advised of common adverse effects of any medical therapies prescribed  All questions were answered and discussed with the patient and any accompanying family members or caretakers  Subjective:      Patient ID: Italia Canas is a 78 y o  female seen in the [facility]) office  Patient is followed by Dr Gurmeet Rodgers for management stage 2 chronic kidney disease, hyperaldosteronism, hyponatremia, hypertension  She was last seen by Dr Gurmeet Rodgers on 09/09/2020, which time no medication changes were made  HPI     In the interim since her last visit, she was seen by primary care physician on 07/15/2022 for evaluation of hip and lower back pain  Results pending  Today, patient presents for routine follow-up  No complaints  Blood pressure is 130/72 with a heart rate of 55  She reports systolics 865F  Denies dizziness  Reports adherence with antihypertensive regimen bisoprolol 10 mg daily, losartan 100 mg daily, spironolactone 50 mg daily after lunch  Discussed reviewed the results of labs performed 7/12/22 of 128 mmol/L reveal serum sodium 129 millimole per L, at which time patient was called and instructed to adhere to 40 oz fluid restriction and furosemide 20 mg daily  Patient reported difficulty with adherence to strict fluid restriction given the number of pills that she takes per day  Repeat serum sodium level was 129 millimole per L on 07/15/2022   She has about 67 ounce fluid restriction  Renal function is stable with creatinine 1 03 mg/dL estimated GFR 53 mL/min on 07/15/2022  History is obtained from patient  The following portions of the patient's history were reviewed and updated as appropriate: allergies, current medications, past family history, past medical history, past social history, past surgical history, and problem list     Review of Systems   Constitutional: Negative for activity change, appetite change, chills, fatigue, fever and unexpected weight change  Respiratory: Negative for apnea, cough and shortness of breath  Cardiovascular: Negative for chest pain, palpitations and leg swelling  Gastrointestinal: Negative for abdominal pain, blood in stool, constipation, diarrhea, nausea and vomiting  Genitourinary: Negative for decreased urine volume, difficulty urinating, dysuria, flank pain, frequency, hematuria and urgency  Musculoskeletal: Positive for arthralgias and back pain  Negative for joint swelling and myalgias         "stimulator that "   Skin: Negative for color change and rash  Neurological: Negative for dizziness, seizures, syncope, weakness, light-headedness, numbness and headaches  Hematological: Negative for adenopathy  Does not bruise/bleed easily  Psychiatric/Behavioral: Negative for agitation, behavioral problems, confusion, decreased concentration, dysphoric mood and hallucinations  The patient is not nervous/anxious and is not hyperactive  All other systems reviewed and are negative  Objective:      /72   Pulse 55   Ht 5' 3" (1 6 m)   Wt 89 9 kg (198 lb 3 2 oz)   SpO2 97%   BMI 35 11 kg/m²          Physical Exam  Vitals and nursing note reviewed  Exam conducted with a chaperone present  Constitutional:       General: She is not in acute distress  Appearance: Normal appearance  She is normal weight  She is not ill-appearing or toxic-appearing  HENT:      Head: Normocephalic and atraumatic  Nose: Nose normal  No congestion or rhinorrhea  Mouth/Throat:      Mouth: Mucous membranes are moist       Pharynx: Oropharynx is clear  Eyes:      General: No scleral icterus  Right eye: No discharge  Left eye: No discharge  Extraocular Movements: Extraocular movements intact  Conjunctiva/sclera: Conjunctivae normal       Pupils: Pupils are equal, round, and reactive to light  Cardiovascular:      Rate and Rhythm: Regular rhythm  Bradycardia present  Pulses: Normal pulses  Heart sounds: Normal heart sounds  No murmur heard  Pulmonary:      Effort: Pulmonary effort is normal  No respiratory distress  Breath sounds: Normal breath sounds  No wheezing  Abdominal:      General: Abdomen is flat  Bowel sounds are normal  There is no distension  Palpations: Abdomen is soft  There is no mass  Tenderness: There is no abdominal tenderness  Musculoskeletal:         General: No swelling or deformity  Normal range of motion  Cervical back: Normal range of motion and neck supple  No rigidity or tenderness  Skin:     General: Skin is warm and dry  Coloration: Skin is not jaundiced or pale  Findings: No bruising  Neurological:      General: No focal deficit present  Mental Status: She is alert and oriented to person, place, and time  Mental status is at baseline  Psychiatric:         Mood and Affect: Mood normal          Behavior: Behavior normal          Thought Content:  Thought content normal          Judgment: Judgment normal              Lab Results   Component Value Date     (L) 09/21/2015    SODIUM 129 (L) 07/15/2022    K 4 6 07/15/2022    CL 94 (L) 07/15/2022    CO2 28 07/15/2022    ANIONGAP 7 09/21/2015    AGAP 7 07/15/2022    BUN 12 07/15/2022    CREATININE 1 03 07/15/2022    GLUC 115 (H) 06/26/2020    GLUF 94 07/15/2022    CALCIUM 9 2 07/15/2022    AST 19 11/18/2021    ALT 27 11/18/2021    ALKPHOS 133 (H) 11/18/2021    PROT 6 9 09/21/2015    TP 7 6 11/18/2021    BILITOT 0 29 09/21/2015    TBILI 0 46 11/18/2021    EGFR 53 07/15/2022      Lab Results   Component Value Date    CREATININE 1 03 07/15/2022    CREATININE 1 06 07/12/2022    CREATININE 0 97 11/18/2021    CREATININE 0 90 04/30/2021    CREATININE 1 09 03/29/2021    CREATININE 0 98 03/04/2021    CREATININE 0 83 08/26/2020    CREATININE 0 93 07/22/2020    CREATININE 0 80 06/26/2020    CREATININE 0 74 06/25/2020    CREATININE 0 79 06/10/2020    CREATININE 0 93 10/02/2019    CREATININE 0 87 08/14/2019    CREATININE 0 87 06/14/2019    CREATININE 0 89 05/01/2019      Lab Results   Component Value Date    COLORU Yellow 07/12/2022    CLARITYU Clear 07/12/2022    SPECGRAV <=1 005 07/12/2022    PHUR 6 5 07/12/2022    LEUKOCYTESUR Trace (A) 07/12/2022    NITRITE Negative 07/12/2022    PROTEIN UA Negative 07/12/2022    GLUCOSEU Negative 07/12/2022    KETONESU Negative 07/12/2022    UROBILINOGEN 0 2 07/12/2022    BILIRUBINUR Negative 07/12/2022    BLOODU Negative 07/12/2022    RBCUA 0-1 (A) 07/12/2022    WBCUA 1-2 (A) 07/12/2022    EPIS Occasional 07/12/2022    BACTERIA None Seen 07/12/2022      No results found for: LABPROT  No results found for: Carter Reynoso  Lab Results   Component Value Date    WBC 4 81 11/18/2021    HGB 12 5 11/18/2021    HCT 36 8 11/18/2021    MCV 90 11/18/2021     11/18/2021      Lab Results   Component Value Date    HGB 12 5 11/18/2021    HGB 11 6 07/22/2020    HGB 10 0 (L) 06/26/2020    HGB 10 2 (L) 06/25/2020    HGB 12 6 06/10/2020      Lab Results   Component Value Date    IRON 93 06/10/2020    TIBC 353 06/10/2020    FERRITIN 35 06/10/2020      No results found for: PTHCALCIUM, [id number], PHOSPHORUS   Lab Results   Component Value Date    CHOLESTEROL 220 (H) 11/18/2021    HDL 86 11/18/2021    LDLCALC 121 (H) 11/18/2021    TRIG 67 11/18/2021      No results found for: URICACID   Lab Results   Component Value Date    HGBA1C 5 0 07/22/2020      Lab Results   Component Value Date    H9FWUVE 0 90 11/17/2017    FREET4 0 97 03/29/2021      No results found for: MORENO, DSDNAAB, RFIGM   Lab Results   Component Value Date    PROT 6 9 09/21/2015        Portions of the record may have been created with voice recognition software  Occasional wrong word or "sound a like" substitutions may have occurred due to the inherent limitations of voice recognition software  Read the chart carefully and recognize, using context, where substitutions have occurred  If you have any questions, please contact the dictating provider

## 2022-07-19 NOTE — ASSESSMENT & PLAN NOTE
BMI greater than 40 is associated with greater risk of progression of renal disease secondary to glomerular hyperfiltration  Recommend weight loss

## 2022-07-19 NOTE — ASSESSMENT & PLAN NOTE
Not quite at goal   Patient is struggling with fluid restriction  Did advise focus on low-sodium diet to prevent thirst to allow patient to restrict fluids a bit further  She has been estimating her fluid intake around 67 oz per day  Recommend trialing reduction to around target of 50 oz per day

## 2022-08-04 ENCOUNTER — OFFICE VISIT (OUTPATIENT)
Dept: FAMILY MEDICINE CLINIC | Facility: CLINIC | Age: 77
End: 2022-08-04
Payer: MEDICARE

## 2022-08-04 VITALS
TEMPERATURE: 98.4 F | WEIGHT: 197 LBS | BODY MASS INDEX: 34.91 KG/M2 | RESPIRATION RATE: 20 BRPM | DIASTOLIC BLOOD PRESSURE: 84 MMHG | HEIGHT: 63 IN | HEART RATE: 84 BPM | SYSTOLIC BLOOD PRESSURE: 130 MMHG

## 2022-08-04 DIAGNOSIS — J01.00 SUBACUTE MAXILLARY SINUSITIS: Primary | ICD-10-CM

## 2022-08-04 DIAGNOSIS — E66.01 CLASS 2 SEVERE OBESITY DUE TO EXCESS CALORIES WITH SERIOUS COMORBIDITY AND BODY MASS INDEX (BMI) OF 35.0 TO 35.9 IN ADULT (HCC): ICD-10-CM

## 2022-08-04 DIAGNOSIS — I10 PRIMARY HYPERTENSION: ICD-10-CM

## 2022-08-04 DIAGNOSIS — N18.31 STAGE 3A CHRONIC KIDNEY DISEASE (HCC): ICD-10-CM

## 2022-08-04 PROCEDURE — 99213 OFFICE O/P EST LOW 20 MIN: CPT | Performed by: FAMILY MEDICINE

## 2022-08-04 RX ORDER — METHYLPREDNISOLONE 4 MG/1
TABLET ORAL
Qty: 21 EACH | Refills: 0 | Status: SHIPPED | OUTPATIENT
Start: 2022-08-04 | End: 2022-10-06 | Stop reason: SDUPTHER

## 2022-08-04 RX ORDER — AZITHROMYCIN 250 MG/1
TABLET, FILM COATED ORAL
Qty: 6 TABLET | Refills: 0 | Status: SHIPPED | OUTPATIENT
Start: 2022-08-04 | End: 2022-08-09

## 2022-08-04 RX ORDER — DEXTROMETHORPHAN HYDROBROMIDE AND PROMETHAZINE HYDROCHLORIDE 15; 6.25 MG/5ML; MG/5ML
5 SOLUTION ORAL 4 TIMES DAILY PRN
Qty: 180 ML | Refills: 1 | Status: SHIPPED | OUTPATIENT
Start: 2022-08-04 | End: 2022-10-06 | Stop reason: SDUPTHER

## 2022-08-04 NOTE — PROGRESS NOTES
Assessment/Plan:  Recurrent maxillary sinusitis with bronchitis will provide a Medrol Dosepak Zithromax Z-Thang and promethazine DM    Primary hypertension with blood pressure controlled on the current regimen    Stage IIIA chronic kidney disease stable    Problem List Items Addressed This Visit        Cardiovascular and Mediastinum    Hypertension       Genitourinary    Stage 3a chronic kidney disease ([facility] [address] )      Other Visit Diagnoses     Subacute maxillary sinusitis    -  Primary    Relevant Medications    methylPREDNISolone 4 MG tablet therapy pack    azithromycin (Zithromax) 250 mg tablet    Promethazine-DM (PHENERGAN-DM) 6 25-15 mg/5 mL oral syrup    Class 2 severe obesity due to excess calories with serious comorbidity and body mass index (BMI) of 35 0 to 35 9 in [facility])               Diagnoses and all orders for this visit:    Subacute maxillary sinusitis  -     methylPREDNISolone 4 MG tablet therapy pack; Use as directed on package  -     azithromycin (Zithromax) 250 mg tablet; Take 2 tablets (500 mg total) by mouth daily for 1 day, THEN 1 tablet (250 mg total) daily for 4 days   -     Promethazine-DM (PHENERGAN-DM) 6 25-15 mg/5 mL oral syrup; Take 5 mL by mouth 4 (four) times a day as needed for cough    Primary hypertension    Stage 3a chronic kidney disease (HCC)    Class 2 severe obesity due to excess calories with serious comorbidity and body mass index (BMI) of 35 0 to 35 9 in [facility])      No problem-specific Assessment & Plan notes found for this encounter  PHQ-2/9 Depression Screening            Body mass index is 34 9 kg/m²  BMI Counseling: Body mass index is 34 9 kg/m²  The BMI     Subjective:      Patient ID: Nedra Suero is a 78 y o  female      Patient presents with sinus pain and pressure associated with cough      The following portions of the patient's history were reviewed and updated as appropriate:   She has a past medical history of Allergies, Anxiety, Arthritis, Benign arteriolar nephrosclerosis, Bereavement, Cataract, Chronic kidney disease, Chronic kidney disease in type 2 diabetes mellitus ([facility] [address] ), Chronic kidney disease, stage 2 (mild), Chronic pain disorder, Chronic pain syndrome, COPD (chronic obstructive pulmonary disease) ([facility] [address] ), DDD (degenerative disc disease), cervical, DDD (degenerative disc disease), lumbar, Degenerative joint disease of right hip, Depression, Diastolic dysfunction, DJD (degenerative joint disease), ankle and foot, right, DJD of right shoulder, Edema, Fracture of left calcaneus, Generalized anxiety disorder, GERD (gastroesophageal reflux disease), Heart murmur, Hyperaldosteronism ([facility] [address] ), Hyperlipidemia, Hypertension, Hypertensive nephrosclerosis, Hypo-osmolality and hyponatremia, IBS (irritable bowel syndrome), Insomnia, Migraines, Mitral regurgitation, MVP (mitral valve prolapse), Obstructive sleep apnea syndrome, Osteoarthritis, Osteoarthritis, Pernicious anemia, Plantar fascia rupture, Rheumatoid arthritis ([facility] [address] ), Right knee DJD, Shingles, Sinobronchitis, Sleep apnea, Stroke ([facility] ), TIA (transient ischemic attack), and Vertigo  ,  does not have any pertinent problems on file  ,   has a past surgical history that includes Appendectomy; Sinus surgery; Insert / replace peripheral neurostimulator pulse generator /  (Left); pr colonoscopy flx dx w/collj spec when pfrmd (N/A, 4/24/2017); NERVE BLOCK (Left, 2/20/2018); NERVE BLOCK (Left, 3/6/2018); Radiofrequency ablation (Left, 4/17/2018); Colonoscopy; Upper gastrointestinal endoscopy (01/12/2007); Knee arthroscopy (Bilateral); Hand surgery (Right); pr total hip arthroplasty (Right, 2/27/2019); Nasal septum surgery (2008); Carpal tunnel release (Bilateral); Total hip arthroplasty (Left); Hysterectomy; Tonsillectomy; Joint replacement (Left); Joint replacement (Right, 04/2019); pr total knee arthroplasty (Right, 6/24/2020); Wrist surgery (Right); Knee cartilage surgery (09/10/2009);  Replacement total knee (Left, 07/05/2011); Knee Arthroplasty (Left, 01/15/2009); Colonoscopy (04/02/2012); DXA procedure(historical) (10/26/2016, 9/17/2014, 6/18/2007); Mammo (historical) (12/10/2018, 10/30/2017, 10/26/2016); and Trigger finger release  ,  family history includes Anxiety disorder in her sister; Arthritis in her mother; Colon cancer in her father; Dementia in her mother; Heart disease in her father and mother; Hypertension in her father, mother, and sister; No Known Problems in her paternal aunt, paternal grandfather, and paternal grandmother; Prostate cancer in her maternal grandfather; Rheum arthritis in her mother; Thyroid disease in her sister  ,   reports that she has quit smoking  Her smoking use included cigarettes  She has never used smokeless tobacco  She reports current alcohol use  She reports that she does not use drugs  ,  is allergic to procaine, adhesive [medical tape], lidocaine, and penicillins     Current Outpatient Medications   Medication Sig Dispense Refill    azithromycin (Zithromax) 250 mg tablet Take 2 tablets (500 mg total) by mouth daily for 1 day, THEN 1 tablet (250 mg total) daily for 4 days   6 tablet 0    methylPREDNISolone 4 MG tablet therapy pack Use as directed on package 21 each 0    Promethazine-DM (PHENERGAN-DM) 6 25-15 mg/5 mL oral syrup Take 5 mL by mouth 4 (four) times a day as needed for cough 180 mL 1    aspirin (ECOTRIN LOW STRENGTH) 81 mg EC tablet Take 1 tablet (81 mg total) by mouth daily      bisoprolol (ZEBETA) 10 MG tablet TAKE 1 TABLET EVERY DAY 90 tablet 3    butalbital-acetaminophen-caffeine (FIORICET,ESGIC) -40 mg per tablet TAKE 1 TABLET EVERY 4 HOURS AS NEEDED FOR HEADACHES 120 tablet 2    calcium carbonate (OS-MARLENY) 600 MG tablet Take 600 mg by mouth 2 (two) times a day with meals      clindamycin (CLEOCIN) 150 mg capsule take 4 capsules by mouth 1 hour prior to appointment      diclofenac sodium (VOLTAREN) 1 % Apply 2 g topically 4 (four) times a day (Patient not taking: No sig reported) 50 g 5    losartan (COZAAR) 100 MG tablet TAKE 1 TABLET EVERY MORNING 90 tablet 3    methylPREDNISolone 4 MG tablet therapy pack Use as directed on package (Patient not taking: No sig reported) 21 each 0    mirtazapine (REMERON) 15 mg tablet Take 1 tablet (15 mg total) by mouth daily at bedtime 90 tablet 4    Multiple Vitamins-Minerals (MULTIVITAMIN WITH MINERALS) tablet Take 1 tablet by mouth every morning      nystatin (MYCOSTATIN) 500,000 units/5 mL suspension RINSE BY MOUTH WITH 5 MILLILITERS FOR 2 MINUTES 4-5 TIMES DAILY THEN SPIT (Patient not taking: Reported on 7/19/2022)      ofloxacin (OCUFLOX) 0 3 % ophthalmic solution INSTILL 1 DROP INTO AFFECTED EYE 4 TIMES DAILY      omeprazole (PriLOSEC) 20 mg delayed release capsule TAKE 1 CAPSULE TWICE DAILY 180 capsule 1    pravastatin (PRAVACHOL) 40 mg tablet TAKE 1 TABLET (40 MG TOTAL) BY MOUTH DAILY 90 tablet 3    prednisoLONE acetate (PRED FORTE) 1 % ophthalmic suspension INSTILL 1 DROP INTO AFFECTED  EYE(S) FOUR TIMES A DAY      pregabalin (LYRICA) 50 mg capsule Take 1 capsule (50 mg total) by mouth 3 (three) times a day (Patient not taking: No sig reported) 90 capsule 1    prochlorperazine (COMPAZINE) 5 mg tablet Take 1 tablet (5 mg total) by mouth every 6 (six) hours as needed for nausea or vomiting 30 tablet 5    spironolactone (ALDACTONE) 50 mg tablet TAKE 1 TABLET DAILY AFTER LUNCH 90 tablet 3     No current facility-administered medications for this visit  Review of Systems   Constitutional: Negative for chills and fever  HENT: Positive for sinus pressure and sinus pain  Negative for ear pain and sore throat  Eyes: Positive for visual disturbance  Negative for pain  Respiratory: Positive for cough  Negative for shortness of breath  Cardiovascular: Negative for chest pain and palpitations  Gastrointestinal: Negative for abdominal pain and vomiting     Genitourinary: Negative for dysuria and hematuria  Musculoskeletal: Positive for arthralgias and back pain  Skin: Negative for color change and rash  Neurological: Negative for seizures and syncope  All other systems reviewed and are negative  Objective:    /84   Pulse 84   Temp 98 4 °F (36 9 °C)   Resp 20   Ht 5' 3" (1 6 m)   Wt 89 4 kg (197 lb)   BMI 34 90 kg/m²   Body mass index is 34 9 kg/m²  Physical Exam  Constitutional:       Appearance: She is well-developed  HENT:      Head: Normocephalic  Eyes:      Pupils: Pupils are equal, round, and reactive to light  Cardiovascular:      Rate and Rhythm: Normal rate and regular rhythm  Heart sounds: Normal heart sounds  Pulmonary:      Effort: Pulmonary effort is normal       Breath sounds: Normal breath sounds  Abdominal:      General: Bowel sounds are normal       Palpations: Abdomen is soft  Tenderness: There is no abdominal tenderness  Musculoskeletal:      Cervical back: Normal range of motion  Skin:     General: Skin is warm  Neurological:      Mental Status: She is alert and oriented to person, place, and time

## 2022-08-05 NOTE — PROGRESS NOTES
Assessment/Plan:  Acute maxillary sinusitis bilateral with current plan is to add a Medrol Dosepak and a course of amoxicillin  The patient was instructed to continue Flonase and to use a daily  Gastroesophageal reflux stable on Prilosec  Hypertensive cardiovascular disease on Cozaar  Palpitations the patient just had a Holter monitor performed results are pending  Mitral regurgitation stable  Problem List Items Addressed This Visit     None           There are no diagnoses linked to this encounter  No problem-specific Assessment & Plan notes found for this encounter  PHQ-9 Depression Screening    PHQ-9:    Frequency of the following problems over the past two weeks: Body mass index is 31 53 kg/m²  BMI Counseling: Body mass index is 31 53 kg/m²  The BMI is above normal  Nutrition recommendations include reducing portion sizes, decreasing overall calorie intake, 3-5 servings of fruits/vegetables daily, reducing fast food intake, consuming healthier snacks, decreasing soda and/or juice intake, moderation in carbohydrate intake, increasing intake of lean protein, reducing intake of saturated fat and trans fat and reducing intake of cholesterol  Subjective:      Patient ID: Diamond Donnelly is a 70 y o  female  Patient presents with a complaint of her sinuses acting up      The following portions of the patient's history were reviewed and updated as appropriate:   She has a past medical history of Anxiety, Arthritis, Benign arteriolar nephrosclerosis, Chronic kidney disease in type 2 diabetes mellitus ([address] ), Chronic pain disorder, Depression, GERD (gastroesophageal reflux disease), Heart murmur, Hyperaldosteronism ([address] ), Hyperlipidemia, Hypertension, Hypo-osmolality and hyponatremia, IBS (irritable bowel syndrome), Insomnia, Migraines, Mitral regurgitation, Obstructive sleep apnea syndrome, Osteoarthritis, Pernicious anemia, Right knee DJD, and Stroke ([address] )  ,  does not have any pertinent problems on file  ,   has a past surgical history that includes Appendectomy; Sinus surgery; Insert / replace peripheral neurostimulator pulse generator /  (Left); pr colonoscopy flx dx w/collj spec when pfrmd (N/A, 4/24/2017); NERVE BLOCK (Left, 2/20/2018); NERVE BLOCK (Left, 3/6/2018); Radiofrequency ablation (Left, 4/17/2018); Colonoscopy; Upper gastrointestinal endoscopy; Knee arthroscopy (Bilateral); Hand surgery (Right); pr total hip arthroplasty (Right, 2/27/2019); Nasal septum surgery (2008); Carpal tunnel release (Bilateral); Total hip arthroplasty (Left); Hysterectomy; Tonsillectomy; Joint replacement (Left); Joint replacement (Right, 04/2019); pr total knee arthroplasty (Right, 6/24/2020); and Wrist surgery (Right)  ,  family history includes Anxiety disorder in her sister; Arthritis in her mother; Colon cancer in her father; Dementia in her mother; Heart disease in her father and mother; Hypertension in her father, mother, and sister; No Known Problems in her paternal aunt, paternal grandfather, and paternal grandmother; Prostate cancer in her maternal grandfather; Rheum arthritis in her mother; Thyroid disease in her sister  ,   reports that she has quit smoking  Her smoking use included cigarettes  She has never used smokeless tobacco  She reports current alcohol use  She reports that she does not use drugs  ,  is allergic to procaine; adhesive [medical tape]; lidocaine; and penicillins     Current Outpatient Medications   Medication Sig Dispense Refill    aspirin (ECOTRIN) 325 mg EC tablet Take 1 tablet (325 mg total) by mouth 2 (two) times a day (Patient taking differently: Take 81 mg by mouth daily ) 30 tablet 0    bisoprolol (ZEBETA) 10 MG tablet TAKE 1 TABLET EVERY DAY (Patient taking differently: Take 10 mg by mouth every morning ) 90 tablet 3    butalbital-acetaminophen-caffeine-codeine (FIORICET WITH CODEINE) -69-30 MG per capsule Take 1 capsule by mouth every 4 (four) hours as needed for headaches      calcium carbonate (OS-MARLENY) 600 MG tablet Take 600 mg by mouth 2 (two) times a day with meals      diclofenac sodium (VOLTAREN) 1 % Apply 2 g topically 4 (four) times a day 50 g 5    LORazepam (ATIVAN) 1 mg tablet Take 1 mg by mouth 2 (two) times a day as needed for anxiety      losartan (COZAAR) 100 MG tablet Take 1 tablet (100 mg total) by mouth every morning 90 tablet 3    Multiple Vitamins-Minerals (MULTIVITAMIN WITH MINERALS) tablet Take 1 tablet by mouth every morning      omeprazole (PriLOSEC) 20 mg delayed release capsule Take 20 mg by mouth every morning        pravastatin (PRAVACHOL) 20 mg tablet Take 20 mg by mouth 2 (two) times a day       spironolactone (ALDACTONE) 50 mg tablet TAKE 1 TABLET DAILY AFTER LUNCH 90 tablet 3    zolpidem (AMBIEN) 10 mg tablet Take 10 mg by mouth daily at bedtime as needed        No current facility-administered medications for this visit  Review of Systems   Constitutional: Negative  HENT: Positive for rhinorrhea, sinus pressure and sinus pain  Eyes: Negative  Respiratory: Negative  Cardiovascular: Negative  Palpitations   Gastrointestinal: Negative  Endocrine: Negative  Genitourinary: Negative  Musculoskeletal: Negative  Skin: Negative  Allergic/Immunologic: Negative  Neurological: Negative  Hematological: Negative  Psychiatric/Behavioral: Negative  Objective:    /64   Pulse 84   Temp 98 °F (36 7 °C)   Resp 20   Wt 80 7 kg (178 lb)   BMI 31 53 kg/m²   Body mass index is 31 53 kg/m²  Physical Exam  Constitutional:       Appearance: She is well-developed  HENT:      Head: Normocephalic  Comments: Area of pain and pressure of the sinuses  Eyes:      Pupils: Pupils are equal, round, and reactive to light  Neck:      Musculoskeletal: Normal range of motion  Cardiovascular:      Rate and Rhythm: Normal rate and regular rhythm        Heart sounds: Murmur present  Pulmonary:      Effort: Pulmonary effort is normal       Breath sounds: Normal breath sounds  Abdominal:      General: Bowel sounds are normal       Palpations: Abdomen is soft  Tenderness: There is no abdominal tenderness  Skin:     General: Skin is warm  Neurological:      Mental Status: She is alert and oriented to person, place, and time  Ataxic gait

## 2022-08-25 ENCOUNTER — OFFICE VISIT (OUTPATIENT)
Dept: SLEEP CENTER | Facility: CLINIC | Age: 77
End: 2022-08-25
Payer: MEDICARE

## 2022-08-25 VITALS
TEMPERATURE: 96.1 F | SYSTOLIC BLOOD PRESSURE: 142 MMHG | HEIGHT: 63 IN | DIASTOLIC BLOOD PRESSURE: 78 MMHG | BODY MASS INDEX: 35.54 KG/M2 | WEIGHT: 200.6 LBS | OXYGEN SATURATION: 95 % | HEART RATE: 63 BPM

## 2022-08-25 DIAGNOSIS — G47.33 OSA (OBSTRUCTIVE SLEEP APNEA): Primary | ICD-10-CM

## 2022-08-25 DIAGNOSIS — Z86.73 HISTORY OF TIA (TRANSIENT ISCHEMIC ATTACK): ICD-10-CM

## 2022-08-25 DIAGNOSIS — G89.4 CHRONIC PAIN DISORDER: ICD-10-CM

## 2022-08-25 DIAGNOSIS — I10 ESSENTIAL HYPERTENSION: ICD-10-CM

## 2022-08-25 DIAGNOSIS — I50.40 COMBINED SYSTOLIC AND DIASTOLIC CONGESTIVE HEART FAILURE, UNSPECIFIED HF CHRONICITY (HCC): ICD-10-CM

## 2022-08-25 DIAGNOSIS — J43.9 PULMONARY EMPHYSEMA, UNSPECIFIED EMPHYSEMA TYPE (HCC): ICD-10-CM

## 2022-08-25 DIAGNOSIS — E66.9 OBESITY (BMI 30-39.9): ICD-10-CM

## 2022-08-25 DIAGNOSIS — G25.81 RLS (RESTLESS LEGS SYNDROME): ICD-10-CM

## 2022-08-25 DIAGNOSIS — F41.9 ANXIETY: ICD-10-CM

## 2022-08-25 DIAGNOSIS — G47.00 INSOMNIA, UNSPECIFIED TYPE: ICD-10-CM

## 2022-08-25 PROCEDURE — 99214 OFFICE O/P EST MOD 30 MIN: CPT | Performed by: INTERNAL MEDICINE

## 2022-08-25 NOTE — PROGRESS NOTES
Follow-Up Note - [address]  78 y o  female  :1945  DSU:[id number]  DOS:2022    CC: I saw this patient for follow-up in clinic today for sleep disordered breathing, insomnia, Coexisting Sleep and Medical Problems   PSlbs   (AHI) of 5  events per hour of sleep  The AHI in the supine position was 8 5  The AHI during REM sleep was 38  Intermittent snoring of moderate intensity was noted  The baseline oxygen saturation with the patient awake was 98%  The mean oxygen saturation throughout the study was 94%  The percentage of sleep time below 90% was 4 %  The lowest oxygen saturation was 83%  PFSH, Problem List, Medications & Allergies were reviewed in EMR  Interval changes: none reported  She  has a past medical history of Allergies, Anxiety, Arthritis, Benign arteriolar nephrosclerosis, Bereavement, Cataract, Chronic kidney disease, Chronic kidney disease in type 2 diabetes mellitus ([address] ), Chronic kidney disease, stage 2 (mild), Chronic pain disorder, Chronic pain syndrome, COPD (chronic obstructive pulmonary disease) ([address] ), DDD (degenerative disc disease), cervical, DDD (degenerative disc disease), lumbar, Degenerative joint disease of right hip, Depression, Diastolic dysfunction, DJD (degenerative joint disease), ankle and foot, right, DJD of right shoulder, Edema, Fracture of left calcaneus, Generalized anxiety disorder, GERD (gastroesophageal reflux disease), Heart murmur, Hyperaldosteronism ([address] ), Hyperlipidemia, Hypertension, Hypertensive nephrosclerosis, Hypo-osmolality and hyponatremia, IBS (irritable bowel syndrome), Insomnia, Migraines, Mitral regurgitation, MVP (mitral valve prolapse), Obstructive sleep apnea syndrome, Osteoarthritis, Osteoarthritis, Pernicious anemia, Plantar fascia rupture, Rheumatoid arthritis ([address] ), Right knee DJD, Shingles, Sinobronchitis, Sleep apnea, Stroke ([address] ), TIA (transient ischemic attack), and Vertigo      She has a current medication list which includes the following prescription(s): aspirin, bisoprolol, butalbital-acetaminophen-caffeine, calcium carbonate, clindamycin, losartan, mirtazapine, multivitamin with minerals, ofloxacin, omeprazole, pravastatin, prednisolone acetate, prochlorperazine, spironolactone, diclofenac sodium, methylprednisolone, methylprednisolone, nystatin, pregabalin, and promethazine-dm  HPI:  She is using Remeron as a sleep aid that worked initially but recently she is once again having difficulty falling asleep  She also reports symptoms suggestive of restless leg syndrome that she attributes to the medication and has gained weight  Bed partner sleep soundly and has not noted snoring or breathing difficulties during sleep  Sleep Routine: Gaynell Brittle reports getting 5 hrs sleep out of approximately 7-1/2 hours in bed; she has difficulty initiating sleep, but not maintaining sleep   She denies racing thoughts, clock watching or use of electronics in the bedroom  She arises spontaneously and feels refreshed  Gaynell Brittle denies Excessive Daytime Sleepiness, or napping  She rated herself at Total score: 2 /24 on the [place] Sleepiness Scale  Habits: reports that she has quit smoking  Her smoking use included cigarettes  She has never used smokeless tobacco ,  reports current alcohol use ,  reports no history of drug use , Caffeine use: none  , Exercise routine: regular    ROS: reviewed & as attached  Signific approximately 25 lb weight gain in the past year  She reported no nasal, respiratory symptoms  She has episodic palpitations  She reports acid reflux  She has musculoskeletal and back pain  She reported no radicular symptoms  She has feelings of anxiety         EXAM: /78 (BP Location: Left arm, Cuff Size: Large)   Pulse 63   Temp (!) 96 1 °F (35 6 °C) (Temporal)   Ht 5' 3" (1 6 m)   Wt 91 kg (200 lb 9 6 oz)   SpO2 95%   BMI 35 53 kg/m²     Patient is well groomed; well appearing  CNS: Alert, orientated, clear & coherent speech  Psych: cooperativeand in no distress  Mental state:  Appears Anxious, depressed and has a constricted affect  H&N: EOMI; NC/AT:no facial pressure marks, no rashes  Nasal airway is patent  Oral airway is crowded; she has poor dentition and several missing teeth  Skin/Extrem: col & hydration normal; no edema  Resp: Respiratory effort is normal  Physical findings otherwise essentially unchanged from previous  IMPRESSION: Diagnoses, Problem List Items & Comorbidities Addressed this Visit   1  CLARKE (obstructive sleep apnea)  Home Study   2  RLS (restless legs syndrome)  Home Study   3  Insomnia, unspecified type  Home Study   4  Anxiety     5  Chronic pain disorder     6  Obesity (BMI 30-39 9)     7  Pulmonary emphysema, unspecified emphysema type ([facility] )     8  Essential hypertension     9  History of TIA (transient ischemic attack)     10  Combined systolic and diastolic congestive heart failure, unspecified HF chronicity ([facility] )         PLAN:  I reviewed results of her prior studies and clinic records  With respect to above conditions, comprehensive counseling provided on pathophysiology; effects on symptoms and comorbidities, diagnostic strategies & limitations; treatment options; risks or no treament; risks & benefits of the various therapeutic options; costs and insurance aspects  I advised weight reduction, avoiding sleeping supine, using alcohol or sedating medications close to bed time and on safe driving practices  Repeat Nocturnal polysomnography is indicated in view of weight gain and ongoing symptoms  She declined a in-lab diagnostic study and Home sleep testing will be scheduled  She understands limitations of this study  Multi component Cognitive behavioral therapy for Insomnia undertaken - Sleep Restriction, Stimulus control, Relaxation techniques and Sleep hygiene were discussed  Advice gradual dose reduction of Remeron    She may do a lot better when she gets down to 3 75 mg (1/4 of the 15 mg tablet) as the anti histaminic effect at this dose works very well for insomnia  If anxiety persists, She may benefit from medications such as Cymbalta that would also help with pain  She is unwilling to try CPAP and may not be a suitable candidate for mandibular advancement device  Follow-up to be scheduled after the studies to review results, further details of treatment options and to initiate/adjust therapy  Sincerely,     Authenticated electronically on 24/02/07   Board Certified Specialist     Portions of the record may have been created with voice recognition software  Occasional wrong word or "sound a like" substitutions may have occurred due to the inherent limitations of voice recognition software  There may also be notations and random deletions of words or characters from malfunctioning software  Read the chart carefully and recognize, using context, where substitutions/deletions have occurred

## 2022-08-25 NOTE — PATIENT INSTRUCTIONS
What you can do to improve your sleep: (Sleep Hygiene) Basic rules for a good night's sleep    Create a regular sleep schedule  This will help you form a sleep routine  Keep a record of your sleep patterns, and any sleeping problems you have  Bring the record to follow-up visits with healthcare providers  Avoid prolonged use of light-emitting screens before bedtime or watching TV in bed  Avoid forcing sleep  Do not take naps  Naps could make it hard for you to fall asleep at bedtime  Deal with your worries before bedtime  Keep your bedroom cool, quiet, and dark  Turn on white noise, such as a fan, to help you relax  Do not use your bed for any activity that will keep you awake  Do not read, exercise, eat, or watch TV in your bedroom  Get up if you do not fall asleep within 20 minutes  Move to another room and do something relaxing until you become sleepy  Limit caffeine, alcohol, nicotine and food to earlier in the day  Only drink caffeine in the morning  Do not drink alcohol within 6 hours of bedtime  Do not eat a heavy meal right before you go to bed  Avoid smoking, especially in the evening  Exercise regularly  Daily exercise will help you sleep better  Do not exercise within 4 hours of bedtime  Stimulus control therapy rules  1  Go to bed only when sleepy  2  Do not watch television, read, eat, or worry while in bed  Use bed only for sleep and sex  3  Get out of bed if unable to fall asleep within 20 minutes and go to another room  Return to bed only when sleepy  Repeat this step as many times as necessary throughout the night  4  Set an alarm clock to wake up at a fixed time each morning, including weekends  5  Do not take a nap during the day  Data from: [address] [place] [address], [address] Nonpharmacologic treatments of insomnia  [facility] 3602; 53:37  Go to AASM website for more information: Sleepeducation  org     Recommended Reading:  Book by authors Rachael Ayon No More sleepless nights    What is CLARKE? Obstructive sleep apnea is a common and serious sleep disorder that causes you to stop breathing during sleep  The airway repeatedly becomes blocked, limiting the amount of air that reaches your lungs  When this happens, you may snore loudly or making choking noises as you try to breathe  Your brain and body becomes oxygen deprived and you may wake up  This may happen a few times a night, or in more severe cases, several hundred times a night  Sleep apnea can make you wake up in the morning feeling tired or unrefreshed even though you have had a full night of sleep  During the day, you may feel fatigued, have difficulty concentrating or you may even unintentionally fall asleep  This is because your body is waking up numerous times throughout the night, even though you might not be conscious of each awakening  The lack of oxygen your body receives can have negative long-term consequences for your health  This includes:  High blood pressure  Heart disease  Irregular heart rhythms  Stroke  Pre-diabetes and diabetes  Depression    Testing  An objective evaluation of your sleep may be needed before your board certified sleep physician can make a diagnosis  Options include:   In-lab overnight sleep study  This type of sleep study requires you to stay overnight at a sleep center, in a bed that may resemble a hotel room  You will sleep with sensors hooked up to various parts of your body  These sensors record your brain waves, heartbeat, breathing and movement  An overnight sleep study also provides your doctor with the most complete information about your sleep  Learn more about an overnight sleep study       Home sleep apnea test  Some patients with high risk factors for obstructive sleep apnea and no other medical disorders may be candidates for a home sleep apnea test  The testing equipment differs in that it is less complicated than what is used in an overnight sleep study  As such, does not give all the data an in-lab will and if negative, may not mean you do not have the problem  Treatment for sleep apnea  includes using a continuous positive airway pressure (CPAP) machine to keep your airway open during sleep  A mask is placed over your nose and mouth, or just your nose  The mask is hooked to the CPAP machine that blows a gentle stream of air into the mask when you breathe  This helps keep your airway open so you can breathe more regularly  Extra oxygen may be given to you through the machine  You may be given a mouth device  It looks like a mouth guard or dental retainer and stops your tongue and mouth tissues from blocking your throat while you sleep  Surgery may be needed to remove extra tissues that block your mouth, throat, or nose  Manage sleep apnea:   Do not smoke  Nicotine and other chemicals in cigarettes and cigars can cause lung damage  Ask your healthcare provider for information if you currently smoke and need help to quit  E-cigarettes or smokeless tobacco still contain nicotine  Talk to your healthcare provider before you use these products  Do not drink alcohol or take sedative medicine before you go to sleep  Alcohol and sedatives can relax the muscles and tissues around your throat  This can block the airflow to your lungs  Maintain a healthy weight  Excess tissue around your throat may restrict your breathing  Ask your healthcare provider for information if you need to lose weight  Sleep on your side or use pillows designed to prevent sleep apnea  This prevents your tongue or other tissues from blocking your throat  You can also raise the head of your bed  Driving Safety  Refrain from driving when drowsy  Follow up with your healthcare provider as directed:  Write down your questions so you remember to ask them during your visits  Go to AASM website for more information: Sleepeducation  org     What is CLARKE?    Obstructive sleep apnea is a common and serious sleep disorder that causes you to stop breathing during sleep  The airway repeatedly becomes blocked, limiting the amount of air that reaches your lungs  When this happens, you may snore loudly or making choking noises as you try to breathe  Your brain and body becomes oxygen deprived and you may wake up  This may happen a few times a night, or in more severe cases, several hundred times a night  Sleep apnea can make you wake up in the morning feeling tired or unrefreshed even though you have had a full night of sleep  During the day, you may feel fatigued, have difficulty concentrating or you may even unintentionally fall asleep  This is because your body is waking up numerous times throughout the night, even though you might not be conscious of each awakening  The lack of oxygen your body receives can have negative long-term consequences for your health  This includes:  High blood pressure  Heart disease  Irregular heart rhythms  Stroke  Pre-diabetes and diabetes  Depression    Testing  An objective evaluation of your sleep may be needed before your board certified sleep physician can make a diagnosis  Options include:   In-lab overnight sleep study  This type of sleep study requires you to stay overnight at a sleep center, in a bed that may resemble a hotel room  You will sleep with sensors hooked up to various parts of your body  These sensors record your brain waves, heartbeat, breathing and movement  An overnight sleep study also provides your doctor with the most complete information about your sleep  Learn more about an overnight sleep study  Home sleep apnea test  Some patients with high risk factors for obstructive sleep apnea and no other medical disorders may be candidates for a home sleep apnea test  The testing equipment differs in that it is less complicated than what is used in an overnight sleep study   As such, does not give all the data an in-lab will and if negative, may not mean you do not have the problem  Treatment for sleep apnea  includes using a continuous positive airway pressure (CPAP) machine to keep your airway open during sleep  A mask is placed over your nose and mouth, or just your nose  The mask is hooked to the CPAP machine that blows a gentle stream of air into the mask when you breathe  This helps keep your airway open so you can breathe more regularly  Extra oxygen may be given to you through the machine  You may be given a mouth device  It looks like a mouth guard or dental retainer and stops your tongue and mouth tissues from blocking your throat while you sleep  Surgery may be needed to remove extra tissues that block your mouth, throat, or nose  Manage sleep apnea:   Do not smoke  Nicotine and other chemicals in cigarettes and cigars can cause lung damage  Ask your healthcare provider for information if you currently smoke and need help to quit  E-cigarettes or smokeless tobacco still contain nicotine  Talk to your healthcare provider before you use these products  Do not drink alcohol or take sedative medicine before you go to sleep  Alcohol and sedatives can relax the muscles and tissues around your throat  This can block the airflow to your lungs  Maintain a healthy weight  Excess tissue around your throat may restrict your breathing  Ask your healthcare provider for information if you need to lose weight  Sleep on your side or use pillows designed to prevent sleep apnea  This prevents your tongue or other tissues from blocking your throat  You can also raise the head of your bed  Driving Safety  Refrain from driving when drowsy  Follow up with your healthcare provider as directed:  Write down your questions so you remember to ask them during your visits  Go to AAS website for more information: Sleepeducation  org       Nursing Support:  When: Monday through Friday 7A-5PM except holidays  Where: Our direct line is [phone]  If you are having a true emergency please call 911  In the event that the line is busy or it is after hours please leave a voice message and we will return your call  Please speak clearly, leaving your full name, birth date, best number to reach you and the reason for your call  Medication refills: We will need the name of the medication, the dosage, the ordering provider, whether you get a 30 or 90 day refill, and the pharmacy name and address  Medications will be ordered by the provider only  Nurses cannot call in prescriptions  Please allow 7 days for medication refills  Physician requested updates: If your provider requested that you call with an update after starting medication, please be ready to provide us the medication and dosage, what time you take your medication, the time you attempt to fall asleep, time you fall asleep, when you wake up, and what time you get out of bed  Sleep Study Results: We will contact you with sleep study results and/or next steps after the physician has reviewed your testing

## 2022-08-25 NOTE — PROGRESS NOTES
Review of Systems      Genitourinary none   Cardiology palpitations/fluttering feeling in the chest   Gastrointestinal frequent heartburn/acid reflux   Neurology frequent headaches   Constitutional fatigue and weight change   Integumentary none   Psychiatry anxiety   Musculoskeletal joint pain, back pain and legs twitching/jerking   Pulmonary none   ENT none   Endocrine none   Hematological none

## 2022-09-09 DIAGNOSIS — I10 ESSENTIAL HYPERTENSION: ICD-10-CM

## 2022-09-09 RX ORDER — BISOPROLOL FUMARATE 10 MG/1
TABLET, FILM COATED ORAL
Qty: 90 TABLET | Refills: 3 | Status: SHIPPED | OUTPATIENT
Start: 2022-09-09

## 2022-09-20 ENCOUNTER — HOSPITAL ENCOUNTER (OUTPATIENT)
Dept: SLEEP CENTER | Facility: HOSPITAL | Age: 77
Discharge: HOME | End: 2022-09-20
Attending: INTERNAL MEDICINE
Payer: MEDICARE

## 2022-09-20 DIAGNOSIS — G47.00 INSOMNIA, UNSPECIFIED TYPE: ICD-10-CM

## 2022-09-20 DIAGNOSIS — G25.81 RLS (RESTLESS LEGS SYNDROME): ICD-10-CM

## 2022-09-20 DIAGNOSIS — G47.33 OSA (OBSTRUCTIVE SLEEP APNEA): ICD-10-CM

## 2022-09-20 PROCEDURE — G0399 HOME SLEEP TEST/TYPE 3 PORTA: HCPCS

## 2022-09-27 NOTE — PROGRESS NOTES
Home Sleep Study Documentation    HOME STUDY DEVICE: Noxturnal no                                           Erica G3 yes      Pre-Sleep Home Study:    Set-up and instructions performed by: MS    Technician performed demonstration for Patient: yes    Return demonstration performed by Patient: yes    Written instructions provided to Patient: yes    Patient signed consent form: yes        Post-Sleep Home Study:    Additional comments by Patient: See NCLC    Home Sleep Study Failed:no:    Failure reason: N/A    Reported or Detected: N/A    Scored by: MARTINE Phillip

## 2022-09-29 ENCOUNTER — TELEPHONE (OUTPATIENT)
Dept: SLEEP CENTER | Facility: CLINIC | Age: 77
End: 2022-09-29

## 2022-09-29 ENCOUNTER — OFFICE VISIT (OUTPATIENT)
Dept: OBGYN CLINIC | Facility: CLINIC | Age: 77
End: 2022-09-29
Payer: MEDICARE

## 2022-09-29 VITALS
WEIGHT: 188 LBS | HEART RATE: 69 BPM | BODY MASS INDEX: 33.31 KG/M2 | DIASTOLIC BLOOD PRESSURE: 78 MMHG | HEIGHT: 63 IN | SYSTOLIC BLOOD PRESSURE: 155 MMHG

## 2022-09-29 DIAGNOSIS — M47.816 OSTEOARTHRITIS OF LUMBAR SPINE, UNSPECIFIED SPINAL OSTEOARTHRITIS COMPLICATION STATUS: Primary | ICD-10-CM

## 2022-09-29 DIAGNOSIS — Z96.641 HISTORY OF TOTAL HIP ARTHROPLASTY, RIGHT: ICD-10-CM

## 2022-09-29 PROCEDURE — 99213 OFFICE O/P EST LOW 20 MIN: CPT | Performed by: ORTHOPAEDIC SURGERY

## 2022-09-29 NOTE — TELEPHONE ENCOUNTER
Called patient  Left message to advise that home sleep study is resulted and to call the nursing staff to review the results and Dr Stern Age recommendations  Study does not show CLARKE  Patient needs to schedule a follow-up appointment with Dr Kathy Peter

## 2022-09-29 NOTE — PROGRESS NOTES
ASSESSMENT/PLAN:    Diagnoses and all orders for this visit:    Osteoarthritis of lumbar spine, unspecified spinal osteoarthritis complication status  -     Ambulatory Referral to Physical Therapy; Future    History of total hip arthroplasty, right        X-rays of the patient's right hip are consistent with a well-seated right total hip replacement  The prosthesis is in good alignment  There are no signs of loosening  There are no fractures or dislocations  Overall, the patient is doing very well with regards to her right hip replacement  She has tried several different treatments with regards to her lower back pain including injections and spine stimulators  She would like to be referred to physical therapy  The patient was given a formal referral   She will follow up with our office in 3 months  The patient is acceptable to this plan  Return in about 1 year (around 9/29/2023)  The patient has radicular pain  It does go from her spine through her hip and past her knee  She has already participated pain management  She has a spinal cord stimulator  The only viable option would be rehabilitation  She was given a prescription  Return back in 3 months evaluation  If her condition changes, she will not hesitate to let us know      _____________________________________________________  CHIEF COMPLAINT:  Chief Complaint   Patient presents with    Right Hip - Follow-up         SUBJECTIVE:  Edenilson Olivares is a 78 y o  female who presents to our office for an annual visit  The patient is status post right total hip replacement from 02/27/2019  She denies any specific right hip pain  She does complain of lower back pain which is chronic in nature  She states the pain from her back will radiate down the lateral aspect of her leg to her knee  She denies any fever or chills        The following portions of the patient's history were reviewed and updated as appropriate: allergies, current medications, past family history, past medical history, past social history, past surgical history and problem list     PAST MEDICAL HISTORY:  Past Medical History:   Diagnosis Date    Allergies     Anxiety     Arthritis     Benign arteriolar nephrosclerosis     Bereavement     Cataract     Chronic kidney disease     Chronic kidney disease in type 2 diabetes mellitus ([facility])     Chronic kidney disease, stage 2 (mild)     Chronic pain disorder     Chronic pain syndrome     COPD (chronic obstructive pulmonary disease) ([facility]     DDD (degenerative disc disease), cervical     DDD (degenerative disc disease), lumbar     Degenerative joint disease of right hip     Depression     Diastolic dysfunction     DJD (degenerative joint disease), ankle and foot, right     DJD of right shoulder     Edema     Fracture of left calcaneus     Generalized anxiety disorder     GERD (gastroesophageal reflux disease)     Heart murmur     Hyperaldosteronism ([facility])     Hyperlipidemia     Hypertension     Hypertensive nephrosclerosis     Hypo-osmolality and hyponatremia     IBS (irritable bowel syndrome)     Insomnia     Migraines     Mitral regurgitation     MVP (mitral valve prolapse)     Obstructive sleep apnea syndrome     Osteoarthritis     Osteoarthritis     Pernicious anemia     Plantar fascia rupture     Rheumatoid arthritis ([address] )     Right knee DJD     Shingles     Sinobronchitis     Sleep apnea     Stroke ([facility] [address]     tia    TIA (transient ischemic attack)     Vertigo        PAST SURGICAL HISTORY:  Past Surgical History:   Procedure Laterality Date    APPENDECTOMY      CARPAL TUNNEL RELEASE Bilateral     COLONOSCOPY      several    COLONOSCOPY  04/02/2012    by Dr Ann Reynolds Left 04/11/2022    DXA PROCEDURE (HISTORICAL)  10/26/2016, 9/17/2014, 6/18/2007    HAND SURGERY Right     ring finger has pin    HYSTERECTOMY      32    INSERT / REPLACE PERIPHERAL NEUROSTIMULATOR PULSE GENERATOR /  Left     buttocks    JOINT REPLACEMENT Left     knee    JOINT REPLACEMENT Right 04/2019    Hip    KNEE ARTHROPLASTY Left 01/15/2009    by Dr Martha Pritchett at [facility] ARTHROSCOPY Bilateral    [address]  09/10/2009    by Dr Martha Pritchett at [address] (HISTORICAL)  12/10/2018, 10/30/2017, 10/26/2016    NASAL SEPTUM SURGERY  2008    NERVE BLOCK Left 02/20/2018    Procedure: BLOCK MEDIAL BRANCH - C4, C5, C6;  Surgeon: Rubens Castillo MD;  Location: [facility] MAIN [facility];  Service: Pain Management     NERVE BLOCK Left 03/06/2018    Procedure: C4, C5, C6 MEDIAL BRANCH BLOCK;  Surgeon: Rubens Castillo MD;  Location: [facility];  Service: Pain Management     ME COLONOSCOPY FLX DX W/COLLJ SPEC WHEN PFRMD N/A 04/24/2017    Procedure: Lynita Manifold;  Surgeon: Tez Wren MD;  Location: [facility];  Service: Colorectal    ME TOTAL HIP ARTHROPLASTY Right 02/27/2019    Procedure: ARTHROPLASTY HIP TOTAL;  Surgeon: Bela Matos DO;  Location[facility] MAIN OR;  Service: Orthopedics    ME TOTAL KNEE ARTHROPLASTY Right 06/24/2020    Procedure: KNEE TOTAL ARTHROPLASTY;  Surgeon: Blea Matos DO;  Location: [address] [place] [address] MAIN OR;  Service: Orthopedics    RADIOFREQUENCY ABLATION Left 04/17/2018    Procedure: ABLATION RADIO FREQUENCY (RFA) - C4, C5, C6;  Surgeon: Rubens Castillo MD;  Location: [facility];  Service: Pain Management     REPLACEMENT TOTAL KNEE Left 07/05/2011    SINUS SURGERY      TONSILLECTOMY      TOTAL HIP ARTHROPLASTY Left     TRIGGER FINGER RELEASE      R 4th     UPPER GASTROINTESTINAL ENDOSCOPY  01/12/2007    with Mynor Claros dilatation by Dr Juancarlos Cabral at [address] Right        FAMILY HISTORY:  Family History   Problem Relation Age of Onset    Heart disease Mother     Hypertension Mother    Irish Sourav Arthritis Mother     Dementia Mother     Rheum arthritis Mother     Hypertension Father     Heart disease Father     Colon cancer Father     Hypertension Sister     Anxiety disorder Sister     Thyroid disease Sister     Prostate cancer Maternal Grandfather     No Known Problems Paternal Grandmother     No Known Problems Paternal Grandfather     No Known Problems Paternal Aunt     Pseudochol deficiency Neg Hx        SOCIAL HISTORY:  Social History     Tobacco Use    Smoking status: Former Smoker     Types: Cigarettes    Smokeless tobacco: Never Used    Tobacco comment: quit 40 yrs ago   Vaping Use    Vaping Use: Never used   Substance Use Topics    Alcohol use: Yes     Comment: 2 beer a week    Drug use: Never       MEDICATIONS:    Current Outpatient Medications:     aspirin (ECOTRIN LOW STRENGTH) 81 mg EC tablet, Take 1 tablet (81 mg total) by mouth daily, Disp:  , Rfl:     bisoprolol (ZEBETA) 10 MG tablet, TAKE 1 TABLET EVERY DAY, Disp: 90 tablet, Rfl: 3    butalbital-acetaminophen-caffeine (FIORICET,ESGIC) -40 mg per tablet, TAKE 1 TABLET EVERY 4 HOURS AS NEEDED  FOR  HEADACHES, Disp: 120 tablet, Rfl: 1    calcium carbonate (OS-MARLENY) 600 MG tablet, Take 600 mg by mouth 2 (two) times a day with meals, Disp: , Rfl:     clindamycin (CLEOCIN) 150 mg capsule, take 4 capsules by mouth 1 hour prior to appointment, Disp: , Rfl:     losartan (COZAAR) 100 MG tablet, TAKE 1 TABLET EVERY MORNING, Disp: 90 tablet, Rfl: 3    mirtazapine (REMERON) 15 mg tablet, Take 1 tablet (15 mg total) by mouth daily at bedtime, Disp: 90 tablet, Rfl: 4    Multiple Vitamins-Minerals (MULTIVITAMIN WITH MINERALS) tablet, Take 1 tablet by mouth every morning, Disp: , Rfl:     ofloxacin (OCUFLOX) 0 3 % ophthalmic solution, INSTILL 1 DROP INTO AFFECTED EYE 4 TIMES DAILY, Disp: , Rfl:     omeprazole (PriLOSEC) 20 mg delayed release capsule, TAKE 1 CAPSULE TWICE DAILY, Disp: 180 capsule, Rfl: 1    pravastatin (PRAVACHOL) 40 mg tablet, TAKE 1 TABLET (40 MG TOTAL) BY MOUTH DAILY, Disp: 90 tablet, Rfl: 3    prednisoLONE acetate (PRED FORTE) 1 % ophthalmic suspension, INSTILL 1 DROP INTO AFFECTED  EYE(S) FOUR TIMES A DAY, Disp: , Rfl:     prochlorperazine (COMPAZINE) 5 mg tablet, Take 1 tablet (5 mg total) by mouth every 6 (six) hours as needed for nausea or vomiting, Disp: 30 tablet, Rfl: 5    spironolactone (ALDACTONE) 50 mg tablet, TAKE 1 TABLET DAILY AFTER LUNCH, Disp: 90 tablet, Rfl: 3    diclofenac sodium (VOLTAREN) 1 %, Apply 2 g topically 4 (four) times a day (Patient not taking: No sig reported), Disp: 50 g, Rfl: 5    methylPREDNISolone 4 MG tablet therapy pack, Use as directed on package (Patient not taking: No sig reported), Disp: 21 each, Rfl: 0    methylPREDNISolone 4 MG tablet therapy pack, Use as directed on package (Patient not taking: No sig reported), Disp: 21 each, Rfl: 0    nystatin (MYCOSTATIN) 500,000 units/5 mL suspension, RINSE BY MOUTH WITH 5 MILLILITERS FOR 2 MINUTES 4-5 TIMES DAILY THEN SPIT (Patient not taking: No sig reported), Disp: , Rfl:     pregabalin (LYRICA) 50 mg capsule, Take 1 capsule (50 mg total) by mouth 3 (three) times a day (Patient not taking: No sig reported), Disp: 90 capsule, Rfl: 1    Promethazine-DM (PHENERGAN-DM) 6 25-15 mg/5 mL oral syrup, Take 5 mL by mouth 4 (four) times a day as needed for cough (Patient not taking: No sig reported), Disp: 180 mL, Rfl: 1    ALLERGIES:  Allergies   Allergen Reactions    Procaine Hives    Adhesive [Medical Tape] Rash    Lidocaine Rash    Penicillins Rash       ROS:  Review of Systems     Constitutional: Negative for fatigue, fever or loss of appetite  HENT: Negative  Respiratory: Negative for shortness of breath, dyspnea  Cardiovascular: Negative for chest pain/tightness  Gastrointestinal: Negative for abdominal pain, N/V  Endocrine: Negative for cold/heat intolerance, unexplained weight loss/gain  Genitourinary: Negative for flank pain, dysuria, hematuria     Musculoskeletal: Positive for arthralgia   Skin: Negative for rash  Neurological: Negative for numbness or tingling  Psychiatric/Behavioral: Negative for agitation  _____________________________________________________  PHYSICAL EXAMINATION:    Blood pressure 155/78, pulse 69, height 5' 3" (1 6 m), weight 85 3 kg (188 lb)  Constitutional: Oriented to person, place, and time  Appears well-developed and well-nourished  No distress  HENT:   Head: Normocephalic  Eyes: Conjunctivae are normal  Right eye exhibits no discharge  Left eye exhibits no discharge  No scleral icterus  Cardiovascular: Normal rate  Pulmonary/Chest: Effort normal    Neurological: Alert and oriented to person, place, and time  Skin: Skin is warm and dry  No rash noted  Not diaphoretic  No erythema  No pallor  Psychiatric: Normal mood and affect  Behavior is normal  Judgment and thought content normal       MUSCULOSKELETAL EXAMINATION:   Physical Exam  Ortho Exam    Right lower extremity is neurovascularly intact  Toes are pink mobile  Compartments are soft  Good quad tone  Brisk cap refill  Sensation intact  Good range of motion of hip  Tenderness to palpation throughout the lumbar spine  No tenderness to palpation along trochanteric bursa  Objective:  BP Readings from Last 1 Encounters:   09/29/22 155/78      Wt Readings from Last 1 Encounters:   09/29/22 85 3 kg (188 lb)        BMI:   Estimated body mass index is 33 3 kg/m² as calculated from the following:    Height as of this encounter: 5' 3" (1 6 m)  Weight as of this encounter: 85 3 kg (188 lb)          Scribe Attestation    I,:  Dylon Hammer PA-C am acting as a scribe while in the presence of the attending physician :       I,:  Kimberli Figueroa, DO personally performed the services described in this documentation    as scribed in my presence :

## 2022-09-30 NOTE — TELEPHONE ENCOUNTER
Returned patient's call  Advised sleep study did not show CLARKE  Follow-up appointment scheduled with Dr Korey Townsend 10/6/22 in [facility]

## 2022-10-05 ENCOUNTER — EVALUATION (OUTPATIENT)
Dept: PHYSICAL THERAPY | Facility: HOME HEALTHCARE | Age: 77
End: 2022-10-05
Payer: MEDICARE

## 2022-10-05 DIAGNOSIS — G89.29 CHRONIC RIGHT-SIDED LOW BACK PAIN WITH RIGHT-SIDED SCIATICA: Primary | ICD-10-CM

## 2022-10-05 DIAGNOSIS — M54.41 CHRONIC RIGHT-SIDED LOW BACK PAIN WITH RIGHT-SIDED SCIATICA: Primary | ICD-10-CM

## 2022-10-05 PROCEDURE — 97110 THERAPEUTIC EXERCISES: CPT | Performed by: PHYSICAL THERAPIST

## 2022-10-05 PROCEDURE — 97161 PT EVAL LOW COMPLEX 20 MIN: CPT | Performed by: PHYSICAL THERAPIST

## 2022-10-05 NOTE — PROGRESS NOTES
PT Evaluation     Today's date: 10/5/2022  Patient name: Jesus Wing  : 1945  MRN: [id number]  Referring provider: Esther Strickland  Dx:   Encounter Diagnosis     ICD-10-CM    1  Chronic right-sided low back pain with right-sided sciatica  M54 41     G89 29                   Assessment  Assessment details: Pt [place] Bob is a 78 y o  who presents to OPPT with s/s consistent with chronic R sided lower back pain with R sciatica  Pt presents with limited L/S mobility and core strength deficits, decreased R LE ROM and strength, impaired soft tissue mobility/limited flexibility, decreased postural awareness, and gait/balance dysfunctions  Pt with limitations with prolonged standing/ambulation, disrupted sleep patterns, and difficulty with lifting/carrying with 5-10# lifting restrictions following unrelated surgery  Pt has become more sedentary due to pain and limited mobility at this time; has been restricting participation in household activities due to pain  Pt would benefit from skilled therapy services to address outlined impairments, work towards goals, and restore pts PLOF  Thank you!    Impairments: abnormal gait, abnormal or restricted ROM, abnormal movement, activity intolerance, impaired balance, impaired physical strength, lacks appropriate home exercise program, pain with function, weight-bearing intolerance, poor posture  and poor body mechanics  Understanding of Dx/Px/POC: good   Prognosis: good    Goals  STGs to be achieved in 4 weeks:  -Pt to demonstrate reduced subjective pain rating "at worst" by at least 2-3 points from Initial Eval to allow for reduced pain with ADLs and improved functional activity tolerance    -Pt to demonstrate L/S ROM improved to minimal limitations in order to maximize joint mobility and function and allow for progression of exercise program and achievement of goals    -Pt to demonstrate increased MMT of R LE by at least 1/2 grade in order to improve safety and stability with ADLs and functional mobility  LTGs to be achieved upon discharge:   -Pt will be I with HEP in order to continue to improve quality of life and independence and reduce risk for re-injury    -Pt to demonstrate return to activities of daily living without limiations or restrictions    -Pt will return to standing/ambulation tolerance > 30 minutes to help facilitate return to community activities independently   -Pt to demonstrate improved function as noted by achieving or exceeding predicted score on FOTO outcomes assessment tool  Plan  Plan details: PT provided pt with detailed HEP program; pt verbalized understanding of program    Patient would benefit from: skilled physical therapy  Planned modality interventions: cryotherapy and thermotherapy: hydrocollator packs  Planned therapy interventions: abdominal trunk stabilization, balance, manual therapy, neuromuscular re-education, patient education, postural training, therapeutic activities, therapeutic exercise, home exercise program, flexibility and functional ROM exercises  Frequency: 2x week  Duration in weeks: 4  Plan of Care beginning date: 10/5/2022  Plan of Care expiration date: 2022  Treatment plan discussed with: patient        Subjective Evaluation    History of Present Illness  Mechanism of injury: Pt reporting that she has been having back pain for 10+ years  She had a spinal stimulator placed in  which only provided relief for about 6 months  She has had the battery replaced 3x but does not want to do it again as she doesn't get much relief from it  She is seeing Orthopedics and had x-rays updated recently which were unremarkable  She is referred to OPPT for conservative therapy x 4 weeks and will return to MD on 11/10/22      5-10# lifting restrictions from eye doctor from cornea transplant in 2022     Quality of life: good    Pain  At best pain ratin  At worst pain ratin  Quality: dull ache  Relieving factors: medications and heat (prn)  Aggravating factors: standing and walking    Social Support  Steps to enter house: yes  Stairs in house: yes   Lives with: spouse    Employment status: not working    Diagnostic Tests  X-ray: normal  Treatments  Current treatment: physical therapy  Patient Goals  Patient goals for therapy: decreased pain, increased motion, increased strength and independence with ADLs/IADLs          Objective     Postural Observations  Seated posture: fair  Standing posture: good        Neurological Testing     Sensation     Lumbar   Left   Intact: light touch    Right   Intact: light touch    Active Range of Motion     Lumbar   Flexion:  Restriction level: moderate  Extension:  with pain Restriction level: maximal  Left lateral flexion:  with pain Restriction level: maximal  Right lateral flexion:  Restriction level: minimal  Left Hip   Flexion: 100 degrees   Abduction: 30 degrees     Right Hip   Flexion: 90 degrees with pain  Abduction: 20 degrees with pain    Strength/Myotome Testing     Left Hip   Planes of Motion   Flexion: 5  Abduction: 5  Adduction: 5    Right Hip   Planes of Motion   Flexion: 3+  Abduction: 4-  Adduction: 4-    Tests     Lumbar   Positive prone instability   Left   Negative crossed SLR and passive SLR  Right   Positive passive SLR  Negative crossed SLR  Ambulation     Ambulation: Level Surfaces     Additional Level Surfaces Ambulation Details  Tolerance x 30 minutes     Ambulation: Stairs   Pattern: reciprocal  Railings: one rail  Pattern: reciprocal  Railings: one rail             Re-eval Date: 11/5/22     Precautions: 5-10# lifting restrictions from eye doctor from cornea transplant in April 2022          Manuals 10/5       R HS  Pt unable to tolerate this date                                Neuro Re-Ed         PPT HEP       PPT with maryjane         TA        TA + OH lifts        MTP/LTP        Palloff press                 Ther Ex NuStep         Standing hip flex/abd/ext        Squats         Step-ups         LTR HEP        SKTC        SLR        S/L SLR        Clamshells         Heel walk outs         Bridges                                 Ther Activity                        Gait Training                        Modalities        MHP prn

## 2022-10-06 ENCOUNTER — OFFICE VISIT (OUTPATIENT)
Dept: FAMILY MEDICINE CLINIC | Facility: CLINIC | Age: 77
End: 2022-10-06
Payer: MEDICARE

## 2022-10-06 ENCOUNTER — OFFICE VISIT (OUTPATIENT)
Dept: SLEEP CENTER | Facility: CLINIC | Age: 77
End: 2022-10-06
Payer: MEDICARE

## 2022-10-06 VITALS
OXYGEN SATURATION: 97 % | DIASTOLIC BLOOD PRESSURE: 70 MMHG | HEIGHT: 64 IN | HEART RATE: 87 BPM | TEMPERATURE: 97.5 F | BODY MASS INDEX: 33.29 KG/M2 | WEIGHT: 195 LBS | SYSTOLIC BLOOD PRESSURE: 136 MMHG

## 2022-10-06 VITALS
HEART RATE: 58 BPM | SYSTOLIC BLOOD PRESSURE: 182 MMHG | WEIGHT: 195.8 LBS | BODY MASS INDEX: 34.68 KG/M2 | OXYGEN SATURATION: 98 % | DIASTOLIC BLOOD PRESSURE: 80 MMHG | TEMPERATURE: 97.7 F

## 2022-10-06 DIAGNOSIS — I10 ESSENTIAL HYPERTENSION: ICD-10-CM

## 2022-10-06 DIAGNOSIS — F41.9 ANXIETY: ICD-10-CM

## 2022-10-06 DIAGNOSIS — I50.40 COMBINED SYSTOLIC AND DIASTOLIC CONGESTIVE HEART FAILURE, UNSPECIFIED HF CHRONICITY (HCC): ICD-10-CM

## 2022-10-06 DIAGNOSIS — G47.00 INSOMNIA, UNSPECIFIED TYPE: ICD-10-CM

## 2022-10-06 DIAGNOSIS — Z86.73 HISTORY OF TIA (TRANSIENT ISCHEMIC ATTACK): ICD-10-CM

## 2022-10-06 DIAGNOSIS — E66.9 OBESITY (BMI 30-39.9): ICD-10-CM

## 2022-10-06 DIAGNOSIS — G89.4 CHRONIC PAIN DISORDER: ICD-10-CM

## 2022-10-06 DIAGNOSIS — J40 BRONCHITIS: Primary | ICD-10-CM

## 2022-10-06 DIAGNOSIS — G47.33 OSA (OBSTRUCTIVE SLEEP APNEA): Primary | ICD-10-CM

## 2022-10-06 DIAGNOSIS — J01.00 SUBACUTE MAXILLARY SINUSITIS: ICD-10-CM

## 2022-10-06 DIAGNOSIS — G25.81 RLS (RESTLESS LEGS SYNDROME): ICD-10-CM

## 2022-10-06 DIAGNOSIS — J43.9 PULMONARY EMPHYSEMA, UNSPECIFIED EMPHYSEMA TYPE (HCC): ICD-10-CM

## 2022-10-06 PROCEDURE — 99214 OFFICE O/P EST MOD 30 MIN: CPT | Performed by: INTERNAL MEDICINE

## 2022-10-06 PROCEDURE — 99213 OFFICE O/P EST LOW 20 MIN: CPT | Performed by: FAMILY MEDICINE

## 2022-10-06 RX ORDER — BACITRACIN ZINC AND POLYMYXIN B SULFATE 500; 10000 [USP'U]/G; [USP'U]/G
OINTMENT OPHTHALMIC
COMMUNITY
Start: 2022-08-30

## 2022-10-06 RX ORDER — AZITHROMYCIN 250 MG/1
TABLET, FILM COATED ORAL
Qty: 6 TABLET | Refills: 0 | Status: SHIPPED | OUTPATIENT
Start: 2022-10-06 | End: 2022-10-11

## 2022-10-06 RX ORDER — DEXTROMETHORPHAN HYDROBROMIDE AND PROMETHAZINE HYDROCHLORIDE 15; 6.25 MG/5ML; MG/5ML
5 SOLUTION ORAL 4 TIMES DAILY PRN
Qty: 180 ML | Refills: 1 | Status: CANCELLED | OUTPATIENT
Start: 2022-10-06

## 2022-10-06 RX ORDER — METHYLPREDNISOLONE 4 MG/1
TABLET ORAL
Qty: 21 EACH | Refills: 0 | Status: SHIPPED | OUTPATIENT
Start: 2022-10-06

## 2022-10-06 NOTE — PROGRESS NOTES
Follow-up Note -  Pedro Garcia  78 y o  female  :1945  BNF:[id number]  DOS:10/6/2022    I saw Brady Montana in sleep clinic today for her sleep complaints & comorbidities  Home sleep testing was undertaken to evaluate for sleep disordered breathing and patient is here to review results and further options  The study demonstrated : respiratory event index (DANGELO) of 0 8/hr  The lowest SpO2 recorded is 88% and 1 3 minutes of time asleep was spent with saturations below 90%  The snore index was 0 3%  ]   PSlbs   (AHI) of 5  events per hour of sleep  The AHI in the supine position was 8 5  The AHI during REM sleep was 38  Intermittent snoring of moderate intensity was noted  The baseline oxygen saturation with the patient awake was 98%  The mean oxygen saturation throughout the study was 94%  The percentage of sleep time below 90% was 4 %  The lowest oxygen saturation was 83%  PFSH, Problem List, Medications & Allergies were reviewed in EMR  Interval changes: none reported     She  has a past medical history of Allergies, Anxiety, Arthritis, Benign arteriolar nephrosclerosis, Bereavement, Cataract, Chronic kidney disease, Chronic kidney disease in type 2 diabetes mellitus ([address] ), Chronic kidney disease, stage 2 (mild), Chronic pain disorder, Chronic pain syndrome, COPD (chronic obstructive pulmonary disease) ([address] ), DDD (degenerative disc disease), cervical, DDD (degenerative disc disease), lumbar, Degenerative joint disease of right hip, Depression, Diastolic dysfunction, DJD (degenerative joint disease), ankle and foot, right, DJD of right shoulder, Edema, Fracture of left calcaneus, Generalized anxiety disorder, GERD (gastroesophageal reflux disease), Heart murmur, Hyperaldosteronism ([address] ), Hyperlipidemia, Hypertension, Hypertensive nephrosclerosis, Hypo-osmolality and hyponatremia, IBS (irritable bowel syndrome), Insomnia, Migraines, Mitral regurgitation, MVP (mitral valve prolapse), Obstructive sleep apnea syndrome, Osteoarthritis, Osteoarthritis, Pernicious anemia, Plantar fascia rupture, Rheumatoid arthritis ([facility] ), Right knee DJD, Shingles, Sinobronchitis, Sleep apnea, Stroke ([facility] ), TIA (transient ischemic attack), and Vertigo  She has a current medication list which includes the following prescription(s): aspirin, azithromycin, bacitracin-polymyxin b, bisoprolol, butalbital-acetaminophen-caffeine, calcium carbonate, clindamycin, losartan, methylprednisolone, mirtazapine, multivitamin with minerals, ofloxacin, omeprazole, pravastatin, prednisolone acetate, prochlorperazine, spironolactone, diclofenac sodium, nystatin, and pregabalin  HPI:  She is having increased difficulty initiating sleep since her sister was involved in hurricane [place]  Restless leg symptoms have resolved since she reduced dose of Remeron  She is unaware and Bed partner has not been reporting snoring or breathing difficulties during sleep  Sleep Routine:  Shona Candelaria reports getting 5-6 hrs sleep  ; she has difficulty initiating sleep, but not maintaining sleep   She arises spontaneously and feels refreshed  Shona Candelaria denies Excessive Daytime Sleepiness, or napping  She rated herself at Total score: 0 /24 on the Blythe Sleepiness Scale  Habits:  reports that she has quit smoking  Her smoking use included cigarettes  She has never used smokeless tobacco  She reports current alcohol use  She reports that she does not use drugs  ROS: reviewed & as attached  Significant for few lb weight reduction since her last visit  She reports no nasal or respiratory symptoms  She has episodic palpitations but reported no other cardiac symptoms  Acid reflux is controlled  She started PT and lower back pain has improved  Reymundo Gonzalez        EXAM: /70 (BP Location: Left arm, Cuff Size: Large)   Pulse 87   Temp 97 5 °F (36 4 °C) (Temporal)   Ht 5' 3 5" (1 613 m)   Wt 88 5 kg (195 lb)   SpO2 97%   BMI 34 00 kg/m²     Patient is well groomed; well appearing  CNS: Alert, orientated, clear & coherent speech  Psych: cooperativeand in no distress  Mental state:appears normal   H&N: EOMI; NC/AT:no facial pressure marks, no rashes  Skin/Extrem: col & hydration normal; no edema  Resp: Respiratory effort is normal  Physical findings otherwise essentially unchanged from previous  IMPRESSION: Diagnoses, Problem List Items & Comorbidities Addressed this Visit   1  CLARKE (obstructive sleep apnea)      Resolved   2  Insomnia, unspecified type     3  RLS (restless legs syndrome)      Improved   4  Anxiety     5  Chronic pain disorder      Controlled   6  Pulmonary emphysema, unspecified emphysema type ([facility] )     7  Essential hypertension     8  History of TIA (transient ischemic attack)     9  Combined systolic and diastolic congestive heart failure, unspecified HF chronicity ([facility] )     10  Obesity (BMI 30-39  9)       PLAN:    1  I reviewed results of the Sleep studies with the patient  2  With respect to above conditions, I counseled on pathophysiology, diagnosis, treatment options, risks and benefits; inter-relationship and effects on symptoms and comorbidities; risks of no treatment; costs and insurance aspects  3  Her mild sleep disordered breathing has resolved  4  No treatment is needed for the restless leg symptoms  5  Multi component Cognitive behavioral therapy for Insomnia undertaken - Sleep Restriction, Stimulus control, Relaxation techniques and Sleep hygiene were discussed  6  Continue low-dose Remeron half of 7 5 milligrams (for insomnia)   The alternate would be low-dose doxepin that she declined  7  I have not scheduled any follow-up in Sleep Clinic at this time       Thank you for allowing me to participate in the care of this patient  Please feel free to call me if I can be of any further assistance           Sincerely,     Authenticated electronically on 21/56/93   Board Certified Specialist     Portions of the record may have been created with voice recognition software  Occasional wrong word or "sound a like" substitutions may have occurred due to the inherent limitations of voice recognition software  There may also be notations and random deletions of words or characters from malfunctioning software  Read the chart carefully and recognize, using context, where substitutions/deletions have occurred

## 2022-10-06 NOTE — PROGRESS NOTES
Review of Systems      Genitourinary none   Cardiology palpitations/fluttering feeling in the chest   Gastrointestinal frequent heartburn/acid reflux   Neurology frequent headaches   Constitutional weight change   Integumentary none   Psychiatry none   Musculoskeletal joint pain and back pain   Pulmonary none   ENT none   Endocrine none   Hematological none

## 2022-10-06 NOTE — PROGRESS NOTES
Assessment/Plan:  Bronchitis will provide a Zithromax Z-Thang along with a Medrol Dosepak and promethazine DM  The patient has had ophthalmological surgery for central corneal opacity of left eye  The patient has been instructed by Ophthalmology to try not cough  Hypertensive cardiovascular disease with blood pressure controlled on current regimen    Stage IIIA chronic kidney disease stable    Class 2 obesity with serious comorbidity and body mass index of 34 68    Problem List Items Addressed This Visit    None     Visit Diagnoses     Bronchitis    -  Primary    Relevant Medications    methylPREDNISolone 4 MG tablet therapy pack    azithromycin (Zithromax) 250 mg tablet    Subacute maxillary sinusitis        Relevant Medications    methylPREDNISolone 4 MG tablet therapy pack           Diagnoses and all orders for this visit:    Bronchitis  -     methylPREDNISolone 4 MG tablet therapy pack; Use as directed on package  -     azithromycin (Zithromax) 250 mg tablet; Take 2 tablets (500 mg total) by mouth daily for 1 day, THEN 1 tablet (250 mg total) daily for 4 days  Subacute maxillary sinusitis  -     methylPREDNISolone 4 MG tablet therapy pack; Use as directed on package      No problem-specific Assessment & Plan notes found for this encounter  PHQ-2/9 Depression Screening            Body mass index is 34 68 kg/m²  BMI Counseling: Body mass index is 34 68 kg/m²  The BMI     Subjective:      Patient ID: Bobbi Israel is a 78 y o  female      Patient presents with a chief complaint of dry cough x2 days was home tested COVID negative has had eye surgery in should not be coughing according to Ophthalmology      The following portions of the patient's history were reviewed and updated as appropriate:   She has a past medical history of Allergies, Anxiety, Arthritis, Benign arteriolar nephrosclerosis, Bereavement, Cataract, Chronic kidney disease, Chronic kidney disease in type 2 diabetes mellitus ([address] ), Chronic kidney disease, stage 2 (mild), Chronic pain disorder, Chronic pain syndrome, COPD (chronic obstructive pulmonary disease) (HCC), DDD (degenerative disc disease), cervical, DDD (degenerative disc disease), lumbar, Degenerative joint disease of right hip, Depression, Diastolic dysfunction, DJD (degenerative joint disease), ankle and foot, right, DJD of right shoulder, Edema, Fracture of left calcaneus, Generalized anxiety disorder, GERD (gastroesophageal reflux disease), Heart murmur, Hyperaldosteronism ([facility] ), Hyperlipidemia, Hypertension, Hypertensive nephrosclerosis, Hypo-osmolality and hyponatremia, IBS (irritable bowel syndrome), Insomnia, Migraines, Mitral regurgitation, MVP (mitral valve prolapse), Obstructive sleep apnea syndrome, Osteoarthritis, Osteoarthritis, Pernicious anemia, Plantar fascia rupture, Rheumatoid arthritis ([address] ), Right knee DJD, Shingles, Sinobronchitis, Sleep apnea, Stroke ([facility] ), TIA (transient ischemic attack), and Vertigo  ,  does not have any pertinent problems on file  ,   has a past surgical history that includes Appendectomy; Sinus surgery; Insert / replace peripheral neurostimulator pulse generator /  (Left); pr colonoscopy flx dx w/collj spec when pfrmd (N/A, 04/24/2017); NERVE BLOCK (Left, 02/20/2018); NERVE BLOCK (Left, 03/06/2018); Radiofrequency ablation (Left, 04/17/2018); Colonoscopy; Upper gastrointestinal endoscopy (01/12/2007); Knee arthroscopy (Bilateral); Hand surgery (Right); pr total hip arthroplasty (Right, 02/27/2019); Nasal septum surgery (2008); Carpal tunnel release (Bilateral); Total hip arthroplasty (Left); Hysterectomy; Tonsillectomy; Joint replacement (Left); Joint replacement (Right, 04/2019); pr total knee arthroplasty (Right, 06/24/2020); Wrist surgery (Right); Knee cartilage surgery (09/10/2009); Replacement total knee (Left, 07/05/2011); Knee Arthroplasty (Left, 01/15/2009);  Colonoscopy (04/02/2012); DXA procedure(historical) (10/26/2016, 9/17/2014, 6/18/2007); Mammo (historical) (12/10/2018, 10/30/2017, 10/26/2016); Trigger finger release; and Corneal transplant (Left, 04/11/2022)  ,  family history includes Anxiety disorder in her sister; Arthritis in her mother; Colon cancer in her father; Dementia in her mother; Heart disease in her father and mother; Hypertension in her father, mother, and sister; No Known Problems in her paternal aunt, paternal grandfather, and paternal grandmother; Prostate cancer in her maternal grandfather; Rheum arthritis in her mother; Thyroid disease in her sister  ,   reports that she has quit smoking  Her smoking use included cigarettes  She has never used smokeless tobacco  She reports current alcohol use  She reports that she does not use drugs  ,  is allergic to procaine, adhesive [medical tape], lidocaine, and penicillins     Current Outpatient Medications   Medication Sig Dispense Refill    azithromycin (Zithromax) 250 mg tablet Take 2 tablets (500 mg total) by mouth daily for 1 day, THEN 1 tablet (250 mg total) daily for 4 days   6 tablet 0    methylPREDNISolone 4 MG tablet therapy pack Use as directed on package 21 each 0    aspirin (ECOTRIN LOW STRENGTH) 81 mg EC tablet Take 1 tablet (81 mg total) by mouth daily      bisoprolol (ZEBETA) 10 MG tablet TAKE 1 TABLET EVERY DAY 90 tablet 3    butalbital-acetaminophen-caffeine (FIORICET,ESGIC) -40 mg per tablet TAKE 1 TABLET EVERY 4 HOURS AS NEEDED  FOR  HEADACHES 120 tablet 1    calcium carbonate (OS-MARLENY) 600 MG tablet Take 600 mg by mouth 2 (two) times a day with meals      clindamycin (CLEOCIN) 150 mg capsule take 4 capsules by mouth 1 hour prior to appointment      diclofenac sodium (VOLTAREN) 1 % Apply 2 g topically 4 (four) times a day (Patient not taking: No sig reported) 50 g 5    losartan (COZAAR) 100 MG tablet TAKE 1 TABLET EVERY MORNING 90 tablet 3    mirtazapine (REMERON) 15 mg tablet Take 1 tablet (15 mg total) by mouth daily at bedtime 90 tablet 4    Multiple Vitamins-Minerals (MULTIVITAMIN WITH MINERALS) tablet Take 1 tablet by mouth every morning      nystatin (MYCOSTATIN) 500,000 units/5 mL suspension RINSE BY MOUTH WITH 5 MILLILITERS FOR 2 MINUTES 4-5 TIMES DAILY THEN SPIT (Patient not taking: No sig reported)      ofloxacin (OCUFLOX) 0 3 % ophthalmic solution INSTILL 1 DROP INTO AFFECTED EYE 4 TIMES DAILY      omeprazole (PriLOSEC) 20 mg delayed release capsule TAKE 1 CAPSULE TWICE DAILY 180 capsule 1    pravastatin (PRAVACHOL) 40 mg tablet TAKE 1 TABLET (40 MG TOTAL) BY MOUTH DAILY 90 tablet 3    prednisoLONE acetate (PRED FORTE) 1 % ophthalmic suspension INSTILL 1 DROP INTO AFFECTED  EYE(S) FOUR TIMES A DAY      pregabalin (LYRICA) 50 mg capsule Take 1 capsule (50 mg total) by mouth 3 (three) times a day (Patient not taking: No sig reported) 90 capsule 1    prochlorperazine (COMPAZINE) 5 mg tablet Take 1 tablet (5 mg total) by mouth every 6 (six) hours as needed for nausea or vomiting 30 tablet 5    spironolactone (ALDACTONE) 50 mg tablet TAKE 1 TABLET DAILY AFTER LUNCH 90 tablet 3     No current facility-administered medications for this visit  Review of Systems   Constitutional: Negative for chills and fever  HENT: Negative for ear pain and sore throat  Eyes: Negative for pain and visual disturbance  Respiratory: Positive for cough  Negative for shortness of breath  Cardiovascular: Negative for chest pain and palpitations  Gastrointestinal: Negative for abdominal pain and vomiting  Genitourinary: Negative for dysuria and hematuria  Musculoskeletal: Positive for arthralgias and myalgias  Negative for back pain  Skin: Negative for color change and rash  Neurological: Negative for seizures and syncope  All other systems reviewed and are negative          Objective:    BP (!) 182/80 (BP Location: Left arm, Patient Position: Sitting, Cuff Size: Standard)   Pulse 58   Temp 97 7 °F (36 5 °C) (Tympanic)   Wt 88 8 kg (195 lb 12 8 oz)   SpO2 98%   BMI 34 68 kg/m²   Body mass index is 34 68 kg/m²  Physical Exam  Constitutional:       Appearance: She is well-developed  She is obese  HENT:      Head: Normocephalic  Eyes:      Pupils: Pupils are equal, round, and reactive to light  Cardiovascular:      Rate and Rhythm: Normal rate and regular rhythm  Heart sounds: Normal heart sounds  Pulmonary:      Effort: Pulmonary effort is normal       Breath sounds: Normal breath sounds  Abdominal:      General: Bowel sounds are normal       Palpations: Abdomen is soft  Tenderness: There is no abdominal tenderness  Musculoskeletal:      Cervical back: Normal range of motion  Skin:     General: Skin is warm  Neurological:      Mental Status: She is alert and oriented to person, place, and time

## 2022-10-06 NOTE — PATIENT INSTRUCTIONS

## 2022-10-11 ENCOUNTER — OFFICE VISIT (OUTPATIENT)
Dept: PHYSICAL THERAPY | Facility: HOME HEALTHCARE | Age: 77
End: 2022-10-11
Payer: MEDICARE

## 2022-10-11 DIAGNOSIS — G89.29 CHRONIC RIGHT-SIDED LOW BACK PAIN WITH RIGHT-SIDED SCIATICA: Primary | ICD-10-CM

## 2022-10-11 DIAGNOSIS — M54.41 CHRONIC RIGHT-SIDED LOW BACK PAIN WITH RIGHT-SIDED SCIATICA: Primary | ICD-10-CM

## 2022-10-11 PROCEDURE — 97110 THERAPEUTIC EXERCISES: CPT

## 2022-10-11 PROCEDURE — 97112 NEUROMUSCULAR REEDUCATION: CPT

## 2022-10-11 NOTE — PROGRESS NOTES
Daily Note     Today's date: 10/11/2022  Patient name: Linn Morales  : 1945  MRN: [id number]  Referring provider: Elizabeth Falcon  Dx:   Encounter Diagnosis     ICD-10-CM    1  Chronic right-sided low back pain with right-sided sciatica  M54 41     G89 29               Subjective: Pt reports pain R side LB  Objective: See treatment diary below      Assessment: Tolerated treatment well  Verbal cues needed t/o exercise to perform correctly  No increased pain reported in her back t/o session  Core strength deficits noted  Patient would benefit from continued PT      Plan: Continue per plan of care  Re-eval Date: 22     Precautions: 5-10# lifting restrictions from eye doctor from cornea transplant in 2022          Manuals 10/5 10/11      R HS  Pt unable to tolerate this date                                Neuro Re-Ed         PPT HEP       PPT with          TA  5" x 10      TA + OH lifts        MTP/LTP  Green 1x15 ea      Palloff press                 Ther Ex        NuStep         Standing hip flex/abd/ext  1x10 ea Chris      Squats   1x10      Step-ups         LTR HEP  5" x 10      SKTC  5" x 5       SLR  1x10 Chris      S/L SLR        Clamshells         Heel walk outs         Bridges   1x10                               Ther Activity                        Gait Training                        Modalities        MHP prn   Seated   10 min

## 2022-10-14 ENCOUNTER — OFFICE VISIT (OUTPATIENT)
Dept: PHYSICAL THERAPY | Facility: HOME HEALTHCARE | Age: 77
End: 2022-10-14
Payer: MEDICARE

## 2022-10-14 DIAGNOSIS — M54.41 CHRONIC RIGHT-SIDED LOW BACK PAIN WITH RIGHT-SIDED SCIATICA: Primary | ICD-10-CM

## 2022-10-14 DIAGNOSIS — G89.29 CHRONIC RIGHT-SIDED LOW BACK PAIN WITH RIGHT-SIDED SCIATICA: Primary | ICD-10-CM

## 2022-10-14 PROCEDURE — 97110 THERAPEUTIC EXERCISES: CPT

## 2022-10-14 PROCEDURE — 97112 NEUROMUSCULAR REEDUCATION: CPT

## 2022-10-14 NOTE — PROGRESS NOTES
Daily Note     Today's date: 10/14/2022  Patient name: Ministerio Rinaldi  : 1945  MRN: [id number]  Referring provider: Algernon Blizzard  Dx:   Encounter Diagnosis     ICD-10-CM    1  Chronic right-sided low back pain with right-sided sciatica  M54 41     G89 29                   Subjective: Patient reports low back is a little sore this morning  Objective: See treatment diary below      Assessment: Patient tolerated treatment fair  Patient demonstrated poor technique when performing squats- when corrected patient deferred remaining due to increased LBP with this  Fatigue noted pots session  Patient would benefit from continued PT to inrease trunk mobility and core strength for improved function in ADLs  Plan: Continue per plan of care  Re-eval Date: 22     Precautions: 5-10# lifting restrictions from eye doctor from cornea transplant in 2022  Sensitive to light, needs 3 pillows or wedge          Manuals 10/5 10/11 10/14     R HS  Pt unable to tolerate this date                                Neuro Re-Ed         PPT HEP  5"x15     PPT with     15x ea     TA  5" x 10 5"x10     TA + OH lifts        MTP/LTP  Green 1x15 ea Green 2x10 ea     Palloff press                 Ther Ex        NuStep    L1 10 minutes      Standing hip flex/abd/ext  1x10 ea Chris 15x ea chris     Squats   1x10 2x then def     Step-ups         LTR HEP  5" x 10 5"x10 ea     SKTC  5" x 5  5"x5     SLR  1x10 Chris 1x10 chris     S/L SLR        Clamshells         Heel walk outs         Bridges   1x10  1x10                             Ther Activity                        Gait Training                        Modalities        MHP prn   Seated   10 min

## 2022-10-18 ENCOUNTER — OFFICE VISIT (OUTPATIENT)
Dept: PHYSICAL THERAPY | Facility: HOME HEALTHCARE | Age: 77
End: 2022-10-18
Payer: MEDICARE

## 2022-10-18 DIAGNOSIS — G89.29 CHRONIC RIGHT-SIDED LOW BACK PAIN WITH RIGHT-SIDED SCIATICA: Primary | ICD-10-CM

## 2022-10-18 DIAGNOSIS — M54.41 CHRONIC RIGHT-SIDED LOW BACK PAIN WITH RIGHT-SIDED SCIATICA: Primary | ICD-10-CM

## 2022-10-18 PROCEDURE — 97112 NEUROMUSCULAR REEDUCATION: CPT

## 2022-10-18 PROCEDURE — 97110 THERAPEUTIC EXERCISES: CPT

## 2022-10-18 NOTE — PROGRESS NOTES
Daily Note     Today's date: 10/18/2022  Patient name: Emil Thomas  : 1945  MRN: [id number]  Referring provider: Saeid Gayle  Dx:   Encounter Diagnosis     ICD-10-CM    1  Chronic right-sided low back pain with right-sided sciatica  M54 41     G89 29                   Subjective: Patient reports that she is having increased low back pain now on both sides 8/10 ever since last session  Notes that she over did it and is requesting to hold some exercises today  Objective: See treatment diary below      Assessment: Patient tolerated treatment fair  Continued with current exercises this session but did decrease workload to avoid any increased pain  Also held standing extensions per pt request  She felt good towards the end of session with complaints  Patient would benefit from continued PT to inrease trunk mobility and core strength for improved function in ADLs  Plan: Continue per plan of care  Re-eval Date: 22     Precautions: 5-10# lifting restrictions from eye doctor from cornea transplant in 2022  Sensitive to light, needs 3 pillows or wedge          Manuals 10/5 10/11 10/14 10/18    R HS  Pt unable to tolerate this date                                Neuro Re-Ed         PPT HEP  5"x15 5"x15    PPT with marches    15x ea 15x ea    TA  5" x 10 5"x10 5"x10    TA + OH lifts        MTP/LTP  Green 1x15 ea Green 2x10 ea Green 2x10 ea    Palloff press                 Ther Ex        NuStep    L1 10 minutes  L1 10'    Standing hip flex/abd/ext  1x10 ea Chris 15x ea chris 15x ea chris    Squats   1x10 2x then def     Step-ups         LTR HEP  5" x 10 5"x10 ea 5"x10 ea    SKTC  5" x 5  5"x5 5"x5    SLR  1x10 Chris 1x10 chris 1x10 ea    S/L SLR        Clamshells         Heel walk outs         Bridges   1x10  1x10 1x10                            Ther Activity                        Gait Training                        Modalities        MHP prn   Seated   10 min

## 2022-10-21 ENCOUNTER — OFFICE VISIT (OUTPATIENT)
Dept: PHYSICAL THERAPY | Facility: HOME HEALTHCARE | Age: 77
End: 2022-10-21
Payer: MEDICARE

## 2022-10-21 DIAGNOSIS — G89.29 CHRONIC RIGHT-SIDED LOW BACK PAIN WITH RIGHT-SIDED SCIATICA: Primary | ICD-10-CM

## 2022-10-21 DIAGNOSIS — M54.41 CHRONIC RIGHT-SIDED LOW BACK PAIN WITH RIGHT-SIDED SCIATICA: Primary | ICD-10-CM

## 2022-10-21 PROCEDURE — 97110 THERAPEUTIC EXERCISES: CPT

## 2022-10-21 PROCEDURE — 97112 NEUROMUSCULAR REEDUCATION: CPT

## 2022-10-21 NOTE — PROGRESS NOTES
Daily Note     Today's date: 10/21/2022  Patient name: Bobbi Israel  : 1945  MRN: [id number]  Referring provider: Sixto Son  Dx:   Encounter Diagnosis     ICD-10-CM    1  Chronic right-sided low back pain with right-sided sciatica  M54 41     G89 29                   Subjective: Pt reports the pressure is up in her L eye  Pt reports she saw the eye Dr on Monday and the eye Dr told her she is ok to go to PT but just be careful with what she does  Pt reports she has pain in her back today  Objective: See treatment diary below      Assessment: Notified PT Loretta Garcia before start of session about Pt's eye pressure  Per PT Loretta Garcia all exercises to Pts tolerance  Tolerated treatment well  Pt with no c/o increased pain t/o session or at end of session  Program modified today, kept program light  Patient would benefit from continued PT      Plan: Continue per plan of care  Re-eval Date: 22     Precautions: 5-10# lifting restrictions from eye doctor from cornea transplant in 2022  Sensitive to light, needs 3 pillows or wedge          Manuals 10/5 10/11 10/14 10/18 10/21   R HS  Pt unable to tolerate this date                                Neuro Re-Ed         PPT HEP  5"x15 5"x15 5"x 15 eleveted   PPT with marches    15x ea 15x ea NP   TA  5" x 10 5"x10 5"x10 5"x10 elevated   TA + OH lifts        MTP/LTP  Green 1x15 ea Green 2x10 ea Green 2x10 ea NP   Palloff press                 Ther Ex        NuStep    L1 10 minutes  L1 10' L1 10'   Standing hip flex/abd/ext  1x10 ea Marielos 15x ea marielos 15x ea marielos 1x15 ea Marielos   Squats   1x10 2x then def     Step-ups         LTR HEP  5" x 10 5"x10 ea 5"x10 ea 5"x10 ea elevated    SKTC  5" x 5  5"x5 5"x5 NP   SLR  1x10 Marielos 1x10 marielos 1x10 ea 1 x 10 ea  elevated   S/L SLR        Clamshells         Heel walk outs         Bridges   1x10  1x10 1x10 NP                           Ther Activity                        Gait Training Modalities        MHP prn   Seated   10 min

## 2022-10-25 ENCOUNTER — OFFICE VISIT (OUTPATIENT)
Dept: PHYSICAL THERAPY | Facility: HOME HEALTHCARE | Age: 77
End: 2022-10-25
Payer: MEDICARE

## 2022-10-25 DIAGNOSIS — M54.41 CHRONIC RIGHT-SIDED LOW BACK PAIN WITH RIGHT-SIDED SCIATICA: Primary | ICD-10-CM

## 2022-10-25 DIAGNOSIS — G89.29 CHRONIC RIGHT-SIDED LOW BACK PAIN WITH RIGHT-SIDED SCIATICA: Primary | ICD-10-CM

## 2022-10-25 PROCEDURE — 97110 THERAPEUTIC EXERCISES: CPT

## 2022-10-25 PROCEDURE — 97112 NEUROMUSCULAR REEDUCATION: CPT

## 2022-10-25 NOTE — PROGRESS NOTES
Daily Note     Today's date: 10/25/2022  Patient name: Peterson Adams  : 1945  MRN: [id number]  Referring provider: Modesto Turner,*  Dx: No diagnosis found  Start Time: 915          Subjective: Pain of 6/10 at LB to R thigh  I did ok with the modified ex last visit  Objective: See treatment diary below    Assessment: Continued with modified TE program 2* pt with elevated pressure in L eye  Pt padmini TE well and was able to resume SKTC with use of towel for self assist  Patient would benefit from continued PT    Plan: Continue per plan of care  Re-eval Date: 22     Precautions: 5-10# lifting restrictions from eye doctor from cornea transplant in 2022  Sensitive to light, needs 3 pillows or wedge          Manuals 10-25 10/11 10/14 10/18 10/21   R HS                                 Neuro Re-Ed         PPT 5" 15 elevated  5"x15 5"x15 5"x 15 eleveted   PPT with marches    15x ea 15x ea NP   TA 5" x10 elevated 5" x 10 5"x10 5"x10 5"x10 elevated   TA + OH lifts        MTP/LTP  Green 1x15 ea Green 2x10 ea Green 2x10 ea NP   Palloff press                 Ther Ex 10-       NuStep  L1 10'  L1 10 minutes  L1 10' L1 10'   Standing hip flex/abd/ext 1x15 ea   marielos 1x10 ea Marielos 15x ea marielos 15x ea marielos 1x15 ea Marielos   Squats   1x10 2x then def     Step-ups         LTR 5" x10  elevated 5" x 10 5"x10 ea 5"x10 ea 5"x10 ea elevated    SKTC 5" x5 5" x 5  5"x5 5"x5 NP   SLR 1x10 ea   elevated 1x10 Marielos 1x10 marielos 1x10 ea 1 x 10 ea  elevated   S/L SLR        Clamshells         Heel walk outs         Bridges  NP 1x10  1x10 1x10 NP                           Ther Activity                        Gait Training                        Modalities        MHP prn  Home Seated   10 min

## 2022-10-28 ENCOUNTER — OFFICE VISIT (OUTPATIENT)
Dept: PHYSICAL THERAPY | Facility: HOME HEALTHCARE | Age: 77
End: 2022-10-28
Payer: MEDICARE

## 2022-10-28 DIAGNOSIS — M54.41 CHRONIC RIGHT-SIDED LOW BACK PAIN WITH RIGHT-SIDED SCIATICA: Primary | ICD-10-CM

## 2022-10-28 DIAGNOSIS — G89.29 CHRONIC RIGHT-SIDED LOW BACK PAIN WITH RIGHT-SIDED SCIATICA: Primary | ICD-10-CM

## 2022-10-28 PROCEDURE — 97112 NEUROMUSCULAR REEDUCATION: CPT

## 2022-10-28 PROCEDURE — 97110 THERAPEUTIC EXERCISES: CPT

## 2022-10-28 NOTE — PROGRESS NOTES
Daily Note     Today's date: 10/28/2022  Patient name: Talib Ivey  : 1945  MRN: [id number]  Referring provider: Otf Silva  Dx:   Encounter Diagnosis     ICD-10-CM    1  Chronic right-sided low back pain with right-sided sciatica  M54 41     G89 29               Subjective: Pt reports she has a little pain R side of her LB  Objective: See treatment diary below      Assessment: Tolerated treatment well  No increased pain reported t/o session  Program continues to be modified due to elevated L eye pressure  Patient would benefit from continued PT      Plan: Continue per plan of care  Re-eval Date: 22     Precautions: 5-10# lifting restrictions from eye doctor from cornea transplant in 2022  Sensitive to light, needs 3 pillows or wedge          Manuals 10-25 10/28 10/14 10/18 10/21   R HS                                 Neuro Re-Ed         PPT 5" 15 elevated 5"x15 elevated  5"x15 5"x15 5"x 15 eleveted   PPT with marches    15x ea 15x ea NP   TA 5" x10 elevated 5" x 10 elevated 5"x10 5"x10 5"x10 elevated   TA + OH lifts        MTP/LTP   Green 2x10 ea Green 2x10 ea NP   Palloff press                 Ther Ex 10-       NuStep  L1 10' L1  10' L1 10 minutes  L1 10' L1 10'   Standing hip flex/abd/ext 1x15 ea   chris 1x15 ea Chris 15x ea chris 15x ea chris 1x15 ea Chris   Squats    2x then def     Step-ups         LTR 5" x10  elevated 5" x 10 ea   elevated 5"x10 ea 5"x10 ea 5"x10 ea elevated    SKTC 5" x5 5" x 5  5"x5 5"x5 NP   SLR 1x10 ea   elevated 1x10 ea elevated  1x10 chris 1x10 ea 1 x 10 ea  elevated   S/L SLR        Clamshells         Heel walk outs         Bridges  NP NP 1x10 1x10 NP                           Ther Activity                        Gait Training                        Modalities        MHP prn  Home

## 2022-11-01 ENCOUNTER — APPOINTMENT (OUTPATIENT)
Dept: PHYSICAL THERAPY | Facility: HOME HEALTHCARE | Age: 77
End: 2022-11-01

## 2022-11-02 NOTE — PROGRESS NOTES
Consultation - Otolaryngology - Head and Neck Surgery  Facial Plastic and Reconstructive Surgery  Rochelle Preez 69 y o  female MRN: [id number]  Encounter: [id number]        Assessment/Plan:  1  Postural dizziness with presyncope     2  Essential hypertension     3  Hyponatremia         She denies any true vertigo or room spinning  She also denies any changes in her hearing  I believe her symptoms of lightheadedness or likely cardiovascular in origin  I recommend she call her cardiologist to inform them and possibly come in for an appointment  She has an appointment with her nephrologist tomorrow who she will also mention this to  Cerumen impaction:  We discussed the nature of cerumen impaction  We discussed appropriate treat with hydrogen peroxide 4-5 drops once per week  We discussed discontinuing the use of any Q-Tips or other objects into the ear  She can follow up as needed if symptoms worsen  History of Present Illness   Physician Requesting Consult: Darrius Connor DO  Reason for Consult / Principal Problem:  Dizziness  HPI: Rochelle Perez is a 69y o  year old female who presents with dizziness  She states this started initially several weeks ago  She denies any preceding medication changes, head trauma, URIs  Since then she has been having recurrent spells of lightheadedness  She denies any sensation of room spinning vertigo or imbalance  She denies associated diaphoresis, loss of consciousness, palpitations  She currently follows with Dr Beverly Cole for cardiology and Dr Michael Mcnulty for Nephrology  She has a known history of hyponatremia  Review of systems:  ROS was performed by the MA and documented in the attached note  This was reviewed personally      Historical Information   Past Medical History:   Diagnosis Date    Anxiety     Arthritis     Benign arteriolar nephrosclerosis     Chronic kidney disease in type 2 diabetes mellitus (HCC)     Chronic pain disorder     GERD (gastroesophageal reflux disease)     Hyperaldosteronism (HCC)     Hyperlipidemia     Hypertension     Hypo-osmolality and hyponatremia     IBS (irritable bowel syndrome)     Insomnia     Mitral regurgitation     Obstructive sleep apnea syndrome     Osteoarthritis     Pernicious anemia     Stroke ([address] )     tia     Past Surgical History:   Procedure Laterality Date    APPENDECTOMY      CARPAL TUNNEL RELEASE Bilateral     COLONOSCOPY      several    HAND SURGERY Right     ring finger has pin    HYSTERECTOMY      Total     INSERT / REPLACE PERIPHERAL NEUROSTIMULATOR PULSE GENERATOR /  Left     buttocks    JOINT REPLACEMENT Left     knee    JOINT REPLACEMENT  04/2019    Hip    KNEE ARTHROSCOPY Bilateral     NASAL SEPTUM SURGERY  2008    NERVE BLOCK Left 2/20/2018    Procedure: BLOCK MEDIAL BRANCH - C4, C5, C6;  Surgeon: Kay Sims MD;  Location: [facility];  Service: Pain Management     NERVE BLOCK Left 3/6/2018    Procedure: C4, C5, C6 MEDIAL BRANCH BLOCK;  Surgeon: Kay Sims MD;  Location: [facility];  Service: Pain Management     NM COLONOSCOPY FLX DX W/COLLJ SPEC WHEN PFRMD N/A 4/24/2017    Procedure: Heather Grew;  Surgeon: J Carlos Rivera MD;  Location: [facility];  Service: Colorectal    NM TOTAL HIP ARTHROPLASTY Right 2/27/2019    Procedure: ARTHROPLASTY HIP TOTAL;  Surgeon: Kaden Nuñez DO;  Location: [address] [place] [address] MAIN OR;  Service: Orthopedics    RADIOFREQUENCY ABLATION Left 4/17/2018    Procedure: ABLATION RADIO FREQUENCY (RFA) - C4, C5, C6;  Surgeon: Kay Sims MD;  Location: [facility];  Service: Pain Management     SINUS SURGERY      UPPER GASTROINTESTINAL ENDOSCOPY       Social History   Social History     Substance and Sexual Activity   Alcohol Use Never    Frequency: Never    Comment: 2 beer a week     Social History     Substance and Sexual Activity   Drug Use Never     Social History     Tobacco Use   Smoking Status Former Smoker Smokeless Tobacco Never Used   Tobacco Comment    quit 40 yrs ago     Family History:   Family History   Problem Relation Age of Onset    Heart disease Mother     Hypertension Mother    [place] Arthritis Mother     Dementia Mother     Rheum arthritis Mother     Hypertension Father     Heart disease Father     Cancer Father     Hypertension Sister     Anxiety disorder Sister     Thyroid disease Sister     Cancer Maternal Grandmother     Pseudochol deficiency Neg Hx        Current Outpatient Medications on File Prior to Visit   Medication Sig    bisoprolol (ZEBETA) 10 MG tablet Take 1 tablet (10 mg total) by mouth daily    butalbital-acetaminophen-caffeine-codeine (FIORICET WITH CODEINE) -31-30 MG per capsule Take 1 capsule by mouth every 4 (four) hours as needed for headaches    calcium carbonate (OS-MARLENY) 600 MG tablet Take 600 mg by mouth daily      cholecalciferol (VITAMIN D3) 1,000 units tablet Take 50,000 Units by mouth once a week    fluticasone (FLONASE) 50 mcg/act nasal spray 2 sprays into each nostril daily as needed for rhinitis    LORazepam (ATIVAN) 1 mg tablet Take 1 mg by mouth 2 (two) times a day as needed for anxiety    losartan (COZAAR) 100 MG tablet TAKE 1 TABLET EVERY DAY    Multiple Vitamin (DAILY VALUE MULTIVITAMIN) TABS Take 1 tablet by mouth daily    nebivolol (BYSTOLIC) 10 mg tablet Take 10 mg by mouth daily    omeprazole (PriLOSEC) 20 mg delayed release capsule Take 20 mg by mouth every morning      pravastatin (PRAVACHOL) 20 mg tablet Take 20 mg by mouth 2 (two) times a day     spironolactone (ALDACTONE) 50 mg tablet Take 50 mg by mouth daily after lunch      zolpidem (AMBIEN) 10 mg tablet zolpidem 10 mg tablet    docusate sodium (COLACE) 100 mg capsule Take 1 capsule (100 mg total) by mouth 2 (two) times a day (Patient not taking: Reported on 10/8/2019)    ferrous sulfate 325 (65 Fe) mg tablet Take 1 tablet (325 mg total) by mouth 2 (two) times a day with meals (Patient not taking: Reported on 8/27/2019)    fondaparinux (ARIXTRA) 2 5 mg/0 5 mL Inject 2 5 mg under the skin daily (Patient not taking: Reported on 8/27/2019)    Multiple Vitamins-Minerals (MULTIVITAMIN WITH MINERALS) tablet Take 1 tablet by mouth daily (Patient not taking: Reported on 8/27/2019)    [DISCONTINUED] pravastatin (PRAVACHOL) 40 mg tablet TAKE 1 TABLET EVERY DAY AS DIRECTED     No current facility-administered medications on file prior to visit  Allergies   Allergen Reactions    Procaine Hives    Adhesive [Medical Tape] Rash    Lidocaine Rash    Penicillins Rash       Vitals:    10/08/19 1333   BP: 142/70   Pulse: 56       Physical Exam   Constitutional: Oriented to person, place, and time  Well-developed and well-nourished, no apparent distress, non-toxic appearance  Cooperative, able to hear and answer questions without difficulty  Voice: Normal voice quality  Head: Normocephalic, atraumatic  No scars, masses or lesions  Face: Symmetric, no edema, no sinus tenderness  Eyes: Vision grossly intact, extra-ocular movement intact  Ears: External ears normal   Right EAC completely impacted with dried cerumen  Status post debridement, tympanic membranes intact with intact normal landmarks  No post-auricular erythema or tenderness  Nose: Septum intact, nares clear  Mucosa moist, turbinates well appearing  No crusting, polyps or discharge evident  Oral cavity: Dentition intact  Mucosa moist, lips without lesions or masses  Tongue mobile, floor of mouth soft and flat  Hard palate intact  No masses or lesions  Oropharynx: Uvula is midline, soft palate intact without lesion or mass  Oropharyngeal inlet without obstruction  Tonsils unremarkable  Posterior pharyngeal wall clear  No masses or lesions  Salivary glands:  Parotid glands and submandibular glands symmetric, no enlargement or tenderness  Neck: Normal laryngeal elevation with swallow  Trachea midline    No masses or lesions  No palpable adenopathy  Thyroid: Without tenderness or palpable nodules  Pulmonary/Chest: Normal effort and rate  No respiratory distress  No stertor or stridor  Musculoskeletal: Normal range of motion  Neurological: Cranial nerves 2-12 intact  Skin: Skin is warm and dry  Psychiatric: Normal mood and affect  Procedure: Cerumen debridement right    Indications: Cerumen impaction right    Procedure in detail: After informed verbal consent was obtained the ear was visualized using microscopy  Using cerumen loop, suction, and alligator forceps the cerumen was debrided and the findings below were seen  The patient tolerated the procedure well  FINDINGS:  Bilateral TM intact and clear with normal internal landmarks  Imaging Studies: I have personally reviewed pertinent reports  Lab Results: I have personally reviewed pertinent lab results  Greater than 40 minutes were spent in consultation  More than 1/2 of that time was spent in counselling and coordination of care  Metronidazole Counseling:  I discussed with the patient the risks of metronidazole including but not limited to seizures, nausea/vomiting, a metallic taste in the mouth, nausea/vomiting and severe allergy.

## 2022-11-04 ENCOUNTER — OFFICE VISIT (OUTPATIENT)
Dept: PHYSICAL THERAPY | Facility: HOME HEALTHCARE | Age: 77
End: 2022-11-04

## 2022-11-04 DIAGNOSIS — G89.29 CHRONIC RIGHT-SIDED LOW BACK PAIN WITH RIGHT-SIDED SCIATICA: Primary | ICD-10-CM

## 2022-11-04 DIAGNOSIS — M54.41 CHRONIC RIGHT-SIDED LOW BACK PAIN WITH RIGHT-SIDED SCIATICA: Primary | ICD-10-CM

## 2022-11-04 NOTE — PROGRESS NOTES
Daily Note     Today's date: 2022  Patient name: King Ambrosio  : 1945  MRN: [id number]  Referring provider: Shameka Joshua  Dx:   Encounter Diagnosis     ICD-10-CM    1  Chronic right-sided low back pain with right-sided sciatica  M54 41     G89 29               Subjective: Pt reports she has pain in her R hip  Objective: See treatment diary below      Assessment: Tolerated treatment well  See re-eval/D/C summary today for assessment  Patient with no c/o increased pain t/o session  Plan:  Pt to be discharged from PT  Re-eval Date: 22     Precautions: 5-10# lifting restrictions from eye doctor from cornea transplant in 2022  Sensitive to light, needs 3 pillows or wedge          Manuals 10-25 10/28 11/4 10/18 10/21   R HS                                 Neuro Re-Ed         PPT 5" 15 elevated 5"x15 elevated  5"x15 elevated  5"x15 5"x 15 eleveted   PPT with marches     15x ea NP   TA 5" x10 elevated 5" x 10 elevated 5"x10 elevated  5"x10 5"x10 elevated   TA + OH lifts        MTP/LTP    Green 2x10 ea NP   Palloff press                 Ther Ex 10-       NuStep  L1 10' L1  10' L1 10 minutes  L1 10' L1 10'   Standing hip flex/abd/ext 1x15 ea   chris 1x15 ea Chris 15x ea chris 15x ea chris 1x15 ea Chris   Squats         Step-ups         LTR 5" x10  elevated 5" x 10 ea   elevated 5"x10 ea  Elevated  5"x10 ea 5"x10 ea elevated    SKTC 5" x5 5" x 5  5"x5 5"x5 NP   SLR 1x10 ea   elevated 1x10 ea elevated  1x10 chris  elevated  1x10 ea 1 x 10 ea  elevated   S/L SLR        Clamshells         Heel walk outs         Bridges  NP NP NP 1x10 NP                           Ther Activity                        Gait Training                        Modalities        MHP prn  Home

## 2022-11-10 ENCOUNTER — OFFICE VISIT (OUTPATIENT)
Dept: OBGYN CLINIC | Facility: CLINIC | Age: 77
End: 2022-11-10

## 2022-11-10 ENCOUNTER — RA CDI HCC (OUTPATIENT)
Dept: OTHER | Facility: HOSPITAL | Age: 77
End: 2022-11-10

## 2022-11-10 VITALS — WEIGHT: 190 LBS | HEIGHT: 64 IN | BODY MASS INDEX: 32.44 KG/M2

## 2022-11-10 DIAGNOSIS — Z96.641 HISTORY OF TOTAL HIP ARTHROPLASTY, RIGHT: Primary | ICD-10-CM

## 2022-11-10 NOTE — PROGRESS NOTES
Assessment/Plan:   Diagnoses and all orders for this visit:    History of total hip arthroplasty, right    Patient presents for annual follow-up of right DELGADO performed 2019  Her exam and most recent x-rays demonstrate a stable total hip prosthesis  She does demonstrate mild lateral thigh pain with direct pressure which may indicate some degree of trochanteric bursitis  Briefly discussed referral to pain management, or her going back to her spine surgeon for battery replacement of her spinal cord stimulator  However, she states that she is not interested in undergoing an additional procedure for the sake of the stimulator, so she does not intend to pursue that course of action at this time  Discussed with patient that should any questions or concerns arise in regards to her right hip, she can contact the office to schedule an appointment  Otherwise, she will be seen for regular annual follow-up appointment in 1 year  At that time, we will repeat x-rays of the AP pelvis and right hip for evaluation  Patient expresses understanding is in agreement with this treatment plan  The patient is doing quite well from a right total hip replacement  Strength and motion intact  X-rays do show anatomic placement of prosthesis  Continue home exercise program   Follow-up in 1 year for re-evaluation with x-rays of right hip-two views as well as her knee-three views which was replaced    Subjective:   Patient ID: Rachel Johnston  1945     HPI  Patient is a 78 y o  female who presents for annual evaluation 3 years and 8 months s/p right hip arthroplasty performed 2019  On today's presentation, she reports only mild symptoms in the lateral portion of the right thigh/buttock  She denies any specific deep hip pain  She has a previous surgical history that includes spine stimulator implantation in   She needed a revision to have her leads replaced in     And the battery has since  in this unit so is no longer working at this time  However, she does not want to undergo another procedure in regards to the stimulator, so she has not returned to have the battery replaced  Previous treatments in regards to spinal pain and hip pain include physical therapy, spinal injections, and activity modification  She denies any feelings of instability or mechanical symptoms      The following portions of the patient's history were reviewed and updated as appropriate:  Past medical history, past surgical history, Family history, social history, current medications and allergies    Past Medical History:   Diagnosis Date   • Allergies    • Anxiety    • Arthritis    • Benign arteriolar nephrosclerosis    • Bereavement    • Cataract    • Chronic kidney disease    • Chronic kidney disease in type 2 diabetes mellitus ([place] [address] )    • Chronic kidney disease, stage 2 (mild)    • Chronic pain disorder    • Chronic pain syndrome    • COPD (chronic obstructive pulmonary disease) ([place] [address] )    • DDD (degenerative disc disease), cervical    • DDD (degenerative disc disease), lumbar    • Degenerative joint disease of right hip    • Depression    • Diastolic dysfunction    • DJD (degenerative joint disease), ankle and foot, right    • DJD of right shoulder    • Edema    • Fracture of left calcaneus    • Generalized anxiety disorder    • GERD (gastroesophageal reflux disease)    • Heart murmur    • Hyperaldosteronism ([place] [address] )    • Hyperlipidemia    • Hypertension    • Hypertensive nephrosclerosis    • Hypo-osmolality and hyponatremia    • IBS (irritable bowel syndrome)    • Insomnia    • Migraines    • Mitral regurgitation    • MVP (mitral valve prolapse)    • Obstructive sleep apnea syndrome    • Osteoarthritis    • Osteoarthritis    • Pernicious anemia    • Plantar fascia rupture    • Rheumatoid arthritis (HCC)    • Right knee DJD    • Shingles    • Sinobronchitis    • Sleep apnea    • Stroke ([place] [address] )     tia   • TIA (transient ischemic attack) • Vertigo        Past Surgical History:   Procedure Laterality Date   • APPENDECTOMY     • CARPAL TUNNEL RELEASE Bilateral    • COLONOSCOPY      several   • COLONOSCOPY  04/02/2012    by Dr J Carlos Rivera   • CORNEAL TRANSPLANT Left 04/11/2022   • DXA PROCEDURE (HISTORICAL)  10/26/2016, 9/17/2014, 6/18/2007   • HAND SURGERY Right     ring finger has pin   • HYSTERECTOMY      34   • INSERT / REPLACE PERIPHERAL NEUROSTIMULATOR PULSE GENERATOR /  Left     buttocks   • JOINT REPLACEMENT Left     knee   • JOINT REPLACEMENT Right 04/2019    Hip   • KNEE ARTHROPLASTY Left 01/15/2009    by Dr Sha Mims at [facility]    • KNEE ARTHROSCOPY Bilateral    • KNEE CARTILAGE SURGERY  09/10/2009    by Dr Sha Mims at [address] [place] [address] Road (HISTORICAL)  12/10/2018, 10/30/2017, 10/26/2016   • NASAL SEPTUM SURGERY  2008   • NERVE BLOCK Left 02/20/2018    Procedure: BLOCK MEDIAL BRANCH - C4, C5, C6;  Surgeon: Kay Sims MD;  Location: [facility];  Service: Pain Management    • NERVE BLOCK Left 03/06/2018    Procedure: C4, C5, C6 MEDIAL BRANCH BLOCK;  Surgeon: Kay Sims MD;  Location: [facility];  Service: Pain Management    • MS COLONOSCOPY FLX DX W/COLLJ SPEC WHEN PFRMD N/A 04/24/2017    Procedure: Heather Kaye;  Surgeon: J Carlos Rivera MD;  Location: [facility];  Service: Colorectal   • MS TOTAL HIP ARTHROPLASTY Right 02/27/2019    Procedure: ARTHROPLASTY HIP TOTAL;  Surgeon: Kaden Nuñez DO;  Location: [address] [place] [address] MAIN OR;  Service: Orthopedics   • MS TOTAL KNEE ARTHROPLASTY Right 06/24/2020    Procedure: KNEE TOTAL ARTHROPLASTY;  Surgeon: Kaden Nuñez DO;  Location: [address] [place] [address] MAIN OR;  Service: Orthopedics   • RADIOFREQUENCY ABLATION Left 04/17/2018    Procedure: ABLATION RADIO FREQUENCY (RFA) - C4, C5, C6;  Surgeon: Kay Sims MD;  Location: [facility];  Service: Pain Management    • REPLACEMENT TOTAL KNEE Left 07/05/2011   • SINUS SURGERY     • TONSILLECTOMY     • TOTAL HIP ARTHROPLASTY Left    • TRIGGER FINGER RELEASE      R 4th    • UPPER GASTROINTESTINAL ENDOSCOPY  01/12/2007    with Sherif Culver dilatation by Dr Shannan Cantrell at [facility] [place] [facility]   • WRIST SURGERY Right        Family History   Problem Relation Age of Onset   • Heart disease Mother    • Hypertension Mother    • Arthritis Mother    • Dementia Mother    • Rheum arthritis Mother    • Hypertension Father    • Heart disease Father    • Colon cancer Father    • Hypertension Sister    • Anxiety disorder Sister    • Thyroid disease Sister    • Prostate cancer Maternal Grandfather    • No Known Problems Paternal Grandmother    • No Known Problems Paternal Grandfather    • No Known Problems Paternal Aunt    • Pseudochol deficiency Neg Hx        Social History     Socioeconomic History   • Marital status: /Civil Union     Spouse name: None   • Number of children: None   • Years of education: None   • Highest education level: None   Occupational History   • Occupation: Admnistrator    Tobacco Use   • Smoking status: Former Smoker     Types: Cigarettes   • Smokeless tobacco: Never Used   • Tobacco comment: quit 40 yrs ago   Vaping Use   • Vaping Use: Never used   Substance and Sexual Activity   • Alcohol use: Yes     Comment: 2 beer a week   • Drug use: Never   • Sexual activity: Not Currently     Birth control/protection: Post-menopausal   Other Topics Concern   • None   Social History Narrative    Patient has never smoked - As per [employer]    Consumes 1-2 beers per week - As per Ambar Rehman    Consumes on average 1 cup of decaf coffee per day      Social Determinants of Health     Financial Resource Strain: Not on file   Food Insecurity: Not on file   Transportation Needs: Not on file   Physical Activity: Not on file   Stress: Not on file   Social Connections: Not on file   Intimate Partner Violence: Not on file   Housing Stability: Not on file         Current Outpatient Medications:   •  aspirin (ECOTRIN LOW STRENGTH) 81 mg EC tablet, Take 1 tablet (81 mg total) by mouth daily, Disp:  , Rfl:   •  bacitracin-polymyxin b ophthalmic ointment, INSTILL A 1/4 INCH INTO LEFT EYE AT BEDTIME, Disp: , Rfl:   •  bisoprolol (ZEBETA) 10 MG tablet, TAKE 1 TABLET EVERY DAY, Disp: 90 tablet, Rfl: 3  •  butalbital-acetaminophen-caffeine (FIORICET,ESGIC) -40 mg per tablet, TAKE 1 TABLET EVERY 4 HOURS AS NEEDED  FOR  HEADACHES, Disp: 120 tablet, Rfl: 1  •  calcium carbonate (OS-MARLENY) 600 MG tablet, Take 600 mg by mouth 2 (two) times a day with meals, Disp: , Rfl:   •  losartan (COZAAR) 100 MG tablet, TAKE 1 TABLET EVERY MORNING, Disp: 90 tablet, Rfl: 3  •  mirtazapine (REMERON) 15 mg tablet, Take 1 tablet (15 mg total) by mouth daily at bedtime, Disp: 90 tablet, Rfl: 4  •  Multiple Vitamins-Minerals (MULTIVITAMIN WITH MINERALS) tablet, Take 1 tablet by mouth every morning, Disp: , Rfl:   •  ofloxacin (OCUFLOX) 0 3 % ophthalmic solution, INSTILL 1 DROP INTO AFFECTED EYE 4 TIMES DAILY, Disp: , Rfl:   •  omeprazole (PriLOSEC) 20 mg delayed release capsule, TAKE 1 CAPSULE TWICE DAILY, Disp: 180 capsule, Rfl: 1  •  pravastatin (PRAVACHOL) 40 mg tablet, TAKE 1 TABLET (40 MG TOTAL) BY MOUTH DAILY, Disp: 90 tablet, Rfl: 3  •  prednisoLONE acetate (PRED FORTE) 1 % ophthalmic suspension, INSTILL 1 DROP INTO AFFECTED  EYE(S) FOUR TIMES A DAY, Disp: , Rfl:   •  prochlorperazine (COMPAZINE) 5 mg tablet, Take 1 tablet (5 mg total) by mouth every 6 (six) hours as needed for nausea or vomiting, Disp: 30 tablet, Rfl: 5  •  spironolactone (ALDACTONE) 50 mg tablet, TAKE 1 TABLET DAILY AFTER LUNCH, Disp: 90 tablet, Rfl: 3  •  clindamycin (CLEOCIN) 150 mg capsule, take 4 capsules by mouth 1 hour prior to appointment (Patient not taking: Reported on 11/10/2022), Disp: , Rfl:   •  diclofenac sodium (VOLTAREN) 1 %, Apply 2 g topically 4 (four) times a day (Patient not taking: No sig reported), Disp: 50 g, Rfl: 5  •  methylPREDNISolone 4 MG tablet therapy pack, Use as directed on package (Patient not taking: Reported on 11/10/2022), Disp: 21 each, Rfl: 0  •  nystatin (MYCOSTATIN) 500,000 units/5 mL suspension, RINSE BY MOUTH WITH 5 MILLILITERS FOR 2 MINUTES 4-5 TIMES DAILY THEN SPIT (Patient not taking: No sig reported), Disp: , Rfl:   •  pregabalin (LYRICA) 50 mg capsule, Take 1 capsule (50 mg total) by mouth 3 (three) times a day (Patient not taking: No sig reported), Disp: 90 capsule, Rfl: 1    Allergies   Allergen Reactions   • Procaine Hives   • Adhesive [Medical Tape] Rash   • Lidocaine Rash   • Penicillins Rash       Review of Systems   Constitutional: Negative for chills, fever and unexpected weight change  HENT: Negative for hearing loss, nosebleeds and sore throat  Eyes: Negative for pain, redness and visual disturbance  Respiratory: Negative for cough, shortness of breath and wheezing  Cardiovascular: Negative for chest pain, palpitations and leg swelling  Gastrointestinal: Negative for abdominal pain, nausea and vomiting  Endocrine: Negative for polydipsia and polyuria  Genitourinary: Negative for dysuria and hematuria  Musculoskeletal:        As noted in HPI   Skin: Negative for rash and wound  Neurological: Negative for dizziness, numbness and headaches  Psychiatric/Behavioral: Negative for decreased concentration and suicidal ideas  The patient is not nervous/anxious           Objective:  Ht 5' 3 5" (1 613 m)   Wt 86 2 kg (190 lb)   BMI 33 13 kg/m²     Ortho Exam  Right hip -   Patient presents with no obvious anatomical deformity  Ambulates with stable gait pattern  TTP over lumbar midline  Palpable spasm over paraspinal musculature  TTP over greater trochanter/trochanteric bursa  Mildly TTP over PSIS, nontender over piriformis/glute med  Nontender over anterior hip joint/groin  ROM:  0°-30° seated IR, 0°-40° seated ER, 120° seated hip flexion  Smooth passive circumduction  Knee flexor and extensor mechanisms intact  - MEGAN, - FADIR  - log roll  2+ TP and DP pulses with brisk capillary refill to the toes  Sural, saphenous, tibial, superficial and deep peroneal motor and sensory distributions intact  Sensation to light touch intact distally      Physical Exam  Vitals and nursing note reviewed  Constitutional:       General: She is not in acute distress  Appearance: She is well-developed  HENT:      Head: Normocephalic and atraumatic  Eyes:      Conjunctiva/sclera: Conjunctivae normal    Cardiovascular:      Rate and Rhythm: Normal rate  Pulmonary:      Effort: Pulmonary effort is normal    Musculoskeletal:      Cervical back: Neck supple  Skin:     General: Skin is warm and dry  Capillary Refill: Capillary refill takes less than 2 seconds  Neurological:      Mental Status: She is alert and oriented to person, place, and time  Psychiatric:         Mood and Affect: Mood normal          Behavior: Behavior normal           Diagnostic Test Review:  Attending Physician has personally reviewed pertinent imaging in PACS, impression is as follows:    Review of radiographic series taken 7/18/2022 of the AP pelvis and right hip shows well-seated total hip prosthesis with maintained alignment and no signs of loosening      Procedures   9 procedures performed this visit    Scribe Attestation    I,:  Dyana Jack am acting as a scribe while in the presence of the attending physician :       I,:  Preeti Dinh, DO personally performed the services described in this documentation    as scribed in my presence :

## 2022-11-10 NOTE — PROGRESS NOTES
[id number]  [facility]  coding opportunities          Chart Reviewed number of suggestions sent to Provider: 1     Patients Insurance     Medicare Insurance: [employer]

## 2022-11-17 ENCOUNTER — OFFICE VISIT (OUTPATIENT)
Dept: FAMILY MEDICINE CLINIC | Facility: CLINIC | Age: 77
End: 2022-11-17

## 2022-11-17 VITALS
WEIGHT: 192 LBS | DIASTOLIC BLOOD PRESSURE: 84 MMHG | SYSTOLIC BLOOD PRESSURE: 142 MMHG | HEIGHT: 64 IN | TEMPERATURE: 96.8 F | HEART RATE: 80 BPM | BODY MASS INDEX: 32.78 KG/M2 | RESPIRATION RATE: 20 BRPM

## 2022-11-17 DIAGNOSIS — G89.4 CHRONIC PAIN DISORDER: ICD-10-CM

## 2022-11-17 DIAGNOSIS — Z96.651 HISTORY OF TOTAL KNEE ARTHROPLASTY, RIGHT: ICD-10-CM

## 2022-11-17 DIAGNOSIS — Z96.641 HISTORY OF TOTAL HIP ARTHROPLASTY, RIGHT: ICD-10-CM

## 2022-11-17 DIAGNOSIS — E55.9 VITAMIN D DEFICIENCY: ICD-10-CM

## 2022-11-17 DIAGNOSIS — E26.9 HYPERALDOSTERONISM (HCC): ICD-10-CM

## 2022-11-17 DIAGNOSIS — M25.559 HIP PAIN: ICD-10-CM

## 2022-11-17 DIAGNOSIS — M51.36 DDD (DEGENERATIVE DISC DISEASE), LUMBAR: ICD-10-CM

## 2022-11-17 DIAGNOSIS — R11.0 NAUSEA: Primary | ICD-10-CM

## 2022-11-17 DIAGNOSIS — Z96.642 HISTORY OF TOTAL HIP ARTHROPLASTY, LEFT: ICD-10-CM

## 2022-11-17 DIAGNOSIS — Z63.4 BEREAVEMENT: ICD-10-CM

## 2022-11-17 DIAGNOSIS — G44.209 MUSCLE TENSION HEADACHE: ICD-10-CM

## 2022-11-17 DIAGNOSIS — I50.40 COMBINED SYSTOLIC AND DIASTOLIC CONGESTIVE HEART FAILURE, UNSPECIFIED HF CHRONICITY (HCC): ICD-10-CM

## 2022-11-17 DIAGNOSIS — N18.31 STAGE 3A CHRONIC KIDNEY DISEASE (HCC): ICD-10-CM

## 2022-11-17 DIAGNOSIS — I10 PRIMARY HYPERTENSION: ICD-10-CM

## 2022-11-17 RX ORDER — LOTEPREDNOL ETABONATE 5 MG/G
GEL OPHTHALMIC
COMMUNITY
Start: 2022-11-15

## 2022-11-17 RX ORDER — PROCHLORPERAZINE MALEATE 5 MG/1
5 TABLET ORAL EVERY 6 HOURS PRN
Qty: 30 TABLET | Refills: 5 | Status: SHIPPED | OUTPATIENT
Start: 2022-11-17

## 2022-11-17 SDOH — SOCIAL STABILITY - SOCIAL INSECURITY: DISSAPEARANCE AND DEATH OF FAMILY MEMBER: Z63.4

## 2022-11-17 NOTE — PROGRESS NOTES
Assessment/Plan:  Chronic nausea treated with Compazine 5 mg t i d  P r n  Hip and lower back pain the patient states that her spinal cord stimulator has  and she does not want a replacement is continue with exercise and physical therapy    Muscle tension headache medically treated with Fioricet PDMP has been reviewed no issues found no evidence of divergence or abuse    Primary hypertension with blood pressure controlled on the current regimen    Class 1 obesity due to excess calories with a BMI of 33 48 down from 35 07    Combined systolic and diastolic congestive heart failure currently treated with Zebeta 10 mg Cozaar 100 mg and Aldactone 50 mg      Dyslipidemia treated with Pravachol with the lipid panel and a desirable profile    Chronic kidney disease stage IIIA patient was advised to avoid nonsteroidal anti-inflammatories    History of bilateral knee arthroplasties     History of hyper aldosteronism on Aldactone by Nephrology    Bereavement due to the death of her dog to cancer at 8years of age  Problem List Items Addressed This Visit    None  Visit Diagnoses     Nausea               Diagnoses and all orders for this visit:    Nausea    Other orders  -     loteprednol etabonate (LOTEMAX) 0 5 % ophth gel; instill 1 drop into left eye twice a day        No problem-specific Assessment & Plan notes found for this encounter  PHQ-2/9 Depression Screening            Body mass index is 33 48 kg/m²  BMI Counseling: Body mass index is 33 48 kg/m²  The BMI     Subjective:      Patient ID: Henrene Libman is a 78 y o  female      Patient presents for four-month checkup      The following portions of the patient's history were reviewed and updated as appropriate:   She has a past medical history of Allergies, Anxiety, Arthritis, Benign arteriolar nephrosclerosis, Bereavement, Cataract, Chronic kidney disease, Chronic kidney disease in type 2 diabetes mellitus ([facility] [address] ), Chronic kidney disease, stage 2 (mild), Chronic pain disorder, Chronic pain syndrome, COPD (chronic obstructive pulmonary disease) ([address] ), DDD (degenerative disc disease), cervical, DDD (degenerative disc disease), lumbar, Degenerative joint disease of right hip, Depression, Diastolic dysfunction, DJD (degenerative joint disease), ankle and foot, right, DJD of right shoulder, Edema, Fracture of left calcaneus, Generalized anxiety disorder, GERD (gastroesophageal reflux disease), Heart murmur, Hyperaldosteronism ([address] ), Hyperlipidemia, Hypertension, Hypertensive nephrosclerosis, Hypo-osmolality and hyponatremia, IBS (irritable bowel syndrome), Insomnia, Migraines, Mitral regurgitation, MVP (mitral valve prolapse), Obstructive sleep apnea syndrome, Osteoarthritis, Osteoarthritis, Pernicious anemia, Plantar fascia rupture, Rheumatoid arthritis ([address] ), Right knee DJD, Shingles, Sinobronchitis, Sleep apnea, Stroke ([facility] [address] ), TIA (transient ischemic attack), and Vertigo  ,  does not have any pertinent problems on file  ,   has a past surgical history that includes Appendectomy; Sinus surgery; Insert / replace peripheral neurostimulator pulse generator /  (Left); pr colonoscopy flx dx w/collj spec when pfrmd (N/A, 04/24/2017); NERVE BLOCK (Left, 02/20/2018); NERVE BLOCK (Left, 03/06/2018); Radiofrequency ablation (Left, 04/17/2018); Colonoscopy; Upper gastrointestinal endoscopy (01/12/2007); Knee arthroscopy (Bilateral); Hand surgery (Right); pr total hip arthroplasty (Right, 02/27/2019); Nasal septum surgery (2008); Carpal tunnel release (Bilateral); Total hip arthroplasty (Left); Hysterectomy; Tonsillectomy; Joint replacement (Left); Joint replacement (Right, 04/2019); pr total knee arthroplasty (Right, 06/24/2020); Wrist surgery (Right); Knee cartilage surgery (09/10/2009); Replacement total knee (Left, 07/05/2011); Knee Arthroplasty (Left, 01/15/2009); Colonoscopy (04/02/2012); DXA procedure(historical) (10/26/2016, 9/17/2014, 6/18/2007);  Mammo (historical) (12/10/2018, 10/30/2017, 10/26/2016); Trigger finger release; and Corneal transplant (Left, 04/11/2022)  ,  family history includes Anxiety disorder in her sister; Arthritis in her mother; Colon cancer in her father; Dementia in her mother; Heart disease in her father and mother; Hypertension in her father, mother, and sister; No Known Problems in her paternal aunt, paternal grandfather, and paternal grandmother; Prostate cancer in her maternal grandfather; Rheum arthritis in her mother; Thyroid disease in her sister  ,   reports that she has quit smoking  Her smoking use included cigarettes  She has never used smokeless tobacco  She reports current alcohol use  She reports that she does not use drugs  ,  is allergic to procaine, adhesive [medical tape], lidocaine, and penicillins     Current Outpatient Medications   Medication Sig Dispense Refill   • aspirin (ECOTRIN LOW STRENGTH) 81 mg EC tablet Take 1 tablet (81 mg total) by mouth daily     • bacitracin-polymyxin b ophthalmic ointment INSTILL A 1/4 INCH INTO LEFT EYE AT BEDTIME     • bisoprolol (ZEBETA) 10 MG tablet TAKE 1 TABLET EVERY DAY 90 tablet 3   • butalbital-acetaminophen-caffeine (FIORICET,ESGIC) -40 mg per tablet TAKE 1 TABLET EVERY 4 HOURS AS NEEDED  FOR  HEADACHES 120 tablet 1   • calcium carbonate (OS-MARLENY) 600 MG tablet Take 600 mg by mouth 2 (two) times a day with meals     • clindamycin (CLEOCIN) 150 mg capsule take 4 capsules by mouth 1 hour prior to appointment (Patient not taking: Reported on 11/10/2022)     • diclofenac sodium (VOLTAREN) 1 % Apply 2 g topically 4 (four) times a day (Patient not taking: No sig reported) 50 g 5   • losartan (COZAAR) 100 MG tablet TAKE 1 TABLET EVERY MORNING 90 tablet 3   • loteprednol etabonate (LOTEMAX) 0 5 % ophth gel instill 1 drop into left eye twice a day     • methylPREDNISolone 4 MG tablet therapy pack Use as directed on package (Patient not taking: Reported on 11/10/2022) 21 each 0   • mirtazapine (REMERON) 15 mg tablet Take 1 tablet (15 mg total) by mouth daily at bedtime 90 tablet 4   • Multiple Vitamins-Minerals (MULTIVITAMIN WITH MINERALS) tablet Take 1 tablet by mouth every morning     • nystatin (MYCOSTATIN) 500,000 units/5 mL suspension RINSE BY MOUTH WITH 5 MILLILITERS FOR 2 MINUTES 4-5 TIMES DAILY THEN SPIT (Patient not taking: No sig reported)     • ofloxacin (OCUFLOX) 0 3 % ophthalmic solution INSTILL 1 DROP INTO AFFECTED EYE 4 TIMES DAILY     • omeprazole (PriLOSEC) 20 mg delayed release capsule TAKE 1 CAPSULE TWICE DAILY 180 capsule 1   • pravastatin (PRAVACHOL) 40 mg tablet TAKE 1 TABLET (40 MG TOTAL) BY MOUTH DAILY 90 tablet 3   • prednisoLONE acetate (PRED FORTE) 1 % ophthalmic suspension INSTILL 1 DROP INTO AFFECTED  EYE(S) FOUR TIMES A DAY     • pregabalin (LYRICA) 50 mg capsule Take 1 capsule (50 mg total) by mouth 3 (three) times a day (Patient not taking: No sig reported) 90 capsule 1   • prochlorperazine (COMPAZINE) 5 mg tablet Take 1 tablet (5 mg total) by mouth every 6 (six) hours as needed for nausea or vomiting 30 tablet 5   • spironolactone (ALDACTONE) 50 mg tablet TAKE 1 TABLET DAILY AFTER LUNCH 90 tablet 3     No current facility-administered medications for this visit  Review of Systems   Constitutional: Negative for chills and fever  HENT: Negative for ear pain and sore throat  Eyes: Negative for pain and visual disturbance  Respiratory: Negative for cough and shortness of breath  Cardiovascular: Negative for chest pain and palpitations  Gastrointestinal: Negative for abdominal pain and vomiting  Genitourinary: Negative for dysuria and hematuria  Musculoskeletal: Positive for arthralgias and back pain  Skin: Negative for color change and rash  Neurological: Negative for seizures and syncope  Psychiatric/Behavioral: Positive for dysphoric mood  Patient is bereaving the loss of her dog   All other systems reviewed and are negative  Objective:    /84   Pulse 80   Temp (!) 96 8 °F (36 °C)   Resp 20   Ht 5' 3 5" (1 613 m)   Wt 87 1 kg (192 lb)   BMI 33 48 kg/m²   Body mass index is 33 48 kg/m²  Physical Exam  Constitutional:       Appearance: She is well-developed and well-nourished  She is obese  HENT:      Head: Normocephalic  Eyes:      Pupils: Pupils are equal, round, and reactive to light  Cardiovascular:      Rate and Rhythm: Normal rate and regular rhythm  Heart sounds: Normal heart sounds  Pulmonary:      Effort: Pulmonary effort is normal       Breath sounds: Normal breath sounds  Abdominal:      General: Bowel sounds are normal       Palpations: Abdomen is soft  Tenderness: There is no abdominal tenderness  Musculoskeletal:      Cervical back: Normal range of motion  Skin:     General: Skin is warm  Neurological:      Mental Status: She is alert and oriented to person, place, and time     Psychiatric:         Mood and Affect: Mood and affect normal

## 2022-11-22 ENCOUNTER — APPOINTMENT (OUTPATIENT)
Dept: LAB | Facility: HOSPITAL | Age: 77
End: 2022-11-22

## 2022-11-22 DIAGNOSIS — N18.31 STAGE 3A CHRONIC KIDNEY DISEASE (HCC): ICD-10-CM

## 2022-11-22 DIAGNOSIS — I10 PRIMARY HYPERTENSION: ICD-10-CM

## 2022-11-22 DIAGNOSIS — I10 ESSENTIAL HYPERTENSION: ICD-10-CM

## 2022-11-22 DIAGNOSIS — E55.9 VITAMIN D DEFICIENCY: ICD-10-CM

## 2022-11-22 LAB
25(OH)D3 SERPL-MCNC: 35 NG/ML (ref 30–100)
ALBUMIN SERPL BCP-MCNC: 3.6 G/DL (ref 3.5–5)
ALP SERPL-CCNC: 145 U/L (ref 46–116)
ALT SERPL W P-5'-P-CCNC: 26 U/L (ref 12–78)
ANION GAP SERPL CALCULATED.3IONS-SCNC: 11 MMOL/L (ref 4–13)
AST SERPL W P-5'-P-CCNC: 20 U/L (ref 5–45)
BACTERIA UR QL AUTO: ABNORMAL /HPF
BASOPHILS # BLD AUTO: 0.03 THOUSANDS/ÂΜL (ref 0–0.1)
BASOPHILS NFR BLD AUTO: 1 % (ref 0–1)
BILIRUB SERPL-MCNC: 0.34 MG/DL (ref 0.2–1)
BILIRUB UR QL STRIP: NEGATIVE
BUN SERPL-MCNC: 9 MG/DL (ref 5–25)
CALCIUM SERPL-MCNC: 9.3 MG/DL (ref 8.3–10.1)
CHLORIDE SERPL-SCNC: 99 MMOL/L (ref 96–108)
CHOLEST SERPL-MCNC: 223 MG/DL
CLARITY UR: CLEAR
CO2 SERPL-SCNC: 23 MMOL/L (ref 21–32)
COLOR UR: YELLOW
CREAT SERPL-MCNC: 0.9 MG/DL (ref 0.6–1.3)
CREAT UR-MCNC: 19.1 MG/DL
CREAT UR-MCNC: 19.1 MG/DL
EOSINOPHIL # BLD AUTO: 0.31 THOUSAND/ÂΜL (ref 0–0.61)
EOSINOPHIL NFR BLD AUTO: 7 % (ref 0–6)
ERYTHROCYTE [DISTWIDTH] IN BLOOD BY AUTOMATED COUNT: 12.9 % (ref 11.6–15.1)
GFR SERPL CREATININE-BSD FRML MDRD: 62 ML/MIN/1.73SQ M
GLUCOSE P FAST SERPL-MCNC: 90 MG/DL (ref 65–99)
GLUCOSE UR STRIP-MCNC: NEGATIVE MG/DL
HCT VFR BLD AUTO: 37.1 % (ref 34.8–46.1)
HDLC SERPL-MCNC: 75 MG/DL
HGB BLD-MCNC: 12.6 G/DL (ref 11.5–15.4)
HGB UR QL STRIP.AUTO: NEGATIVE
IMM GRANULOCYTES # BLD AUTO: 0 THOUSAND/UL (ref 0–0.2)
IMM GRANULOCYTES NFR BLD AUTO: 0 % (ref 0–2)
KETONES UR STRIP-MCNC: NEGATIVE MG/DL
LDLC SERPL CALC-MCNC: 123 MG/DL (ref 0–100)
LEUKOCYTE ESTERASE UR QL STRIP: NEGATIVE
LYMPHOCYTES # BLD AUTO: 1.55 THOUSANDS/ÂΜL (ref 0.6–4.47)
LYMPHOCYTES NFR BLD AUTO: 33 % (ref 14–44)
MAGNESIUM SERPL-MCNC: 2 MG/DL (ref 1.6–2.6)
MCH RBC QN AUTO: 29.6 PG (ref 26.8–34.3)
MCHC RBC AUTO-ENTMCNC: 34 G/DL (ref 31.4–37.4)
MCV RBC AUTO: 87 FL (ref 82–98)
MICROALBUMIN UR-MCNC: <5 MG/L (ref 0–20)
MICROALBUMIN/CREAT 24H UR: <26 MG/G CREATININE (ref 0–30)
MONOCYTES # BLD AUTO: 0.39 THOUSAND/ÂΜL (ref 0.17–1.22)
MONOCYTES NFR BLD AUTO: 8 % (ref 4–12)
NEUTROPHILS # BLD AUTO: 2.47 THOUSANDS/ÂΜL (ref 1.85–7.62)
NEUTS SEG NFR BLD AUTO: 51 % (ref 43–75)
NITRITE UR QL STRIP: NEGATIVE
NON-SQ EPI CELLS URNS QL MICRO: ABNORMAL /HPF
NRBC BLD AUTO-RTO: 0 /100 WBCS
PH UR STRIP.AUTO: 6.5 [PH]
PHOSPHATE SERPL-MCNC: 3.8 MG/DL (ref 2.3–4.1)
PLATELET # BLD AUTO: 235 THOUSANDS/UL (ref 149–390)
PMV BLD AUTO: 9.5 FL (ref 8.9–12.7)
POTASSIUM SERPL-SCNC: 3.9 MMOL/L (ref 3.5–5.3)
PROT SERPL-MCNC: 7.7 G/DL (ref 6.4–8.4)
PROT UR STRIP-MCNC: NEGATIVE MG/DL
PROT UR-MCNC: <6 MG/DL
PROT/CREAT UR: <0.31 MG/G{CREAT} (ref 0–0.1)
PTH-INTACT SERPL-MCNC: 67.2 PG/ML (ref 18.4–80.1)
RBC # BLD AUTO: 4.26 MILLION/UL (ref 3.81–5.12)
RBC #/AREA URNS AUTO: ABNORMAL /HPF
SODIUM SERPL-SCNC: 133 MMOL/L (ref 135–147)
SP GR UR STRIP.AUTO: <=1.005 (ref 1–1.03)
TRIGL SERPL-MCNC: 123 MG/DL
UROBILINOGEN UR QL STRIP.AUTO: 0.2 E.U./DL
WBC # BLD AUTO: 4.75 THOUSAND/UL (ref 4.31–10.16)
WBC #/AREA URNS AUTO: ABNORMAL /HPF

## 2022-11-22 RX ORDER — LOSARTAN POTASSIUM 100 MG/1
TABLET ORAL
Qty: 90 TABLET | Refills: 3 | Status: SHIPPED | OUTPATIENT
Start: 2022-11-22

## 2022-12-08 DIAGNOSIS — G44.209 MUSCLE TENSION HEADACHE: ICD-10-CM

## 2022-12-09 RX ORDER — BUTALBITAL, ACETAMINOPHEN AND CAFFEINE 50; 325; 40 MG/1; MG/1; MG/1
TABLET ORAL
Qty: 120 TABLET | Refills: 3 | Status: SHIPPED | OUTPATIENT
Start: 2022-12-09

## 2022-12-15 ENCOUNTER — TELEPHONE (OUTPATIENT)
Dept: NEPHROLOGY | Facility: CLINIC | Age: 77
End: 2022-12-15

## 2022-12-15 DIAGNOSIS — I10 HYPERTENSION, UNSPECIFIED TYPE: Primary | ICD-10-CM

## 2022-12-20 DIAGNOSIS — I10 ESSENTIAL HYPERTENSION: ICD-10-CM

## 2022-12-20 RX ORDER — SPIRONOLACTONE 50 MG/1
TABLET, FILM COATED ORAL
Qty: 90 TABLET | Refills: 3 | Status: SHIPPED | OUTPATIENT
Start: 2022-12-20

## 2022-12-27 ENCOUNTER — OFFICE VISIT (OUTPATIENT)
Dept: FAMILY MEDICINE CLINIC | Facility: CLINIC | Age: 77
End: 2022-12-27

## 2022-12-27 VITALS
WEIGHT: 191 LBS | RESPIRATION RATE: 20 BRPM | BODY MASS INDEX: 32.61 KG/M2 | SYSTOLIC BLOOD PRESSURE: 132 MMHG | TEMPERATURE: 97.6 F | HEART RATE: 68 BPM | HEIGHT: 64 IN | DIASTOLIC BLOOD PRESSURE: 84 MMHG

## 2022-12-27 DIAGNOSIS — N18.31 STAGE 3A CHRONIC KIDNEY DISEASE (HCC): ICD-10-CM

## 2022-12-27 DIAGNOSIS — Z12.31 ENCOUNTER FOR SCREENING MAMMOGRAM FOR MALIGNANT NEOPLASM OF BREAST: ICD-10-CM

## 2022-12-27 DIAGNOSIS — G44.209 MUSCLE TENSION HEADACHE: ICD-10-CM

## 2022-12-27 DIAGNOSIS — Z01.818 PRE-OPERATIVE CLEARANCE: Primary | ICD-10-CM

## 2022-12-27 DIAGNOSIS — I10 PRIMARY HYPERTENSION: ICD-10-CM

## 2022-12-27 DIAGNOSIS — J43.9 PULMONARY EMPHYSEMA, UNSPECIFIED EMPHYSEMA TYPE (HCC): ICD-10-CM

## 2022-12-27 DIAGNOSIS — I50.40 COMBINED SYSTOLIC AND DIASTOLIC CONGESTIVE HEART FAILURE, UNSPECIFIED HF CHRONICITY (HCC): ICD-10-CM

## 2022-12-27 DIAGNOSIS — E26.9 HYPERALDOSTERONISM (HCC): ICD-10-CM

## 2022-12-27 NOTE — PROGRESS NOTES
Assessment/Plan: patient is cleared for cataract surgery    Combined systolic and diastolic congestive heart failure currently treated with Zebeta Cozaar and Aldactone    Primary hypertension with blood pressure controlled on the current regimen    Pulmonary emphysema currently stable    Primary hyper aldosteronism on Aldactone 50 mg    GERD without esophagitis treated with Prilosec 20 mg with a minimization of symptoms    Dyslipidemia treated with Pravachol 40 mg daily    Muscle tension headaches treated with Fioricet    Problem List Items Addressed This Visit    None  Visit Diagnoses     Encounter for screening mammogram for malignant neoplasm of breast    -  Primary    Relevant Orders    Mammo screening bilateral w 3d & cad           Diagnoses and all orders for this visit:    Encounter for screening mammogram for malignant neoplasm of breast  -     Mammo screening bilateral w 3d & cad; Future        No problem-specific Assessment & Plan notes found for this encounter  PHQ-2/9 Depression Screening            Body mass index is 32 79 kg/m²  BMI Counseling: Body mass index is 32 79 kg/m²  The BMI     Subjective:      Patient ID: Zaria Gupta is a 78 y o  female      HPI    The following portions of the patient's history were reviewed and updated as appropriate:   She has a past medical history of Allergies, Anxiety, Arthritis, Benign arteriolar nephrosclerosis, Bereavement, Cataract, Chronic kidney disease, Chronic kidney disease in type 2 diabetes mellitus ([address] ), Chronic kidney disease, stage 2 (mild), Chronic pain disorder, Chronic pain syndrome, COPD (chronic obstructive pulmonary disease) ([address] ), DDD (degenerative disc disease), cervical, DDD (degenerative disc disease), lumbar, Degenerative joint disease of right hip, Depression, Diastolic dysfunction, DJD (degenerative joint disease), ankle and foot, right, DJD of right shoulder, Edema, Fracture of left calcaneus, Generalized anxiety disorder, GERD (gastroesophageal reflux disease), Heart murmur, Hyperaldosteronism (HCC), Hyperlipidemia, Hypertension, Hypertensive nephrosclerosis, Hypo-osmolality and hyponatremia, IBS (irritable bowel syndrome), Insomnia, Migraines, Mitral regurgitation, MVP (mitral valve prolapse), Obstructive sleep apnea syndrome, Osteoarthritis, Osteoarthritis, Pernicious anemia, Plantar fascia rupture, Rheumatoid arthritis ([facility] [address] ), Right knee DJD, Shingles, Sinobronchitis, Sleep apnea, Stroke ([facility] [address] ), TIA (transient ischemic attack), and Vertigo  ,  does not have any pertinent problems on file  ,   has a past surgical history that includes Appendectomy; Sinus surgery; Insert / replace peripheral neurostimulator pulse generator /  (Left); pr colonoscopy flx dx w/collj spec when pfrmd (N/A, 04/24/2017); NERVE BLOCK (Left, 02/20/2018); NERVE BLOCK (Left, 03/06/2018); Radiofrequency ablation (Left, 04/17/2018); Colonoscopy; Upper gastrointestinal endoscopy (01/12/2007); Knee arthroscopy (Bilateral); Hand surgery (Right); pr arthrp acetblr/prox fem prostc agrft/algrft (Right, 02/27/2019); Nasal septum surgery (2008); Carpal tunnel release (Bilateral); Total hip arthroplasty (Left); Hysterectomy; Tonsillectomy; Joint replacement (Left); Joint replacement (Right, 04/2019); pr arthrp kne condyle&platu medial&lat compartments (Right, 06/24/2020); Wrist surgery (Right); Knee cartilage surgery (09/10/2009); Replacement total knee (Left, 07/05/2011); Knee Arthroplasty (Left, 01/15/2009); Colonoscopy (04/02/2012); DXA procedure(historical) (10/26/2016, 9/17/2014, 6/18/2007); Mammo (historical) (12/10/2018, 10/30/2017, 10/26/2016); Trigger finger release; and Corneal transplant (Left, 04/11/2022)  ,  family history includes Anxiety disorder in her sister; Arthritis in her mother; Colon cancer in her father; Dementia in her mother; Heart disease in her father and mother; Hypertension in her father, mother, and sister;  No Known Problems in her paternal aunt, paternal grandfather, and paternal grandmother; Prostate cancer in her maternal grandfather; Rheum arthritis in her mother; Thyroid disease in her sister  ,   reports that she has quit smoking  Her smoking use included cigarettes  She has never used smokeless tobacco  She reports current alcohol use  She reports that she does not use drugs  ,  is allergic to procaine, adhesive [medical tape], lidocaine, and penicillins     Current Outpatient Medications   Medication Sig Dispense Refill   • aspirin (ECOTRIN LOW STRENGTH) 81 mg EC tablet Take 1 tablet (81 mg total) by mouth daily     • bacitracin-polymyxin b ophthalmic ointment INSTILL A 1/4 INCH INTO LEFT EYE AT BEDTIME     • bisoprolol (ZEBETA) 10 MG tablet TAKE 1 TABLET EVERY DAY 90 tablet 3   • butalbital-acetaminophen-caffeine (FIORICET,ESGIC) -40 mg per tablet TAKE 1 TABLET EVERY 4 HOURS AS NEEDED  FOR  HEADACHES 120 tablet 3   • calcium carbonate (OS-MARLENY) 600 MG tablet Take 600 mg by mouth 2 (two) times a day with meals     • clindamycin (CLEOCIN) 150 mg capsule take 4 capsules by mouth 1 hour prior to appointment (Patient not taking: Reported on 11/10/2022)     • diclofenac sodium (VOLTAREN) 1 % Apply 2 g topically 4 (four) times a day (Patient not taking: No sig reported) 50 g 5   • losartan (COZAAR) 100 MG tablet TAKE 1 TABLET EVERY MORNING 90 tablet 3   • loteprednol etabonate (LOTEMAX) 0 5 % ophth gel instill 1 drop into left eye twice a day     • methylPREDNISolone 4 MG tablet therapy pack Use as directed on package (Patient not taking: Reported on 11/10/2022) 21 each 0   • mirtazapine (REMERON) 15 mg tablet Take 1 tablet (15 mg total) by mouth daily at bedtime 90 tablet 4   • Multiple Vitamins-Minerals (MULTIVITAMIN WITH MINERALS) tablet Take 1 tablet by mouth every morning     • nystatin (MYCOSTATIN) 500,000 units/5 mL suspension RINSE BY MOUTH WITH 5 MILLILITERS FOR 2 MINUTES 4-5 TIMES DAILY THEN SPIT (Patient not taking: No sig reported)     • ofloxacin (OCUFLOX) 0 3 % ophthalmic solution INSTILL 1 DROP INTO AFFECTED EYE 4 TIMES DAILY     • omeprazole (PriLOSEC) 20 mg delayed release capsule TAKE 1 CAPSULE TWICE DAILY 180 capsule 1   • pravastatin (PRAVACHOL) 40 mg tablet TAKE 1 TABLET (40 MG TOTAL) BY MOUTH DAILY 90 tablet 3   • prednisoLONE acetate (PRED FORTE) 1 % ophthalmic suspension INSTILL 1 DROP INTO AFFECTED  EYE(S) FOUR TIMES A DAY     • pregabalin (LYRICA) 50 mg capsule Take 1 capsule (50 mg total) by mouth 3 (three) times a day (Patient not taking: No sig reported) 90 capsule 1   • prochlorperazine (COMPAZINE) 5 mg tablet Take 1 tablet (5 mg total) by mouth every 6 (six) hours as needed for nausea or vomiting 30 tablet 5   • spironolactone (ALDACTONE) 50 mg tablet TAKE 1 TABLET DAILY AFTER LUNCH 90 tablet 3     No current facility-administered medications for this visit  Review of Systems   Constitutional: Negative for chills and fever  HENT: Negative for ear pain and sore throat  Eyes: Negative for pain and visual disturbance  Respiratory: Negative for cough and shortness of breath  Cardiovascular: Negative for chest pain and palpitations  Gastrointestinal: Negative for abdominal pain and vomiting  Genitourinary: Negative for dysuria and hematuria  Musculoskeletal: Negative for arthralgias and back pain  Skin: Negative for color change and rash  Neurological: Negative for seizures and syncope  All other systems reviewed and are negative  Objective:    /84   Pulse 68   Temp 97 6 °F (36 4 °C)   Resp 20   Ht 5' 4" (1 626 m)   Wt 86 6 kg (191 lb)   BMI 32 79 kg/m²   Body mass index is 32 79 kg/m²  Physical Exam  Constitutional:       Appearance: She is well-developed  HENT:      Head: Normocephalic  Eyes:      Pupils: Pupils are equal, round, and reactive to light  Cardiovascular:      Rate and Rhythm: Normal rate and regular rhythm  Heart sounds: Normal heart sounds  Pulmonary:      Effort: Pulmonary effort is normal       Breath sounds: Normal breath sounds  Abdominal:      General: Bowel sounds are normal       Palpations: Abdomen is soft  Tenderness: There is no abdominal tenderness  Musculoskeletal:      Cervical back: Normal range of motion  Skin:     General: Skin is warm  Neurological:      Mental Status: She is alert and oriented to person, place, and time

## 2023-01-13 ENCOUNTER — HOSPITAL ENCOUNTER (OUTPATIENT)
Dept: MAMMOGRAPHY | Facility: HOSPITAL | Age: 78
Discharge: HOME | End: 2023-01-13
Attending: FAMILY MEDICINE

## 2023-01-13 VITALS — HEIGHT: 64 IN | BODY MASS INDEX: 32.59 KG/M2 | WEIGHT: 190.92 LBS

## 2023-01-13 DIAGNOSIS — Z12.31 OTHER SCREENING MAMMOGRAM: ICD-10-CM

## 2023-01-13 DIAGNOSIS — Z12.31 ENCOUNTER FOR SCREENING MAMMOGRAM FOR BREAST CANCER: ICD-10-CM

## 2023-01-13 DIAGNOSIS — Z12.31 ENCOUNTER FOR SCREENING MAMMOGRAM FOR MALIGNANT NEOPLASM OF BREAST: ICD-10-CM

## 2023-02-22 ENCOUNTER — OFFICE VISIT (OUTPATIENT)
Dept: CARDIOLOGY CLINIC | Facility: HOSPITAL | Age: 78
End: 2023-02-22

## 2023-02-22 VITALS
OXYGEN SATURATION: 99 % | WEIGHT: 185.2 LBS | HEART RATE: 57 BPM | SYSTOLIC BLOOD PRESSURE: 140 MMHG | DIASTOLIC BLOOD PRESSURE: 70 MMHG | BODY MASS INDEX: 31.62 KG/M2 | HEIGHT: 64 IN

## 2023-02-22 DIAGNOSIS — I51.89 DIASTOLIC DYSFUNCTION: ICD-10-CM

## 2023-02-22 DIAGNOSIS — I50.40 COMBINED SYSTOLIC AND DIASTOLIC CONGESTIVE HEART FAILURE, UNSPECIFIED HF CHRONICITY (HCC): Primary | ICD-10-CM

## 2023-02-22 DIAGNOSIS — R06.02 SHORTNESS OF BREATH ON EXERTION: ICD-10-CM

## 2023-02-22 DIAGNOSIS — E66.01 CLASS 2 SEVERE OBESITY DUE TO EXCESS CALORIES WITH SERIOUS COMORBIDITY AND BODY MASS INDEX (BMI) OF 35.0 TO 35.9 IN ADULT (HCC): ICD-10-CM

## 2023-02-22 DIAGNOSIS — I10 ESSENTIAL HYPERTENSION: ICD-10-CM

## 2023-02-22 DIAGNOSIS — I05.1 RHEUMATIC MITRAL REGURGITATION: ICD-10-CM

## 2023-02-22 DIAGNOSIS — N18.31 STAGE 3A CHRONIC KIDNEY DISEASE (HCC): ICD-10-CM

## 2023-02-22 NOTE — PROGRESS NOTES
Cardiology Follow Up    Janiya Carlos  1945  [id number]  [address]    1  Combined systolic and diastolic congestive heart failure, unspecified HF chronicity (HCC)  VAS screening    Echo complete w/ contrast if indicated      2  Essential hypertension        3  Rheumatic mitral regurgitation  VAS screening    Echo complete w/ contrast if indicated      4  Shortness of breath on exertion        5  Diastolic dysfunction        6  Class 2 severe obesity due to excess calories with serious comorbidity and body mass index (BMI) of 35 0 to 35 9 in adult ([facility])        7  Stage 3a chronic kidney disease ([facility] [address] )            Discussion/Summary: Overall she has been doing well blood pressure has been well controlled I reviewed her recent lab work from the fall  Denies any symptoms  She is due for an echocardiogram to follow-up on mitral regurgitation  She is also due for a vascular screening  Continue current medications discussed diet exercise and low sodium intake  Interval History:  53-year-old female with difficult-to-control hypertension, mitral regurgitation, chronic diastolic congestive heart failure presents for follow-up visit  She has been under a lot of stress since her last exam   There have been several deaths in her mediated family and she has had a hard time handling this  From a cardiac standpoint she has been rather comfortable  Breathing has been stable  Overall she has been doing well denies any chest pain, shortness of breath, palpitations, lightheadedness, dizziness, or syncope  Is been a lower extremity edema, PND, orthopnea  She has been taking her medications as prescribed  Remains active around the house  She recently had cataract surgery and other extensive eye procedures      Problem List     Diastolic dysfunction    Hypercholesterolemia    Hypertension    Mitral regurgitation    Overview Signed 2/8/2018  8:25 AM by Angelo Aragon DO     Description: Moderate         Shortness of breath on exertion    Cervical spondylosis without myelopathy    Overview Signed 2/13/2018  8:13 AM by Blayne Marques MA     Added automatically from request for surgery [id number]         Chronic pain syndrome        Past Medical History:   Diagnosis Date   • Allergies    • Anxiety    • Arthritis    • Benign arteriolar nephrosclerosis    • Bereavement    • Cataract    • Chronic kidney disease    • Chronic kidney disease in type 2 diabetes mellitus ([place] [address] )    • Chronic kidney disease, stage 2 (mild)    • Chronic pain disorder    • Chronic pain syndrome    • COPD (chronic obstructive pulmonary disease) ([place] [address] )    • DDD (degenerative disc disease), cervical    • DDD (degenerative disc disease), lumbar    • Degenerative joint disease of right hip    • Depression    • Diastolic dysfunction    • DJD (degenerative joint disease), ankle and foot, right    • DJD of right shoulder    • Edema    • Fracture of left calcaneus    • Generalized anxiety disorder    • GERD (gastroesophageal reflux disease)    • Heart murmur    • Hyperaldosteronism (HCC)    • Hyperlipidemia    • Hypertension    • Hypertensive nephrosclerosis    • Hypo-osmolality and hyponatremia    • IBS (irritable bowel syndrome)    • Insomnia    • Migraines    • Mitral regurgitation    • MVP (mitral valve prolapse)    • Obstructive sleep apnea syndrome    • Osteoarthritis    • Osteoarthritis    • Pernicious anemia    • Plantar fascia rupture    • Rheumatoid arthritis (HCC)    • Right knee DJD    • Shingles    • Sinobronchitis    • Sleep apnea    • Stroke ([place] [address] )     tia   • TIA (transient ischemic attack)    • Vertigo      Social History     Socioeconomic History   • Marital status: /Civil Union     Spouse name: Not on file   • Number of children: Not on file   • Years of education: Not on file   • Highest education level: Not on file   Occupational History   • Occupation: Admnistrator    Tobacco Use   • Smoking status: Former     Types: Cigarettes   • Smokeless tobacco: Never   • Tobacco comments:     quit 40 yrs ago   Vaping Use   • Vaping Use: Never used   Substance and Sexual Activity   • Alcohol use: Yes     Comment: 2 beer a week   • Drug use: Never   • Sexual activity: Not Currently     Birth control/protection: Post-menopausal   Other Topics Concern   • Not on file   Social History Narrative    Patient has never smoked - As per [employer]    Consumes 1-2 beers per week - As per Maudine Macadamia    Consumes on average 1 cup of decaf coffee per day      Social Determinants of Health     Financial Resource Strain: Not on file   Food Insecurity: Not on file   Transportation Needs: Not on file   Physical Activity: Not on file   Stress: Not on file   Social Connections: Not on file   Intimate Partner Violence: Not on file   Housing Stability: Not on file      Family History   Problem Relation Age of Onset   • Heart disease Mother    • Hypertension Mother    • Arthritis Mother    • Dementia Mother    • Rheum arthritis Mother    • Hypertension Father    • Heart disease Father    • Colon cancer Father    • Hypertension Sister    • Anxiety disorder Sister    • Thyroid disease Sister    • Prostate cancer Maternal Grandfather    • No Known Problems Paternal Grandmother    • No Known Problems Paternal Grandfather    • No Known Problems Paternal Aunt    • Pseudochol deficiency Neg Hx      Past Surgical History:   Procedure Laterality Date   • APPENDECTOMY     • CARPAL TUNNEL RELEASE Bilateral    • COLONOSCOPY      several   • COLONOSCOPY  04/02/2012    by Dr Moy Page   • CORNEAL TRANSPLANT Left 04/11/2022   • DXA PROCEDURE (HISTORICAL)  10/26/2016, 9/17/2014, 6/18/2007   • HAND SURGERY Right     ring finger has pin   • HYSTERECTOMY      34   • INSERT / REPLACE PERIPHERAL NEUROSTIMULATOR PULSE GENERATOR /  Left     buttocks   • JOINT REPLACEMENT Left     knee   • JOINT REPLACEMENT Right 04/2019    Hip   • KNEE ARTHROPLASTY Left 01/15/2009    by Dr Alex Dykes at [facility]    • KNEE ARTHROSCOPY Bilateral    • KNEE CARTILAGE SURGERY  09/10/2009    by Dr Alex Dykes at [address] [place] [address] (HISTORICAL)  12/10/2018, 10/30/2017, 10/26/2016   • NASAL SEPTUM SURGERY  2008   • NERVE BLOCK Left 02/20/2018    Procedure: BLOCK MEDIAL BRANCH - C4, C5, C6;  Surgeon: Ez Alva MD;  Location: [facility];  Service: Pain Management    • NERVE BLOCK Left 03/06/2018    Procedure: C4, C5, C6 MEDIAL BRANCH BLOCK;  Surgeon: Ez Alva MD;  Location: [facility];  Service: Pain Management    • W[place] ARTHRP ACETBLR/PROX FEM PROSTC AGRFT/ALGRFT Right 02/27/2019    Procedure: ARTHROPLASTY HIP TOTAL;  Surgeon: Armaan Hull DO;  Location: [facility] [address] MAIN OR;  Service: Orthopedics   • WV ARTHRP KNE CONDYLE&PLATU MEDIAL&LAT COMPARTMENTS Right 06/24/2020    Procedure: KNEE TOTAL ARTHROPLASTY;  Surgeon: Armaan Hull DO;  Location: [address] [place], [place] [address]o [place] MAIN OR;  Service: Orthopedics   • WV COLONOSCOPY FLX DX W/COLLJ SPEC WHEN PFRMD N/A 04/24/2017    Procedure: Amadeo Russell;  Surgeon: Pablo Garza MD;  Location: [facility];  Service: Colorectal   • RADIOFREQUENCY ABLATION Left 04/17/2018    Procedure: ABLATION RADIO FREQUENCY (RFA) - C4, C5, C6;  Surgeon: Ez Alva MD;  Location: [facility];  Service: Pain Management    • REPLACEMENT TOTAL KNEE Left 07/05/2011   • SINUS SURGERY     • TONSILLECTOMY     • TOTAL HIP ARTHROPLASTY Left    • TRIGGER FINGER RELEASE      R 4th    • UPPER GASTROINTESTINAL ENDOSCOPY  01/12/2007    with [place] dilatation by Dr Neelima Sheldon at [facility]   • WRIST SURGERY Right        Current Outpatient Medications:   •  aspirin (ECOTRIN LOW STRENGTH) 81 mg EC tablet, Take 1 tablet (81 mg total) by mouth daily, Disp:  , Rfl:   •  bacitracin-polymyxin b ophthalmic ointment, INSTILL A 1/4 INCH INTO LEFT EYE AT BEDTIME, Disp: , Rfl:   • bisoprolol (ZEBETA) 10 MG tablet, TAKE 1 TABLET EVERY DAY, Disp: 90 tablet, Rfl: 3  •  butalbital-acetaminophen-caffeine (FIORICET,ESGIC) -40 mg per tablet, TAKE 1 TABLET EVERY 4 HOURS AS NEEDED  FOR  HEADACHES, Disp: 120 tablet, Rfl: 3  •  calcium carbonate (OS-MARLENY) 600 MG tablet, Take 600 mg by mouth 2 (two) times a day with meals, Disp: , Rfl:   •  losartan (COZAAR) 100 MG tablet, TAKE 1 TABLET EVERY MORNING, Disp: 90 tablet, Rfl: 3  •  loteprednol etabonate (LOTEMAX) 0 5 % ophth gel, instill 1 drop into left eye twice a day, Disp: , Rfl:   •  Multiple Vitamins-Minerals (MULTIVITAMIN WITH MINERALS) tablet, Take 1 tablet by mouth every morning, Disp: , Rfl:   •  omeprazole (PriLOSEC) 20 mg delayed release capsule, TAKE 1 CAPSULE TWICE DAILY, Disp: 180 capsule, Rfl: 1  •  pravastatin (PRAVACHOL) 40 mg tablet, TAKE 1 TABLET (40 MG TOTAL) BY MOUTH DAILY, Disp: 90 tablet, Rfl: 3  •  prednisoLONE acetate (PRED FORTE) 1 % ophthalmic suspension, INSTILL 1 DROP INTO AFFECTED  EYE(S) FOUR TIMES A DAY, Disp: , Rfl:   •  prochlorperazine (COMPAZINE) 5 mg tablet, Take 1 tablet (5 mg total) by mouth every 6 (six) hours as needed for nausea or vomiting, Disp: 30 tablet, Rfl: 5  •  spironolactone (ALDACTONE) 50 mg tablet, TAKE 1 TABLET DAILY AFTER LUNCH, Disp: 90 tablet, Rfl: 3  •  clindamycin (CLEOCIN) 150 mg capsule, take 4 capsules by mouth 1 hour prior to appointment (Patient not taking: Reported on 11/10/2022), Disp: , Rfl:   •  diclofenac sodium (VOLTAREN) 1 %, Apply 2 g topically 4 (four) times a day (Patient not taking: Reported on 5/20/2022), Disp: 50 g, Rfl: 5  •  methylPREDNISolone 4 MG tablet therapy pack, Use as directed on package (Patient not taking: Reported on 11/10/2022), Disp: 21 each, Rfl: 0  •  mirtazapine (REMERON) 15 mg tablet, Take 1 tablet (15 mg total) by mouth daily at bedtime (Patient not taking: Reported on 2/22/2023), Disp: 90 tablet, Rfl: 4  •  nystatin (MYCOSTATIN) 500,000 units/5 mL suspension, RINSE BY MOUTH WITH 5 MILLILITERS FOR 2 MINUTES 4-5 TIMES DAILY THEN SPIT (Patient not taking: Reported on 7/19/2022), Disp: , Rfl:   •  ofloxacin (OCUFLOX) 0 3 % ophthalmic solution, INSTILL 1 DROP INTO AFFECTED EYE 4 TIMES DAILY (Patient not taking: Reported on 2/22/2023), Disp: , Rfl:   •  pregabalin (LYRICA) 50 mg capsule, Take 1 capsule (50 mg total) by mouth 3 (three) times a day (Patient not taking: Reported on 5/20/2022), Disp: 90 capsule, Rfl: 1  Allergies   Allergen Reactions   • Procaine Hives   • Adhesive [Medical Tape] Rash   • Lidocaine Rash   • Penicillins Rash       Labs:     Chemistry        Component Value Date/Time     (L) 09/21/2015 1057    K 3 9 11/22/2022 0703    K 4 3 09/21/2015 1057    CL 99 11/22/2022 0703    CL 94 (L) 09/21/2015 1057    CO2 23 11/22/2022 0703    CO2 31 2 09/21/2015 1057    BUN 9 11/22/2022 0703    BUN 8 09/21/2015 1057    CREATININE 0 90 11/22/2022 0703    CREATININE 0 74 09/21/2015 1057        Component Value Date/Time    CALCIUM 9 3 11/22/2022 0703    CALCIUM 9 5 09/21/2015 1057    ALKPHOS 145 (H) 11/22/2022 0703    ALKPHOS 123 (H) 09/21/2015 1057    AST 20 11/22/2022 0703    AST 21 09/21/2015 1057    ALT 26 11/22/2022 0703    ALT 34 09/21/2015 1057    BILITOT 0 29 09/21/2015 1057            Lab Results   Component Value Date    CHOL 290 06/04/2015    CHOL 240 10/19/2014    CHOL 277 03/12/2014     Lab Results   Component Value Date    HDL 75 11/22/2022    HDL 86 11/18/2021     (H) 06/08/2016     Lab Results   Component Value Date    LDLCALC 123 (H) 11/22/2022    LDLCALC 121 (H) 11/18/2021    LDLCALC 121 (H) 06/08/2016     Lab Results   Component Value Date    TRIG 123 11/22/2022    TRIG 67 11/18/2021    TRIG 49 06/08/2016     No results found for: CHOLHDL    Imaging: No results found  ECG:  Normal sinus rhythm unremarkable tracing      Review of Systems   Constitutional: Negative  HENT: Negative  Eyes: Negative      Cardiovascular: Negative  Respiratory: Negative  Endocrine: Negative  Hematologic/Lymphatic: Negative  Skin: Negative  Musculoskeletal: Negative  Gastrointestinal: Negative  Genitourinary: Negative  Neurological: Positive for dizziness, headaches and loss of balance  Psychiatric/Behavioral: Negative  Vitals:    02/22/23 1433   BP: 140/70   Pulse: 57   SpO2: 99%     Vitals:    02/22/23 1433   Weight: 84 kg (185 lb 3 2 oz)     Height: 5' 4" (162 6 cm)   Body mass index is 31 79 kg/m²  Physical Exam:  Vital signs reviewed  General:  Alert and cooperative, appears stated age, no acute distress  HEENT:  PERRLA, EOMI, no scleral icterus, no conjunctival pallor  Neck:  No lymphadenopathy, no thyromegaly, no carotid bruits, no elevated JVP  Heart:  Regular rate and rhythm, normal S1/S2, no S3/S4, no murmur, rubs or gallops  PMI nondisplaced  Lungs:  Clear to auscultation bilaterally, no wheezes rales or rhonchi  Abdomen:  Soft, non-tender, positive bowel sounds, no rebound or guarding,   no organomegaly   Extremities:  Normal range of motion    No clubbing, cyanosis or edema   Vascular:  2+ pedal pulses  Skin:  No rashes or lesions on exposed skin  Neurologic:  Cranial nerves II-XII grossly intact without focal deficits  Psych:  Normal mood and affect

## 2023-02-24 ENCOUNTER — APPOINTMENT (OUTPATIENT)
Dept: LAB | Facility: HOSPITAL | Age: 78
End: 2023-02-24
Attending: INTERNAL MEDICINE

## 2023-02-24 DIAGNOSIS — I10 HYPERTENSION, UNSPECIFIED TYPE: ICD-10-CM

## 2023-02-24 LAB
ALBUMIN SERPL BCP-MCNC: 3.9 G/DL (ref 3.5–5)
ALP SERPL-CCNC: 127 U/L (ref 46–116)
ALT SERPL W P-5'-P-CCNC: 21 U/L (ref 12–78)
ANION GAP SERPL CALCULATED.3IONS-SCNC: 7 MMOL/L (ref 4–13)
AST SERPL W P-5'-P-CCNC: 20 U/L (ref 5–45)
BILIRUB SERPL-MCNC: 0.31 MG/DL (ref 0.2–1)
BUN SERPL-MCNC: 11 MG/DL (ref 5–25)
CALCIUM SERPL-MCNC: 9.2 MG/DL (ref 8.3–10.1)
CHLORIDE SERPL-SCNC: 97 MMOL/L (ref 96–108)
CO2 SERPL-SCNC: 28 MMOL/L (ref 21–32)
CREAT SERPL-MCNC: 0.92 MG/DL (ref 0.6–1.3)
GFR SERPL CREATININE-BSD FRML MDRD: 61 ML/MIN/1.73SQ M
GLUCOSE P FAST SERPL-MCNC: 94 MG/DL (ref 65–99)
POTASSIUM SERPL-SCNC: 4.7 MMOL/L (ref 3.5–5.3)
PROT SERPL-MCNC: 7.2 G/DL (ref 6.4–8.4)
SODIUM SERPL-SCNC: 132 MMOL/L (ref 135–147)

## 2023-03-02 ENCOUNTER — TELEPHONE (OUTPATIENT)
Dept: FAMILY MEDICINE CLINIC | Facility: CLINIC | Age: 78
End: 2023-03-02

## 2023-03-02 DIAGNOSIS — J01.00 SUBACUTE MAXILLARY SINUSITIS: Primary | ICD-10-CM

## 2023-03-02 RX ORDER — AZITHROMYCIN 250 MG/1
TABLET, FILM COATED ORAL
Qty: 6 TABLET | Refills: 0 | Status: SHIPPED | OUTPATIENT
Start: 2023-03-02 | End: 2023-03-07

## 2023-03-02 RX ORDER — AZITHROMYCIN 250 MG/1
TABLET, FILM COATED ORAL
Qty: 6 TABLET | Refills: 0 | Status: SHIPPED | OUTPATIENT
Start: 2023-03-02 | End: 2023-03-02

## 2023-03-03 ENCOUNTER — OFFICE VISIT (OUTPATIENT)
Dept: NEPHROLOGY | Facility: CLINIC | Age: 78
End: 2023-03-03

## 2023-03-03 VITALS
DIASTOLIC BLOOD PRESSURE: 60 MMHG | HEART RATE: 56 BPM | HEIGHT: 64 IN | WEIGHT: 185 LBS | BODY MASS INDEX: 31.58 KG/M2 | SYSTOLIC BLOOD PRESSURE: 128 MMHG | OXYGEN SATURATION: 97 %

## 2023-03-03 DIAGNOSIS — I12.9 HYPERTENSIVE NEPHROSCLEROSIS, STAGE 1 THROUGH STAGE 4 OR UNSPECIFIED CHRONIC KIDNEY DISEASE: ICD-10-CM

## 2023-03-03 DIAGNOSIS — N18.31 STAGE 3A CHRONIC KIDNEY DISEASE (HCC): ICD-10-CM

## 2023-03-03 DIAGNOSIS — E26.9 HYPERALDOSTERONISM (HCC): Primary | ICD-10-CM

## 2023-03-03 DIAGNOSIS — E87.1 HYPONATREMIA: ICD-10-CM

## 2023-03-03 RX ORDER — FLUOROMETHOLONE 0.1 %
SUSPENSION, DROPS(FINAL DOSAGE FORM)(ML) OPHTHALMIC (EYE)
COMMUNITY
Start: 2023-02-06

## 2023-03-03 RX ORDER — KETOROLAC TROMETHAMINE 5 MG/ML
SOLUTION OPHTHALMIC
COMMUNITY
Start: 2022-12-12

## 2023-03-03 NOTE — ASSESSMENT & PLAN NOTE
Lab Results   Component Value Date    EGFR 61 02/24/2023    EGFR 62 11/22/2022    EGFR 53 07/15/2022    CREATININE 0 92 02/24/2023    CREATININE 0 90 11/22/2022    CREATININE 1 03 07/15/2022     Kidney function stable, continue to maximize blood pressure control  Continue with potential nephrotoxins

## 2023-03-03 NOTE — ASSESSMENT & PLAN NOTE
Patient serum sodium level remains below goal   Continue to encourage the patient to reduce fluid intake  She drinks a lot of fluids over the course the day and can continue to reduce this in order to achieve a more appropriate serum sodium level

## 2023-03-03 NOTE — ASSESSMENT & PLAN NOTE
Blood pressures well controlled, continue with hyperaldosteronism management on spironolactone, and current medications  Continue to encourage low-sodium diet

## 2023-03-03 NOTE — PROGRESS NOTES
[facility] [place], [place]    Name: Zaria Gupta  YOB: 1945      Assessment/Plan:    Stage 3a chronic kidney disease [facility])  Lab Results   Component Value Date    EGFR 61 02/24/2023    EGFR 62 11/22/2022    EGFR 53 07/15/2022    CREATININE 0 92 02/24/2023    CREATININE 0 90 11/22/2022    CREATININE 1 03 07/15/2022     Kidney function stable, continue to maximize blood pressure control  Continue with potential nephrotoxins  Hypertensive nephrosclerosis  Blood pressures well controlled, continue with hyperaldosteronism management on spironolactone, and current medications  Continue to encourage low-sodium diet  Hyperaldosteronism (HCC)  Continue spironolactone 50 mg once daily  Hyponatremia  Patient serum sodium level remains below goal   Continue to encourage the patient to reduce fluid intake  She drinks a lot of fluids over the course the day and can continue to reduce this in order to achieve a more appropriate serum sodium level  Problem List Items Addressed This Visit        Endocrine    Hyperaldosteronism ([facility] [address] ) - Primary     Continue spironolactone 50 mg once daily  Cardiovascular and Mediastinum    Hypertensive nephrosclerosis     Blood pressures well controlled, continue with hyperaldosteronism management on spironolactone, and current medications  Continue to encourage low-sodium diet  Genitourinary    Stage 3a chronic kidney disease [facility])     Lab Results   Component Value Date    EGFR 61 02/24/2023    EGFR 62 11/22/2022    EGFR 53 07/15/2022    CREATININE 0 92 02/24/2023    CREATININE 0 90 11/22/2022    CREATININE 1 03 07/15/2022     Kidney function stable, continue to maximize blood pressure control  Continue with potential nephrotoxins  Other    Hyponatremia     Patient serum sodium level remains below goal   Continue to encourage the patient to reduce fluid intake    She drinks a lot of fluids over the course the day and can continue to reduce this in order to achieve a more appropriate serum sodium level  Is stable from renal standpoint, we will see her back for an appointment approximately 6 months  Sooner if clinically indicated  Subjective:      Patient ID: Holli Urban is a 70 y o  female  Patient presents for follow-up appointment  Labs reviewed in detail, creatinine is 0 92 mg/dL which places estimated GFR just over 60 mL/min  Patient continues to have a mild hyponatremia, otherwise no other significant electrolyte abnormalities noted  Patient is taking all medications as prescribed with no specific side effects at this time  Hypertension  This is a chronic problem  The current episode started more than 1 year ago  The problem is unchanged  The problem is controlled  Pertinent negatives include no chest pain, orthopnea or peripheral edema  There are no associated agents to hypertension  Risk factors for coronary artery disease include post-menopausal state and obesity  Past treatments include lifestyle changes, diuretics, angiotensin blockers and beta blockers  There are no compliance problems  Hypertensive end-organ damage includes kidney disease  Identifiable causes of hypertension include chronic renal disease and hyperaldosteronism  The following portions of the patient's history were reviewed and updated as appropriate: allergies, current medications, past family history, past medical history, past social history, past surgical history and problem list     Review of Systems   Cardiovascular: Negative for chest pain and orthopnea  All other systems reviewed and are negative          Social History     Socioeconomic History   • Marital status: /Civil Union     Spouse name: None   • Number of children: None   • Years of education: None   • Highest education level: None   Occupational History   • Occupation: Admnistrator    Tobacco Use • Smoking status: Former     Types: Cigarettes   • Smokeless tobacco: Never   • Tobacco comments:     quit 40 yrs ago   Vaping Use   • Vaping Use: Never used   Substance and Sexual Activity   • Alcohol use: Yes     Comment: 2 beer a week   • Drug use: Never   • Sexual activity: Not Currently     Birth control/protection: Post-menopausal   Other Topics Concern   • None   Social History Narrative    Patient has never smoked - As per [employer]    Consumes 1-2 beers per week - As per [address]    Consumes on average 1 cup of decaf coffee per day      Social Determinants of Health     Financial Resource Strain: Not on file   Food Insecurity: Not on file   Transportation Needs: Not on file   Physical Activity: Not on file   Stress: Not on file   Social Connections: Not on file   Intimate Partner Violence: Not on file   Housing Stability: Not on file     Past Medical History:   Diagnosis Date   • Allergies    • Anxiety    • Arthritis    • Benign arteriolar nephrosclerosis    • Bereavement    • Cataract    • Chronic kidney disease    • Chronic kidney disease in type 2 diabetes mellitus ([place] [address] )    • Chronic kidney disease, stage 2 (mild)    • Chronic pain disorder    • Chronic pain syndrome    • COPD (chronic obstructive pulmonary disease) ([place] [address] )    • DDD (degenerative disc disease), cervical    • DDD (degenerative disc disease), lumbar    • Degenerative joint disease of right hip    • Depression    • Diastolic dysfunction    • DJD (degenerative joint disease), ankle and foot, right    • DJD of right shoulder    • Edema    • Fracture of left calcaneus    • Generalized anxiety disorder    • GERD (gastroesophageal reflux disease)    • Heart murmur    • Hyperaldosteronism ([place] [address] )    • Hyperlipidemia    • Hypertension    • Hypertensive nephrosclerosis    • Hypo-osmolality and hyponatremia    • IBS (irritable bowel syndrome)    • Insomnia    • Migraines    • Mitral regurgitation    • MVP (mitral valve prolapse)    • Obstructive sleep apnea syndrome    • Osteoarthritis    • Osteoarthritis    • Pernicious anemia    • Plantar fascia rupture    • Rheumatoid arthritis (HCC)    • Right knee DJD    • Shingles    • Sinobronchitis    • Sleep apnea    • Stroke ([address] )     tia   • TIA (transient ischemic attack)    • Vertigo      Past Surgical History:   Procedure Laterality Date   • APPENDECTOMY     • CARPAL TUNNEL RELEASE Bilateral    • COLONOSCOPY      several   • COLONOSCOPY  04/02/2012    by Dr Rosanna Shah   • CORNEAL TRANSPLANT Left 04/11/2022   • DXA PROCEDURE (HISTORICAL)  10/26/2016, 9/17/2014, 6/18/2007   • HAND SURGERY Right     ring finger has pin   • HYSTERECTOMY      34   • INSERT / REPLACE PERIPHERAL NEUROSTIMULATOR PULSE GENERATOR /  Left     buttocks   • JOINT REPLACEMENT Left     knee   • JOINT REPLACEMENT Right 04/2019    Hip   • KNEE ARTHROPLASTY Left 01/15/2009    by Dr Reinier Lewis at [facility]    • KNEE ARTHROSCOPY Bilateral    • KNEE CARTILAGE SURGERY  09/10/2009    by Dr Reinier Lewis at [address] [place] [address] Road (HISTORICAL)  12/10/2018, 10/30/2017, 10/26/2016   • NASAL SEPTUM SURGERY  2008   • NERVE BLOCK Left 02/20/2018    Procedure: BLOCK MEDIAL BRANCH - C4, C5, C6;  Surgeon: Sunita Sales MD;  Location: [facility];  Service: Pain Management    • NERVE BLOCK Left 03/06/2018    Procedure: C4, C5, C6 MEDIAL BRANCH BLOCK;  Surgeon: Sunita Sales MD;  Location: [facility];  Service: Pain Management    • AL ARTHRP ACETBLR/PROX FEM PROSTC AGRFT/ALGRFT Right 02/27/2019    Procedure: ARTHROPLASTY HIP TOTAL;  Surgeon: Kira Castaneda DO;  Location: [facility] MAIN OR;  Service: Orthopedics   • AL ARTHRP KNE CONDYLE&PLATU MEDIAL&LAT COMPARTMENTS Right 06/24/2020    Procedure: KNEE TOTAL ARTHROPLASTY;  Surgeon: Kira Castaneda DO;  Location: [facility] MAIN OR;  Service: Orthopedics   • AL COLONOSCOPY FLX DX W/COLLJ [facility] IN[facility] WHEN PFRMD N/A 04/24/2017    Procedure: Gisselle Pore;  Surgeon: Rosanna Shah, MD;  Location: [facility];  Service: Colorectal   • RADIOFREQUENCY ABLATION Left 04/17/2018    Procedure: ABLATION RADIO FREQUENCY (RFA) - C4, C5, C6;  Surgeon: Margie Obregon MD;  Location: [facility];  Service: Pain Management    • REPLACEMENT TOTAL KNEE Left 07/05/2011   • SINUS SURGERY     • TONSILLECTOMY     • TOTAL HIP ARTHROPLASTY Left    • TRIGGER FINGER RELEASE      R 4th    • UPPER GASTROINTESTINAL ENDOSCOPY  01/12/2007    with Jannine Pleva dilatation by Dr Grace Lo at [facility]   • WRIST SURGERY Right        Current Outpatient Medications:   •  aspirin (ECOTRIN LOW STRENGTH) 81 mg EC tablet, Take 1 tablet (81 mg total) by mouth daily, Disp:  , Rfl:   •  azithromycin (Zithromax) 250 mg tablet, Take 2 tablets (500 mg total) by mouth daily for 1 day, THEN 1 tablet (250 mg total) daily for 4 days  , Disp: 6 tablet, Rfl: 0  •  bacitracin-polymyxin b ophthalmic ointment, INSTILL A 1/4 INCH INTO LEFT EYE AT BEDTIME, Disp: , Rfl:   •  bisoprolol (ZEBETA) 10 MG tablet, TAKE 1 TABLET EVERY DAY, Disp: 90 tablet, Rfl: 3  •  butalbital-acetaminophen-caffeine (FIORICET,ESGIC) -40 mg per tablet, TAKE 1 TABLET EVERY 4 HOURS AS NEEDED  FOR  HEADACHES, Disp: 120 tablet, Rfl: 3  •  calcium carbonate (OS-MARLENY) 600 MG tablet, Take 600 mg by mouth 2 (two) times a day with meals, Disp: , Rfl:   •  clindamycin (CLEOCIN) 150 mg capsule, Prn for dental procedures, Disp: , Rfl:   •  fluorometholone (FML) 0 1 % ophthalmic suspension, INSTILL 1 DROP BY OPTHALMIC ROUTE FOUR TIMES DAILY INTO LEFT EYE, Disp: , Rfl:   •  ketorolac (ACULAR) 0 5 % ophthalmic solution, START 3 days prior to procedure instill 1 drop INTO AFFECTED EYE(   (REFER TO PRESCRIPTION NOTES)  , Disp: , Rfl:   •  losartan (COZAAR) 100 MG tablet, TAKE 1 TABLET EVERY MORNING, Disp: 90 tablet, Rfl: 3  •  loteprednol etabonate (LOTEMAX) 0 5 % ophth gel, instill 1 drop into left eye twice a day, Disp: , Rfl:   •  Multiple Vitamins-Minerals (MULTIVITAMIN WITH MINERALS) tablet, Take 1 tablet by mouth every morning, Disp: , Rfl:   •  omeprazole (PriLOSEC) 20 mg delayed release capsule, TAKE 1 CAPSULE TWICE DAILY, Disp: 180 capsule, Rfl: 1  •  pravastatin (PRAVACHOL) 40 mg tablet, TAKE 1 TABLET (40 MG TOTAL) BY MOUTH DAILY, Disp: 90 tablet, Rfl: 3  •  prednisoLONE acetate (PRED FORTE) 1 % ophthalmic suspension, INSTILL 1 DROP INTO AFFECTED  EYE(S) FOUR TIMES A DAY, Disp: , Rfl:   •  prochlorperazine (COMPAZINE) 5 mg tablet, Take 1 tablet (5 mg total) by mouth every 6 (six) hours as needed for nausea or vomiting, Disp: 30 tablet, Rfl: 5  •  spironolactone (ALDACTONE) 50 mg tablet, TAKE 1 TABLET DAILY AFTER LUNCH, Disp: 90 tablet, Rfl: 3  •  diclofenac sodium (VOLTAREN) 1 %, Apply 2 g topically 4 (four) times a day (Patient not taking: Reported on 5/20/2022), Disp: 50 g, Rfl: 5  •  methylPREDNISolone 4 MG tablet therapy pack, Use as directed on package (Patient not taking: Reported on 11/10/2022), Disp: 21 each, Rfl: 0  •  mirtazapine (REMERON) 15 mg tablet, Take 1 tablet (15 mg total) by mouth daily at bedtime (Patient not taking: Reported on 2/22/2023), Disp: 90 tablet, Rfl: 4  •  nystatin (MYCOSTATIN) 500,000 units/5 mL suspension, RINSE BY MOUTH WITH 5 MILLILITERS FOR 2 MINUTES 4-5 TIMES DAILY THEN SPIT (Patient not taking: Reported on 7/19/2022), Disp: , Rfl:   •  ofloxacin (OCUFLOX) 0 3 % ophthalmic solution, INSTILL 1 DROP INTO AFFECTED EYE 4 TIMES DAILY (Patient not taking: Reported on 2/22/2023), Disp: , Rfl:   •  pregabalin (LYRICA) 50 mg capsule, Take 1 capsule (50 mg total) by mouth 3 (three) times a day (Patient not taking: Reported on 5/20/2022), Disp: 90 capsule, Rfl: 1    Lab Results   Component Value Date     (L) 09/21/2015    SODIUM 132 (L) 02/24/2023    K 4 7 02/24/2023    CL 97 02/24/2023    CO2 28 02/24/2023    ANIONGAP 7 09/21/2015    AGAP 7 02/24/2023    BUN 11 02/24/2023    CREATININE 0 92 02/24/2023    GLUC 115 (H) 06/26/2020    GLUF 94 02/24/2023    CALCIUM 9 2 02/24/2023    AST 20 02/24/2023    ALT 21 02/24/2023    ALKPHOS 127 (H) 02/24/2023    PROT 6 9 09/21/2015    TP 7 2 02/24/2023    BILITOT 0 29 09/21/2015    TBILI 0 31 02/24/2023    EGFR 61 02/24/2023     Lab Results   Component Value Date    WBC 4 75 11/22/2022    HGB 12 6 11/22/2022    HCT 37 1 11/22/2022    MCV 87 11/22/2022     11/22/2022     Lab Results   Component Value Date    CHOLESTEROL 223 (H) 11/22/2022    CHOLESTEROL 220 (H) 11/18/2021    CHOLESTEROL 201 (H) 07/22/2020     Lab Results   Component Value Date    HDL 75 11/22/2022    HDL 86 11/18/2021     (H) 06/08/2016     Lab Results   Component Value Date    LDLCALC 123 (H) 11/22/2022    LDLCALC 121 (H) 11/18/2021    LDLCALC 121 (H) 06/08/2016     Lab Results   Component Value Date    TRIG 123 11/22/2022    TRIG 67 11/18/2021    TRIG 49 06/08/2016     No results found for: [place], [place]  Lab Results   Component Value Date    UCV2XDCHEFSU 4 267 (H) 11/18/2021     Lab Results   Component Value Date    PTH 67 2 11/22/2022    CALCIUM 9 2 02/24/2023    PHOS 3 8 11/22/2022     No results found for: SPEP, UPEP  No results found for: LIEN CARRANZA        Objective: There were no vitals taken for this visit  Physical Exam  Vitals reviewed  Constitutional:       General: She is not in acute distress  Appearance: She is well-developed  HENT:      Head: Normocephalic and atraumatic  Eyes:      Conjunctiva/sclera: Conjunctivae normal    Cardiovascular:      Rate and Rhythm: Normal rate and regular rhythm  Pulmonary:      Effort: Pulmonary effort is normal       Breath sounds: Normal breath sounds  Abdominal:      Palpations: Abdomen is soft  Musculoskeletal:      Cervical back: Neck supple  Skin:     General: Skin is warm  Findings: No rash  Neurological:      Mental Status: She is alert and oriented to person, place, and time  Cranial Nerves: No cranial nerve deficit     Psychiatric: Behavior: Behavior normal

## 2023-03-10 DIAGNOSIS — G44.209 MUSCLE TENSION HEADACHE: ICD-10-CM

## 2023-03-10 RX ORDER — BUTALBITAL, ACETAMINOPHEN AND CAFFEINE 50; 325; 40 MG/1; MG/1; MG/1
TABLET ORAL
Qty: 120 TABLET | Refills: 2 | Status: SHIPPED | OUTPATIENT
Start: 2023-03-10

## 2023-03-17 ENCOUNTER — OFFICE VISIT (OUTPATIENT)
Dept: FAMILY MEDICINE CLINIC | Facility: CLINIC | Age: 78
End: 2023-03-17

## 2023-03-17 VITALS
SYSTOLIC BLOOD PRESSURE: 132 MMHG | DIASTOLIC BLOOD PRESSURE: 84 MMHG | RESPIRATION RATE: 20 BRPM | TEMPERATURE: 95.5 F | HEART RATE: 84 BPM | HEIGHT: 64 IN | BODY MASS INDEX: 31.58 KG/M2 | WEIGHT: 185 LBS

## 2023-03-17 DIAGNOSIS — N18.31 STAGE 3A CHRONIC KIDNEY DISEASE (HCC): ICD-10-CM

## 2023-03-17 DIAGNOSIS — E78.00 HYPERCHOLESTEROLEMIA: ICD-10-CM

## 2023-03-17 DIAGNOSIS — I10 PRIMARY HYPERTENSION: ICD-10-CM

## 2023-03-17 DIAGNOSIS — J01.00 SUBACUTE MAXILLARY SINUSITIS: ICD-10-CM

## 2023-03-17 DIAGNOSIS — Z96.89 S/P INSERTION OF SPINAL CORD STIMULATOR: ICD-10-CM

## 2023-03-17 DIAGNOSIS — G44.209 MUSCLE TENSION HEADACHE: ICD-10-CM

## 2023-03-17 DIAGNOSIS — J43.9 PULMONARY EMPHYSEMA, UNSPECIFIED EMPHYSEMA TYPE (HCC): ICD-10-CM

## 2023-03-17 DIAGNOSIS — Z78.0 ASYMPTOMATIC MENOPAUSAL STATE: ICD-10-CM

## 2023-03-17 DIAGNOSIS — E26.9 HYPERALDOSTERONISM (HCC): ICD-10-CM

## 2023-03-17 DIAGNOSIS — Z00.00 MEDICARE ANNUAL WELLNESS VISIT, SUBSEQUENT: Primary | ICD-10-CM

## 2023-03-17 DIAGNOSIS — M51.36 DDD (DEGENERATIVE DISC DISEASE), LUMBAR: ICD-10-CM

## 2023-03-17 RX ORDER — METHYLPREDNISOLONE 4 MG/1
TABLET ORAL
Qty: 21 EACH | Refills: 0 | Status: SHIPPED | OUTPATIENT
Start: 2023-03-17

## 2023-03-17 RX ORDER — METHYLPREDNISOLONE 4 MG/1
TABLET ORAL
Qty: 21 EACH | Refills: 0 | Status: SHIPPED | OUTPATIENT
Start: 2023-03-17 | End: 2023-03-17

## 2023-03-17 NOTE — PROGRESS NOTES
Assessment and Plan: Sinus pain and pressure chronic we will provide a Medrol Dosepak and Flonase the patient is used the Flonase daily through the season    Hyperaldosteronism on Aldactone 50 mg    Hypercholesterolemia on Pravachol 40 mg    Primary hypertension treated with Cozaar 100 mg Zebeta 10 mg    Stage IIIa chronic kidney disease followed by nephrology    Pulmonary emphysema stable    Muscle tension headaches treated with Fioricet    Degenerative disc disease of the lumbar spine followed by pain management patient has had insertion of a spinal cord stimulator     Problem List Items Addressed This Visit    None    BMI Counseling: Body mass index is 31 76 kg/m²  The BMI is above normal  Nutrition recommendations include decreasing portion sizes, encouraging healthy choices of fruits and vegetables, decreasing fast food intake, consuming healthier snacks, limiting drinks that contain sugar, moderation in carbohydrate intake, increasing intake of lean protein, reducing intake of saturated and trans fat and reducing intake of cholesterol  Exercise recommendations include moderate physical activity 150 minutes/week  Rationale for BMI follow-up plan is due to patient being overweight or obese  Preventive health issues were discussed with patient, and age appropriate screening tests were ordered as noted in patient's After Visit Summary  Personalized health advice and appropriate referrals for health education or preventive services given if needed, as noted in patient's After Visit Summary       History of Present Illness:     Patient presents for a Medicare Wellness Visit    Patient presents for Medicare wellness visit     Patient Care Team:  Mariel Anderson DO as PCP - DO Joaquina Motta MD Winthrop Dunnings, MD as Endoscopist  John Ferreira PA-C as Physician Assistant (Internal Medicine)  Daniel Hatch MD as Consulting Physician (Internal Medicine)  Heidy Champion DO (Nephrology)     Review of Systems:     Review of Systems   Constitutional: Negative for chills and fever  HENT: Positive for sinus pressure and sinus pain  Negative for ear pain and sore throat  Eyes: Negative for pain and visual disturbance  Respiratory: Negative for cough and shortness of breath  Cardiovascular: Negative for chest pain and palpitations  Gastrointestinal: Negative for abdominal pain and vomiting  Genitourinary: Negative for dysuria and hematuria  Musculoskeletal: Positive for arthralgias, back pain and neck pain  Skin: Negative for color change and rash  Neurological: Positive for headaches  Negative for seizures and syncope  All other systems reviewed and are negative         Problem List:     Patient Active Problem List   Diagnosis   • Diastolic dysfunction   • Hypercholesterolemia   • Hypertension   • Mitral regurgitation   • Shortness of breath on exertion   • Cervical spondylosis without myelopathy   • Chronic pain disorder   • Gastroesophageal reflux disease without esophagitis   • Hyponatremia   • Primary osteoarthritis of right knee   • Osteoarthritis of right hip   • Essential hypertension   • Stage 2 chronic kidney disease   • Hyperaldosteronism (HCC)   • Hypertensive nephrosclerosis   • Status post total right knee replacement   • Pulmonary emphysema, unspecified emphysema type ([facility])   • DDD (degenerative disc disease), lumbar   • Lumbar spondylosis   • Lumbar disc herniation   • S/P insertion of spinal cord stimulator   • Stage 3a chronic kidney disease (HCC)   • Combined systolic and diastolic congestive heart failure, unspecified HF chronicity ([facility])   • Obesity, morbid ([address] )      Past Medical and Surgical History:     Past Medical History:   Diagnosis Date   • Allergies    • Anxiety    • Arthritis    • Benign arteriolar nephrosclerosis    • Bereavement    • Cataract    • Chronic kidney disease    • Chronic kidney disease in type 2 diabetes mellitus ([place] [address] )    • Chronic kidney disease, stage 2 (mild)    • Chronic pain disorder    • Chronic pain syndrome    • COPD (chronic obstructive pulmonary disease) ([facility])    • DDD (degenerative disc disease), cervical    • DDD (degenerative disc disease), lumbar    • Degenerative joint disease of right hip    • Depression    • Diastolic dysfunction    • DJD (degenerative joint disease), ankle and foot, right    • DJD of right shoulder    • Edema    • Fracture of left calcaneus    • Generalized anxiety disorder    • GERD (gastroesophageal reflux disease)    • Heart murmur    • Hyperaldosteronism ([facility])    • Hyperlipidemia    • Hypertension    • Hypertensive nephrosclerosis    • Hypo-osmolality and hyponatremia    • IBS (irritable bowel syndrome)    • Insomnia    • Migraines    • Mitral regurgitation    • MVP (mitral valve prolapse)    • Obstructive sleep apnea syndrome    • Osteoarthritis    • Osteoarthritis    • Pernicious anemia    • Plantar fascia rupture    • Rheumatoid arthritis ([facility])    • Right knee DJD    • Shingles    • Sinobronchitis    • Sleep apnea    • Stroke ([place] [address] )     tia   • TIA (transient ischemic attack)    • Vertigo      Past Surgical History:   Procedure Laterality Date   • APPENDECTOMY     • CARPAL TUNNEL RELEASE Bilateral    • COLONOSCOPY      several   • COLONOSCOPY  04/02/2012    by Dr Jer Saldaña   • CORNEAL TRANSPLANT Left 04/11/2022   • DXA PROCEDURE (HISTORICAL)  10/26/2016, 9/17/2014, 6/18/2007   • HAND SURGERY Right     ring finger has pin   • HYSTERECTOMY      34   • INSERT / REPLACE PERIPHERAL NEUROSTIMULATOR PULSE GENERATOR /  Left     buttocks   • JOINT REPLACEMENT Left     knee   • JOINT REPLACEMENT Right 04/2019    Hip   • KNEE ARTHROPLASTY Left 01/15/2009    by Dr Fredy Vergara at [facility]    • KNEE ARTHROSCOPY Bilateral    • KNEE CARTILAGE SURGERY  09/10/2009    by Dr Fredy Vergara at [address] [place] [address] Road (HISTORICAL) 12/10/2018, 10/30/2017, 10/26/2016   • NASAL SEPTUM SURGERY  2008   • NERVE BLOCK Left 02/20/2018    Procedure: BLOCK MEDIAL BRANCH - C4, C5, C6;  Surgeon: Alon Mojica MD;  Location: [facility] MA[facility];  Service: Pain Management    • NERVE BLOCK Left 03/06/2018    Procedure: C4, C5, C6 MEDIAL BRANCH BLOCK;  Surgeon: Alon Mojica MD;  Location: [facility];  Service: Pain Management    • NH ARTHRP ACETBLR/PROX FEM PROSTC AGRFT/ALGRFT Right 02/27/2019    Procedure: ARTHROPLASTY HIP TOTAL;  Surgeon: Leo Cabrera DO;  Location: [address] MAIN OR;  Service: Orthopedics   • NH ARTHRP KNE CONDYLE&PLATU MEDIAL&LAT COMPARTMENTS Right 06/24/2020    Procedure: KNEE TOTAL ARTHROPLASTY;  Surgeon: Leo Cabrera DO;  Location: [facility] MAIN OR;  Service: Orthopedics   • NH COLONOSCOPY FLX DX W/COLLJ SPEC WHEN PFRMD N/A 04/24/2017    Procedure: Lou Levy;  Surgeon: Virginia Mercedes MD;  Location: [facility];  Service: Colorectal   • RADIOFREQUENCY ABLATION Left 04/17/2018    Procedure: ABLATION RADIO FREQUENCY (RFA) - C4, C5, C6;  Surgeon: Alon Mojica MD;  Location: [facility];  Service: Pain Management    • REPLACEMENT TOTAL KNEE Left 07/05/2011   • SINUS SURGERY     • TONSILLECTOMY     • TOTAL HIP ARTHROPLASTY Left    • TRIGGER FINGER RELEASE      R 4th    • UPPER GASTROINTESTINAL ENDOSCOPY  01/12/2007    with Kike Maldonado dilatation by Dr Micaela Daniel at [facility]   • WRIST SURGERY Right       Family History:     Family History   Problem Relation Age of Onset   • Heart disease Mother    • Hypertension Mother    • Arthritis Mother    • Dementia Mother    • Rheum arthritis Mother    • Hypertension Father    • Heart disease Father    • Colon cancer Father    • Hypertension Sister    • Anxiety disorder Sister    • Thyroid disease Sister    • Prostate cancer Maternal Grandfather    • No Known Problems Paternal Grandmother    • No Known Problems Paternal Grandfather    • No Known Problems Paternal Aunt    • Pseudochol deficiency Neg Hx       Social History:     Social History     Socioeconomic History   • Marital status: /Civil Union     Spouse name: None   • Number of children: None   • Years of education: None   • Highest education level: None   Occupational History   • Occupation: Admnistrator    Tobacco Use   • Smoking status: Former     Types: Cigarettes   • Smokeless tobacco: Never   • Tobacco comments:     quit 40 yrs ago   Vaping Use   • Vaping Use: Never used   Substance and Sexual Activity   • Alcohol use: Yes     Comment: 2 beer a week   • Drug use: Never   • Sexual activity: Not Currently     Birth control/protection: Post-menopausal   Other Topics Concern   • None   Social History Narrative    Patient has never smoked - As per [employer]    Consumes 1-2 beers per week - As per [employer]    Consumes on average 1 cup of decaf coffee per day      Social Determinants of Health     Financial Resource Strain: Not on file   Food Insecurity: Not on file   Transportation Needs: Not on file   Physical Activity: Not on file   Stress: Not on file   Social Connections: Not on file   Intimate Partner Violence: Not on file   Housing Stability: Not on file      Medications and Allergies:     Current Outpatient Medications   Medication Sig Dispense Refill   • aspirin (ECOTRIN LOW STRENGTH) 81 mg EC tablet Take 1 tablet (81 mg total) by mouth daily     • bacitracin-polymyxin b ophthalmic ointment INSTILL A 1/4 INCH INTO LEFT EYE AT BEDTIME     • bisoprolol (ZEBETA) 10 MG tablet TAKE 1 TABLET EVERY DAY 90 tablet 3   • butalbital-acetaminophen-caffeine (FIORICET,ESGIC) -40 mg per tablet TAKE 1 TABLET EVERY 4 HOURS AS NEEDED  FOR  HEADACHES 120 tablet 2   • calcium carbonate (OS-MARLENY) 600 MG tablet Take 600 mg by mouth 2 (two) times a day with meals     • clindamycin (CLEOCIN) 150 mg capsule Prn for dental procedures     • fluorometholone (FML) 0 1 % ophthalmic suspension INSTILL 1 DROP BY OPTHALMIC ROUTE FOUR TIMES DAILY INTO LEFT EYE     • losartan (COZAAR) 100 MG tablet TAKE 1 TABLET EVERY MORNING 90 tablet 3   • Multiple Vitamins-Minerals (MULTIVITAMIN WITH MINERALS) tablet Take 1 tablet by mouth every morning     • omeprazole (PriLOSEC) 20 mg delayed release capsule TAKE 1 CAPSULE TWICE DAILY 180 capsule 1   • pravastatin (PRAVACHOL) 40 mg tablet TAKE 1 TABLET (40 MG TOTAL) BY MOUTH DAILY 90 tablet 3   • prochlorperazine (COMPAZINE) 5 mg tablet Take 1 tablet (5 mg total) by mouth every 6 (six) hours as needed for nausea or vomiting 30 tablet 5   • spironolactone (ALDACTONE) 50 mg tablet TAKE 1 TABLET DAILY AFTER LUNCH 90 tablet 3     No current facility-administered medications for this visit  Allergies   Allergen Reactions   • Procaine Hives   • Adhesive [Medical Tape] Rash   • Lidocaine Rash   • Penicillins Rash      Immunizations:     Immunization History   Administered Date(s) Administered   • COVID-19 MODERNA VACC 0 5 ML IM 02/05/2021, 03/05/2021, 10/30/2021, 07/16/2022   • COVID-19 Moderna Vac BIVALENT 12 Yr+ IM (BOOSTER ONLY) 0 5 ML 12/03/2022   • INFLUENZA 10/16/2021   • Influenza, high dose seasonal 0 7 mL 09/25/2020, 10/24/2022   • Pneumococcal Polysaccharide PPV23 10/08/2020   • Tdap 11/17/2020   • Zoster 10/30/2014      Health Maintenance:         Topic Date Due   • Colorectal Cancer Screening  04/24/2022   • Breast Cancer Screening: Mammogram  01/13/2024   • Hepatitis C Screening  Completed         Topic Date Due   • Pneumococcal Vaccine: 65+ Years (2 - PCV) 10/08/2021      Medicare Screening Tests and Risk Assessments:         Health Risk Assessment:   Patient rates overall health as good  Patient feels that their physical health rating is same  Eyesight was rated as same  Hearing was rated as same  Patient feels that their emotional and mental health rating is same  Patients states they are sometimes angry  Patient states they are sometimes unusually tired/fatigued  Pain experienced in the last 7 days has been some  Patient's pain rating has been 6/10  Patient states that she has experienced no weight loss or gain in last 6 months  Fall Risk Screening: In the past year, patient has experienced: no history of falling in past year      Urinary Incontinence Screening:   Patient has not leaked urine accidently in the last six months  Home Safety:  Patient does not have trouble with stairs inside or outside of their home  Patient has working smoke alarms and has working carbon monoxide detector  Home safety hazards include: none  Nutrition:   Current diet is Regular  Medications:   Patient is currently taking over-the-counter supplements  OTC medications include: see medication list  Patient is able to manage medications  Activities of Daily Living (ADLs)/Instrumental Activities of Daily Living (IADLs):   Walk and transfer into and out of bed and chair?: Yes  Dress and groom yourself?: Yes    Bathe or shower yourself?: Yes    Feed yourself?  Yes  Do your laundry/housekeeping?: Yes  Manage your money, pay your bills and track your expenses?: Yes  Make your own meals?: Yes    Do your own shopping?: Yes    Previous Hospitalizations:   Any hospitalizations or ED visits within the last 12 months?: No      Advance Care Planning:   Living will: Yes    Durable POA for healthcare: No    Advanced directive: Yes      PREVENTIVE SCREENINGS      Cardiovascular Screening:    General: Screening Not Indicated and History Lipid Disorder      Diabetes Screening:     General: Screening Current      Breast Cancer Screening:     General: Screening Current      Cervical Cancer Screening:    General: Screening Not Indicated      Lung Cancer Screening:     General: Screening Not Indicated      Hepatitis C Screening:    General: Screening Current    Screening, Brief Intervention, and Referral to Treatment (SBIRT)    Screening  Typical number of drinks in a day: 0  Typical number of drinks in a week: 2  Interpretation: Low risk drinking behavior  Single Item Drug Screening:  How often have you used an illegal drug (including marijuana) or a prescription medication for non-medical reasons in the past year? never    Single Item Drug Screen Score: 0  Interpretation: Negative screen for possible drug use disorder    No results found  Physical Exam:     /84   Pulse 84   Temp (!) 95 5 °F (35 3 °C)   Resp 20   Ht 5' 4" (1 626 m)   Wt 83 9 kg (185 lb)   BMI 31 76 kg/m²     Physical Exam  Vitals and nursing note reviewed  Constitutional:       General: She is not in acute distress  Appearance: She is well-developed  HENT:      Head: Normocephalic and atraumatic  Right Ear: Tympanic membrane, ear canal and external ear normal       Left Ear: Tympanic membrane, ear canal and external ear normal       Nose: Nose normal       Mouth/Throat:      Mouth: Mucous membranes are moist       Pharynx: Oropharynx is clear  Eyes:      Extraocular Movements: Extraocular movements intact  Conjunctiva/sclera: Conjunctivae normal       Pupils: Pupils are equal, round, and reactive to light  Cardiovascular:      Rate and Rhythm: Normal rate and regular rhythm  Heart sounds: No murmur heard  Pulmonary:      Effort: Pulmonary effort is normal  No respiratory distress  Breath sounds: Normal breath sounds  Abdominal:      General: Abdomen is flat  Bowel sounds are normal       Palpations: Abdomen is soft  Tenderness: There is no abdominal tenderness  Musculoskeletal:         General: No swelling  Normal range of motion  Cervical back: Normal range of motion and neck supple  Skin:     General: Skin is warm and dry  Capillary Refill: Capillary refill takes less than 2 seconds  Neurological:      General: No focal deficit present  Mental Status: She is alert and oriented to person, place, and time     Psychiatric:         Mood and Affect: Mood normal          Behavior: Behavior normal          Thought Content:  Thought content normal          Judgment: Judgment normal           Hilario Acosta, DO

## 2023-03-17 NOTE — PATIENT INSTRUCTIONS
Medicare Preventive Visit Patient Instructions  Thank you for completing your Welcome to Medicare Visit or Medicare Annual Wellness Visit today  Your next wellness visit will be due in one year (3/17/2024)  The screening/preventive services that you may require over the next 5-10 years are detailed below  Some tests may not apply to you based off risk factors and/or age  Screening tests ordered at today's visit but not completed yet may show as past due  Also, please note that scanned in results may not display below  Preventive Screenings:  Service Recommendations Previous Testing/Comments   Colorectal Cancer Screening  * Colonoscopy    * Fecal Occult Blood Test (FOBT)/Fecal Immunochemical Test (FIT)  * Fecal DNA/Cologuard Test  * Flexible Sigmoidoscopy Age: 39-70 years old   Colonoscopy: every 10 years (may be performed more frequently if at higher risk)  OR  FOBT/FIT: every 1 year  OR  Cologuard: every 3 years  OR  Sigmoidoscopy: every 5 years  Screening may be recommended earlier than age 39 if at higher risk for colorectal cancer  Also, an individualized decision between you and your healthcare provider will decide whether screening between the ages of 74-80 would be appropriate  Colonoscopy: 04/24/2017  FOBT/FIT: Not on file  Cologuard: Not on file  Sigmoidoscopy: Not on file          Breast Cancer Screening Age: 36 years old  Frequency: every 1-2 years  Not required if history of left and right mastectomy Mammogram: 01/13/2023    Screening Current   Cervical Cancer Screening Between the ages of 21-29, pap smear recommended once every 3 years  Between the ages of 33-67, can perform pap smear with HPV co-testing every 5 years     Recommendations may differ for women with a history of total hysterectomy, cervical cancer, or abnormal pap smears in past  Pap Smear: 04/04/2022    Screening Not Indicated   Hepatitis C Screening Once for adults born between 1945 and 1965  More frequently in patients at high risk for Hepatitis C Hep C Antibody: 01/12/2022    Screening Current   Diabetes Screening 1-2 times per year if you're at risk for diabetes or have pre-diabetes Fasting glucose: 94 mg/dL (2/24/2023)  A1C: 5 0 % (7/22/2020)  Screening Current   Cholesterol Screening Once every 5 years if you don't have a lipid disorder  May order more often based on risk factors  Lipid panel: 11/22/2022    Screening Not Indicated  History Lipid Disorder     Other Preventive Screenings Covered by Medicare:  1  Abdominal Aortic Aneurysm (AAA) Screening: covered once if your at risk  You're considered to be at risk if you have a family history of AAA  2  Lung Cancer Screening: covers low dose CT scan once per year if you meet all of the following conditions: (1) Age 50-69; (2) No signs or symptoms of lung cancer; (3) Current smoker or have quit smoking within the last 15 years; (4) You have a tobacco smoking history of at least 20 pack years (packs per day multiplied by number of years you smoked); (5) You get a written order from a healthcare provider  3  Glaucoma Screening: covered annually if you're considered high risk: (1) You have diabetes OR (2) Family history of glaucoma OR (3)  aged 48 and older OR (3)  American aged 72 and older  3  Osteoporosis Screening: covered every 2 years if you meet one of the following conditions: (1) You're estrogen deficient and at risk for osteoporosis based off medical history and other findings; (2) Have a vertebral abnormality; (3) On glucocorticoid therapy for more than 3 months; (4) Have primary hyperparathyroidism; (5) On osteoporosis medications and need to assess response to drug therapy  · Last bone density test (DXA Scan): 05/03/2022   5  HIV Screening: covered annually if you're between the age of 15-65  Also covered annually if you are younger than 13 and older than 72 with risk factors for HIV infection   For pregnant patients, it is covered up to 3 times per pregnancy  Immunizations:  Immunization Recommendations   Influenza Vaccine Annual influenza vaccination during flu season is recommended for all persons aged >= 6 months who do not have contraindications   Pneumococcal Vaccine   * Pneumococcal conjugate vaccine = PCV13 (Prevnar 13), PCV15 (Vaxneuvance), PCV20 (Prevnar 20)  * Pneumococcal polysaccharide vaccine = PPSV23 (Pneumovax) Adults 25-60 years old: 1-3 doses may be recommended based on certain risk factors  Adults 72 years old: 1-2 doses may be recommended based off what pneumonia vaccine you previously received   Hepatitis B Vaccine 3 dose series if at intermediate or high risk (ex: diabetes, end stage renal disease, liver disease)   Tetanus (Td) Vaccine - COST NOT COVERED BY MEDICARE PART B Following completion of primary series, a booster dose should be given every 10 years to maintain immunity against tetanus  Td may also be given as tetanus wound prophylaxis  Tdap Vaccine - COST NOT COVERED BY MEDICARE PART B Recommended at least once for all adults  For pregnant patients, recommended with each pregnancy  Shingles Vaccine (Shingrix) - COST NOT COVERED BY MEDICARE PART B  2 shot series recommended in those aged 48 and above     Health Maintenance Due:      Topic Date Due   • Colorectal Cancer Screening  04/24/2022   • Breast Cancer Screening: Mammogram  01/13/2024   • Hepatitis C Screening  Completed     Immunizations Due:      Topic Date Due   • Pneumococcal Vaccine: 65+ Years (2 - PCV) 10/08/2021     Advance Directives   What are advance directives? Advance directives are legal documents that state your wishes and plans for medical care  These plans are made ahead of time in case you lose your ability to make decisions for yourself  Advance directives can apply to any medical decision, such as the treatments you want, and if you want to donate organs  What are the types of advance directives?   There are many types of advance directives, and each state has rules about how to use them  You may choose a combination of any of the following:  · Living will: This is a written record of the treatment you want  You can also choose which treatments you do not want, which to limit, and which to stop at a certain time  This includes surgery, medicine, IV fluid, and tube feedings  · Durable power of  for healthcare [facility]): This is a written record that states who you want to make healthcare choices for you when you are unable to make them for yourself  This person, called a proxy, is usually a family member or a friend  You may choose more than 1 proxy  · Do not resuscitate (DNR) order:  A DNR order is used in case your heart stops beating or you stop breathing  It is a request not to have certain forms of treatment, such as CPR  A DNR order may be included in other types of advance directives  · Medical directive: This covers the care that you want if you are in a coma, near death, or unable to make decisions for yourself  You can list the treatments you want for each condition  Treatment may include pain medicine, surgery, blood transfusions, dialysis, IV or tube feedings, and a ventilator (breathing machine)  · Values history: This document has questions about your views, beliefs, and how you feel and think about life  This information can help others choose the care that you would choose  Why are advance directives important? An advance directive helps you control your care  Although spoken wishes may be used, it is better to have your wishes written down  Spoken wishes can be misunderstood, or not followed  Treatments may be given even if you do not want them  An advance directive may make it easier for your family to make difficult choices about your care     Weight Management   Why it is important to manage your weight:  Being overweight increases your risk of health conditions such as heart disease, high blood pressure, type 2 diabetes, and certain types of cancer  It can also increase your risk for osteoarthritis, sleep apnea, and other respiratory problems  Aim for a slow, steady weight loss  Even a small amount of weight loss can lower your risk of health problems  How to lose weight safely:  A safe and healthy way to lose weight is to eat fewer calories and get regular exercise  You can lose up about 1 pound a week by decreasing the number of calories you eat by 500 calories each day  Healthy meal plan for weight management:  A healthy meal plan includes a variety of foods, contains fewer calories, and helps you stay healthy  A healthy meal plan includes the following:  · Eat whole-grain foods more often  A healthy meal plan should contain fiber  Fiber is the part of grains, fruits, and vegetables that is not broken down by your body  Whole-grain foods are healthy and provide extra fiber in your diet  Some examples of whole-grain foods are whole-wheat breads and pastas, oatmeal, brown rice, and bulgur  · Eat a variety of vegetables every day  Include dark, leafy greens such as spinach, kale, daniela greens, and mustard greens  Eat yellow and orange vegetables such as carrots, sweet potatoes, and winter squash  · Eat a variety of fruits every day  Choose fresh or canned fruit (canned in its own juice or light syrup) instead of juice  Fruit juice has very little or no fiber  · Eat low-fat dairy foods  Drink fat-free (skim) milk or 1% milk  Eat fat-free yogurt and low-fat cottage cheese  Try low-fat cheeses such as mozzarella and other reduced-fat cheeses  · Choose meat and other protein foods that are low in fat  Choose beans or other legumes such as split peas or lentils  Choose fish, skinless poultry (chicken or turkey), or lean cuts of red meat (beef or pork)  Before you cook meat or poultry, cut off any visible fat  · Use less fat and oil  Try baking foods instead of frying them   Add less fat, such as margarine, sour cream, regular salad dressing and mayonnaise to foods  Eat fewer high-fat foods  Some examples of high-fat foods include french fries, doughnuts, ice cream, and cakes  · Eat fewer sweets  Limit foods and drinks that are high in sugar  This includes candy, cookies, regular soda, and sweetened drinks  Exercise:  Exercise at least 30 minutes per day on most days of the week  Some examples of exercise include walking, biking, dancing, and swimming  You can also fit in more physical activity by taking the stairs instead of the elevator or parking farther away from stores  Ask your healthcare provider about the best exercise plan for you  © Copyright [employer] 2018 Information is for End User's use only and may not be sold, redistributed or otherwise used for commercial purposes   All illustrations and images included in CareNotes® are the copyrighted property of A D A M , [employer]  or [address] [place] [address]

## 2023-03-29 ENCOUNTER — HOSPITAL ENCOUNTER (OUTPATIENT)
Dept: NON INVASIVE DIAGNOSTICS | Facility: HOSPITAL | Age: 78
Discharge: HOME | End: 2023-03-29
Attending: INTERNAL MEDICINE

## 2023-03-29 VITALS
SYSTOLIC BLOOD PRESSURE: 140 MMHG | HEART RATE: 80 BPM | WEIGHT: 185 LBS | BODY MASS INDEX: 31.58 KG/M2 | DIASTOLIC BLOOD PRESSURE: 70 MMHG | HEIGHT: 64 IN

## 2023-03-29 DIAGNOSIS — I05.1 RHEUMATIC MITRAL REGURGITATION: ICD-10-CM

## 2023-03-29 DIAGNOSIS — I50.40 COMBINED SYSTOLIC AND DIASTOLIC CONGESTIVE HEART FAILURE, UNSPECIFIED HF CHRONICITY (HCC): ICD-10-CM

## 2023-03-29 LAB
AORTIC ROOT: 2.8 CM
APICAL FOUR CHAMBER EJECTION FRACTION: 57 %
E WAVE DECELERATION TIME: 189 MS
FRACTIONAL SHORTENING: 31 % (ref 28–44)
INTERVENTRICULAR SEPTUM IN DIASTOLE (PARASTERNAL SHORT AXIS VIEW): 1 CM
INTERVENTRICULAR SEPTUM: 1 CM (ref 0.6–1.1)
LAAS-AP2: 24 CM2
LAAS-AP4: 23.7 CM2
LEFT ATRIUM SIZE: 4.5 CM
LEFT INTERNAL DIMENSION IN SYSTOLE: 3.1 CM (ref 2.1–4)
LEFT VENTRICULAR INTERNAL DIMENSION IN DIASTOLE: 4.5 CM (ref 3.5–6)
LEFT VENTRICULAR POSTERIOR WALL IN END DIASTOLE: 1.1 CM
LEFT VENTRICULAR STROKE VOLUME: 57 ML
LVSV (TEICH): 57 ML
MV E'TISSUE VEL-SEP: 8 CM/S
MV PEAK A VEL: 0.65 M/S
MV PEAK E VEL: 104 CM/S
MV STENOSIS PRESSURE HALF TIME: 55 MS
MV VALVE AREA P 1/2 METHOD: 4 CM2
PA SYSTOLIC PRESSURE: 37 MMHG
RIGHT ATRIUM AREA SYSTOLE A4C: 18.6 CM2
RIGHT VENTRICLE ID DIMENSION: 3.2 CM
SL CV LEFT ATRIUM LENGTH A2C: 5.5 CM
SL CV LV EF: 60
SL CV PED ECHO LEFT VENTRICLE DIASTOLIC VOLUME (MOD BIPLANE) 2D: 94 ML
SL CV PED ECHO LEFT VENTRICLE SYSTOLIC VOLUME (MOD BIPLANE) 2D: 37 ML
TR MAX PG: 37 MMHG
TR PEAK VELOCITY: 3 M/S
TRICUSPID ANNULAR PLANE SYSTOLIC EXCURSION: 1.9 CM
TRICUSPID VALVE PEAK REGURGITATION VELOCITY: 3.03 M/S

## 2023-05-30 ENCOUNTER — OFFICE VISIT (OUTPATIENT)
Dept: FAMILY MEDICINE CLINIC | Facility: CLINIC | Age: 78
End: 2023-05-30

## 2023-05-30 VITALS
HEIGHT: 64 IN | RESPIRATION RATE: 20 BRPM | WEIGHT: 179 LBS | TEMPERATURE: 96.3 F | HEART RATE: 84 BPM | SYSTOLIC BLOOD PRESSURE: 134 MMHG | BODY MASS INDEX: 30.56 KG/M2 | DIASTOLIC BLOOD PRESSURE: 84 MMHG

## 2023-05-30 DIAGNOSIS — K21.9 GASTROESOPHAGEAL REFLUX DISEASE WITHOUT ESOPHAGITIS: ICD-10-CM

## 2023-05-30 DIAGNOSIS — Z96.89 S/P INSERTION OF SPINAL CORD STIMULATOR: ICD-10-CM

## 2023-05-30 DIAGNOSIS — G44.209 MUSCLE TENSION HEADACHE: ICD-10-CM

## 2023-05-30 DIAGNOSIS — E78.00 HYPERCHOLESTEROLEMIA: ICD-10-CM

## 2023-05-30 DIAGNOSIS — Z01.818 PRE-OPERATIVE CLEARANCE: Primary | ICD-10-CM

## 2023-05-30 DIAGNOSIS — I10 PRIMARY HYPERTENSION: ICD-10-CM

## 2023-05-30 DIAGNOSIS — M51.36 DDD (DEGENERATIVE DISC DISEASE), LUMBAR: ICD-10-CM

## 2023-05-30 DIAGNOSIS — N18.31 STAGE 3A CHRONIC KIDNEY DISEASE (HCC): ICD-10-CM

## 2023-05-30 DIAGNOSIS — E26.9 HYPERALDOSTERONISM (HCC): ICD-10-CM

## 2023-05-30 NOTE — PROGRESS NOTES
Assessment/Plan: The patient is cleared for cataract surgery    Hypertensive cardiovascular disease with a blood pressure controlled on the current regimen    Hyperaldosteronism on Aldactone    Chronic kidney disease stage IIIa stable    GERD without esophagitis Prilosec 20 mg minimizes symptoms    Muscle tension headaches treated with Fioricet    Hyperlipidemia treated with Pravachol 40 mg    Cervical spondylosis without myelopathy    Degenerative disc disease of the lumbar spine with lumbar spondylosis post spinal cord stimulator insertion    Problem List Items Addressed This Visit    None       There are no diagnoses linked to this encounter  No problem-specific Assessment & Plan notes found for this encounter  PHQ-2/9 Depression Screening            Body mass index is 30 73 kg/m²  BMI Counseling: Body mass index is 30 73 kg/m²  The BMI     Subjective:      Patient ID: Stephanie George is a 70 y o  female      HPI    The following portions of the patient's history were reviewed and updated as appropriate:   She has a past medical history of Allergies, Anxiety, Arthritis, Benign arteriolar nephrosclerosis, Bereavement, Cataract, Chronic kidney disease, Chronic kidney disease in type 2 diabetes mellitus ([address] ), Chronic kidney disease, stage 2 (mild), Chronic pain disorder, Chronic pain syndrome, COPD (chronic obstructive pulmonary disease) ([address] ), DDD (degenerative disc disease), cervical, DDD (degenerative disc disease), lumbar, Degenerative joint disease of right hip, Depression, Diastolic dysfunction, DJD (degenerative joint disease), ankle and foot, right, DJD of right shoulder, Edema, Fracture of left calcaneus, Generalized anxiety disorder, GERD (gastroesophageal reflux disease), Heart murmur, Hyperaldosteronism ([address] ), Hyperlipidemia, Hypertension, Hypertensive nephrosclerosis, Hypo-osmolality and hyponatremia, IBS (irritable bowel syndrome), Insomnia, Migraines, Mitral regurgitation, MVP (mitral valve prolapse), Obstructive sleep apnea syndrome, Osteoarthritis, Osteoarthritis, Pernicious anemia, Plantar fascia rupture, Rheumatoid arthritis ([facility] ), Right knee DJD, Shingles, Sinobronchitis, Sleep apnea, Stroke ([facility] [address] ), TIA (transient ischemic attack), and Vertigo  ,  does not have any pertinent problems on file  ,   has a past surgical history that includes Appendectomy; Sinus surgery; Insert / replace peripheral neurostimulator pulse generator /  (Left); pr colonoscopy flx dx w/collj spec when pfrmd (N/A, 04/24/2017); NERVE BLOCK (Left, 02/20/2018); NERVE BLOCK (Left, 03/06/2018); Radiofrequency ablation (Left, 04/17/2018); Colonoscopy; Upper gastrointestinal endoscopy (01/12/2007); Knee arthroscopy (Bilateral); Hand surgery (Right); pr arthrp acetblr/prox fem prostc agrft/algrft (Right, 02/27/2019); Nasal septum surgery (2008); Carpal tunnel release (Bilateral); Total hip arthroplasty (Left); Hysterectomy; Tonsillectomy; Joint replacement (Left); Joint replacement (Right, 04/2019); pr arthrp kne condyle&platu medial&lat compartments (Right, 06/24/2020); Wrist surgery (Right); Knee cartilage surgery (09/10/2009); Replacement total knee (Left, 07/05/2011); Knee Arthroplasty (Left, 01/15/2009); Colonoscopy (04/02/2012); DXA procedure(historical) (10/26/2016, 9/17/2014, 6/18/2007); Mammo (historical) (12/10/2018, 10/30/2017, 10/26/2016); Trigger finger release; and Corneal transplant (Left, 04/11/2022)  ,  family history includes Anxiety disorder in her sister; Arthritis in her mother; Colon cancer in her father; Dementia in her mother; Heart disease in her father and mother; Hypertension in her father, mother, and sister; No Known Problems in her paternal aunt, paternal grandfather, and paternal grandmother; Prostate cancer in her maternal grandfather; Rheum arthritis in her mother; Thyroid disease in her sister  ,   reports that she has quit smoking  Her smoking use included cigarettes   She has never used smokeless tobacco  She reports current alcohol use  She reports that she does not use drugs  ,  is allergic to procaine, adhesive [medical tape], lidocaine, and penicillins     Current Outpatient Medications   Medication Sig Dispense Refill   • aspirin (ECOTRIN LOW STRENGTH) 81 mg EC tablet Take 1 tablet (81 mg total) by mouth daily     • bacitracin-polymyxin b ophthalmic ointment INSTILL A 1/4 INCH INTO LEFT EYE AT BEDTIME     • bisoprolol (ZEBETA) 10 MG tablet TAKE 1 TABLET EVERY DAY 90 tablet 3   • butalbital-acetaminophen-caffeine (FIORICET,ESGIC) -40 mg per tablet TAKE 1 TABLET EVERY 4 HOURS AS NEEDED  FOR  HEADACHES 120 tablet 2   • calcium carbonate (OS-MARLENY) 600 MG tablet Take 600 mg by mouth 2 (two) times a day with meals     • clindamycin (CLEOCIN) 150 mg capsule Prn for dental procedures     • fluorometholone (FML) 0 1 % ophthalmic suspension INSTILL 1 DROP BY OPTHALMIC ROUTE FOUR TIMES DAILY INTO LEFT EYE     • losartan (COZAAR) 100 MG tablet TAKE 1 TABLET EVERY MORNING 90 tablet 3   • methylPREDNISolone 4 MG tablet therapy pack Use as directed on package 21 each 0   • Multiple Vitamins-Minerals (MULTIVITAMIN WITH MINERALS) tablet Take 1 tablet by mouth every morning     • omeprazole (PriLOSEC) 20 mg delayed release capsule TAKE 1 CAPSULE TWICE DAILY 180 capsule 1   • pravastatin (PRAVACHOL) 40 mg tablet TAKE 1 TABLET (40 MG TOTAL) BY MOUTH DAILY 90 tablet 3   • prochlorperazine (COMPAZINE) 5 mg tablet Take 1 tablet (5 mg total) by mouth every 6 (six) hours as needed for nausea or vomiting 30 tablet 5   • spironolactone (ALDACTONE) 50 mg tablet TAKE 1 TABLET DAILY AFTER LUNCH 90 tablet 3     No current facility-administered medications for this visit  Review of Systems   Constitutional: Negative for chills and fever  HENT: Negative for ear pain and sore throat  Eyes: Negative for pain and visual disturbance  Respiratory: Negative for cough and shortness of breath      Cardiovascular: "Negative for chest pain and palpitations  Gastrointestinal: Negative for abdominal pain and vomiting  Genitourinary: Negative for dysuria and hematuria  Musculoskeletal: Negative for arthralgias and back pain  Skin: Negative for color change and rash  Neurological: Negative for seizures and syncope  All other systems reviewed and are negative  Objective:    /84   Pulse 84   Temp (!) 96 3 °F (35 7 °C)   Resp 20   Ht 5' 4\" (1 626 m)   Wt 81 2 kg (179 lb)   BMI 30 73 kg/m²   Body mass index is 30 73 kg/m²  Physical Exam  Constitutional:       Appearance: Normal appearance  She is well-developed  HENT:      Head: Normocephalic and atraumatic  Right Ear: Tympanic membrane, ear canal and external ear normal       Left Ear: Tympanic membrane, ear canal and external ear normal       Nose: Nose normal       Mouth/Throat:      Mouth: Mucous membranes are moist       Pharynx: Oropharynx is clear  Eyes:      Extraocular Movements: Extraocular movements intact  Conjunctiva/sclera: Conjunctivae normal       Pupils: Pupils are equal, round, and reactive to light  Cardiovascular:      Rate and Rhythm: Normal rate and regular rhythm  Pulses: Normal pulses  Heart sounds: Normal heart sounds  Pulmonary:      Effort: Pulmonary effort is normal       Breath sounds: Normal breath sounds  Abdominal:      General: Abdomen is flat  Bowel sounds are normal       Palpations: Abdomen is soft  Tenderness: There is no abdominal tenderness  Musculoskeletal:         General: Normal range of motion  Cervical back: Normal range of motion and neck supple  Skin:     General: Skin is warm and dry  Capillary Refill: Capillary refill takes less than 2 seconds  Neurological:      General: No focal deficit present  Mental Status: She is alert and oriented to person, place, and time     Psychiatric:         Mood and Affect: Mood normal          Behavior: Behavior " normal          Thought Content:  Thought content normal          Judgment: Judgment normal

## 2023-06-26 DIAGNOSIS — E78.00 HYPERCHOLESTEROLEMIA: ICD-10-CM

## 2023-06-26 RX ORDER — PRAVASTATIN SODIUM 40 MG
TABLET ORAL
Qty: 90 TABLET | Refills: 3 | Status: SHIPPED | OUTPATIENT
Start: 2023-06-26

## 2023-07-14 ENCOUNTER — OFFICE VISIT (OUTPATIENT)
Dept: FAMILY MEDICINE CLINIC | Facility: CLINIC | Age: 78
End: 2023-07-14
Payer: MEDICARE

## 2023-07-14 VITALS
DIASTOLIC BLOOD PRESSURE: 66 MMHG | BODY MASS INDEX: 29.71 KG/M2 | HEIGHT: 64 IN | HEART RATE: 84 BPM | TEMPERATURE: 96.6 F | WEIGHT: 174 LBS | RESPIRATION RATE: 20 BRPM | SYSTOLIC BLOOD PRESSURE: 130 MMHG

## 2023-07-14 DIAGNOSIS — R11.0 NAUSEA: ICD-10-CM

## 2023-07-14 DIAGNOSIS — K21.9 GASTROESOPHAGEAL REFLUX DISEASE WITHOUT ESOPHAGITIS: ICD-10-CM

## 2023-07-14 DIAGNOSIS — I10 PRIMARY HYPERTENSION: Primary | ICD-10-CM

## 2023-07-14 DIAGNOSIS — E26.9 HYPERALDOSTERONISM (HCC): ICD-10-CM

## 2023-07-14 DIAGNOSIS — M51.36 DDD (DEGENERATIVE DISC DISEASE), LUMBAR: ICD-10-CM

## 2023-07-14 DIAGNOSIS — Z96.89 S/P INSERTION OF SPINAL CORD STIMULATOR: ICD-10-CM

## 2023-07-14 DIAGNOSIS — E78.00 HYPERCHOLESTEROLEMIA: ICD-10-CM

## 2023-07-14 DIAGNOSIS — G44.209 MUSCLE TENSION HEADACHE: ICD-10-CM

## 2023-07-14 DIAGNOSIS — N18.31 STAGE 3A CHRONIC KIDNEY DISEASE (HCC): ICD-10-CM

## 2023-07-14 PROCEDURE — 99214 OFFICE O/P EST MOD 30 MIN: CPT | Performed by: FAMILY MEDICINE

## 2023-07-14 RX ORDER — PROCHLORPERAZINE MALEATE 5 MG/1
5 TABLET ORAL EVERY 6 HOURS PRN
Qty: 90 TABLET | Refills: 1 | Status: SHIPPED | OUTPATIENT
Start: 2023-07-14

## 2023-07-14 RX ORDER — BUTALBITAL, ACETAMINOPHEN AND CAFFEINE 50; 325; 40 MG/1; MG/1; MG/1
1 TABLET ORAL EVERY 6 HOURS PRN
Qty: 120 TABLET | Refills: 2 | Status: SHIPPED | OUTPATIENT
Start: 2023-07-14

## 2023-07-14 NOTE — PROGRESS NOTES
Assessment/Plan: Hypertensive cardiovascular disease with a blood pressure controlled on current regimen    Hyperaldosteronism treated with Aldactone    Chronic kidney disease stage IIIa stable    GERD without esophagitis Prilosec 20 mg minimizes symptoms. Nausea is treated with Compazine    Muscle tension headaches are treated with Fioricet the PDMP has been reviewed no issues found no evidence of diversion's or abuse    Hyperlipidemia treated with Pravachol 40 mg laboratories pending    Cervical spondylosis without myelopathy    Degenerative disc disease of the lumbar spine with lumbar spondylosis post spinal cord stimulator insertion        Problem List Items Addressed This Visit        Digestive    Gastroesophageal reflux disease without esophagitis       Endocrine    Hyperaldosteronism ([address])       Cardiovascular and Mediastinum    Hypertension - Primary       Musculoskeletal and Integument    DDD (degenerative disc disease), lumbar       Genitourinary    Stage 3a chronic kidney disease ([address])       Other    Hypercholesterolemia    S/P insertion of spinal cord stimulator   Other Visit Diagnoses     Nausea        Relevant Medications    prochlorperazine (COMPAZINE) 5 mg tablet    Muscle tension headache        Relevant Medications    butalbital-acetaminophen-caffeine (FIORICET,ESGIC) -40 mg per tablet           Diagnoses and all orders for this visit:    Primary hypertension    Hyperaldosteronism (HCC)    Stage 3a chronic kidney disease ([address])    Gastroesophageal reflux disease without esophagitis    Nausea  -     prochlorperazine (COMPAZINE) 5 mg tablet; Take 1 tablet (5 mg total) by mouth every 6 (six) hours as needed for nausea or vomiting    Muscle tension headache  -     butalbital-acetaminophen-caffeine (FIORICET,ESGIC) -40 mg per tablet;  Take 1 tablet by mouth every 6 (six) hours as needed for migraine    Hypercholesterolemia    DDD (degenerative disc disease), lumbar    S/P insertion of spinal cord stimulator        No problem-specific Assessment & Plan notes found for this encounter. PHQ-2/9 Depression Screening    Little interest or pleasure in doing things: 0 - not at all  Feeling down, depressed, or hopeless: 0 - not at all  PHQ-2 Score: 0  PHQ-2 Interpretation: Negative depression screen          Body mass index is 29.87 kg/m². BMI Counseling: Body mass index is 29.87 kg/m². The BMI     Subjective:      Patient ID: Lynne Paiz is a 70 y.o. female. Patient presents for 4-month checkup      The following portions of the patient's history were reviewed and updated as appropriate:   She has a past medical history of Allergies, Anxiety, Arthritis, Benign arteriolar nephrosclerosis, Bereavement, Cataract, Chronic kidney disease, Chronic kidney disease in type 2 diabetes mellitus ([address]), Chronic kidney disease, stage 2 (mild), Chronic pain disorder, Chronic pain syndrome, COPD (chronic obstructive pulmonary disease) ([address]), DDD (degenerative disc disease), cervical, DDD (degenerative disc disease), lumbar, Degenerative joint disease of right hip, Depression, Diastolic dysfunction, DJD (degenerative joint disease), ankle and foot, right, DJD of right shoulder, Edema, Fracture of left calcaneus, Generalized anxiety disorder, GERD (gastroesophageal reflux disease), Heart murmur, Hyperaldosteronism ([address]), Hyperlipidemia, Hypertension, Hypertensive nephrosclerosis, Hypo-osmolality and hyponatremia, IBS (irritable bowel syndrome), Insomnia, Migraines, Mitral regurgitation, MVP (mitral valve prolapse), Obstructive sleep apnea syndrome, Osteoarthritis, Osteoarthritis, Pernicious anemia, Plantar fascia rupture, Rheumatoid arthritis ([address]), Right knee DJD, Shingles, Sinobronchitis, Sleep apnea, Stroke ([address]), TIA (transient ischemic attack), and Vertigo. ,  does not have any pertinent problems on file. ,   has a past surgical history that includes Appendectomy; Sinus surgery;  Insert / replace peripheral neurostimulator pulse generator /  (Left); pr colonoscopy flx dx w/collj spec when pfrmd (N/A, 04/24/2017); NERVE BLOCK (Left, 02/20/2018); NERVE BLOCK (Left, 03/06/2018); Radiofrequency ablation (Left, 04/17/2018); Colonoscopy; Upper gastrointestinal endoscopy (01/12/2007); Knee arthroscopy (Bilateral); Hand surgery (Right); pr arthrp acetblr/prox fem prostc agrft/algrft (Right, 02/27/2019); Nasal septum surgery (2008); Carpal tunnel release (Bilateral); Total hip arthroplasty (Left); Hysterectomy; Tonsillectomy; Joint replacement (Left); Joint replacement (Right, 04/2019); pr arthrp kne condyle&platu medial&lat compartments (Right, 06/24/2020); Wrist surgery (Right); Knee cartilage surgery (09/10/2009); Replacement total knee (Left, 07/05/2011); Knee Arthroplasty (Left, 01/15/2009); Colonoscopy (04/02/2012); DXA procedure(historical) (10/26/2016, 9/17/2014, 6/18/2007); Mammo (historical) (12/10/2018, 10/30/2017, 10/26/2016); Trigger finger release; and Corneal transplant (Left, 04/11/2022). ,  family history includes Anxiety disorder in her sister; Arthritis in her mother; Colon cancer in her father; Dementia in her mother; Heart disease in her father and mother; Hypertension in her father, mother, and sister; No Known Problems in her paternal aunt, paternal grandfather, and paternal grandmother; Prostate cancer in her maternal grandfather; Rheum arthritis in her mother; Thyroid disease in her sister. ,   reports that she has quit smoking. Her smoking use included cigarettes. She has never used smokeless tobacco. She reports current alcohol use. She reports that she does not use drugs. ,  is allergic to procaine, adhesive [medical tape], lidocaine, and penicillins. .  Current Outpatient Medications   Medication Sig Dispense Refill   • butalbital-acetaminophen-caffeine (FIORICET,ESGIC) -40 mg per tablet Take 1 tablet by mouth every 6 (six) hours as needed for migraine 120 tablet 2   • prochlorperazine (COMPAZINE) 5 mg tablet Take 1 tablet (5 mg total) by mouth every 6 (six) hours as needed for nausea or vomiting 90 tablet 1   • aspirin (ECOTRIN LOW STRENGTH) 81 mg EC tablet Take 1 tablet (81 mg total) by mouth daily     • bacitracin-polymyxin b ophthalmic ointment INSTILL A 1/4 INCH INTO LEFT EYE AT BEDTIME     • bisoprolol (ZEBETA) 10 MG tablet TAKE 1 TABLET EVERY DAY 90 tablet 3   • calcium carbonate (OS-MARLENY) 600 MG tablet Take 600 mg by mouth 2 (two) times a day with meals     • clindamycin (CLEOCIN) 150 mg capsule Prn for dental procedures     • fluorometholone (FML) 0.1 % ophthalmic suspension INSTILL 1 DROP BY OPTHALMIC ROUTE FOUR TIMES DAILY INTO LEFT EYE     • losartan (COZAAR) 100 MG tablet TAKE 1 TABLET EVERY MORNING 90 tablet 3   • methylPREDNISolone 4 MG tablet therapy pack Use as directed on package (Patient not taking: Reported on 7/14/2023) 21 each 0   • Multiple Vitamins-Minerals (MULTIVITAMIN WITH MINERALS) tablet Take 1 tablet by mouth every morning     • omeprazole (PriLOSEC) 20 mg delayed release capsule TAKE 1 CAPSULE TWICE DAILY 180 capsule 1   • pravastatin (PRAVACHOL) 40 mg tablet TAKE 1 TABLET (40 MG TOTAL) DAILY 90 tablet 3   • spironolactone (ALDACTONE) 50 mg tablet TAKE 1 TABLET DAILY AFTER LUNCH 90 tablet 3     No current facility-administered medications for this visit. Review of Systems   Constitutional: Negative for chills and fever. HENT: Negative for ear pain and sore throat. Eyes: Negative for pain and visual disturbance. Respiratory: Negative for cough and shortness of breath. Cardiovascular: Negative for chest pain and palpitations. Gastrointestinal: Negative for abdominal pain and vomiting. Genitourinary: Negative for dysuria and hematuria. Musculoskeletal: Negative for arthralgias and back pain. Skin: Negative for color change and rash. Neurological: Negative for seizures and syncope. All other systems reviewed and are negative. Objective:    /66   Pulse 84   Temp (!) 96.6 °F (35.9 °C)   Resp 20   Ht 5' 4" (1.626 m)   Wt 78.9 kg (174 lb)   BMI 29.87 kg/m²   Body mass index is 29.87 kg/m². Physical Exam  Constitutional:       Appearance: Normal appearance. She is well-developed. HENT:      Head: Normocephalic and atraumatic. Right Ear: Tympanic membrane, ear canal and external ear normal.      Left Ear: Tympanic membrane, ear canal and external ear normal.      Nose: Nose normal.      Mouth/Throat:      Mouth: Mucous membranes are moist.      Pharynx: Oropharynx is clear. Eyes:      Extraocular Movements: Extraocular movements intact. Conjunctiva/sclera: Conjunctivae normal.      Pupils: Pupils are equal, round, and reactive to light. Cardiovascular:      Rate and Rhythm: Normal rate and regular rhythm. Pulses: Normal pulses. Heart sounds: Normal heart sounds. Pulmonary:      Effort: Pulmonary effort is normal.      Breath sounds: Normal breath sounds. Abdominal:      General: Abdomen is flat. Bowel sounds are normal.      Palpations: Abdomen is soft. Tenderness: There is no abdominal tenderness. Musculoskeletal:         General: Normal range of motion. Cervical back: Normal range of motion and neck supple. Skin:     General: Skin is warm and dry. Capillary Refill: Capillary refill takes less than 2 seconds. Neurological:      General: No focal deficit present. Mental Status: She is alert and oriented to person, place, and time. Psychiatric:         Mood and Affect: Mood normal.         Behavior: Behavior normal.         Thought Content:  Thought content normal.         Judgment: Judgment normal.

## 2023-08-25 ENCOUNTER — TELEPHONE (OUTPATIENT)
Dept: FAMILY MEDICINE CLINIC | Facility: CLINIC | Age: 78
End: 2023-08-25

## 2023-08-25 DIAGNOSIS — R11.0 NAUSEA: ICD-10-CM

## 2023-08-25 DIAGNOSIS — E87.1 SODIUM DEFICIENCY: Primary | ICD-10-CM

## 2023-08-25 RX ORDER — PROCHLORPERAZINE MALEATE 5 MG/1
5 TABLET ORAL EVERY 6 HOURS PRN
Qty: 90 TABLET | Refills: 1 | Status: SHIPPED | OUTPATIENT
Start: 2023-08-25

## 2023-08-25 NOTE — TELEPHONE ENCOUNTER
Patient called; c/o lightheadedness. Patient states its usually her first sign that her sodium has dropped. Patient requesting bloodwork.

## 2023-08-26 ENCOUNTER — APPOINTMENT (OUTPATIENT)
Dept: LAB | Facility: HOSPITAL | Age: 78
End: 2023-08-26
Payer: MEDICARE

## 2023-08-26 DIAGNOSIS — E87.1 SODIUM DEFICIENCY: ICD-10-CM

## 2023-08-26 LAB
ALBUMIN SERPL BCP-MCNC: 4.3 G/DL (ref 3.5–5)
ALP SERPL-CCNC: 124 U/L (ref 34–104)
ALT SERPL W P-5'-P-CCNC: 18 U/L (ref 7–52)
ANION GAP SERPL CALCULATED.3IONS-SCNC: 9 MMOL/L
AST SERPL W P-5'-P-CCNC: 20 U/L (ref 13–39)
BILIRUB SERPL-MCNC: 0.38 MG/DL (ref 0.2–1)
BUN SERPL-MCNC: 12 MG/DL (ref 5–25)
CALCIUM SERPL-MCNC: 9.4 MG/DL (ref 8.4–10.2)
CHLORIDE SERPL-SCNC: 93 MMOL/L (ref 96–108)
CO2 SERPL-SCNC: 26 MMOL/L (ref 21–32)
CREAT SERPL-MCNC: 0.94 MG/DL (ref 0.6–1.3)
GFR SERPL CREATININE-BSD FRML MDRD: 59 ML/MIN/1.73SQ M
GLUCOSE P FAST SERPL-MCNC: 93 MG/DL (ref 65–99)
POTASSIUM SERPL-SCNC: 4.4 MMOL/L (ref 3.5–5.3)
PROT SERPL-MCNC: 6.8 G/DL (ref 6.4–8.4)
SODIUM SERPL-SCNC: 128 MMOL/L (ref 135–147)

## 2023-08-26 PROCEDURE — 36415 COLL VENOUS BLD VENIPUNCTURE: CPT

## 2023-08-26 PROCEDURE — 80053 COMPREHEN METABOLIC PANEL: CPT

## 2023-08-28 ENCOUNTER — TELEPHONE (OUTPATIENT)
Dept: FAMILY MEDICINE CLINIC | Facility: CLINIC | Age: 78
End: 2023-08-28

## 2023-09-07 ENCOUNTER — OFFICE VISIT (OUTPATIENT)
Dept: FAMILY MEDICINE CLINIC | Facility: CLINIC | Age: 78
End: 2023-09-07
Payer: MEDICARE

## 2023-09-07 VITALS
SYSTOLIC BLOOD PRESSURE: 130 MMHG | WEIGHT: 173 LBS | BODY MASS INDEX: 29.53 KG/M2 | DIASTOLIC BLOOD PRESSURE: 72 MMHG | TEMPERATURE: 97.9 F | HEIGHT: 64 IN | HEART RATE: 84 BPM | RESPIRATION RATE: 20 BRPM

## 2023-09-07 DIAGNOSIS — I10 PRIMARY HYPERTENSION: ICD-10-CM

## 2023-09-07 DIAGNOSIS — G44.209 MUSCLE TENSION HEADACHE: Primary | ICD-10-CM

## 2023-09-07 DIAGNOSIS — E87.1 HYPONATREMIA: ICD-10-CM

## 2023-09-07 DIAGNOSIS — Z96.642 HISTORY OF TOTAL HIP ARTHROPLASTY, LEFT: ICD-10-CM

## 2023-09-07 DIAGNOSIS — J01.01 ACUTE RECURRENT MAXILLARY SINUSITIS: ICD-10-CM

## 2023-09-07 DIAGNOSIS — N18.31 STAGE 3A CHRONIC KIDNEY DISEASE (HCC): ICD-10-CM

## 2023-09-07 PROCEDURE — 99214 OFFICE O/P EST MOD 30 MIN: CPT | Performed by: FAMILY MEDICINE

## 2023-09-07 RX ORDER — METHYLPREDNISOLONE 4 MG/1
TABLET ORAL
Qty: 21 EACH | Refills: 0 | Status: SHIPPED | OUTPATIENT
Start: 2023-09-07 | End: 2023-09-13 | Stop reason: SDUPTHER

## 2023-09-07 RX ORDER — AZITHROMYCIN 250 MG/1
TABLET, FILM COATED ORAL
Qty: 6 TABLET | Refills: 0 | Status: SHIPPED | OUTPATIENT
Start: 2023-09-07 | End: 2023-09-12

## 2023-09-07 NOTE — PROGRESS NOTES
Assessment/Plan: Acute recurrent bilateral maxillary sinusitis we will provide a Medrol Dosepak along with Zithromax Z-SAUMYA    Chronic muscle tension headache Fioricet is effective therapy    Hyponatremia and chronic kidney disease 3A under the care of nephrology        Problem List Items Addressed This Visit        Cardiovascular and Mediastinum    Hypertension       Genitourinary    Stage 3a chronic kidney disease ([address])       Other    Hyponatremia   Other Visit Diagnoses     Muscle tension headache    -  Primary    Acute recurrent maxillary sinusitis        Relevant Medications    methylPREDNISolone 4 MG tablet therapy pack    azithromycin (Zithromax) 250 mg tablet    History of total hip arthroplasty, left               Diagnoses and all orders for this visit:    Muscle tension headache    Acute recurrent maxillary sinusitis  -     methylPREDNISolone 4 MG tablet therapy pack; Use as directed on package  -     azithromycin (Zithromax) 250 mg tablet; Take 2 tablets (500 mg total) by mouth daily for 1 day, THEN 1 tablet (250 mg total) daily for 4 days. Primary hypertension    Stage 3a chronic kidney disease (HCC)    Hyponatremia    History of total hip arthroplasty, left        No problem-specific Assessment & Plan notes found for this encounter. PHQ-2/9 Depression Screening            Body mass index is 29.7 kg/m². BMI Counseling: Body mass index is 29.7 kg/m². The BMI     Subjective:      Patient ID: Rosalia Gallagher is a 70 y.o. female.     Patient presents with a chief complaint of daily headaches going on 2 to 3 weeks      The following portions of the patient's history were reviewed and updated as appropriate:   She has a past medical history of Allergies, Anxiety, Arthritis, Benign arteriolar nephrosclerosis, Bereavement, Cataract, Chronic kidney disease, Chronic kidney disease in type 2 diabetes mellitus ([address]), Chronic kidney disease, stage 2 (mild), Chronic pain disorder, Chronic pain syndrome, COPD (chronic obstructive pulmonary disease) (HCC), DDD (degenerative disc disease), cervical, DDD (degenerative disc disease), lumbar, Degenerative joint disease of right hip, Depression, Diastolic dysfunction, DJD (degenerative joint disease), ankle and foot, right, DJD of right shoulder, Edema, Fracture of left calcaneus, Generalized anxiety disorder, GERD (gastroesophageal reflux disease), Heart murmur, Hyperaldosteronism ([address]), Hyperlipidemia, Hypertension, Hypertensive nephrosclerosis, Hypo-osmolality and hyponatremia, IBS (irritable bowel syndrome), Insomnia, Migraines, Mitral regurgitation, MVP (mitral valve prolapse), Obstructive sleep apnea syndrome, Osteoarthritis, Osteoarthritis, Pernicious anemia, Plantar fascia rupture, Rheumatoid arthritis ([address]), Right knee DJD, Shingles, Sinobronchitis, Sleep apnea, Stroke ([address]), TIA (transient ischemic attack), and Vertigo. ,  does not have any pertinent problems on file. ,   has a past surgical history that includes Appendectomy; Sinus surgery; Insert / replace peripheral neurostimulator pulse generator /  (Left); pr colonoscopy flx dx w/collj spec when pfrmd (N/A, 04/24/2017); NERVE BLOCK (Left, 02/20/2018); NERVE BLOCK (Left, 03/06/2018); Radiofrequency ablation (Left, 04/17/2018); Colonoscopy; Upper gastrointestinal endoscopy (01/12/2007); Knee arthroscopy (Bilateral); Hand surgery (Right); pr arthrp acetblr/prox fem prostc agrft/algrft (Right, 02/27/2019); Nasal septum surgery (2008); Carpal tunnel release (Bilateral); Total hip arthroplasty (Left); Hysterectomy; Tonsillectomy; Joint replacement (Left); Joint replacement (Right, 04/2019); pr arthrp kne condyle&platu medial&lat compartments (Right, 06/24/2020); Wrist surgery (Right); Knee cartilage surgery (09/10/2009); Replacement total knee (Left, 07/05/2011); Knee Arthroplasty (Left, 01/15/2009); Colonoscopy (04/02/2012); DXA procedure(historical) (10/26/2016, 9/17/2014, 6/18/2007);  Mammo (historical) (12/10/2018, 10/30/2017, 10/26/2016); Trigger finger release; and Corneal transplant (Left, 04/11/2022). ,  family history includes Anxiety disorder in her sister; Arthritis in her mother; Colon cancer in her father; Dementia in her mother; Heart disease in her father and mother; Hypertension in her father, mother, and sister; No Known Problems in her paternal aunt, paternal grandfather, and paternal grandmother; Prostate cancer in her maternal grandfather; Rheum arthritis in her mother; Thyroid disease in her sister. ,   reports that she has quit smoking. Her smoking use included cigarettes. She has never used smokeless tobacco. She reports current alcohol use. She reports that she does not use drugs. ,  is allergic to procaine, adhesive [medical tape], lidocaine, and penicillins. .  Current Outpatient Medications   Medication Sig Dispense Refill   • azithromycin (Zithromax) 250 mg tablet Take 2 tablets (500 mg total) by mouth daily for 1 day, THEN 1 tablet (250 mg total) daily for 4 days.  6 tablet 0   • methylPREDNISolone 4 MG tablet therapy pack Use as directed on package 21 each 0   • aspirin (ECOTRIN LOW STRENGTH) 81 mg EC tablet Take 1 tablet (81 mg total) by mouth daily     • bacitracin-polymyxin b ophthalmic ointment INSTILL A 1/4 INCH INTO LEFT EYE AT BEDTIME     • bisoprolol (ZEBETA) 10 MG tablet TAKE 1 TABLET EVERY DAY 90 tablet 3   • butalbital-acetaminophen-caffeine (FIORICET,ESGIC) -40 mg per tablet Take 1 tablet by mouth every 6 (six) hours as needed for migraine 120 tablet 2   • calcium carbonate (OS-MARLENY) 600 MG tablet Take 600 mg by mouth 2 (two) times a day with meals     • clindamycin (CLEOCIN) 150 mg capsule Prn for dental procedures     • fluorometholone (FML) 0.1 % ophthalmic suspension INSTILL 1 DROP BY OPTHALMIC ROUTE FOUR TIMES DAILY INTO LEFT EYE     • losartan (COZAAR) 100 MG tablet TAKE 1 TABLET EVERY MORNING 90 tablet 3   • Multiple Vitamins-Minerals (MULTIVITAMIN WITH MINERALS) tablet Take 1 tablet by mouth every morning     • omeprazole (PriLOSEC) 20 mg delayed release capsule TAKE 1 CAPSULE TWICE DAILY 180 capsule 1   • pravastatin (PRAVACHOL) 40 mg tablet TAKE 1 TABLET (40 MG TOTAL) DAILY 90 tablet 3   • prochlorperazine (COMPAZINE) 5 mg tablet TAKE 1 TABLET BY MOUTH EVERY 6 (SIX) HOURS AS NEEDED FOR NAUSEA OR VOMITING 90 tablet 1   • spironolactone (ALDACTONE) 50 mg tablet TAKE 1 TABLET DAILY AFTER LUNCH 90 tablet 3     No current facility-administered medications for this visit. Review of Systems   Constitutional: Negative for chills and fever. HENT: Positive for congestion. Negative for ear pain and sore throat. Eyes: Negative for pain and visual disturbance. Respiratory: Negative for cough and shortness of breath. Cardiovascular: Negative for chest pain and palpitations. Gastrointestinal: Negative for abdominal pain and vomiting. Genitourinary: Negative for dysuria and hematuria. Musculoskeletal: Negative for arthralgias and back pain. Skin: Negative for color change and rash. Neurological: Positive for headaches. Negative for seizures and syncope. All other systems reviewed and are negative. Objective:    /72   Pulse 84   Temp 97.9 °F (36.6 °C)   Resp 20   Ht 5' 4" (1.626 m)   Wt 78.5 kg (173 lb)   BMI 29.70 kg/m²   Body mass index is 29.7 kg/m². Physical Exam  Constitutional:       Appearance: Normal appearance. She is well-developed. HENT:      Head: Normocephalic and atraumatic. Right Ear: Tympanic membrane, ear canal and external ear normal.      Left Ear: Tympanic membrane, ear canal and external ear normal.      Nose:      Right Sinus: Maxillary sinus tenderness present. Left Sinus: Maxillary sinus tenderness present. Mouth/Throat:      Mouth: Mucous membranes are moist.      Pharynx: Oropharynx is clear. Eyes:      Extraocular Movements: Extraocular movements intact.       Conjunctiva/sclera: Conjunctivae normal. Pupils: Pupils are equal, round, and reactive to light. Cardiovascular:      Rate and Rhythm: Normal rate and regular rhythm. Pulses: Normal pulses. Heart sounds: Normal heart sounds. Pulmonary:      Effort: Pulmonary effort is normal.      Breath sounds: Normal breath sounds. Abdominal:      General: Abdomen is flat. Bowel sounds are normal.      Palpations: Abdomen is soft. Tenderness: There is no abdominal tenderness. Musculoskeletal:         General: Normal range of motion. Cervical back: Normal range of motion and neck supple. Skin:     General: Skin is warm and dry. Capillary Refill: Capillary refill takes less than 2 seconds. Neurological:      General: No focal deficit present. Mental Status: She is alert and oriented to person, place, and time. Psychiatric:         Mood and Affect: Mood normal.         Behavior: Behavior normal.         Thought Content:  Thought content normal.         Judgment: Judgment normal.

## 2023-09-13 ENCOUNTER — TELEPHONE (OUTPATIENT)
Dept: FAMILY MEDICINE CLINIC | Facility: CLINIC | Age: 78
End: 2023-09-13

## 2023-09-13 DIAGNOSIS — J01.01 ACUTE RECURRENT MAXILLARY SINUSITIS: ICD-10-CM

## 2023-09-13 RX ORDER — AZITHROMYCIN 250 MG/1
TABLET, FILM COATED ORAL
Qty: 6 TABLET | Refills: 0 | Status: SHIPPED | OUTPATIENT
Start: 2023-09-13 | End: 2023-09-13

## 2023-09-13 RX ORDER — AZITHROMYCIN 250 MG/1
TABLET, FILM COATED ORAL
Qty: 6 TABLET | Refills: 0 | Status: SHIPPED | OUTPATIENT
Start: 2023-09-13 | End: 2023-09-18

## 2023-09-13 RX ORDER — METHYLPREDNISOLONE 4 MG/1
TABLET ORAL
Qty: 21 EACH | Refills: 0 | Status: SHIPPED | OUTPATIENT
Start: 2023-09-13 | End: 2023-09-13

## 2023-09-13 RX ORDER — METHYLPREDNISOLONE 4 MG/1
TABLET ORAL
Qty: 21 EACH | Refills: 0 | Status: SHIPPED | OUTPATIENT
Start: 2023-09-13

## 2023-09-13 NOTE — TELEPHONE ENCOUNTER
Patient states she just finished the zpak and medrol, she felt a little better while she was on it but now she is back to feeling terrible in the head again. ......  advice

## 2023-09-25 ENCOUNTER — OFFICE VISIT (OUTPATIENT)
Dept: FAMILY MEDICINE CLINIC | Facility: CLINIC | Age: 78
End: 2023-09-25
Payer: MEDICARE

## 2023-09-25 VITALS
TEMPERATURE: 97.8 F | SYSTOLIC BLOOD PRESSURE: 170 MMHG | OXYGEN SATURATION: 99 % | HEART RATE: 55 BPM | DIASTOLIC BLOOD PRESSURE: 80 MMHG

## 2023-09-25 DIAGNOSIS — J01.01 ACUTE RECURRENT MAXILLARY SINUSITIS: Primary | ICD-10-CM

## 2023-09-25 DIAGNOSIS — I10 PRIMARY HYPERTENSION: ICD-10-CM

## 2023-09-25 DIAGNOSIS — R03.0 ELEVATED BLOOD PRESSURE READING: ICD-10-CM

## 2023-09-25 DIAGNOSIS — E26.9 HYPERALDOSTERONISM (HCC): ICD-10-CM

## 2023-09-25 DIAGNOSIS — N18.31 STAGE 3A CHRONIC KIDNEY DISEASE (HCC): ICD-10-CM

## 2023-09-25 PROCEDURE — 99213 OFFICE O/P EST LOW 20 MIN: CPT | Performed by: FAMILY MEDICINE

## 2023-09-25 RX ORDER — CLARITHROMYCIN 500 MG/1
500 TABLET, COATED ORAL EVERY 12 HOURS SCHEDULED
Qty: 20 TABLET | Refills: 0 | Status: SHIPPED | OUTPATIENT
Start: 2023-09-25 | End: 2023-10-05

## 2023-09-25 RX ORDER — PREDNISONE 10 MG/1
20 TABLET ORAL 2 TIMES DAILY WITH MEALS
Qty: 10 TABLET | Refills: 1 | Status: SHIPPED | OUTPATIENT
Start: 2023-09-25 | End: 2023-09-25

## 2023-09-25 RX ORDER — PREDNISONE 10 MG/1
20 TABLET ORAL 2 TIMES DAILY WITH MEALS
Qty: 10 TABLET | Refills: 1 | Status: SHIPPED | OUTPATIENT
Start: 2023-09-25 | End: 2023-09-26 | Stop reason: SDUPTHER

## 2023-09-25 RX ORDER — CLARITHROMYCIN 500 MG/1
500 TABLET, COATED ORAL EVERY 12 HOURS SCHEDULED
Qty: 20 TABLET | Refills: 0 | Status: SHIPPED | OUTPATIENT
Start: 2023-09-25 | End: 2023-09-25

## 2023-09-25 NOTE — PROGRESS NOTES
Assessment/Plan: Subacute maxillary sinusitis we will provide Biaxin 500 twice daily and prednisone 20 mg twice daily with food    Hypertensive cardiovascular disease with a blood pressure elevated 170/80 at today's visit the patient was advised to keep an eye on her blood pressure and contact us if it remains elevated. The patient currently is on Zebeta 10 mg Cozaar 100 mg and Aldactone 50 mg nephrology    Problem List Items Addressed This Visit        Cardiovascular and Mediastinum    Hypertension       Genitourinary    Stage 3a chronic kidney disease ([address])   Other Visit Diagnoses     Acute recurrent maxillary sinusitis    -  Primary    Relevant Medications    predniSONE 10 mg tablet    clarithromycin (BIAXIN) 500 mg tablet    Other Relevant Orders    XR sinuses < 3 vw           Diagnoses and all orders for this visit:    Acute recurrent maxillary sinusitis  -     predniSONE 10 mg tablet; Take 2 tablets (20 mg total) by mouth 2 (two) times a day with meals  -     clarithromycin (BIAXIN) 500 mg tablet; Take 1 tablet (500 mg total) by mouth every 12 (twelve) hours for 10 days  -     XR sinuses < 3 vw; Future    Primary hypertension    Stage 3a chronic kidney disease ([address])        No problem-specific Assessment & Plan notes found for this encounter. PHQ-2/9 Depression Screening            There is no height or weight on file to calculate BMI. BMI Counseling: There is no height or weight on file to calculate BMI. The BMI     Subjective:      Patient ID: Reese Alan is a 70 y.o. female.     Patient continues with sinus congestion and pressure after doing a Medrol Dosepak and to Zithromax Z-SAUMYA      The following portions of the patient's history were reviewed and updated as appropriate:   She has a past medical history of Allergies, Anxiety, Arthritis, Benign arteriolar nephrosclerosis, Bereavement, Cataract, Chronic kidney disease, Chronic kidney disease in type 2 diabetes mellitus ([address]), Chronic kidney disease, stage 2 (mild), Chronic pain disorder, Chronic pain syndrome, COPD (chronic obstructive pulmonary disease) (HCC), DDD (degenerative disc disease), cervical, DDD (degenerative disc disease), lumbar, Degenerative joint disease of right hip, Depression, Diastolic dysfunction, DJD (degenerative joint disease), ankle and foot, right, DJD of right shoulder, Edema, Fracture of left calcaneus, Generalized anxiety disorder, GERD (gastroesophageal reflux disease), Heart murmur, Hyperaldosteronism ([address]), Hyperlipidemia, Hypertension, Hypertensive nephrosclerosis, Hypo-osmolality and hyponatremia, IBS (irritable bowel syndrome), Insomnia, Migraines, Mitral regurgitation, MVP (mitral valve prolapse), Obstructive sleep apnea syndrome, Osteoarthritis, Osteoarthritis, Pernicious anemia, Plantar fascia rupture, Rheumatoid arthritis ([address]), Right knee DJD, Shingles, Sinobronchitis, Sleep apnea, Stroke ([address]), TIA (transient ischemic attack), and Vertigo. ,  does not have any pertinent problems on file. ,   has a past surgical history that includes Appendectomy; Sinus surgery; Insert / replace peripheral neurostimulator pulse generator /  (Left); pr colonoscopy flx dx w/collj spec when pfrmd (N/A, 04/24/2017); NERVE BLOCK (Left, 02/20/2018); NERVE BLOCK (Left, 03/06/2018); Radiofrequency ablation (Left, 04/17/2018); Colonoscopy; Upper gastrointestinal endoscopy (01/12/2007); Knee arthroscopy (Bilateral); Hand surgery (Right); pr arthrp acetblr/prox fem prostc agrft/algrft (Right, 02/27/2019); Nasal septum surgery (2008); Carpal tunnel release (Bilateral); Total hip arthroplasty (Left); Hysterectomy; Tonsillectomy; Joint replacement (Left); Joint replacement (Right, 04/2019); pr arthrp kne condyle&platu medial&lat compartments (Right, 06/24/2020); Wrist surgery (Right); Knee cartilage surgery (09/10/2009); Replacement total knee (Left, 07/05/2011); Knee Arthroplasty (Left, 01/15/2009); Colonoscopy (04/02/2012);  DXA procedure(historical) (10/26/2016, 9/17/2014, 6/18/2007); Mammo (historical) (12/10/2018, 10/30/2017, 10/26/2016); Trigger finger release; and Corneal transplant (Left, 04/11/2022). ,  family history includes Anxiety disorder in her sister; Arthritis in her mother; Colon cancer in her father; Dementia in her mother; Heart disease in her father and mother; Hypertension in her father, mother, and sister; No Known Problems in her paternal aunt, paternal grandfather, and paternal grandmother; Prostate cancer in her maternal grandfather; Rheum arthritis in her mother; Thyroid disease in her sister. ,   reports that she has quit smoking. Her smoking use included cigarettes. She has never used smokeless tobacco. She reports current alcohol use. She reports that she does not use drugs. ,  is allergic to procaine, adhesive [medical tape], lidocaine, and penicillins. .  Current Outpatient Medications   Medication Sig Dispense Refill   • clarithromycin (BIAXIN) 500 mg tablet Take 1 tablet (500 mg total) by mouth every 12 (twelve) hours for 10 days 20 tablet 0   • predniSONE 10 mg tablet Take 2 tablets (20 mg total) by mouth 2 (two) times a day with meals 10 tablet 1   • aspirin (ECOTRIN LOW STRENGTH) 81 mg EC tablet Take 1 tablet (81 mg total) by mouth daily     • bacitracin-polymyxin b ophthalmic ointment INSTILL A 1/4 INCH INTO LEFT EYE AT BEDTIME     • bisoprolol (ZEBETA) 10 MG tablet TAKE 1 TABLET EVERY DAY 90 tablet 3   • butalbital-acetaminophen-caffeine (FIORICET,ESGIC) -40 mg per tablet Take 1 tablet by mouth every 6 (six) hours as needed for migraine 120 tablet 2   • calcium carbonate (OS-MARLENY) 600 MG tablet Take 600 mg by mouth 2 (two) times a day with meals     • clindamycin (CLEOCIN) 150 mg capsule Prn for dental procedures     • fluorometholone (FML) 0.1 % ophthalmic suspension INSTILL 1 DROP BY OPTHALMIC ROUTE FOUR TIMES DAILY INTO LEFT EYE     • losartan (COZAAR) 100 MG tablet TAKE 1 TABLET EVERY MORNING 90 tablet 3   • methylPREDNISolone 4 MG tablet therapy pack Use as directed on package 21 each 0   • Multiple Vitamins-Minerals (MULTIVITAMIN WITH MINERALS) tablet Take 1 tablet by mouth every morning     • omeprazole (PriLOSEC) 20 mg delayed release capsule TAKE 1 CAPSULE TWICE DAILY 180 capsule 1   • pravastatin (PRAVACHOL) 40 mg tablet TAKE 1 TABLET (40 MG TOTAL) DAILY 90 tablet 3   • prochlorperazine (COMPAZINE) 5 mg tablet TAKE 1 TABLET BY MOUTH EVERY 6 (SIX) HOURS AS NEEDED FOR NAUSEA OR VOMITING 90 tablet 1   • spironolactone (ALDACTONE) 50 mg tablet TAKE 1 TABLET DAILY AFTER LUNCH 90 tablet 3     No current facility-administered medications for this visit. Review of Systems   Constitutional: Negative for chills and fever. HENT: Positive for sinus pressure and sinus pain. Negative for ear pain and sore throat. Eyes: Negative for pain and visual disturbance. Respiratory: Negative for cough and shortness of breath. Cardiovascular: Negative for chest pain and palpitations. Gastrointestinal: Negative for abdominal pain and vomiting. Genitourinary: Negative for dysuria and hematuria. Musculoskeletal: Negative for arthralgias and back pain. Skin: Negative for color change and rash. Neurological: Negative for seizures and syncope. All other systems reviewed and are negative. Objective:    /80 (BP Location: Right arm, Patient Position: Sitting, Cuff Size: Standard)   Pulse 55   Temp 97.8 °F (36.6 °C) (Tympanic)   SpO2 99%   There is no height or weight on file to calculate BMI. Physical Exam  Constitutional:       Appearance: Normal appearance. She is well-developed. HENT:      Head: Normocephalic and atraumatic. Right Ear: Tympanic membrane, ear canal and external ear normal.      Left Ear: Tympanic membrane, ear canal and external ear normal.      Nose:      Right Sinus: Maxillary sinus tenderness present. Left Sinus: Maxillary sinus tenderness present. Mouth/Throat:      Mouth: Mucous membranes are moist.      Pharynx: Oropharynx is clear. Eyes:      Extraocular Movements: Extraocular movements intact. Conjunctiva/sclera: Conjunctivae normal.      Pupils: Pupils are equal, round, and reactive to light. Cardiovascular:      Rate and Rhythm: Normal rate and regular rhythm. Pulses: Normal pulses. Heart sounds: Normal heart sounds. Pulmonary:      Effort: Pulmonary effort is normal.      Breath sounds: Normal breath sounds. Abdominal:      General: Abdomen is flat. Bowel sounds are normal.      Palpations: Abdomen is soft. Tenderness: There is no abdominal tenderness. Musculoskeletal:         General: Normal range of motion. Cervical back: Normal range of motion and neck supple. Skin:     General: Skin is warm and dry. Capillary Refill: Capillary refill takes less than 2 seconds. Neurological:      General: No focal deficit present. Mental Status: She is alert and oriented to person, place, and time. Psychiatric:         Mood and Affect: Mood normal.         Behavior: Behavior normal.         Thought Content:  Thought content normal.         Judgment: Judgment normal.

## 2023-09-26 ENCOUNTER — TELEPHONE (OUTPATIENT)
Dept: FAMILY MEDICINE CLINIC | Facility: CLINIC | Age: 78
End: 2023-09-26

## 2023-09-26 DIAGNOSIS — J01.01 ACUTE RECURRENT MAXILLARY SINUSITIS: ICD-10-CM

## 2023-09-26 RX ORDER — PREDNISONE 10 MG/1
20 TABLET ORAL 2 TIMES DAILY WITH MEALS
Qty: 20 TABLET | Refills: 1 | Status: SHIPPED | OUTPATIENT
Start: 2023-09-26 | End: 2023-09-26

## 2023-09-26 RX ORDER — PREDNISONE 10 MG/1
20 TABLET ORAL 2 TIMES DAILY WITH MEALS
Qty: 20 TABLET | Refills: 1 | Status: SHIPPED | OUTPATIENT
Start: 2023-09-26

## 2023-09-26 NOTE — TELEPHONE ENCOUNTER
Patient called to confirm prednisone Rx from 09/25/2023. OV note states Pred 20mg twice daily. Patient received Pred 10mg 2 tablets twice a day but at a Qty of 10; equaling 2.5 days worth of medication. Is this correct? Please advise.

## 2023-10-02 DIAGNOSIS — I10 ESSENTIAL HYPERTENSION: ICD-10-CM

## 2023-10-02 DIAGNOSIS — G44.209 MUSCLE TENSION HEADACHE: ICD-10-CM

## 2023-10-02 RX ORDER — BUTALBITAL, ACETAMINOPHEN AND CAFFEINE 50; 325; 40 MG/1; MG/1; MG/1
1 TABLET ORAL EVERY 6 HOURS PRN
Qty: 120 TABLET | Refills: 2 | Status: SHIPPED | OUTPATIENT
Start: 2023-10-02

## 2023-10-02 RX ORDER — BISOPROLOL FUMARATE 10 MG/1
TABLET, FILM COATED ORAL
Qty: 90 TABLET | Refills: 3 | Status: SHIPPED | OUTPATIENT
Start: 2023-10-02

## 2023-10-30 DIAGNOSIS — R11.0 NAUSEA: ICD-10-CM

## 2023-10-30 RX ORDER — PROCHLORPERAZINE MALEATE 5 MG/1
TABLET ORAL
Qty: 90 TABLET | Refills: 10 | Status: SHIPPED | OUTPATIENT
Start: 2023-10-30

## 2023-11-10 ENCOUNTER — RA CDI HCC (OUTPATIENT)
Dept: OTHER | Facility: HOSPITAL | Age: 78
End: 2023-11-10

## 2023-11-14 ENCOUNTER — OFFICE VISIT (OUTPATIENT)
Dept: FAMILY MEDICINE CLINIC | Facility: CLINIC | Age: 78
End: 2023-11-14
Payer: MEDICARE

## 2023-11-14 VITALS
HEIGHT: 64 IN | WEIGHT: 173 LBS | SYSTOLIC BLOOD PRESSURE: 126 MMHG | BODY MASS INDEX: 29.53 KG/M2 | DIASTOLIC BLOOD PRESSURE: 84 MMHG | TEMPERATURE: 95.5 F | HEART RATE: 84 BPM | RESPIRATION RATE: 20 BRPM

## 2023-11-14 DIAGNOSIS — E78.00 HYPERCHOLESTEROLEMIA: ICD-10-CM

## 2023-11-14 DIAGNOSIS — E55.9 VITAMIN D DEFICIENCY: ICD-10-CM

## 2023-11-14 DIAGNOSIS — I10 PRIMARY HYPERTENSION: ICD-10-CM

## 2023-11-14 DIAGNOSIS — M51.36 DDD (DEGENERATIVE DISC DISEASE), LUMBAR: ICD-10-CM

## 2023-11-14 DIAGNOSIS — E26.9 HYPERALDOSTERONISM (HCC): ICD-10-CM

## 2023-11-14 DIAGNOSIS — E87.1 HYPONATREMIA: ICD-10-CM

## 2023-11-14 DIAGNOSIS — Z23 ENCOUNTER FOR IMMUNIZATION: Primary | ICD-10-CM

## 2023-11-14 DIAGNOSIS — G44.209 MUSCLE TENSION HEADACHE: ICD-10-CM

## 2023-11-14 DIAGNOSIS — Z96.89 S/P INSERTION OF SPINAL CORD STIMULATOR: ICD-10-CM

## 2023-11-14 PROCEDURE — 99214 OFFICE O/P EST MOD 30 MIN: CPT | Performed by: FAMILY MEDICINE

## 2023-11-14 PROCEDURE — G0008 ADMIN INFLUENZA VIRUS VAC: HCPCS

## 2023-11-14 PROCEDURE — 90662 IIV NO PRSV INCREASED AG IM: CPT

## 2023-11-14 NOTE — PROGRESS NOTES
Assessment/Plan: Hypertensive cardiovascular disease with blood pressure controlled on the current regimen of Zebeta 10 mg Cozaar 100 mg and Aldactone 50 mg    Chronic muscle tension headache Fioricet is effective therapy    Hyponatremia and chronic kidney disease 3A under the care of nephrology    Hyperaldosteronism is treated with Aldactone    GERD without esophagitis Prilosec 20 mg minimizes symptoms nausea is treated with Compazine    Hyperlipidemia treated with Pravachol    Degenerative disc disease of the lumbar spine the patient has had a spinal cord stimulator inserted    Problem List Items Addressed This Visit     Hypercholesterolemia    Hypertension    Relevant Orders    Comprehensive metabolic panel    Lipid Panel with Direct LDL reflex    CBC and differential    Vitamin D 25 hydroxy    TSH, 3rd generation with Free T4 reflex    UA w Reflex to Microscopic w Reflex to Culture    Hyponatremia    Hyperaldosteronism (HCC)    DDD (degenerative disc disease), lumbar    S/P insertion of spinal cord stimulator   Other Visit Diagnoses     Encounter for immunization    -  Primary    Relevant Orders    influenza vaccine, high-dose, PF 0.7 mL (FLUZONE HIGH-DOSE) (Completed)    Muscle tension headache        Vitamin D deficiency        Relevant Orders    Vitamin D 25 hydroxy           Diagnoses and all orders for this visit:    Encounter for immunization  -     influenza vaccine, high-dose, PF 0.7 mL (FLUZONE HIGH-DOSE)    Primary hypertension  -     Comprehensive metabolic panel; Future  -     Lipid Panel with Direct LDL reflex; Future  -     CBC and differential; Future  -     Vitamin D 25 hydroxy; Future  -     TSH, 3rd generation with Free T4 reflex;  Future  -     UA w Reflex to Microscopic w Reflex to Culture    Muscle tension headache    Hyponatremia    Hyperaldosteronism (HCC)    Hypercholesterolemia    DDD (degenerative disc disease), lumbar    S/P insertion of spinal cord stimulator    Vitamin D deficiency  - Vitamin D 25 hydroxy; Future        No problem-specific Assessment & Plan notes found for this encounter. PHQ-2/9 Depression Screening            Body mass index is 29.7 kg/m². BMI Counseling: Body mass index is 29.7 kg/m². The BMI     Subjective:      Patient ID: Anita Felix is a 70 y.o. female. HPI    The following portions of the patient's history were reviewed and updated as appropriate:   She has a past medical history of Allergies, Anxiety, Arthritis, Benign arteriolar nephrosclerosis, Bereavement, Cataract, Chronic kidney disease, Chronic kidney disease in type 2 diabetes mellitus ([address]), Chronic kidney disease, stage 2 (mild), Chronic pain disorder, Chronic pain syndrome, COPD (chronic obstructive pulmonary disease) ([address]), DDD (degenerative disc disease), cervical, DDD (degenerative disc disease), lumbar, Degenerative joint disease of right hip, Depression, Diastolic dysfunction, DJD (degenerative joint disease), ankle and foot, right, DJD of right shoulder, Edema, Fracture of left calcaneus, Generalized anxiety disorder, GERD (gastroesophageal reflux disease), Heart murmur, Hyperaldosteronism ([address]), Hyperlipidemia, Hypertension, Hypertensive nephrosclerosis, Hypo-osmolality and hyponatremia, IBS (irritable bowel syndrome), Insomnia, Migraines, Mitral regurgitation, MVP (mitral valve prolapse), Obstructive sleep apnea syndrome, Osteoarthritis, Osteoarthritis, Pernicious anemia, Plantar fascia rupture, Rheumatoid arthritis ([address]), Right knee DJD, Shingles, Sinobronchitis, Sleep apnea, Stroke ([address]), TIA (transient ischemic attack), and Vertigo. ,  does not have any pertinent problems on file. ,   has a past surgical history that includes Appendectomy; Sinus surgery; Insert / replace peripheral neurostimulator pulse generator /  (Left); pr colonoscopy flx dx w/collj spec when pfrmd (N/A, 04/24/2017); NERVE BLOCK (Left, 02/20/2018); NERVE BLOCK (Left, 03/06/2018);  Radiofrequency ablation (Left, 04/17/2018); Colonoscopy; Upper gastrointestinal endoscopy (01/12/2007); Knee arthroscopy (Bilateral); Hand surgery (Right); pr arthrp acetblr/prox fem prostc agrft/algrft (Right, 02/27/2019); Nasal septum surgery (2008); Carpal tunnel release (Bilateral); Total hip arthroplasty (Left); Hysterectomy; Tonsillectomy; Joint replacement (Left); Joint replacement (Right, 04/2019); pr arthrp kne condyle&platu medial&lat compartments (Right, 06/24/2020); Wrist surgery (Right); Knee cartilage surgery (09/10/2009); Replacement total knee (Left, 07/05/2011); Knee Arthroplasty (Left, 01/15/2009); Colonoscopy (04/02/2012); DXA procedure(historical) (10/26/2016, 9/17/2014, 6/18/2007); Mammo (historical) (12/10/2018, 10/30/2017, 10/26/2016); Trigger finger release; and Corneal transplant (Left, 04/11/2022). ,  family history includes Anxiety disorder in her sister; Arthritis in her mother; Colon cancer in her father; Dementia in her mother; Heart disease in her father and mother; Hypertension in her father, mother, and sister; No Known Problems in her paternal aunt, paternal grandfather, and paternal grandmother; Prostate cancer in her maternal grandfather; Rheum arthritis in her mother; Thyroid disease in her sister. ,   reports that she has quit smoking. Her smoking use included cigarettes. She has never used smokeless tobacco. She reports current alcohol use. She reports that she does not use drugs. ,  is allergic to procaine, adhesive [medical tape], lidocaine, and penicillins. .  Current Outpatient Medications   Medication Sig Dispense Refill   • aspirin (ECOTRIN LOW STRENGTH) 81 mg EC tablet Take 1 tablet (81 mg total) by mouth daily     • bacitracin-polymyxin b ophthalmic ointment INSTILL A 1/4 INCH INTO LEFT EYE AT BEDTIME     • bisoprolol (ZEBETA) 10 MG tablet TAKE 1 TABLET EVERY DAY 90 tablet 3   • butalbital-acetaminophen-caffeine (FIORICET,ESGIC) -40 mg per tablet TAKE 1 TABLET BY MOUTH EVERY 6 (SIX) HOURS AS NEEDED FOR MIGRAINE 120 tablet 2   • calcium carbonate (OS-MARLENY) 600 MG tablet Take 600 mg by mouth 2 (two) times a day with meals     • clindamycin (CLEOCIN) 150 mg capsule Prn for dental procedures     • fluorometholone (FML) 0.1 % ophthalmic suspension INSTILL 1 DROP BY OPTHALMIC ROUTE FOUR TIMES DAILY INTO LEFT EYE     • losartan (COZAAR) 100 MG tablet TAKE 1 TABLET EVERY MORNING 90 tablet 3   • Multiple Vitamins-Minerals (MULTIVITAMIN WITH MINERALS) tablet Take 1 tablet by mouth every morning     • omeprazole (PriLOSEC) 20 mg delayed release capsule TAKE 1 CAPSULE TWICE DAILY 180 capsule 1   • pravastatin (PRAVACHOL) 40 mg tablet TAKE 1 TABLET (40 MG TOTAL) DAILY 90 tablet 3   • predniSONE 10 mg tablet Take 2 tablets (20 mg total) by mouth 2 (two) times a day with meals 20 tablet 1   • prochlorperazine (COMPAZINE) 5 mg tablet TAKE 1 TABLET EVERY 6 HOURS AS NEEDED FOR NAUSEA OR VOMITING 90 tablet 10   • spironolactone (ALDACTONE) 50 mg tablet TAKE 1 TABLET DAILY AFTER LUNCH 90 tablet 3     No current facility-administered medications for this visit. Review of Systems   Constitutional:  Negative for chills and fever. HENT:  Negative for ear pain and sore throat. Eyes:  Negative for pain and visual disturbance. Respiratory:  Negative for cough and shortness of breath. Cardiovascular:  Negative for chest pain and palpitations. Gastrointestinal:  Negative for abdominal pain and vomiting. Genitourinary:  Negative for dysuria and hematuria. Musculoskeletal:  Positive for back pain and neck pain. Negative for arthralgias. Skin:  Negative for color change and rash. Neurological:  Positive for headaches. Negative for seizures and syncope. All other systems reviewed and are negative. Objective:    /84   Pulse 84   Temp (!) 95.5 °F (35.3 °C)   Resp 20   Ht 5' 4" (1.626 m)   Wt 78.5 kg (173 lb)   BMI 29.70 kg/m²   Body mass index is 29.7 kg/m².      Physical Exam  Constitutional:       Appearance: Normal appearance. She is well-developed. She is obese. HENT:      Head: Normocephalic and atraumatic. Right Ear: Tympanic membrane, ear canal and external ear normal.      Left Ear: Tympanic membrane, ear canal and external ear normal.      Nose: Nose normal.      Mouth/Throat:      Mouth: Mucous membranes are moist.      Pharynx: Oropharynx is clear. Eyes:      Extraocular Movements: Extraocular movements intact. Conjunctiva/sclera: Conjunctivae normal.      Pupils: Pupils are equal, round, and reactive to light. Cardiovascular:      Rate and Rhythm: Normal rate and regular rhythm. Pulses: Normal pulses. Heart sounds: Normal heart sounds. Pulmonary:      Effort: Pulmonary effort is normal.      Breath sounds: Normal breath sounds. Abdominal:      General: Abdomen is flat. Bowel sounds are normal.      Palpations: Abdomen is soft. Tenderness: There is no abdominal tenderness. Musculoskeletal:         General: Normal range of motion. Cervical back: Normal range of motion and neck supple. Skin:     General: Skin is warm and dry. Capillary Refill: Capillary refill takes less than 2 seconds. Neurological:      General: No focal deficit present. Mental Status: She is alert and oriented to person, place, and time. Psychiatric:         Mood and Affect: Mood normal.         Behavior: Behavior normal.         Thought Content:  Thought content normal.         Judgment: Judgment normal.

## 2023-11-22 ENCOUNTER — OFFICE VISIT (OUTPATIENT)
Dept: FAMILY MEDICINE CLINIC | Facility: CLINIC | Age: 78
End: 2023-11-22
Payer: MEDICARE

## 2023-11-22 VITALS
TEMPERATURE: 98.8 F | HEART RATE: 84 BPM | HEIGHT: 64 IN | WEIGHT: 170 LBS | DIASTOLIC BLOOD PRESSURE: 68 MMHG | BODY MASS INDEX: 29.02 KG/M2 | SYSTOLIC BLOOD PRESSURE: 134 MMHG | RESPIRATION RATE: 20 BRPM

## 2023-11-22 DIAGNOSIS — H66.93 OTITIS OF BOTH EARS: Primary | ICD-10-CM

## 2023-11-22 PROCEDURE — 99214 OFFICE O/P EST MOD 30 MIN: CPT | Performed by: FAMILY MEDICINE

## 2023-11-22 RX ORDER — METHYLPREDNISOLONE 4 MG/1
TABLET ORAL
Qty: 21 EACH | Refills: 0 | Status: SHIPPED | OUTPATIENT
Start: 2023-11-22

## 2023-11-22 RX ORDER — AZITHROMYCIN 250 MG/1
TABLET, FILM COATED ORAL
Qty: 6 TABLET | Refills: 0 | Status: SHIPPED | OUTPATIENT
Start: 2023-11-22 | End: 2023-11-27

## 2023-11-22 RX ORDER — DEXTROMETHORPHAN HBR. AND GUAIFENESIN 10; 100 MG/5ML; MG/5ML
5 SOLUTION ORAL 4 TIMES DAILY PRN
Qty: 180 ML | Refills: 0 | Status: SHIPPED | OUTPATIENT
Start: 2023-11-22 | End: 2023-11-27

## 2023-11-22 NOTE — PROGRESS NOTES
Assessment/Plan: Bilateral otitis we will provide a Medrol Dosepak Zithromax Z-Thang and Robitussin DM      Hypertensive cardiovascular disease with a blood pressure controlled on the current regimen of Zebeta 10 mg Cozaar 100 mg and Aldactone 50 mg    Stage IIIa chronic kidney disease under the care of nephrology      Problem List Items Addressed This Visit    None  Visit Diagnoses     Otitis of both ears    -  Primary    Relevant Medications    methylPREDNISolone 4 MG tablet therapy pack    azithromycin (Zithromax) 250 mg tablet    dextromethorphan-guaiFENesin (ROBITUSSIN-DM)  mg/5 mL oral liquid           Diagnoses and all orders for this visit:    Otitis of both ears  -     methylPREDNISolone 4 MG tablet therapy pack; Use as directed on package  -     azithromycin (Zithromax) 250 mg tablet; Take 2 tablets (500 mg total) by mouth daily for 1 day, THEN 1 tablet (250 mg total) daily for 4 days. -     dextromethorphan-guaiFENesin (ROBITUSSIN-DM)  mg/5 mL oral liquid; Take 5 mL by mouth 4 (four) times a day as needed for cough        No problem-specific Assessment & Plan notes found for this encounter. PHQ-2/9 Depression Screening            Body mass index is 29.18 kg/m². BMI Counseling: Body mass index is 29.18 kg/m². The BMI     Subjective:      Patient ID: Mary Gonzalez is a 70 y.o. female. Cough  Associated symptoms include ear pain and headaches. Earache   Associated symptoms include abdominal pain, coughing and headaches. Headache  Abdominal Pain  Associated symptoms include headaches.        The following portions of the patient's history were reviewed and updated as appropriate:   She has a past medical history of Allergies, Anxiety, Arthritis, Benign arteriolar nephrosclerosis, Bereavement, Cataract, Chronic kidney disease, Chronic kidney disease in type 2 diabetes mellitus ([address]), Chronic kidney disease, stage 2 (mild), Chronic pain disorder, Chronic pain syndrome, COPD (chronic obstructive pulmonary disease) ([address]), DDD (degenerative disc disease), cervical, DDD (degenerative disc disease), lumbar, Degenerative joint disease of right hip, Depression, Diastolic dysfunction, DJD (degenerative joint disease), ankle and foot, right, DJD of right shoulder, Edema, Fracture of left calcaneus, Generalized anxiety disorder, GERD (gastroesophageal reflux disease), Heart murmur, Hyperaldosteronism ([address]), Hyperlipidemia, Hypertension, Hypertensive nephrosclerosis, Hypo-osmolality and hyponatremia, IBS (irritable bowel syndrome), Insomnia, Migraines, Mitral regurgitation, MVP (mitral valve prolapse), Obstructive sleep apnea syndrome, Osteoarthritis, Osteoarthritis, Pernicious anemia, Plantar fascia rupture, Rheumatoid arthritis ([address]), Right knee DJD, Shingles, Sinobronchitis, Sleep apnea, Stroke ([address]), TIA (transient ischemic attack), and Vertigo. ,  does not have any pertinent problems on file. ,   has a past surgical history that includes Appendectomy; Sinus surgery; Insert / replace peripheral neurostimulator pulse generator /  (Left); pr colonoscopy flx dx w/collj spec when pfrmd (N/A, 04/24/2017); NERVE BLOCK (Left, 02/20/2018); NERVE BLOCK (Left, 03/06/2018); Radiofrequency ablation (Left, 04/17/2018); Colonoscopy; Upper gastrointestinal endoscopy (01/12/2007); Knee arthroscopy (Bilateral); Hand surgery (Right); pr arthrp acetblr/prox fem prostc agrft/algrft (Right, 02/27/2019); Nasal septum surgery (2008); Carpal tunnel release (Bilateral); Total hip arthroplasty (Left); Hysterectomy; Tonsillectomy; Joint replacement (Left); Joint replacement (Right, 04/2019); pr arthrp kne condyle&platu medial&lat compartments (Right, 06/24/2020); Wrist surgery (Right); Knee cartilage surgery (09/10/2009); Replacement total knee (Left, 07/05/2011); Knee Arthroplasty (Left, 01/15/2009); Colonoscopy (04/02/2012); DXA procedure(historical) (10/26/2016, 9/17/2014, 6/18/2007);  Mammo (historical) (12/10/2018, 10/30/2017, 10/26/2016); Trigger finger release; and Corneal transplant (Left, 04/11/2022). ,  family history includes Anxiety disorder in her sister; Arthritis in her mother; Colon cancer in her father; Dementia in her mother; Heart disease in her father and mother; Hypertension in her father, mother, and sister; No Known Problems in her paternal aunt, paternal grandfather, and paternal grandmother; Prostate cancer in her maternal grandfather; Rheum arthritis in her mother; Thyroid disease in her sister. ,   reports that she has quit smoking. Her smoking use included cigarettes. She has never used smokeless tobacco. She reports current alcohol use. She reports that she does not use drugs. ,  is allergic to procaine, adhesive [medical tape], lidocaine, and penicillins. .  Current Outpatient Medications   Medication Sig Dispense Refill   • azithromycin (Zithromax) 250 mg tablet Take 2 tablets (500 mg total) by mouth daily for 1 day, THEN 1 tablet (250 mg total) daily for 4 days.  6 tablet 0   • dextromethorphan-guaiFENesin (ROBITUSSIN-DM)  mg/5 mL oral liquid Take 5 mL by mouth 4 (four) times a day as needed for cough 180 mL 0   • methylPREDNISolone 4 MG tablet therapy pack Use as directed on package 21 each 0   • aspirin (ECOTRIN LOW STRENGTH) 81 mg EC tablet Take 1 tablet (81 mg total) by mouth daily     • bacitracin-polymyxin b ophthalmic ointment INSTILL A 1/4 INCH INTO LEFT EYE AT BEDTIME     • bisoprolol (ZEBETA) 10 MG tablet TAKE 1 TABLET EVERY DAY 90 tablet 3   • butalbital-acetaminophen-caffeine (FIORICET,ESGIC) -40 mg per tablet TAKE 1 TABLET BY MOUTH EVERY 6 (SIX) HOURS AS NEEDED FOR MIGRAINE 120 tablet 2   • calcium carbonate (OS-MARLENY) 600 MG tablet Take 600 mg by mouth 2 (two) times a day with meals     • clindamycin (CLEOCIN) 150 mg capsule Prn for dental procedures     • fluorometholone (FML) 0.1 % ophthalmic suspension INSTILL 1 DROP BY OPTHALMIC ROUTE FOUR TIMES DAILY INTO LEFT EYE • losartan (COZAAR) 100 MG tablet TAKE 1 TABLET EVERY MORNING 90 tablet 3   • Multiple Vitamins-Minerals (MULTIVITAMIN WITH MINERALS) tablet Take 1 tablet by mouth every morning     • omeprazole (PriLOSEC) 20 mg delayed release capsule TAKE 1 CAPSULE TWICE DAILY 180 capsule 1   • pravastatin (PRAVACHOL) 40 mg tablet TAKE 1 TABLET (40 MG TOTAL) DAILY 90 tablet 3   • prochlorperazine (COMPAZINE) 5 mg tablet TAKE 1 TABLET EVERY 6 HOURS AS NEEDED FOR NAUSEA OR VOMITING 90 tablet 10   • spironolactone (ALDACTONE) 50 mg tablet TAKE 1 TABLET DAILY AFTER LUNCH 90 tablet 3     No current facility-administered medications for this visit. Review of Systems   HENT:  Positive for ear pain. Respiratory:  Positive for cough. Gastrointestinal:  Positive for abdominal pain. Neurological:  Positive for headaches. Objective:    /68   Pulse 84   Temp 98.8 °F (37.1 °C)   Resp 20   Ht 5' 4" (1.626 m)   Wt 77.1 kg (170 lb)   BMI 29.18 kg/m²   Body mass index is 29.18 kg/m². Physical Exam  Constitutional:       Appearance: Normal appearance. She is well-developed. HENT:      Head: Normocephalic and atraumatic. Right Ear: Ear canal and external ear normal. Tympanic membrane is erythematous and bulging. Left Ear: Ear canal and external ear normal. Tympanic membrane is erythematous and bulging. Nose: Nose normal.      Mouth/Throat:      Mouth: Mucous membranes are moist.      Pharynx: Oropharynx is clear. Eyes:      Extraocular Movements: Extraocular movements intact. Conjunctiva/sclera: Conjunctivae normal.      Pupils: Pupils are equal, round, and reactive to light. Cardiovascular:      Rate and Rhythm: Normal rate and regular rhythm. Pulses: Normal pulses. Heart sounds: Normal heart sounds. Pulmonary:      Effort: Pulmonary effort is normal.      Breath sounds: Normal breath sounds. Abdominal:      General: Abdomen is flat.  Bowel sounds are normal. Palpations: Abdomen is soft. Tenderness: There is no abdominal tenderness. Musculoskeletal:         General: Normal range of motion. Cervical back: Normal range of motion and neck supple. Skin:     General: Skin is warm and dry. Capillary Refill: Capillary refill takes less than 2 seconds. Neurological:      General: No focal deficit present. Mental Status: She is alert and oriented to person, place, and time. Psychiatric:         Mood and Affect: Mood normal.         Behavior: Behavior normal.         Thought Content:  Thought content normal.         Judgment: Judgment normal. Topical Metronidazole Counseling: Metronidazole is a topical antibiotic medication. You may experience burning, stinging, redness, or allergic reactions.  Please call our office if you develop any problems from using this medication.

## 2023-11-24 ENCOUNTER — TELEPHONE (OUTPATIENT)
Dept: FAMILY MEDICINE CLINIC | Facility: CLINIC | Age: 78
End: 2023-11-24

## 2023-11-24 NOTE — TELEPHONE ENCOUNTER
Called patient the patient and her  have minor symptoms I do not at this time desire Paxlovid for did not have any

## 2023-11-24 NOTE — TELEPHONE ENCOUNTER
Patient called; Neighbor tested positive for Covid, Patient tested this morning with a Positive result. Please advise.

## 2023-11-27 ENCOUNTER — TELEPHONE (OUTPATIENT)
Dept: FAMILY MEDICINE CLINIC | Facility: CLINIC | Age: 78
End: 2023-11-27

## 2023-11-27 DIAGNOSIS — J40 BRONCHITIS: Primary | ICD-10-CM

## 2023-11-27 RX ORDER — DEXTROMETHORPHAN HYDROBROMIDE AND PROMETHAZINE HYDROCHLORIDE 15; 6.25 MG/5ML; MG/5ML
5 SYRUP ORAL 4 TIMES DAILY PRN
Qty: 180 ML | Refills: 0 | Status: SHIPPED | OUTPATIENT
Start: 2023-11-27

## 2023-11-27 NOTE — TELEPHONE ENCOUNTER
Patient is feeling better although she has a hacking cough, no chest congestion just an annoying cough keeping her up at night, she states the robitussin is not doing anything for her , she was wondering if something could be prescribed

## 2023-12-05 ENCOUNTER — TELEPHONE (OUTPATIENT)
Dept: FAMILY MEDICINE CLINIC | Facility: CLINIC | Age: 78
End: 2023-12-05

## 2023-12-05 DIAGNOSIS — H66.93 OTITIS OF BOTH EARS: ICD-10-CM

## 2023-12-05 RX ORDER — METHYLPREDNISOLONE 4 MG/1
TABLET ORAL
Qty: 21 EACH | Refills: 0 | Status: SHIPPED | OUTPATIENT
Start: 2023-12-05

## 2023-12-05 RX ORDER — SULFAMETHOXAZOLE AND TRIMETHOPRIM 800; 160 MG/1; MG/1
1 TABLET ORAL 2 TIMES DAILY
Qty: 14 TABLET | Refills: 0 | Status: SHIPPED | OUTPATIENT
Start: 2023-12-05 | End: 2023-12-12

## 2023-12-05 NOTE — TELEPHONE ENCOUNTER
Patient states she now has painful ears and a scratchy throat she was wondering what she can take or do for that

## 2023-12-07 ENCOUNTER — OFFICE VISIT (OUTPATIENT)
Dept: OBGYN CLINIC | Facility: CLINIC | Age: 78
End: 2023-12-07
Payer: MEDICARE

## 2023-12-07 ENCOUNTER — APPOINTMENT (OUTPATIENT)
Dept: RADIOLOGY | Facility: MEDICAL CENTER | Age: 78
End: 2023-12-07
Payer: MEDICARE

## 2023-12-07 VITALS
DIASTOLIC BLOOD PRESSURE: 75 MMHG | SYSTOLIC BLOOD PRESSURE: 159 MMHG | BODY MASS INDEX: 29.02 KG/M2 | WEIGHT: 170 LBS | HEART RATE: 67 BPM | HEIGHT: 64 IN

## 2023-12-07 DIAGNOSIS — Z96.641 HISTORY OF TOTAL HIP ARTHROPLASTY, RIGHT: Primary | ICD-10-CM

## 2023-12-07 DIAGNOSIS — Z96.641 HISTORY OF TOTAL HIP ARTHROPLASTY, RIGHT: ICD-10-CM

## 2023-12-07 DIAGNOSIS — M21.612 BUNION OF GREAT TOE OF LEFT FOOT: ICD-10-CM

## 2023-12-07 PROCEDURE — 73502 X-RAY EXAM HIP UNI 2-3 VIEWS: CPT

## 2023-12-07 PROCEDURE — 99213 OFFICE O/P EST LOW 20 MIN: CPT | Performed by: ORTHOPAEDIC SURGERY

## 2023-12-07 NOTE — PROGRESS NOTES
Assessment:  1. History of total hip arthroplasty, right  XR hip/pelv 2-3 vws right if performed      2. Bunion of great toe of left foot  Ambulatory Referral to Podiatry          Plan:  4 years s/p right total hip arthroplasty, 2/27/2019  Patient currently doing well  Patient encouraged to remain active  Follow up in one year    To do next visit:  Return in about 1 year (around 12/7/2024) for re-check with x-rays. The above stated was discussed in layman's terms and the patient expressed understanding. All questions were answered to the patient's satisfaction. The patient is doing quite well in regards to her right total hip replacement. Continue home exercise program.  Follow-up in 1 year for strength and motion check and new x-rays right hip-2 views. In the meantime, she wants referral to podiatry for bunions and hammertoes which has been treated by surgeons in the past      Scribe Attestation      I,:  Maggy Hackett am acting as a scribe while in the presence of the attending physician.:       I,:  Maryann Vasquez DO personally performed the services described in this documentation    as scribed in my presence.:               Subjective:   Lanny Watson is a 70 y.o. female who presents for annual follow up of right total hip arthroplasty performed 2/27/2019. She is doing well. Today she has no right hip pain complaints. She is active without repercussion. He denies fever, chills or shortness of breath. Her greatest complaint of left bunion.         Review of systems negative unless otherwise specified in HPI    Past Medical History:   Diagnosis Date    Allergies     Anxiety     Arthritis     Benign arteriolar nephrosclerosis     Bereavement     Cataract     Chronic kidney disease     Chronic kidney disease in type 2 diabetes mellitus (HCC)     Chronic kidney disease, stage 2 (mild)     Chronic pain disorder     Chronic pain syndrome     COPD (chronic obstructive pulmonary disease) ([address])     DDD (degenerative disc disease), cervical     DDD (degenerative disc disease), lumbar     Degenerative joint disease of right hip     Depression     Diastolic dysfunction     DJD (degenerative joint disease), ankle and foot, right     DJD of right shoulder     Edema     Fracture of left calcaneus     Generalized anxiety disorder     GERD (gastroesophageal reflux disease)     Heart murmur     Hyperaldosteronism (HCC)     Hyperlipidemia     Hypertension     Hypertensive nephrosclerosis     Hypo-osmolality and hyponatremia     IBS (irritable bowel syndrome)     Insomnia     Migraines     Mitral regurgitation     MVP (mitral valve prolapse)     Obstructive sleep apnea syndrome     Osteoarthritis     Osteoarthritis     Pernicious anemia     Plantar fascia rupture     Rheumatoid arthritis ([address])     Right knee DJD     Shingles     Sinobronchitis     Sleep apnea     Stroke ([address])     tia    TIA (transient ischemic attack)     Vertigo        Past Surgical History:   Procedure Laterality Date    APPENDECTOMY      CARPAL TUNNEL RELEASE Bilateral     COLONOSCOPY      several    COLONOSCOPY  04/02/2012    by Dr. Lenin Lewis Left 04/11/2022    DXA PROCEDURE (HISTORICAL)  10/26/2016, 9/17/2014, 6/18/2007    HAND SURGERY Right     ring finger has pin    HYSTERECTOMY      34    INSERT / REPLACE PERIPHERAL NEUROSTIMULATOR PULSE GENERATOR /  Left     buttocks    JOINT REPLACEMENT Left     knee    JOINT REPLACEMENT Right 04/2019    Hip    KNEE ARTHROPLASTY Left 01/15/2009    by Dr. Carine Moore at [facility] ARTHROSCOPY Bilateral     KNEE CARTILAGE SURGERY  09/10/2009    by Dr. Carine Moore at [facility] (HISTORICAL)  12/10/2018, 10/30/2017, 10/26/2016    NASAL SEPTUM SURGERY  2008    NERVE BLOCK Left 02/20/2018    Procedure: BLOCK MEDIAL BRANCH - C4, C5, C6;  Surgeon: Brenna Beach MD;  Location: [facility];  Service: Pain Management NERVE BLOCK Left 03/06/2018    Procedure: C4, C5, C6 MEDIAL BRANCH BLOCK;  Surgeon: Ciera Mckeon MD;  Location: [facility];  Service: Pain Management     CT ARTHRP ACETBLR/PROX FEM PROSTC AGRFT/ALGRFT Right 02/27/2019    Procedure: ARTHROPLASTY HIP TOTAL;  Surgeon: Tatyana Trent DO;  Location: [address] [place] MAIN OR;  Service: Orthopedics    CT ARTHRP KNE CONDYLE&PLATU MEDIAL&LAT COMPARTMENTS Right 06/24/2020    Procedure: KNEE TOTAL ARTHROPLASTY;  Surgeon: Tatyana Trent DO;  Location: [address] [place] MAIN OR;  Service: Orthopedics    CT COLONOSCOPY FLX DX W/COLLJ [place] WHEN PFRMD N/A 04/24/2017    Procedure: Otseun Webster;  Surgeon: Matthew Barragan MD;  Location: [facility];  Service: Colorectal    RADIOFREQUENCY ABLATION Left 04/17/2018    Procedure: ABLATION RADIO FREQUENCY (RFA) - C4, C5, C6;  Surgeon: Ciera Mckeon MD;  Location: [facility];  Service: Pain Management     REPLACEMENT TOTAL KNEE Left 07/05/2011    SINUS SURGERY      TONSILLECTOMY      TOTAL HIP ARTHROPLASTY Left     TRIGGER FINGER RELEASE      R 4th     UPPER GASTROINTESTINAL ENDOSCOPY  01/12/2007    with Anice Rashida dilatation by Dr. Esperanza Croft at [address] Right        Family History   Problem Relation Age of Onset    Heart disease Mother     Hypertension Mother     Arthritis Mother     Dementia Mother     Rheum arthritis Mother     Hypertension Father     Heart disease Father     Colon cancer Father     Hypertension Sister     Anxiety disorder Sister     Thyroid disease Sister     Prostate cancer Maternal Grandfather     No Known Problems Paternal Grandmother     No Known Problems Paternal Grandfather     No Known Problems Paternal Aunt     Pseudochol deficiency Neg Hx        Social History     Occupational History    Occupation: Admnistrator    Tobacco Use    Smoking status: Former     Types: Cigarettes    Smokeless tobacco: Never    Tobacco comments:     quit 40 yrs ago   Vaping Use    Vaping Use: Never used   Substance and Sexual Activity    Alcohol use: Yes     Comment: 2 beer a week    Drug use: Never    Sexual activity: Not Currently     Birth control/protection: Post-menopausal         Current Outpatient Medications:     aspirin (ECOTRIN LOW STRENGTH) 81 mg EC tablet, Take 1 tablet (81 mg total) by mouth daily, Disp:  , Rfl:     bacitracin-polymyxin b ophthalmic ointment, INSTILL A 1/4 INCH INTO LEFT EYE AT BEDTIME, Disp: , Rfl:     bisoprolol (ZEBETA) 10 MG tablet, TAKE 1 TABLET EVERY DAY, Disp: 90 tablet, Rfl: 3    butalbital-acetaminophen-caffeine (FIORICET,ESGIC) -40 mg per tablet, TAKE 1 TABLET BY MOUTH EVERY 6 (SIX) HOURS AS NEEDED FOR MIGRAINE, Disp: 120 tablet, Rfl: 2    calcium carbonate (OS-MARLENY) 600 MG tablet, Take 600 mg by mouth 2 (two) times a day with meals, Disp: , Rfl:     clindamycin (CLEOCIN) 150 mg capsule, Prn for dental procedures, Disp: , Rfl:     fluorometholone (FML) 0.1 % ophthalmic suspension, INSTILL 1 DROP BY OPTHALMIC ROUTE FOUR TIMES DAILY INTO LEFT EYE, Disp: , Rfl:     losartan (COZAAR) 100 MG tablet, TAKE 1 TABLET EVERY MORNING, Disp: 90 tablet, Rfl: 3    methylPREDNISolone 4 MG tablet therapy pack, Use as directed on package, Disp: 21 each, Rfl: 0    Multiple Vitamins-Minerals (MULTIVITAMIN WITH MINERALS) tablet, Take 1 tablet by mouth every morning, Disp: , Rfl:     omeprazole (PriLOSEC) 20 mg delayed release capsule, TAKE 1 CAPSULE TWICE DAILY, Disp: 180 capsule, Rfl: 1    pravastatin (PRAVACHOL) 40 mg tablet, TAKE 1 TABLET (40 MG TOTAL) DAILY, Disp: 90 tablet, Rfl: 3    prochlorperazine (COMPAZINE) 5 mg tablet, TAKE 1 TABLET EVERY 6 HOURS AS NEEDED FOR NAUSEA OR VOMITING, Disp: 90 tablet, Rfl: 10    promethazine-dextromethorphan (PHENERGAN-DM) 6.25-15 mg/5 mL oral syrup, Take 5 mL by mouth 4 (four) times a day as needed for cough, Disp: 180 mL, Rfl: 0    spironolactone (ALDACTONE) 50 mg tablet, TAKE 1 TABLET DAILY AFTER LUNCH, Disp: 90 tablet, Rfl: 3    sulfamethoxazole-trimethoprim (BACTRIM DS) 800-160 mg per tablet, Take 1 tablet by mouth 2 (two) times a day for 7 days, Disp: 14 tablet, Rfl: 0    Allergies   Allergen Reactions    Procaine Hives    Adhesive [Medical Tape] Rash    Lidocaine Rash    Penicillins Rash            Vitals:    12/07/23 0921   BP: 159/75   Pulse: 67       Objective:  Physical exam  General: Awake, Alert, Oriented  Eyes: Pupils equal, round and reactive to light  Heart: regular rate and rhythm  Lungs: No audible wheezing  Abdomen: soft                    Ortho Exam  Right hip:  Well healed posterior incision  Good arc of motion with no pain  Patient sits comfortably in chair with hip flexed at 90 degrees  Patient stands from seated position without assistance  Calf compartments soft and supple  Sensation intact  Toes are warm sensate and mobile      Diagnostics, reviewed and taken today if performed as documented: The attending physician has personally reviewed the pertinent films in PACS and interpretation is as follows:  Right hip x-ray:  Well aligned prosthesis with no acute changes. Procedures, if performed today:    Procedures    None performed      Portions of the record may have been created with voice recognition software. Occasional wrong word or "sound a like" substitutions may have occurred due to the inherent limitations of voice recognition software. Read the chart carefully and recognize, using context, where substitutions have occurred.

## 2023-12-18 DIAGNOSIS — I10 ESSENTIAL HYPERTENSION: ICD-10-CM

## 2023-12-18 RX ORDER — LOSARTAN POTASSIUM 100 MG/1
TABLET ORAL
Qty: 90 TABLET | Refills: 3 | Status: SHIPPED | OUTPATIENT
Start: 2023-12-18

## 2023-12-21 ENCOUNTER — APPOINTMENT (OUTPATIENT)
Dept: RADIOLOGY | Facility: MEDICAL CENTER | Age: 78
End: 2023-12-21
Payer: MEDICARE

## 2023-12-21 ENCOUNTER — OFFICE VISIT (OUTPATIENT)
Dept: PODIATRY | Facility: CLINIC | Age: 78
End: 2023-12-21
Payer: MEDICARE

## 2023-12-21 VITALS — DIASTOLIC BLOOD PRESSURE: 81 MMHG | SYSTOLIC BLOOD PRESSURE: 158 MMHG | HEART RATE: 56 BPM

## 2023-12-21 DIAGNOSIS — M21.612 BUNION OF GREAT TOE OF LEFT FOOT: ICD-10-CM

## 2023-12-21 DIAGNOSIS — M21.619 BUNION: ICD-10-CM

## 2023-12-21 DIAGNOSIS — M20.42 HAMMER TOE OF LEFT FOOT: Primary | ICD-10-CM

## 2023-12-21 PROCEDURE — 73630 X-RAY EXAM OF FOOT: CPT

## 2023-12-21 PROCEDURE — 99203 OFFICE O/P NEW LOW 30 MIN: CPT | Performed by: STUDENT IN AN ORGANIZED HEALTH CARE EDUCATION/TRAINING PROGRAM

## 2023-12-21 NOTE — PROGRESS NOTES
Assessment/Plan:     Diagnoses and all orders for this visit:    Hammer toe of left foot  -     X-ray foot left 3+ views; Future  -     Durable Medical Equipment    Bunion of great toe of left foot  -     X-ray foot left 3+ views; Future  -     Ambulatory Referral to Podiatry  -     Durable Medical Equipment          Imaging Reviewed at this visit (I personally reviewed/independently interpreted images and reports in PACS)  XR left foot WB 3v 12/21/23: Severe HAV with VIKTOR 15-16, sesamoid pos 6, 1st MTPJ arthritic changes, hammering toes 2-5. Pes planus.      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 I have discussed with the patient the pathophysiology of this diagnosis and reviewed how the examination correlates with this diagnosis.  I reviewed ortho note from 12/7/23 and PCP note from 11/22/23  I discussed conservative and surgical (1st MTPJ fusion vs george + 2nd PIPJ arthrodesis) management of her deformity today. Given she does not have pan on a regular basis and is able to wear shoes that do not rub, I discussed that conservative care is probably in her best interest as of now given risk of infection and poor wound healing given her age.    I recommended shoes with soft and wide toe box to prevent rubbing. I did order shoes and recommended [employer] to get these fit at.   F/u PRN    Subjective:      Patient ID: Jacklyn Garcia is a 78 y.o. female.    Jacklyn presents to clinic today concerning left foot bunion and hammertoe. Notes she cannot wear shoes like she used to. Notes she can war sneakers which are soft and wide as they do not rub nor cause her pain. Pain is not daily however she is more bothered by the appearance.         The following portions of the patient's history were reviewed and updated as appropriate: allergies, current medications, past family history, past medical history, past social  history, past surgical history, and problem list.    Review of Systems   Constitutional:  Negative for activity change, chills and fever.   HENT: Negative.     Respiratory:  Negative for cough, chest tightness and shortness of breath.    Cardiovascular:  Negative for chest pain and leg swelling.   Endocrine: Negative.    Genitourinary: Negative.    Neurological: Negative.  Negative for numbness.   Psychiatric/Behavioral: Negative.  Negative for agitation and behavioral problems.          Objective:      /81   Pulse 56          Physical Exam  Constitutional:       General: She is not in acute distress.     Appearance: Normal appearance. She is not ill-appearing.   Cardiovascular:      Pulses: Normal pulses.      Comments: Bilateral DP & PT pulses 1/4. CRT WNL. Pedal hair diminished. Feet warm and well perfused. Varicosities with mild edema to LE.   Pulmonary:      Effort: No respiratory distress.   Musculoskeletal:         General: Deformity (Left HAV with HTs 2-5.) present. No tenderness.      Comments: Rigid 2nd HT (PIPJ). Hallux is trackbound left. There is crepitus with ROM 1st MTPJ left. No pain.    Skin:     General: Skin is warm.      Capillary Refill: Capillary refill takes less than 2 seconds.      Findings: No erythema or lesion.   Neurological:      General: No focal deficit present.      Mental Status: She is alert and oriented to person, place, and time.      Sensory: No sensory deficit.      Comments: Gross sensation intact. Denies N/T/B to B/L feet.    Psychiatric:         Mood and Affect: Mood normal.         Behavior: Behavior normal.

## 2023-12-21 NOTE — PATIENT INSTRUCTIONS
[employer]  [facility]  [address] [address]  [phone]    Shoes with wide and soft toe box.

## 2024-01-08 DIAGNOSIS — I10 ESSENTIAL HYPERTENSION: ICD-10-CM

## 2024-01-08 RX ORDER — SPIRONOLACTONE 50 MG/1
TABLET, FILM COATED ORAL
Qty: 90 TABLET | Refills: 3 | Status: SHIPPED | OUTPATIENT
Start: 2024-01-08

## 2024-01-15 ENCOUNTER — HOSPITAL ENCOUNTER (OUTPATIENT)
Dept: MAMMOGRAPHY | Facility: HOSPITAL | Age: 79
Discharge: HOME | End: 2024-01-15
Attending: FAMILY MEDICINE
Payer: MEDICARE

## 2024-01-15 DIAGNOSIS — Z12.31 OTHER SCREENING MAMMOGRAM: ICD-10-CM

## 2024-01-15 PROCEDURE — 77067 SCR MAMMO BI INCL CAD: CPT

## 2024-01-15 PROCEDURE — 77063 BREAST TOMOSYNTHESIS BI: CPT

## 2024-01-22 DIAGNOSIS — G44.209 MUSCLE TENSION HEADACHE: ICD-10-CM

## 2024-01-22 RX ORDER — BUTALBITAL, ACETAMINOPHEN AND CAFFEINE 50; 325; 40 MG/1; MG/1; MG/1
TABLET ORAL
Qty: 120 TABLET | Refills: 2 | Status: SHIPPED | OUTPATIENT
Start: 2024-01-22

## 2024-02-05 ENCOUNTER — OFFICE VISIT (OUTPATIENT)
Dept: CARDIOLOGY CLINIC | Facility: HOSPITAL | Age: 79
End: 2024-02-05
Payer: MEDICARE

## 2024-02-05 VITALS
BODY MASS INDEX: 29.02 KG/M2 | WEIGHT: 170 LBS | DIASTOLIC BLOOD PRESSURE: 66 MMHG | HEART RATE: 57 BPM | SYSTOLIC BLOOD PRESSURE: 124 MMHG | HEIGHT: 64 IN

## 2024-02-05 DIAGNOSIS — I10 ESSENTIAL HYPERTENSION: Primary | ICD-10-CM

## 2024-02-05 DIAGNOSIS — N18.31 STAGE 3A CHRONIC KIDNEY DISEASE (HCC): ICD-10-CM

## 2024-02-05 DIAGNOSIS — I50.40 COMBINED SYSTOLIC AND DIASTOLIC CONGESTIVE HEART FAILURE, UNSPECIFIED HF CHRONICITY (HCC): ICD-10-CM

## 2024-02-05 DIAGNOSIS — E66.01 OBESITY, MORBID (HCC): ICD-10-CM

## 2024-02-05 PROCEDURE — 99214 OFFICE O/P EST MOD 30 MIN: CPT | Performed by: INTERNAL MEDICINE

## 2024-02-06 PROCEDURE — 93000 ELECTROCARDIOGRAM COMPLETE: CPT | Performed by: INTERNAL MEDICINE

## 2024-02-06 NOTE — PROGRESS NOTES
Cardiology Follow Up    Jacklyn Garcia  1945  [id number]  CARDIOVASC PHYSICIAN  [address] [facility] [id number]    1. Essential hypertension  POCT ECG      2. Combined systolic and diastolic congestive heart failure, unspecified HF chronicity (HCC)        3. Obesity, morbid (HCC)        4. Stage 3a chronic kidney disease (HCC)            Discussion/Summary: Overall she has been doing very well since her last office appointment.  Blood pressure has been very well-controlled on current regimen.  No signs of decompensated heart failure.  She has been euvolemic on exam.  Lipids have been doing well hide like to try to get her a little bit lower of asked her to take an additional dose of pravastatin a few days a week she has had difficulty tolerating high intensity statins in the past.  No testing follow-up in 8 to 12 months.    Interval History:  78-year-old female with difficult-to-control hypertension, mitral regurgitation, chronic diastolic congestive heart failure presents for follow-up visit.  She has been under a lot of stress since her last exam.  There have been several deaths in her mediated family and she has had a hard time handling this.  From a cardiac standpoint she has been rather comfortable.  Breathing has been stable.        Since last appointment visit she has been doing well she remains active around the house and does a moderate amount of walking.  Denies any chest pain, shortness of breath, palpitations, lightheadedness, dizziness, or syncope.  Is been no lower extremity edema, PND, orthopnea.    Problem List       Diastolic dysfunction    Hypercholesterolemia    Hypertension    Mitral regurgitation    Overview Signed 2/8/2018  8:25 AM by Williams Blas DO     Description: Moderate         Shortness of breath on exertion    Cervical spondylosis without myelopathy    Overview Signed 2/13/2018  8:13 AM by Vesna Greer MA     Added  automatically from request for surgery [id number]         Chronic pain syndrome          Past Medical History:   Diagnosis Date    Allergies     Anxiety     Arthritis     Benign arteriolar nephrosclerosis     Bereavement     Cataract     Chronic kidney disease     Chronic kidney disease in type 2 diabetes mellitus (HCC)     Chronic kidney disease, stage 2 (mild)     Chronic pain disorder     Chronic pain syndrome     COPD (chronic obstructive pulmonary disease) ([facility])     DDD (degenerative disc disease), cervical     DDD (degenerative disc disease), lumbar     Degenerative joint disease of right hip     Depression     Diastolic dysfunction     DJD (degenerative joint disease), ankle and foot, right     DJD of right shoulder     Edema     Fracture of left calcaneus     Generalized anxiety disorder     GERD (gastroesophageal reflux disease)     Heart murmur     Hyperaldosteronism ([facility])     Hyperlipidemia     Hypertension     Hypertensive nephrosclerosis     Hypo-osmolality and hyponatremia     IBS (irritable bowel syndrome)     Insomnia     Migraines     Mitral regurgitation     MVP (mitral valve prolapse)     Obstructive sleep apnea syndrome     Osteoarthritis     Osteoarthritis     Pernicious anemia     Plantar fascia rupture     Rheumatoid arthritis ([facility])     Right knee DJD     Shingles     Sinobronchitis     Sleep apnea     Stroke ([facility])     tia    TIA (transient ischemic attack)     Vertigo      Social History     Socioeconomic History    Marital status: /Civil Union     Spouse name: Not on file    Number of children: Not on file    Years of education: Not on file    Highest education level: Not on file   Occupational History    Occupation: Admnistrator    Tobacco Use    Smoking status: Former     Types: Cigarettes    Smokeless tobacco: Never    Tobacco comments:     quit 40 yrs ago   Vaping Use    Vaping status: Never Used   Substance and Sexual Activity    Alcohol use: Yes     Comment: 2 beer a week    Drug  use: Never    Sexual activity: Not Currently     Birth control/protection: Post-menopausal   Other Topics Concern    Not on file   Social History Narrative    Patient has never smoked - As per [employer]    Consumes 1-2 beers per week - As per [employer]    Consumes on average 1 cup of decaf coffee per day      Social Determinants of Health     Financial Resource Strain: Low Risk  (3/17/2023)    Overall Financial Resource Strain (CARDIA)     Difficulty of Paying Living Expenses: Not very hard   Food Insecurity: Not on file   Transportation Needs: No Transportation Needs (3/17/2023)    PRAPARE - Transportation     Lack of Transportation (Medical): No     Lack of Transportation (Non-Medical): No   Physical Activity: Not on file   Stress: Not on file   Social Connections: Not on file   Intimate Partner Violence: Not on file   Housing Stability: Not on file      Family History   Problem Relation Age of Onset    Heart disease Mother     Hypertension Mother     Arthritis Mother     Dementia Mother     Rheum arthritis Mother     Hypertension Father     Heart disease Father     Colon cancer Father     Hypertension Sister     Anxiety disorder Sister     Thyroid disease Sister     Prostate cancer Maternal Grandfather     No Known Problems Paternal Grandmother     No Known Problems Paternal Grandfather     No Known Problems Paternal Aunt     Pseudochol deficiency Neg Hx      Past Surgical History:   Procedure Laterality Date    APPENDECTOMY      CARPAL TUNNEL RELEASE Bilateral     COLONOSCOPY      several    COLONOSCOPY  04/02/2012    by Dr. Mckinley Ruvalcaba    CORNEAL TRANSPLANT Left 04/11/2022    DXA PROCEDURE (HISTORICAL)  10/26/2016, 9/17/2014, 6/18/2007    HAND SURGERY Right     ring finger has pin    HYSTERECTOMY      34    INSERT / REPLACE PERIPHERAL NEUROSTIMULATOR PULSE GENERATOR /  Left     buttocks    JOINT REPLACEMENT Left     knee    JOINT REPLACEMENT Right 04/2019    Hip    KNEE ARTHROPLASTY Left 01/15/2009    by  Dr. Avery Leigh at [facility]     KNEE ARTHROSCOPY Bilateral     KNEE CARTILAGE SURGERY  09/10/2009    by Dr. Avery Leigh at [facility]     MAMMO (HISTORICAL)  12/10/2018, 10/30/2017, 10/26/2016    NASAL SEPTUM SURGERY  2008    NERVE BLOCK Left 02/20/2018    Procedure: BLOCK MEDIAL BRANCH - C4, C5, C6;  Surgeon: Jaycob Cruz MD;  Location: [facility];  Service: Pain Management     NERVE BLOCK Left 03/06/2018    Procedure: C4, C5, C6 MEDIAL BRANCH BLOCK;  Surgeon: Jaycob Cruz MD;  Location: [facility];  Service: Pain Management     ND ARTHRP ACETBLR/PROX FEM PROSTC AGRFT/ALGRFT Right 02/27/2019    Procedure: ARTHROPLASTY HIP TOTAL;  Surgeon: Bruno Pina DO;  Location:  MAIN OR;  Service: Orthopedics    ND ARTHRP KNE CONDYLE&PLATU MEDIAL&LAT COMPARTMENTS Right 06/24/2020    Procedure: KNEE TOTAL ARTHROPLASTY;  Surgeon: Bruno Pina DO;  Location:  MAIN OR;  Service: Orthopedics    ND COLONOSCOPY FLX DX W/COLLJ SPEC WHEN PFRMD N/A 04/24/2017    Procedure: FLEXIBLE COLONOSCOPY;  Surgeon: Mckinley Ruvalcaba MD;  Location: [facility];  Service: Colorectal    RADIOFREQUENCY ABLATION Left 04/17/2018    Procedure: ABLATION RADIO FREQUENCY (RFA) - C4, C5, C6;  Surgeon: Jaycob Cruz MD;  Location: [facility];  Service: Pain Management     REPLACEMENT TOTAL KNEE Left 07/05/2011    SINUS SURGERY      TONSILLECTOMY      TOTAL HIP ARTHROPLASTY Left     TRIGGER FINGER RELEASE      R 4th     UPPER GASTROINTESTINAL ENDOSCOPY  01/12/2007    with Donaldson dilatation by Dr. Misael Santiago at [facility] Right        Current Outpatient Medications:     aspirin (ECOTRIN LOW STRENGTH) 81 mg EC tablet, Take 1 tablet (81 mg total) by mouth daily, Disp:  , Rfl:     bacitracin-polymyxin b ophthalmic ointment, INSTILL A 1/4 INCH INTO LEFT EYE AT BEDTIME, Disp: , Rfl:     bisoprolol (ZEBETA) 10 MG tablet, TAKE 1 TABLET EVERY DAY, Disp: 90 tablet, Rfl: 3     butalbital-acetaminophen-caffeine (FIORICET,ESGIC) -40 mg per tablet, TAKE 1 TABLET EVERY 6 HOURS AS NEEDED FOR MIGRAINE (SUBSTITUTED FOR FIORICET), Disp: 120 tablet, Rfl: 2    calcium carbonate (OS-MARLENY) 600 MG tablet, Take 600 mg by mouth 2 (two) times a day with meals, Disp: , Rfl:     clindamycin (CLEOCIN) 150 mg capsule, Prn for dental procedures, Disp: , Rfl:     fluorometholone (FML) 0.1 % ophthalmic suspension, INSTILL 1 DROP BY OPTHALMIC ROUTE FOUR TIMES DAILY INTO LEFT EYE, Disp: , Rfl:     losartan (COZAAR) 100 MG tablet, TAKE 1 TABLET EVERY MORNING, Disp: 90 tablet, Rfl: 3    methylPREDNISolone 4 MG tablet therapy pack, Use as directed on package, Disp: 21 each, Rfl: 0    Multiple Vitamins-Minerals (MULTIVITAMIN WITH MINERALS) tablet, Take 1 tablet by mouth every morning, Disp: , Rfl:     omeprazole (PriLOSEC) 20 mg delayed release capsule, TAKE 1 CAPSULE TWICE DAILY, Disp: 180 capsule, Rfl: 1    pravastatin (PRAVACHOL) 40 mg tablet, TAKE 1 TABLET (40 MG TOTAL) DAILY, Disp: 90 tablet, Rfl: 3    prochlorperazine (COMPAZINE) 5 mg tablet, TAKE 1 TABLET EVERY 6 HOURS AS NEEDED FOR NAUSEA OR VOMITING, Disp: 90 tablet, Rfl: 10    promethazine-dextromethorphan (PHENERGAN-DM) 6.25-15 mg/5 mL oral syrup, Take 5 mL by mouth 4 (four) times a day as needed for cough, Disp: 180 mL, Rfl: 0    spironolactone (ALDACTONE) 50 mg tablet, TAKE 1 TABLET DAILY AFTER LUNCH, Disp: 90 tablet, Rfl: 3  Allergies   Allergen Reactions    Procaine Hives    Adhesive [Medical Tape] Rash    Lidocaine Rash    Penicillins Rash       Labs:     Chemistry        Component Value Date/Time     (L) 09/21/2015 1057    K 4.4 08/26/2023 0724    K 4.3 09/21/2015 1057    CL 93 (L) 08/26/2023 0724    CL 94 (L) 09/21/2015 1057    CO2 26 08/26/2023 0724    CO2 31.2 09/21/2015 1057    BUN 12 08/26/2023 0724    BUN 8 09/21/2015 1057    CREATININE 0.94 08/26/2023 0724    CREATININE 0.74 09/21/2015 1057        Component Value Date/Time     "CALCIUM 9.4 08/26/2023 0724    CALCIUM 9.5 09/21/2015 1057    ALKPHOS 124 (H) 08/26/2023 0724    ALKPHOS 123 (H) 09/21/2015 1057    AST 20 08/26/2023 0724    AST 21 09/21/2015 1057    ALT 18 08/26/2023 0724    ALT 34 09/21/2015 1057    BILITOT 0.29 09/21/2015 1057            Lab Results   Component Value Date    CHOL 290 06/04/2015    CHOL 240 10/19/2014    CHOL 277 03/12/2014     Lab Results   Component Value Date    HDL 75 11/22/2022    HDL 86 11/18/2021     (H) 06/08/2016     Lab Results   Component Value Date    LDLCALC 123 (H) 11/22/2022    LDLCALC 121 (H) 11/18/2021    LDLCALC 121 (H) 06/08/2016     Lab Results   Component Value Date    TRIG 123 11/22/2022    TRIG 67 11/18/2021    TRIG 49 06/08/2016     No results found for: \"CHOLHDL\"    Imaging: No results found.    ECG:  Normal sinus rhythm unremarkable tracing      Review of Systems   Constitutional: Negative.   HENT: Negative.     Eyes: Negative.    Cardiovascular: Negative.    Respiratory: Negative.     Endocrine: Negative.    Hematologic/Lymphatic: Negative.    Skin: Negative.    Musculoskeletal: Negative.    Gastrointestinal: Negative.    Genitourinary: Negative.    Neurological:  Positive for dizziness, headaches and loss of balance.   Psychiatric/Behavioral: Negative.         Vitals:    02/05/24 1425   BP: 124/66   Pulse: 57     Vitals:    02/05/24 1425   Weight: 77.1 kg (170 lb)     Height: 5' 4\" (162.6 cm)   Body mass index is 29.18 kg/m².    Physical Exam:  Vital signs reviewed  General:  Alert and cooperative, appears stated age, no acute distress  HEENT:  PERRLA, EOMI, no scleral icterus, no conjunctival pallor  Neck:  No lymphadenopathy, no thyromegaly, no carotid bruits, no elevated JVP  Heart:  Regular rate and rhythm, normal S1/S2, no S3/S4, no murmur, rubs or gallops.  PMI nondisplaced  Lungs:  Clear to auscultation bilaterally, no wheezes rales or rhonchi  Abdomen:  Soft, non-tender, positive bowel sounds, no rebound or guarding, "   no organomegaly   Extremities:  Normal range of motion.  No clubbing, cyanosis or edema   Vascular:  2+ pedal pulses  Skin:  No rashes or lesions on exposed skin  Neurologic:  Cranial nerves II-XII grossly intact without focal deficits  Psych:  Normal mood and affect

## 2024-03-11 ENCOUNTER — APPOINTMENT (OUTPATIENT)
Dept: LAB | Facility: HOSPITAL | Age: 79
End: 2024-03-11
Payer: MEDICARE

## 2024-03-11 DIAGNOSIS — E55.9 VITAMIN D DEFICIENCY: ICD-10-CM

## 2024-03-11 DIAGNOSIS — I10 PRIMARY HYPERTENSION: ICD-10-CM

## 2024-03-11 LAB
25(OH)D3 SERPL-MCNC: 44.2 NG/ML (ref 30–100)
ALBUMIN SERPL BCP-MCNC: 4.4 G/DL (ref 3.5–5)
ALP SERPL-CCNC: 104 U/L (ref 34–104)
ALT SERPL W P-5'-P-CCNC: 14 U/L (ref 7–52)
ANION GAP SERPL CALCULATED.3IONS-SCNC: 6 MMOL/L
AST SERPL W P-5'-P-CCNC: 17 U/L (ref 13–39)
BASOPHILS # BLD AUTO: 0.03 THOUSANDS/ÂΜL (ref 0–0.1)
BASOPHILS NFR BLD AUTO: 1 % (ref 0–1)
BILIRUB SERPL-MCNC: 0.47 MG/DL (ref 0.2–1)
BUN SERPL-MCNC: 11 MG/DL (ref 5–25)
CALCIUM SERPL-MCNC: 9.6 MG/DL (ref 8.4–10.2)
CHLORIDE SERPL-SCNC: 92 MMOL/L (ref 96–108)
CHOLEST SERPL-MCNC: 208 MG/DL
CO2 SERPL-SCNC: 29 MMOL/L (ref 21–32)
CREAT SERPL-MCNC: 0.98 MG/DL (ref 0.6–1.3)
CREAT UR-MCNC: 19.4 MG/DL
EOSINOPHIL # BLD AUTO: 0.34 THOUSAND/ÂΜL (ref 0–0.61)
EOSINOPHIL NFR BLD AUTO: 8 % (ref 0–6)
ERYTHROCYTE [DISTWIDTH] IN BLOOD BY AUTOMATED COUNT: 12.5 % (ref 11.6–15.1)
GFR SERPL CREATININE-BSD FRML MDRD: 55 ML/MIN/1.73SQ M
GLUCOSE P FAST SERPL-MCNC: 96 MG/DL (ref 65–99)
HCT VFR BLD AUTO: 35 % (ref 34.8–46.1)
HDLC SERPL-MCNC: 87 MG/DL
HGB BLD-MCNC: 11.8 G/DL (ref 11.5–15.4)
IMM GRANULOCYTES # BLD AUTO: 0.01 THOUSAND/UL (ref 0–0.2)
IMM GRANULOCYTES NFR BLD AUTO: 0 % (ref 0–2)
LDLC SERPL CALC-MCNC: 102 MG/DL (ref 0–100)
LYMPHOCYTES # BLD AUTO: 1.9 THOUSANDS/ÂΜL (ref 0.6–4.47)
LYMPHOCYTES NFR BLD AUTO: 41 % (ref 14–44)
MCH RBC QN AUTO: 29.5 PG (ref 26.8–34.3)
MCHC RBC AUTO-ENTMCNC: 33.7 G/DL (ref 31.4–37.4)
MCV RBC AUTO: 88 FL (ref 82–98)
MICROALBUMIN UR-MCNC: <7 MG/L
MICROALBUMIN/CREAT 24H UR: <36 MG/G CREATININE (ref 0–30)
MONOCYTES # BLD AUTO: 0.39 THOUSAND/ÂΜL (ref 0.17–1.22)
MONOCYTES NFR BLD AUTO: 9 % (ref 4–12)
NEUTROPHILS # BLD AUTO: 1.88 THOUSANDS/ÂΜL (ref 1.85–7.62)
NEUTS SEG NFR BLD AUTO: 41 % (ref 43–75)
NRBC BLD AUTO-RTO: 0 /100 WBCS
PLATELET # BLD AUTO: 202 THOUSANDS/UL (ref 149–390)
PMV BLD AUTO: 8.8 FL (ref 8.9–12.7)
POTASSIUM SERPL-SCNC: 4 MMOL/L (ref 3.5–5.3)
PROT SERPL-MCNC: 6.8 G/DL (ref 6.4–8.4)
RBC # BLD AUTO: 4 MILLION/UL (ref 3.81–5.12)
SODIUM SERPL-SCNC: 127 MMOL/L (ref 135–147)
T4 FREE SERPL-MCNC: 0.78 NG/DL (ref 0.61–1.12)
TRIGL SERPL-MCNC: 93 MG/DL
TSH SERPL DL<=0.05 MIU/L-ACNC: 4.92 UIU/ML (ref 0.45–4.5)
WBC # BLD AUTO: 4.55 THOUSAND/UL (ref 4.31–10.16)

## 2024-03-11 PROCEDURE — 80061 LIPID PANEL: CPT

## 2024-03-11 PROCEDURE — 82570 ASSAY OF URINE CREATININE: CPT

## 2024-03-11 PROCEDURE — 84443 ASSAY THYROID STIM HORMONE: CPT

## 2024-03-11 PROCEDURE — 85025 COMPLETE CBC W/AUTO DIFF WBC: CPT

## 2024-03-11 PROCEDURE — 82043 UR ALBUMIN QUANTITATIVE: CPT

## 2024-03-11 PROCEDURE — 82306 VITAMIN D 25 HYDROXY: CPT

## 2024-03-11 PROCEDURE — 36415 COLL VENOUS BLD VENIPUNCTURE: CPT

## 2024-03-11 PROCEDURE — 84439 ASSAY OF FREE THYROXINE: CPT

## 2024-03-11 PROCEDURE — 80053 COMPREHEN METABOLIC PANEL: CPT

## 2024-03-13 ENCOUNTER — RA CDI HCC (OUTPATIENT)
Dept: OTHER | Facility: HOSPITAL | Age: 79
End: 2024-03-13

## 2024-03-18 ENCOUNTER — OFFICE VISIT (OUTPATIENT)
Dept: FAMILY MEDICINE CLINIC | Facility: CLINIC | Age: 79
End: 2024-03-18
Payer: MEDICARE

## 2024-03-18 VITALS
SYSTOLIC BLOOD PRESSURE: 130 MMHG | BODY MASS INDEX: 29.02 KG/M2 | WEIGHT: 170 LBS | HEART RATE: 84 BPM | HEIGHT: 64 IN | RESPIRATION RATE: 20 BRPM | DIASTOLIC BLOOD PRESSURE: 68 MMHG | TEMPERATURE: 98.2 F

## 2024-03-18 DIAGNOSIS — E03.8 SUBCLINICAL HYPOTHYROIDISM: ICD-10-CM

## 2024-03-18 DIAGNOSIS — Z00.00 MEDICARE ANNUAL WELLNESS VISIT, SUBSEQUENT: Primary | ICD-10-CM

## 2024-03-18 DIAGNOSIS — R00.2 PALPITATIONS: ICD-10-CM

## 2024-03-18 DIAGNOSIS — I10 PRIMARY HYPERTENSION: ICD-10-CM

## 2024-03-18 DIAGNOSIS — E55.9 VITAMIN D DEFICIENCY: ICD-10-CM

## 2024-03-18 DIAGNOSIS — G44.209 MUSCLE TENSION HEADACHE: ICD-10-CM

## 2024-03-18 DIAGNOSIS — N18.31 STAGE 3A CHRONIC KIDNEY DISEASE (HCC): ICD-10-CM

## 2024-03-18 DIAGNOSIS — J43.9 PULMONARY EMPHYSEMA, UNSPECIFIED EMPHYSEMA TYPE (HCC): ICD-10-CM

## 2024-03-18 DIAGNOSIS — E26.9 HYPERALDOSTERONISM (HCC): ICD-10-CM

## 2024-03-18 PROCEDURE — 99214 OFFICE O/P EST MOD 30 MIN: CPT | Performed by: FAMILY MEDICINE

## 2024-03-18 PROCEDURE — G0439 PPPS, SUBSEQ VISIT: HCPCS | Performed by: FAMILY MEDICINE

## 2024-03-18 RX ORDER — MAGNESIUM L-LACTATE 84 MG
84 TABLET, EXTENDED RELEASE ORAL DAILY
Qty: 90 TABLET | Refills: 1 | Status: SHIPPED | OUTPATIENT
Start: 2024-03-18

## 2024-03-18 NOTE — PROGRESS NOTES
Assessment and Plan: Patient complains of palpitations occurring on 2 separate episodes we will provide a Holter monitor for analysis.  Cardiology notes of February 5 reviewed and appreciated    Hyponatremia with a sodium of 127 and the patient was advised to restrict her water intake she does follow with nephrology    Hypertension with a blood pressure controlled on the current regimen    Increasing frequency of muscle tension headaches and palpitations we will begin a magnesium supplement    Elevated TSH we will continue to follow for subclinical hypothyroidism    Stage IIIa chronic kidney disease    Pulmonary emphysema at baseline    Hyperaldosteronism of renal origin treated with Aldactone by nephrology     Problem List Items Addressed This Visit     Hypertension    Relevant Orders    Lipid Panel with Direct LDL reflex    Hyperaldosteronism (HCC)    Pulmonary emphysema, unspecified emphysema type (HCC)    Stage 3a chronic kidney disease (HCC)   Other Visit Diagnoses     Medicare annual wellness visit, subsequent    -  Primary    Palpitations        Relevant Medications    magnesium (MAGTAB) 84 MG (7MEQ) TBCR    Other Relevant Orders    Holter monitor    CBC and differential    Comprehensive metabolic panel    Muscle tension headache        Subclinical hypothyroidism        Relevant Orders    TSH w/Reflex    Vitamin D deficiency        Relevant Orders    Vitamin D 25 hydroxy            Depression Screening and Follow-up Plan: Patient was screened for depression during today's encounter. They screened negative with a PHQ-2 score of 0.      Preventive health issues were discussed with patient, and age appropriate screening tests were ordered as noted in patient's After Visit Summary.  Personalized health advice and appropriate referrals for health education or preventive services given if needed, as noted in patient's After Visit Summary.     History of Present Illness:     Patient presents for a Medicare Wellness  Visit    Patient presents for Medicare annual wellness visit.  The patient states that she is experienced palpitations on 2 occasions       Patient Care Team:  Ernie Wilkinson DO as PCP - General  DO Erickson Abbott MD Daniel Joseph Bowers, MD as Endoscopist  Becky Nuñez PA-C as Physician Assistant (Internal Medicine)  Williams Hernandez MD as Consulting Physician (Internal Medicine)  Kin Cortes DO (Nephrology)     Review of Systems:     Review of Systems   Constitutional:  Negative for chills and fever.   HENT:  Negative for ear pain and sore throat.    Eyes:  Negative for pain and visual disturbance.   Respiratory:  Negative for cough and shortness of breath.    Cardiovascular:  Positive for palpitations. Negative for chest pain.   Gastrointestinal:  Negative for abdominal pain and vomiting.   Genitourinary:  Negative for dysuria and hematuria.   Musculoskeletal:  Negative for arthralgias and back pain.   Skin:  Negative for color change and rash.   Neurological:  Negative for seizures and syncope.   All other systems reviewed and are negative.       Problem List:     Patient Active Problem List   Diagnosis   • Diastolic dysfunction   • Hypercholesterolemia   • Hypertension   • Mitral regurgitation   • Shortness of breath on exertion   • Cervical spondylosis without myelopathy   • Chronic pain disorder   • Gastroesophageal reflux disease without esophagitis   • Hyponatremia   • Primary osteoarthritis of right knee   • Osteoarthritis of right hip   • Essential hypertension   • Stage 2 chronic kidney disease   • Hyperaldosteronism (HCC)   • Hypertensive nephrosclerosis   • Status post total right knee replacement   • Pulmonary emphysema, unspecified emphysema type (HCC)   • DDD (degenerative disc disease), lumbar   • Lumbar spondylosis   • Lumbar disc herniation   • S/P insertion of spinal cord stimulator   • Stage 3a chronic kidney disease (HCC)   • Combined  systolic and diastolic congestive heart failure, unspecified HF chronicity ([facility])   • Obesity, morbid ([facility])   • Bunion of great toe of left foot   • Hammer toe of left foot      Past Medical and Surgical History:     Past Medical History:   Diagnosis Date   • Allergies    • Anxiety    • Arthritis    • Benign arteriolar nephrosclerosis    • Bereavement    • Cataract    • Chronic kidney disease    • Chronic kidney disease in type 2 diabetes mellitus ([facility])    • Chronic kidney disease, stage 2 (mild)    • Chronic pain disorder    • Chronic pain syndrome    • COPD (chronic obstructive pulmonary disease) ([facility])    • DDD (degenerative disc disease), cervical    • DDD (degenerative disc disease), lumbar    • Degenerative joint disease of right hip    • Depression    • Diastolic dysfunction    • DJD (degenerative joint disease), ankle and foot, right    • DJD of right shoulder    • Edema    • Fracture of left calcaneus    • Generalized anxiety disorder    • GERD (gastroesophageal reflux disease)    • Heart murmur    • Hyperaldosteronism ([facility])    • Hyperlipidemia    • Hypertension    • Hypertensive nephrosclerosis    • Hypo-osmolality and hyponatremia    • IBS (irritable bowel syndrome)    • Insomnia    • Migraines    • Mitral regurgitation    • MVP (mitral valve prolapse)    • Obstructive sleep apnea syndrome    • Osteoarthritis    • Osteoarthritis    • Pernicious anemia    • Plantar fascia rupture    • Rheumatoid arthritis ([facility])    • Right knee DJD    • Shingles    • Sinobronchitis    • Sleep apnea    • Stroke ([facility])     tia   • TIA (transient ischemic attack)    • Vertigo      Past Surgical History:   Procedure Laterality Date   • APPENDECTOMY     • CARPAL TUNNEL RELEASE Bilateral    • COLONOSCOPY      several   • COLONOSCOPY  04/02/2012    by Dr. Mckinley Ruvalcaba   • CORNEAL TRANSPLANT Left 04/11/2022   • DXA PROCEDURE (HISTORICAL)  10/26/2016, 9/17/2014, 6/18/2007   • HAND SURGERY Right     ring finger has pin   • HYSTERECTOMY       34   • INSERT / REPLACE PERIPHERAL NEUROSTIMULATOR PULSE GENERATOR /  Left     buttocks   • JOINT REPLACEMENT Left     knee   • JOINT REPLACEMENT Right 04/2019    Hip   • KNEE ARTHROPLASTY Left 01/15/2009    by Dr. Avery Leigh at [facility]    • KNEE ARTHROSCOPY Bilateral    • KNEE CARTILAGE SURGERY  09/10/2009    by Dr. Avery Leigh at [facility]    • MAMMO (HISTORICAL)  12/10/2018, 10/30/2017, 10/26/2016   • NASAL SEPTUM SURGERY  2008   • NERVE BLOCK Left 02/20/2018    Procedure: BLOCK MEDIAL BRANCH - C4, C5, C6;  Surgeon: Jaycob Cruz MD;  Location: [facility];  Service: Pain Management    • NERVE BLOCK Left 03/06/2018    Procedure: C4, C5, C6 MEDIAL BRANCH BLOCK;  Surgeon: Jaycob Cruz MD;  Location: [facility];  Service: Pain Management    • OK ARTHRP ACETBLR/PROX FEM PROSTC AGRFT/ALGRFT Right 02/27/2019    Procedure: ARTHROPLASTY HIP TOTAL;  Surgeon: Bruno Pina DO;  Location:  MAIN OR;  Service: Orthopedics   • OK ARTHRP KNE CONDYLE&PLATU MEDIAL&LAT COMPARTMENTS Right 06/24/2020    Procedure: KNEE TOTAL ARTHROPLASTY;  Surgeon: Bruno Pina DO;  Location:  MAIN OR;  Service: Orthopedics   • OK COLONOSCOPY FLX DX W/COLLJ SPEC WHEN PFRMD N/A 04/24/2017    Procedure: FLEXIBLE COLONOSCOPY;  Surgeon: Mckinley Ruvalcaba MD;  Location: [facility];  Service: Colorectal   • RADIOFREQUENCY ABLATION Left 04/17/2018    Procedure: ABLATION RADIO FREQUENCY (RFA) - C4, C5, C6;  Surgeon: Jaycob Cruz MD;  Location: [facility];  Service: Pain Management    • REPLACEMENT TOTAL KNEE Left 07/05/2011   • SINUS SURGERY     • TONSILLECTOMY     • TOTAL HIP ARTHROPLASTY Left    • TRIGGER FINGER RELEASE      R 4th    • UPPER GASTROINTESTINAL ENDOSCOPY  01/12/2007    with Donaldson dilatation by Dr. Misael Santiago at [facility]   • WRIST SURGERY Right       Family History:     Family History   Problem Relation Age of Onset   • Heart disease Mother    •  Hypertension Mother    • Arthritis Mother    • Dementia Mother    • Rheum arthritis Mother    • Hypertension Father    • Heart disease Father    • Colon cancer Father    • Hypertension Sister    • Anxiety disorder Sister    • Thyroid disease Sister    • Prostate cancer Maternal Grandfather    • No Known Problems Paternal Grandmother    • No Known Problems Paternal Grandfather    • No Known Problems Paternal Aunt    • Pseudochol deficiency Neg Hx       Social History:     Social History     Socioeconomic History   • Marital status: /Civil Union     Spouse name: None   • Number of children: None   • Years of education: None   • Highest education level: None   Occupational History   • Occupation: Admnistrator    Tobacco Use   • Smoking status: Former     Types: Cigarettes   • Smokeless tobacco: Never   • Tobacco comments:     quit 40 yrs ago   Vaping Use   • Vaping status: Never Used   Substance and Sexual Activity   • Alcohol use: Yes     Comment: 2 beer a week   • Drug use: Never   • Sexual activity: Not Currently     Birth control/protection: Post-menopausal   Other Topics Concern   • None   Social History Narrative    Patient has never smoked - As per [employer]    Consumes 1-2 beers per week - As per [employer]    Consumes on average 1 cup of decaf coffee per day      Social Determinants of Health     Financial Resource Strain: Low Risk  (3/17/2023)    Overall Financial Resource Strain (CARDIA)    • Difficulty of Paying Living Expenses: Not very hard   Food Insecurity: Not on file   Transportation Needs: No Transportation Needs (3/18/2024)    PRAPARE - Transportation    • Lack of Transportation (Medical): No    • Lack of Transportation (Non-Medical): No   Physical Activity: Not on file   Stress: Not on file   Social Connections: Not on file   Intimate Partner Violence: Not on file   Housing Stability: Low Risk  (3/18/2024)    Housing Stability Vital Sign    • Unable to Pay for Housing in the Last Year: No    •  Number of Places Lived in the Last Year: 1    • Unstable Housing in the Last Year: No      Medications and Allergies:     Current Outpatient Medications   Medication Sig Dispense Refill   • magnesium (MAGTAB) 84 MG (7MEQ) TBCR Take 1 tablet (84 mg total) by mouth daily 90 tablet 1   • aspirin (ECOTRIN LOW STRENGTH) 81 mg EC tablet Take 1 tablet (81 mg total) by mouth daily     • bacitracin-polymyxin b ophthalmic ointment INSTILL A 1/4 INCH INTO LEFT EYE AT BEDTIME     • bisoprolol (ZEBETA) 10 MG tablet TAKE 1 TABLET EVERY DAY 90 tablet 3   • butalbital-acetaminophen-caffeine (FIORICET,ESGIC) -40 mg per tablet TAKE 1 TABLET EVERY 6 HOURS AS NEEDED FOR MIGRAINE (SUBSTITUTED FOR FIORICET) 120 tablet 2   • calcium carbonate (OS-MARLENY) 600 MG tablet Take 600 mg by mouth 2 (two) times a day with meals     • clindamycin (CLEOCIN) 150 mg capsule Prn for dental procedures     • fluorometholone (FML) 0.1 % ophthalmic suspension INSTILL 1 DROP BY OPTHALMIC ROUTE FOUR TIMES DAILY INTO LEFT EYE     • losartan (COZAAR) 100 MG tablet TAKE 1 TABLET EVERY MORNING 90 tablet 3   • methylPREDNISolone 4 MG tablet therapy pack Use as directed on package (Patient not taking: Reported on 3/18/2024) 21 each 0   • Multiple Vitamins-Minerals (MULTIVITAMIN WITH MINERALS) tablet Take 1 tablet by mouth every morning     • omeprazole (PriLOSEC) 20 mg delayed release capsule TAKE 1 CAPSULE TWICE DAILY 180 capsule 1   • pravastatin (PRAVACHOL) 40 mg tablet TAKE 1 TABLET (40 MG TOTAL) DAILY 90 tablet 3   • prochlorperazine (COMPAZINE) 5 mg tablet TAKE 1 TABLET EVERY 6 HOURS AS NEEDED FOR NAUSEA OR VOMITING 90 tablet 10   • promethazine-dextromethorphan (PHENERGAN-DM) 6.25-15 mg/5 mL oral syrup Take 5 mL by mouth 4 (four) times a day as needed for cough (Patient not taking: Reported on 3/18/2024) 180 mL 0   • spironolactone (ALDACTONE) 50 mg tablet TAKE 1 TABLET DAILY AFTER LUNCH 90 tablet 3     No current facility-administered medications for  this visit.     Allergies   Allergen Reactions   • Procaine Hives   • Adhesive [Medical Tape] Rash   • Lidocaine Rash   • Penicillins Rash      Immunizations:     Immunization History   Administered Date(s) Administered   • COVID-19 MODERNA VACC 0.5 ML IM 02/05/2021, 03/05/2021, 10/30/2021, 07/16/2022   • COVID-19 Moderna Vac BIVALENT 12 Yr+ IM 0.5 ML 12/03/2022   • INFLUENZA 10/16/2021   • Influenza, high dose seasonal 0.7 mL 09/25/2020, 10/24/2022, 11/14/2023   • Pneumococcal Polysaccharide PPV23 10/08/2020   • Tdap 11/17/2020   • Zoster 10/30/2014      Health Maintenance:         Topic Date Due   • Colorectal Cancer Screening  11/14/2024 (Originally 2/28/1990)   • Breast Cancer Screening: Mammogram  01/15/2025   • Hepatitis C Screening  Completed         Topic Date Due   • Pneumococcal Vaccine: 65+ Years (2 of 2 - PCV) 10/08/2021   • COVID-19 Vaccine (6 - 2023-24 season) 09/01/2023      Medicare Screening Tests and Risk Assessments:         Health Risk Assessment:   Patient rates overall health as good. Patient feels that their physical health rating is same. Eyesight was rated as same. Hearing was rated as same. Patient feels that their emotional and mental health rating is same. Patients states they are never, rarely angry. Patient states they are sometimes unusually tired/fatigued. Pain experienced in the last 7 days has been some. Patient's pain rating has been 4/10. Patient states that she has experienced no weight loss or gain in last 6 months.     Depression Screening:   PHQ-2 Score: 0      Fall Risk Screening:   In the past year, patient has experienced: no history of falling in past year      Urinary Incontinence Screening:   Patient has not leaked urine accidently in the last six months.     Home Safety:  Patient does not have trouble with stairs inside or outside of their home.     Nutrition:   Current diet is Regular.     Medications:   Patient is currently taking over-the-counter supplements. OTC  "medications include: see medication list. Patient is able to manage medications.     Activities of Daily Living (ADLs)/Instrumental Activities of Daily Living (IADLs):   Walk and transfer into and out of bed and chair?: Yes  Dress and groom yourself?: Yes    Bathe or shower yourself?: Yes    Feed yourself? Yes  Do your laundry/housekeeping?: Yes  Manage your money, pay your bills and track your expenses?: Yes  Make your own meals?: Yes    Do your own shopping?: Yes    Previous Hospitalizations:   Any hospitalizations or ED visits within the last 12 months?: No      Advance Care Planning:   Living will: Yes    Advanced directive: Yes      PREVENTIVE SCREENINGS      Cardiovascular Screening:    General: Screening Not Indicated and History Lipid Disorder      Diabetes Screening:     General: Screening Current      Breast Cancer Screening:     General: Screening Current      Cervical Cancer Screening:    General: Screening Not Indicated      Lung Cancer Screening:     General: Screening Not Indicated      Hepatitis C Screening:    General: Screening Current    Screening, Brief Intervention, and Referral to Treatment (SBIRT)    Screening  Typical number of drinks in a day: 0  Typical number of drinks in a week: 2  Interpretation: Low risk drinking behavior.    Single Item Drug Screening:  How often have you used an illegal drug (including marijuana) or a prescription medication for non-medical reasons in the past year? never    Single Item Drug Screen Score: 0  Interpretation: Negative screen for possible drug use disorder    No results found.     Physical Exam:     /68   Pulse 84   Temp 98.2 °F (36.8 °C)   Resp 20   Ht 5' 4\" (1.626 m)   Wt 77.1 kg (170 lb)   BMI 29.18 kg/m²     Physical Exam  Vitals and nursing note reviewed.   Constitutional:       General: She is not in acute distress.     Appearance: Normal appearance. She is well-developed and normal weight.   HENT:      Head: Normocephalic and " atraumatic.      Right Ear: Tympanic membrane, ear canal and external ear normal.      Left Ear: Tympanic membrane, ear canal and external ear normal.      Nose: Nose normal.      Mouth/Throat:      Mouth: Mucous membranes are moist.      Pharynx: Oropharynx is clear.   Eyes:      Extraocular Movements: Extraocular movements intact.      Conjunctiva/sclera: Conjunctivae normal.      Pupils: Pupils are equal, round, and reactive to light.   Cardiovascular:      Rate and Rhythm: Normal rate and regular rhythm.      Pulses: Normal pulses.      Heart sounds: Normal heart sounds. No murmur heard.  Pulmonary:      Effort: Pulmonary effort is normal. No respiratory distress.      Breath sounds: Normal breath sounds.   Abdominal:      General: Abdomen is flat. Bowel sounds are normal.      Palpations: Abdomen is soft.      Tenderness: There is no abdominal tenderness.   Musculoskeletal:         General: No swelling. Normal range of motion.      Cervical back: Normal range of motion and neck supple.   Skin:     General: Skin is warm and dry.      Capillary Refill: Capillary refill takes less than 2 seconds.   Neurological:      General: No focal deficit present.      Mental Status: She is alert and oriented to person, place, and time. Mental status is at baseline.   Psychiatric:         Mood and Affect: Mood normal.         Behavior: Behavior normal.         Thought Content: Thought content normal.         Judgment: Judgment normal.          Ernie Wilkinson,

## 2024-03-26 ENCOUNTER — HOSPITAL ENCOUNTER (OUTPATIENT)
Dept: NON INVASIVE DIAGNOSTICS | Facility: HOSPITAL | Age: 79
Discharge: HOME | End: 2024-03-26
Attending: FAMILY MEDICINE
Payer: MEDICARE

## 2024-03-26 DIAGNOSIS — R00.2 PALPITATIONS: ICD-10-CM

## 2024-03-26 PROCEDURE — 93225 XTRNL ECG REC<48 HRS REC: CPT

## 2024-03-26 PROCEDURE — 93226 XTRNL ECG REC<48 HR SCAN A/R: CPT

## 2024-03-27 PROCEDURE — 93227 XTRNL ECG REC<48 HR R&I: CPT | Performed by: INTERNAL MEDICINE

## 2024-04-15 DIAGNOSIS — G44.209 MUSCLE TENSION HEADACHE: ICD-10-CM

## 2024-04-15 RX ORDER — BUTALBITAL, ACETAMINOPHEN AND CAFFEINE 50; 325; 40 MG/1; MG/1; MG/1
TABLET ORAL
Qty: 120 TABLET | Refills: 3 | Status: SHIPPED | OUTPATIENT
Start: 2024-04-15

## 2024-06-03 ENCOUNTER — TELEPHONE (OUTPATIENT)
Age: 79
End: 2024-06-03

## 2024-06-03 DIAGNOSIS — N18.31 STAGE 3A CHRONIC KIDNEY DISEASE (HCC): Primary | ICD-10-CM

## 2024-06-03 NOTE — TELEPHONE ENCOUNTER
The pt called to schedule her followup appt - she was last seen 3/2023. She needs lab slips put in epic and if you can call her then so she knows she can go get it done. Thanks!

## 2024-06-03 NOTE — TELEPHONE ENCOUNTER
I placed a CMP lab to be completed prior to appt on July 5th 2024. I spoke with Jacklyn and she is aware of lab to be completed.

## 2024-06-27 ENCOUNTER — LAB (OUTPATIENT)
Dept: LAB | Facility: HOSPITAL | Age: 79
End: 2024-06-27
Payer: MEDICARE

## 2024-06-27 DIAGNOSIS — E55.9 VITAMIN D DEFICIENCY: ICD-10-CM

## 2024-06-27 DIAGNOSIS — I10 PRIMARY HYPERTENSION: ICD-10-CM

## 2024-06-27 DIAGNOSIS — E03.8 SUBCLINICAL HYPOTHYROIDISM: ICD-10-CM

## 2024-06-27 DIAGNOSIS — N18.31 STAGE 3A CHRONIC KIDNEY DISEASE (HCC): ICD-10-CM

## 2024-06-27 DIAGNOSIS — R00.2 PALPITATIONS: ICD-10-CM

## 2024-06-27 LAB
25(OH)D3 SERPL-MCNC: 53.3 NG/ML (ref 30–100)
ALBUMIN SERPL BCG-MCNC: 4.5 G/DL (ref 3.5–5)
ALP SERPL-CCNC: 88 U/L (ref 34–104)
ALT SERPL W P-5'-P-CCNC: 14 U/L (ref 7–52)
ANION GAP SERPL CALCULATED.3IONS-SCNC: 8 MMOL/L (ref 4–13)
AST SERPL W P-5'-P-CCNC: 17 U/L (ref 13–39)
BASOPHILS # BLD AUTO: 0.02 THOUSANDS/ÂΜL (ref 0–0.1)
BASOPHILS NFR BLD AUTO: 1 % (ref 0–1)
BILIRUB SERPL-MCNC: 0.47 MG/DL (ref 0.2–1)
BUN SERPL-MCNC: 18 MG/DL (ref 5–25)
CALCIUM SERPL-MCNC: 9.9 MG/DL (ref 8.4–10.2)
CHLORIDE SERPL-SCNC: 92 MMOL/L (ref 96–108)
CHOLEST SERPL-MCNC: 223 MG/DL
CO2 SERPL-SCNC: 28 MMOL/L (ref 21–32)
CREAT SERPL-MCNC: 0.99 MG/DL (ref 0.6–1.3)
EOSINOPHIL # BLD AUTO: 0.27 THOUSAND/ÂΜL (ref 0–0.61)
EOSINOPHIL NFR BLD AUTO: 7 % (ref 0–6)
ERYTHROCYTE [DISTWIDTH] IN BLOOD BY AUTOMATED COUNT: 12.5 % (ref 11.6–15.1)
GFR SERPL CREATININE-BSD FRML MDRD: 55 ML/MIN/1.73SQ M
GLUCOSE P FAST SERPL-MCNC: 92 MG/DL (ref 65–99)
HCT VFR BLD AUTO: 36.3 % (ref 34.8–46.1)
HDLC SERPL-MCNC: 97 MG/DL
HGB BLD-MCNC: 12.4 G/DL (ref 11.5–15.4)
IMM GRANULOCYTES # BLD AUTO: 0.01 THOUSAND/UL (ref 0–0.2)
IMM GRANULOCYTES NFR BLD AUTO: 0 % (ref 0–2)
LDLC SERPL CALC-MCNC: 113 MG/DL (ref 0–100)
LYMPHOCYTES # BLD AUTO: 1.57 THOUSANDS/ÂΜL (ref 0.6–4.47)
LYMPHOCYTES NFR BLD AUTO: 38 % (ref 14–44)
MCH RBC QN AUTO: 30.1 PG (ref 26.8–34.3)
MCHC RBC AUTO-ENTMCNC: 34.2 G/DL (ref 31.4–37.4)
MCV RBC AUTO: 88 FL (ref 82–98)
MONOCYTES # BLD AUTO: 0.33 THOUSAND/ÂΜL (ref 0.17–1.22)
MONOCYTES NFR BLD AUTO: 8 % (ref 4–12)
NEUTROPHILS # BLD AUTO: 1.92 THOUSANDS/ÂΜL (ref 1.85–7.62)
NEUTS SEG NFR BLD AUTO: 46 % (ref 43–75)
NRBC BLD AUTO-RTO: 0 /100 WBCS
PLATELET # BLD AUTO: 203 THOUSANDS/UL (ref 149–390)
PMV BLD AUTO: 8.5 FL (ref 8.9–12.7)
POTASSIUM SERPL-SCNC: 4.1 MMOL/L (ref 3.5–5.3)
PROT SERPL-MCNC: 7.1 G/DL (ref 6.4–8.4)
RBC # BLD AUTO: 4.12 MILLION/UL (ref 3.81–5.12)
SODIUM SERPL-SCNC: 128 MMOL/L (ref 135–147)
TRIGL SERPL-MCNC: 64 MG/DL
TSH SERPL DL<=0.05 MIU/L-ACNC: 3.73 UIU/ML (ref 0.45–4.5)
WBC # BLD AUTO: 4.12 THOUSAND/UL (ref 4.31–10.16)

## 2024-06-27 PROCEDURE — 85025 COMPLETE CBC W/AUTO DIFF WBC: CPT

## 2024-06-27 PROCEDURE — 82306 VITAMIN D 25 HYDROXY: CPT

## 2024-06-27 PROCEDURE — 84443 ASSAY THYROID STIM HORMONE: CPT

## 2024-06-27 PROCEDURE — 36415 COLL VENOUS BLD VENIPUNCTURE: CPT

## 2024-06-27 PROCEDURE — 80053 COMPREHEN METABOLIC PANEL: CPT

## 2024-06-27 PROCEDURE — 80061 LIPID PANEL: CPT

## 2024-07-05 ENCOUNTER — OFFICE VISIT (OUTPATIENT)
Dept: NEPHROLOGY | Facility: CLINIC | Age: 79
End: 2024-07-05
Payer: MEDICARE

## 2024-07-05 VITALS
WEIGHT: 167.2 LBS | OXYGEN SATURATION: 95 % | HEIGHT: 64 IN | SYSTOLIC BLOOD PRESSURE: 138 MMHG | BODY MASS INDEX: 28.54 KG/M2 | DIASTOLIC BLOOD PRESSURE: 60 MMHG | HEART RATE: 57 BPM

## 2024-07-05 DIAGNOSIS — E26.9 HYPERALDOSTERONISM (HCC): ICD-10-CM

## 2024-07-05 DIAGNOSIS — E87.1 HYPONATREMIA: ICD-10-CM

## 2024-07-05 DIAGNOSIS — N18.31 STAGE 3A CHRONIC KIDNEY DISEASE (HCC): Primary | ICD-10-CM

## 2024-07-05 PROCEDURE — 99214 OFFICE O/P EST MOD 30 MIN: CPT | Performed by: NURSE PRACTITIONER

## 2024-07-05 NOTE — PROGRESS NOTES
Nephrology   Office Follow-Up  Jacklyn Garcia 79 y.o. female MRN: [id number]    Encounter: [id number]        Assessment & Plan    Jacklyn Garcia was seen in the [place] office today. All diagnoses and orders for visit:     Moderate asymptomatic hyponatremia  Overhydration plus potential non osmotic ADH release from losartan/spironolactone, low solute intake. Advised decreased fluid intake. No longer on lasix. Repeat labs in 3 months including urine osmolality   CKD2/3a A3  Baseline creatinine 0.9-1.0 mg/dL since 2020. Etiology presumed HTN nephrosclerosis, age-related nephron loss. Most recent creatinine 0.99 mg/dL   HTN nephrosclerosis, likely  Home bp log reviewed and appropriate. No changes indicated. Thiazide and thiazide like diuretics contraindicated   Bp 126/57  125/52  140/55  138/57  Hyperaldosteronism   No specific side effects related to this mediation noted. Potassium is appropriate. No changes made    HPI: Jacklyn Garcia is a 79 y.o. female who is here for scheduled follow-up    Pertinent problems include CKD2/3a, HTN, hyperaldosteronism, hyponatremia. Last visit with Dr. Cortes in March 2023 no changes made.    Serum sodium 128 mmol/L. Patient drinks 10 ounces tea before breakfast, 6 ounces of juice or gatorade with breakfast, iced tea/water unspecified amount, unspecified amount of water with pills and 10 ounces of water prior to lab draw.    She has no specific side effects from medication. She is euvolemic.    Recommend reducing fluid as tolerated and increasing solute to diet. Perhaps would be better to try non fasting labs with less fluid and a salty snack to help phlebotomy.       ROS:   Review of Systems   Constitutional:  Negative for chills and fever.        Intermittent constipation    HENT:  Negative for ear pain and sore throat.    Eyes:  Negative for pain and visual disturbance.   Respiratory:  Negative for cough and shortness of breath.    Cardiovascular:  Negative for  chest pain and palpitations.   Gastrointestinal:  Negative for abdominal pain and vomiting.   Genitourinary:  Negative for dysuria and hematuria.   Musculoskeletal:  Negative for arthralgias and back pain.   Skin:  Negative for color change and rash.   Neurological:  Negative for seizures and syncope.   All other systems reviewed and are negative.      Allergies: Procaine, Adhesive [medical tape], Lidocaine, and Penicillins    Medications:   Current Outpatient Medications:     aspirin (ECOTRIN LOW STRENGTH) 81 mg EC tablet, Take 1 tablet (81 mg total) by mouth daily, Disp:  , Rfl:     bacitracin-polymyxin b ophthalmic ointment, INSTILL A 1/4 INCH INTO LEFT EYE AT BEDTIME, Disp: , Rfl:     bisoprolol (ZEBETA) 10 MG tablet, TAKE 1 TABLET EVERY DAY, Disp: 90 tablet, Rfl: 3    butalbital-acetaminophen-caffeine (FIORICET,ESGIC) -40 mg per tablet, TAKE 1 TABLET EVERY 6 HOURS AS NEEDED FOR MIGRAINE, Disp: 120 tablet, Rfl: 3    calcium carbonate (OS-MARLEYN) 600 MG tablet, Take 600 mg by mouth 2 (two) times a day with meals, Disp: , Rfl:     clindamycin (CLEOCIN) 150 mg capsule, Prn for dental procedures, Disp: , Rfl:     fluorometholone (FML) 0.1 % ophthalmic suspension, INSTILL 1 DROP BY OPTHALMIC ROUTE FOUR TIMES DAILY INTO LEFT EYE, Disp: , Rfl:     losartan (COZAAR) 100 MG tablet, TAKE 1 TABLET EVERY MORNING, Disp: 90 tablet, Rfl: 3    magnesium (MAGTAB) 84 MG (7MEQ) TBCR, Take 1 tablet (84 mg total) by mouth daily, Disp: 90 tablet, Rfl: 1    Multiple Vitamins-Minerals (MULTIVITAMIN WITH MINERALS) tablet, Take 1 tablet by mouth every morning, Disp: , Rfl:     omeprazole (PriLOSEC) 20 mg delayed release capsule, TAKE 1 CAPSULE TWICE DAILY, Disp: 180 capsule, Rfl: 1    pravastatin (PRAVACHOL) 40 mg tablet, TAKE 1 TABLET (40 MG TOTAL) DAILY, Disp: 90 tablet, Rfl: 3    prochlorperazine (COMPAZINE) 5 mg tablet, TAKE 1 TABLET EVERY 6 HOURS AS NEEDED FOR NAUSEA OR VOMITING, Disp: 90 tablet, Rfl: 10    spironolactone  (ALDACTONE) 50 mg tablet, TAKE 1 TABLET DAILY AFTER LUNCH, Disp: 90 tablet, Rfl: 3    Past Medical History:   Diagnosis Date    Allergies     Anxiety     Arthritis     Benign arteriolar nephrosclerosis     Bereavement     Cataract     Chronic kidney disease     Chronic kidney disease in type 2 diabetes mellitus (HCC)     Chronic kidney disease, stage 2 (mild)     Chronic pain disorder     Chronic pain syndrome     COPD (chronic obstructive pulmonary disease) ([facility])     DDD (degenerative disc disease), cervical     DDD (degenerative disc disease), lumbar     Degenerative joint disease of right hip     Depression     Diastolic dysfunction     DJD (degenerative joint disease), ankle and foot, right     DJD of right shoulder     Edema     Fracture of left calcaneus     Generalized anxiety disorder     GERD (gastroesophageal reflux disease)     Heart murmur     Hyperaldosteronism ([facility])     Hyperlipidemia     Hypertension     Hypertensive nephrosclerosis     Hypo-osmolality and hyponatremia     IBS (irritable bowel syndrome)     Insomnia     Migraines     Mitral regurgitation     MVP (mitral valve prolapse)     Obstructive sleep apnea syndrome     Osteoarthritis     Osteoarthritis     Pernicious anemia     Plantar fascia rupture     Rheumatoid arthritis ([facility])     Right knee DJD     Shingles     Sinobronchitis     Sleep apnea     Stroke ([facility])     tia    TIA (transient ischemic attack)     Vertigo      Past Surgical History:   Procedure Laterality Date    APPENDECTOMY      CARPAL TUNNEL RELEASE Bilateral     COLONOSCOPY      several    COLONOSCOPY  04/02/2012    by Dr. Mckinley Ruvalcaba    CORNEAL TRANSPLANT Left 04/11/2022    DXA PROCEDURE (HISTORICAL)  10/26/2016, 9/17/2014, 6/18/2007    HAND SURGERY Right     ring finger has pin    HYSTERECTOMY      34    INSERT / REPLACE PERIPHERAL NEUROSTIMULATOR PULSE GENERATOR /  Left     buttocks    JOINT REPLACEMENT Left     knee    JOINT REPLACEMENT Right 04/2019    Hip    KNEE  ARTHROPLASTY Left 01/15/2009    by Dr. Avery Leigh at [facility]     KNEE ARTHROSCOPY Bilateral     KNEE CARTILAGE SURGERY  09/10/2009    by Dr. Avery Leigh at [facility]     MAMMO (HISTORICAL)  12/10/2018, 10/30/2017, 10/26/2016    NASAL SEPTUM SURGERY  2008    NERVE BLOCK Left 02/20/2018    Procedure: BLOCK MEDIAL BRANCH - C4, C5, C6;  Surgeon: Jaycob Cruz MD;  Location: [facility];  Service: Pain Management     NERVE BLOCK Left 03/06/2018    Procedure: C4, C5, C6 MEDIAL BRANCH BLOCK;  Surgeon: Jaycob Cruz MD;  Location: [facility];  Service: Pain Management     CO ARTHRP ACETBLR/PROX FEM PROSTC AGRFT/ALGRFT Right 02/27/2019    Procedure: ARTHROPLASTY HIP TOTAL;  Surgeon: Bruno Pina DO;  Location:  MAIN OR;  Service: Orthopedics    CO ARTHRP KNE CONDYLE&PLATU MEDIAL&LAT COMPARTMENTS Right 06/24/2020    Procedure: KNEE TOTAL ARTHROPLASTY;  Surgeon: Bruno Pina DO;  Location:  MAIN OR;  Service: Orthopedics    CO COLONOSCOPY FLX DX W/COLLJ SPEC WHEN PFRMD N/A 04/24/2017    Procedure: FLEXIBLE COLONOSCOPY;  Surgeon: Mckinley Ruvalcaba MD;  Location: [facility];  Service: Colorectal    RADIOFREQUENCY ABLATION Left 04/17/2018    Procedure: ABLATION RADIO FREQUENCY (RFA) - C4, C5, C6;  Surgeon: Jaycob Cruz MD;  Location: [facility];  Service: Pain Management     REPLACEMENT TOTAL KNEE Left 07/05/2011    SINUS SURGERY      TONSILLECTOMY      TOTAL HIP ARTHROPLASTY Left     TRIGGER FINGER RELEASE      R 4th     UPPER GASTROINTESTINAL ENDOSCOPY  01/12/2007    with Donaldson dilatation by Dr. Misael Santiago at [facility]    WRIST SURGERY Right      Family History   Problem Relation Age of Onset    Heart disease Mother     Hypertension Mother     Arthritis Mother     Dementia Mother     Rheum arthritis Mother     Hypertension Father     Heart disease Father     Colon cancer Father     Hypertension Sister     Anxiety disorder Sister     Thyroid disease Sister  "    Prostate cancer Maternal Grandfather     No Known Problems Paternal Grandmother     No Known Problems Paternal Grandfather     No Known Problems Paternal Aunt     Pseudochol deficiency Neg Hx       reports that she has quit smoking. Her smoking use included cigarettes. She has never used smokeless tobacco. She reports current alcohol use. She reports that she does not use drugs.      Physical Exam:   Vitals:    07/05/24 0818   BP: 138/60   BP Location: Right arm   Patient Position: Sitting   Cuff Size: Standard   Pulse: 57   SpO2: 95%   Weight: 75.8 kg (167 lb 3.2 oz)   Height: 5' 4\" (1.626 m)     Body mass index is 28.7 kg/m².    General: conscious, cooperative, in no acute distress, appears stated age  Eyes: conjunctivae pale, anicteric sclerae  ENT: lips and mucous membranes moist  Neck: supple, no JVD, no masses  Chest:  essentially clear breath sounds bilaterally, no crackles, ronchus or wheezings  CVS: S1 & S2, normal rate, regular rhythm  Abdomen: soft, non-tender, non-distended, normoactive bowel sounds, rounded  Extremities: no edema of both legs  Skin: no rash   Neuro: awake, alert, oriented       Diagnostic Data:  Lab: I have personally reviewed pertinent lab results.,   CBC:       CMP: No results found for: \"SODIUM\", \"K\", \"CL\", \"CO2\", \"ANIONGAP\", \"BUN\", \"CREATININE\", \"GLUCOSE\", \"CALCIUM\", \"AST\", \"ALT\", \"ALKPHOS\", \"PROT\", \"BILITOT\", \"EGFR\",   PT/INR: No results found for: \"PT\", \"INR\",   Magnesium: No components found for: \"MAG\",  Phosphorous: No results found for: \"PHOS\"    Patient Instructions   It was nice to see you today. Continue fluid restriction and let me know if non fasting labs would be better for you  Repeat labs kathy bout 3 months     Portions of the record may have been created with voice recognition software. Occasional wrong word or \"sound a like\" substitutions may have occurred due to the inherent limitations of voice recognition software. Read the chart carefully and recognize, using " context, where substitutions have occurred.    If you have any questions, please contact the dictating provider.

## 2024-07-05 NOTE — PATIENT INSTRUCTIONS
It was nice to see you today. Continue fluid restriction and let me know if non fasting labs would be better for you  Repeat labs kathy bout 3 months

## 2024-07-12 DIAGNOSIS — E78.00 HYPERCHOLESTEROLEMIA: ICD-10-CM

## 2024-07-12 RX ORDER — PRAVASTATIN SODIUM 40 MG
TABLET ORAL
Qty: 90 TABLET | Refills: 1 | Status: SHIPPED | OUTPATIENT
Start: 2024-07-12

## 2024-07-18 ENCOUNTER — OFFICE VISIT (OUTPATIENT)
Dept: FAMILY MEDICINE CLINIC | Facility: CLINIC | Age: 79
End: 2024-07-18
Payer: MEDICARE

## 2024-07-18 VITALS
DIASTOLIC BLOOD PRESSURE: 68 MMHG | HEART RATE: 84 BPM | WEIGHT: 167 LBS | RESPIRATION RATE: 20 BRPM | SYSTOLIC BLOOD PRESSURE: 130 MMHG | TEMPERATURE: 97.7 F | BODY MASS INDEX: 28.51 KG/M2 | HEIGHT: 64 IN

## 2024-07-18 DIAGNOSIS — N18.31 STAGE 3A CHRONIC KIDNEY DISEASE (HCC): ICD-10-CM

## 2024-07-18 DIAGNOSIS — G44.209 MUSCLE TENSION HEADACHE: ICD-10-CM

## 2024-07-18 DIAGNOSIS — F51.01 PRIMARY INSOMNIA: ICD-10-CM

## 2024-07-18 DIAGNOSIS — E26.9 HYPERALDOSTERONISM (HCC): ICD-10-CM

## 2024-07-18 DIAGNOSIS — K21.9 GASTROESOPHAGEAL REFLUX DISEASE WITHOUT ESOPHAGITIS: ICD-10-CM

## 2024-07-18 DIAGNOSIS — E78.00 HYPERCHOLESTEROLEMIA: Primary | ICD-10-CM

## 2024-07-18 DIAGNOSIS — I10 ESSENTIAL HYPERTENSION: ICD-10-CM

## 2024-07-18 DIAGNOSIS — E87.1 HYPONATREMIA: ICD-10-CM

## 2024-07-18 PROCEDURE — G2211 COMPLEX E/M VISIT ADD ON: HCPCS | Performed by: FAMILY MEDICINE

## 2024-07-18 PROCEDURE — 99214 OFFICE O/P EST MOD 30 MIN: CPT | Performed by: FAMILY MEDICINE

## 2024-07-18 RX ORDER — ZOLPIDEM TARTRATE 5 MG/1
5 TABLET ORAL
Qty: 30 TABLET | Refills: 3 | Status: SHIPPED | OUTPATIENT
Start: 2024-07-18

## 2024-07-18 NOTE — PROGRESS NOTES
Assessment/Plan: Dyslipidemia on Pravachol with a total cholesterol of 223 triglycerides of 64 HDL of 97 and LDL of 113    Primary insomnia the patient has a history of Ambien use will represcribe Ambien 5 mg at bedtime    Moderate asymptomatic hyponatremia    Chronic kidney disease 3A nephrology notes reviewed and appreciated    Hyperaldosteronism on Aldactone    Hypertensive cardiovascular disease with a blood pressure controlled on the current regimen of Zebeta 10 mg Cozaar 100 mg and Aldactone 50 mg    Chronic muscle tension headaches Fioricet is effective therapy    GERD without esophagitis Prilosec 20 mg minimizes symptoms    Problem List Items Addressed This Visit     Hypercholesterolemia - Primary    Gastroesophageal reflux disease without esophagitis    Hyponatremia    Essential hypertension    Hyperaldosteronism (HCC)    Stage 3a chronic kidney disease (HCC)   Other Visit Diagnoses     Primary insomnia        Relevant Medications    zolpidem (AMBIEN) 5 mg tablet    Muscle tension headache               Diagnoses and all orders for this visit:    Hypercholesterolemia    Primary insomnia  -     zolpidem (AMBIEN) 5 mg tablet; Take 1 tablet (5 mg total) by mouth daily at bedtime as needed for sleep    Hyponatremia    Stage 3a chronic kidney disease (HCC)    Hyperaldosteronism (HCC)    Essential hypertension    Muscle tension headache    Gastroesophageal reflux disease without esophagitis        No problem-specific Assessment & Plan notes found for this encounter.      PHQ-2/9 Depression Screening            Body mass index is 28.67 kg/m².    BMI Counseling: Body mass index is 28.67 kg/m². The BMI     Subjective:      Patient ID: Jacklyn Garcia is a 79 y.o. female.    HPI    The following portions of the patient's history were reviewed and updated as appropriate:   She has a past medical history of Allergies, Anxiety, Arthritis, Benign arteriolar nephrosclerosis, Bereavement, Cataract, Chronic kidney  disease, Chronic kidney disease in type 2 diabetes mellitus (HCC), Chronic kidney disease, stage 2 (mild), Chronic pain disorder, Chronic pain syndrome, COPD (chronic obstructive pulmonary disease) ([facility]), DDD (degenerative disc disease), cervical, DDD (degenerative disc disease), lumbar, Degenerative joint disease of right hip, Depression, Diastolic dysfunction, DJD (degenerative joint disease), ankle and foot, right, DJD of right shoulder, Edema, Fracture of left calcaneus, Generalized anxiety disorder, GERD (gastroesophageal reflux disease), Heart murmur, Hyperaldosteronism ([facility]), Hyperlipidemia, Hypertension, Hypertensive nephrosclerosis, Hypo-osmolality and hyponatremia, IBS (irritable bowel syndrome), Insomnia, Migraines, Mitral regurgitation, MVP (mitral valve prolapse), Obstructive sleep apnea syndrome, Osteoarthritis, Osteoarthritis, Pernicious anemia, Plantar fascia rupture, Rheumatoid arthritis ([facility]), Right knee DJD, Shingles, Sinobronchitis, Sleep apnea, Stroke ([facility]), TIA (transient ischemic attack), and Vertigo.,  does not have any pertinent problems on file.,   has a past surgical history that includes Appendectomy; Sinus surgery; Insert / replace peripheral neurostimulator pulse generator /  (Left); pr colonoscopy flx dx w/collj spec when pfrmd (N/A, 04/24/2017); NERVE BLOCK (Left, 02/20/2018); NERVE BLOCK (Left, 03/06/2018); Radiofrequency ablation (Left, 04/17/2018); Colonoscopy; Upper gastrointestinal endoscopy (01/12/2007); Knee arthroscopy (Bilateral); Hand surgery (Right); pr arthrp acetblr/prox fem prostc agrft/algrft (Right, 02/27/2019); Nasal septum surgery (2008); Carpal tunnel release (Bilateral); Total hip arthroplasty (Left); Hysterectomy; Tonsillectomy; Joint replacement (Left); Joint replacement (Right, 04/2019); pr arthrp kne condyle&platu medial&lat compartments (Right, 06/24/2020); Wrist surgery (Right); Knee cartilage surgery (09/10/2009); Replacement total knee (Left,  07/05/2011); Knee Arthroplasty (Left, 01/15/2009); Colonoscopy (04/02/2012); DXA procedure(historical) (10/26/2016, 9/17/2014, 6/18/2007); Mammo (historical) (12/10/2018, 10/30/2017, 10/26/2016); Trigger finger release; and Corneal transplant (Left, 04/11/2022).,  family history includes Anxiety disorder in her sister; Arthritis in her mother; Colon cancer in her father; Dementia in her mother; Heart disease in her father and mother; Hypertension in her father, mother, and sister; No Known Problems in her paternal aunt, paternal grandfather, and paternal grandmother; Prostate cancer in her maternal grandfather; Rheum arthritis in her mother; Thyroid disease in her sister.,   reports that she has quit smoking. Her smoking use included cigarettes. She has never used smokeless tobacco. She reports current alcohol use. She reports that she does not use drugs.,  is allergic to procaine, adhesive [medical tape], lidocaine, and penicillins..  Current Outpatient Medications   Medication Sig Dispense Refill   • zolpidem (AMBIEN) 5 mg tablet Take 1 tablet (5 mg total) by mouth daily at bedtime as needed for sleep 30 tablet 3   • aspirin (ECOTRIN LOW STRENGTH) 81 mg EC tablet Take 1 tablet (81 mg total) by mouth daily     • bacitracin-polymyxin b ophthalmic ointment INSTILL A 1/4 INCH INTO LEFT EYE AT BEDTIME     • bisoprolol (ZEBETA) 10 MG tablet TAKE 1 TABLET EVERY DAY 90 tablet 3   • butalbital-acetaminophen-caffeine (FIORICET,ESGIC) -40 mg per tablet TAKE 1 TABLET EVERY 6 HOURS AS NEEDED FOR MIGRAINE 120 tablet 3   • calcium carbonate (OS-MARLENY) 600 MG tablet Take 600 mg by mouth 2 (two) times a day with meals     • clindamycin (CLEOCIN) 150 mg capsule Prn for dental procedures     • fluorometholone (FML) 0.1 % ophthalmic suspension INSTILL 1 DROP BY OPTHALMIC ROUTE FOUR TIMES DAILY INTO LEFT EYE     • losartan (COZAAR) 100 MG tablet TAKE 1 TABLET EVERY MORNING 90 tablet 3   • magnesium (MAGTAB) 84 MG (7MEQ) TBCR Take  "1 tablet (84 mg total) by mouth daily 90 tablet 1   • Multiple Vitamins-Minerals (MULTIVITAMIN WITH MINERALS) tablet Take 1 tablet by mouth every morning     • omeprazole (PriLOSEC) 20 mg delayed release capsule TAKE 1 CAPSULE TWICE DAILY 180 capsule 1   • pravastatin (PRAVACHOL) 40 mg tablet TAKE 1 TABLET (40 MG TOTAL) DAILY 90 tablet 1   • prochlorperazine (COMPAZINE) 5 mg tablet TAKE 1 TABLET EVERY 6 HOURS AS NEEDED FOR NAUSEA OR VOMITING 90 tablet 10   • spironolactone (ALDACTONE) 50 mg tablet TAKE 1 TABLET DAILY AFTER LUNCH 90 tablet 3     No current facility-administered medications for this visit.       Review of Systems   Constitutional:  Negative for chills and fever.   HENT:  Negative for ear pain and sore throat.    Eyes:  Negative for pain and visual disturbance.   Respiratory:  Negative for cough and shortness of breath.    Cardiovascular:  Negative for chest pain and palpitations.   Gastrointestinal:  Negative for abdominal pain and vomiting.   Genitourinary:  Negative for dysuria and hematuria.   Musculoskeletal:  Negative for arthralgias and back pain.   Skin:  Negative for color change and rash.   Neurological:  Positive for headaches. Negative for seizures and syncope.   Psychiatric/Behavioral:  Positive for sleep disturbance.    All other systems reviewed and are negative.        Objective:    /68   Pulse 84   Temp 97.7 °F (36.5 °C)   Resp 20   Ht 5' 4\" (1.626 m)   Wt 75.8 kg (167 lb)   BMI 28.67 kg/m²   Body mass index is 28.67 kg/m².     Physical Exam  Constitutional:       Appearance: Normal appearance. She is well-developed.   HENT:      Head: Normocephalic and atraumatic.      Right Ear: Tympanic membrane, ear canal and external ear normal.      Left Ear: Tympanic membrane, ear canal and external ear normal.      Nose: Nose normal.      Mouth/Throat:      Mouth: Mucous membranes are moist.      Pharynx: Oropharynx is clear.   Eyes:      Extraocular Movements: Extraocular movements " intact.      Conjunctiva/sclera: Conjunctivae normal.      Pupils: Pupils are equal, round, and reactive to light.   Cardiovascular:      Rate and Rhythm: Normal rate and regular rhythm.      Pulses: Normal pulses.      Heart sounds: Normal heart sounds.   Pulmonary:      Effort: Pulmonary effort is normal.      Breath sounds: Normal breath sounds.   Abdominal:      General: Abdomen is flat. Bowel sounds are normal.      Palpations: Abdomen is soft.      Tenderness: There is no abdominal tenderness.   Musculoskeletal:         General: Normal range of motion.      Cervical back: Normal range of motion and neck supple.   Skin:     General: Skin is warm and dry.      Capillary Refill: Capillary refill takes less than 2 seconds.   Neurological:      General: No focal deficit present.      Mental Status: She is alert and oriented to person, place, and time.   Psychiatric:         Mood and Affect: Mood normal.         Behavior: Behavior normal.         Thought Content: Thought content normal.         Judgment: Judgment normal.

## 2024-07-29 ENCOUNTER — NURSE TRIAGE (OUTPATIENT)
Age: 79
End: 2024-07-29

## 2024-07-29 NOTE — TELEPHONE ENCOUNTER
"Pt called in stating that the Bisoprolol is only working in the AM and then by the afternoon she is having palpitations and elevated BP. She states that in the mornings after medication her BP ranges 120s/50s. She said by the afternoon systolic ranges 154-178 and diastolic in the 50s. She said she also develops headaches and palpitations.     Please advise     Answer Assessment - Initial Assessment Questions  1. BLOOD PRESSURE: \"What is the blood pressure?\" \"Did you take at least two measurements 5 minutes apart?\"      Takes BP in the AM about 1 hour after medication and in the afternoon   2. ONSET: \"When did you take your blood pressure?\"      This afternoon-158/54  3. HOW: \"How did you obtain the blood pressure?\" (e.g., visiting nurse, automatic home BP monitor)      Upper arm automatic cuff  4. HISTORY: \"Do you have a history of high blood pressure?\"      yes  5. MEDICATIONS: \"Are you taking any medications for blood pressure?\" \"Have you missed any doses recently?\"      Bisoprolol 10mg, denies missing a dose  6. OTHER SYMPTOMS: \"Do you have any symptoms?\" (e.g., headache, chest pain, blurred vision, difficulty breathing, weakness)      Headaches, slight SOB mostly subsided by the night time    Protocols used: Blood Pressure - High-ADULT-[place]    "

## 2024-07-30 NOTE — TELEPHONE ENCOUNTER
Per Dr. Blas instructed patient to take 5 mgm (1/2 tab) of bisoprolol in the evening. Continue to monitor BP for one week and call office with readings, Verbalized understanding.

## 2024-08-05 DIAGNOSIS — G44.209 MUSCLE TENSION HEADACHE: ICD-10-CM

## 2024-08-05 RX ORDER — BUTALBITAL, ACETAMINOPHEN AND CAFFEINE 50; 325; 40 MG/1; MG/1; MG/1
TABLET ORAL
Qty: 120 TABLET | Refills: 3 | Status: SHIPPED | OUTPATIENT
Start: 2024-08-05

## 2024-08-07 ENCOUNTER — TELEPHONE (OUTPATIENT)
Age: 79
End: 2024-08-07

## 2024-08-07 NOTE — TELEPHONE ENCOUNTER
Pt phoned in with pressure recent pressure reading and some concerns. Pressures are have been 136/55, 126/50, 114/48, 124/45 and 119/32 notices fluctuations with bottom numbers, Reports she is growing concerned. Palpitations are not bothering at this times denies SOB and chest pains.

## 2024-08-08 ENCOUNTER — OFFICE VISIT (OUTPATIENT)
Dept: FAMILY MEDICINE CLINIC | Facility: CLINIC | Age: 79
End: 2024-08-08
Payer: MEDICARE

## 2024-08-08 ENCOUNTER — CLINICAL SUPPORT (OUTPATIENT)
Dept: CARDIOLOGY CLINIC | Facility: HOSPITAL | Age: 79
End: 2024-08-08
Payer: MEDICARE

## 2024-08-08 VITALS
DIASTOLIC BLOOD PRESSURE: 72 MMHG | OXYGEN SATURATION: 98 % | HEART RATE: 56 BPM | HEIGHT: 64 IN | BODY MASS INDEX: 28.99 KG/M2 | SYSTOLIC BLOOD PRESSURE: 148 MMHG | WEIGHT: 169.8 LBS

## 2024-08-08 VITALS
TEMPERATURE: 97.9 F | BODY MASS INDEX: 28.85 KG/M2 | RESPIRATION RATE: 20 BRPM | HEIGHT: 64 IN | HEART RATE: 84 BPM | DIASTOLIC BLOOD PRESSURE: 80 MMHG | WEIGHT: 169 LBS | SYSTOLIC BLOOD PRESSURE: 136 MMHG

## 2024-08-08 DIAGNOSIS — K21.9 GASTROESOPHAGEAL REFLUX DISEASE WITHOUT ESOPHAGITIS: ICD-10-CM

## 2024-08-08 DIAGNOSIS — G44.209 MUSCLE TENSION HEADACHE: ICD-10-CM

## 2024-08-08 DIAGNOSIS — I10 ESSENTIAL HYPERTENSION: Primary | ICD-10-CM

## 2024-08-08 DIAGNOSIS — J01.01 ACUTE RECURRENT MAXILLARY SINUSITIS: Primary | ICD-10-CM

## 2024-08-08 DIAGNOSIS — I10 ESSENTIAL HYPERTENSION: ICD-10-CM

## 2024-08-08 PROCEDURE — 99214 OFFICE O/P EST MOD 30 MIN: CPT | Performed by: FAMILY MEDICINE

## 2024-08-08 PROCEDURE — G2211 COMPLEX E/M VISIT ADD ON: HCPCS | Performed by: FAMILY MEDICINE

## 2024-08-08 PROCEDURE — 99211 OFF/OP EST MAY X REQ PHY/QHP: CPT | Performed by: INTERNAL MEDICINE

## 2024-08-08 RX ORDER — PREDNISONE 10 MG/1
20 TABLET ORAL 2 TIMES DAILY WITH MEALS
Qty: 20 TABLET | Refills: 1 | Status: SHIPPED | OUTPATIENT
Start: 2024-08-08

## 2024-08-08 RX ORDER — CLARITHROMYCIN 500 MG/1
500 TABLET, COATED ORAL EVERY 12 HOURS SCHEDULED
Qty: 14 TABLET | Refills: 0 | Status: SHIPPED | OUTPATIENT
Start: 2024-08-08 | End: 2024-08-15

## 2024-08-08 NOTE — PROGRESS NOTES
The patient is here today under the direction of Dr. Blas for a blood pressure check for hypertension. Jacklyn states that she is feeling well today during her visit. We checked her blood pressure on her left arm with our blood pressure cuff in our office today, we got 148/72. Pt stated she uses a manual cuff at home and did not bring her cuff with her today for her blood pressure visit.      Jacklyn brought along with her a log of her blood pressures below:    7/31/24 136/55 am  58                120/63 pm  63    08/01/24 126/60 am  52                  114/48 pm  57    08/02/24  119/45 am  53                  124/45 am  54    08/03/24   119/50 am 55                   114/44 pm 57    08/05/24   121/52 am 58                    122/42 pm 58     08/06/24  131/46 am - ( right arm ) 126/57 56                   137/54   55    08/07/24  127/54 am 54

## 2024-08-08 NOTE — PROGRESS NOTES
Assessment/Plan: Acute recurrent bilateral maxillary sinusitis we will provide prednisone 20 mg twice daily with food and Biaxin 500 mg twice daily with food    Hypertensive cardiovascular disease with a blood pressure controlled on the current regimen    GERD without esophagitis Prilosec minimizes    Muscle tension headaches treated with Fioricet 1 up to 4 times daily as needed    Problem List Items Addressed This Visit     Gastroesophageal reflux disease without esophagitis    Essential hypertension   Other Visit Diagnoses     Acute recurrent maxillary sinusitis    -  Primary    Relevant Medications    clarithromycin (BIAXIN) 500 mg tablet    predniSONE 10 mg tablet    Muscle tension headache                 Diagnoses and all orders for this visit:    Acute recurrent maxillary sinusitis  -     clarithromycin (BIAXIN) 500 mg tablet; Take 1 tablet (500 mg total) by mouth every 12 (twelve) hours for 7 days  -     predniSONE 10 mg tablet; Take 2 tablets (20 mg total) by mouth 2 (two) times a day with meals    Essential hypertension    Gastroesophageal reflux disease without esophagitis    Muscle tension headache        No problem-specific Assessment & Plan notes found for this encounter.      PHQ-2/9 Depression Screening            Body mass index is 29.01 kg/m².    BMI Counseling: Body mass index is 29.01 kg/m². The BMI     Subjective:      Patient ID: Jacklyn Garcia is a 79 y.o. female.    Sinus Problem  Associated symptoms include headaches and sinus pressure. Pertinent negatives include no chills, coughing, ear pain, shortness of breath or sore throat.       The following portions of the patient's history were reviewed and updated as appropriate:   She has a past medical history of Allergies, Anxiety, Arthritis, Benign arteriolar nephrosclerosis, Bereavement, Cataract, Chronic kidney disease, Chronic kidney disease in type 2 diabetes mellitus (HCC), Chronic kidney disease, stage 2 (mild), Chronic pain  disorder, Chronic pain syndrome, COPD (chronic obstructive pulmonary disease) (HCC), DDD (degenerative disc disease), cervical, DDD (degenerative disc disease), lumbar, Degenerative joint disease of right hip, Depression, Diastolic dysfunction, DJD (degenerative joint disease), ankle and foot, right, DJD of right shoulder, Edema, Fracture of left calcaneus, Generalized anxiety disorder, GERD (gastroesophageal reflux disease), Heart murmur, Hyperaldosteronism (HCC), Hyperlipidemia, Hypertension, Hypertensive nephrosclerosis, Hypo-osmolality and hyponatremia, IBS (irritable bowel syndrome), Insomnia, Migraines, Mitral regurgitation, MVP (mitral valve prolapse), Obstructive sleep apnea syndrome, Osteoarthritis, Osteoarthritis, Pernicious anemia, Plantar fascia rupture, Rheumatoid arthritis ([facility]), Right knee DJD, Shingles, Sinobronchitis, Sleep apnea, Stroke ([facility]), TIA (transient ischemic attack), and Vertigo.,  does not have any pertinent problems on file.,   has a past surgical history that includes Appendectomy; Sinus surgery; Insert / replace peripheral neurostimulator pulse generator /  (Left); pr colonoscopy flx dx w/collj spec when pfrmd (N/A, 04/24/2017); NERVE BLOCK (Left, 02/20/2018); NERVE BLOCK (Left, 03/06/2018); Radiofrequency ablation (Left, 04/17/2018); Colonoscopy; Upper gastrointestinal endoscopy (01/12/2007); Knee arthroscopy (Bilateral); Hand surgery (Right); pr arthrp acetblr/prox fem prostc agrft/algrft (Right, 02/27/2019); Nasal septum surgery (2008); Carpal tunnel release (Bilateral); Total hip arthroplasty (Left); Hysterectomy; Tonsillectomy; Joint replacement (Left); Joint replacement (Right, 04/2019); pr arthrp kne condyle&platu medial&lat compartments (Right, 06/24/2020); Wrist surgery (Right); Knee cartilage surgery (09/10/2009); Replacement total knee (Left, 07/05/2011); Knee Arthroplasty (Left, 01/15/2009); Colonoscopy (04/02/2012); DXA procedure(historical) (10/26/2016, 9/17/2014,  6/18/2007); Mammo (historical) (12/10/2018, 10/30/2017, 10/26/2016); Trigger finger release; and Corneal transplant (Left, 04/11/2022).,  family history includes Anxiety disorder in her sister; Arthritis in her mother; Colon cancer in her father; Dementia in her mother; Heart disease in her father and mother; Hypertension in her father, mother, and sister; No Known Problems in her paternal aunt, paternal grandfather, and paternal grandmother; Prostate cancer in her maternal grandfather; Rheum arthritis in her mother; Thyroid disease in her sister.,   reports that she has quit smoking. Her smoking use included cigarettes. She has never used smokeless tobacco. She reports current alcohol use. She reports that she does not use drugs.,  is allergic to procaine, adhesive [medical tape], lidocaine, and penicillins..  Current Outpatient Medications   Medication Sig Dispense Refill   • clarithromycin (BIAXIN) 500 mg tablet Take 1 tablet (500 mg total) by mouth every 12 (twelve) hours for 7 days 14 tablet 0   • predniSONE 10 mg tablet Take 2 tablets (20 mg total) by mouth 2 (two) times a day with meals 20 tablet 1   • aspirin (ECOTRIN LOW STRENGTH) 81 mg EC tablet Take 1 tablet (81 mg total) by mouth daily     • bacitracin-polymyxin b ophthalmic ointment INSTILL A 1/4 INCH INTO LEFT EYE AT BEDTIME     • bisoprolol (ZEBETA) 10 MG tablet TAKE 1 TABLET EVERY DAY 90 tablet 3   • butalbital-acetaminophen-caffeine (FIORICET,ESGIC) -40 mg per tablet TAKE 1 TABLET EVERY 6 HOURS AS NEEDED FOR MIGRAINE 120 tablet 3   • calcium carbonate (OS-MARLENY) 600 MG tablet Take 600 mg by mouth 2 (two) times a day with meals     • clindamycin (CLEOCIN) 150 mg capsule Prn for dental procedures     • fluorometholone (FML) 0.1 % ophthalmic suspension INSTILL 1 DROP BY OPTHALMIC ROUTE FOUR TIMES DAILY INTO LEFT EYE     • losartan (COZAAR) 100 MG tablet TAKE 1 TABLET EVERY MORNING 90 tablet 3   • magnesium (MAGTAB) 84 MG (7MEQ) TBCR Take 1 tablet  "(84 mg total) by mouth daily 90 tablet 1   • Multiple Vitamins-Minerals (MULTIVITAMIN WITH MINERALS) tablet Take 1 tablet by mouth every morning     • omeprazole (PriLOSEC) 20 mg delayed release capsule TAKE 1 CAPSULE TWICE DAILY 180 capsule 1   • pravastatin (PRAVACHOL) 40 mg tablet TAKE 1 TABLET (40 MG TOTAL) DAILY 90 tablet 1   • prochlorperazine (COMPAZINE) 5 mg tablet TAKE 1 TABLET EVERY 6 HOURS AS NEEDED FOR NAUSEA OR VOMITING 90 tablet 10   • spironolactone (ALDACTONE) 50 mg tablet TAKE 1 TABLET DAILY AFTER LUNCH 90 tablet 3   • zolpidem (AMBIEN) 5 mg tablet Take 1 tablet (5 mg total) by mouth daily at bedtime as needed for sleep 30 tablet 3     No current facility-administered medications for this visit.       Review of Systems   Constitutional:  Negative for chills and fever.   HENT:  Positive for sinus pressure and sinus pain. Negative for ear pain and sore throat.    Eyes:  Negative for pain and visual disturbance.   Respiratory:  Negative for cough and shortness of breath.    Cardiovascular:  Negative for chest pain and palpitations.   Gastrointestinal:  Negative for abdominal pain and vomiting.   Genitourinary:  Negative for dysuria and hematuria.   Musculoskeletal:  Negative for arthralgias and back pain.   Skin:  Negative for color change and rash.   Neurological:  Positive for headaches. Negative for seizures and syncope.   All other systems reviewed and are negative.        Objective:    /80   Pulse 84   Temp 97.9 °F (36.6 °C)   Resp 20   Ht 5' 4\" (1.626 m)   Wt 76.7 kg (169 lb)   BMI 29.01 kg/m²   Body mass index is 29.01 kg/m².     Physical Exam  Constitutional:       Appearance: Normal appearance. She is well-developed.   HENT:      Head: Normocephalic and atraumatic.      Right Ear: Tympanic membrane, ear canal and external ear normal.      Left Ear: Tympanic membrane, ear canal and external ear normal.      Nose: Nose normal.      Mouth/Throat:      Mouth: Mucous membranes are " moist.      Pharynx: Oropharynx is clear.   Eyes:      Extraocular Movements: Extraocular movements intact.      Conjunctiva/sclera: Conjunctivae normal.      Pupils: Pupils are equal, round, and reactive to light.   Cardiovascular:      Rate and Rhythm: Normal rate and regular rhythm.      Pulses: Normal pulses.      Heart sounds: Normal heart sounds.   Pulmonary:      Effort: Pulmonary effort is normal.      Breath sounds: Normal breath sounds.   Abdominal:      General: Abdomen is flat. Bowel sounds are normal.      Palpations: Abdomen is soft.      Tenderness: There is no abdominal tenderness.   Musculoskeletal:         General: Normal range of motion.      Cervical back: Normal range of motion and neck supple.   Skin:     General: Skin is warm and dry.      Capillary Refill: Capillary refill takes less than 2 seconds.   Neurological:      General: No focal deficit present.      Mental Status: She is alert and oriented to person, place, and time.   Psychiatric:         Mood and Affect: Mood normal.         Behavior: Behavior normal.         Thought Content: Thought content normal.         Judgment: Judgment normal.

## 2024-09-06 ENCOUNTER — IMMUNIZATIONS (OUTPATIENT)
Dept: FAMILY MEDICINE CLINIC | Facility: CLINIC | Age: 79
End: 2024-09-06
Payer: MEDICARE

## 2024-09-06 DIAGNOSIS — Z23 ENCOUNTER FOR IMMUNIZATION: Primary | ICD-10-CM

## 2024-09-06 PROCEDURE — G0008 ADMIN INFLUENZA VIRUS VAC: HCPCS

## 2024-09-06 PROCEDURE — 90662 IIV NO PRSV INCREASED AG IM: CPT

## 2024-09-27 ENCOUNTER — OFFICE VISIT (OUTPATIENT)
Dept: FAMILY MEDICINE CLINIC | Facility: CLINIC | Age: 79
End: 2024-09-27
Payer: MEDICARE

## 2024-09-27 VITALS
TEMPERATURE: 97.7 F | DIASTOLIC BLOOD PRESSURE: 84 MMHG | BODY MASS INDEX: 28.68 KG/M2 | HEIGHT: 64 IN | SYSTOLIC BLOOD PRESSURE: 142 MMHG | RESPIRATION RATE: 20 BRPM | HEART RATE: 84 BPM | WEIGHT: 168 LBS

## 2024-09-27 DIAGNOSIS — G44.219 EPISODIC TENSION-TYPE HEADACHE, NOT INTRACTABLE: Primary | ICD-10-CM

## 2024-09-27 DIAGNOSIS — N30.90 CYSTITIS: ICD-10-CM

## 2024-09-27 DIAGNOSIS — M51.369 DDD (DEGENERATIVE DISC DISEASE), LUMBAR: ICD-10-CM

## 2024-09-27 DIAGNOSIS — Z96.89 S/P INSERTION OF SPINAL CORD STIMULATOR: ICD-10-CM

## 2024-09-27 DIAGNOSIS — M51.36 DDD (DEGENERATIVE DISC DISEASE), LUMBAR: ICD-10-CM

## 2024-09-27 PROBLEM — N18.2 STAGE 2 CHRONIC KIDNEY DISEASE: Status: RESOLVED | Noted: 2019-10-09 | Resolved: 2024-09-27

## 2024-09-27 PROBLEM — E87.1 HYPONATREMIA: Status: RESOLVED | Noted: 2019-02-06 | Resolved: 2024-09-27

## 2024-09-27 PROBLEM — I50.40 COMBINED SYSTOLIC AND DIASTOLIC CONGESTIVE HEART FAILURE, UNSPECIFIED HF CHRONICITY (HCC): Status: RESOLVED | Noted: 2022-05-20 | Resolved: 2024-09-27

## 2024-09-27 PROBLEM — R06.02 SHORTNESS OF BREATH ON EXERTION: Status: RESOLVED | Noted: 2017-04-18 | Resolved: 2024-09-27

## 2024-09-27 PROBLEM — I10 ESSENTIAL HYPERTENSION: Status: RESOLVED | Noted: 2019-02-27 | Resolved: 2024-09-27

## 2024-09-27 PROBLEM — Z96.651 STATUS POST TOTAL RIGHT KNEE REPLACEMENT: Status: RESOLVED | Noted: 2020-07-07 | Resolved: 2024-09-27

## 2024-09-27 PROCEDURE — G2211 COMPLEX E/M VISIT ADD ON: HCPCS | Performed by: FAMILY MEDICINE

## 2024-09-27 PROCEDURE — 99214 OFFICE O/P EST MOD 30 MIN: CPT | Performed by: FAMILY MEDICINE

## 2024-09-27 RX ORDER — METHYLPREDNISOLONE 4 MG
TABLET, DOSE PACK ORAL
Qty: 21 EACH | Refills: 0 | Status: SHIPPED | OUTPATIENT
Start: 2024-09-27

## 2024-09-27 RX ORDER — CIPROFLOXACIN 250 MG/1
250 TABLET, FILM COATED ORAL EVERY 12 HOURS SCHEDULED
Qty: 10 TABLET | Refills: 0 | Status: SHIPPED | OUTPATIENT
Start: 2024-09-27 | End: 2024-10-02

## 2024-09-27 NOTE — ASSESSMENT & PLAN NOTE
With low back pain the patient has a spinal cord stimulator that is nonfunctional and she defers surgery for replacement of the battery.  Will provide a Medrol Dosepak for the back pain  Orders:    methylPREDNISolone 4 MG tablet therapy pack; Use as directed on package

## 2024-09-27 NOTE — ASSESSMENT & PLAN NOTE
Currently nonfunctional  Orders:    methylPREDNISolone 4 MG tablet therapy pack; Use as directed on package

## 2024-09-27 NOTE — PROGRESS NOTES
Ambulatory Visit  Name: Jacklyn Garcia      : 1945      MRN: [id number]  Encounter Provider: Ernie Wilkinson DO  Encounter Date: 2024   Encounter department: [facility] PRIMARY CARE    Assessment & Plan  Episodic tension-type headache, not intractable  The patient is questioning temporal arteritis as a possible cause of her headaches.  Will obtain a count with a sed rate and C-reactive protein.  Orders:    C-reactive protein; Future    CBC and differential; Future    Sedimentation rate, automated; Future    DDD (degenerative disc disease), lumbar  With low back pain the patient has a spinal cord stimulator that is nonfunctional and she defers surgery for replacement of the battery.  Will provide a Medrol Dosepak for the back pain  Orders:    methylPREDNISolone 4 MG tablet therapy pack; Use as directed on package    S/P insertion of spinal cord stimulator  Currently nonfunctional  Orders:    methylPREDNISolone 4 MG tablet therapy pack; Use as directed on package    Cystitis    Orders:    UA w Reflex to Microscopic w Reflex to Culture; Future    ciprofloxacin (CIPRO) 250 mg tablet; Take 1 tablet (250 mg total) by mouth every 12 (twelve) hours for 5 days       History of Present Illness     Patient presents with a chief complaint of back pain urinary frequency urgency and pain    Back Pain  Associated symptoms include dysuria. Pertinent negatives include no abdominal pain, chest pain or fever.   Migraine  Associated symptoms include back pain. Pertinent negatives include no abdominal pain, coughing, ear pain, eye pain, fever, seizures, sore throat or vomiting.         Review of Systems   Constitutional:  Negative for chills and fever.   HENT:  Negative for ear pain and sore throat.    Eyes:  Negative for pain and visual disturbance.   Respiratory:  Negative for cough and shortness of breath.    Cardiovascular:  Negative for chest pain and palpitations.   Gastrointestinal:  Negative  "for abdominal pain and vomiting.   Genitourinary:  Positive for dysuria and frequency. Negative for hematuria.   Musculoskeletal:  Positive for back pain. Negative for arthralgias.   Skin:  Negative for color change and rash.   Neurological:  Negative for seizures and syncope.   All other systems reviewed and are negative.          Objective     /84   Pulse 84   Temp 97.7 °F (36.5 °C)   Resp 20   Ht 5' 4\" (1.626 m)   Wt 76.2 kg (168 lb)   BMI 28.84 kg/m²     Physical Exam  Constitutional:       Appearance: Normal appearance.   HENT:      Head: Normocephalic and atraumatic.      Right Ear: Tympanic membrane, ear canal and external ear normal.      Left Ear: Tympanic membrane, ear canal and external ear normal.      Nose: Nose normal.      Mouth/Throat:      Mouth: Mucous membranes are moist.      Pharynx: Oropharynx is clear.   Eyes:      Extraocular Movements: Extraocular movements intact.      Conjunctiva/sclera: Conjunctivae normal.      Pupils: Pupils are equal, round, and reactive to light.   Cardiovascular:      Rate and Rhythm: Normal rate and regular rhythm.      Pulses: Normal pulses.      Heart sounds: Normal heart sounds.   Pulmonary:      Effort: Pulmonary effort is normal.      Breath sounds: Normal breath sounds.   Abdominal:      General: Abdomen is flat. Bowel sounds are normal.      Palpations: Abdomen is soft.   Musculoskeletal:         General: Normal range of motion.      Cervical back: Normal range of motion and neck supple.   Skin:     General: Skin is warm and dry.   Neurological:      General: No focal deficit present.      Mental Status: She is alert and oriented to person, place, and time.   Psychiatric:         Mood and Affect: Mood normal.         Behavior: Behavior normal.         "

## 2024-09-30 ENCOUNTER — TELEPHONE (OUTPATIENT)
Dept: NEPHROLOGY | Facility: CLINIC | Age: 79
End: 2024-09-30

## 2024-09-30 ENCOUNTER — APPOINTMENT (OUTPATIENT)
Dept: LAB | Facility: HOSPITAL | Age: 79
End: 2024-09-30
Payer: MEDICARE

## 2024-09-30 DIAGNOSIS — G44.219 EPISODIC TENSION-TYPE HEADACHE, NOT INTRACTABLE: ICD-10-CM

## 2024-09-30 DIAGNOSIS — N18.31 STAGE 3A CHRONIC KIDNEY DISEASE (HCC): ICD-10-CM

## 2024-09-30 DIAGNOSIS — E87.1 HYPONATREMIA: ICD-10-CM

## 2024-09-30 DIAGNOSIS — N30.90 CYSTITIS: ICD-10-CM

## 2024-09-30 LAB
ALBUMIN SERPL BCG-MCNC: 4.5 G/DL (ref 3.5–5)
ALP SERPL-CCNC: 94 U/L (ref 34–104)
ALT SERPL W P-5'-P-CCNC: 15 U/L (ref 7–52)
ANION GAP SERPL CALCULATED.3IONS-SCNC: 9 MMOL/L (ref 4–13)
AST SERPL W P-5'-P-CCNC: 17 U/L (ref 13–39)
BACTERIA UR QL AUTO: NORMAL /HPF
BASOPHILS # BLD AUTO: 0.01 THOUSANDS/ÂΜL (ref 0–0.1)
BASOPHILS NFR BLD AUTO: 0 % (ref 0–1)
BILIRUB SERPL-MCNC: 0.53 MG/DL (ref 0.2–1)
BILIRUB UR QL STRIP: NEGATIVE
BUN SERPL-MCNC: 15 MG/DL (ref 5–25)
CALCIUM SERPL-MCNC: 9.4 MG/DL (ref 8.4–10.2)
CHLORIDE SERPL-SCNC: 91 MMOL/L (ref 96–108)
CLARITY UR: CLEAR
CO2 SERPL-SCNC: 27 MMOL/L (ref 21–32)
COLOR UR: COLORLESS
CREAT SERPL-MCNC: 0.93 MG/DL (ref 0.6–1.3)
CREAT UR-MCNC: 27.8 MG/DL
CRP SERPL QL: 2.4 MG/L
EOSINOPHIL # BLD AUTO: 0.1 THOUSAND/ÂΜL (ref 0–0.61)
EOSINOPHIL NFR BLD AUTO: 2 % (ref 0–6)
ERYTHROCYTE [DISTWIDTH] IN BLOOD BY AUTOMATED COUNT: 12.4 % (ref 11.6–15.1)
ERYTHROCYTE [SEDIMENTATION RATE] IN BLOOD: 9 MM/HOUR (ref 0–29)
GFR SERPL CREATININE-BSD FRML MDRD: 59 ML/MIN/1.73SQ M
GLUCOSE P FAST SERPL-MCNC: 90 MG/DL (ref 65–99)
GLUCOSE UR STRIP-MCNC: NEGATIVE MG/DL
HCT VFR BLD AUTO: 36 % (ref 34.8–46.1)
HGB BLD-MCNC: 12.3 G/DL (ref 11.5–15.4)
HGB UR QL STRIP.AUTO: NEGATIVE
IMM GRANULOCYTES # BLD AUTO: 0.01 THOUSAND/UL (ref 0–0.2)
IMM GRANULOCYTES NFR BLD AUTO: 0 % (ref 0–2)
KETONES UR STRIP-MCNC: NEGATIVE MG/DL
LEUKOCYTE ESTERASE UR QL STRIP: ABNORMAL
LYMPHOCYTES # BLD AUTO: 1.97 THOUSANDS/ÂΜL (ref 0.6–4.47)
LYMPHOCYTES NFR BLD AUTO: 36 % (ref 14–44)
MAGNESIUM SERPL-MCNC: 2 MG/DL (ref 1.9–2.7)
MCH RBC QN AUTO: 30.2 PG (ref 26.8–34.3)
MCHC RBC AUTO-ENTMCNC: 34.2 G/DL (ref 31.4–37.4)
MCV RBC AUTO: 89 FL (ref 82–98)
MICROALBUMIN UR-MCNC: <7 MG/L
MONOCYTES # BLD AUTO: 0.45 THOUSAND/ÂΜL (ref 0.17–1.22)
MONOCYTES NFR BLD AUTO: 8 % (ref 4–12)
NEUTROPHILS # BLD AUTO: 2.92 THOUSANDS/ÂΜL (ref 1.85–7.62)
NEUTS SEG NFR BLD AUTO: 54 % (ref 43–75)
NITRITE UR QL STRIP: NEGATIVE
NON-SQ EPI CELLS URNS QL MICRO: NORMAL /HPF
NRBC BLD AUTO-RTO: 0 /100 WBCS
OSMOLALITY UR: 201 MMOL/KG
PH UR STRIP.AUTO: 6.5 [PH]
PLATELET # BLD AUTO: 224 THOUSANDS/UL (ref 149–390)
PMV BLD AUTO: 9.3 FL (ref 8.9–12.7)
POTASSIUM SERPL-SCNC: 3.8 MMOL/L (ref 3.5–5.3)
PROT SERPL-MCNC: 7.1 G/DL (ref 6.4–8.4)
PROT UR STRIP-MCNC: NEGATIVE MG/DL
RBC # BLD AUTO: 4.07 MILLION/UL (ref 3.81–5.12)
RBC #/AREA URNS AUTO: NORMAL /HPF
SODIUM SERPL-SCNC: 127 MMOL/L (ref 135–147)
SP GR UR STRIP.AUTO: <1.005 (ref 1–1.03)
WBC # BLD AUTO: 5.46 THOUSAND/UL (ref 4.31–10.16)
WBC #/AREA URNS AUTO: NORMAL /HPF

## 2024-09-30 PROCEDURE — 85025 COMPLETE CBC W/AUTO DIFF WBC: CPT

## 2024-09-30 PROCEDURE — 81001 URINALYSIS AUTO W/SCOPE: CPT

## 2024-09-30 PROCEDURE — 83735 ASSAY OF MAGNESIUM: CPT

## 2024-09-30 PROCEDURE — 83935 ASSAY OF URINE OSMOLALITY: CPT

## 2024-09-30 PROCEDURE — 82043 UR ALBUMIN QUANTITATIVE: CPT

## 2024-09-30 PROCEDURE — 82570 ASSAY OF URINE CREATININE: CPT

## 2024-09-30 PROCEDURE — 85652 RBC SED RATE AUTOMATED: CPT

## 2024-09-30 PROCEDURE — 86140 C-REACTIVE PROTEIN: CPT

## 2024-09-30 PROCEDURE — 36415 COLL VENOUS BLD VENIPUNCTURE: CPT

## 2024-09-30 PROCEDURE — 80053 COMPREHEN METABOLIC PANEL: CPT

## 2024-09-30 NOTE — TELEPHONE ENCOUNTER
I called and spoke with Jacklyn regarding the following:    ----- Message from Dawson Diaz PA-C sent at 9/30/2024  4:01 PM EDT -----  Labs are stable and will be reviewed with patient upcoming office visit      She is aware of results. She had no questions or concerns for me.

## 2024-10-10 ENCOUNTER — TELEPHONE (OUTPATIENT)
Dept: FAMILY MEDICINE CLINIC | Facility: CLINIC | Age: 79
End: 2024-10-10

## 2024-10-10 DIAGNOSIS — G44.86 CERVICOGENIC HEADACHE: Primary | ICD-10-CM

## 2024-10-10 NOTE — TELEPHONE ENCOUNTER
Received transfer from [facility].   Reviewed labs with Patient. Patient c/o continued headaches. Please review chart for further recommendations. Possbile C-spine xray? Or referral for Neurology? Please advise.     Patient aware PCP out of office until Friday 10/11/2024. Advised ER if needed.

## 2024-10-11 RX ORDER — BACLOFEN 10 MG/1
10 TABLET ORAL 3 TIMES DAILY
Qty: 30 TABLET | Refills: 0 | Status: SHIPPED | OUTPATIENT
Start: 2024-10-11 | End: 2024-10-14

## 2024-10-11 NOTE — TELEPHONE ENCOUNTER
Spoke to patient.   Neurology has already outreached to patient--scheduled 11/26/2024 in [facility].

## 2024-10-11 NOTE — TELEPHONE ENCOUNTER
Did place an order for an x-ray of the C-spine and referral to neurology.  She could begin baclofen 10 mg up to 3 times daily.

## 2024-10-12 ENCOUNTER — HOSPITAL ENCOUNTER (OUTPATIENT)
Dept: RADIOLOGY | Facility: HOSPITAL | Age: 79
Discharge: HOME | End: 2024-10-12
Payer: MEDICARE

## 2024-10-12 DIAGNOSIS — G44.86 CERVICOGENIC HEADACHE: ICD-10-CM

## 2024-10-12 PROCEDURE — 72040 X-RAY EXAM NECK SPINE 2-3 VW: CPT

## 2024-10-14 ENCOUNTER — OFFICE VISIT (OUTPATIENT)
Dept: FAMILY MEDICINE CLINIC | Facility: CLINIC | Age: 79
End: 2024-10-14
Payer: MEDICARE

## 2024-10-14 VITALS
HEIGHT: 64 IN | DIASTOLIC BLOOD PRESSURE: 84 MMHG | RESPIRATION RATE: 20 BRPM | SYSTOLIC BLOOD PRESSURE: 134 MMHG | HEART RATE: 84 BPM | BODY MASS INDEX: 29.02 KG/M2 | TEMPERATURE: 97.3 F | WEIGHT: 170 LBS

## 2024-10-14 DIAGNOSIS — R42 VERTIGO: ICD-10-CM

## 2024-10-14 DIAGNOSIS — I10 ESSENTIAL HYPERTENSION: ICD-10-CM

## 2024-10-14 DIAGNOSIS — N18.31 STAGE 3A CHRONIC KIDNEY DISEASE (HCC): ICD-10-CM

## 2024-10-14 DIAGNOSIS — G44.86 CERVICOGENIC HEADACHE: Primary | ICD-10-CM

## 2024-10-14 DIAGNOSIS — K21.9 GASTROESOPHAGEAL REFLUX DISEASE WITHOUT ESOPHAGITIS: ICD-10-CM

## 2024-10-14 PROCEDURE — 99214 OFFICE O/P EST MOD 30 MIN: CPT | Performed by: FAMILY MEDICINE

## 2024-10-14 PROCEDURE — G2211 COMPLEX E/M VISIT ADD ON: HCPCS | Performed by: FAMILY MEDICINE

## 2024-10-14 RX ORDER — DICLOFENAC POTASSIUM 50 MG/1
50 TABLET, FILM COATED ORAL 3 TIMES DAILY
Qty: 30 TABLET | Refills: 0 | Status: SHIPPED | OUTPATIENT
Start: 2024-10-14

## 2024-10-14 RX ORDER — MECLIZINE HCL 12.5 MG 12.5 MG/1
12.5 TABLET ORAL 3 TIMES DAILY PRN
Qty: 30 TABLET | Refills: 0 | Status: SHIPPED | OUTPATIENT
Start: 2024-10-14

## 2024-10-14 NOTE — PROGRESS NOTES
Ambulatory Visit  Name: Jacklyn Garcia      : 1945      MRN: [id number]  Encounter Provider: Ernie Wilkinson DO  Encounter Date: 10/14/2024   Encounter department: [facility] PRIMARY CARE    Assessment & Plan  Cervicogenic headache  Will attempt Cataflam 50 mg up to 3 times daily for total tablets prescribed of 30 due to the patient's glomerular filtration rate of 59.  And reflux.  The patient will discontinue baclofen  Orders:    diclofenac potassium (CATAFLAM) 50 mg tablet; Take 1 tablet (50 mg total) by mouth 3 (three) times a day    Essential hypertension  With a blood pressure controlled on the current regimen       Gastroesophageal reflux disease without esophagitis         Stage 3a chronic kidney disease (HCC)  Lab Results   Component Value Date    EGFR 59 2024    EGFR 55 2024    EGFR 55 2024    CREATININE 0.93 2024    CREATININE 0.99 2024    CREATININE 0.98 2024            Vertigo  We will provide Antivert 12.53 times daily  Orders:    meclizine (ANTIVERT) 12.5 MG tablet; Take 1 tablet (12.5 mg total) by mouth 3 (three) times a day as needed for dizziness       History of Present Illness     Patient presents with continued headache had an x-ray of her cervical spine performed 2 days ago however the x-ray has not been read.  The patient complains of feeling lightheaded and dizzy after taking baclofen.    Headache  Dizziness  Associated symptoms include headaches. Pertinent negatives include no abdominal pain, arthralgias, chest pain, chills, coughing, fever, rash, sore throat or vomiting.       History obtained from : patient  Review of Systems   Constitutional:  Negative for chills and fever.   HENT:  Negative for ear pain and sore throat.    Eyes:  Negative for pain and visual disturbance.   Respiratory:  Negative for cough and shortness of breath.    Cardiovascular:  Negative for chest pain and palpitations.   Gastrointestinal:  Negative for  "abdominal pain and vomiting.   Genitourinary:  Negative for dysuria and hematuria.   Musculoskeletal:  Negative for arthralgias and back pain.   Skin:  Negative for color change and rash.   Neurological:  Positive for dizziness and headaches. Negative for seizures and syncope.   All other systems reviewed and are negative.          Objective     /84   Pulse 84   Temp (!) 97.3 °F (36.3 °C)   Resp 20   Ht 5' 4\" (1.626 m)   Wt 77.1 kg (170 lb)   BMI 29.18 kg/m²     Physical Exam  Constitutional:       Appearance: Normal appearance.   HENT:      Head: Normocephalic and atraumatic.      Right Ear: Tympanic membrane, ear canal and external ear normal.      Left Ear: Tympanic membrane, ear canal and external ear normal.      Nose: Nose normal.      Mouth/Throat:      Mouth: Mucous membranes are moist.      Pharynx: Oropharynx is clear.   Eyes:      Extraocular Movements: Extraocular movements intact.      Conjunctiva/sclera: Conjunctivae normal.      Pupils: Pupils are equal, round, and reactive to light.   Cardiovascular:      Rate and Rhythm: Normal rate and regular rhythm.      Pulses: Normal pulses.      Heart sounds: Normal heart sounds.   Pulmonary:      Effort: Pulmonary effort is normal.      Breath sounds: Normal breath sounds.   Abdominal:      General: Abdomen is flat. Bowel sounds are normal.      Palpations: Abdomen is soft.   Musculoskeletal:         General: Normal range of motion.      Cervical back: Normal range of motion and neck supple.      Comments: Decreased range of motion of the cervical spine   Skin:     General: Skin is warm and dry.   Neurological:      General: No focal deficit present.      Mental Status: She is alert and oriented to person, place, and time.   Psychiatric:         Mood and Affect: Mood normal.         Behavior: Behavior normal.         "

## 2024-10-14 NOTE — ASSESSMENT & PLAN NOTE
Lab Results   Component Value Date    EGFR 59 09/30/2024    EGFR 55 06/27/2024    EGFR 55 03/11/2024    CREATININE 0.93 09/30/2024    CREATININE 0.99 06/27/2024    CREATININE 0.98 03/11/2024

## 2024-10-15 RX ORDER — BISOPROLOL FUMARATE 10 MG/1
TABLET, FILM COATED ORAL
Qty: 90 TABLET | Refills: 1 | Status: SHIPPED | OUTPATIENT
Start: 2024-10-15

## 2024-10-15 RX ORDER — LOSARTAN POTASSIUM 100 MG/1
TABLET ORAL
Qty: 90 TABLET | Refills: 1 | Status: SHIPPED | OUTPATIENT
Start: 2024-10-15

## 2024-10-18 ENCOUNTER — TELEPHONE (OUTPATIENT)
Dept: FAMILY MEDICINE CLINIC | Facility: CLINIC | Age: 79
End: 2024-10-18

## 2024-10-18 DIAGNOSIS — G44.86 CERVICOGENIC HEADACHE: Primary | ICD-10-CM

## 2024-10-18 DIAGNOSIS — M50.30 DDD (DEGENERATIVE DISC DISEASE), CERVICAL: ICD-10-CM

## 2024-10-18 NOTE — TELEPHONE ENCOUNTER
Left message about the x-ray findings of his cervical spine with disc space narrowing at C5-C6 and C6-C7 with degenerative changes.  Asked her to call our office if she like a referral to pain management

## 2024-10-18 NOTE — TELEPHONE ENCOUNTER
Spoke to the patient about her cervical x-ray findings she is agreeable to obtaining a referral to pain management

## 2024-10-18 NOTE — TELEPHONE ENCOUNTER
Patient called; questioned if she should or should not see NeuroSurgeon. Patient states she's home for the rest of the day if provider could give her a call back.

## 2024-10-21 ENCOUNTER — TELEPHONE (OUTPATIENT)
Dept: CARDIOLOGY CLINIC | Facility: HOSPITAL | Age: 79
End: 2024-10-21

## 2024-10-21 ENCOUNTER — TELEPHONE (OUTPATIENT)
Dept: CARDIOLOGY CLINIC | Facility: CLINIC | Age: 79
End: 2024-10-21

## 2024-10-21 NOTE — TELEPHONE ENCOUNTER
Patient called pod wanting to have appointment with Dr. Blas only. Patient was scheduled from recall list and is aware of appointment.

## 2024-10-21 NOTE — TELEPHONE ENCOUNTER
"Hello,    The following message was sent via e-mail to the leadership team:     Please advise if you can help facilitate the following overbook request:    Patient Name: omero gómez    Patient MRN: [id number]    Call back #: [phone]    Insurance: medicare a & b    Department:Cardiology    Speciality: General Cardiology    Reason for overbook request: PATIENT REQUEST    Comments (Write \"N/a\" if no comments): patient called trying to schedule her follow up since she received a recall letter in the mail but nothing available with Provider she does not want to see a TREY and does not want to travel to [facility]/ I have added her to the Wait list for Dr. Blas in [place] but pt is adament on seeing Dr. Blas when she is due.     Requested doctor and location: Dr. Blas & [place]     Date of current appointment: n.a      Thank you.      "

## 2024-10-30 RX ORDER — DOXYCYCLINE HYCLATE 50 MG/1
50 CAPSULE ORAL 2 TIMES DAILY
COMMUNITY

## 2024-10-30 RX ORDER — LANSOPRAZOLE 30 MG/1
30 CAPSULE, DELAYED RELEASE ORAL DAILY
COMMUNITY

## 2024-10-30 RX ORDER — LORAZEPAM 0.5 MG/1
0.5 TABLET ORAL
COMMUNITY

## 2024-10-30 RX ORDER — NORTRIPTYLINE HYDROCHLORIDE 10 MG/1
10 CAPSULE ORAL
COMMUNITY

## 2024-10-30 RX ORDER — DICLOFENAC SODIUM 75 MG/1
75 TABLET, DELAYED RELEASE ORAL
COMMUNITY

## 2024-10-30 RX ORDER — ONDANSETRON 4 MG/1
4 TABLET, FILM COATED ORAL
COMMUNITY

## 2024-10-30 RX ORDER — NEOMYCIN SULFATE, POLYMYXIN B SULFATE AND BACITRACIN ZINC 3.5; 10000; 4 MG/G; [USP'U]/G; [USP'U]/G
OINTMENT OPHTHALMIC
COMMUNITY
Start: 2024-10-04

## 2024-10-30 RX ORDER — ZOLPIDEM TARTRATE 5 MG/1
5 TABLET ORAL
COMMUNITY
Start: 2024-09-13

## 2024-10-30 RX ORDER — DOXYCYCLINE HYCLATE 100 MG/1
100 TABLET, DELAYED RELEASE ORAL
COMMUNITY

## 2024-10-30 RX ORDER — CONJUGATED ESTROGENS 0.62 MG/G
1 CREAM VAGINAL DAILY
COMMUNITY

## 2024-10-30 RX ORDER — BISOPROLOL FUMARATE 10 MG/1
10 TABLET, FILM COATED ORAL
COMMUNITY

## 2024-10-30 RX ORDER — LOSARTAN POTASSIUM 100 MG/1
100 TABLET ORAL DAILY
COMMUNITY

## 2024-10-30 RX ORDER — PRAVASTATIN SODIUM 40 MG
40 TABLET ORAL DAILY
COMMUNITY
Start: 2024-09-23

## 2024-10-30 RX ORDER — TRIAMCINOLONE ACETONIDE 1 MG/G
CREAM TOPICAL
COMMUNITY
Start: 2024-10-09

## 2024-10-30 RX ORDER — ZONISAMIDE 100 MG/1
100 CAPSULE ORAL DAILY
COMMUNITY

## 2024-10-30 RX ORDER — DICYCLOMINE HYDROCHLORIDE 10 MG/1
10 CAPSULE ORAL
COMMUNITY

## 2024-10-30 RX ORDER — SUMATRIPTAN 100 MG/1
100 TABLET, FILM COATED ORAL ONCE AS NEEDED
COMMUNITY

## 2024-10-30 RX ORDER — LANSOPRAZOLE, AMOXICILLIN, CLARITHROMYCIN 30-500-500
1 KIT ORAL 3 TIMES DAILY
COMMUNITY

## 2024-10-30 RX ORDER — VALACYCLOVIR HYDROCHLORIDE 1 G/1
1000 TABLET, FILM COATED ORAL DAILY
COMMUNITY

## 2024-10-30 RX ORDER — CITALOPRAM HYDROBROMIDE 20 MG/1
20 TABLET ORAL
COMMUNITY

## 2024-10-30 RX ORDER — MOXIFLOXACIN HYDROCHLORIDE 400 MG/1
400 TABLET ORAL
COMMUNITY

## 2024-10-30 RX ORDER — FLUTICASONE PROPIONATE 50 MCG
2 SPRAY, SUSPENSION (ML) NASAL DAILY
COMMUNITY

## 2024-10-30 RX ORDER — ZOLPIDEM TARTRATE 10 MG/1
10 TABLET ORAL
COMMUNITY

## 2024-10-30 RX ORDER — DOXYCYCLINE 100 MG/1
100 CAPSULE ORAL
COMMUNITY

## 2024-10-30 RX ORDER — FEXOFENADINE HCL 180 MG/1
180 TABLET ORAL DAILY
COMMUNITY

## 2024-10-30 RX ORDER — NADOLOL 80 MG/1
80 TABLET ORAL DAILY
COMMUNITY

## 2024-10-30 RX ORDER — METAXALONE 800 MG/1
800 TABLET ORAL 3 TIMES DAILY
COMMUNITY

## 2024-10-30 RX ORDER — LEVOFLOXACIN 500 MG/1
500 TABLET, FILM COATED ORAL
COMMUNITY

## 2024-10-30 RX ORDER — BUTALBITAL, ACETAMINOPHEN AND CAFFEINE 50; 325; 40 MG/1; MG/1; MG/1
2 CAPSULE ORAL EVERY 4 HOURS PRN
COMMUNITY

## 2024-10-30 RX ORDER — TIZANIDINE HYDROCHLORIDE 4 MG/1
4 CAPSULE, GELATIN COATED ORAL DAILY PRN
COMMUNITY

## 2024-10-30 RX ORDER — DOXYCYCLINE 100 MG/1
100 TABLET ORAL DAILY
COMMUNITY

## 2024-10-30 RX ORDER — BISOPROLOL FUMARATE 5 MG/1
5 TABLET, FILM COATED ORAL DAILY
COMMUNITY

## 2024-10-30 RX ORDER — HYDROCODONE BITARTRATE AND ACETAMINOPHEN 5; 300 MG/1; MG/1
1 TABLET ORAL
COMMUNITY

## 2024-10-30 RX ORDER — POLYETHYLENE GLYCOL 3350
POWDER (GRAM) MISCELLANEOUS
COMMUNITY

## 2024-10-30 RX ORDER — CLINDAMYCIN HYDROCHLORIDE 300 MG/1
300 CAPSULE ORAL
COMMUNITY

## 2024-10-30 RX ORDER — DOXYCYCLINE 50 MG/1
50 CAPSULE ORAL 2 TIMES DAILY
COMMUNITY

## 2024-10-30 RX ORDER — ATORVASTATIN CALCIUM 40 MG/1
40 TABLET, FILM COATED ORAL
COMMUNITY

## 2024-10-30 RX ORDER — VERAPAMIL HYDROCHLORIDE 240 MG/1
240 TABLET, FILM COATED, EXTENDED RELEASE ORAL
COMMUNITY

## 2024-10-30 RX ORDER — LOTEPREDNOL ETABONATE 5 MG/G
GEL OPHTHALMIC
COMMUNITY
Start: 2024-10-09

## 2024-10-30 RX ORDER — SPIRONOLACTONE 50 MG/1
50 TABLET, FILM COATED ORAL DAILY
COMMUNITY

## 2024-10-30 RX ORDER — BUTALBITAL AND ACETAMINOPHEN 325; 50 MG/1; MG/1
TABLET ORAL
COMMUNITY

## 2024-10-30 RX ORDER — PRAVASTATIN SODIUM 40 MG
40 TABLET ORAL DAILY
COMMUNITY

## 2024-10-30 RX ORDER — ALPRAZOLAM 0.25 MG/1
0.25 TABLET ORAL
COMMUNITY

## 2024-10-30 RX ORDER — CLINDAMYCIN HYDROCHLORIDE 150 MG/1
150 CAPSULE ORAL
COMMUNITY

## 2024-10-31 DIAGNOSIS — E26.9 HYPERALDOSTERONISM (HCC): Primary | ICD-10-CM

## 2024-10-31 RX ORDER — SPIRONOLACTONE 50 MG/1
TABLET, FILM COATED ORAL
Qty: 90 TABLET | Refills: 1 | Status: SHIPPED | OUTPATIENT
Start: 2024-10-31

## 2024-11-01 ENCOUNTER — CONSULT (OUTPATIENT)
Dept: PAIN MEDICINE | Facility: CLINIC | Age: 79
End: 2024-11-01
Payer: MEDICARE

## 2024-11-01 VITALS
HEART RATE: 63 BPM | HEIGHT: 64 IN | OXYGEN SATURATION: 98 % | RESPIRATION RATE: 16 BRPM | DIASTOLIC BLOOD PRESSURE: 76 MMHG | BODY MASS INDEX: 29.02 KG/M2 | SYSTOLIC BLOOD PRESSURE: 137 MMHG | WEIGHT: 170 LBS | TEMPERATURE: 97.9 F

## 2024-11-01 DIAGNOSIS — M50.30 DDD (DEGENERATIVE DISC DISEASE), CERVICAL: ICD-10-CM

## 2024-11-01 DIAGNOSIS — G44.86 CERVICOGENIC HEADACHE: ICD-10-CM

## 2024-11-01 DIAGNOSIS — M47.812 CERVICAL SPONDYLOSIS: Primary | ICD-10-CM

## 2024-11-01 PROCEDURE — 99214 OFFICE O/P EST MOD 30 MIN: CPT | Performed by: ANESTHESIOLOGY

## 2024-11-01 NOTE — PROGRESS NOTES
Assessment:  1. Cervical spondylosis    2. Cervicogenic headache    3. DDD (degenerative disc disease), cervical        Plan:  Patient is a 79-year-old female complains of neck pain and bilateral shoulder pain with chronic pain syndrome secondary to cervical spondylosis and cervical degenerative disc disease presents to office for initial consultation.  Patient is status post left C4-C5 C5-C6 radiofrequency ablation which was performed on 3/6/2018.  She reports greater than 1 year of 90% relief.  Patient reports left-sided neck pain has returned. Pt may benefit from a C3-4 RFA also.  1.  We will schedule patient for a left C4-C5 and C5-C6 radiofrequency ablation  2.  Follow-up 1 month after injection    Complete risks and benefits including bleeding, infection, tissue reaction, nerve injury and allergic reaction were discussed. The approach was demonstrated using models and literature was provided. Verbal and written consent was obtained.      History of Present Illness:    The patient is a 79 y.o. female who presents for consultation in regards to Neck Pain.  Symptoms have been present for 6 months. Symptoms began without any precipitating injury or trauma. Pain is reported to be 7 on the numeric rating scale.  Symptoms are felt nearly constantly and worst in the no typical pattern.  Symptoms are characterized as sharp.  Symptoms are associated with no weakness.  Aggravating factors include lying down and sitting.  Relieving factors include nothing.  No change in symptoms with kneeling, standing, bending, leaning forward, leaning bckward, walking, exercise, turning the head, relaxation, coughing/sneezing, and bowel movements.  Treatments that have been helpful include prior injections including AMA and heat/ice. physical therapy and home exercise have provided no relief.  Medications to relieve symptoms include Tylenol.    Review of Systems:    Review of Systems   Eyes:  Positive for pain.   Cardiovascular:   Positive for palpitations.   Endocrine:        Frequent urination   Musculoskeletal:  Positive for neck pain and neck stiffness.   Neurological:  Positive for headaches.   All other systems reviewed and are negative.          Past Medical History:   Diagnosis Date    Allergies     Anxiety     Arthritis     Benign arteriolar nephrosclerosis     Bereavement     Cataract     Chronic kidney disease     Chronic kidney disease in type 2 diabetes mellitus (HCC)     Chronic kidney disease, stage 2 (mild)     Chronic pain disorder     Chronic pain syndrome     COPD (chronic obstructive pulmonary disease) ([facility])     DDD (degenerative disc disease), cervical     DDD (degenerative disc disease), lumbar     Degenerative joint disease of right hip     Depression     Diastolic dysfunction     DJD (degenerative joint disease), ankle and foot, right     DJD of right shoulder     Edema     Fracture of left calcaneus     Generalized anxiety disorder     GERD (gastroesophageal reflux disease)     Heart murmur     Hyperaldosteronism ([facility])     Hyperlipidemia     Hypertension     Hypertensive nephrosclerosis     Hypo-osmolality and hyponatremia     IBS (irritable bowel syndrome)     Insomnia     Migraines     Mitral regurgitation     MVP (mitral valve prolapse)     Obstructive sleep apnea syndrome     Osteoarthritis     Osteoarthritis     Pernicious anemia     Plantar fascia rupture     Rheumatoid arthritis ([facility])     Right knee DJD     S/P insertion of spinal cord stimulator     Shingles     Sinobronchitis     Sleep apnea     Status post total right knee replacement 07/07/2020    Stroke ([facility])     tia    TIA (transient ischemic attack)     Vertigo        Past Surgical History:   Procedure Laterality Date    APPENDECTOMY      CARPAL TUNNEL RELEASE Bilateral     COLONOSCOPY      several    COLONOSCOPY  04/02/2012    by Dr. Mckinley Ruvalcaba    CORNEAL TRANSPLANT Left 04/11/2022    DXA PROCEDURE (HISTORICAL)  10/26/2016, 9/17/2014, 6/18/2007     HAND SURGERY Right     ring finger has pin    HYSTERECTOMY      34    INSERT / REPLACE PERIPHERAL NEUROSTIMULATOR PULSE GENERATOR /  Left     buttocks    JOINT REPLACEMENT Left     knee    JOINT REPLACEMENT Right 04/2019    Hip    KNEE ARTHROPLASTY Left 01/15/2009    by Dr. Avery Leigh at [facility], [place], operated by [facility]     KNEE ARTHROSCOPY Bilateral     KNEE CARTILAGE SURGERY  09/10/2009    by Dr. Avery Leigh at [facility], [place], operated by [facility]     MAMMO (HISTORICAL)  12/10/2018, 10/30/2017, 10/26/2016    NASAL SEPTUM SURGERY  2008    NERVE BLOCK Left 02/20/2018    Procedure: BLOCK MEDIAL BRANCH - C4, C5, C6;  Surgeon: Jaycob Cruz MD;  Location: [facility];  Service: Pain Management     NERVE BLOCK Left 03/06/2018    Procedure: C4, C5, C6 MEDIAL BRANCH BLOCK;  Surgeon: Jaycob Cruz MD;  Location: [facility];  Service: Pain Management     NY ARTHRP ACETBLR/PROX FEM PROSTC AGRFT/ALGRFT Right 02/27/2019    Procedure: ARTHROPLASTY HIP TOTAL;  Surgeon: Bruno Pina DO;  Location:  MAIN OR;  Service: Orthopedics    NY ARTHRP KNE CONDYLE&PLATU MEDIAL&LAT COMPARTMENTS Right 06/24/2020    Procedure: KNEE TOTAL ARTHROPLASTY;  Surgeon: Bruno Pina DO;  Location:  MAIN OR;  Service: Orthopedics    NY COLONOSCOPY FLX DX W/COLLJ SPEC WHEN PFRMD N/A 04/24/2017    Procedure: FLEXIBLE COLONOSCOPY;  Surgeon: Mckinley Ruvalcaba MD;  Location: [facility];  Service: Colorectal    RADIOFREQUENCY ABLATION Left 04/17/2018    Procedure: ABLATION RADIO FREQUENCY (RFA) - C4, C5, C6;  Surgeon: Jaycob Cruz MD;  Location: [facility];  Service: Pain Management     REPLACEMENT TOTAL KNEE Left 07/05/2011    SINUS SURGERY      TONSILLECTOMY      TOTAL HIP ARTHROPLASTY Left     TRIGGER FINGER RELEASE      R 4th     UPPER GASTROINTESTINAL ENDOSCOPY  01/12/2007    with Donaldson dilatation by Dr. Misael Santiago at [facility]    WRIST SURGERY Right        Family History   Problem Relation Age of Onset    Heart disease  Mother     Hypertension Mother     Arthritis Mother     Dementia Mother     Rheum arthritis Mother     Hypertension Father     Heart disease Father     Colon cancer Father     Hypertension Sister     Anxiety disorder Sister     Thyroid disease Sister     Prostate cancer Maternal Grandfather     No Known Problems Paternal Grandmother     No Known Problems Paternal Grandfather     No Known Problems Paternal Aunt     Pseudochol deficiency Neg Hx        Social History     Occupational History    Occupation: Admnistrator    Tobacco Use    Smoking status: Former     Types: Cigarettes    Smokeless tobacco: Never    Tobacco comments:     quit 40 yrs ago   Vaping Use    Vaping status: Never Used   Substance and Sexual Activity    Alcohol use: Yes     Comment: 2 beer a week    Drug use: Never    Sexual activity: Not Currently     Birth control/protection: Post-menopausal         Current Outpatient Medications:     ACETAMINOPHEN-BUTALBITAL  MG TABS, , Disp: , Rfl:     ALPRAZolam (XANAX) 0.25 mg tablet, Take 0.25 mg by mouth daily at bedtime as needed, Disp: , Rfl:     amitriptyline (ELAVIL) 25 mg tablet, Take 25 mg by mouth, Disp: , Rfl:     Lokozdzav-Rlalneazn-Myddgvkdl 500 & 500 & 30 MG THPK, Take 1 capsule by mouth 3 (three) times a day, Disp: , Rfl:     aspirin (ECOTRIN LOW STRENGTH) 81 mg EC tablet, Take 1 tablet (81 mg total) by mouth daily, Disp:  , Rfl:     ASPIRIN 81 PO, , Disp: , Rfl:     atorvastatin (LIPITOR) 40 mg tablet, Take 40 mg by mouth, Disp: , Rfl:     bacitracin-polymyxin b ophthalmic ointment, INSTILL A 1/4 INCH INTO LEFT EYE AT BEDTIME, Disp: , Rfl:     bisoprolol (ZEBETA) 10 MG tablet, TAKE 1 TABLET EVERY DAY, Disp: 90 tablet, Rfl: 1    bisoprolol (ZEBETA) 10 MG tablet, Take 10 mg by mouth, Disp: , Rfl:     bisoprolol (ZEBETA) 5 mg tablet, Take 5 mg by mouth daily, Disp: , Rfl:     butalbital-acetaminophen-caffeine (FIORICET,ESGIC) -40 mg per tablet, TAKE 1 TABLET EVERY 6 HOURS AS NEEDED FOR  MIGRAINE, Disp: 120 tablet, Rfl: 3    Butalbital-APAP-Caffeine (Zebutal) -40 MG per capsule, Take 2 capsules by mouth every 4 (four) hours as needed, Disp: , Rfl:     Calcium Carb-Cholecalciferol (CALCIUM 500 + D PO), , Disp: , Rfl:     calcium carbonate (OS-MARLENY) 600 MG tablet, Take 600 mg by mouth 2 (two) times a day with meals, Disp: , Rfl:     citalopram (CeleXA) 20 mg tablet, Take 20 mg by mouth, Disp: , Rfl:     clindamycin (CLEOCIN) 150 mg capsule, Prn for dental procedures, Disp: , Rfl:     clindamycin (CLEOCIN) 150 mg capsule, Take 150 mg by mouth, Disp: , Rfl:     clindamycin (CLEOCIN) 300 MG capsule, Take 300 mg by mouth, Disp: , Rfl:     dexAMETHasone (DEXPAK 10 DAY PO), TAKE AS DIRECTED, Disp: , Rfl:     diclofenac (VOLTAREN) 75 mg EC tablet, Take 75 mg by mouth, Disp: , Rfl:     diclofenac potassium (CATAFLAM) 50 mg tablet, Take 1 tablet (50 mg total) by mouth 3 (three) times a day, Disp: 30 tablet, Rfl: 0    Diclofenac Sodium (VOLTAREN) 1 %, APPLY 2 GRAM TO THE AFFECTED AREA(S) BY TOPICAL ROUTE 4 TIMES PER DAY, Disp: , Rfl:     Diclofenac Sodium 1.5 % KIT, USE TWICE DAILY ON HANDS, Disp: , Rfl:     dicyclomine (BENTYL) 10 mg capsule, Take 10 mg by mouth, Disp: , Rfl:     DIPHENOXYLATE-ATROPINE PO, , Disp: , Rfl:     doxycycline (ADOXA) 100 MG tablet, Take 100 mg by mouth daily, Disp: , Rfl:     doxycycline (DORYX) 100 MG EC tablet, Take 100 mg by mouth, Disp: , Rfl:     doxycycline hyclate (VIBRAMYCIN) 50 mg capsule, Take 50 mg by mouth 2 (two) times a day, Disp: , Rfl:     doxycycline monohydrate (MONODOX) 100 mg capsule, Take 100 mg by mouth, Disp: , Rfl:     doxycycline monohydrate (MONODOX) 50 mg capsule, Take 50 mg by mouth 2 (two) times a day, Disp: , Rfl:     Ergocalciferol (VITAMIN D2 PO), , Disp: , Rfl:     estrogens, conjugated (Premarin) vaginal cream, Insert 1 g into the vagina daily, Disp: , Rfl:     fexofenadine (ALLEGRA) 180 MG tablet, Take 180 mg by mouth daily, Disp: , Rfl:      fluorometholone (FML) 0.1 % ophthalmic suspension, INSTILL 1 DROP BY OPTHALMIC ROUTE FOUR TIMES DAILY INTO LEFT EYE, Disp: , Rfl:     fluticasone (FLONASE) 50 mcg/act nasal spray, 2 sprays into each nostril daily, Disp: , Rfl:     HYDROcodone-acetaminophen (XODOL) 5-300 MG per tablet, Take 1 tablet by mouth, Disp: , Rfl:     lansoprazole (PREVACID) 30 mg capsule, Take 30 mg by mouth daily, Disp: , Rfl:     levofloxacin (LEVAQUIN) 500 mg tablet, Take 500 mg by mouth, Disp: , Rfl:     LORazepam (ATIVAN) 0.5 mg tablet, Take 0.5 mg by mouth, Disp: , Rfl:     losartan (COZAAR) 100 MG tablet, TAKE 1 TABLET EVERY MORNING, Disp: 90 tablet, Rfl: 1    losartan (COZAAR) 100 MG tablet, Take 100 mg by mouth daily, Disp: , Rfl:     loteprednol etabonate (LOTEMAX) 0.5 % ophth gel, instill 1 drop into left eye once daily, Disp: , Rfl:     magnesium (MAGTAB) 84 MG (7MEQ) TBCR, Take 1 tablet (84 mg total) by mouth daily, Disp: 90 tablet, Rfl: 1    meclizine (ANTIVERT) 12.5 MG tablet, Take 1 tablet (12.5 mg total) by mouth 3 (three) times a day as needed for dizziness, Disp: 30 tablet, Rfl: 0    metaxalone (SKELAXIN) 800 mg tablet, Take 800 mg by mouth 3 (three) times a day, Disp: , Rfl:     methylPREDNISolone 4 MG tablet therapy pack, Use as directed on package, Disp: 21 each, Rfl: 0    moxifloxacin (AVELOX) 400 MG tablet, Take 400 mg by mouth, Disp: , Rfl:     Multiple Vitamin (MULTIVITAMIN ADULT PO), , Disp: , Rfl:     Multiple Vitamins-Minerals (MULTIVITAMIN WITH MINERALS) tablet, Take 1 tablet by mouth every morning, Disp: , Rfl:     nadolol (CORGARD) 80 MG tablet, Take 80 mg by mouth daily, Disp: , Rfl:     neomycin-bacitracin-polymyxin (NEOSPORIN) ophthalmic ointment, INSTILL 1/4' INTO LEFT EYE AT BEDTIME, Disp: , Rfl:     Neomycin-Polymyxin-Dexameth 0.1 % OINT, INSTILL 1 DROP IN BOTH EYES TWICE DAILY FOR 1 WEEK, Disp: , Rfl:     nortriptyline (PAMELOR) 10 mg capsule, Take 10 mg by mouth daily at bedtime, Disp: , Rfl:      omeprazole (PriLOSEC) 20 mg delayed release capsule, TAKE 1 CAPSULE TWICE DAILY, Disp: 180 capsule, Rfl: 1    ondansetron (ZOFRAN) 4 mg tablet, Take 4 mg by mouth, Disp: , Rfl:     Polyethylene Glycol 3350 POWD, TAKE 17 GM (DISSOLVED IN A WATER) BY MOUTH ONCE DAILY, Disp: , Rfl:     pravastatin (PRAVACHOL) 40 mg tablet, TAKE 1 TABLET (40 MG TOTAL) DAILY, Disp: 90 tablet, Rfl: 1    pravastatin (PRAVACHOL) 40 mg tablet, Take 40 mg by mouth daily, Disp: , Rfl:     pravastatin (PRAVACHOL) 40 mg tablet, Take 40 mg by mouth daily, Disp: , Rfl:     prochlorperazine (COMPAZINE) 5 mg tablet, TAKE 1 TABLET EVERY 6 HOURS AS NEEDED FOR NAUSEA OR VOMITING, Disp: 90 tablet, Rfl: 10    PROPOXYPHENE N-ACETAMINOPHEN PO, , Disp: , Rfl:     spironolactone (ALDACTONE) 50 mg tablet, TAKE 1 TABLET DAILY AFTER LUNCH, Disp: 90 tablet, Rfl: 3    spironolactone (ALDACTONE) 50 mg tablet, Take 50 mg by mouth daily, Disp: , Rfl:     spironolactone (ALDACTONE) 50 mg tablet, TAKE 1 TABLET DAILY AFTER LUNCH, Disp: 90 tablet, Rfl: 1    SUMAtriptan (IMITREX) 100 mg tablet, Take 100 mg by mouth once as needed, Disp: , Rfl:     TiZANidine (ZANAFLEX) 4 MG capsule, Take 4 mg by mouth daily as needed, Disp: , Rfl:     triamcinolone (KENALOG) 0.1 % cream, apply twice a day to affected area ON LOWER BACK as directed for 1-2 WEEKS for ITCH / FLARE, Disp: , Rfl:     valACYclovir (VALTREX) 1,000 mg tablet, Take 1,000 mg by mouth daily, Disp: , Rfl:     verapamil (CALAN-SR) 240 mg CR tablet, Take 240 mg by mouth daily at bedtime, Disp: , Rfl:     zolpidem (AMBIEN) 10 mg tablet, Take 10 mg by mouth daily at bedtime as needed, Disp: , Rfl:     zolpidem (AMBIEN) 5 mg tablet, Take 5 mg by mouth, Disp: , Rfl:     zonisamide (ZONEGRAN) 100 mg capsule, Take 100 mg by mouth daily, Disp: , Rfl:     Allergies   Allergen Reactions    Procaine Hives    Adhesive [Medical Tape] Rash    Lidocaine Rash    Penicillins Rash       Physical Exam:    /76   Pulse 63   Temp  "97.9 °F (36.6 °C)   Resp 16   Ht 5' 4\" (1.626 m)   Wt 77.1 kg (170 lb)   SpO2 98%   BMI 29.18 kg/m²     Constitutional: normal, well developed, well nourished, alert, in no distress and non-toxic and no overt pain behavior.  Eyes: anicteric  HEENT: grossly intact  Neck: supple, symmetric, trachea midline and no masses   Pulmonary:even and unlabored  Cardiovascular:No edema or pitting edema present  Skin:Normal without rashes or lesions and well hydrated  Psychiatric:Mood and affect appropriate  Neurologic:Cranial Nerves II-XII grossly intact  Musculoskeletal:antalgic    Cervical Spine examination demonstrates. Decreased ROM secondary to pain with lateral rotation to the left/right and bending to the left/right, in addition to neck flexion. 5/5 upper extremity strength in all muscle groups bilaterally. Negative Spurling's maneuver to the b/l Ue, sensitivity to light touch intact b/l Ue.    Imaging    Study Result    Narrative & Impression   XR SPINE CERVICAL 2 OR 3 VW INJURY     INDICATION: G44.86: Cervicogenic headache.     COMPARISON: 4/17/2018     FINDINGS:     No acute fracture or subluxation.     There is disc space narrowing at C5-C6 and C6-C7. Mild retrolisthesis of C4-C5.     Intervertebral disc heights are preserved.     Normal prevertebral soft tissues.     Clear lung apices.     IMPRESSION:     No acute osseous abnormality.     Degenerative changes as described.        Workstation performed: [id number]              No orders to display       No orders of the defined types were placed in this encounter.     "

## 2024-11-04 ENCOUNTER — TELEPHONE (OUTPATIENT)
Age: 79
End: 2024-11-04

## 2024-11-04 NOTE — TELEPHONE ENCOUNTER
Caller: Patient     Doctor/Office: Anthony     Call regarding :  Returning call to schedule procedure      Call was transferred to: Procedure

## 2024-11-11 DIAGNOSIS — F51.01 PRIMARY INSOMNIA: Primary | ICD-10-CM

## 2024-11-11 RX ORDER — ZOLPIDEM TARTRATE 5 MG/1
5 TABLET ORAL
Qty: 30 TABLET | Refills: 3 | Status: SHIPPED | OUTPATIENT
Start: 2024-11-11

## 2024-11-11 NOTE — TELEPHONE ENCOUNTER
Reason for call:   [x] Refill   [] Prior Auth  [] Other:     Office:   [x] PCP/Provider - [facility]/ Minh, DO  [] Specialty/Provider -     Medication: zolpidem (AMBIEN) 5 mg tablet     Dose/Frequency: Take 5 mg by mouth    Quantity: 30    Pharmacy: [id number] - [place] [address] [phone]    Does the patient have enough for 3 days?   [] Yes   [x] No - Send as HP to POD

## 2024-11-18 ENCOUNTER — OFFICE VISIT (OUTPATIENT)
Dept: FAMILY MEDICINE CLINIC | Facility: CLINIC | Age: 79
End: 2024-11-18
Payer: MEDICARE

## 2024-11-18 VITALS
RESPIRATION RATE: 16 BRPM | SYSTOLIC BLOOD PRESSURE: 126 MMHG | HEART RATE: 76 BPM | TEMPERATURE: 96.4 F | WEIGHT: 172 LBS | BODY MASS INDEX: 29.37 KG/M2 | DIASTOLIC BLOOD PRESSURE: 76 MMHG | HEIGHT: 64 IN

## 2024-11-18 DIAGNOSIS — K21.9 GASTROESOPHAGEAL REFLUX DISEASE WITHOUT ESOPHAGITIS: ICD-10-CM

## 2024-11-18 DIAGNOSIS — Z96.89 S/P INSERTION OF SPINAL CORD STIMULATOR: ICD-10-CM

## 2024-11-18 DIAGNOSIS — Z12.31 ENCOUNTER FOR SCREENING MAMMOGRAM FOR MALIGNANT NEOPLASM OF BREAST: ICD-10-CM

## 2024-11-18 DIAGNOSIS — G44.209 MUSCLE TENSION HEADACHE: Primary | ICD-10-CM

## 2024-11-18 DIAGNOSIS — M50.30 DDD (DEGENERATIVE DISC DISEASE), CERVICAL: ICD-10-CM

## 2024-11-18 DIAGNOSIS — I10 ESSENTIAL HYPERTENSION: ICD-10-CM

## 2024-11-18 DIAGNOSIS — G44.86 CERVICOGENIC HEADACHE: ICD-10-CM

## 2024-11-18 PROCEDURE — 99214 OFFICE O/P EST MOD 30 MIN: CPT | Performed by: FAMILY MEDICINE

## 2024-11-18 PROCEDURE — G2211 COMPLEX E/M VISIT ADD ON: HCPCS | Performed by: FAMILY MEDICINE

## 2024-11-18 RX ORDER — BUTALBITAL, ACETAMINOPHEN AND CAFFEINE 50; 325; 40 MG/1; MG/1; MG/1
1 TABLET ORAL EVERY 6 HOURS PRN
Qty: 120 TABLET | Refills: 3 | Status: SHIPPED | OUTPATIENT
Start: 2024-11-18

## 2024-11-18 NOTE — PROGRESS NOTES
Name: Jacklyn Garcia      : 1945      MRN: [id number]  Encounter Provider: Ernie Wilkinson DO  Encounter Date: 2024   Encounter department: [facility]  :  Assessment & Plan  Muscle tension headache  And cervicogenic headache treated with Fioricet the PDMP has been reviewed no issues found no evidence of divergence or abuse  Orders:    butalbital-acetaminophen-caffeine (FIORICET,ESGIC) -40 mg per tablet; Take 1 tablet by mouth every 6 (six) hours as needed for migraine    Cervicogenic headache  The patient will be receiving a steroid injection from pain management       DDD (degenerative disc disease), cervical  Chronic under the care of pain management       Essential hypertension  With a blood pressure controlled on the current regimen       Gastroesophageal reflux disease without esophagitis  Omeprazole 20 mg minimizes symptoms       S/P insertion of spinal cord stimulator  Has been effective therapy for low back pain       Encounter for screening mammogram for malignant neoplasm of breast    Orders:    Mammo screening bilateral w 3d and cad; Future           History of Present Illness     Patient presents for 4-month checkup on hypertension muscle tension headache and cervicogenic headache    Neck Pain   Associated symptoms include headaches. Pertinent negatives include no chest pain or fever.     Review of Systems   Constitutional:  Negative for chills and fever.   HENT:  Negative for ear pain and sore throat.    Eyes:  Negative for pain and visual disturbance.   Respiratory:  Negative for cough and shortness of breath.    Cardiovascular:  Negative for chest pain and palpitations.   Gastrointestinal:  Negative for abdominal pain and vomiting.   Genitourinary:  Negative for dysuria and hematuria.   Musculoskeletal:  Positive for neck pain. Negative for arthralgias and back pain.   Skin:  Negative for color change and rash.   Neurological:  Positive for  "headaches. Negative for seizures and syncope.   All other systems reviewed and are negative.         Objective   /76   Pulse 76   Temp (!) 96.4 °F (35.8 °C)   Resp 16   Ht 5' 4\" (1.626 m)   Wt 78 kg (172 lb)   BMI 29.52 kg/m²      Physical Exam  Constitutional:       Appearance: Normal appearance.   HENT:      Head: Normocephalic and atraumatic.      Right Ear: Tympanic membrane, ear canal and external ear normal.      Left Ear: Tympanic membrane, ear canal and external ear normal.      Nose: Nose normal.      Mouth/Throat:      Mouth: Mucous membranes are moist.      Pharynx: Oropharynx is clear.   Eyes:      Extraocular Movements: Extraocular movements intact.      Conjunctiva/sclera: Conjunctivae normal.      Pupils: Pupils are equal, round, and reactive to light.   Cardiovascular:      Rate and Rhythm: Normal rate and regular rhythm.      Pulses: Normal pulses.      Heart sounds: Normal heart sounds.   Pulmonary:      Effort: Pulmonary effort is normal.      Breath sounds: Normal breath sounds.   Abdominal:      General: Abdomen is flat. Bowel sounds are normal.      Palpations: Abdomen is soft.   Musculoskeletal:         General: Normal range of motion.      Cervical back: Normal range of motion and neck supple.      Comments: Decreased range of motion of the cervical spine with multiple trigger points   Skin:     General: Skin is warm and dry.   Neurological:      General: No focal deficit present.      Mental Status: She is alert and oriented to person, place, and time.   Psychiatric:         Mood and Affect: Mood normal.         Behavior: Behavior normal.         "

## 2024-12-05 DIAGNOSIS — E78.00 HYPERCHOLESTEROLEMIA: ICD-10-CM

## 2024-12-05 RX ORDER — PRAVASTATIN SODIUM 40 MG
40 TABLET ORAL DAILY
Qty: 90 TABLET | Refills: 1 | Status: SHIPPED | OUTPATIENT
Start: 2024-12-05

## 2024-12-12 ENCOUNTER — APPOINTMENT (OUTPATIENT)
Dept: RADIOLOGY | Facility: MEDICAL CENTER | Age: 79
End: 2024-12-12
Payer: MEDICARE

## 2024-12-12 ENCOUNTER — OFFICE VISIT (OUTPATIENT)
Dept: OBGYN CLINIC | Facility: CLINIC | Age: 79
End: 2024-12-12
Payer: MEDICARE

## 2024-12-12 VITALS
BODY MASS INDEX: 28.85 KG/M2 | SYSTOLIC BLOOD PRESSURE: 148 MMHG | HEART RATE: 64 BPM | OXYGEN SATURATION: 99 % | WEIGHT: 169 LBS | HEIGHT: 64 IN | TEMPERATURE: 97.6 F | DIASTOLIC BLOOD PRESSURE: 65 MMHG

## 2024-12-12 DIAGNOSIS — Z96.641 HISTORY OF TOTAL HIP ARTHROPLASTY, RIGHT: Primary | ICD-10-CM

## 2024-12-12 DIAGNOSIS — Z96.641 HISTORY OF TOTAL HIP ARTHROPLASTY, RIGHT: ICD-10-CM

## 2024-12-12 PROCEDURE — 99213 OFFICE O/P EST LOW 20 MIN: CPT | Performed by: ORTHOPAEDIC SURGERY

## 2024-12-12 PROCEDURE — 73502 X-RAY EXAM HIP UNI 2-3 VIEWS: CPT

## 2024-12-12 NOTE — PROGRESS NOTES
Assessment/Plan:   Diagnoses and all orders for this visit:    History of total hip arthroplasty, right  -     XR hip/pelv 2-3 vws right if performed; Future    Reviewed physical exam and imaging with patient at time of visit. The patient is 5 year(s) s/p Right total hip arthroplasty. Radiographic findings demonstrate a well-seated prosthesis, with no signs of loosening. She continues to do well in regard to the hip. Continue weightbearing activities. She will follow-up in 1 year for an annual appointment, with repeat XR of her Right hip. The patient expresses understanding and is in agreement with today's treatment plan.     The patient is doing quite well in regards to her right total hip replacement.  There is full strength full motion.  Leg lengths intact.  X-rays show anatomic placement the prosthesis.  Continue home exercise program.  Follow-up 1 year for strength and motion check with new x-rays right hip-2 views      Subjective:   Patient ID: Jacklyn Garcia  1945     HPI  Patient is a 79 y.o. female who presents for annual evaluation of her right hip. The patient is approximately 5 years s/p right total hip arthroplasty form 2/27/2019. The patient states that her hip is doing well. She denies hip or groin pain. She continues weightbearing and normal activities of daily living. She denies weakness or instability.     The following portions of the patient's history were reviewed and updated as appropriate:  Past medical history, past surgical history, Family history, social history, current medications and allergies    Past Medical History:   Diagnosis Date    Allergies     Anxiety     Arthritis     Benign arteriolar nephrosclerosis     Bereavement     Cataract     Chronic kidney disease     Chronic kidney disease in type 2 diabetes mellitus (HCC)     Chronic kidney disease, stage 2 (mild)     Chronic pain disorder     Chronic pain syndrome     COPD (chronic obstructive pulmonary disease) ([facility])      DDD (degenerative disc disease), cervical     DDD (degenerative disc disease), lumbar     Degenerative joint disease of right hip     Depression     Diastolic dysfunction     DJD (degenerative joint disease), ankle and foot, right     DJD of right shoulder     Edema     Fracture of left calcaneus     Generalized anxiety disorder     GERD (gastroesophageal reflux disease)     Heart murmur     Hyperaldosteronism (HCC)     Hyperlipidemia     Hypertension     Hypertensive nephrosclerosis     Hypo-osmolality and hyponatremia     IBS (irritable bowel syndrome)     Insomnia     Migraines     Mitral regurgitation     MVP (mitral valve prolapse)     Obstructive sleep apnea syndrome     Osteoarthritis     Osteoarthritis     Pernicious anemia     Plantar fascia rupture     Rheumatoid arthritis (HCC)     Right knee DJD     S/P insertion of spinal cord stimulator     Shingles     Sinobronchitis     Sleep apnea     Status post total right knee replacement 07/07/2020    Stroke (HCC)     tia    TIA (transient ischemic attack)     Vertigo        Past Surgical History:   Procedure Laterality Date    APPENDECTOMY      CARPAL TUNNEL RELEASE Bilateral     COLONOSCOPY      several    COLONOSCOPY  04/02/2012    by Dr. Mckinley Ruvalcaba    CORNEAL TRANSPLANT Left 04/11/2022    DXA PROCEDURE (HISTORICAL)  10/26/2016, 9/17/2014, 6/18/2007    HAND SURGERY Right     ring finger has pin    HYSTERECTOMY      34    INSERT / REPLACE PERIPHERAL NEUROSTIMULATOR PULSE GENERATOR /  Left     buttocks    JOINT REPLACEMENT Left     knee    JOINT REPLACEMENT Right 04/2019    Hip    KNEE ARTHROPLASTY Left 01/15/2009    by Dr. Avery Leigh at [facility]     KNEE ARTHROSCOPY Bilateral     KNEE CARTILAGE SURGERY  09/10/2009    by Dr. Avery Leigh at [facility]     MAMMO (HISTORICAL)  12/10/2018, 10/30/2017, 10/26/2016    NASAL SEPTUM SURGERY  2008    NERVE BLOCK Left 02/20/2018    Procedure: BLOCK MEDIAL BRANCH - C4, C5,  C6;  Surgeon: Jaycob Cruz MD;  Location: [facility];  Service: Pain Management     NERVE BLOCK Left 03/06/2018    Procedure: C4, C5, C6 MEDIAL BRANCH BLOCK;  Surgeon: Jaycob Cruz MD;  Location: [facility];  Service: Pain Management     AZ ARTHRP ACETBLR/PROX FEM PROSTC AGRFT/ALGRFT Right 02/27/2019    Procedure: ARTHROPLASTY HIP TOTAL;  Surgeon: Bruno Pina DO;  Location:  MAIN OR;  Service: Orthopedics    AZ ARTHRP KNE CONDYLE&PLATU MEDIAL&LAT COMPARTMENTS Right 06/24/2020    Procedure: KNEE TOTAL ARTHROPLASTY;  Surgeon: Bruno Pina DO;  Location:  MAIN OR;  Service: Orthopedics    AZ COLONOSCOPY FLX DX W/COLLJ SPEC WHEN PFRMD N/A 04/24/2017    Procedure: FLEXIBLE COLONOSCOPY;  Surgeon: Mckinley Ruvalcaba MD;  Location: [facility];  Service: Colorectal    RADIOFREQUENCY ABLATION Left 04/17/2018    Procedure: ABLATION RADIO FREQUENCY (RFA) - C4, C5, C6;  Surgeon: Jaycob Cruz MD;  Location: [facility];  Service: Pain Management     REPLACEMENT TOTAL KNEE Left 07/05/2011    SINUS SURGERY      TONSILLECTOMY      TOTAL HIP ARTHROPLASTY Left     TRIGGER FINGER RELEASE      R 4th     UPPER GASTROINTESTINAL ENDOSCOPY  01/12/2007    with Donaldson dilatation by Dr. Misael Santiago at [facility]    WRIST SURGERY Right        Family History   Problem Relation Age of Onset    Heart disease Mother     Hypertension Mother     Arthritis Mother     Dementia Mother     Rheum arthritis Mother     Hypertension Father     Heart disease Father     Colon cancer Father     Hypertension Sister     Anxiety disorder Sister     Thyroid disease Sister     Prostate cancer Maternal Grandfather     No Known Problems Paternal Grandmother     No Known Problems Paternal Grandfather     No Known Problems Paternal Aunt     Pseudochol deficiency Neg Hx        Social History     Socioeconomic History    Marital status: /Civil Union     Spouse name: None    Number of children: None    Years of education: None     Highest education level: None   Occupational History    Occupation: Admnistrator    Tobacco Use    Smoking status: Former     Types: Cigarettes    Smokeless tobacco: Never    Tobacco comments:     quit 40 yrs ago   Vaping Use    Vaping status: Never Used   Substance and Sexual Activity    Alcohol use: Yes     Comment: 2 beer a week    Drug use: Never    Sexual activity: Not Currently     Birth control/protection: Post-menopausal   Other Topics Concern    None   Social History Narrative    Patient has never smoked - As per [employer]    Consumes 1-2 beers per week - As per [employer]    Consumes on average 1 cup of decaf coffee per day      Social Drivers of Health     Financial Resource Strain: Low Risk  (3/17/2023)    Overall Financial Resource Strain (CARDIA)     Difficulty of Paying Living Expenses: Not very hard   Food Insecurity: Not on file   Transportation Needs: No Transportation Needs (3/18/2024)    PRAPARE - Transportation     Lack of Transportation (Medical): No     Lack of Transportation (Non-Medical): No   Physical Activity: Not on file   Stress: Not on file   Social Connections: Not on file   Intimate Partner Violence: Not on file   Housing Stability: Low Risk  (3/18/2024)    Housing Stability Vital Sign     Unable to Pay for Housing in the Last Year: No     Number of Times Moved in the Last Year: 1     Homeless in the Last Year: No         Current Outpatient Medications:     aspirin (ECOTRIN LOW STRENGTH) 81 mg EC tablet, Take 1 tablet (81 mg total) by mouth daily, Disp:  , Rfl:     bacitracin-polymyxin b ophthalmic ointment, INSTILL A 1/4 INCH INTO LEFT EYE AT BEDTIME, Disp: , Rfl:     bisoprolol (ZEBETA) 10 MG tablet, TAKE 1 TABLET EVERY DAY, Disp: 90 tablet, Rfl: 1    butalbital-acetaminophen-caffeine (FIORICET,ESGIC) -40 mg per tablet, Take 1 tablet by mouth every 6 (six) hours as needed for migraine, Disp: 120 tablet, Rfl: 3    calcium carbonate (OS-MARLENY) 600 MG tablet, Take 600 mg by mouth 2 (two)  times a day with meals, Disp: , Rfl:     Ergocalciferol (VITAMIN D2 PO), Take 1,000 Units by mouth in the morning, Disp: , Rfl:     fluorometholone (FML) 0.1 % ophthalmic suspension, INSTILL 1 DROP BY OPTHALMIC ROUTE FOUR TIMES DAILY INTO LEFT EYE, Disp: , Rfl:     fluticasone (FLONASE) 50 mcg/act nasal spray, 2 sprays into each nostril daily, Disp: , Rfl:     losartan (COZAAR) 100 MG tablet, TAKE 1 TABLET EVERY MORNING, Disp: 90 tablet, Rfl: 1    magnesium (MAGTAB) 84 MG (7MEQ) TBCR, Take 1 tablet (84 mg total) by mouth daily, Disp: 90 tablet, Rfl: 1    meclizine (ANTIVERT) 12.5 MG tablet, Take 1 tablet (12.5 mg total) by mouth 3 (three) times a day as needed for dizziness, Disp: 30 tablet, Rfl: 0    Multiple Vitamins-Minerals (MULTIVITAMIN WITH MINERALS) tablet, Take 1 tablet by mouth every morning, Disp: , Rfl:     neomycin-bacitracin-polymyxin (NEOSPORIN) ophthalmic ointment, INSTILL 1/4' INTO LEFT EYE AT BEDTIME, Disp: , Rfl:     omeprazole (PriLOSEC) 20 mg delayed release capsule, TAKE 1 CAPSULE TWICE DAILY, Disp: 180 capsule, Rfl: 1    pravastatin (PRAVACHOL) 40 mg tablet, TAKE 1 TABLET EVERY DAY, Disp: 90 tablet, Rfl: 1    prochlorperazine (COMPAZINE) 5 mg tablet, TAKE 1 TABLET EVERY 6 HOURS AS NEEDED FOR NAUSEA OR VOMITING, Disp: 90 tablet, Rfl: 10    spironolactone (ALDACTONE) 50 mg tablet, TAKE 1 TABLET DAILY AFTER LUNCH, Disp: 90 tablet, Rfl: 1    zolpidem (AMBIEN) 5 mg tablet, Take 1 tablet (5 mg total) by mouth daily at bedtime as needed for sleep, Disp: 30 tablet, Rfl: 3    Allergies   Allergen Reactions    Procaine Hives    Adhesive [Medical Tape] Rash    Lidocaine Rash    Penicillins Rash       Review of Systems   Constitutional:  Negative for chills, fever and unexpected weight change.   HENT:  Negative for hearing loss, nosebleeds and sore throat.    Eyes:  Negative for pain, redness and visual disturbance.   Respiratory:  Negative for cough, shortness of breath and wheezing.    Cardiovascular:   "Negative for chest pain, palpitations and leg swelling.   Gastrointestinal:  Negative for abdominal pain, nausea and vomiting.   Endocrine: Negative for polydipsia and polyuria.   Genitourinary:  Negative for dysuria and hematuria.   Skin:  Negative for rash and wound.   Neurological:  Negative for dizziness, numbness and headaches.   Psychiatric/Behavioral:  Negative for decreased concentration and suicidal ideas. The patient is not nervous/anxious.    All other systems reviewed and are negative.       Objective:  /65   Pulse 64   Temp 97.6 °F (36.4 °C) (Temporal)   Ht 5' 4\" (1.626 m)   Wt 76.7 kg (169 lb)   SpO2 99%   BMI 29.01 kg/m²     Ortho Exam  right Hip -  Patient presents with no anatomical deformity  Ambulates with steady gait pattern  Uses No assistive devices  No tenderness upon palpation  Smooth passive circumduction   Strength: 5/5 throughout  Knee flexor and extensor mechanism intact  Ankle DF and PF mechanism intact  No evidence of a leg length discrepancy   2+ TP and DP pulses with brisk capillary refill to the toes  Sural, saphenous, tibial, superficial and deep peroneal motor and sensory distribution intact  Sensation to light touch intact distally      Physical Exam  HENT:      Head: Normocephalic and atraumatic.      Nose: Nose normal.   Eyes:      Conjunctiva/sclera: Conjunctivae normal.   Cardiovascular:      Rate and Rhythm: Normal rate.   Pulmonary:      Effort: Pulmonary effort is normal.   Musculoskeletal:      Cervical back: Neck supple.   Skin:     General: Skin is warm and dry.      Capillary Refill: Capillary refill takes less than 2 seconds.   Neurological:      Mental Status: She is alert and oriented to person, place, and time.   Psychiatric:         Mood and Affect: Mood normal.         Behavior: Behavior normal.          Diagnostic Test Review:  X-Ray of right hip obtained on 12/12/2024 were reviewed and demonstrate well-seated total hip prosthesis with maintained " anatomical alignment and no signs of loosening.      Procedures   None performed.       Scribe Attestation      I,:  Pedro Luis Currie am acting as a scribe while in the presence of the attending physician.:       I,:  Bruno Pina, DO personally performed the services described in this documentation    as scribed in my presence.:

## 2024-12-13 ENCOUNTER — TELEPHONE (OUTPATIENT)
Dept: PAIN MEDICINE | Facility: CLINIC | Age: 79
End: 2024-12-13

## 2024-12-16 ENCOUNTER — PREP FOR PROCEDURE (OUTPATIENT)
Dept: PAIN MEDICINE | Facility: CLINIC | Age: 79
End: 2024-12-16

## 2024-12-16 ENCOUNTER — OFFICE VISIT (OUTPATIENT)
Dept: FAMILY MEDICINE CLINIC | Facility: CLINIC | Age: 79
End: 2024-12-16
Payer: MEDICARE

## 2024-12-16 ENCOUNTER — TELEPHONE (OUTPATIENT)
Dept: PAIN MEDICINE | Facility: CLINIC | Age: 79
End: 2024-12-16

## 2024-12-16 VITALS
HEIGHT: 64 IN | BODY MASS INDEX: 29.02 KG/M2 | TEMPERATURE: 97.7 F | HEART RATE: 76 BPM | DIASTOLIC BLOOD PRESSURE: 76 MMHG | WEIGHT: 170 LBS | RESPIRATION RATE: 16 BRPM | SYSTOLIC BLOOD PRESSURE: 126 MMHG

## 2024-12-16 DIAGNOSIS — I10 ESSENTIAL HYPERTENSION: ICD-10-CM

## 2024-12-16 DIAGNOSIS — H69.93 EUSTACHIAN TUBE DISORDER, BILATERAL: ICD-10-CM

## 2024-12-16 DIAGNOSIS — J01.01 ACUTE RECURRENT MAXILLARY SINUSITIS: Primary | ICD-10-CM

## 2024-12-16 DIAGNOSIS — N18.31 STAGE 3A CHRONIC KIDNEY DISEASE (HCC): ICD-10-CM

## 2024-12-16 DIAGNOSIS — M47.812 CERVICAL SPONDYLOSIS: Primary | ICD-10-CM

## 2024-12-16 DIAGNOSIS — R42 VERTIGO: ICD-10-CM

## 2024-12-16 PROCEDURE — G2211 COMPLEX E/M VISIT ADD ON: HCPCS | Performed by: FAMILY MEDICINE

## 2024-12-16 PROCEDURE — 99214 OFFICE O/P EST MOD 30 MIN: CPT | Performed by: FAMILY MEDICINE

## 2024-12-16 RX ORDER — MECLIZINE HCL 12.5 MG 12.5 MG/1
12.5 TABLET ORAL 3 TIMES DAILY PRN
Qty: 30 TABLET | Refills: 0 | Status: SHIPPED | OUTPATIENT
Start: 2024-12-16

## 2024-12-16 RX ORDER — CLARITHROMYCIN 500 MG/1
500 TABLET ORAL EVERY 12 HOURS SCHEDULED
Qty: 14 TABLET | Refills: 0 | Status: SHIPPED | OUTPATIENT
Start: 2024-12-16 | End: 2024-12-23

## 2024-12-16 RX ORDER — PREDNISONE 10 MG/1
20 TABLET ORAL DAILY
Qty: 10 TABLET | Refills: 0 | Status: SHIPPED | OUTPATIENT
Start: 2024-12-16 | End: 2024-12-21

## 2024-12-16 NOTE — PROGRESS NOTES
Name: Jacklyn Garcia      : 1945      MRN: [id number]  Encounter Provider: Ernie Wilkinson DO  Encounter Date: 2024   Encounter department: [facility] PR[facility]  :  Assessment & Plan  Acute recurrent maxillary sinusitis  We will provide prednisone 20 mg twice daily and Biaxin 500 twice daily for 7 days  Orders:  •  predniSONE 10 mg tablet; Take 2 tablets (20 mg total) by mouth daily for 5 days  •  clarithromycin (BIAXIN) 500 mg tablet; Take 1 tablet (500 mg total) by mouth every 12 (twelve) hours for 7 days    Vertigo  We will provide Antivert 12.5 up to 3 times daily  Orders:  •  meclizine (ANTIVERT) 12.5 MG tablet; Take 1 tablet (12.5 mg total) by mouth 3 (three) times a day as needed for dizziness    Essential hypertension  With blood pressure controlled on the current regimen       Stage 3a chronic kidney disease (HCC)  Lab Results   Component Value Date    EGFR 59 2024    EGFR 55 2024    EGFR 55 2024    CREATININE 0.93 2024    CREATININE 0.99 2024    CREATININE 0.98 2024   Stable advised the patient avoid nonsteroidal anti-inflammatories stay well-hydrated and keep her blood pressure controlled         Eustachian tube disorder, bilateral  Prednisone 20 mg for 5 days to address disorder              History of Present Illness     Patient present with chief complaint of cold-like symptoms for 3 days with bilateral earache sore throat head congestion.  The patient tested negative for COVID-19 at home    Headache  Earache   Associated symptoms include headaches, rhinorrhea and a sore throat. Pertinent negatives include no abdominal pain, coughing, rash or vomiting.   Sore Throat   Associated symptoms include ear pain and headaches. Pertinent negatives include no abdominal pain, coughing, shortness of breath or vomiting.     Review of Systems   Constitutional:  Negative for chills and fever.   HENT:  Positive for ear pain, rhinorrhea, sinus  "pressure, sinus pain and sore throat.    Eyes:  Negative for pain and visual disturbance.   Respiratory:  Negative for cough and shortness of breath.    Cardiovascular:  Negative for chest pain and palpitations.   Gastrointestinal:  Negative for abdominal pain and vomiting.   Genitourinary:  Negative for dysuria and hematuria.   Musculoskeletal:  Negative for arthralgias and back pain.   Skin:  Negative for color change and rash.   Neurological:  Positive for dizziness and headaches. Negative for seizures and syncope.   All other systems reviewed and are negative.      Objective   /76   Pulse 76   Temp 97.7 °F (36.5 °C)   Resp 16   Ht 5' 4\" (1.626 m)   Wt 77.1 kg (170 lb)   BMI 29.18 kg/m²      Physical Exam  Constitutional:       Appearance: Normal appearance.   HENT:      Head: Normocephalic and atraumatic.      Right Ear: Ear canal and external ear normal. Tympanic membrane is bulging.      Left Ear: Ear canal and external ear normal. Tympanic membrane is bulging.      Nose:      Right Sinus: Maxillary sinus tenderness present.      Left Sinus: Maxillary sinus tenderness present.      Mouth/Throat:      Mouth: Mucous membranes are moist.      Pharynx: Oropharynx is clear.   Eyes:      Extraocular Movements: Extraocular movements intact.      Conjunctiva/sclera: Conjunctivae normal.      Pupils: Pupils are equal, round, and reactive to light.   Cardiovascular:      Rate and Rhythm: Normal rate and regular rhythm.      Pulses: Normal pulses.      Heart sounds: Normal heart sounds.   Pulmonary:      Effort: Pulmonary effort is normal.      Breath sounds: Normal breath sounds.   Abdominal:      General: Abdomen is flat. Bowel sounds are normal.      Palpations: Abdomen is soft.   Musculoskeletal:         General: Normal range of motion.      Cervical back: Normal range of motion and neck supple.   Skin:     General: Skin is warm and dry.   Neurological:      General: No focal deficit present.      Mental " Status: She is alert and oriented to person, place, and time.   Psychiatric:         Mood and Affect: Mood normal.         Behavior: Behavior normal.

## 2024-12-16 NOTE — ASSESSMENT & PLAN NOTE
Lab Results   Component Value Date    EGFR 59 09/30/2024    EGFR 55 06/27/2024    EGFR 55 03/11/2024    CREATININE 0.93 09/30/2024    CREATININE 0.99 06/27/2024    CREATININE 0.98 03/11/2024   Stable advised the patient avoid nonsteroidal anti-inflammatories stay well-hydrated and keep her blood pressure controlled

## 2024-12-27 ENCOUNTER — TELEPHONE (OUTPATIENT)
Dept: FAMILY MEDICINE CLINIC | Facility: CLINIC | Age: 79
End: 2024-12-27

## 2024-12-27 DIAGNOSIS — F51.01 PRIMARY INSOMNIA: Primary | ICD-10-CM

## 2024-12-27 NOTE — TELEPHONE ENCOUNTER
Patient states that the Ambien puts her to sleep but wakes at 2:30 am and unable to go back to sleep - patient does go to bed 11:00 pm

## 2024-12-30 ENCOUNTER — TELEPHONE (OUTPATIENT)
Dept: FAMILY MEDICINE CLINIC | Facility: CLINIC | Age: 79
End: 2024-12-30

## 2024-12-30 DIAGNOSIS — U07.1 COVID-19: Primary | ICD-10-CM

## 2024-12-30 RX ORDER — ONDANSETRON 4 MG/1
4 TABLET, FILM COATED ORAL EVERY 8 HOURS PRN
Qty: 20 TABLET | Refills: 0 | Status: SHIPPED | OUTPATIENT
Start: 2024-12-30

## 2024-12-30 NOTE — TELEPHONE ENCOUNTER
Zofran for nausea 1 every 4 hours as needed Mucinex DM for the cough 1 up to twice daily has been transmitted

## 2024-12-30 NOTE — TELEPHONE ENCOUNTER
Patient called;  tested positive for COVID on Friday, Patient tested positive for COVID on Saturday. Patient does c/o cough, nausea, and mild headache. Please advise.

## 2025-01-17 ENCOUNTER — HOSPITAL ENCOUNTER (OUTPATIENT)
Dept: MAMMOGRAPHY | Facility: HOSPITAL | Age: 80
Discharge: HOME | End: 2025-01-17
Attending: FAMILY MEDICINE
Payer: MEDICARE

## 2025-01-17 VITALS — HEIGHT: 64 IN | WEIGHT: 170 LBS | BODY MASS INDEX: 29.02 KG/M2

## 2025-01-17 DIAGNOSIS — Z12.31 ENCOUNTER FOR SCREENING MAMMOGRAM FOR MALIGNANT NEOPLASM OF BREAST: ICD-10-CM

## 2025-01-17 PROCEDURE — 77067 SCR MAMMO BI INCL CAD: CPT

## 2025-01-17 PROCEDURE — 77063 BREAST TOMOSYNTHESIS BI: CPT

## 2025-02-03 ENCOUNTER — OFFICE VISIT (OUTPATIENT)
Dept: FAMILY MEDICINE CLINIC | Facility: CLINIC | Age: 80
End: 2025-02-03
Payer: MEDICARE

## 2025-02-03 VITALS
HEART RATE: 76 BPM | RESPIRATION RATE: 16 BRPM | TEMPERATURE: 96.1 F | BODY MASS INDEX: 29.19 KG/M2 | DIASTOLIC BLOOD PRESSURE: 76 MMHG | SYSTOLIC BLOOD PRESSURE: 130 MMHG | WEIGHT: 171 LBS | HEIGHT: 64 IN

## 2025-02-03 DIAGNOSIS — J01.01 ACUTE RECURRENT MAXILLARY SINUSITIS: Primary | ICD-10-CM

## 2025-02-03 DIAGNOSIS — K21.9 GASTROESOPHAGEAL REFLUX DISEASE WITHOUT ESOPHAGITIS: ICD-10-CM

## 2025-02-03 DIAGNOSIS — I10 ESSENTIAL HYPERTENSION: ICD-10-CM

## 2025-02-03 DIAGNOSIS — N18.31 STAGE 3A CHRONIC KIDNEY DISEASE (HCC): ICD-10-CM

## 2025-02-03 PROCEDURE — G2211 COMPLEX E/M VISIT ADD ON: HCPCS | Performed by: FAMILY MEDICINE

## 2025-02-03 PROCEDURE — 99214 OFFICE O/P EST MOD 30 MIN: CPT | Performed by: FAMILY MEDICINE

## 2025-02-03 RX ORDER — AZITHROMYCIN 250 MG/1
TABLET, FILM COATED ORAL
Qty: 6 TABLET | Refills: 1 | Status: SHIPPED | OUTPATIENT
Start: 2025-02-03 | End: 2025-02-08

## 2025-02-03 RX ORDER — METHYLPREDNISOLONE 4 MG/1
TABLET ORAL
Qty: 21 EACH | Refills: 0 | Status: SHIPPED | OUTPATIENT
Start: 2025-02-03

## 2025-02-03 NOTE — PROGRESS NOTES
Name: Jacklyn Garcia      : 1945      MRN: [id number]  Encounter Provider: Ernie Wilkinson DO  Encounter Date: 2/3/2025   Encounter department: [facility] PRIMARY CARE  :  Assessment & Plan  Acute recurrent maxillary sinusitis  We will provide a Medrol Dosepak and Zithromax Z-SAUMYA  Orders:  •  methylPREDNISolone 4 MG tablet therapy pack; Use as directed on package  •  azithromycin (Zithromax) 250 mg tablet; Take 2 tablets (500 mg total) by mouth daily for 1 day, THEN 1 tablet (250 mg total) daily for 4 days.    Essential hypertension  With blood pressure controlled on the current regimen       Gastroesophageal reflux disease without esophagitis  Prilosec minimizes symptoms       Stage 3a chronic kidney disease (HCC)  Lab Results   Component Value Date    EGFR 59 2024    EGFR 55 2024    EGFR 55 2024    CREATININE 0.93 2024    CREATININE 0.99 2024    CREATININE 0.98 2024                Depression Screening and Follow-up Plan: Patient was screened for depression during today's encounter. They screened negative with a PHQ-2 score of 0.      History of Present Illness   Patient presents with a chief complaint of cold-like symptoms with sinus congestion and pain with nasal congestion beginning yesterday    Sinus Problem  Associated symptoms include congestion and sinus pressure. Pertinent negatives include no chills, coughing, ear pain, shortness of breath or sore throat.     Review of Systems   Constitutional: Negative.  Negative for chills and fever.   HENT:  Positive for congestion, sinus pressure and sinus pain. Negative for ear pain and sore throat.    Eyes: Negative.  Negative for pain and visual disturbance.   Respiratory: Negative.  Negative for cough and shortness of breath.    Cardiovascular: Negative.  Negative for chest pain and palpitations.   Gastrointestinal: Negative.  Negative for abdominal pain and vomiting.   Endocrine: Negative.   "  Genitourinary: Negative.  Negative for dysuria and hematuria.   Musculoskeletal: Negative.  Negative for arthralgias and back pain.   Skin: Negative.  Negative for color change and rash.   Allergic/Immunologic: Negative.    Neurological: Negative.  Negative for seizures and syncope.   Hematological: Negative.    Psychiatric/Behavioral: Negative.     All other systems reviewed and are negative.      Objective   /76   Pulse 76   Temp (!) 96.1 °F (35.6 °C)   Resp 16   Ht 5' 4\" (1.626 m)   Wt 77.6 kg (171 lb)   BMI 29.35 kg/m²      Physical Exam  Constitutional:       Appearance: Normal appearance.   HENT:      Head: Normocephalic and atraumatic.      Right Ear: Tympanic membrane, ear canal and external ear normal.      Left Ear: Tympanic membrane, ear canal and external ear normal.      Nose: Congestion and rhinorrhea present.      Right Sinus: Maxillary sinus tenderness present.      Left Sinus: Maxillary sinus tenderness present.      Mouth/Throat:      Mouth: Mucous membranes are moist.      Pharynx: Oropharynx is clear.   Eyes:      Extraocular Movements: Extraocular movements intact.      Conjunctiva/sclera: Conjunctivae normal.      Pupils: Pupils are equal, round, and reactive to light.   Cardiovascular:      Rate and Rhythm: Normal rate and regular rhythm.      Pulses: Normal pulses.      Heart sounds: Normal heart sounds.   Pulmonary:      Effort: Pulmonary effort is normal.      Breath sounds: Normal breath sounds.   Abdominal:      General: Abdomen is flat. Bowel sounds are normal.      Palpations: Abdomen is soft.   Musculoskeletal:         General: Normal range of motion.      Cervical back: Normal range of motion and neck supple. No tenderness.   Skin:     General: Skin is warm and dry.      Capillary Refill: Capillary refill takes less than 2 seconds.   Neurological:      General: No focal deficit present.      Mental Status: She is alert and oriented to person, place, and time. "   Psychiatric:         Mood and Affect: Mood normal.         Behavior: Behavior normal.

## 2025-02-03 NOTE — PROGRESS NOTES
"Name: Jacklyn Garcia      : 1945      MRN: [id number]  Encounter Provider: Ernie Wilkinson DO  Encounter Date: 2/3/2025   Encounter department: [facility] PRIMARY CARE  :  Assessment & Plan           History of Present Illness   HPI  Review of Systems    Objective   /76   Pulse 76   Temp (!) 96.1 °F (35.6 °C)   Resp 16   Ht 5' 4\" (1.626 m)   Wt 77.6 kg (171 lb)   BMI 29.35 kg/m²      Physical Exam    "

## 2025-02-04 ENCOUNTER — OFFICE VISIT (OUTPATIENT)
Dept: CARDIOLOGY CLINIC | Facility: HOSPITAL | Age: 80
End: 2025-02-04
Payer: MEDICARE

## 2025-02-04 VITALS
DIASTOLIC BLOOD PRESSURE: 56 MMHG | WEIGHT: 170.8 LBS | BODY MASS INDEX: 29.16 KG/M2 | HEIGHT: 64 IN | HEART RATE: 59 BPM | SYSTOLIC BLOOD PRESSURE: 128 MMHG

## 2025-02-04 DIAGNOSIS — I12.9 HYPERTENSIVE NEPHROSCLEROSIS, STAGE 1 THROUGH STAGE 4 OR UNSPECIFIED CHRONIC KIDNEY DISEASE: ICD-10-CM

## 2025-02-04 DIAGNOSIS — E26.9 HYPERALDOSTERONISM (HCC): ICD-10-CM

## 2025-02-04 DIAGNOSIS — I05.1 RHEUMATIC MITRAL REGURGITATION: Primary | ICD-10-CM

## 2025-02-04 DIAGNOSIS — E66.01 OBESITY, MORBID (HCC): ICD-10-CM

## 2025-02-04 DIAGNOSIS — E78.00 HYPERCHOLESTEROLEMIA: ICD-10-CM

## 2025-02-04 DIAGNOSIS — R06.09 DOE (DYSPNEA ON EXERTION): ICD-10-CM

## 2025-02-04 PROCEDURE — 99214 OFFICE O/P EST MOD 30 MIN: CPT | Performed by: INTERNAL MEDICINE

## 2025-02-04 PROCEDURE — 93000 ELECTROCARDIOGRAM COMPLETE: CPT | Performed by: INTERNAL MEDICINE

## 2025-02-04 NOTE — PROGRESS NOTES
Cardiology Follow Up    Jacklyn Garcia  1945  [id number]  CARDIOVASC PHYSICIAN  [address] [address]    1. Rheumatic mitral regurgitation  POCT ECG    VAS screening    Echo complete w/ contrast if indicated      2. Hypertensive nephrosclerosis, stage 1 through stage 4 or unspecified chronic kidney disease  POCT ECG    VAS screening    Echo complete w/ contrast if indicated      3. Hyperaldosteronism (HCC)        4. Hypercholesterolemia        5. Obesity, morbid (HCC)        6. RÍOS (dyspnea on exertion)            Discussion/Summary: Blood pressure is very well-controlled.  Lipids have been doing well she is due for basic blood work.  There is been some mild dyspnea recommend checking severity of mitral regurgitation with an echo.  She also needs 2-year follow-up for mild bilateral carotid stenosis.  Follow-up with her in the office in 12 months.      Interval History:  79-year-old female with difficult-to-control hypertension, mitral regurgitation, chronic diastolic congestive heart failure presents for follow-up visit.  She has been under a lot of stress since her last exam.  There have been several deaths in her mediated family and she has had a hard time handling this.  From a cardiac standpoint she has been rather comfortable.  Breathing has been stable.      Overall she is doing well she remains active around the house.  Denies any chest pain, shortness of breath has been minimal.  There is been no lower extremity edema, PND, orthopnea.  Functional capacity is good.  No claudication.      Problem List       Diastolic dysfunction    Hypercholesterolemia    Hypertension    Mitral regurgitation    Overview Signed 2/8/2018  8:25 AM by Williams Blas DO     Description: Moderate         Shortness of breath on exertion    Cervical spondylosis without myelopathy    Overview Signed 2/13/2018  8:13 AM by Vesna Greer MA     Added automatically  from request for surgery [id number]         Chronic pain syndrome          Past Medical History:   Diagnosis Date    Allergies     Anxiety     Arthritis     Benign arteriolar nephrosclerosis     Bereavement     Cataract     Chronic kidney disease     Chronic kidney disease in type 2 diabetes mellitus ([facility])     Chronic kidney disease, stage 2 (mild)     Chronic pain disorder     Chronic pain syndrome     COPD (chronic obstructive pulmonary disease) ([facility])     DDD (degenerative disc disease), cervical     DDD (degenerative disc disease), lumbar     Degenerative joint disease of right hip     Depression     Diastolic dysfunction     DJD (degenerative joint disease), ankle and foot, right     DJD of right shoulder     Edema     Fracture of left calcaneus     Generalized anxiety disorder     GERD (gastroesophageal reflux disease)     Heart murmur     Hyperaldosteronism ([facility])     Hyperlipidemia     Hypertension     Hypertensive nephrosclerosis     Hypo-osmolality and hyponatremia     IBS (irritable bowel syndrome)     Insomnia     Migraines     Mitral regurgitation     MVP (mitral valve prolapse)     Obstructive sleep apnea syndrome     Osteoarthritis     Osteoarthritis     Pernicious anemia     Plantar fascia rupture     Rheumatoid arthritis ([facility])     Right knee DJD     S/P insertion of spinal cord stimulator     Shingles     Sinobronchitis     Sleep apnea     Status post total right knee replacement 07/07/2020    Stroke ([facility])     tia    TIA (transient ischemic attack)     Vertigo      Social History     Socioeconomic History    Marital status: /Civil Union     Spouse name: Not on file    Number of children: Not on file    Years of education: Not on file    Highest education level: Not on file   Occupational History    Occupation: Admnistrator    Tobacco Use    Smoking status: Former     Types: Cigarettes    Smokeless tobacco: Never    Tobacco comments:     quit 40 yrs ago   Vaping Use    Vaping status: Never Used    Substance and Sexual Activity    Alcohol use: Yes     Comment: 2 beer a week    Drug use: Never    Sexual activity: Not Currently     Birth control/protection: Post-menopausal   Other Topics Concern    Not on file   Social History Narrative    Patient has never smoked - As per [employer]    Consumes 1-2 beers per week - As per [employer]    Consumes on average 1 cup of decaf coffee per day      Social Drivers of Health     Financial Resource Strain: Low Risk  (3/17/2023)    Overall Financial Resource Strain (CARDIA)     Difficulty of Paying Living Expenses: Not very hard   Food Insecurity: Not on file   Transportation Needs: No Transportation Needs (3/18/2024)    PRAPARE - Transportation     Lack of Transportation (Medical): No     Lack of Transportation (Non-Medical): No   Physical Activity: Not on file   Stress: Not on file   Social Connections: Not on file   Intimate Partner Violence: Not on file   Housing Stability: Low Risk  (3/18/2024)    Housing Stability Vital Sign     Unable to Pay for Housing in the Last Year: No     Number of Times Moved in the Last Year: 1     Homeless in the Last Year: No      Family History   Problem Relation Age of Onset    Heart disease Mother     Hypertension Mother     Arthritis Mother     Dementia Mother     Rheum arthritis Mother     Hypertension Father     Heart disease Father     Colon cancer Father     Hypertension Sister     Anxiety disorder Sister     Thyroid disease Sister     Prostate cancer Maternal Grandfather     No Known Problems Paternal Grandmother     No Known Problems Paternal Grandfather     No Known Problems Paternal Aunt     Pseudochol deficiency Neg Hx      Past Surgical History:   Procedure Laterality Date    APPENDECTOMY      CARPAL TUNNEL RELEASE Bilateral     COLONOSCOPY      several    COLONOSCOPY  04/02/2012    by Dr. Mckinley Ruvalcaba    CORNEAL TRANSPLANT Left 04/11/2022    DXA PROCEDURE (HISTORICAL)  10/26/2016, 9/17/2014, 6/18/2007    HAND SURGERY Right      ring finger has pin    HYSTERECTOMY      34    INSERT / REPLACE PERIPHERAL NEUROSTIMULATOR PULSE GENERATOR /  Left     buttocks    JOINT REPLACEMENT Left     knee    JOINT REPLACEMENT Right 04/2019    Hip    KNEE ARTHROPLASTY Left 01/15/2009    by Dr. Avery Leigh at [facility]     KNEE ARTHROSCOPY Bilateral     KNEE CARTILAGE SURGERY  09/10/2009    by Dr. Avery Leigh at [facility]     MAMMO (HISTORICAL)  12/10/2018, 10/30/2017, 10/26/2016    NASAL SEPTUM SURGERY  2008    NERVE BLOCK Left 02/20/2018    Procedure: BLOCK MEDIAL BRANCH - C4, C5, C6;  Surgeon: Jaycob Cruz MD;  Location: [facility];  Service: Pain Management     NERVE BLOCK Left 03/06/2018    Procedure: C4, C5, C6 MEDIAL BRANCH BLOCK;  Surgeon: Jaycob Cruz MD;  [facility]: [facility];  Service: Pain Management     FL ARTHRP ACETBLR/PROX FEM PROSTC AGRFT/ALGRFT Right 02/27/2019    Procedure: ARTHROPLASTY HIP TOTAL;  Surgeon: Bruno Pina DO;  Location:  MAIN OR;  Service: Orthopedics    FL ARTHRP KNE CONDYLE&PLATU MEDIAL&LAT COMPARTMENTS Right 06/24/2020    Procedure: KNEE TOTAL ARTHROPLASTY;  Surgeon: Bruno Pina DO;  Location:  MAIN OR;  Service: Orthopedics    FL COLONOSCOPY FLX DX W/COLLJ SPEC WHEN PFRMD N/A 04/24/2017    Procedure: FLEXIBLE COLONOSCOPY;  Surgeon: Mckinley Ruvalcaba MD;  Location: [facility];  Service: Colorectal    RADIOFREQUENCY ABLATION Left 04/17/2018    Procedure: ABLATION RADIO FREQUENCY (RFA) - C4, C5, C6;  Surgeon: Jaycob Cruz MD;  [facility]: [facility];  Service: Pain Management     REPLACEMENT TOTAL KNEE Left 07/05/2011    SINUS SURGERY      TONSILLECTOMY      TOTAL HIP ARTHROPLASTY Left     TRIGGER FINGER RELEASE      R 4th     UPPER GASTROINTESTINAL ENDOSCOPY  01/12/2007    with Donaldson dilatation by Dr. Misael Santiago at [facility] Right        Current Outpatient Medications:     aspirin (ECOTRIN LOW STRENGTH) 81 mg EC tablet, Take 1  tablet (81 mg total) by mouth daily, Disp:  , Rfl:     azithromycin (Zithromax) 250 mg tablet, Take 2 tablets (500 mg total) by mouth daily for 1 day, THEN 1 tablet (250 mg total) daily for 4 days., Disp: 6 tablet, Rfl: 1    bacitracin-polymyxin b ophthalmic ointment, INSTILL A 1/4 INCH INTO LEFT EYE AT BEDTIME, Disp: , Rfl:     bisoprolol (ZEBETA) 10 MG tablet, TAKE 1 TABLET EVERY DAY, Disp: 90 tablet, Rfl: 1    butalbital-acetaminophen-caffeine (FIORICET,ESGIC) -40 mg per tablet, Take 1 tablet by mouth every 6 (six) hours as needed for migraine, Disp: 120 tablet, Rfl: 3    calcium carbonate (OS-MARLENY) 600 MG tablet, Take 600 mg by mouth 2 (two) times a day with meals, Disp: , Rfl:     Ergocalciferol (VITAMIN D2 PO), Take 1,000 Units by mouth in the morning, Disp: , Rfl:     fluorometholone (FML) 0.1 % ophthalmic suspension, INSTILL 1 DROP BY OPTHALMIC ROUTE FOUR TIMES DAILY INTO LEFT EYE, Disp: , Rfl:     fluticasone (FLONASE) 50 mcg/act nasal spray, 2 sprays into each nostril daily, Disp: , Rfl:     losartan (COZAAR) 100 MG tablet, TAKE 1 TABLET EVERY MORNING, Disp: 90 tablet, Rfl: 1    magnesium (MAGTAB) 84 MG (7MEQ) TBCR, Take 1 tablet (84 mg total) by mouth daily, Disp: 90 tablet, Rfl: 1    meclizine (ANTIVERT) 12.5 MG tablet, Take 1 tablet (12.5 mg total) by mouth 3 (three) times a day as needed for dizziness, Disp: 30 tablet, Rfl: 0    methylPREDNISolone 4 MG tablet therapy pack, Use as directed on package, Disp: 21 each, Rfl: 0    Multiple Vitamins-Minerals (MULTIVITAMIN WITH MINERALS) tablet, Take 1 tablet by mouth every morning, Disp: , Rfl:     neomycin-bacitracin-polymyxin (NEOSPORIN) ophthalmic ointment, INSTILL 1/4' INTO LEFT EYE AT BEDTIME, Disp: , Rfl:     omeprazole (PriLOSEC) 20 mg delayed release capsule, TAKE 1 CAPSULE TWICE DAILY, Disp: 180 capsule, Rfl: 1    ondansetron (ZOFRAN) 4 mg tablet, Take 1 tablet (4 mg total) by mouth every 8 (eight) hours as needed for nausea or vomiting, Disp:  20 tablet, Rfl: 0    pravastatin (PRAVACHOL) 40 mg tablet, TAKE 1 TABLET EVERY DAY, Disp: 90 tablet, Rfl: 1    spironolactone (ALDACTONE) 50 mg tablet, TAKE 1 TABLET DAILY AFTER LUNCH, Disp: 90 tablet, Rfl: 1    zolpidem (AMBIEN) 5 mg tablet, Take 1 tablet (5 mg total) by mouth daily at bedtime as needed for sleep, Disp: 30 tablet, Rfl: 3    dextromethorphan-guaifenesin (MUCINEX DM)  MG per 12 hr tablet, Take 1 tablet by mouth every 12 (twelve) hours (Patient not taking: Reported on 2/4/2025), Disp: 20 tablet, Rfl: 1    prochlorperazine (COMPAZINE) 5 mg tablet, TAKE 1 TABLET EVERY 6 HOURS AS NEEDED FOR NAUSEA OR VOMITING (Patient not taking: Reported on 2/4/2025), Disp: 90 tablet, Rfl: 10  Allergies   Allergen Reactions    Procaine Hives    Adhesive [Medical Tape] Rash    Lidocaine Rash    Penicillins Rash       Labs:     Chemistry        Component Value Date/Time     (L) 09/21/2015 1057    K 3.8 09/30/2024 0710    K 4.3 09/21/2015 1057    CL 91 (L) 09/30/2024 0710    CL 94 (L) 09/21/2015 1057    CO2 27 09/30/2024 0710    CO2 31.2 09/21/2015 1057    BUN 15 09/30/2024 0710    BUN 8 09/21/2015 1057    CREATININE 0.93 09/30/2024 0710    CREATININE 0.74 09/21/2015 1057        Component Value Date/Time    CALCIUM 9.4 09/30/2024 0710    CALCIUM 9.5 09/21/2015 1057    ALKPHOS 94 09/30/2024 0710    ALKPHOS 123 (H) 09/21/2015 1057    AST 17 09/30/2024 0710    AST 21 09/21/2015 1057    ALT 15 09/30/2024 0710    ALT 34 09/21/2015 1057    BILITOT 0.29 09/21/2015 1057            Lab Results   Component Value Date    CHOL 290 06/04/2015    CHOL 240 10/19/2014    CHOL 277 03/12/2014     Lab Results   Component Value Date    HDL 97 06/27/2024    HDL 87 03/11/2024    HDL 75 11/22/2022     Lab Results   Component Value Date    LDLCALC 113 (H) 06/27/2024    LDLCALC 102 (H) 03/11/2024    LDLCALC 123 (H) 11/22/2022     Lab Results   Component Value Date    TRIG 64 06/27/2024    TRIG 93 03/11/2024    TRIG 123 11/22/2022  "    No results found for: \"CHOLHDL\"    Imaging: No results found.    ECG:  Normal sinus rhythm unremarkable tracing      Review of Systems   Constitutional: Negative.   HENT: Negative.     Eyes: Negative.    Cardiovascular: Negative.    Respiratory: Negative.     Endocrine: Negative.    Hematologic/Lymphatic: Negative.    Skin: Negative.    Musculoskeletal: Negative.    Gastrointestinal: Negative.    Genitourinary: Negative.    Neurological:  Positive for dizziness, headaches and loss of balance.   Psychiatric/Behavioral: Negative.         Vitals:    02/04/25 1250   BP: 128/56   Pulse: 59     Vitals:    02/04/25 1250   Weight: 77.5 kg (170 lb 12.8 oz)     Height: 5' 4\" (162.6 cm)   Body mass index is 29.32 kg/m².    Physical Exam:  Vital signs reviewed  General:  Alert and cooperative, appears stated age, no acute distress  HEENT:  PERRLA, EOMI, no scleral icterus, no conjunctival pallor  Neck:  No lymphadenopathy, no thyromegaly, no carotid bruits, no elevated JVP  Heart:  Regular rate and rhythm, normal S1/S2, no S3/S4, no murmur, rubs or gallops.  PMI nondisplaced  Lungs:  Clear to auscultation bilaterally, no wheezes rales or rhonchi  Abdomen:  Soft, non-tender, positive bowel sounds, no rebound or guarding,   no organomegaly   Extremities:  Normal range of motion.  No clubbing, cyanosis or edema   Vascular:  2+ pedal pulses  Skin:  No rashes or lesions on exposed skin  Neurologic:  Cranial nerves II-XII grossly intact without focal deficits  Psych:  Normal mood and affect            "

## 2025-02-13 ENCOUNTER — HOSPITAL ENCOUNTER (OUTPATIENT)
Dept: NON INVASIVE DIAGNOSTICS | Facility: HOSPITAL | Age: 80
Discharge: HOME | End: 2025-02-13
Attending: INTERNAL MEDICINE
Payer: MEDICARE

## 2025-02-13 VITALS
BODY MASS INDEX: 29.02 KG/M2 | DIASTOLIC BLOOD PRESSURE: 56 MMHG | WEIGHT: 170 LBS | SYSTOLIC BLOOD PRESSURE: 128 MMHG | HEART RATE: 59 BPM | HEIGHT: 64 IN

## 2025-02-13 DIAGNOSIS — I12.9 HYPERTENSIVE NEPHROSCLEROSIS, STAGE 1 THROUGH STAGE 4 OR UNSPECIFIED CHRONIC KIDNEY DISEASE: ICD-10-CM

## 2025-02-13 DIAGNOSIS — I05.1 RHEUMATIC MITRAL REGURGITATION: ICD-10-CM

## 2025-02-13 LAB
AORTIC ROOT: 2.8 CM
ASCENDING AORTA: 3.7 CM
BSA FOR ECHO PROCEDURE: 1.83 M2
E WAVE DECELERATION TIME: 180 MS
E/A RATIO: 1.56
FRACTIONAL SHORTENING: 31 (ref 28–44)
INTERVENTRICULAR SEPTUM IN DIASTOLE (PARASTERNAL SHORT AXIS VIEW): 1 CM
INTERVENTRICULAR SEPTUM: 1 CM (ref 0.6–1.1)
LAAS-AP2: 30.4 CM2
LAAS-AP4: 29.3 CM2
LEFT ATRIUM SIZE: 4.6 CM
LEFT ATRIUM VOLUME (MOD BIPLANE): 119 ML
LEFT ATRIUM VOLUME INDEX (MOD BIPLANE): 65 ML/M2
LEFT INTERNAL DIMENSION IN SYSTOLE: 3.1 CM (ref 2.1–4)
LEFT VENTRICULAR INTERNAL DIMENSION IN DIASTOLE: 4.5 CM (ref 3.5–6)
LEFT VENTRICULAR POSTERIOR WALL IN END DIASTOLE: 0.9 CM
LEFT VENTRICULAR STROKE VOLUME: 57 ML
LV EF US.2D.A4C+ESTIMATED: 72 %
LVSV (TEICH): 57 ML
MV E'TISSUE VEL-LAT: 6 CM/S
MV E'TISSUE VEL-SEP: 6 CM/S
MV PEAK A VEL: 0.63 M/S
MV PEAK E VEL: 98 CM/S
MV STENOSIS PRESSURE HALF TIME: 52 MS
MV VALVE AREA P 1/2 METHOD: 4.23
RA PRESSURE ESTIMATED: 3 MMHG
RIGHT ATRIUM AREA SYSTOLE A4C: 16.1 CM2
RIGHT VENTRICLE ID DIMENSION: 3.8 CM
RV PSP: 31 MMHG
SL CV LEFT ATRIUM LENGTH A2C: 6.5 CM
SL CV LV EF: 60
SL CV PED ECHO LEFT VENTRICLE DIASTOLIC VOLUME (MOD BIPLANE) 2D: 94 ML
SL CV PED ECHO LEFT VENTRICLE SYSTOLIC VOLUME (MOD BIPLANE) 2D: 37 ML
TR MAX PG: 28 MMHG
TR PEAK VELOCITY: 2.7 M/S
TRICUSPID ANNULAR PLANE SYSTOLIC EXCURSION: 2 CM
TRICUSPID VALVE PEAK REGURGITATION VELOCITY: 2.65 M/S

## 2025-02-13 PROCEDURE — 93306 TTE W/DOPPLER COMPLETE: CPT

## 2025-02-13 PROCEDURE — 93306 TTE W/DOPPLER COMPLETE: CPT | Performed by: INTERNAL MEDICINE

## 2025-02-20 ENCOUNTER — HOSPITAL ENCOUNTER (OUTPATIENT)
Facility: HOSPITAL | Age: 80
Setting detail: OUTPATIENT SURGERY
Discharge: HOME | End: 2025-02-20
Attending: ANESTHESIOLOGY | Admitting: ANESTHESIOLOGY
Payer: MEDICARE

## 2025-02-20 ENCOUNTER — APPOINTMENT (OUTPATIENT)
Dept: RADIOLOGY | Facility: HOSPITAL | Age: 80
End: 2025-02-20
Payer: MEDICARE

## 2025-02-20 VITALS
DIASTOLIC BLOOD PRESSURE: 74 MMHG | WEIGHT: 165 LBS | BODY MASS INDEX: 28.17 KG/M2 | TEMPERATURE: 96.8 F | SYSTOLIC BLOOD PRESSURE: 163 MMHG | RESPIRATION RATE: 18 BRPM | HEIGHT: 64 IN | OXYGEN SATURATION: 99 % | HEART RATE: 55 BPM

## 2025-02-20 PROCEDURE — 64490 INJ PARAVERT F JNT C/T 1 LEV: CPT | Performed by: ANESTHESIOLOGY

## 2025-02-20 PROCEDURE — 64491 INJ PARAVERT F JNT C/T 2 LEV: CPT | Performed by: ANESTHESIOLOGY

## 2025-02-20 RX ORDER — LIDOCAINE HYDROCHLORIDE 20 MG/ML
INJECTION, SOLUTION EPIDURAL; INFILTRATION; INTRACAUDAL; PERINEURAL AS NEEDED
Status: DISCONTINUED | OUTPATIENT
Start: 2025-02-20 | End: 2025-02-20 | Stop reason: HOSPADM

## 2025-02-20 RX ORDER — LIDOCAINE HYDROCHLORIDE 10 MG/ML
INJECTION, SOLUTION EPIDURAL; INFILTRATION; INTRACAUDAL; PERINEURAL AS NEEDED
Status: DISCONTINUED | OUTPATIENT
Start: 2025-02-20 | End: 2025-02-20 | Stop reason: HOSPADM

## 2025-02-20 NOTE — H&P
Assessment:  No diagnosis found.    Plan:  Jacklyn Garcia is a 79 y.o. female with complaints of neck pain presents to surgical center for procedure.  We will perform a Procedure(s) (LRB):  BLOCK MEDIAL BRANCH Left C4-5 and C5-6 (Left)   2. Follow-up 1 month after injection    Complete risks and benefits including bleeding, infection, tissue reaction, nerve injury and allergic reaction were discussed. The approach was demonstrated using models and literature was provided. Verbal and written consent was obtained.    My impressions and treatment recommendations were discussed in detail with the patient who verbalized understanding and had no further questions.  Discharge instructions were provided. I personally saw and examined the patient and I agree with the above discussed plan of care.    No orders of the defined types were placed in this encounter.    No orders of the defined types were placed in this encounter.      History of Present Illness:  Jacklyn Garcia is a 79 y.o. female who presents for a follow up office visit in regards to neck pain.   The patient’s current symptoms include 7 out of 10 constant sharp stabbing throbbing pain particular time pattern.      I have personally reviewed and/or updated the patient's past medical history, past surgical history, family history, social history, current medications, allergies, and vital signs today.     Review of Systems   Musculoskeletal:  Positive for neck pain and neck stiffness.   All other systems reviewed and are negative.      Patient Active Problem List   Diagnosis    Hypercholesterolemia    Mitral regurgitation    DDD (degenerative disc disease), cervical    Chronic pain disorder    Gastroesophageal reflux disease without esophagitis    Primary osteoarthritis of right knee    Osteoarthritis of right hip    Hyperaldosteronism (HCC)    Hypertensive nephrosclerosis    Pulmonary emphysema, unspecified emphysema type (HCC)    DDD (degenerative disc  disease), lumbar    Lumbar spondylosis    Lumbar disc herniation    Stage 3a chronic kidney disease ([facility])    Obesity, morbid ([facility])    Bunion of great toe of left foot    Hammer toe of left foot    RÍOS (dyspnea on exertion)       Past Medical History:   Diagnosis Date    Allergies     Anxiety     Arthritis     Benign arteriolar nephrosclerosis     Bereavement     Cataract     Chronic kidney disease     Chronic kidney disease in type 2 diabetes mellitus ([facility])     Chronic kidney disease, stage 2 (mild)     Chronic pain disorder     Chronic pain syndrome     COPD (chronic obstructive pulmonary disease) ([facility])     DDD (degenerative disc disease), cervical     DDD (degenerative disc disease), lumbar     Degenerative joint disease of right hip     Depression     Diastolic dysfunction     DJD (degenerative joint disease), ankle and foot, right     DJD of right shoulder     Edema     Fracture of left calcaneus     Generalized anxiety disorder     GERD (gastroesophageal reflux disease)     Heart murmur     Hyperaldosteronism ([facility])     Hyperlipidemia     Hypertension     Hypertensive nephrosclerosis     Hypo-osmolality and hyponatremia     IBS (irritable bowel syndrome)     Insomnia     Migraines     Mitral regurgitation     MVP (mitral valve prolapse)     Obstructive sleep apnea syndrome     Osteoarthritis     Osteoarthritis     Pernicious anemia     Plantar fascia rupture     Rheumatoid arthritis ([facility])     Right knee DJD     S/P insertion of spinal cord stimulator     Shingles     Sinobronchitis     Sleep apnea     Status post total right knee replacement 07/07/2020    Stroke ([facility])     tia    TIA (transient ischemic attack)     Vertigo        Past Surgical History:   Procedure Laterality Date    APPENDECTOMY      CARPAL TUNNEL RELEASE Bilateral     COLONOSCOPY      several    COLONOSCOPY  04/02/2012    by Dr. Mckinley Ruvalcaba    CORNEAL TRANSPLANT Left 04/11/2022    DXA PROCEDURE (HISTORICAL)  10/26/2016, 9/17/2014, 6/18/2007     HAND SURGERY Right     ring finger has pin    HYSTERECTOMY      34    INSERT / REPLACE PERIPHERAL NEUROSTIMULATOR PULSE GENERATOR /  Left     buttocks    JOINT REPLACEMENT Left     knee    JOINT REPLACEMENT Right 04/2019    Hip    KNEE ARTHROPLASTY Left 01/15/2009    by Dr. Avery Leigh at [facility]     KNEE ARTHROSCOPY Bilateral     KNEE CARTILAGE SURGERY  09/10/2009    by Dr. Avery Leigh at [facility]     MAMMO (HISTORICAL)  12/10/2018, 10/30/2017, 10/26/2016    NASAL SEPTUM SURGERY  2008    NERVE BLOCK Left 02/20/2018    Procedure: BLOCK MEDIAL BRANCH - C4, C5, C6;  Surgeon: Jaycob Cruz MD;  [facility]: [facility];  Service: Pain Management     NERVE BLOCK Left 03/06/2018    Procedure: C4, C5, C6 MEDIAL BRANCH BLOCK;  Surgeon: Jaycob Cruz MD;  [facility];  Service: Pain Management     OH ARTHRP ACETBLR/PROX FEM PROSTC AGRFT/ALGRFT Right 02/27/2019    Procedure: ARTHROPLASTY HIP TOTAL;  Surgeon: Bruno Pina DO;  Location:  MAIN OR;  Service: Orthopedics    OH ARTHRP KNE CONDYLE&PLATU MEDIAL&LAT COMPARTMENTS Right 06/24/2020    Procedure: KNEE TOTAL ARTHROPLASTY;  Surgeon: Bruno Pina DO;  Location:  MAIN OR;  Service: Orthopedics    OH COLONOSCOPY FLX DX W/COLLJ SPEC WHEN PFRMD N/A 04/24/2017    Procedure: FLEXIBLE COLONOSCOPY;  Surgeon: Mckinley Ruvalcaba MD;  [facility]: [facility];  Service: Colorectal    RADIOFREQUENCY ABLATION Left 04/17/2018    Procedure: ABLATION RADIO FREQUENCY (RFA) - C4, C5, C6;  Surgeon: Jaycob Cruz MD;  [facility]: [facility];  Service: Pain Management     REPLACEMENT TOTAL KNEE Left 07/05/2011    SINUS SURGERY      TONSILLECTOMY      TOTAL HIP ARTHROPLASTY Left     TRIGGER FINGER RELEASE      R 4th     UPPER GASTROINTESTINAL ENDOSCOPY  01/12/2007    with Donaldson dilatation by Dr. Misael Santiago at [facility]    WRIST SURGERY Right        Family History   Problem Relation Age of Onset    Heart disease  Mother     Hypertension Mother     Arthritis Mother     Dementia Mother     Rheum arthritis Mother     Hypertension Father     Heart disease Father     Colon cancer Father     Hypertension Sister     Anxiety disorder Sister     Thyroid disease Sister     Prostate cancer Maternal Grandfather     No Known Problems Paternal Grandmother     No Known Problems Paternal Grandfather     No Known Problems Paternal Aunt     Pseudochol deficiency Neg Hx        Social History     Occupational History    Occupation: Admnistrator    Tobacco Use    Smoking status: Former     Types: Cigarettes    Smokeless tobacco: Never    Tobacco comments:     quit 40 yrs ago   Vaping Use    Vaping status: Never Used   Substance and Sexual Activity    Alcohol use: Yes     Comment: 2 beer a week    Drug use: Never    Sexual activity: Not Currently     Birth control/protection: Post-menopausal       No current facility-administered medications on file prior to encounter.     Current Outpatient Medications on File Prior to Encounter   Medication Sig    aspirin (ECOTRIN LOW STRENGTH) 81 mg EC tablet Take 1 tablet (81 mg total) by mouth daily    bacitracin-polymyxin b ophthalmic ointment INSTILL A 1/4 INCH INTO LEFT EYE AT BEDTIME    bisoprolol (ZEBETA) 10 MG tablet TAKE 1 TABLET EVERY DAY    butalbital-acetaminophen-caffeine (FIORICET,ESGIC) -40 mg per tablet Take 1 tablet by mouth every 6 (six) hours as needed for migraine    calcium carbonate (OS-MARLENY) 600 MG tablet Take 600 mg by mouth 2 (two) times a day with meals    Ergocalciferol (VITAMIN D2 PO) Take 1,000 Units by mouth in the morning    fluorometholone (FML) 0.1 % ophthalmic suspension INSTILL 1 DROP BY OPTHALMIC ROUTE FOUR TIMES DAILY INTO LEFT EYE    fluticasone (FLONASE) 50 mcg/act nasal spray 2 sprays into each nostril daily    losartan (COZAAR) 100 MG tablet TAKE 1 TABLET EVERY MORNING    magnesium (MAGTAB) 84 MG (7MEQ) TBCR Take 1 tablet (84 mg total) by mouth daily    Multiple  "Vitamins-Minerals (MULTIVITAMIN WITH MINERALS) tablet Take 1 tablet by mouth every morning    neomycin-bacitracin-polymyxin (NEOSPORIN) ophthalmic ointment INSTILL 1/4' INTO LEFT EYE AT BEDTIME    omeprazole (PriLOSEC) 20 mg delayed release capsule TAKE 1 CAPSULE TWICE DAILY    pravastatin (PRAVACHOL) 40 mg tablet TAKE 1 TABLET EVERY DAY    spironolactone (ALDACTONE) 50 mg tablet TAKE 1 TABLET DAILY AFTER LUNCH    zolpidem (AMBIEN) 5 mg tablet Take 1 tablet (5 mg total) by mouth daily at bedtime as needed for sleep    dextromethorphan-guaifenesin (MUCINEX DM)  MG per 12 hr tablet Take 1 tablet by mouth every 12 (twelve) hours (Patient not taking: Reported on 2/4/2025)    meclizine (ANTIVERT) 12.5 MG tablet Take 1 tablet (12.5 mg total) by mouth 3 (three) times a day as needed for dizziness    ondansetron (ZOFRAN) 4 mg tablet Take 1 tablet (4 mg total) by mouth every 8 (eight) hours as needed for nausea or vomiting    prochlorperazine (COMPAZINE) 5 mg tablet TAKE 1 TABLET EVERY 6 HOURS AS NEEDED FOR NAUSEA OR VOMITING (Patient not taking: No sig reported)       Allergies   Allergen Reactions    Procaine Hives    Adhesive [Medical Tape] Rash    Lidocaine Rash    Penicillins Rash       Physical Exam:    BP (!) 185/81   Pulse 63   Temp (!) 97 °F (36.1 °C) (Temporal)   Resp 18   Ht 5' 4\" (1.626 m)   Wt 74.8 kg (165 lb)   SpO2 99%   BMI 28.32 kg/m²     Constitutional:normal, well developed, well nourished, alert, in no distress and non-toxic and no overt pain behavior.  Eyes:anicteric  HEENT:grossly intact  Neck:supple, symmetric, trachea midline and no masses   Pulmonary:even and unlabored  Cardiovascular:No edema or pitting edema present  Skin:Normal without rashes or lesions and well hydrated  Psychiatric:Mood and affect appropriate  Neurologic:Cranial Nerves II-XII grossly intact  Musculoskeletal:normal        "

## 2025-02-20 NOTE — OP NOTE
OPERATIVE REPORT  PATIENT NAME: Jacklyn Garcia    :  1945  MRN: [id number]  Pt Location: [facility] IR    SURGERY DATE: 2025    Surgeons and Role:     * Jaycob Cruz MD - Primary    Preop Diagnosis:  Cervical spondylosis [M47.812]    Post-Op Diagnosis Codes:     * Cervical spondylosis [M47.812]    Procedure(s):  Left - BLOCK MEDIAL BRANCH Left C4-5 and C5-6    Specimen(s):  * No specimens in log *    Estimated Blood Loss:   Minimal    Drains:  * No LDAs found *    Anesthesia Type:   Local    Operative Indications:  Cervical spondylosis [M47.812]      Operative Findings:  same      Complications:   None    Procedure and Technique:  Fluoroscopically-guided Medial Branch Nerve Blocks of the left C4-C5 and C5-C6 facet joint(s) using 2% lidocaine      After discussing the risks, benefits, and alternatives to the procedure, the patient expressed understanding and wished to proceed.  The patient was brought to the fluoroscopy suite and placed in the prone position.  Procedural pause conducted to verify:  correct patient identity, procedure to be performed and as applicable, correct side and site, correct patient position, and availability of implants, special equipment and special requirements.  Using fluoroscopy, the mid-body of the articular pillar at the left C4, C5, C6 levels were identified.  The skin was sterilely prepped and draped in the usual fashion using Chloraprep skin prep.  Using fluoroscopic guidance, a 3.5 inch 25 gauge spinal needle was advanced to each target.  After negative aspiration, 0.5cc of percent lidocaine was injected at each site and the needles were then removed. The patient tolerated the procedure well and there were no apparent complications.  After appropriate observation, the patient was dismissed from the clinic in good condition under their own power.  The patient was instructed to keep a pain diary and report the results at her follow up appointment.               I was present for the entire procedure.    Patient Disposition:  hemodynamically stable             SIGNATURE: Jaycob Cruz MD  DATE: February 20, 2025  TIME: 10:45 AM

## 2025-02-20 NOTE — DISCHARGE INSTR - AVS FIRST PAGE

## 2025-02-21 ENCOUNTER — TELEPHONE (OUTPATIENT)
Dept: PAIN MEDICINE | Facility: CLINIC | Age: 80
End: 2025-02-21

## 2025-02-21 NOTE — TELEPHONE ENCOUNTER
Patient dropped of pain diary from [facility]. Per BK, patient needs a follow up visit to discuss results.  I called and spoke with patient , gave him message to have patient call office to schedule office visit to discuss paper that she dropped off earlier today.

## 2025-02-24 ENCOUNTER — APPOINTMENT (OUTPATIENT)
Dept: LAB | Facility: HOSPITAL | Age: 80
End: 2025-02-24
Payer: MEDICARE

## 2025-02-24 DIAGNOSIS — I12.9 HYPERTENSIVE NEPHROSCLEROSIS, STAGE 1 THROUGH STAGE 4 OR UNSPECIFIED CHRONIC KIDNEY DISEASE: ICD-10-CM

## 2025-02-24 DIAGNOSIS — E78.00 HYPERCHOLESTEROLEMIA: ICD-10-CM

## 2025-02-24 LAB
ALBUMIN SERPL BCG-MCNC: 4.6 G/DL (ref 3.5–5)
ALP SERPL-CCNC: 96 U/L (ref 34–104)
ALT SERPL W P-5'-P-CCNC: 15 U/L (ref 7–52)
ANION GAP SERPL CALCULATED.3IONS-SCNC: 7 MMOL/L (ref 4–13)
AST SERPL W P-5'-P-CCNC: 19 U/L (ref 13–39)
BASOPHILS # BLD AUTO: 0.03 THOUSANDS/ÂΜL (ref 0–0.1)
BASOPHILS NFR BLD AUTO: 1 % (ref 0–1)
BILIRUB SERPL-MCNC: 0.52 MG/DL (ref 0.2–1)
BUN SERPL-MCNC: 10 MG/DL (ref 5–25)
CALCIUM SERPL-MCNC: 9.2 MG/DL (ref 8.4–10.2)
CHLORIDE SERPL-SCNC: 93 MMOL/L (ref 96–108)
CHOLEST SERPL-MCNC: 196 MG/DL (ref ?–200)
CO2 SERPL-SCNC: 29 MMOL/L (ref 21–32)
CREAT SERPL-MCNC: 0.92 MG/DL (ref 0.6–1.3)
EOSINOPHIL # BLD AUTO: 0.29 THOUSAND/ÂΜL (ref 0–0.61)
EOSINOPHIL NFR BLD AUTO: 6 % (ref 0–6)
ERYTHROCYTE [DISTWIDTH] IN BLOOD BY AUTOMATED COUNT: 12.9 % (ref 11.6–15.1)
GFR SERPL CREATININE-BSD FRML MDRD: 60 ML/MIN/1.73SQ M
GLUCOSE P FAST SERPL-MCNC: 94 MG/DL (ref 65–99)
HCT VFR BLD AUTO: 34 % (ref 34.8–46.1)
HDLC SERPL-MCNC: 88 MG/DL
HGB BLD-MCNC: 11.4 G/DL (ref 11.5–15.4)
IMM GRANULOCYTES # BLD AUTO: 0.01 THOUSAND/UL (ref 0–0.2)
IMM GRANULOCYTES NFR BLD AUTO: 0 % (ref 0–2)
LDLC SERPL CALC-MCNC: 95 MG/DL (ref 0–100)
LYMPHOCYTES # BLD AUTO: 1.33 THOUSANDS/ÂΜL (ref 0.6–4.47)
LYMPHOCYTES NFR BLD AUTO: 29 % (ref 14–44)
MCH RBC QN AUTO: 30.2 PG (ref 26.8–34.3)
MCHC RBC AUTO-ENTMCNC: 33.5 G/DL (ref 31.4–37.4)
MCV RBC AUTO: 90 FL (ref 82–98)
MONOCYTES # BLD AUTO: 0.39 THOUSAND/ÂΜL (ref 0.17–1.22)
MONOCYTES NFR BLD AUTO: 9 % (ref 4–12)
NEUTROPHILS # BLD AUTO: 2.55 THOUSANDS/ÂΜL (ref 1.85–7.62)
NEUTS SEG NFR BLD AUTO: 55 % (ref 43–75)
NRBC BLD AUTO-RTO: 0 /100 WBCS
PLATELET # BLD AUTO: 200 THOUSANDS/UL (ref 149–390)
PMV BLD AUTO: 9.1 FL (ref 8.9–12.7)
POTASSIUM SERPL-SCNC: 4.2 MMOL/L (ref 3.5–5.3)
PROT SERPL-MCNC: 6.8 G/DL (ref 6.4–8.4)
RBC # BLD AUTO: 3.78 MILLION/UL (ref 3.81–5.12)
SODIUM SERPL-SCNC: 129 MMOL/L (ref 135–147)
TRIGL SERPL-MCNC: 67 MG/DL (ref ?–150)
WBC # BLD AUTO: 4.6 THOUSAND/UL (ref 4.31–10.16)

## 2025-02-24 PROCEDURE — 36415 COLL VENOUS BLD VENIPUNCTURE: CPT

## 2025-02-24 PROCEDURE — 85025 COMPLETE CBC W/AUTO DIFF WBC: CPT

## 2025-02-24 PROCEDURE — 80053 COMPREHEN METABOLIC PANEL: CPT

## 2025-02-24 PROCEDURE — 80061 LIPID PANEL: CPT

## 2025-02-27 ENCOUNTER — OFFICE VISIT (OUTPATIENT)
Dept: FAMILY MEDICINE CLINIC | Facility: CLINIC | Age: 80
End: 2025-02-27
Payer: MEDICARE

## 2025-02-27 VITALS
WEIGHT: 171 LBS | RESPIRATION RATE: 20 BRPM | HEART RATE: 68 BPM | TEMPERATURE: 97.2 F | SYSTOLIC BLOOD PRESSURE: 134 MMHG | BODY MASS INDEX: 29.19 KG/M2 | DIASTOLIC BLOOD PRESSURE: 84 MMHG | HEIGHT: 64 IN

## 2025-02-27 DIAGNOSIS — F51.01 PRIMARY INSOMNIA: ICD-10-CM

## 2025-02-27 DIAGNOSIS — G44.209 MUSCLE TENSION HEADACHE: ICD-10-CM

## 2025-02-27 DIAGNOSIS — J01.01 ACUTE RECURRENT MAXILLARY SINUSITIS: Primary | ICD-10-CM

## 2025-02-27 PROCEDURE — 99214 OFFICE O/P EST MOD 30 MIN: CPT | Performed by: FAMILY MEDICINE

## 2025-02-27 PROCEDURE — G2211 COMPLEX E/M VISIT ADD ON: HCPCS | Performed by: FAMILY MEDICINE

## 2025-02-27 RX ORDER — ZOLPIDEM TARTRATE 5 MG/1
5 TABLET ORAL
Qty: 30 TABLET | Refills: 3 | Status: ON HOLD | OUTPATIENT
Start: 2025-03-08

## 2025-02-27 RX ORDER — PREDNISONE 10 MG/1
10 TABLET ORAL DAILY
Qty: 20 TABLET | Refills: 0 | Status: SHIPPED | OUTPATIENT
Start: 2025-02-27 | End: 2025-03-09 | Stop reason: ALTCHOICE

## 2025-02-27 RX ORDER — SULFAMETHOXAZOLE AND TRIMETHOPRIM 800; 160 MG/1; MG/1
1 TABLET ORAL 2 TIMES DAILY
Qty: 20 TABLET | Refills: 0 | Status: SHIPPED | OUTPATIENT
Start: 2025-02-27 | End: 2025-03-09 | Stop reason: ALTCHOICE

## 2025-02-27 RX ORDER — BUTALBITAL, ACETAMINOPHEN AND CAFFEINE 50; 325; 40 MG/1; MG/1; MG/1
1 TABLET ORAL EVERY 6 HOURS PRN
Qty: 120 TABLET | Refills: 3 | Status: ON HOLD | OUTPATIENT
Start: 2025-03-16

## 2025-02-27 NOTE — PROGRESS NOTES
Name: Jacklyn Garcia      : 1945      MRN: [id number]  Encounter Provider: Ernie Wilkinson DO  Encounter Date: 2025   Encounter department: [facility] PRIMARY CARE  :  Assessment & Plan  Acute recurrent maxillary sinusitis  The patient was informed that there is a sinus x-ray in the system.  Will begin Bactrim DS 1 twice daily for 10 days and prednisone 10 mg twice daily for 10 days  Orders:  •  sulfamethoxazole-trimethoprim (BACTRIM DS) 800-160 mg per tablet; Take 1 tablet by mouth 2 (two) times a day for 10 days  •  predniSONE 10 mg tablet; Take 1 tablet (10 mg total) by mouth daily    Primary insomnia  The PDMP has been reviewed no issues found no evidence of diversion's or abuse.  Will renew Ambien  Orders:  •  zolpidem (AMBIEN) 5 mg tablet; Take 1 tablet (5 mg total) by mouth daily at bedtime as needed for sleep Do not start before 2025.    Muscle tension headache  PDMP reviewed no issues found no evidence of diversion's or abuse we will renew Fioricet  Orders:  •  butalbital-acetaminophen-caffeine (FIORICET,ESGIC) -40 mg per tablet; Take 1 tablet by mouth every 6 (six) hours as needed for migraine Do not start before 2025.           History of Present Illness   Patient presents with continued maxillary sinus pressure and pain.  She has had 2 courses of antibiotics thus far.      Review of Systems   Constitutional:  Negative for chills and fever.   HENT:  Positive for rhinorrhea, sinus pressure and sinus pain. Negative for ear pain and sore throat.    Eyes:  Negative for pain and visual disturbance.   Respiratory:  Negative for cough and shortness of breath.    Cardiovascular:  Negative for chest pain and palpitations.   Gastrointestinal:  Negative for abdominal pain and vomiting.   Genitourinary:  Negative for dysuria and hematuria.   Musculoskeletal:  Positive for back pain and neck pain. Negative for arthralgias.   Skin:  Negative for color change  "and rash.   Neurological:  Positive for headaches. Negative for seizures and syncope.   All other systems reviewed and are negative.      Objective   /84   Pulse 68   Temp (!) 97.2 °F (36.2 °C)   Resp 20   Ht 5' 4\" (1.626 m)   Wt 77.6 kg (171 lb)   BMI 29.35 kg/m²      Physical Exam  Constitutional:       Appearance: Normal appearance.   HENT:      Head: Normocephalic and atraumatic.      Right Ear: Tympanic membrane, ear canal and external ear normal.      Left Ear: Tympanic membrane, ear canal and external ear normal.      Nose: Congestion and rhinorrhea present.      Right Sinus: Maxillary sinus tenderness present.      Mouth/Throat:      Mouth: Mucous membranes are moist.      Pharynx: Oropharynx is clear.   Eyes:      Extraocular Movements: Extraocular movements intact.      Conjunctiva/sclera: Conjunctivae normal.      Pupils: Pupils are equal, round, and reactive to light.   Cardiovascular:      Rate and Rhythm: Normal rate and regular rhythm.      Pulses: Normal pulses.      Heart sounds: Normal heart sounds.   Pulmonary:      Effort: Pulmonary effort is normal.      Breath sounds: Normal breath sounds.   Abdominal:      General: Abdomen is flat. Bowel sounds are normal.      Palpations: Abdomen is soft.   Musculoskeletal:         General: Normal range of motion.      Cervical back: Normal range of motion and neck supple.   Skin:     General: Skin is warm and dry.   Neurological:      General: No focal deficit present.      Mental Status: She is alert and oriented to person, place, and time.   Psychiatric:         Mood and Affect: Mood normal.         Behavior: Behavior normal.         "

## 2025-03-03 ENCOUNTER — OFFICE VISIT (OUTPATIENT)
Dept: NEPHROLOGY | Facility: CLINIC | Age: 80
End: 2025-03-03
Payer: MEDICARE

## 2025-03-03 ENCOUNTER — TELEPHONE (OUTPATIENT)
Age: 80
End: 2025-03-03

## 2025-03-03 VITALS
SYSTOLIC BLOOD PRESSURE: 131 MMHG | BODY MASS INDEX: 28.71 KG/M2 | TEMPERATURE: 98.1 F | DIASTOLIC BLOOD PRESSURE: 79 MMHG | HEIGHT: 64 IN | RESPIRATION RATE: 16 BRPM | WEIGHT: 168.2 LBS | OXYGEN SATURATION: 99 % | HEART RATE: 56 BPM

## 2025-03-03 DIAGNOSIS — I51.89 GRADE II DIASTOLIC DYSFUNCTION: ICD-10-CM

## 2025-03-03 DIAGNOSIS — E26.9 HYPERALDOSTERONISM (HCC): Primary | ICD-10-CM

## 2025-03-03 DIAGNOSIS — J01.01 ACUTE RECURRENT MAXILLARY SINUSITIS: Primary | ICD-10-CM

## 2025-03-03 DIAGNOSIS — N18.31 STAGE 3A CHRONIC KIDNEY DISEASE (HCC): ICD-10-CM

## 2025-03-03 DIAGNOSIS — I12.9 HYPERTENSIVE NEPHROSCLEROSIS, STAGE 1 THROUGH STAGE 4 OR UNSPECIFIED CHRONIC KIDNEY DISEASE: ICD-10-CM

## 2025-03-03 DIAGNOSIS — E22.2 SIADH (SYNDROME OF INAPPROPRIATE ADH PRODUCTION) (HCC): ICD-10-CM

## 2025-03-03 PROCEDURE — G2211 COMPLEX E/M VISIT ADD ON: HCPCS | Performed by: INTERNAL MEDICINE

## 2025-03-03 PROCEDURE — 99214 OFFICE O/P EST MOD 30 MIN: CPT | Performed by: INTERNAL MEDICINE

## 2025-03-03 RX ORDER — CLINDAMYCIN HYDROCHLORIDE 150 MG/1
CAPSULE ORAL
Status: ON HOLD | COMMUNITY
Start: 2025-02-06

## 2025-03-03 NOTE — ASSESSMENT & PLAN NOTE
Patient continues to have good blood pressure control management at this time with current dosing of spironolactone 50 mg daily.  Continue current dosing, no modification needed.

## 2025-03-03 NOTE — ASSESSMENT & PLAN NOTE
Patient continued to focus on low sodium diet, continue RAAS inhibition with losartan, further interventions medically as indicated according to our cardiology colleagues.

## 2025-03-03 NOTE — TELEPHONE ENCOUNTER
"Patient calling to request Dr. Wilkinson order a \"acute recurrent maxillary sinusitis x-ray\". States she had an order for an x-ray but is  from . Please advise.  "

## 2025-03-03 NOTE — ASSESSMENT & PLAN NOTE
Continue current medications including losartan and spironolactone.  Blood pressure is well-controlled, continue to encourage low-sodium diet.

## 2025-03-03 NOTE — ASSESSMENT & PLAN NOTE
Lab Results   Component Value Date    EGFR 60 02/24/2025    EGFR 59 09/30/2024    EGFR 55 06/27/2024    CREATININE 0.92 02/24/2025    CREATININE 0.93 09/30/2024    CREATININE 0.99 06/27/2024     Kidney function is stable at this time, continue to optimize care and avoid nephrotoxins.

## 2025-03-03 NOTE — PROGRESS NOTES
[facility]  Kin Cortes DO    Name: Jacklyn Garcia  YOB: 1945      Assessment/Plan:    Stage 3a chronic kidney disease (HCC)  Lab Results   Component Value Date    EGFR 60 02/24/2025    EGFR 59 09/30/2024    EGFR 55 06/27/2024    CREATININE 0.92 02/24/2025    CREATININE 0.93 09/30/2024    CREATININE 0.99 06/27/2024     Kidney function is stable at this time, continue to optimize care and avoid nephrotoxins.    Hyperaldosteronism (HCC)  Patient continues to have good blood pressure control management at this time with current dosing of spironolactone 50 mg daily.  Continue current dosing, no modification needed.    Hypertensive nephrosclerosis  Continue current medications including losartan and spironolactone.  Blood pressure is well-controlled, continue to encourage low-sodium diet.    SIADH (syndrome of inappropriate ADH production) (HCC)  Continue fluid restriction, serum sodium level is acceptable to 129 mmol/L.    Grade II diastolic dysfunction  Patient continued to focus on low sodium diet, continue RAAS inhibition with losartan, further interventions medically as indicated according to our cardiology colleagues.         Problem List Items Addressed This Visit          Cardiovascular and Mediastinum    Hypertensive nephrosclerosis    Continue current medications including losartan and spironolactone.  Blood pressure is well-controlled, continue to encourage low-sodium diet.            Endocrine    Hyperaldosteronism (HCC) - Primary    Patient continues to have good blood pressure control management at this time with current dosing of spironolactone 50 mg daily.  Continue current dosing, no modification needed.         SIADH (syndrome of inappropriate ADH production) (HCC)    Continue fluid restriction, serum sodium level is acceptable to 129 mmol/L.         Relevant Orders    Osmolality, urine       Genitourinary    Stage 3a chronic kidney  disease (HCC)    Lab Results   Component Value Date    EGFR 60 02/24/2025    EGFR 59 09/30/2024    EGFR 55 06/27/2024    CREATININE 0.92 02/24/2025    CREATININE 0.93 09/30/2024    CREATININE 0.99 06/27/2024     Kidney function is stable at this time, continue to optimize care and avoid nephrotoxins.         Relevant Orders    Comprehensive metabolic panel    Albumin / creatinine urine ratio    Urinalysis with microscopic    Magnesium       Other    Grade II diastolic dysfunction    Patient continued to focus on low sodium diet, continue RAAS inhibition with losartan, further interventions medically as indicated according to our cardiology colleagues.            Patient stable from the renal standpoint, we will see her back for regular visit in about 7-8 months, we will continue to see her on that schedule unless otherwise indicated.    Subjective:      Patient ID: Jacklyn Garcia is a 80 y.o. female.    Patient presents for follow up.    We reviewed labs in detail, most recent creatinine noted to be 0.92 mg/dL with an eGFR of 60 ml/min.    There were no significant electrolyte abnormalities noted.  Patient is taking all medications as prescribed with no specific side effects and denies use of nonsteroid anti-inflammatory medications.              The following portions of the patient's history were reviewed and updated as appropriate: allergies, current medications, past family history, past medical history, past social history, past surgical history and problem list.    Review of Systems   All other systems reviewed and are negative.        Social History     Socioeconomic History    Marital status: /Civil Union     Spouse name: None    Number of children: None    Years of education: None    Highest education level: None   Occupational History    Occupation: Admnistrator    Tobacco Use    Smoking status: Former     Types: Cigarettes    Smokeless tobacco: Never    Tobacco comments:     quit 40 yrs ago    Vaping Use    Vaping status: Never Used   Substance and Sexual Activity    Alcohol use: Yes     Comment: 2 beer a week    Drug use: Never    Sexual activity: Not Currently     Birth control/protection: Post-menopausal   Other Topics Concern    None   Social History Narrative    Patient has never smoked - As per [employer]    Consumes 1-2 beers per week - As per [employer]    Consumes on average 1 cup of decaf coffee per day      Social Drivers of Health     Financial Resource Strain: Low Risk  (3/17/2023)    Overall Financial Resource Strain (CARDIA)     Difficulty of Paying Living Expenses: Not very hard   Food Insecurity: Not on file   Transportation Needs: No Transportation Needs (3/18/2024)    PRAPARE - Transportation     Lack of Transportation (Medical): No     Lack of Transportation (Non-Medical): No   Physical Activity: Not on file   Stress: Not on file   Social Connections: Not on file   Intimate Partner Violence: Not on file   Housing Stability: Low Risk  (3/18/2024)    Housing Stability Vital Sign     Unable to Pay for Housing in the Last Year: No     Number of Times Moved in the Last Year: 1     Homeless in the Last Year: No     Past Medical History:   Diagnosis Date    Allergies     Anxiety     Arthritis     Benign arteriolar nephrosclerosis     Bereavement     Cataract     Chronic kidney disease     Chronic kidney disease in type 2 diabetes mellitus (HCC)     Chronic kidney disease, stage 2 (mild)     Chronic pain disorder     Chronic pain syndrome     COPD (chronic obstructive pulmonary disease) ([facility])     DDD (degenerative disc disease), cervical     DDD (degenerative disc disease), lumbar     Degenerative joint disease of right hip     Depression     Diastolic dysfunction     DJD (degenerative joint disease), ankle and foot, right     DJD of right shoulder     Edema     Fracture of left calcaneus     Generalized anxiety disorder     GERD (gastroesophageal reflux disease)     Heart murmur      Hyperaldosteronism (HCC)     Hyperlipidemia     Hypertension     Hypertensive nephrosclerosis     Hypo-osmolality and hyponatremia     IBS (irritable bowel syndrome)     Insomnia     Migraines     Mitral regurgitation     MVP (mitral valve prolapse)     Obstructive sleep apnea syndrome     Osteoarthritis     Osteoarthritis     Pernicious anemia     Plantar fascia rupture     Rheumatoid arthritis (HCC)     Right knee DJD     S/P insertion of spinal cord stimulator     Shingles     Sinobronchitis     Sleep apnea     Status post total right knee replacement 07/07/2020    Stroke (HCC)     tia    TIA (transient ischemic attack)     Vertigo      Past Surgical History:   Procedure Laterality Date    APPENDECTOMY      CARPAL TUNNEL RELEASE Bilateral     COLONOSCOPY      several    COLONOSCOPY  04/02/2012    by Dr. Mckinley Ruvalcaba    CORNEAL TRANSPLANT Left 04/11/2022    DXA PROCEDURE (HISTORICAL)  10/26/2016, 9/17/2014, 6/18/2007    HAND SURGERY Right     ring finger has pin    HYSTERECTOMY      34    INSERT / REPLACE PERIPHERAL NEUROSTIMULATOR PULSE GENERATOR /  Left     buttocks    JOINT REPLACEMENT Left     knee    JOINT REPLACEMENT Right 04/2019    Hip    KNEE ARTHROPLASTY Left 01/15/2009    by Dr. Avery Leigh at [facility], [place]     KNEE ARTHROSCOPY Bilateral     KNEE CARTILAGE SURGERY  09/10/2009    by Dr. Avery Leigh at [facility], [place]     MAMMO (HISTORICAL)  12/10/2018, 10/30/2017, 10/26/2016    NASAL SEPTUM SURGERY  2008    NERVE BLOCK Left 02/20/2018    Procedure: BLOCK MEDIAL BRANCH - C4, C5, C6;  Surgeon: Jaycob Cruz MD;  Location: [facility] MAIN OR;  Service: Pain Management     NERVE BLOCK Left 03/06/2018    Procedure: C4, C5, C6 MEDIAL BRANCH BLOCK;  Surgeon: Jaycob Cruz MD;  Location: [facility];  Service: Pain Management     NERVE BLOCK Left 2/20/2025    Procedure: BLOCK MEDIAL BRANCH Left C4-5 and C5-6;  Surgeon: Jaycob Cruz MD;  Location: [facility];   Service: Pain Management     WA ARTHRP ACETBLR/PROX FEM PROSTC AGRFT/ALGRFT Right 02/27/2019    Procedure: ARTHROPLASTY HIP TOTAL;  Surgeon: Bruno Pina DO;  Location:  MAIN OR;  Service: Orthopedics    WA ARTHRP KNE CONDYLE&PLATU MEDIAL&LAT COMPARTMENTS Right 06/24/2020    Procedure: KNEE TOTAL ARTHROPLASTY;  Surgeon: Bruno Pina DO;  Location:  MAIN OR;  Service: Orthopedics    WA COLONOSCOPY FLX DX W/COLLJ SPEC WHEN PFRMD N/A 04/24/2017    Procedure: FLEXIBLE COLONOSCOPY;  Surgeon: Mckinley Ruvalcaba MD;  Location: [facility];  Service: Colorectal    RADIOFREQUENCY ABLATION Left 04/17/2018    Procedure: ABLATION RADIO FREQUENCY (RFA) - C4, C5, C6;  Surgeon: Jaycob Cruz MD;  Location: [facility];  Service: Pain Management     REPLACEMENT TOTAL KNEE Left 07/05/2011    SINUS SURGERY      TONSILLECTOMY      TOTAL HIP ARTHROPLASTY Left     TRIGGER FINGER RELEASE      R 4th     UPPER GASTROINTESTINAL ENDOSCOPY  01/12/2007    with Donaldson dilatation by Dr. Misael Santiago at [facility] Right        Current Outpatient Medications:     aspirin (ECOTRIN LOW STRENGTH) 81 mg EC tablet, Take 1 tablet (81 mg total) by mouth daily, Disp:  , Rfl:     bacitracin-polymyxin b ophthalmic ointment, INSTILL A 1/4 INCH INTO LEFT EYE AT BEDTIME, Disp: , Rfl:     bisoprolol (ZEBETA) 10 MG tablet, TAKE 1 TABLET EVERY DAY, Disp: 90 tablet, Rfl: 1    [START ON 3/16/2025] butalbital-acetaminophen-caffeine (FIORICET,ESGIC) -40 mg per tablet, Take 1 tablet by mouth every 6 (six) hours as needed for migraine Do not start before March 16, 2025., Disp: 120 tablet, Rfl: 3    calcium carbonate (OS-MARLENY) 600 MG tablet, Take 600 mg by mouth 2 (two) times a day with meals, Disp: , Rfl:     clindamycin (CLEOCIN) 150 mg capsule, take 4 capsules 1 hour before APPOINTMENT, Disp: , Rfl:     Ergocalciferol (VITAMIN D2 PO), Take 1,000 Units by mouth in the morning, Disp: , Rfl:     fluorometholone (FML) 0.1 % ophthalmic  suspension, INSTILL 1 DROP BY OPTHALMIC ROUTE FOUR TIMES DAILY INTO LEFT EYE, Disp: , Rfl:     fluticasone (FLONASE) 50 mcg/act nasal spray, 2 sprays into each nostril daily, Disp: , Rfl:     losartan (COZAAR) 100 MG tablet, TAKE 1 TABLET EVERY MORNING, Disp: 90 tablet, Rfl: 1    magnesium (MAGTAB) 84 MG (7MEQ) TBCR, Take 1 tablet (84 mg total) by mouth daily, Disp: 90 tablet, Rfl: 1    meclizine (ANTIVERT) 12.5 MG tablet, Take 1 tablet (12.5 mg total) by mouth 3 (three) times a day as needed for dizziness, Disp: 30 tablet, Rfl: 0    Multiple Vitamins-Minerals (MULTIVITAMIN WITH MINERALS) tablet, Take 1 tablet by mouth every morning, Disp: , Rfl:     neomycin-bacitracin-polymyxin (NEOSPORIN) ophthalmic ointment, INSTILL 1/4' INTO LEFT EYE AT BEDTIME, Disp: , Rfl:     omeprazole (PriLOSEC) 20 mg delayed release capsule, TAKE 1 CAPSULE TWICE DAILY, Disp: 180 capsule, Rfl: 1    ondansetron (ZOFRAN) 4 mg tablet, Take 1 tablet (4 mg total) by mouth every 8 (eight) hours as needed for nausea or vomiting, Disp: 20 tablet, Rfl: 0    pravastatin (PRAVACHOL) 40 mg tablet, TAKE 1 TABLET EVERY DAY, Disp: 90 tablet, Rfl: 1    predniSONE 10 mg tablet, Take 1 tablet (10 mg total) by mouth daily, Disp: 20 tablet, Rfl: 0    spironolactone (ALDACTONE) 50 mg tablet, TAKE 1 TABLET DAILY AFTER LUNCH, Disp: 90 tablet, Rfl: 1    sulfamethoxazole-trimethoprim (BACTRIM DS) 800-160 mg per tablet, Take 1 tablet by mouth 2 (two) times a day for 10 days, Disp: 20 tablet, Rfl: 0    [START ON 3/8/2025] zolpidem (AMBIEN) 5 mg tablet, Take 1 tablet (5 mg total) by mouth daily at bedtime as needed for sleep Do not start before March 8, 2025., Disp: 30 tablet, Rfl: 3     Lab Results   Component Value Date     (L) 09/21/2015    SODIUM 129 (L) 02/24/2025    K 4.2 02/24/2025    CL 93 (L) 02/24/2025    CO2 29 02/24/2025    ANIONGAP 7 09/21/2015    AGAP 7 02/24/2025    BUN 10 02/24/2025    CREATININE 0.92 02/24/2025    GLUC 115 (H) 06/26/2020     "GLUF 94 02/24/2025    CALCIUM 9.2 02/24/2025    AST 19 02/24/2025    ALT 15 02/24/2025    ALKPHOS 96 02/24/2025    PROT 6.9 09/21/2015    TP 6.8 02/24/2025    BILITOT 0.29 09/21/2015    TBILI 0.52 02/24/2025    EGFR 60 02/24/2025     Lab Results   Component Value Date    WBC 4.60 02/24/2025    HGB 11.4 (L) 02/24/2025    HCT 34.0 (L) 02/24/2025    MCV 90 02/24/2025     02/24/2025     Lab Results   Component Value Date    CHOLESTEROL 196 02/24/2025    CHOLESTEROL 223 (H) 06/27/2024    CHOLESTEROL 208 (H) 03/11/2024     Lab Results   Component Value Date    HDL 88 02/24/2025    HDL 97 06/27/2024    HDL 87 03/11/2024     Lab Results   Component Value Date    LDLCALC 95 02/24/2025    LDLCALC 113 (H) 06/27/2024    LDLCALC 102 (H) 03/11/2024     Lab Results   Component Value Date    TRIG 67 02/24/2025    TRIG 64 06/27/2024    TRIG 93 03/11/2024     No results found for: \"CHOLHDL\"  Lab Results   Component Value Date    WED8YALIRWCE 3.731 06/27/2024           Objective:      /79 (Patient Position: Sitting, Cuff Size: Standard)   Pulse 56   Temp 98.1 °F (36.7 °C) (Temporal)   Resp 16   Ht 5' 4\" (1.626 m)   Wt 76.3 kg (168 lb 3.2 oz)   SpO2 99%   BMI 28.87 kg/m²          Physical Exam  Vitals reviewed.   Constitutional:       General: She is not in acute distress.     Appearance: Normal appearance.   HENT:      Head: Normocephalic and atraumatic.      Right Ear: External ear normal.      Left Ear: External ear normal.   Eyes:      Conjunctiva/sclera: Conjunctivae normal.   Cardiovascular:      Rate and Rhythm: Normal rate and regular rhythm.      Heart sounds: Normal heart sounds.   Pulmonary:      Effort: No respiratory distress.      Breath sounds: No wheezing.   Abdominal:      Palpations: Abdomen is soft.   Skin:     General: Skin is warm and dry.   Neurological:      General: No focal deficit present.      Mental Status: She is alert and oriented to person, place, and time.   Psychiatric:         Mood " and Affect: Mood normal.         Behavior: Behavior normal.

## 2025-03-04 ENCOUNTER — TELEPHONE (OUTPATIENT)
Dept: FAMILY MEDICINE CLINIC | Facility: CLINIC | Age: 80
End: 2025-03-04

## 2025-03-04 ENCOUNTER — HOSPITAL ENCOUNTER (OUTPATIENT)
Dept: RADIOLOGY | Facility: HOSPITAL | Age: 80
Discharge: HOME | End: 2025-03-04
Payer: MEDICARE

## 2025-03-04 DIAGNOSIS — J01.01 ACUTE RECURRENT MAXILLARY SINUSITIS: ICD-10-CM

## 2025-03-04 PROCEDURE — 70210 X-RAY EXAM OF SINUSES: CPT

## 2025-03-09 ENCOUNTER — HOSPITAL ENCOUNTER (INPATIENT)
Facility: HOSPITAL | Age: 80
LOS: 3 days | Discharge: HOME | End: 2025-03-12
Attending: EMERGENCY MEDICINE | Admitting: FAMILY MEDICINE
Payer: MEDICARE

## 2025-03-09 ENCOUNTER — APPOINTMENT (EMERGENCY)
Dept: CT IMAGING | Facility: HOSPITAL | Age: 80
End: 2025-03-09
Payer: MEDICARE

## 2025-03-09 ENCOUNTER — APPOINTMENT (EMERGENCY)
Dept: RADIOLOGY | Facility: HOSPITAL | Age: 80
End: 2025-03-09
Payer: MEDICARE

## 2025-03-09 DIAGNOSIS — I48.92 ATRIAL FLUTTER, UNSPECIFIED TYPE (HCC): ICD-10-CM

## 2025-03-09 DIAGNOSIS — E87.1 HYPONATREMIA: Primary | ICD-10-CM

## 2025-03-09 DIAGNOSIS — I48.91 ATRIAL FIBRILLATION, UNSPECIFIED TYPE (HCC): ICD-10-CM

## 2025-03-09 DIAGNOSIS — I48.92 ATRIAL FLUTTER WITH CONTROLLED RESPONSE (HCC): ICD-10-CM

## 2025-03-09 DIAGNOSIS — I48.4 ATYPICAL ATRIAL FLUTTER (HCC): ICD-10-CM

## 2025-03-09 DIAGNOSIS — E22.2 SIADH (SYNDROME OF INAPPROPRIATE ADH PRODUCTION) (HCC): ICD-10-CM

## 2025-03-09 DIAGNOSIS — I48.3 TYPICAL ATRIAL FLUTTER (HCC): ICD-10-CM

## 2025-03-09 PROBLEM — Z94.7 HISTORY OF CORNEAL TRANSPLANT: Status: ACTIVE | Noted: 2025-03-09

## 2025-03-09 PROBLEM — G47.00 INSOMNIA, UNSPECIFIED: Status: ACTIVE | Noted: 2019-03-02

## 2025-03-09 LAB
2HR DELTA HS TROPONIN: 0 NG/L
ANION GAP SERPL CALCULATED.3IONS-SCNC: 8 MMOL/L (ref 4–13)
BASOPHILS # BLD AUTO: 0.02 THOUSANDS/ÂΜL (ref 0–0.1)
BASOPHILS NFR BLD AUTO: 0 % (ref 0–1)
BILIRUB UR QL STRIP: NEGATIVE
BUN SERPL-MCNC: 10 MG/DL (ref 5–25)
CALCIUM SERPL-MCNC: 8.6 MG/DL (ref 8.4–10.2)
CARDIAC TROPONIN I PNL SERPL HS: 3 NG/L (ref ?–50)
CARDIAC TROPONIN I PNL SERPL HS: 3 NG/L (ref ?–50)
CHLORIDE SERPL-SCNC: 84 MMOL/L (ref 96–108)
CLARITY UR: CLEAR
CO2 SERPL-SCNC: 21 MMOL/L (ref 21–32)
COLOR UR: NORMAL
CREAT SERPL-MCNC: 0.93 MG/DL (ref 0.6–1.3)
EOSINOPHIL # BLD AUTO: 0.05 THOUSAND/ÂΜL (ref 0–0.61)
EOSINOPHIL NFR BLD AUTO: 1 % (ref 0–6)
ERYTHROCYTE [DISTWIDTH] IN BLOOD BY AUTOMATED COUNT: 12.4 % (ref 11.6–15.1)
GFR SERPL CREATININE-BSD FRML MDRD: 59 ML/MIN/1.73SQ M
GLUCOSE SERPL-MCNC: 122 MG/DL (ref 65–140)
GLUCOSE UR STRIP-MCNC: NEGATIVE MG/DL
HCT VFR BLD AUTO: 34.8 % (ref 34.8–46.1)
HGB BLD-MCNC: 12.4 G/DL (ref 11.5–15.4)
HGB UR QL STRIP.AUTO: NEGATIVE
IMM GRANULOCYTES # BLD AUTO: 0.03 THOUSAND/UL (ref 0–0.2)
IMM GRANULOCYTES NFR BLD AUTO: 0 % (ref 0–2)
KETONES UR STRIP-MCNC: NEGATIVE MG/DL
LEUKOCYTE ESTERASE UR QL STRIP: NEGATIVE
LYMPHOCYTES # BLD AUTO: 1 THOUSANDS/ÂΜL (ref 0.6–4.47)
LYMPHOCYTES NFR BLD AUTO: 14 % (ref 14–44)
MAGNESIUM SERPL-MCNC: 2 MG/DL (ref 1.9–2.7)
MCH RBC QN AUTO: 30.3 PG (ref 26.8–34.3)
MCHC RBC AUTO-ENTMCNC: 35.6 G/DL (ref 31.4–37.4)
MCV RBC AUTO: 85 FL (ref 82–98)
MONOCYTES # BLD AUTO: 0.57 THOUSAND/ÂΜL (ref 0.17–1.22)
MONOCYTES NFR BLD AUTO: 8 % (ref 4–12)
NEUTROPHILS # BLD AUTO: 5.4 THOUSANDS/ÂΜL (ref 1.85–7.62)
NEUTS SEG NFR BLD AUTO: 77 % (ref 43–75)
NITRITE UR QL STRIP: NEGATIVE
NRBC BLD AUTO-RTO: 0 /100 WBCS
PH UR STRIP.AUTO: 7 [PH]
PLATELET # BLD AUTO: 293 THOUSANDS/UL (ref 149–390)
PMV BLD AUTO: 9.1 FL (ref 8.9–12.7)
POTASSIUM SERPL-SCNC: 5.3 MMOL/L (ref 3.5–5.3)
PROT UR STRIP-MCNC: NEGATIVE MG/DL
RBC # BLD AUTO: 4.09 MILLION/UL (ref 3.81–5.12)
SODIUM SERPL-SCNC: 113 MMOL/L (ref 135–147)
SODIUM UR-SCNC: 40 MMOL/L
SP GR UR STRIP.AUTO: 1.01 (ref 1–1.03)
TSH SERPL DL<=0.05 MIU/L-ACNC: 2.06 UIU/ML (ref 0.45–4.5)
UROBILINOGEN UR STRIP-ACNC: <2 MG/DL
WBC # BLD AUTO: 7.07 THOUSAND/UL (ref 4.31–10.16)

## 2025-03-09 PROCEDURE — 83735 ASSAY OF MAGNESIUM: CPT | Performed by: EMERGENCY MEDICINE

## 2025-03-09 PROCEDURE — 99285 EMERGENCY DEPT VISIT HI MDM: CPT

## 2025-03-09 PROCEDURE — 96365 THER/PROPH/DIAG IV INF INIT: CPT

## 2025-03-09 PROCEDURE — 84443 ASSAY THYROID STIM HORMONE: CPT | Performed by: EMERGENCY MEDICINE

## 2025-03-09 PROCEDURE — 84484 ASSAY OF TROPONIN QUANT: CPT | Performed by: EMERGENCY MEDICINE

## 2025-03-09 PROCEDURE — 84300 ASSAY OF URINE SODIUM: CPT | Performed by: EMERGENCY MEDICINE

## 2025-03-09 PROCEDURE — 99285 EMERGENCY DEPT VISIT HI MDM: CPT | Performed by: EMERGENCY MEDICINE

## 2025-03-09 PROCEDURE — 80048 BASIC METABOLIC PNL TOTAL CA: CPT | Performed by: EMERGENCY MEDICINE

## 2025-03-09 PROCEDURE — 83935 ASSAY OF URINE OSMOLALITY: CPT | Performed by: EMERGENCY MEDICINE

## 2025-03-09 PROCEDURE — 36415 COLL VENOUS BLD VENIPUNCTURE: CPT | Performed by: EMERGENCY MEDICINE

## 2025-03-09 PROCEDURE — 70450 CT HEAD/BRAIN W/O DYE: CPT

## 2025-03-09 PROCEDURE — 99223 1ST HOSP IP/OBS HIGH 75: CPT | Performed by: FAMILY MEDICINE

## 2025-03-09 PROCEDURE — 81003 URINALYSIS AUTO W/O SCOPE: CPT | Performed by: EMERGENCY MEDICINE

## 2025-03-09 PROCEDURE — 85025 COMPLETE CBC W/AUTO DIFF WBC: CPT | Performed by: EMERGENCY MEDICINE

## 2025-03-09 PROCEDURE — 71046 X-RAY EXAM CHEST 2 VIEWS: CPT

## 2025-03-09 PROCEDURE — 93005 ELECTROCARDIOGRAM TRACING: CPT

## 2025-03-09 PROCEDURE — 83930 ASSAY OF BLOOD OSMOLALITY: CPT | Performed by: EMERGENCY MEDICINE

## 2025-03-09 PROCEDURE — 96366 THER/PROPH/DIAG IV INF ADDON: CPT

## 2025-03-09 RX ORDER — BUTALBITAL, ACETAMINOPHEN AND CAFFEINE 50; 325; 40 MG/1; MG/1; MG/1
1 TABLET ORAL EVERY 4 HOURS PRN
Status: DISCONTINUED | OUTPATIENT
Start: 2025-03-09 | End: 2025-03-12 | Stop reason: HOSPADM

## 2025-03-09 RX ORDER — LOSARTAN POTASSIUM 50 MG/1
100 TABLET ORAL EVERY MORNING
Status: DISCONTINUED | OUTPATIENT
Start: 2025-03-10 | End: 2025-03-12 | Stop reason: HOSPADM

## 2025-03-09 RX ORDER — FLUOROMETHOLONE 1 MG/ML
1 SUSPENSION/ DROPS OPHTHALMIC
Status: DISCONTINUED | OUTPATIENT
Start: 2025-03-09 | End: 2025-03-12 | Stop reason: HOSPADM

## 2025-03-09 RX ORDER — SODIUM CHLORIDE 9 MG/ML
3 INJECTION INTRAVENOUS
Status: DISCONTINUED | OUTPATIENT
Start: 2025-03-09 | End: 2025-03-12 | Stop reason: HOSPADM

## 2025-03-09 RX ORDER — SODIUM CHLORIDE, SODIUM GLUCONATE, SODIUM ACETATE, POTASSIUM CHLORIDE, MAGNESIUM CHLORIDE, SODIUM PHOSPHATE, DIBASIC, AND POTASSIUM PHOSPHATE .53; .5; .37; .037; .03; .012; .00082 G/100ML; G/100ML; G/100ML; G/100ML; G/100ML; G/100ML; G/100ML
1000 INJECTION, SOLUTION INTRAVENOUS ONCE
Status: COMPLETED | OUTPATIENT
Start: 2025-03-09 | End: 2025-03-09

## 2025-03-09 RX ORDER — ENOXAPARIN SODIUM 100 MG/ML
40 INJECTION SUBCUTANEOUS DAILY
Status: DISCONTINUED | OUTPATIENT
Start: 2025-03-10 | End: 2025-03-11

## 2025-03-09 RX ORDER — PROCHLORPERAZINE MALEATE 5 MG/1
5 TABLET ORAL EVERY 6 HOURS PRN
COMMUNITY

## 2025-03-09 RX ORDER — BISOPROLOL FUMARATE 10 MG/1
10 TABLET, FILM COATED ORAL DAILY
COMMUNITY
End: 2025-03-12

## 2025-03-09 RX ORDER — SPIRONOLACTONE 25 MG/1
50 TABLET ORAL DAILY
Status: DISCONTINUED | OUTPATIENT
Start: 2025-03-10 | End: 2025-03-12 | Stop reason: HOSPADM

## 2025-03-09 RX ORDER — PANTOPRAZOLE SODIUM 40 MG/1
40 TABLET, DELAYED RELEASE ORAL
Status: DISCONTINUED | OUTPATIENT
Start: 2025-03-10 | End: 2025-03-12 | Stop reason: HOSPADM

## 2025-03-09 RX ORDER — ASPIRIN 81 MG/1
81 TABLET ORAL DAILY
Status: DISCONTINUED | OUTPATIENT
Start: 2025-03-10 | End: 2025-03-12

## 2025-03-09 RX ORDER — MECLIZINE HCL 12.5 MG 12.5 MG/1
25 TABLET ORAL ONCE
Status: COMPLETED | OUTPATIENT
Start: 2025-03-09 | End: 2025-03-09

## 2025-03-09 RX ORDER — ZOLPIDEM TARTRATE 5 MG/1
5 TABLET ORAL
Status: DISCONTINUED | OUTPATIENT
Start: 2025-03-09 | End: 2025-03-12 | Stop reason: HOSPADM

## 2025-03-09 RX ORDER — PRAVASTATIN SODIUM 40 MG
40 TABLET ORAL
Status: DISCONTINUED | OUTPATIENT
Start: 2025-03-10 | End: 2025-03-12 | Stop reason: HOSPADM

## 2025-03-09 RX ADMIN — SODIUM CHLORIDE, SODIUM GLUCONATE, SODIUM ACETATE, POTASSIUM CHLORIDE, MAGNESIUM CHLORIDE, SODIUM PHOSPHATE, DIBASIC, AND POTASSIUM PHOSPHATE 1000 ML: .53; .5; .37; .037; .03; .012; .00082 INJECTION, SOLUTION INTRAVENOUS at 19:54

## 2025-03-09 RX ADMIN — MECLIZINE HYDROCHLORIDE 25 MG: 12.5 TABLET ORAL at 19:54

## 2025-03-09 RX ADMIN — BUTALBITAL, ACETAMINOPHEN, AND CAFFEINE 1 TABLET: 50; 325; 40 TABLET, COATED ORAL at 23:40

## 2025-03-09 RX ADMIN — ZOLPIDEM TARTRATE 5 MG: 5 TABLET, COATED ORAL at 23:24

## 2025-03-09 NOTE — ED PROVIDER NOTES
Time reflects when diagnosis was documented in both MDM as applicable and the Disposition within this note       Time User Action Codes Description Comment    3/9/2025  9:51 PM Kamari Odell Add [E87.1] Hyponatremia     3/9/2025  9:51 PM Jose R Kamaried Hernandez Add [I48.92] Atrial flutter with controlled response (HCC)           ED Disposition       ED Disposition   Admit    Condition   Stable    Date/Time   Sun Mar 9, 2025  9:51 PM    Comment   Case was discussed with Dr Trevizo and the patient's admission status was agreed to be Admission Status: inpatient status to the service of Dr. Trevizo .               Assessment & Plan       Medical Decision Making  DDx includes but is not limited to: electrolyte disturbance, acute labyrinthitis, CVA, cardiac arrhythmia.  Exam w/r/t vertigo is reassuring apart from the presence of HA, although patient has had similar HA previously.  Cbc/bmp/mag/trop. ECG. Orthostatic VS. Given HA a/w vertigo, will obtain CT head for ICH. Meclizine as patient has found this helpful previously. Disposition pending.          Amount and/or Complexity of Data Reviewed  Labs: ordered. Decision-making details documented in ED Course.  Radiology: ordered and independent interpretation performed. Decision-making details documented in ED Course.    Risk  Prescription drug management.  Decision regarding hospitalization.        ED Course as of 03/09/25 2153   Sun Mar 09, 2025   1951 Patient was ambulatory into the emergency department without need for assistance.   2005 ECG.  Computer interpretation as atrial fibrillation, but appears to be baseline sinus rhythm with regular P waves with intermittent ventricular capture at a rate of 60 bpm.  Alternately, A flutter with variable conduction. QRS 84.  QTc 406.  No ectopy.  No acute ST/T changes.  No Q waves.  Previously was in sinus rhythm.  Interpreted by me   2010 Echo 13 February 2025:      Left Ventricle: Left ventricular cavity size is normal. Wall  thickness is mildly increased. There is concentric remodeling. The left ventricular ejection fraction is 60%. Systolic function is normal. Wall motion is normal. Diastolic function is moderately abnormal, consistent with grade II (pseudonormal) relaxation.  ·  Right Ventricle: Right ventricular cavity size is normal. Systolic function is normal.  ·  Left Atrium: The atrium is severely dilated (>48 mL/m2).  ·  Mitral Valve: There is mild annular calcification. There is mild to moderate regurgitation.  ·  Tricuspid Valve: There is mild regurgitation.  ·  Pericardium: There is a small pericardial effusion circumferential to the heart. The fluid exhibits no internal echoes. There is no echocardiographic evidence of tamponade.     2013 Patient symptomatic upon orthostatic vital sign testing with lightheaded feeling upon standing upright.   2024 CT completed and awaiting interpretation   2042 CBC and differential(!)  WBC wnl  Hg/Hct wnl  Plt wnl   2102 Magnesium  Normal   2102 HS Troponin 0hr (reflex protocol)  Nonischemic   2103 Marked hyponatremia is present.  She does have normal mentation with no neurologic deficits.  I would not emergently treat the hyponatremia (as with hypertonic saline), but will require extensive workup to identify etiology with slow correction..  I discussed case with Dr. Trevizo of internal medicine who requested that we wait for the CT result prior to admission; this is entirely reasonable.   2104 Basic metabolic panel(!!)  I suspect the hyponatremia is probably accurate, as the other electrolytes were normal, and she did not receive any hypertonic or hypotonic solution (only received Isolyte).  I reviewed this result with the patient and family.  She will certainly require hospitalization for management and should not undergo rapid correction as the duration over which hyponatremia developed was almost certainly not acute; as noted previously, she is also not demonstrating any neurologic  deficits/AMS/seizure.    She had received approx 450 ml of Isolyte up to this point: I held the remainder and disconnected her from the IV tubing.  Given prior nephrology recommendations for fluid restriction in setting of SIADH, I would institute the same here.   2136 Sodium, urine, random   2136 UA w Reflex to Microscopic w Reflex to Culture   2149 CT head without contrast  FINDINGS:     PARENCHYMA:No intracranial mass, mass effect or midline shift. No CT signs of acute infarction.  No acute parenchymal hemorrhage.     VENTRICLES AND EXTRA-AXIAL SPACES:  Normal for the patient's age.     VISUALIZED ORBITS:  Normal visualized orbits.     PARANASAL SINUSES:  Normal visualized paranasal sinuses.     CALVARIUM AND EXTRACRANIAL SOFT TISSUES:  Normal.     IMPRESSION:     No acute intracranial abnormality.        2150 Dr. Trevizo accepts to inpatient status, ICU       Medications   sodium chloride (PF) 0.9 % injection 3 mL (has no administration in time range)   meclizine (ANTIVERT) tablet 25 mg (25 mg Oral Given 3/9/25 1954)   multi-electrolyte (Plasmalyte-A/Isolyte-S PH 7.4/Normosol-R) IV bolus 1,000 mL (0 mL Intravenous Stopped 3/9/25 2132)       ED Risk Strat Scores          SBIRT 22yo+      Flowsheet Row Most Recent Value   Initial Alcohol Screen: US AUDIT-C     1. How often do you have a drink containing alcohol? 0 Filed at: 03/09/2025 1916   2. How many drinks containing alcohol do you have on a typical day you are drinking?  0 Filed at: 03/09/2025 1916   3a. Male UNDER 65: How often do you have five or more drinks on one occasion? 0 Filed at: 03/09/2025 1916   3b. FEMALE Any Age, or MALE 65+: How often do you have 4 or more drinks on one occassion? 0 Filed at: 03/09/2025 1916   Audit-C Score 0 Filed at: 03/09/2025 1916   REYNA: How many times in the past year have you...    Used an illegal drug or used a prescription medication for non-medical reasons? Never Filed at: 03/09/2025 1916              History of Present  Illness       Chief Complaint   Patient presents with    Dizziness     Pt states she is feeling dizzy since yesterday        Past Medical History:   Diagnosis Date    Allergies     Anxiety     Arthritis     Benign arteriolar nephrosclerosis     Bereavement     Cataract     Chronic kidney disease     Chronic kidney disease in type 2 diabetes mellitus ([facility])     Chronic kidney disease, stage 2 (mild)     Chronic pain disorder     Chronic pain syndrome     COPD (chronic obstructive pulmonary disease) ([facility])     DDD (degenerative disc disease), cervical     DDD (degenerative disc disease), lumbar     Degenerative joint disease of right hip     Depression     Diastolic dysfunction     DJD (degenerative joint disease), ankle and foot, right     DJD of right shoulder     Edema     Fracture of left calcaneus     Generalized anxiety disorder     GERD (gastroesophageal reflux disease)     Heart murmur     Hyperaldosteronism ([facility])     Hyperlipidemia     Hypertension     Hypertensive nephrosclerosis     Hypo-osmolality and hyponatremia     IBS (irritable bowel syndrome)     Insomnia     Migraines     Mitral regurgitation     MVP (mitral valve prolapse)     Obstructive sleep apnea syndrome     Osteoarthritis     Osteoarthritis     Pernicious anemia     Plantar fascia rupture     Rheumatoid arthritis ([facility])     Right knee DJD     S/P insertion of spinal cord stimulator     Shingles     Sinobronchitis     Sleep apnea     Status post total right knee replacement 07/07/2020    Stroke ([facility])     tia    TIA (transient ischemic attack)     Vertigo       Past Surgical History:   Procedure Laterality Date    APPENDECTOMY      CARPAL TUNNEL RELEASE Bilateral     COLONOSCOPY      several    COLONOSCOPY  04/02/2012    by Dr. Mckinley Ruvalcaba    CORNEAL TRANSPLANT Left 04/11/2022    DXA PROCEDURE (HISTORICAL)  10/26/2016, 9/17/2014, 6/18/2007    HAND SURGERY Right     ring finger has pin    HYSTERECTOMY      34    INSERT / REPLACE PERIPHERAL  NEUROSTIMULATOR PULSE GENERATOR /  Left     buttocks    JOINT REPLACEMENT Left     knee    JOINT REPLACEMENT Right 04/2019    Hip    KNEE ARTHROPLASTY Left 01/15/2009    by Dr. Avery Leigh at [facility]     KNEE ARTHROSCOPY Bilateral     KNEE CARTILAGE SURGERY  09/10/2009    by Dr. Aveyr Leigh at [facility]     MAMMO (HISTORICAL)  12/10/2018, 10/30/2017, 10/26/2016    NASAL SEPTUM SURGERY  2008    NERVE BLOCK Left 02/20/2018    Procedure: BLOCK MEDIAL BRANCH - C4, C5, C6;  Surgeon: Jaycob Cruz MD;  Location: [facility];  Service: Pain Management     NERVE BLOCK Left 03/06/2018    Procedure: C4, C5, C6 MEDIAL BRANCH BLOCK;  Surgeon: Jaycob Cruz MD;  Location: [facility];  Service: Pain Management     NERVE BLOCK Left 2/20/2025    Procedure: BLOCK MEDIAL BRANCH Left C4-5 and C5-6;  Surgeon: Jaycob Cruz MD;  Location: [facility];  Service: Pain Management     NH ARTHRP ACETBLR/PROX FEM PROSTC AGRFT/ALGRFT Right 02/27/2019    Procedure: ARTHROPLASTY HIP TOTAL;  Surgeon: Bruno Pina DO;  Location:  MAIN OR;  Service: Orthopedics    NH ARTHRP KNE CONDYLE&PLATU MEDIAL&LAT COMPARTMENTS Right 06/24/2020    Procedure: KNEE TOTAL ARTHROPLASTY;  Surgeon: Bruno Pina DO;  Location:  MAIN OR;  Service: Orthopedics    NH COLONOSCOPY FLX DX W/COLLJ SPEC WHEN PFRMD N/A 04/24/2017    Procedure: FLEXIBLE COLONOSCOPY;  Surgeon: Mckinley Ruvalcaba MD;  Location: [facility];  Service: Colorectal    RADIOFREQUENCY ABLATION Left 04/17/2018    Procedure: ABLATION RADIO FREQUENCY (RFA) - C4, C5, C6;  Surgeon: Jaycob Cruz MD;  Location: [facility];  Service: Pain Management     REPLACEMENT TOTAL KNEE Left 07/05/2011    SINUS SURGERY      TONSILLECTOMY      TOTAL HIP ARTHROPLASTY Left     TRIGGER FINGER RELEASE      R 4th     UPPER GASTROINTESTINAL ENDOSCOPY  01/12/2007    with Donaldson dilatation by Dr. Misael Santiago at [facility]    WRIST SURGERY Right        Family History   Problem Relation Age of Onset    Heart disease Mother     Hypertension Mother     Arthritis Mother     Dementia Mother     Rheum arthritis Mother     Hypertension Father     Heart disease Father     Colon cancer Father     Hypertension Sister     Anxiety disorder Sister     Thyroid disease Sister     Prostate cancer Maternal Grandfather     No Known Problems Paternal Grandmother     No Known Problems Paternal Grandfather     No Known Problems Paternal Aunt     Pseudochol deficiency Neg Hx       Social History     Tobacco Use    Smoking status: Former     Types: Cigarettes    Smokeless tobacco: Never    Tobacco comments:     quit 40 yrs ago   Vaping Use    Vaping status: Never Used   Substance Use Topics    Alcohol use: Yes     Comment: 2 beer a week    Drug use: Never      E-Cigarette/Vaping    E-Cigarette Use Never User       E-Cigarette/Vaping Substances    Nicotine No     THC No     CBD No     Flavoring No     Other No     Unknown No       I have reviewed and agree with the history as documented.     80F with noted PMH presents to ED with dizziness/lightheadedness present severely for past 48 hr; was present upon awakening yesterday morning. Had very mild similar sx on Friday 7 March, with notably worse sx upon awakening 8 March. Continued to have significant sx throughout the day today and sought care d/t severe nature of sx. Dizziness worse with ambulation/head movement; worse with standing to some degree. Accompanied by bifrontal headache of basically the same duration--has hx of migraine HA for which she takes generic Fioricet.  No dysarthria/aphasia. No extremity weakness or paresthesias. No tinnitus/otalgia. No facial droop. Did feel somewhat off-balance when walking but has been able to ambulate throughout the entire course of sx.  Has had vertigo several times before, previously responsive to meclizine. These sx feel similar to previous episodes of vertigo although intensity is more  severe.  Had Covid-19 infection in early Jan 2025 but had recovered prior to current illness. Had been feeling well in the past several weeks prior to onset of current sx.      History provided by:  Medical records, patient and relative  Dizziness  Associated symptoms: headaches, nausea and palpitations    Associated symptoms: no chest pain, no shortness of breath, no vomiting and no weakness        Review of Systems   Constitutional:  Negative for chills, fatigue and fever.   Respiratory:  Negative for cough, chest tightness and shortness of breath.    Cardiovascular:  Positive for palpitations. Negative for chest pain and leg swelling.   Gastrointestinal:  Positive for nausea. Negative for abdominal pain and vomiting.   Musculoskeletal:  Negative for arthralgias, myalgias, neck pain and neck stiffness.   Skin: Negative.    Neurological:  Positive for dizziness, light-headedness and headaches. Negative for seizures, syncope, facial asymmetry, speech difficulty and weakness.           Objective       ED Triage Vitals [03/09/25 1914]   Temperature Pulse Blood Pressure Respirations SpO2 Patient Position - Orthostatic VS   97.8 °F (36.6 °C) 62 144/72 18 97 % Lying      Temp Source Heart Rate Source BP Location FiO2 (%) Pain Score    Temporal Monitor Right arm -- No Pain      Vitals      Date and Time Temp Pulse SpO2 Resp BP Pain Score FACES Pain Rating User   03/09/25 2030 97.6 °F (36.4 °C) 72 98 % 18 164/72 No Pain --    03/09/25 1957 -- 62 -- -- 124/61 dizzy -- --    03/09/25 1955 -- 60 -- -- 135/63 dizzy -- --    03/09/25 1940 -- -- -- -- -- No Pain --    03/09/25 1914 97.8 °F (36.6 °C) 62 97 % 18 144/72 No Pain -- RJP            Physical Exam  Vitals and nursing note reviewed.   Constitutional:       General: She is awake. She is not in acute distress.     Appearance: Normal appearance. She is well-developed.   HENT:      Head: Normocephalic and atraumatic.      Right Ear: Hearing and external ear normal.  No decreased hearing noted.      Left Ear: Hearing and external ear normal. No decreased hearing noted.      Ears:      Comments: Hearing grossly intact to finger rub/finger snap  Eyes:      General: Lids are normal. Vision grossly intact.      Extraocular Movements: Extraocular movements intact.      Right eye: No nystagmus.      Left eye: No nystagmus.      Conjunctiva/sclera: Conjunctivae normal.      Pupils: Pupils are equal, round, and reactive to light.      Comments: No nystagmus at rest or inducible with EOM   Neck:      Vascular: No carotid bruit.      Trachea: Trachea and phonation normal.   Cardiovascular:      Rate and Rhythm: Normal rate and regular rhythm.      Pulses:           Radial pulses are 2+ on the right side and 2+ on the left side.        Dorsalis pedis pulses are 2+ on the right side and 2+ on the left side.        Posterior tibial pulses are 2+ on the right side and 2+ on the left side.      Heart sounds: Normal heart sounds, S1 normal and S2 normal. No murmur heard.     No friction rub. No gallop.   Pulmonary:      Effort: Pulmonary effort is normal. No respiratory distress.      Breath sounds: Normal breath sounds. No stridor. No decreased breath sounds, wheezing, rhonchi or rales.   Abdominal:      General: There is no distension.      Palpations: There is no mass.      Tenderness: There is no abdominal tenderness. There is no guarding or rebound.   Skin:     General: Skin is warm and dry.   Neurological:      Mental Status: She is alert and oriented to person, place, and time.      GCS: GCS eye subscore is 4. GCS verbal subscore is 5. GCS motor subscore is 6.      Cranial Nerves: No cranial nerve deficit, dysarthria or facial asymmetry.      Sensory: Sensation is intact. No sensory deficit.      Motor: Motor function is intact. No abnormal muscle tone.      Coordination: Coordination is intact. Finger-Nose-Finger Test and Heel to Shin Test normal.      Comments: PERRLA; EOMI. Sensation  "intact to light touch over face in V1-V3 distribution bilaterally. Facial expressions symmetric. Tongue/uvula midline. Shoulder shrug equal bilaterally. Strength 5/5 in UE/LE bilaterally. Sensation intact to light touch in UE/LE bilaterally.  No dysmetria  No dysarthria or aphasia         Results Reviewed       Procedure Component Value Units Date/Time    Sodium, urine, random [[id number]] Collected: 03/09/25 2124    Lab Status: Final result Specimen: Urine, Clean Catch Updated: 03/09/25 2136     Sodium, Ur 40.0 mmol/L     TSH [[id number]]  (Normal) Collected: 03/09/25 1955    Lab Status: Final result Specimen: Blood from Arm, Left Updated: 03/09/25 2135     TSH 3RD GENERATON 2.058 uIU/mL     UA w Reflex to Microscopic w Reflex to Culture [[id number]] Collected: 03/09/25 2124    Lab Status: Final result Specimen: Urine, Clean Catch Updated: 03/09/25 2134     Color, UA Light Yellow     Clarity, UA Clear     Specific Gravity, UA 1.015     pH, UA 7.0     Leukocytes, UA Negative     Nitrite, UA Negative     Protein, UA Negative mg/dl      Glucose, UA Negative mg/dl      Ketones, UA Negative mg/dl      Urobilinogen, UA <2.0 mg/dl      Bilirubin, UA Negative     Occult Blood, UA Negative    Osmolality, urine [[id number]] Collected: 03/09/25 2124    Lab Status: In process Specimen: Urine, Clean Catch Updated: 03/09/25 2130    Osmolality-\"If this is regarding a toxic alcohol, please STOP and consult medical  for further guidance.\" [[id number]] Collected: 03/09/25 2124    Lab Status: In process Specimen: Blood from Arm, Left Updated: 03/09/25 2130    HS Troponin I 2hr [[id number]] Collected: 03/09/25 2124    Lab Status: In process Specimen: Blood from Arm, Left Updated: 03/09/25 2130    Basic metabolic panel [[id number]]     Lab Status: No result Specimen: Blood     Basic metabolic panel [[id number]]  (Abnormal) Collected: 03/09/25 1955    Lab Status: Final result Specimen: Blood from Arm, Left Updated: 03/09/25 2103     " Sodium 113 mmol/L      Potassium 5.3 mmol/L      Chloride 84 mmol/L      CO2 21 mmol/L      ANION GAP 8 mmol/L      BUN 10 mg/dL      Creatinine 0.93 mg/dL      Glucose 122 mg/dL      Calcium 8.6 mg/dL      eGFR 59 ml/min/1.73sq m     Narrative:      National Kidney Disease Foundation guidelines for Chronic Kidney Disease (CKD):     Stage 1 with normal or high GFR (GFR > 90 mL/min/1.73 square meters)    Stage 2 Mild CKD (GFR = 60-89 mL/min/1.73 square meters)    Stage 3A Moderate CKD (GFR = 45-59 mL/min/1.73 square meters)    Stage 3B Moderate CKD (GFR = 30-44 mL/min/1.73 square meters)    Stage 4 Severe CKD (GFR = 15-29 mL/min/1.73 square meters)    Stage 5 End Stage CKD (GFR <15 mL/min/1.73 square meters)  Note: GFR calculation is accurate only with a steady state creatinine    Magnesium [[id number]]  (Normal) Collected: 03/09/25 1955    Lab Status: Final result Specimen: Blood from Arm, Left Updated: 03/09/25 2102     Magnesium 2.0 mg/dL     HS Troponin 0hr (reflex protocol) [[id number]]  (Normal) Collected: 03/09/25 1955    Lab Status: Final result Specimen: Blood from Arm, Left Updated: 03/09/25 2053     hs TnI 0hr 3 ng/L     HS Troponin I 4hr [[id number]]     Lab Status: No result Specimen: Blood     CBC and differential [[id number]]  (Abnormal) Collected: 03/09/25 1955    Lab Status: Final result Specimen: Blood from Arm, Left Updated: 03/09/25 2029     WBC 7.07 Thousand/uL      RBC 4.09 Million/uL      Hemoglobin 12.4 g/dL      Hematocrit 34.8 %      MCV 85 fL      MCH 30.3 pg      MCHC 35.6 g/dL      RDW 12.4 %      MPV 9.1 fL      Platelets 293 Thousands/uL      nRBC 0 /100 WBCs      Segmented % 77 %      Immature Grans % 0 %      Lymphocytes % 14 %      Monocytes % 8 %      Eosinophils Relative 1 %      Basophils Relative 0 %      Absolute Neutrophils 5.40 Thousands/µL      Absolute Immature Grans 0.03 Thousand/uL      Absolute Lymphocytes 1.00 Thousands/µL      Absolute Monocytes 0.57 Thousand/µL       Eosinophils Absolute 0.05 Thousand/µL      Basophils Absolute 0.02 Thousands/µL             CT head without contrast   Final Interpretation by Sandip De La Rosa MD ([id number])      No acute intracranial abnormality.                  Workstation performed: [id number]         X-ray chest 2 views   ED Interpretation by Kamari Odell DO ([id number])   Airway is midline. Lungs are clear bilaterally with no evidence of pulmonary vascular congestion/focal infiltrate/pleural effusion/pneumothorax. Cardiac and mediastinal silhouettes are within normal limits. Osseous structures appear normal.  Neurostimulator in place          Procedures    ED Medication and Procedure Management   Prior to Admission Medications   Prescriptions Last Dose Informant Patient Reported? Taking?   Ergocalciferol (VITAMIN D2 PO)  Self Yes No   Sig: Take 1,000 Units by mouth in the morning   Multiple Vitamins-Minerals (MULTIVITAMIN WITH MINERALS) tablet  Self Yes No   Sig: Take 1 tablet by mouth every morning   aspirin (ECOTRIN LOW STRENGTH) 81 mg EC tablet  Self No No   Sig: Take 1 tablet (81 mg total) by mouth daily   bacitracin-polymyxin b ophthalmic ointment  Self Yes No   Sig: INSTILL A 1/4 INCH INTO LEFT EYE AT BEDTIME   bisoprolol (ZEBETA) 10 MG tablet  Self No No   Sig: TAKE 1 TABLET EVERY DAY   butalbital-acetaminophen-caffeine (FIORICET,ESGIC) -40 mg per tablet  Self No No   Sig: Take 1 tablet by mouth every 6 (six) hours as needed for migraine Do not start before 2025.   calcium carbonate (OS-MARLENY) 600 MG tablet  Self Yes No   Sig: Take 600 mg by mouth 2 (two) times a day with meals   clindamycin (CLEOCIN) 150 mg capsule   Yes No   Sig: take 4 capsules 1 hour before APPOINTMENT   fluorometholone (FML) 0.1 % ophthalmic suspension  Self Yes No   Sig: INSTILL 1 DROP BY OPTHALMIC ROUTE FOUR TIMES DAILY INTO LEFT EYE   fluticasone (FLONASE) 50 mcg/act nasal spray  Self Yes No   Si sprays into each nostril daily   losartan  (COZAAR) 100 MG tablet  Self No No   Sig: TAKE 1 TABLET EVERY MORNING   magnesium (MAGTAB) 84 MG (7MEQ) TBCR  Self No No   Sig: Take 1 tablet (84 mg total) by mouth daily   meclizine (ANTIVERT) 12.5 MG tablet  Self No No   Sig: Take 1 tablet (12.5 mg total) by mouth 3 (three) times a day as needed for dizziness   neomycin-bacitracin-polymyxin (NEOSPORIN) ophthalmic ointment  Self Yes No   Sig: INSTILL 1/4' INTO LEFT EYE AT BEDTIME   omeprazole (PriLOSEC) 20 mg delayed release capsule  Self No No   Sig: TAKE 1 CAPSULE TWICE DAILY   ondansetron (ZOFRAN) 4 mg tablet  Self No No   Sig: Take 1 tablet (4 mg total) by mouth every 8 (eight) hours as needed for nausea or vomiting   pravastatin (PRAVACHOL) 40 mg tablet  Self No No   Sig: TAKE 1 TABLET EVERY DAY   predniSONE 10 mg tablet  Self No No   Sig: Take 1 tablet (10 mg total) by mouth daily   spironolactone (ALDACTONE) 50 mg tablet  Self No No   Sig: TAKE 1 TABLET DAILY AFTER LUNCH   sulfamethoxazole-trimethoprim (BACTRIM DS) 800-160 mg per tablet  Self No No   Sig: Take 1 tablet by mouth 2 (two) times a day for 10 days   zolpidem (AMBIEN) 5 mg tablet  Self No No   Sig: Take 1 tablet (5 mg total) by mouth daily at bedtime as needed for sleep Do not start before March 8, 2025.      Facility-Administered Medications: None     Patient's Medications   Discharge Prescriptions    No medications on file     No discharge procedures on file.  ED SEPSIS DOCUMENTATION   Time reflects when diagnosis was documented in both MDM as applicable and the Disposition within this note       Time User Action Codes Description Comment    3/9/2025  9:51 PM Kamari Odell Add [E87.1] Hyponatremia     3/9/2025  9:51 PM Kamari Odell Add [I48.92] Atrial flutter with controlled response (HCC)                  Kamari Odell,   03/11/25 2029

## 2025-03-10 DIAGNOSIS — I10 ESSENTIAL HYPERTENSION: ICD-10-CM

## 2025-03-10 PROBLEM — R94.31 ABNORMAL EKG: Status: ACTIVE | Noted: 2025-03-10

## 2025-03-10 PROBLEM — N18.31 CKD STAGE 3A, GFR 45-59 ML/MIN (HCC): Status: ACTIVE | Noted: 2025-03-10

## 2025-03-10 PROBLEM — I48.91 ATRIAL FIBRILLATION (HCC): Status: ACTIVE | Noted: 2025-03-10

## 2025-03-10 PROBLEM — I48.92 ATRIAL FLUTTER (HCC): Status: ACTIVE | Noted: 2025-03-10

## 2025-03-10 LAB
ANION GAP SERPL CALCULATED.3IONS-SCNC: 6 MMOL/L (ref 4–13)
ANION GAP SERPL CALCULATED.3IONS-SCNC: 7 MMOL/L (ref 4–13)
ANION GAP SERPL CALCULATED.3IONS-SCNC: 8 MMOL/L (ref 4–13)
ATRIAL RATE: 234 BPM
BUN SERPL-MCNC: 11 MG/DL (ref 5–25)
BUN SERPL-MCNC: 11 MG/DL (ref 5–25)
BUN SERPL-MCNC: 14 MG/DL (ref 5–25)
BUN SERPL-MCNC: 15 MG/DL (ref 5–25)
BUN SERPL-MCNC: 16 MG/DL (ref 5–25)
CALCIUM SERPL-MCNC: 8.7 MG/DL (ref 8.4–10.2)
CALCIUM SERPL-MCNC: 8.7 MG/DL (ref 8.4–10.2)
CALCIUM SERPL-MCNC: 8.8 MG/DL (ref 8.4–10.2)
CALCIUM SERPL-MCNC: 8.8 MG/DL (ref 8.4–10.2)
CALCIUM SERPL-MCNC: 8.9 MG/DL (ref 8.4–10.2)
CHLORIDE SERPL-SCNC: 84 MMOL/L (ref 96–108)
CHLORIDE SERPL-SCNC: 86 MMOL/L (ref 96–108)
CHLORIDE SERPL-SCNC: 87 MMOL/L (ref 96–108)
CO2 SERPL-SCNC: 24 MMOL/L (ref 21–32)
CO2 SERPL-SCNC: 24 MMOL/L (ref 21–32)
CO2 SERPL-SCNC: 25 MMOL/L (ref 21–32)
CO2 SERPL-SCNC: 25 MMOL/L (ref 21–32)
CO2 SERPL-SCNC: 27 MMOL/L (ref 21–32)
CREAT SERPL-MCNC: 0.84 MG/DL (ref 0.6–1.3)
CREAT SERPL-MCNC: 0.9 MG/DL (ref 0.6–1.3)
CREAT SERPL-MCNC: 0.91 MG/DL (ref 0.6–1.3)
CREAT SERPL-MCNC: 0.93 MG/DL (ref 0.6–1.3)
CREAT SERPL-MCNC: 1 MG/DL (ref 0.6–1.3)
ERYTHROCYTE [DISTWIDTH] IN BLOOD BY AUTOMATED COUNT: 12.5 % (ref 11.6–15.1)
GFR SERPL CREATININE-BSD FRML MDRD: 54 ML/MIN/1.73SQ M
GFR SERPL CREATININE-BSD FRML MDRD: 59 ML/MIN/1.73SQ M
GFR SERPL CREATININE-BSD FRML MDRD: 60 ML/MIN/1.73SQ M
GFR SERPL CREATININE-BSD FRML MDRD: 61 ML/MIN/1.73SQ M
GFR SERPL CREATININE-BSD FRML MDRD: 66 ML/MIN/1.73SQ M
GLUCOSE SERPL-MCNC: 100 MG/DL (ref 65–140)
GLUCOSE SERPL-MCNC: 108 MG/DL (ref 65–140)
GLUCOSE SERPL-MCNC: 129 MG/DL (ref 65–140)
GLUCOSE SERPL-MCNC: 92 MG/DL (ref 65–140)
GLUCOSE SERPL-MCNC: 99 MG/DL (ref 65–140)
HCT VFR BLD AUTO: 35.6 % (ref 34.8–46.1)
HGB BLD-MCNC: 12.5 G/DL (ref 11.5–15.4)
MAGNESIUM SERPL-MCNC: 2 MG/DL (ref 1.9–2.7)
MCH RBC QN AUTO: 29.8 PG (ref 26.8–34.3)
MCHC RBC AUTO-ENTMCNC: 35.1 G/DL (ref 31.4–37.4)
MCV RBC AUTO: 85 FL (ref 82–98)
OSMOLALITY UR/SERPL-RTO: 252 MMOL/KG (ref 282–298)
OSMOLALITY UR: 333 MMOL/KG (ref 250–900)
PLATELET # BLD AUTO: 290 THOUSANDS/UL (ref 149–390)
PMV BLD AUTO: 9.1 FL (ref 8.9–12.7)
POTASSIUM SERPL-SCNC: 3.9 MMOL/L (ref 3.5–5.3)
POTASSIUM SERPL-SCNC: 4.3 MMOL/L (ref 3.5–5.3)
POTASSIUM SERPL-SCNC: 4.5 MMOL/L (ref 3.5–5.3)
POTASSIUM SERPL-SCNC: 4.7 MMOL/L (ref 3.5–5.3)
POTASSIUM SERPL-SCNC: 4.8 MMOL/L (ref 3.5–5.3)
QRS AXIS: 82 DEGREES
QRSD INTERVAL: 84 MS
QT INTERVAL: 406 MS
QTC INTERVAL: 406 MS
RBC # BLD AUTO: 4.19 MILLION/UL (ref 3.81–5.12)
SODIUM SERPL-SCNC: 116 MMOL/L (ref 135–147)
SODIUM SERPL-SCNC: 117 MMOL/L (ref 135–147)
SODIUM SERPL-SCNC: 117 MMOL/L (ref 135–147)
SODIUM SERPL-SCNC: 119 MMOL/L (ref 135–147)
SODIUM SERPL-SCNC: 120 MMOL/L (ref 135–147)
T WAVE AXIS: 71 DEGREES
VENTRICULAR RATE: 60 BPM
WBC # BLD AUTO: 7.28 THOUSAND/UL (ref 4.31–10.16)

## 2025-03-10 PROCEDURE — 85027 COMPLETE CBC AUTOMATED: CPT | Performed by: FAMILY MEDICINE

## 2025-03-10 PROCEDURE — 80048 BASIC METABOLIC PNL TOTAL CA: CPT | Performed by: FAMILY MEDICINE

## 2025-03-10 PROCEDURE — 99232 SBSQ HOSP IP/OBS MODERATE 35: CPT | Performed by: INTERNAL MEDICINE

## 2025-03-10 PROCEDURE — 80048 BASIC METABOLIC PNL TOTAL CA: CPT | Performed by: NURSE PRACTITIONER

## 2025-03-10 PROCEDURE — 83735 ASSAY OF MAGNESIUM: CPT | Performed by: FAMILY MEDICINE

## 2025-03-10 PROCEDURE — 80048 BASIC METABOLIC PNL TOTAL CA: CPT | Performed by: INTERNAL MEDICINE

## 2025-03-10 PROCEDURE — 93010 ELECTROCARDIOGRAM REPORT: CPT | Performed by: INTERNAL MEDICINE

## 2025-03-10 PROCEDURE — 93005 ELECTROCARDIOGRAM TRACING: CPT

## 2025-03-10 PROCEDURE — 99223 1ST HOSP IP/OBS HIGH 75: CPT | Performed by: INTERNAL MEDICINE

## 2025-03-10 RX ADMIN — DEXTROSE 400 ML: 5 SOLUTION INTRAVENOUS at 06:21

## 2025-03-10 RX ADMIN — PANTOPRAZOLE SODIUM 40 MG: 40 TABLET, DELAYED RELEASE ORAL at 05:06

## 2025-03-10 RX ADMIN — ASPIRIN 81 MG: 81 TABLET, COATED ORAL at 08:31

## 2025-03-10 RX ADMIN — BACITRACIN ZINC AND POLYMYXIN B SULFATE: 500; 10000 OINTMENT OPHTHALMIC at 21:14

## 2025-03-10 RX ADMIN — BUTALBITAL, ACETAMINOPHEN, AND CAFFEINE 1 TABLET: 50; 325; 40 TABLET, COATED ORAL at 06:25

## 2025-03-10 RX ADMIN — MAGNESIUM 64 MG (MAGNESIUM CHLORIDE) TABLET,DELAYED RELEASE 64 MG: at 08:31

## 2025-03-10 RX ADMIN — SODIUM CHLORIDE 40 ML/HR: 234 INJECTION, SOLUTION, CONCENTRATE INTRAVENOUS at 17:56

## 2025-03-10 RX ADMIN — LOSARTAN POTASSIUM 100 MG: 50 TABLET, FILM COATED ORAL at 08:31

## 2025-03-10 RX ADMIN — FLUOROMETHOLONE 1 DROP: 1 SOLUTION/ DROPS OPHTHALMIC at 21:14

## 2025-03-10 RX ADMIN — SPIRONOLACTONE 50 MG: 25 TABLET ORAL at 08:31

## 2025-03-10 RX ADMIN — BUTALBITAL, ACETAMINOPHEN, AND CAFFEINE 1 TABLET: 50; 325; 40 TABLET, COATED ORAL at 11:53

## 2025-03-10 RX ADMIN — PRAVASTATIN SODIUM 40 MG: 40 TABLET ORAL at 16:36

## 2025-03-10 RX ADMIN — ENOXAPARIN SODIUM 40 MG: 40 INJECTION SUBCUTANEOUS at 08:34

## 2025-03-10 NOTE — ASSESSMENT & PLAN NOTE
Lab Results   Component Value Date    EGFR 54 03/10/2025    EGFR 66 03/10/2025    EGFR 61 03/10/2025    CREATININE 1.00 03/10/2025    CREATININE 0.84 03/10/2025    CREATININE 0.90 03/10/2025

## 2025-03-10 NOTE — ASSESSMENT & PLAN NOTE
On fluid restriction in the outpatient setting. Notably on PPI, ARB which may be contributing. Denies any adjustments or increasing hydration. Did have headache, nausea potentially exacerbating ADH secretion. Fluid restriction lifted for now as above

## 2025-03-10 NOTE — PROGRESS NOTES
Progress Note - Hospitalist   Name: Jacklyn Garcia 80 y.o. female I MRN: [id number]  Unit/Bed#:  I Date of Admission: 3/9/2025   Date of Service: 3/10/2025 I Hospital Day: 1    Assessment & Plan  Hyponatremia  Sodium 113 on admission, nephrology following, further plan of care per nephrology, note reviewed.      Patient recently on Bactrim DS prescribed for 1/27 for 10-day course, completed today.    Atrial flutter ([facility])  Possible a flutter, continue telemetry, currently rate controlled    Will consult cardiology, check a twelve-lead EKG.    EKG was obtained from the cardiac monitoring device in the ICU, was reviewed, showed possible atrial flutter, EKG from yesterday showed atrial tachycardia.      SIADH (syndrome of inappropriate ADH production) ([facility])  Followed by nephrology  Baseline sodium is low at 129  Hypertensive nephrosclerosis  Continue with losartan and Aldactone  Hyperaldosteronism ([facility])  Continue Aldactone 50 mg daily  Grade II diastolic dysfunction  Continue with losartan and Aldactone  Gastroesophageal reflux disease without esophagitis  Continue PPI  Hypercholesterolemia  Continue statin  History of corneal transplant  Left eye corneal transplant, continue with Polysporin ointment and fluorometholone  Abnormal EKG  See plan of care above.  CKD stage 3a, GFR 45-59 ml/min ([facility])  Lab Results   Component Value Date    EGFR 54 03/10/2025    EGFR 66 03/10/2025    EGFR 61 03/10/2025    CREATININE 1.00 03/10/2025    CREATININE 0.84 03/10/2025    CREATININE 0.90 03/10/2025       VTE Pharmacologic Prophylaxis: VTE Score: 3 Moderate Risk (Score 3-4) - Pharmacological DVT Prophylaxis Ordered: enoxaparin (Lovenox).    Mobility:   Basic Mobility Inpatient Raw Score: 24  JH-HLM Goal: 8: Walk 250 feet or more  JH-HLM Achieved: 8: Walk 250 feet ot more  JH-HLM Goal achieved. Continue to encourage appropriate mobility.    Code Status: Level 3 - DNAR and DNI    Subjective   Patient overall feeling better,  less dizziness today    Objective :  Temp:  [97.3 °F (36.3 °C)-98.1 °F (36.7 °C)] 97.6 °F (36.4 °C)  HR:  [60-93] 71  BP: ()/(45-78) 97/49  Resp:  [14-34] 20  SpO2:  [94 %-98 %] 94 %  O2 Device: None (Room air)    Body mass index is 27.41 kg/m².     Input and Output Summary (last 24 hours):     Intake/Output Summary (Last 24 hours) at 3/10/2025 1726  Last data filed at 3/10/2025 1657  Gross per 24 hour   Intake 1140 ml   Output 1300 ml   Net -160 ml       Physical Exam  Vitals and nursing note reviewed.   Constitutional:       General: She is not in acute distress.     Appearance: Normal appearance. She is not ill-appearing, toxic-appearing or diaphoretic.   Cardiovascular:      Rate and Rhythm: Normal rate.      Pulses: Normal pulses.      Heart sounds: Normal heart sounds.   Pulmonary:      Effort: Pulmonary effort is normal.      Breath sounds: Normal breath sounds.   Musculoskeletal:         General: Normal range of motion.      Cervical back: Normal range of motion.   Skin:     General: Skin is warm.   Neurological:      Mental Status: She is alert. Mental status is at baseline.   Psychiatric:         Mood and Affect: Mood normal.         Behavior: Behavior normal.         Thought Content: Thought content normal.         Judgment: Judgment normal.                     Lab Results: I have reviewed the following results:   Results from last 7 days   Lab Units 03/10/25  0423 03/09/25  1955   WBC Thousand/uL 7.28 7.07   HEMOGLOBIN g/dL 12.5 12.4   HEMATOCRIT % 35.6 34.8   PLATELETS Thousands/uL 290 293   SEGS PCT %  --  77*   LYMPHO PCT %  --  14   MONO PCT %  --  8   EOS PCT %  --  1     Results from last 7 days   Lab Units 03/10/25  1630   SODIUM mmol/L 117*   POTASSIUM mmol/L 4.7   CHLORIDE mmol/L 86*   CO2 mmol/L 25   BUN mg/dL 16   CREATININE mg/dL 1.00   ANION GAP mmol/L 6   CALCIUM mg/dL 8.7   GLUCOSE RANDOM mg/dL 100                       Recent Cultures (last 7 days):         Imaging Results Review:  No pertinent imaging studies reviewed.  Other Study Results Review: EKG was reviewed.  EKG shows possible atrial flutter    Last 24 Hours Medication List:     Current Facility-Administered Medications:     aspirin (ECOTRIN LOW STRENGTH) EC tablet 81 mg, Daily    bacitracin-polymyxin b (POLYSPORIN) ophthalmic ointment, HS    butalbital-acetaminophen-caffeine (FIORICET,ESGIC) -40 mg per tablet 1 tablet, Q4H PRN    enoxaparin (LOVENOX) subcutaneous injection 40 mg, Daily    fluorometholone (FML) 0.1 % ophthalmic suspension 1 drop, HS    losartan (COZAAR) tablet 100 mg, QAM    magnesium chloride (MAG64) EC tablet TBEC 64 mg, Daily    pantoprazole (PROTONIX) EC tablet 40 mg, Early Morning    pravastatin (PRAVACHOL) tablet 40 mg, Daily With Dinner    sodium chloride (HYPERTONIC) infusion 1.8%, Continuous **AND** Basic metabolic panel, Q4H    Insert peripheral IV, Once **AND** sodium chloride (PF) 0.9 % injection 3 mL, Q1H PRN    spironolactone (ALDACTONE) tablet 50 mg, Daily    zolpidem (AMBIEN) tablet 5 mg, HS    Administrative Statements   Today, Patient Was Seen By: Mike Garzon DO  **Please Note: This note may have been constructed using a voice recognition system.**

## 2025-03-10 NOTE — ASSESSMENT & PLAN NOTE
Sodium 113 on admission, nephrology following, further plan of care per nephrology, note reviewed.      Patient recently on Bactrim DS prescribed for 1/27 for 10-day course, completed today.

## 2025-03-10 NOTE — PLAN OF CARE
Problem: PAIN - ADULT  Goal: Verbalizes/displays adequate comfort level or baseline comfort level  Description: Interventions:  - Encourage patient to monitor pain and request assistance  - Assess pain using appropriate pain scale  - Administer analgesics based on type and severity of pain and evaluate response  - Implement non-pharmacological measures as appropriate and evaluate response  - Consider cultural and social influences on pain and pain management  - Notify physician/advanced practitioner if interventions unsuccessful or patient reports new pain  Outcome: Progressing     Problem: INFECTION - ADULT  Goal: Absence or prevention of progression during hospitalization  Description: INTERVENTIONS:  - Assess and monitor for signs and symptoms of infection  - Monitor lab/diagnostic results  - Monitor all insertion sites, i.e. indwelling lines, tubes, and drains  - Monitor endotracheal if appropriate and nasal secretions for changes in amount and color  - Graham appropriate cooling/warming therapies per order  - Administer medications as ordered  - Instruct and encourage patient and family to use good hand hygiene technique  - Identify and instruct in appropriate isolation precautions for identified infection/condition  Outcome: Progressing     Problem: Knowledge Deficit  Goal: Patient/family/caregiver demonstrates understanding of disease process, treatment plan, medications, and discharge instructions  Description: Complete learning assessment and assess knowledge base.  Interventions:  - Provide teaching at level of understanding  - Provide teaching via preferred learning methods  Outcome: Progressing     Problem: CARDIOVASCULAR - ADULT  Goal: Maintains optimal cardiac output and hemodynamic stability  Description: INTERVENTIONS:  - Monitor I/O, vital signs and rhythm  - Monitor for S/S and trends of decreased cardiac output  - Administer and titrate ordered vasoactive medications to optimize hemodynamic  stability  - Assess quality of pulses, skin color and temperature  - Assess for signs of decreased coronary artery perfusion  - Instruct patient to report change in severity of symptoms  Outcome: Progressing  Goal: Absence of cardiac dysrhythmias or at baseline rhythm  Description: INTERVENTIONS:  - Continuous cardiac monitoring, vital signs, obtain 12 lead EKG if ordered  - Administer antiarrhythmic and heart rate control medications as ordered  - Monitor electrolytes and administer replacement therapy as ordered  Outcome: Progressing     Problem: METABOLIC, FLUID AND ELECTROLYTES - ADULT  Goal: Electrolytes maintained within normal limits  Description: INTERVENTIONS:  - Monitor labs and assess patient for signs and symptoms of electrolyte imbalances  - Administer electrolyte replacement as ordered  - Monitor response to electrolyte replacements, including repeat lab results as appropriate  - Instruct patient on fluid and nutrition as appropriate  Outcome: Progressing  Goal: Fluid balance maintained  Description: INTERVENTIONS:  - Monitor labs   - Monitor I/O and WT  - Instruct patient on fluid and nutrition as appropriate  - Assess for signs & symptoms of volume excess or deficit  Outcome: Progressing

## 2025-03-10 NOTE — ASSESSMENT & PLAN NOTE
Possible a flutter, continue telemetry, currently rate controlled    Will consult cardiology, check a twelve-lead EKG.    EKG was obtained from the cardiac monitoring device in the ICU, was reviewed, showed possible atrial flutter, EKG from yesterday showed atrial tachycardia.

## 2025-03-10 NOTE — CONSULTS
[facility]  Consultation   Jacklyn Garcia 80 y.o. female MRN: [id number]  Unit/Bed#:  Encounter: [id number]        Assessment & Plan  Hyponatremia  Acute exacerbation of chronic hyponatremia. Symptomatic, hypo-osmolar, severe on presentation. Serum sodium typically trends high 120s, low 130s due to SIADH followed by nephrology.     Garry sodium 113 mmol/L. Serum osm 252, urine sodium 40, urine osm 333, UA bland, TSH nl, CTH nl, CXR nl    Suspect acute exacerbation of SIADH +/- volume depletion and was initially treated with crystalloid. Headache, nausea potential precipitating factor. Also- tele potentially showing atrial flutter without documented hx of such.     Fluid restriction and IVF discontinued earlier due to increase in sodium to 120 mmol/L and given small D5W bolus. Sodium 119 mmol/L. Goal to maintain 119 mmol/L until 8 pm. Repeat labs later today ordered in addition to rest of workup plus repeat EKG.     SIADH (syndrome of inappropriate ADH production) (HCC)  On fluid restriction in the outpatient setting. Notably on PPI, ARB which may be contributing. Denies any adjustments or increasing hydration. Did have headache, nausea potentially exacerbating ADH secretion. Fluid restriction lifted for now as above   Hypertensive nephrosclerosis  Blood pressure appropriate. Avoid thiazide and thiazide-like diuretics  Grade II diastolic dysfunction  Examines compensated. Continue low sodium diet. Avoid salt tablets  Hyperaldosteronism (HCC)  Remains on 50 mg spironolactone daily with appropriate blood pressure   Abnormal EKG  Check EKG now. Cardiology consult for primary team if indicated.   CKD stage 3a, GFR 45-59 ml/min ([facility])  Lab Results   Component Value Date    EGFR 66 03/10/2025    EGFR 61 03/10/2025    EGFR 59 03/10/2025    CREATININE 0.84 03/10/2025    CREATININE 0.90 03/10/2025    CREATININE 0.93 03/10/2025   Kidney function at baseline level of function.  Outpt follow-up with nephrology already scheduled.         HPI:    Jacklyn Garcia is a 80 y.o. female with chronic hyponatremia due to SIADH followed by Dr. Cortes, CKD 3a, HTN, hyperaldosteronism, grade II diastolic dysfunction, GERD, who presented yesterday with dizziness, nausea, weakness found to have serum sodium 113 mmol/L. Of note, she was recently treated with Bactrim for a sinus infection. Nephrology was consulted for hyponatremia    Upon discussion with the patient she relays HPI as above. She states she was recently treated for sinus infection with Bactrim. She denies any change to her fluid intake- on chronic fluid restriction. She did endorse nausea and severe headache. She has taken no other new medications. No recent illnesses. No GI or  symptoms.    Reason for Consult: severe hyponatremia    Review of Systems:  A complete 10-point review of systems was performed. Aside from what was mentioned in the HPI, it is otherwise negative.      Historical Information   Past Medical History:   Diagnosis Date    Allergies     Anxiety     Arthritis     Benign arteriolar nephrosclerosis     Bereavement     Cataract     Chronic kidney disease     Chronic kidney disease in type 2 diabetes mellitus (HCC)     Chronic kidney disease, stage 2 (mild)     Chronic pain disorder     Chronic pain syndrome     COPD (chronic obstructive pulmonary disease) ([facility])     DDD (degenerative disc disease), cervical     DDD (degenerative disc disease), lumbar     Degenerative joint disease of right hip     Depression     Diastolic dysfunction     DJD (degenerative joint disease), ankle and foot, right     DJD of right shoulder     Edema     Fracture of left calcaneus     Generalized anxiety disorder     GERD (gastroesophageal reflux disease)     Heart murmur     Hyperaldosteronism (HCC)     Hyperlipidemia     Hypertension     Hypertensive nephrosclerosis     Hypo-osmolality and hyponatremia     IBS (irritable bowel syndrome)      Insomnia     Migraines     Mitral regurgitation     MVP (mitral valve prolapse)     Obstructive sleep apnea syndrome     Osteoarthritis     Osteoarthritis     Pernicious anemia     Plantar fascia rupture     Rheumatoid arthritis (HCC)     Right knee DJD     S/P insertion of spinal cord stimulator     Shingles     Sinobronchitis     Sleep apnea     Status post total right knee replacement 07/07/2020    Stroke (HCC)     tia    TIA (transient ischemic attack)     Vertigo      Past Surgical History:   Procedure Laterality Date    APPENDECTOMY      CARPAL TUNNEL RELEASE Bilateral     COLONOSCOPY      several    COLONOSCOPY  04/02/2012    by Dr. Mckinley Ruvalcaba    CORNEAL TRANSPLANT Left 04/11/2022    DXA PROCEDURE (HISTORICAL)  10/26/2016, 9/17/2014, 6/18/2007    HAND SURGERY Right     ring finger has pin    HYSTERECTOMY      34    INSERT / REPLACE PERIPHERAL NEUROSTIMULATOR PULSE GENERATOR /  Left     buttocks    JOINT REPLACEMENT Left     knee    JOINT REPLACEMENT Right 04/2019    Hip    KNEE ARTHROPLASTY Left 01/15/2009    by Dr. Avery Leigh at [facility]     KNEE ARTHROSCOPY Bilateral     KNEE CARTILAGE SURGERY  09/10/2009    by Dr. Avery Leigh at [facility]     MAMMO (HISTORICAL)  12/10/2018, 10/30/2017, 10/26/2016    NASAL SEPTUM SURGERY  2008    NERVE BLOCK Left 02/20/2018    Procedure: BLOCK MEDIAL BRANCH - C4, C5, C6;  Surgeon: Jaycob Cruz MD;  Location: [facility];  Service: Pain Management     NERVE BLOCK Left 03/06/2018    Procedure: C4, C5, C6 MEDIAL BRANCH BLOCK;  Surgeon: Jaycob rCuz MD;  Location: [facility];  Service: Pain Management     NERVE BLOCK Left 2/20/2025    Procedure: BLOCK MEDIAL BRANCH Left C4-5 and C5-6;  Surgeon: Jaycob Cruz MD;  Location: [facility];  Service: Pain Management     OR ARTHRP ACETBLR/PROX FEM PROSTC AGRFT/ALGRFT Right 02/27/2019    Procedure: ARTHROPLASTY HIP TOTAL;  Surgeon: Bruno Pina DO;  Location:   MAIN OR;  Service: Orthopedics    DE ARTHRP KNE CONDYLE&PLATU MEDIAL&LAT COMPARTMENTS Right 06/24/2020    Procedure: KNEE TOTAL ARTHROPLASTY;  Surgeon: Bruno Pina DO;  Location:  MAIN OR;  Service: Orthopedics    DE COLONOSCOPY FLX DX W/COLLJ SPEC WHEN PFRMD N/A 04/24/2017    Procedure: FLEXIBLE COLONOSCOPY;  Surgeon: Mckinley Ruvalcaba MD;  Location: [facility];  Service: Colorectal    RADIOFREQUENCY ABLATION Left 04/17/2018    Procedure: ABLATION RADIO FREQUENCY (RFA) - C4, C5, C6;  Surgeon: Jaycob Cruz MD;  Location: [facility];  Service: Pain Management     REPLACEMENT TOTAL KNEE Left 07/05/2011    SINUS SURGERY      TONSILLECTOMY      TOTAL HIP ARTHROPLASTY Left     TRIGGER FINGER RELEASE      R 4th     UPPER GASTROINTESTINAL ENDOSCOPY  01/12/2007    with Donaldson dilatation by Dr. Misael Santiago at [facility]    WRIST SURGERY Right      Social History   Social History     Substance and Sexual Activity   Alcohol Use Yes    Comment: 2 beer a week     Social History     Substance and Sexual Activity   Drug Use Never     Social History     Tobacco Use   Smoking Status Former    Types: Cigarettes   Smokeless Tobacco Never   Tobacco Comments    quit 40 yrs ago       Family History:   Family History   Problem Relation Age of Onset    Heart disease Mother     Hypertension Mother     Arthritis Mother     Dementia Mother     Rheum arthritis Mother     Hypertension Father     Heart disease Father     Colon cancer Father     Hypertension Sister     Anxiety disorder Sister     Thyroid disease Sister     Prostate cancer Maternal Grandfather     No Known Problems Paternal Grandmother     No Known Problems Paternal Grandfather     No Known Problems Paternal Aunt     Pseudochol deficiency Neg Hx        Medications:  Pertinent medications were reviewed  Current Facility-Administered Medications   Medication Dose Route Frequency Provider Last Rate    aspirin  81 mg Oral Daily Jose Juan Trevizo MD       "bacitracin-polymyxin b   Left Eye HS Jose Juan Trevizo MD      butalbital-acetaminophen-caffeine  1 tablet Oral Q4H PRN Jose Juan Trevizo MD      enoxaparin  40 mg Subcutaneous Daily Jose Juan Trevizo MD      fluorometholone  1 drop Left Eye HS Jose Juan Trevizo MD      losartan  100 mg Oral QAM Jose Juan Trevizo MD      magnesium chloride  64 mg Oral Daily Jose Juan Trevizo MD      pantoprazole  40 mg Oral Early Morning Jose Juan Trevizo MD      pravastatin  40 mg Oral Daily With Dinner Jose Juan Trevizo MD      sodium chloride (PF)  3 mL Intravenous Q1H PRN Kamari T Ritz, DO      spironolactone  50 mg Oral Daily Jose Juan Trevizo MD      zolpidem  5 mg Oral HS Jose Juan Trevizo MD           Allergies   Allergen Reactions    Procaine Hives    Adhesive [Medical Tape] Rash    Lidocaine Rash    Penicillins Rash         Vitals:   /56   Pulse 66   Temp 98.1 °F (36.7 °C) (Temporal)   Resp (!) 34   Ht 5' 6\" (1.676 m)   Wt 77 kg (169 lb 12.8 oz)   SpO2 96%   BMI 27.41 kg/m²   Body mass index is 27.41 kg/m².  SpO2: 96 %,   SpO2 Activity: At Rest,   O2 Device: None (Room air)      Intake/Output Summary (Last 24 hours) at 3/10/2025 1157  Last data filed at 3/10/2025 0957  Gross per 24 hour   Intake 540 ml   Output 900 ml   Net -360 ml     Invasive Devices       Peripheral Intravenous Line  Duration             Peripheral IV 03/09/25 Left;Ventral (anterior) Forearm <1 day                    Physical Exam:  General: conscious, cooperative, in no acute distress  Eyes: conjunctivae pink, anicteric sclerae  ENT: lips and mucous membranes moist  Neck: supple, no JVD, no masses  Chest: clear breath sounds bilateral, no crackles, ronchus or wheezings  CVS: S1 & S2, normal rate, irregular rhythm  Abdomen: soft, non-tender, non-distended, normoactive bowel sounds  Extremities: no edema of both legs  Skin: no rash  Neuro: awake, alert, oriented      Diagnostic Data:  Lab: I have personally " "reviewed pertinent lab results.,   CBC:  Results from last 7 days   Lab Units 03/10/25  0423   WBC Thousand/uL 7.28   HEMOGLOBIN g/dL 12.5   HEMATOCRIT % 35.6   PLATELETS Thousands/uL 290      CMP:   Lab Results   Component Value Date    SODIUM 119 (LL) 03/10/2025    K 3.9 03/10/2025    CL 87 (L) 03/10/2025    CO2 24 03/10/2025    BUN 11 03/10/2025    CREATININE 0.84 03/10/2025    CALCIUM 8.8 03/10/2025    EGFR 66 03/10/2025   ,   PT/INR: No results found for: \"PT\", \"INR\",   Magnesium: No components found for: \"MAG\",  Phosphorous: No results found for: \"PHOS\"    Microbiology:  @LABRCNTIP,(urinecx:7)@        DAYNE Acosta    Portions of the record may have been created with voice recognition software. Occasional wrong word or \"sound a like\" substitutions may have occurred due to the inherent limitations of voice recognition software. Read the chart carefully and recognize, using context, where substitutions have occurred.      "

## 2025-03-10 NOTE — ASSESSMENT & PLAN NOTE
Sodium 113 @8pmbaseline is around 129  Received 1 L Plasma-Lyte  Will obtain BMP at midnight, no further fluids for now  Will consult nephrology  Patient recently on Bactrim DS prescribed for 1/27 for 10-day course, completed today.  This does have an adverse effect of hyponatremia, patient has not done anything differently at home

## 2025-03-10 NOTE — H&P
H&P - Hospitalist   Name: Jacklyn Garcia 80 y.o. female I MRN: [id number]  Unit/Bed#: RM24 I Date of Admission: 3/9/2025   Date of Service: 3/9/2025 I Hospital Day: 0     Assessment & Plan  Hyponatremia  Sodium 113 @8pmbaseline is around 129  Received 1 L Plasma-Lyte  Will obtain BMP at midnight, no further fluids for now  Will consult nephrology  Patient recently on Bactrim DS prescribed for 1/27 for 10-day course, completed today.  This does have an adverse effect of hyponatremia, patient has not done anything differently at home    SIADH (syndrome of inappropriate ADH production) (HCC)  Followed by nephrology  Is on fluid restriction, baseline 129  Hypertensive nephrosclerosis  Continue with losartan and Aldactone  Hyperaldosteronism (HCC)  Continue Aldactone 50 mg daily  Grade II diastolic dysfunction  Continue with losartan and Aldactone  Gastroesophageal reflux disease without esophagitis  Continue PPI  Hypercholesterolemia  Continue statin  History of corneal transplant  Left eye corneal transplant, continue with Polysporin ointment and fluorometholone        Disposition  #1  BMP at midnight and in the morning  #2  Consult nephrology  #3  Continue home meds  #4  Patient completed Bactrim therapy today no further antibiotics needed for sinusitis      VTE Pharmacologic Prophylaxis: VTE Score: 3 Moderate Risk (Score 3-4) - Pharmacological DVT Prophylaxis Ordered: enoxaparin (Lovenox).  Code Status: Level 3 - DNAR and DNI   Discussion with family: Updated  (significant other) at bedside.    Anticipated Length of Stay: Patient will be admitted on an inpatient basis with an anticipated length of stay of greater than 2 midnights secondary to hyponatremia.    History of Present Illness   Chief Complaint: Dizziness    Jacklyn Garcia is a 80 y.o. female with a PMH of SIADH, hyperaldosteronism, grade 2 diastolic dysfunction, who presents with dizziness.  Symptoms started yesterday, she states that  she has been doing everything as before except for she had a sinus infection was given Bactrim on February 27, 2025.  The dizziness continues at this time.  Patient found to have a sodium of 113.    Review of Systems   Constitutional:  Positive for fatigue.   Respiratory: Negative.     Cardiovascular: Negative.    Gastrointestinal: Negative.    Genitourinary: Negative.    Neurological:  Positive for dizziness.       Historical Information   Past Medical History:   Diagnosis Date    Allergies     Anxiety     Arthritis     Benign arteriolar nephrosclerosis     Bereavement     Cataract     Chronic kidney disease     Chronic kidney disease in type 2 diabetes mellitus ([facility])     Chronic kidney disease, stage 2 (mild)     Chronic pain disorder     Chronic pain syndrome     COPD (chronic obstructive pulmonary disease) ([facility])     DDD (degenerative disc disease), cervical     DDD (degenerative disc disease), lumbar     Degenerative joint disease of right hip     Depression     Diastolic dysfunction     DJD (degenerative joint disease), ankle and foot, right     DJD of right shoulder     Edema     Fracture of left calcaneus     Generalized anxiety disorder     GERD (gastroesophageal reflux disease)     Heart murmur     Hyperaldosteronism ([facility])     Hyperlipidemia     Hypertension     Hypertensive nephrosclerosis     Hypo-osmolality and hyponatremia     IBS (irritable bowel syndrome)     Insomnia     Migraines     Mitral regurgitation     MVP (mitral valve prolapse)     Obstructive sleep apnea syndrome     Osteoarthritis     Osteoarthritis     Pernicious anemia     Plantar fascia rupture     Rheumatoid arthritis ([facility])     Right knee DJD     S/P insertion of spinal cord stimulator     Shingles     Sinobronchitis     Sleep apnea     Status post total right knee replacement 07/07/2020    Stroke ([facility])     tia    TIA (transient ischemic attack)     Vertigo      Past Surgical History:   Procedure Laterality Date    APPENDECTOMY       CARPAL TUNNEL RELEASE Bilateral     COLONOSCOPY      several    COLONOSCOPY  04/02/2012    by Dr. Mckinley Ruvalcaba    CORNEAL TRANSPLANT Left 04/11/2022    DXA PROCEDURE (HISTORICAL)  10/26/2016, 9/17/2014, 6/18/2007    HAND SURGERY Right     ring finger has pin    HYSTERECTOMY      34    INSERT / REPLACE PERIPHERAL NEUROSTIMULATOR PULSE GENERATOR /  Left     buttocks    JOINT REPLACEMENT Left     knee    JOINT REPLACEMENT Right 04/2019    Hip    KNEE ARTHROPLASTY Left 01/15/2009    by Dr. Avery Leigh at [facility]     KNEE ARTHROSCOPY Bilateral     KNEE CARTILAGE SURGERY  09/10/2009    by Dr. Avery Leigh at [facility]     MAMMO (HISTORICAL)  12/10/2018, 10/30/2017, 10/26/2016    NASAL SEPTUM SURGERY  2008    NERVE BLOCK Left 02/20/2018    Procedure: BLOCK MEDIAL BRANCH - C4, C5, C6;  Surgeon: Jaycob Curz MD;  Location: [facility];  Service: Pain Management     NERVE BLOCK Left 03/06/2018    Procedure: C4, C5, C6 MEDIAL BRANCH BLOCK;  Surgeon: Jaycob Cruz MD;  Location: [facility];  Service: Pain Management     NERVE BLOCK Left 2/20/2025    Procedure: BLOCK MEDIAL BRANCH Left C4-5 and C5-6;  Surgeon: Jaycob Cruz MD;  Location: [facility];  Service: Pain Management     NV ARTHRP ACETBLR/PROX FEM PROSTC AGRFT/ALGRFT Right 02/27/2019    Procedure: ARTHROPLASTY HIP TOTAL;  Surgeon: Bruno Pina DO;  Location:  MAIN OR;  Service: Orthopedics    NV ARTHRP KNE CONDYLE&PLATU MEDIAL&LAT COMPARTMENTS Right 06/24/2020    Procedure: KNEE TOTAL ARTHROPLASTY;  Surgeon: Bruno Pina DO;  Location:  MAIN OR;  Service: Orthopedics    NV COLONOSCOPY FLX DX W/COLLJ SPEC WHEN PFRMD N/A 04/24/2017    Procedure: FLEXIBLE COLONOSCOPY;  Surgeon: Mckinley Ruvalcaba MD;  Location: [facility];  Service: Colorectal    RADIOFREQUENCY ABLATION Left 04/17/2018    Procedure: ABLATION RADIO FREQUENCY (RFA) - C4, C5, C6;  Surgeon: Jaycob Cruz MD;  Location: [facility];   Service: Pain Management     REPLACEMENT TOTAL KNEE Left 07/05/2011    SINUS SURGERY      TONSILLECTOMY      TOTAL HIP ARTHROPLASTY Left     TRIGGER FINGER RELEASE      R 4th     UPPER GASTROINTESTINAL ENDOSCOPY  01/12/2007    with Donaldson dilatation by Dr. Misael Santiago at [facility]    WRIST SURGERY Right      Social History     Tobacco Use    Smoking status: Former     Types: Cigarettes    Smokeless tobacco: Never    Tobacco comments:     quit 40 yrs ago   Vaping Use    Vaping status: Never Used   Substance and Sexual Activity    Alcohol use: Yes     Comment: 2 beer a week    Drug use: Never    Sexual activity: Not Currently     Birth control/protection: Post-menopausal     E-Cigarette/Vaping    E-Cigarette Use Never User      E-Cigarette/Vaping Substances    Nicotine No     THC No     CBD No     Flavoring No     Other No     Unknown No      Family history non-contributory  Social History:  Marital Status: /Civil Union   Occupation: Retired  Patient Pre-hospital Living Situation: Home  Patient Pre-hospital Level of Mobility: walks  Patient Pre-hospital Diet Restrictions: Fluid restriction    Meds/Allergies   I have reviewed home medications with patient personally.  Prior to Admission medications    Medication Sig Start Date End Date Taking? Authorizing Provider   aspirin (ECOTRIN LOW STRENGTH) 81 mg EC tablet Take 1 tablet (81 mg total) by mouth daily 9/9/20   Kin Cortes, DO   bacitracin-polymyxin b ophthalmic ointment INSTILL A 1/4 INCH INTO LEFT EYE AT BEDTIME 8/30/22   Historical Provider, MD   butalbital-acetaminophen-caffeine (FIORICET,ESGIC) -40 mg per tablet Take 1 tablet by mouth every 6 (six) hours as needed for migraine Do not start before March 16, 2025. 3/16/25   Ernie Wilkinson,    calcium carbonate (OS-MARLENY) 600 MG tablet Take 600 mg by mouth 2 (two) times a day with meals    Historical Provider, MD   clindamycin (CLEOCIN) 150 mg capsule take 4 capsules 1 hour before  APPOINTMENT 2/6/25   Historical Provider, MD   Ergocalciferol (VITAMIN D2 PO) Take 1,000 Units by mouth in the morning    Historical Provider, MD   fluorometholone (FML) 0.1 % ophthalmic suspension INSTILL 1 DROP BY OPTHALMIC ROUTE FOUR TIMES DAILY INTO LEFT EYE 2/6/23   Historical Provider, MD   fluticasone (FLONASE) 50 mcg/act nasal spray 2 sprays into each nostril daily    Historical Provider, MD   losartan (COZAAR) 100 MG tablet TAKE 1 TABLET EVERY MORNING 10/15/24   DAYNE Dodge   magnesium (MAGTAB) 84 MG (7MEQ) TBCR Take 1 tablet (84 mg total) by mouth daily 3/18/24   Ernie Wilkinson, DO   meclizine (ANTIVERT) 12.5 MG tablet Take 1 tablet (12.5 mg total) by mouth 3 (three) times a day as needed for dizziness 12/16/24   Ernie Wilkinson, DO   Multiple Vitamins-Minerals (MULTIVITAMIN WITH MINERALS) tablet Take 1 tablet by mouth every morning    Historical Provider, MD   neomycin-bacitracin-polymyxin (NEOSPORIN) ophthalmic ointment INSTILL 1/4' INTO LEFT EYE AT BEDTIME 10/4/24   Historical Provider, MD   omeprazole (PriLOSEC) 20 mg delayed release capsule TAKE 1 CAPSULE TWICE DAILY 5/30/22   Ernie Wilkinson, DO   ondansetron (ZOFRAN) 4 mg tablet Take 1 tablet (4 mg total) by mouth every 8 (eight) hours as needed for nausea or vomiting 12/30/24   Ernie Wilkinson, DO   pravastatin (PRAVACHOL) 40 mg tablet TAKE 1 TABLET EVERY DAY 12/5/24   Kyra Pyle PA-C   spironolactone (ALDACTONE) 50 mg tablet TAKE 1 TABLET DAILY AFTER LUNCH 10/31/24   Ernie Wilkinson, DO   zolpidem (AMBIEN) 5 mg tablet Take 1 tablet (5 mg total) by mouth daily at bedtime as needed for sleep Do not start before March 8, 2025. 3/8/25   Ernie Wilkinson, DO   bisoprolol (ZEBETA) 10 MG tablet TAKE 1 TABLET EVERY DAY 10/15/24 3/9/25  Williams Blas,    predniSONE 10 mg tablet Take 1 tablet (10 mg total) by mouth daily 2/27/25 3/9/25  Ernie Wilkinson, DO   sulfamethoxazole-trimethoprim (BACTRIM DS) 800-160 mg per tablet Take  1 tablet by mouth 2 (two) times a day for 10 days 2/27/25 3/9/25  Ernie Wilkinson, DO     Allergies   Allergen Reactions    Procaine Hives    Adhesive [Medical Tape] Rash    Lidocaine Rash    Penicillins Rash       Objective :  Temp:  [97.6 °F (36.4 °C)-97.8 °F (36.6 °C)] 97.6 °F (36.4 °C)  HR:  [60-77] 77  BP: (124-164)/(60-72) 130/60  Resp:  [18] 18  SpO2:  [94 %-98 %] 94 %  O2 Device: None (Room air)    Physical Exam  Constitutional:       Appearance: Normal appearance. She is normal weight.   HENT:      Head: Normocephalic and atraumatic.      Nose: Nose normal.      Mouth/Throat:      Mouth: Mucous membranes are moist.      Pharynx: Oropharynx is clear.   Eyes:      Extraocular Movements: Extraocular movements intact.      Conjunctiva/sclera: Conjunctivae normal.   Cardiovascular:      Rate and Rhythm: Normal rate and regular rhythm.      Pulses: Normal pulses.      Heart sounds: Normal heart sounds.   Pulmonary:      Effort: Pulmonary effort is normal.      Breath sounds: Normal breath sounds.   Abdominal:      General: There is no distension.      Palpations: Abdomen is soft.      Tenderness: There is no abdominal tenderness. There is no guarding.   Musculoskeletal:         General: Normal range of motion.      Right lower leg: No edema.      Left lower leg: No edema.   Skin:     General: Skin is warm and dry.   Neurological:      General: No focal deficit present.      Mental Status: She is alert and oriented to person, place, and time. Mental status is at baseline.   Psychiatric:         Mood and Affect: Mood normal.         Behavior: Behavior normal.         Thought Content: Thought content normal.              Lab Results: I have reviewed the following results:  Results from last 7 days   Lab Units 03/09/25 1955   WBC Thousand/uL 7.07   HEMOGLOBIN g/dL 12.4   HEMATOCRIT % 34.8   PLATELETS Thousands/uL 293   SEGS PCT % 77*   LYMPHO PCT % 14   MONO PCT % 8   EOS PCT % 1     Results from last 7 days   Lab  Units 03/09/25 1955   SODIUM mmol/L 113*   POTASSIUM mmol/L 5.3   CHLORIDE mmol/L 84*   CO2 mmol/L 21   BUN mg/dL 10   CREATININE mg/dL 0.93   ANION GAP mmol/L 8   CALCIUM mg/dL 8.6   GLUCOSE RANDOM mg/dL 122             Lab Results   Component Value Date    HGBA1C 5.0 07/22/2020    HGBA1C 5.2 06/10/2020    HGBA1C 5.2 10/19/2014           Imaging Results Review: I reviewed radiology reports from this admission including: CT head.  Other Study Results Review: No additional pertinent studies reviewed.    Administrative Statements     Medical decision making: High  Diagnosis addressed: Illness/injury posing threat to life or bodily function with hyponatremia 113  Data:   Reviewed  CBC, CMP, CT head, troponin, magnesium  Ordered CBC, BMP, magnesium  Reviewed external notes from nephrology and PCP  Discussion of management with ER provider: Was given a liter of fluid           Risk:  Prescription drug management: Home meds, fluid restriction, possibility of IV fluids versus hypertonic saline based on laboratory data  Discussion for hospitalization with ER provider: Requires hospitalization for sodium of 113  CODE STATUS: Discussed with the patient, she wants to be a DNR      ** Please Note: This note has been constructed using a voice recognition system. **

## 2025-03-10 NOTE — QUICK NOTE
We are consulted for hyponatremia the sodium is 113 on presentation at 8 pm on March 9 currently it's 120 at 4 am on March 20. Will treat as chronic discontinue fluid restriction and give 400 cc of D5W over 4 hours. Repeat BMP again at 10 am. Full consultation to follow.

## 2025-03-10 NOTE — ASSESSMENT & PLAN NOTE
Acute exacerbation of chronic hyponatremia. Symptomatic, hypo-osmolar, severe on presentation. Serum sodium typically trends high 120s, low 130s due to SIADH followed by nephrology.     Garry sodium 113 mmol/L. Serum osm 252, urine sodium 40, urine osm 333, UA bland, TSH nl, CTH nl, CXR nl    Suspect acute exacerbation of SIADH +/- volume depletion and was initially treated with crystalloid. Headache, nausea potential precipitating factor. Also- tele potentially showing atrial flutter without documented hx of such.     Fluid restriction and IVF discontinued earlier due to increase in sodium to 120 mmol/L and given small D5W bolus. Sodium 119 mmol/L. Goal to maintain 119 mmol/L until 8 pm. Repeat labs later today ordered in addition to rest of workup plus repeat EKG.

## 2025-03-10 NOTE — ASSESSMENT & PLAN NOTE
Lab Results   Component Value Date    EGFR 66 03/10/2025    EGFR 61 03/10/2025    EGFR 59 03/10/2025    CREATININE 0.84 03/10/2025    CREATININE 0.90 03/10/2025    CREATININE 0.93 03/10/2025   Kidney function at baseline level of function. Outpt follow-up with nephrology already scheduled.

## 2025-03-10 NOTE — PLAN OF CARE
Problem: PAIN - ADULT  Goal: Verbalizes/displays adequate comfort level or baseline comfort level  Description: Interventions:  - Encourage patient to monitor pain and request assistance  - Assess pain using appropriate pain scale  - Administer analgesics based on type and severity of pain and evaluate response  - Implement non-pharmacological measures as appropriate and evaluate response  - Consider cultural and social influences on pain and pain management  - Notify physician/advanced practitioner if interventions unsuccessful or patient reports new pain  Outcome: Progressing     Problem: INFECTION - ADULT  Goal: Absence or prevention of progression during hospitalization  Description: INTERVENTIONS:  - Assess and monitor for signs and symptoms of infection  - Monitor lab/diagnostic results  - Monitor all insertion sites, i.e. indwelling lines, tubes, and drains  - [place] appropriate cooling/warming therapies per order  - Administer medications as ordered  - Instruct and encourage patient and family to use good hand hygiene technique  - Identify and instruct in appropriate isolation precautions for identified infection/condition  Outcome: Progressing     Problem: SAFETY ADULT  Goal: Patient will remain free of falls  Description: INTERVENTIONS:  - Educate patient/family on patient safety including physical limitations  - Instruct patient to call for assistance with activity   - Consult OT/PT to assist with strengthening/mobility   - Keep Call bell within reach  - Keep bed low and locked with side rails adjusted as appropriate  - Keep care items and personal belongings within reach  - Initiate and maintain comfort rounds  - Make Fall Risk Sign visible to staff  - Offer Toileting every 2 Hours, in advance of need  - Initiate/Maintain bed/chair alarm  - Obtain necessary fall risk management equipment:   - Apply yellow socks and bracelet for high fall risk patients  - Consider moving patient to room near nurses  station  Outcome: Progressing  Goal: Maintain or return to baseline ADL function  Description: INTERVENTIONS:  -  Assess patient's ability to carry out ADLs; assess patient's baseline for ADL function and identify physical deficits which impact ability to perform ADLs (bathing, care of mouth/teeth, toileting, grooming, dressing, etc.)  - Assess/evaluate cause of self-care deficits   - Assess range of motion  - Assess patient's mobility; develop plan if impaired  - Assess patient's need for assistive devices and provide as appropriate  - Encourage maximum independence but intervene and supervise when necessary  - Involve family in performance of ADLs  - Assess for home care needs following discharge   - Consider OT consult to assist with ADL evaluation and planning for discharge  - Provide patient education as appropriate  Outcome: Progressing  Goal: Maintains/Returns to pre admission functional level  Description: INTERVENTIONS:  - Perform AM-PAC 6 Click Basic Mobility/ Daily Activity assessment daily.  - Set and communicate daily mobility goal to care team and patient/family/caregiver.   - Collaborate with rehabilitation services on mobility goals if consulted  - Perform Range of Motion 3 times a day.  - Reposition patient every 2 hours.  - Dangle patient 3 times a day  - Stand patient 3 times a day  - Ambulate patient 3 times a day  - Out of bed to chair 3 times a day   - Out of bed for meals 3 times a day  - Out of bed for toileting  - Record patient progress and toleration of activity level   Outcome: Progressing     Problem: DISCHARGE PLANNING  Goal: Discharge to home or other facility with appropriate resources  Description: INTERVENTIONS:  - Identify barriers to discharge w/patient and caregiver  - Arrange for needed discharge resources and transportation as appropriate  - Identify discharge learning needs (meds, wound care, etc.)  - Refer to Case Management Department for coordinating discharge planning if  the patient needs post-hospital services based on physician/advanced practitioner order or complex needs related to functional status, cognitive ability, or social support system  Outcome: Progressing     Problem: Knowledge Deficit  Goal: Patient/family/caregiver demonstrates understanding of disease process, treatment plan, medications, and discharge instructions  Description: Complete learning assessment and assess knowledge base.  Interventions:  - Provide teaching at level of understanding  - Provide teaching via preferred learning methods  Outcome: Progressing     Problem: CARDIOVASCULAR - ADULT  Goal: Maintains optimal cardiac output and hemodynamic stability  Description: INTERVENTIONS:  - Monitor I/O, vital signs and rhythm  - Monitor for S/S and trends of decreased cardiac output  - Administer and titrate ordered vasoactive medications to optimize hemodynamic stability  - Assess quality of pulses, skin color and temperature  - Assess for signs of decreased coronary artery perfusion  - Instruct patient to report change in severity of symptoms  Outcome: Progressing  Goal: Absence of cardiac dysrhythmias or at baseline rhythm  Description: INTERVENTIONS:  - Continuous cardiac monitoring, vital signs, obtain 12 lead EKG if ordered  - Administer antiarrhythmic and heart rate control medications as ordered  - Monitor electrolytes and administer replacement therapy as ordered  Outcome: Progressing     Problem: METABOLIC, FLUID AND ELECTROLYTES - ADULT  Goal: Electrolytes maintained within normal limits  Description: INTERVENTIONS:  - Monitor labs and assess patient for signs and symptoms of electrolyte imbalances  - Administer electrolyte replacement as ordered  - Monitor response to electrolyte replacements, including repeat lab results as appropriate  - Instruct patient on fluid and nutrition as appropriate  Outcome: Progressing  Goal: Fluid balance maintained  Description: INTERVENTIONS:  - Monitor labs   -  Monitor I/O and WT  - Instruct patient on fluid and nutrition as appropriate  - Assess for signs & symptoms of volume excess or deficit  Outcome: Progressing

## 2025-03-11 LAB
ANION GAP SERPL CALCULATED.3IONS-SCNC: 5 MMOL/L (ref 4–13)
ANION GAP SERPL CALCULATED.3IONS-SCNC: 6 MMOL/L (ref 4–13)
ANION GAP SERPL CALCULATED.3IONS-SCNC: 7 MMOL/L (ref 4–13)
ATRIAL RATE: 277 BPM
ATRIAL RATE: 70 BPM
BUN SERPL-MCNC: 12 MG/DL (ref 5–25)
BUN SERPL-MCNC: 12 MG/DL (ref 5–25)
BUN SERPL-MCNC: 15 MG/DL (ref 5–25)
CALCIUM SERPL-MCNC: 8.6 MG/DL (ref 8.4–10.2)
CALCIUM SERPL-MCNC: 8.8 MG/DL (ref 8.4–10.2)
CALCIUM SERPL-MCNC: 8.9 MG/DL (ref 8.4–10.2)
CHLORIDE SERPL-SCNC: 91 MMOL/L (ref 96–108)
CHLORIDE SERPL-SCNC: 92 MMOL/L (ref 96–108)
CHLORIDE SERPL-SCNC: 92 MMOL/L (ref 96–108)
CO2 SERPL-SCNC: 24 MMOL/L (ref 21–32)
CO2 SERPL-SCNC: 25 MMOL/L (ref 21–32)
CO2 SERPL-SCNC: 26 MMOL/L (ref 21–32)
CORTIS AM PEAK SERPL-MCNC: 10.1 UG/DL (ref 6.7–22.6)
CREAT SERPL-MCNC: 0.84 MG/DL (ref 0.6–1.3)
CREAT SERPL-MCNC: 0.93 MG/DL (ref 0.6–1.3)
CREAT SERPL-MCNC: 1.01 MG/DL (ref 0.6–1.3)
GFR SERPL CREATININE-BSD FRML MDRD: 53 ML/MIN/1.73SQ M
GFR SERPL CREATININE-BSD FRML MDRD: 59 ML/MIN/1.73SQ M
GFR SERPL CREATININE-BSD FRML MDRD: 66 ML/MIN/1.73SQ M
GLUCOSE SERPL-MCNC: 88 MG/DL (ref 65–140)
GLUCOSE SERPL-MCNC: 95 MG/DL (ref 65–140)
GLUCOSE SERPL-MCNC: 97 MG/DL (ref 65–140)
MAGNESIUM SERPL-MCNC: 2 MG/DL (ref 1.9–2.7)
P AXIS: 52 DEGREES
P AXIS: 84 DEGREES
POTASSIUM SERPL-SCNC: 4.8 MMOL/L (ref 3.5–5.3)
POTASSIUM SERPL-SCNC: 4.9 MMOL/L (ref 3.5–5.3)
POTASSIUM SERPL-SCNC: 5 MMOL/L (ref 3.5–5.3)
PR INTERVAL: 336 MS
QRS AXIS: 22 DEGREES
QRS AXIS: 86 DEGREES
QRSD INTERVAL: 76 MS
QRSD INTERVAL: 84 MS
QT INTERVAL: 340 MS
QT INTERVAL: 414 MS
QTC INTERVAL: 429 MS
QTC INTERVAL: 447 MS
SODIUM SERPL-SCNC: 122 MMOL/L (ref 135–147)
SODIUM SERPL-SCNC: 123 MMOL/L (ref 135–147)
SODIUM SERPL-SCNC: 123 MMOL/L (ref 135–147)
T WAVE AXIS: 41 DEGREES
T WAVE AXIS: 81 DEGREES
URATE SERPL-MCNC: 2.7 MG/DL (ref 2–7.5)
VENTRICULAR RATE: 70 BPM
VENTRICULAR RATE: 96 BPM

## 2025-03-11 PROCEDURE — 99232 SBSQ HOSP IP/OBS MODERATE 35: CPT | Performed by: INTERNAL MEDICINE

## 2025-03-11 PROCEDURE — 93005 ELECTROCARDIOGRAM TRACING: CPT

## 2025-03-11 PROCEDURE — 80048 BASIC METABOLIC PNL TOTAL CA: CPT

## 2025-03-11 PROCEDURE — 99232 SBSQ HOSP IP/OBS MODERATE 35: CPT

## 2025-03-11 PROCEDURE — 99223 1ST HOSP IP/OBS HIGH 75: CPT | Performed by: INTERNAL MEDICINE

## 2025-03-11 PROCEDURE — 82533 TOTAL CORTISOL: CPT

## 2025-03-11 PROCEDURE — 84550 ASSAY OF BLOOD/URIC ACID: CPT

## 2025-03-11 PROCEDURE — 83735 ASSAY OF MAGNESIUM: CPT

## 2025-03-11 PROCEDURE — 93010 ELECTROCARDIOGRAM REPORT: CPT | Performed by: INTERNAL MEDICINE

## 2025-03-11 RX ORDER — DILTIAZEM HYDROCHLORIDE 5 MG/ML
10 INJECTION INTRAVENOUS ONCE
Status: COMPLETED | OUTPATIENT
Start: 2025-03-11 | End: 2025-03-11

## 2025-03-11 RX ORDER — LOSARTAN POTASSIUM 100 MG/1
100 TABLET ORAL EVERY MORNING
Qty: 90 TABLET | Refills: 1 | Status: SHIPPED | OUTPATIENT
Start: 2025-03-11

## 2025-03-11 RX ORDER — METOPROLOL TARTRATE 25 MG/1
25 TABLET, FILM COATED ORAL EVERY 12 HOURS SCHEDULED
Status: DISCONTINUED | OUTPATIENT
Start: 2025-03-11 | End: 2025-03-12 | Stop reason: HOSPADM

## 2025-03-11 RX ADMIN — APIXABAN 5 MG: 5 TABLET, FILM COATED ORAL at 17:03

## 2025-03-11 RX ADMIN — ZOLPIDEM TARTRATE 5 MG: 5 TABLET, COATED ORAL at 23:16

## 2025-03-11 RX ADMIN — PRAVASTATIN SODIUM 40 MG: 40 TABLET ORAL at 17:03

## 2025-03-11 RX ADMIN — SPIRONOLACTONE 50 MG: 25 TABLET ORAL at 08:22

## 2025-03-11 RX ADMIN — ENOXAPARIN SODIUM 40 MG: 40 INJECTION SUBCUTANEOUS at 08:22

## 2025-03-11 RX ADMIN — LOSARTAN POTASSIUM 100 MG: 50 TABLET, FILM COATED ORAL at 08:22

## 2025-03-11 RX ADMIN — ASPIRIN 81 MG: 81 TABLET, COATED ORAL at 08:21

## 2025-03-11 RX ADMIN — BUTALBITAL, ACETAMINOPHEN, AND CAFFEINE 1 TABLET: 50; 325; 40 TABLET, COATED ORAL at 17:03

## 2025-03-11 RX ADMIN — DILTIAZEM HYDROCHLORIDE 10 MG: 5 INJECTION, SOLUTION INTRAVENOUS at 12:58

## 2025-03-11 RX ADMIN — METOPROLOL TARTRATE 25 MG: 25 TABLET, FILM COATED ORAL at 22:13

## 2025-03-11 RX ADMIN — MAGNESIUM 64 MG (MAGNESIUM CHLORIDE) TABLET,DELAYED RELEASE 64 MG: at 12:13

## 2025-03-11 RX ADMIN — BUTALBITAL, ACETAMINOPHEN, AND CAFFEINE 1 TABLET: 50; 325; 40 TABLET, COATED ORAL at 00:37

## 2025-03-11 RX ADMIN — BUTALBITAL, ACETAMINOPHEN, AND CAFFEINE 1 TABLET: 50; 325; 40 TABLET, COATED ORAL at 23:21

## 2025-03-11 RX ADMIN — ZOLPIDEM TARTRATE 5 MG: 5 TABLET, COATED ORAL at 00:35

## 2025-03-11 RX ADMIN — PANTOPRAZOLE SODIUM 40 MG: 40 TABLET, DELAYED RELEASE ORAL at 06:36

## 2025-03-11 RX ADMIN — METOPROLOL TARTRATE 25 MG: 25 TABLET, FILM COATED ORAL at 12:13

## 2025-03-11 NOTE — ASSESSMENT & PLAN NOTE
Lab Results   Component Value Date    EGFR 59 03/11/2025    EGFR 66 03/11/2025    EGFR 60 03/10/2025    CREATININE 0.93 03/11/2025    CREATININE 0.84 03/11/2025    CREATININE 0.91 03/10/2025   Kidney function at baseline level of function. Outpt follow-up with nephrology already scheduled.

## 2025-03-11 NOTE — ASSESSMENT & PLAN NOTE
With acute on chronic symptomatic hypotension, Na 113 on admission  Appreciate nephrology consult  Treated with 1.8% saline sicne discontinued   Continue 1.5L FR   Repeat BMP this afternoon   Potentially stable for discharge tomorrow when Na stable at 125-126

## 2025-03-11 NOTE — ASSESSMENT & PLAN NOTE
Repeat EKG noting SVT, probable atrial tachycardia on 3/10. Cardiology consulted by primary team.

## 2025-03-11 NOTE — ASSESSMENT & PLAN NOTE
Lab Results   Component Value Date    EGFR 59 03/11/2025    EGFR 66 03/11/2025    EGFR 60 03/10/2025    CREATININE 0.93 03/11/2025    CREATININE 0.84 03/11/2025    CREATININE 0.91 03/10/2025

## 2025-03-11 NOTE — QUICK NOTE
ECG review with A flutter with accelerated rates  Cardiology consult is pending  Start metoprolol succinate 25mg q12  [id number] score 6, start Eliquis 5mg bid. Case management to price check  Telemetry monitoring

## 2025-03-11 NOTE — ASSESSMENT & PLAN NOTE
With rapid rates today, initiated on metoprolol succinate 25mg bid   Required IV Cardizem 10mg x 1 with symptomatic A flutter with palpitations & headache, rate up to 150s. Remained HD stable. Now rate controlled & symptoms resolved   Started on Eliquis today, case management to price check   Continue telemetry  Appreciate cardiology consult who are reviewing with EP

## 2025-03-11 NOTE — CONSULTS
Consultation - Cardiology   Jacklyn Garcia 80 y.o. female MRN: [id number]  Unit/Bed#: 401-01 Encounter: [id number]  Physician Requesting Consult: Mike Garzon DO  Reason for Consult / Principal Problem: SVT    Assessment:  Principal Problem:    Hyponatremia  Active Problems:    Grade II diastolic dysfunction    Hypercholesterolemia    Gastroesophageal reflux disease without esophagitis    Hyperaldosteronism (HCC)    Hypertensive nephrosclerosis    SIADH (syndrome of inappropriate ADH production) (HCC)    History of corneal transplant    Abnormal EKG    CKD stage 3a, GFR 45-59 ml/min (HCC)    Atrial fibrillation (HCC)    Atrial flutter (HCC)      Plan:  I will have EP review patient's present ECG's and Holter from 3/2024. Continue present meds for now.   She may need to be on AC instead of ASA.   Will follow.    History of Present Illness     HPI: Jacklyn Garcia is a 80 y.o. year old female who follow regularly with Dr Blas for MR, HTN, RÍOS. She also has hyperaldosteronism.   She was admitted 3/9/2025 with dizziness, weakness. Na+ level was 113.   ECG showed SVT with controlled HR. There appears to be distinct atrial activity suggesting atrial tachycardia.   She does admit to occasional palpitations / tachycardia at home.    She had a Holter Monitor done in 3/2024 which showed brief SVT.    ECHO 2/13/2025 - EF 60%, LA severely dilated, mild to moderate MR    Labs today  Na + 123           Review of Systems:    Alert awake oriented, comfortable, denies any complaints  No fevers chills nausea vomiting  No weakness, dizziness, seizures  no cough, shortness of breath, or wheezing  Denies any palpitations, chest pain, diaphoresis  Denies leg edema, pain or paresthesias  Denies any skin rashes  Denies abdominal pain, bloody stools, masses  Denies any depression or suicidal ideations      Historical Information   Past Medical History:   Diagnosis Date    Allergies     Anxiety     Arthritis     Benign  arteriolar nephrosclerosis     Bereavement     Cataract     Chronic kidney disease     Chronic kidney disease in type 2 diabetes mellitus (HCC)     Chronic kidney disease, stage 2 (mild)     Chronic pain disorder     Chronic pain syndrome     COPD (chronic obstructive pulmonary disease) ([facility])     DDD (degenerative disc disease), cervical     DDD (degenerative disc disease), lumbar     Degenerative joint disease of right hip     Depression     Diastolic dysfunction     DJD (degenerative joint disease), ankle and foot, right     DJD of right shoulder     Edema     Fracture of left calcaneus     Generalized anxiety disorder     GERD (gastroesophageal reflux disease)     Heart murmur     Hyperaldosteronism ([facility])     Hyperlipidemia     Hypertension     Hypertensive nephrosclerosis     Hypo-osmolality and hyponatremia     IBS (irritable bowel syndrome)     Insomnia     Migraines     Mitral regurgitation     MVP (mitral valve prolapse)     Obstructive sleep apnea syndrome     Osteoarthritis     Osteoarthritis     Pernicious anemia     Plantar fascia rupture     Rheumatoid arthritis ([facility])     Right knee DJD     S/P insertion of spinal cord stimulator     Shingles     Sinobronchitis     Sleep apnea     Status post total right knee replacement 07/07/2020    Stroke ([facility])     tia    TIA (transient ischemic attack)     Vertigo      Past Surgical History:   Procedure Laterality Date    APPENDECTOMY      CARPAL TUNNEL RELEASE Bilateral     COLONOSCOPY      several    COLONOSCOPY  04/02/2012    by Dr. Mckinley Ruvalcaba    CORNEAL TRANSPLANT Left 04/11/2022    DXA PROCEDURE (HISTORICAL)  10/26/2016, 9/17/2014, 6/18/2007    HAND SURGERY Right     ring finger has pin    HYSTERECTOMY      34    INSERT / REPLACE PERIPHERAL NEUROSTIMULATOR PULSE GENERATOR /  Left     buttocks    JOINT REPLACEMENT Left     knee    JOINT REPLACEMENT Right 04/2019    Hip    KNEE ARTHROPLASTY Left 01/15/2009    by Dr. Avery Leigh at [facility]     KNEE ARTHROSCOPY Bilateral     KNEE CARTILAGE SURGERY  09/10/2009    by Dr. Avery Leigh at [facility]     MAMMO (HISTORICAL)  12/10/2018, 10/30/2017, 10/26/2016    NASAL SEPTUM SURGERY  2008    NERVE BLOCK Left 02/20/2018    Procedure: BLOCK MEDIAL BRANCH - C4, C5, C6;  Surgeon: Jaycob Cruz MD;  Location: [facility];  Service: Pain Management     NERVE BLOCK Left 03/06/2018    Procedure: C4, C5, C6 MEDIAL BRANCH BLOCK;  Surgeon: Jaycob Cruz MD;  Location: [facility];  Service: Pain Management     NERVE BLOCK Left 2/20/2025    Procedure: BLOCK MEDIAL BRANCH Left C4-5 and C5-6;  Surgeon: Jaycob Cruz MD;  Location: [facility];  Service: Pain Management     AL ARTHRP ACETBLR/PROX FEM PROSTC AGRFT/ALGRFT Right 02/27/2019    Procedure: ARTHROPLASTY HIP TOTAL;  Surgeon: Bruno Pina DO;  Location:  MAIN OR;  Service: Orthopedics    AL ARTHRP KNE CONDYLE&PLATU MEDIAL&LAT COMPARTMENTS Right 06/24/2020    Procedure: KNEE TOTAL ARTHROPLASTY;  Surgeon: Bruno Pina DO;  Location:  MAIN OR;  Service: Orthopedics    AL COLONOSCOPY FLX DX W/COLLJ SPEC WHEN PFRMD N/A 04/24/2017    Procedure: FLEXIBLE COLONOSCOPY;  Surgeon: Mckinley Ruvalcaba MD;  Location: [facility];  Service: Colorectal    RADIOFREQUENCY ABLATION Left 04/17/2018    Procedure: ABLATION RADIO FREQUENCY (RFA) - C4, C5, C6;  Surgeon: Jaycob Cruz MD;  Location: [facility];  Service: Pain Management     REPLACEMENT TOTAL KNEE Left 07/05/2011    SINUS SURGERY      TONSILLECTOMY      TOTAL HIP ARTHROPLASTY Left     TRIGGER FINGER RELEASE      R 4th     UPPER GASTROINTESTINAL ENDOSCOPY  01/12/2007    with Donaldson dilatation by Dr. Misael Santiago at [facility]    WRIST SURGERY Right      Social History     Substance and Sexual Activity   Alcohol Use Yes    Comment: 2 beer a week     Social History     Substance and Sexual Activity   Drug Use Never     Social History     Tobacco Use   Smoking Status Former  "   Types: Cigarettes   Smokeless Tobacco Never   Tobacco Comments    quit 40 yrs ago     Family History: Family history non-contributory    Meds/Allergies   all current active meds have been reviewed  Allergies   Allergen Reactions    Procaine Hives    Adhesive [Medical Tape] Rash    Lidocaine Rash    Penicillins Rash       Objective   Vitals: Blood pressure 117/74, pulse 89, temperature (!) 97.2 °F (36.2 °C), resp. rate 18, height 5' 6\" (1.676 m), weight 75.7 kg (166 lb 14.2 oz), SpO2 96%., Body mass index is 26.94 kg/m².,   Orthostatic Blood Pressures      Flowsheet Row Most Recent Value   Blood Pressure 117/74 filed at 03/11/2025 0650   Patient Position - Orthostatic VS Lying filed at 03/10/2025 1501              Intake/Output Summary (Last 24 hours) at 3/11/2025 1030  Last data filed at 3/11/2025 0814  Gross per 24 hour   Intake 1760 ml   Output 1600 ml   Net 160 ml               Physical Exam:  GEN: Jacklyn Garcia appears well, alert and oriented x 3, pleasant and cooperative   HEENT: pupils equal, round, and reactive to light; extraocular muscles intact  NECK: supple, no carotid bruits   HEART: regular rhythm, normal S1 and S2, no murmurs, clicks, gallops or rubs   LUNGS: clear to auscultation bilaterally; no wheezes, rales, or rhonchi   ABDOMEN: normal bowel sounds, soft, no tenderness, no distention  EXTREMITIES: peripheral pulses normal; no clubbing, cyanosis, or edema  NEURO: no focal findings   SKIN: normal without suspicious lesions on exposed skin    Lab Results:   Admission on 03/09/2025   Component Date Value Ref Range Status    WBC 03/09/2025 7.07  4.31 - 10.16 Thousand/uL Final    RBC 03/09/2025 4.09  3.81 - 5.12 Million/uL Final    Hemoglobin 03/09/2025 12.4  11.5 - 15.4 g/dL Final    Hematocrit 03/09/2025 34.8  34.8 - 46.1 % Final    MCV 03/09/2025 85  82 - 98 fL Final    MCH 03/09/2025 30.3  26.8 - 34.3 pg Final    MCHC 03/09/2025 35.6  31.4 - 37.4 g/dL Final    RDW 03/09/2025 12.4  11.6 - " "15.1 % Final    MPV 03/09/2025 9.1  8.9 - 12.7 fL Final    Platelets 03/09/2025 293  149 - 390 Thousands/uL Final    nRBC 03/09/2025 0  /100 WBCs Final    Segmented % 03/09/2025 77 (H)  43 - 75 % Final    Immature Grans % 03/09/2025 0  0 - 2 % Final    Lymphocytes % 03/09/2025 14  14 - 44 % Final    Monocytes % 03/09/2025 8  4 - 12 % Final    Eosinophils Relative 03/09/2025 1  0 - 6 % Final    Basophils Relative 03/09/2025 0  0 - 1 % Final    Absolute Neutrophils 03/09/2025 5.40  1.85 - 7.62 Thousands/µL Final    Absolute Immature Grans 03/09/2025 0.03  0.00 - 0.20 Thousand/uL Final    Absolute Lymphocytes 03/09/2025 1.00  0.60 - 4.47 Thousands/µL Final    Absolute Monocytes 03/09/2025 0.57  0.17 - 1.22 Thousand/µL Final    Eosinophils Absolute 03/09/2025 0.05  0.00 - 0.61 Thousand/µL Final    Basophils Absolute 03/09/2025 0.02  0.00 - 0.10 Thousands/µL Final    Sodium 03/09/2025 113 (LL)  135 - 147 mmol/L Final    Potassium 03/09/2025 5.3  3.5 - 5.3 mmol/L Final    Chloride 03/09/2025 84 (L)  96 - 108 mmol/L Final    CO2 03/09/2025 21  21 - 32 mmol/L Final    ANION GAP 03/09/2025 8  4 - 13 mmol/L Final    BUN 03/09/2025 10  5 - 25 mg/dL Final    Creatinine 03/09/2025 0.93  0.60 - 1.30 mg/dL Final    Standardized to IDMS reference method    Glucose 03/09/2025 122  65 - 140 mg/dL Final    If the patient is fasting, the ADA then defines impaired fasting glucose as > 100 mg/dL and diabetes as > or equal to 123 mg/dL.    Calcium 03/09/2025 8.6  8.4 - 10.2 mg/dL Final    eGFR 03/09/2025 59  ml/min/1.73sq m Final    hs TnI 0hr 03/09/2025 3  \"Refer to ACS Flowchart\"- see link ng/L Final    Comment:                                              Initial (time 0) result  If >=50 ng/L, Myocardial injury suggested ;  Type of myocardial injury and treatment strategy  to be determined.  If 5-49 ng/L, a delta result at 2 hours and or 4 hours will be needed to further evaluate.  If <4 ng/L, and chest pain has been >3 hours since " "onset, patient may qualify for discharge based on the HEART score in the ED.  If <5 ng/L and <3hours since onset of chest pain, a delta result at 2 hours will be needed to further evaluate.    HS Troponin 99th Percentile URL of a Health Population=12 ng/L with a 95% Confidence Interval of 8-18 ng/L.    Second Troponin (time 2 hours)  If calculated delta >= 20 ng/L,  Myocardial injury suggested ; Type of myocardial injury and treatment strategy to be determined.  If 5-49 ng/L and the calculated delta is 5-19 ng/L, consult medical service for evaluation.  Continue evaluation for ischemia on ecg and other possible etiology and repeat hs troponin at 4 hours.  If delta                            is <5 ng/L at 2 hours, consider discharge based on risk stratification via the HEART score (if in ED), or ELBERT risk score in IP/Observation.    HS Troponin 99th Percentile URL of a Health Population=12 ng/L with a 95% Confidence Interval of 8-18 ng/L.    Magnesium 03/09/2025 2.0  1.9 - 2.7 mg/dL Final    Ventricular Rate 03/09/2025 60  BPM Final    Atrial Rate 03/09/2025 234  BPM Final    QRSD Interval 03/09/2025 84  ms Final    QT Interval 03/09/2025 406  ms Final    QTC Interval 03/09/2025 406  ms Final    QRS [place] 03/09/2025 82  degrees Final    T Wave [place] 03/09/2025 71  degrees Final    hs TnI 2hr 03/09/2025 3  \"Refer to ACS Flowchart\"- see link ng/L Final    Comment:                                              Initial (time 0) result  If >=50 ng/L, Myocardial injury suggested ;  Type of myocardial injury and treatment strategy  to be determined.  If 5-49 ng/L, a delta result at 2 hours and or 4 hours will be needed to further evaluate.  If <4 ng/L, and chest pain has been >3 hours since onset, patient may qualify for discharge based on the HEART score in the ED.  If <5 ng/L and <3hours since onset of chest pain, a delta result at 2 hours will be needed to further evaluate.    HS Troponin 99th Percentile URL of a Health " Population=12 ng/L with a 95% Confidence Interval of 8-18 ng/L.    Second Troponin (time 2 hours)  If calculated delta >= 20 ng/L,  Myocardial injury suggested ; Type of myocardial injury and treatment strategy to be determined.  If 5-49 ng/L and the calculated delta is 5-19 ng/L, consult medical service for evaluation.  Continue evaluation for ischemia on ecg and other possible etiology and repeat hs troponin at 4 hours.  If delta                            is <5 ng/L at 2 hours, consider discharge based on risk stratification via the HEART score (if in ED), or ELBERT risk score in IP/Observation.    HS Troponin 99th Percentile URL of a Health Population=12 ng/L with a 95% Confidence Interval of 8-18 ng/L.    Delta 2hr hsTnI 03/09/2025 0  <20 ng/L Final    TSH 3RD GENERATON 03/09/2025 2.058  0.450 - 4.500 uIU/mL Final    The recommended reference ranges for TSH during pregnancy are as follows:   First trimester 0.100 to 2.500 uIU/mL   Second trimester  0.200 to 3.000 uIU/mL   Third trimester 0.300 to 3.000 uIU/m    Note: Normal ranges may not apply to patients who are transgender, non-binary, or whose legal sex, sex at birth, and gender identity differ.  Adult TSH (3rd generation) reference range follows the recommended guidelines of the American [employer] Association, January, 2020.    Osmolality Serum 03/09/2025 252 (L)  282 - 298 mmol/KG Final    Color, UA 03/09/2025 Light Yellow   Final    Clarity, UA 03/09/2025 Clear   Final    Specific Gravity, UA 03/09/2025 1.015  1.005 - 1.030 Final    pH, UA 03/09/2025 7.0  4.5, 5.0, 5.5, 6.0, 6.5, 7.0, 7.5, 8.0 Final    Leukocytes, UA 03/09/2025 Negative  Negative Final    Nitrite, UA 03/09/2025 Negative  Negative Final    Protein, UA 03/09/2025 Negative  Negative mg/dl Final    Glucose, UA 03/09/2025 Negative  Negative mg/dl Final    Ketones, UA 03/09/2025 Negative  Negative mg/dl Final    Urobilinogen, UA 03/09/2025 <2.0  <2.0 mg/dl mg/dl Final    Bilirubin, UA 03/09/2025  Negative  Negative Final    Occult Blood, UA 03/09/2025 Negative  Negative Final    Sodium, Ur 03/09/2025 40.0  mmol/L Final    Osmolality, Ur 03/09/2025 333  250 - 900 mmol/KG Final    Sodium 03/10/2025 116 (LL)  135 - 147 mmol/L Final    Potassium 03/10/2025 4.5  3.5 - 5.3 mmol/L Final    Chloride 03/10/2025 84 (L)  96 - 108 mmol/L Final    CO2 03/10/2025 25  21 - 32 mmol/L Final    ANION GAP 03/10/2025 7  4 - 13 mmol/L Final    BUN 03/10/2025 14  5 - 25 mg/dL Final    Creatinine 03/10/2025 0.93  0.60 - 1.30 mg/dL Final    Standardized to IDMS reference method    Glucose 03/10/2025 129  65 - 140 mg/dL Final    If the patient is fasting, the ADA then defines impaired fasting glucose as > 100 mg/dL and diabetes as > or equal to 123 mg/dL.    Calcium 03/10/2025 8.8  8.4 - 10.2 mg/dL Final    eGFR 03/10/2025 59  ml/min/1.73sq m Final    Sodium 03/10/2025 120 (L)  135 - 147 mmol/L Final    Potassium 03/10/2025 4.3  3.5 - 5.3 mmol/L Final    Chloride 03/10/2025 87 (L)  96 - 108 mmol/L Final    CO2 03/10/2025 27  21 - 32 mmol/L Final    ANION GAP 03/10/2025 6  4 - 13 mmol/L Final    BUN 03/10/2025 11  5 - 25 mg/dL Final    Creatinine 03/10/2025 0.90  0.60 - 1.30 mg/dL Final    Standardized to IDMS reference method    Glucose 03/10/2025 92  65 - 140 mg/dL Final    If the patient is fasting, the ADA then defines impaired fasting glucose as > 100 mg/dL and diabetes as > or equal to 123 mg/dL.    Calcium 03/10/2025 8.9  8.4 - 10.2 mg/dL Final    eGFR 03/10/2025 61  ml/min/1.73sq m Final    Magnesium 03/10/2025 2.0  1.9 - 2.7 mg/dL Final    WBC 03/10/2025 7.28  4.31 - 10.16 Thousand/uL Final    RBC 03/10/2025 4.19  3.81 - 5.12 Million/uL Final    Hemoglobin 03/10/2025 12.5  11.5 - 15.4 g/dL Final    Hematocrit 03/10/2025 35.6  34.8 - 46.1 % Final    MCV 03/10/2025 85  82 - 98 fL Final    MCH 03/10/2025 29.8  26.8 - 34.3 pg Final    MCHC 03/10/2025 35.1  31.4 - 37.4 g/dL Final    RDW 03/10/2025 12.5  11.6 - 15.1 % Final     Platelets 03/10/2025 290  149 - 390 Thousands/uL Final    MPV 03/10/2025 9.1  8.9 - 12.7 fL Final    Sodium 03/10/2025 119 (LL)  135 - 147 mmol/L Final    Potassium 03/10/2025 3.9  3.5 - 5.3 mmol/L Final    Chloride 03/10/2025 87 (L)  96 - 108 mmol/L Final    CO2 03/10/2025 24  21 - 32 mmol/L Final    ANION GAP 03/10/2025 8  4 - 13 mmol/L Final    BUN 03/10/2025 11  5 - 25 mg/dL Final    Creatinine 03/10/2025 0.84  0.60 - 1.30 mg/dL Final    Standardized to IDMS reference method    Glucose 03/10/2025 108  65 - 140 mg/dL Final    If the patient is fasting, the ADA then defines impaired fasting glucose as > 100 mg/dL and diabetes as > or equal to 123 mg/dL.    Calcium 03/10/2025 8.8  8.4 - 10.2 mg/dL Final    eGFR 03/10/2025 66  ml/min/1.73sq m Final    Sodium 03/10/2025 117 (LL)  135 - 147 mmol/L Final    Potassium 03/10/2025 4.7  3.5 - 5.3 mmol/L Final    Chloride 03/10/2025 86 (L)  96 - 108 mmol/L Final    CO2 03/10/2025 25  21 - 32 mmol/L Final    ANION GAP 03/10/2025 6  4 - 13 mmol/L Final    BUN 03/10/2025 16  5 - 25 mg/dL Final    Creatinine 03/10/2025 1.00  0.60 - 1.30 mg/dL Final    Standardized to IDMS reference method    Glucose 03/10/2025 100  65 - 140 mg/dL Final    If the patient is fasting, the ADA then defines impaired fasting glucose as > 100 mg/dL and diabetes as > or equal to 123 mg/dL.    Calcium 03/10/2025 8.7  8.4 - 10.2 mg/dL Final    eGFR 03/10/2025 54  ml/min/1.73sq m Final    Sodium 03/10/2025 117 (LL)  135 - 147 mmol/L Final    Potassium 03/10/2025 4.8  3.5 - 5.3 mmol/L Final    Chloride 03/10/2025 87 (L)  96 - 108 mmol/L Final    CO2 03/10/2025 24  21 - 32 mmol/L Final    ANION GAP 03/10/2025 6  4 - 13 mmol/L Final    BUN 03/10/2025 15  5 - 25 mg/dL Final    Creatinine 03/10/2025 0.91  0.60 - 1.30 mg/dL Final    Standardized to IDMS reference method    Glucose 03/10/2025 99  65 - 140 mg/dL Final    If the patient is fasting, the ADA then defines impaired fasting glucose as > 100 mg/dL and  diabetes as > or equal to 123 mg/dL.    Calcium 03/10/2025 8.7  8.4 - 10.2 mg/dL Final    eGFR 03/10/2025 60  ml/min/1.73sq m Final    Sodium 03/11/2025 122 (L)  135 - 147 mmol/L Final    Potassium 03/11/2025 4.9  3.5 - 5.3 mmol/L Final    Chloride 03/11/2025 91 (L)  96 - 108 mmol/L Final    CO2 03/11/2025 24  21 - 32 mmol/L Final    ANION GAP 03/11/2025 7  4 - 13 mmol/L Final    BUN 03/11/2025 12  5 - 25 mg/dL Final    Creatinine 03/11/2025 0.84  0.60 - 1.30 mg/dL Final    Standardized to IDMS reference method    Glucose 03/11/2025 97  65 - 140 mg/dL Final    If the patient is fasting, the ADA then defines impaired fasting glucose as > 100 mg/dL and diabetes as > or equal to 123 mg/dL.    Calcium 03/11/2025 8.6  8.4 - 10.2 mg/dL Final    eGFR 03/11/2025 66  ml/min/1.73sq m Final    Sodium 03/11/2025 123 (L)  135 - 147 mmol/L Final    Potassium 03/11/2025 4.8  3.5 - 5.3 mmol/L Final    Chloride 03/11/2025 92 (L)  96 - 108 mmol/L Final    CO2 03/11/2025 25  21 - 32 mmol/L Final    ANION GAP 03/11/2025 6  4 - 13 mmol/L Final    BUN 03/11/2025 12  5 - 25 mg/dL Final    Creatinine 03/11/2025 0.93  0.60 - 1.30 mg/dL Final    Standardized to IDMS reference method    Glucose 03/11/2025 88  65 - 140 mg/dL Final    If the patient is fasting, the ADA then defines impaired fasting glucose as > 100 mg/dL and diabetes as > or equal to 123 mg/dL.    Calcium 03/11/2025 8.8  8.4 - 10.2 mg/dL Final    eGFR 03/11/2025 59  ml/min/1.73sq m Final    Magnesium 03/11/2025 2.0  1.9 - 2.7 mg/dL Final    Uric Acid 03/11/2025 2.7  2.0 - 7.5 mg/dL Final    Specimen collection should occur prior to Metamizole administration due to the potential for falsely depressed results.    Ventricular Rate 03/10/2025 70  BPM Final    Atrial Rate 03/10/2025 70  BPM Final    [place] Interval 03/10/2025 336  ms Final    QRSD Interval 03/10/2025 76  ms Final    QT Interval 03/10/2025 414  ms Final    QTC Interval 03/10/2025 447  ms Final    P Axis 03/10/2025 52   degrees Final    QRS [place] 03/10/2025 22  degrees Final    T Wave [place] 03/10/2025 41  degrees Final    Ventricular Rate 03/10/2025 96  BPM Final    Atrial Rate 03/10/2025 277  BPM Final    QRSD Interval 03/10/2025 84  ms Final    QT Interval 03/10/2025 340  ms Final    QTC Interval 03/10/2025 429  ms Final    P Axis 03/10/2025 84  degrees Final    QRS [place] 03/10/2025 86  degrees Final    T Wave [place] 03/10/2025 81  degrees Final           Results from last 7 days   Lab Units 03/10/25  0423 03/09/25 1955   WBC Thousand/uL 7.28 7.07   HEMOGLOBIN g/dL 12.5 12.4   HEMATOCRIT % 35.6 34.8   PLATELETS Thousands/uL 290 293         Results from last 7 days   Lab Units 03/11/25  0415 03/11/25  0018 03/10/25  2000   POTASSIUM mmol/L 4.8 4.9 4.8   CHLORIDE mmol/L 92* 91* 87*   CO2 mmol/L 25 24 24   BUN mg/dL 12 12 15   CREATININE mg/dL 0.93 0.84 0.91   CALCIUM mg/dL 8.8 8.6 8.7             Imaging: Results Review Statement: No pertinent imaging studies reviewed.                    Counseling / Coordination of Care  Total floor / unit time spent today 60 minutes.  Greater than 50% of total time was spent with the patient and / or family counseling and / or coordination of care.

## 2025-03-11 NOTE — PROGRESS NOTES
Progress Note - Nephrology   Name: Jacklyn Garcia 80 y.o. female I MRN: [id number]  Unit/Bed#: 401-01 I Date of Admission: 3/9/2025   Date of Service: 3/11/2025 I Hospital Day: 2    Assessment & Plan  Hyponatremia  Acute exacerbation of chronic hyponatremia. Symptomatic, hypo-osmolar, severe on presentation. Serum sodium typically trends high 120s, low 130s due to SIADH followed by nephrology. Follows with Dr. Cortes.    Garry sodium 113 mmol/L on 3/9. Serum osm 252, urine sodium 40, urine osm 333, UA bland, TSH nl, CTH nl, CXR nl    Suspect acute exacerbation of SIADH +/- volume depletion and was initially treated with crystalloid. Headache, nausea potential precipitating factor. Also- tele potentially showing atrial flutter without documented hx of such.     Sodium initially corrected from 113 on 3/9 to 120 on 3/10 and was given D5W bolus to slow correction. Sodium then decreased to 117 and was provided 1.8% saline on 3/10. Sodium increased to 122 and 1.8% saline was discontinued at 1AM on 3/11. Fluid restriction 1.5L. Sodium 123 mmol/L at 0415 on 3/11. Repeat labs later today ordered. Patient has chronic hyponatremia with baseline sodium 127-129 over the past 12 months.  Discussed with Dr. Cortes, ok to discharge patient if sodium is stable at 125-126, likely tomorrow. Patient is aware and agreeable.     SIADH (syndrome of inappropriate ADH production) (HCC)  On fluid restriction in the outpatient setting. Notably on PPI, ARB which may be contributing. Denies any adjustments or increasing hydration. Did have headache, nausea potentially exacerbating ADH secretion. Fluid restriction as above.   Hypertensive nephrosclerosis  Blood pressure appropriate. Most recent /74 on 3/11. Avoid thiazide and thiazide-like diuretics  Grade II diastolic dysfunction  Examines compensated. Continue low sodium diet. Avoid salt tablets.  Hyperaldosteronism (HCC)  Remains on 50 mg spironolactone daily with appropriate  blood pressure   Abnormal EKG  Repeat EKG noting SVT, probable atrial tachycardia on 3/10. Cardiology consulted by primary team.   CKD stage 3a, GFR 45-59 ml/min ([facility])  Lab Results   Component Value Date    EGFR 59 03/11/2025    EGFR 66 03/11/2025    EGFR 60 03/10/2025    CREATININE 0.93 03/11/2025    CREATININE 0.84 03/11/2025    CREATININE 0.91 03/10/2025   Kidney function at baseline level of function. Outpt follow-up with nephrology already scheduled.   Hypercholesterolemia    Gastroesophageal reflux disease without esophagitis    History of corneal transplant    Atrial flutter ([facility])  EKG noting aflutter with variable AV block. Continue management per hospitalist and cardiology.   Atrial fibrillation (HCC)      I have reviewed the nephrology recommendations including sodium trends with hospitalist and we are in agreement with renal plan including the information outlined above.     Subjective   Brief History of Admission - Jacklyn Garcia is a 80 y.o. female with chronic hyponatremia due to SIADH followed by Dr. Cortes, CKD 3a, HTN, hyperaldosteronism, grade II diastolic dysfunction, GERD, who presented to [facility] on 3/9 with dizziness, nausea, weakness found to have serum sodium 113 mmol/L. Of note, she was recently treated with Bactrim for a sinus infection. Endorsed nausea and headache on admission. Denied changes in fluid intake, recent illness or new medications.  Nephrology is following for hyponatremia. Patient is examined resting in bed, denies complaints. States she felt weak when she first came to the hospital but is feeling much better at this time.         Objective :  Temp:  [97.2 °F (36.2 °C)-97.6 °F (36.4 °C)] 97.2 °F (36.2 °C)  HR:  [67-93] 89  BP: ()/(45-78) 117/74  Resp:  [15-31] 18  SpO2:  [94 %-97 %] 96 %  O2 Device: None (Room air)    Current Weight: Weight - Scale: 75.7 kg (166 lb 14.2 oz)  First Weight: Weight - Scale: 76.2 kg (168 lb)  I/O         03/09 0701  03/10 0700 03/10  0701  03/11 0700 03/11 0701  03/12 0700    P.O. 240 1400     I.V. (mL/kg)  240 (3.2)     Total Intake(mL/kg) 240 (3.1) 1640 (21.7)     Urine (mL/kg/hr)  2500 (1.4)     Total Output  2500     Net +240 -860            Unmeasured Urine Occurrence 1 x            Physical Exam  Vitals and nursing note reviewed.   Constitutional:       General: She is not in acute distress.     Appearance: Normal appearance. She is well-developed and normal weight. She is not ill-appearing.   HENT:      Head: Normocephalic and atraumatic.      Right Ear: External ear normal.      Nose: Nose normal.      Mouth/Throat:      Mouth: Mucous membranes are moist.      Pharynx: Oropharynx is clear.   Eyes:      Extraocular Movements: Extraocular movements intact.      Conjunctiva/sclera: Conjunctivae normal.      Pupils: Pupils are equal, round, and reactive to light.   Cardiovascular:      Rate and Rhythm: Normal rate and regular rhythm.      Heart sounds: Normal heart sounds. No murmur heard.  Pulmonary:      Effort: Pulmonary effort is normal. No respiratory distress.      Breath sounds: Normal breath sounds.   Abdominal:      Palpations: Abdomen is soft.      Tenderness: There is no abdominal tenderness.   Musculoskeletal:         General: No swelling.      Cervical back: Neck supple.      Right lower leg: No edema.      Left lower leg: No edema.   Skin:     General: Skin is warm and dry.      Capillary Refill: Capillary refill takes less than 2 seconds.   Neurological:      General: No focal deficit present.      Mental Status: She is alert and oriented to person, place, and time.   Psychiatric:         Mood and Affect: Mood normal.         Behavior: Behavior normal.       Medications:    Current Facility-Administered Medications:     aspirin (ECOTRIN LOW STRENGTH) EC tablet 81 mg, 81 mg, Oral, Daily, DAYNE Condon, 81 mg at 03/10/25 0831    bacitracin-polymyxin b (POLYSPORIN) ophthalmic ointment, , Left Eye, Yoshi DIAZ  DAYNE López, Given at 03/10/25 2114    butalbital-acetaminophen-caffeine (FIORICET,ESGIC) -40 mg per tablet 1 tablet, 1 tablet, Oral, Q4H PRN, DAYNE Condon, 1 tablet at 03/11/25 0037    enoxaparin (LOVENOX) subcutaneous injection 40 mg, 40 mg, Subcutaneous, Daily, DAYNE Condon, 40 mg at 03/10/25 0834    fluorometholone (FML) 0.1 % ophthalmic suspension 1 drop, 1 drop, Left Eye, HS, DAYNE Condon, 1 drop at 03/10/25 2114    losartan (COZAAR) tablet 100 mg, 100 mg, Oral, QAM, DAYNE Condon, 100 mg at 03/10/25 0831    magnesium chloride (MAG64) EC tablet TBEC 64 mg, 64 mg, Oral, Daily, DAYNE Condon, 64 mg at 03/10/25 0831    pantoprazole (PROTONIX) EC tablet 40 mg, 40 mg, Oral, Early Morning, DAYNE Condon, 40 mg at 03/11/25 0636    pravastatin (PRAVACHOL) tablet 40 mg, 40 mg, Oral, Daily With Dinner, DAYNE Condon, 40 mg at 03/10/25 1636    Insert peripheral IV, , , Once **AND** sodium chloride (PF) 0.9 % injection 3 mL, 3 mL, Intravenous, Q1H PRN, DAYNE Condon    spironolactone (ALDACTONE) tablet 50 mg, 50 mg, Oral, Daily, DAYNE Condon, 50 mg at 03/10/25 0831    zolpidem (AMBIEN) tablet 5 mg, 5 mg, Oral, HS, DAYNE Condon, 5 mg at 03/11/25 0035      Lab Results: I have reviewed the following results:  Results from last 7 days   Lab Units 03/11/25  0415 03/11/25  0018 03/10/25  2000 03/10/25  1630 03/10/25  0952 03/10/25  0423 03/10/25  0010 03/09/25 1955   WBC Thousand/uL  --   --   --   --   --  7.28  --  7.07   HEMOGLOBIN g/dL  --   --   --   --   --  12.5  --  12.4   HEMATOCRIT %  --   --   --   --   --  35.6  --  34.8   PLATELETS Thousands/uL  --   --   --   --   --  290  --  293   POTASSIUM mmol/L 4.8 4.9 4.8 4.7 3.9 4.3 4.5 5.3   CHLORIDE mmol/L 92* 91* 87* 86* 87* 87* 84* 84*   CO2 mmol/L 25 24 24 25 24 27 25 21   BUN mg/dL 12 12  "15 16 11 11 14 10   CREATININE mg/dL 0.93 0.84 0.91 1.00 0.84 0.90 0.93 0.93   CALCIUM mg/dL 8.8 8.6 8.7 8.7 8.8 8.9 8.8 8.6   MAGNESIUM mg/dL 2.0  --   --   --   --  2.0  --  2.0       Administrative Statements     Portions of the record may have been created with voice recognition software. Occasional wrong word or \"sound a like\" substitutions may have occurred due to the inherent limitations of voice recognition software. Read the chart carefully and recognize, using context, where substitutions have occurred.If you have any questions, please contact the dictating provider.  "

## 2025-03-11 NOTE — PLAN OF CARE
Problem: PAIN - ADULT  Goal: Verbalizes/displays adequate comfort level or baseline comfort level  Description: Interventions:  - Encourage patient to monitor pain and request assistance  - Assess pain using appropriate pain scale  - Administer analgesics based on type and severity of pain and evaluate response  - Implement non-pharmacological measures as appropriate and evaluate response  - Consider cultural and social influences on pain and pain management  - Notify physician/advanced practitioner if interventions unsuccessful or patient reports new pain  Outcome: Progressing    Problem: CARDIOVASCULAR - ADULT  Goal: Maintains optimal cardiac output and hemodynamic stability  Description: INTERVENTIONS:  - Monitor I/O, vital signs and rhythm  - Monitor for S/S and trends of decreased cardiac output  - Administer and titrate ordered vasoactive medications to optimize hemodynamic stability  - Assess quality of pulses, skin color and temperature  - Assess for signs of decreased coronary artery perfusion  - Instruct patient to report change in severity of symptoms  Outcome: Progressing  Goal: Absence of cardiac dysrhythmias or at baseline rhythm  Description: INTERVENTIONS:  - Continuous cardiac monitoring, vital signs, obtain 12 lead EKG if ordered  - Administer antiarrhythmic and heart rate control medications as ordered  - Monitor electrolytes and administer replacement therapy as ordered  Outcome: Progressing     Problem: METABOLIC, FLUID AND ELECTROLYTES - ADULT  Goal: Electrolytes maintained within normal limits  Description: INTERVENTIONS:  - Monitor labs and assess patient for signs and symptoms of electrolyte imbalances  - Administer electrolyte replacement as ordered  - Monitor response to electrolyte replacements, including repeat lab results as appropriate  - Instruct patient on fluid and nutrition as appropriate  Outcome: Progressing  Goal: Fluid balance maintained  Description: INTERVENTIONS:  -  Monitor labs   - Monitor I/O and WT  - Instruct patient on fluid and nutrition as appropriate  - Assess for signs & symptoms of volume excess or deficit  Outcome: Progressing

## 2025-03-11 NOTE — PLAN OF CARE
Problem: PAIN - ADULT  Goal: Verbalizes/displays adequate comfort level or baseline comfort level  Description: Interventions:  - Encourage patient to monitor pain and request assistance  - Assess pain using appropriate pain scale  - Administer analgesics based on type and severity of pain and evaluate response  - Implement non-pharmacological measures as appropriate and evaluate response  - Consider cultural and social influences on pain and pain management  - Notify physician/advanced practitioner if interventions unsuccessful or patient reports new pain  Outcome: Progressing     Problem: INFECTION - ADULT  Goal: Absence or prevention of progression during hospitalization  Description: INTERVENTIONS:  - Assess and monitor for signs and symptoms of infection  - Monitor lab/diagnostic results  - Monitor all insertion sites, i.e. indwelling lines, tubes, and drains  - Monitor endotracheal if appropriate and nasal secretions for changes in amount and color  - [place] appropriate cooling/warming therapies per order  - Administer medications as ordered  - Instruct and encourage patient and family to use good hand hygiene technique  - Identify and instruct in appropriate isolation precautions for identified infection/condition  Outcome: Progressing     Problem: Knowledge Deficit  Goal: Patient/family/caregiver demonstrates understanding of disease process, treatment plan, medications, and discharge instructions  Description: Complete learning assessment and assess knowledge base.  Interventions:  - Provide teaching at level of understanding  - Provide teaching via preferred learning methods  Outcome: Progressing     Problem: CARDIOVASCULAR - ADULT  Goal: Maintains optimal cardiac output and hemodynamic stability  Description: INTERVENTIONS:  - Monitor I/O, vital signs and rhythm  - Monitor for S/S and trends of decreased cardiac output  - Administer and titrate ordered vasoactive medications to optimize hemodynamic  stability  - Assess quality of pulses, skin color and temperature  - Assess for signs of decreased coronary artery perfusion  - Instruct patient to report change in severity of symptoms  Outcome: Progressing  Goal: Absence of cardiac dysrhythmias or at baseline rhythm  Description: INTERVENTIONS:  - Continuous cardiac monitoring, vital signs, obtain 12 lead EKG if ordered  - Administer antiarrhythmic and heart rate control medications as ordered  - Monitor electrolytes and administer replacement therapy as ordered  Outcome: Progressing     Problem: METABOLIC, FLUID AND ELECTROLYTES - ADULT  Goal: Electrolytes maintained within normal limits  Description: INTERVENTIONS:  - Monitor labs and assess patient for signs and symptoms of electrolyte imbalances  - Administer electrolyte replacement as ordered  - Monitor response to electrolyte replacements, including repeat lab results as appropriate  - Instruct patient on fluid and nutrition as appropriate  Outcome: Progressing  Goal: Fluid balance maintained  Description: INTERVENTIONS:  - Monitor labs   - Monitor I/O and WT  - Instruct patient on fluid and nutrition as appropriate  - Assess for signs & symptoms of volume excess or deficit  Outcome: Progressing

## 2025-03-11 NOTE — ASSESSMENT & PLAN NOTE
On fluid restriction in the outpatient setting. Notably on PPI, ARB which may be contributing. Denies any adjustments or increasing hydration. Did have headache, nausea potentially exacerbating ADH secretion. Fluid restriction as above.

## 2025-03-11 NOTE — ASSESSMENT & PLAN NOTE
Acute exacerbation of chronic hyponatremia. Symptomatic, hypo-osmolar, severe on presentation. Serum sodium typically trends high 120s, low 130s due to SIADH followed by nephrology. Follows with Dr. Cortes.    Garry sodium 113 mmol/L on 3/9. Serum osm 252, urine sodium 40, urine osm 333, UA bland, TSH nl, CTH nl, CXR nl    Suspect acute exacerbation of SIADH +/- volume depletion and was initially treated with crystalloid. Headache, nausea potential precipitating factor. Also- tele potentially showing atrial flutter without documented hx of such.     Sodium initially corrected from 113 on 3/9 to 120 on 3/10 and was given D5W bolus to slow correction. Sodium then decreased to 117 and was provided 1.8% saline on 3/10. Sodium increased to 122 and 1.8% saline was discontinued at 1AM on 3/11. Fluid restriction 1.5L. Sodium 123 mmol/L at 0415 on 3/11. Repeat labs later today ordered. Patient has chronic hyponatremia with baseline sodium 127-129 over the past 12 months.  Discussed with Dr. Cortes, ok to discharge patient if sodium is stable at 125-126, likely tomorrow. Patient is aware and agreeable.

## 2025-03-11 NOTE — QUICK NOTE
Serum sodium level overnight noted to have improved from 117 up to 122 mmol/L.  Around 1 AM the 1.8% saline that was at a rate of 40 mL/hr was discontinued.  Fluid restriction was also made slightly more severe from 1.8 down to 1.5 L daily.  Can consider reinitiation of the 1.8% saline depending on the patient progresses from a clinical standpoint.    Kin Cortes

## 2025-03-11 NOTE — PROGRESS NOTES
Progress Note - Hospitalist   Name: Jacklyn Garcia 80 y.o. female I MRN: [id number]  Unit/Bed#: 401-01 I Date of Admission: 3/9/2025   Date of Service: 3/11/2025 I Hospital Day: 2    Assessment & Plan  Hyponatremia  With acute on chronic symptomatic hypotension, Na 113 on admission  Appreciate nephrology consult  Treated with 1.8% saline sicne discontinued   Continue 1.5L FR   Repeat BMP this afternoon   Potentially stable for discharge tomorrow when Na stable at 125-126   Atrial flutter (HCC)  With rapid rates today, initiated on metoprolol succinate 25mg bid   Required IV Cardizem 10mg x 1 with symptomatic A flutter with palpitations & headache, rate up to 150s. Remained HD stable. Now rate controlled & symptoms resolved   Started on Eliquis today, case management to price check   Continue telemetry  Appreciate cardiology consult who are reviewing with EP       SIADH (syndrome of inappropriate ADH production) (HCC)  Hx of, follows with Dr Cortes   Hypertensive nephrosclerosis  Continue with losartan and Aldactone  Hyperaldosteronism (HCC)  Continue Aldactone 50 mg daily  Grade II diastolic dysfunction  Continue with losartan and Aldactone  Gastroesophageal reflux disease without esophagitis  Continue PPI  Hypercholesterolemia  Continue statin  History of corneal transplant  Left eye corneal transplant, continue with Polysporin ointment and fluorometholone  Abnormal EKG  See plan of care above.  CKD stage 3a, GFR 45-59 ml/min (HCC)  Lab Results   Component Value Date    EGFR 59 03/11/2025    EGFR 66 03/11/2025    EGFR 60 03/10/2025    CREATININE 0.93 03/11/2025    CREATININE 0.84 03/11/2025    CREATININE 0.91 03/10/2025     Atrial fibrillation (HCC)      VTE Pharmacologic Prophylaxis: VTE Score: 3 Eliquis    Mobility:   Basic Mobility Inpatient Raw Score: 24  JH-HLM Goal: 8: Walk 250 feet or more  JH-HLM Achieved: 1: Laying in bed  JH-HLM Goal NOT achieved. Continue with multidisciplinary rounding and  encourage appropriate mobility to improve upon [facility] goals.    Patient Centered Rounds: I performed bedside rounds with nursing staff today.   Discussions with Specialists or Other Care Team Provider: case management    Education and Discussions with Family / Patient: at bedside    Current Length of Stay: 2 day(s)  Current Patient Status: Inpatient   Certification Statement: The patient will continue to require additional inpatient hospital stay due to hyponatremia, A flutter  Discharge Plan: Anticipate discharge in 24-48 hrs to home.    Code Status: Level 3 - DNAR and DNI    Subjective   Patient seen and examined this morning. No further nausea. Feels overall well. Re-examined later when noting a flutter with rates 120-150s. Patient complaining of palpitations and headache. BP checked and stable. Given 10mg IV Cardizem. Rates decreased back to 60-70s. Symptoms resolved.     Objective :  Temp:  [97.2 °F (36.2 °C)-97.6 °F (36.4 °C)] 97.2 °F (36.2 °C)  HR:  [67-94] 94  BP: ()/(49-74) 107/52  Resp:  [15-31] 18  SpO2:  [94 %-97 %] 96 %  O2 Device: None (Room air)    Body mass index is 26.94 kg/m².     Input and Output Summary (last 24 hours):     Intake/Output Summary (Last 24 hours) at 3/11/2025 1405  Last data filed at 3/11/2025 1207  Gross per 24 hour   Intake 1520 ml   Output 1600 ml   Net -80 ml       Physical Exam  Constitutional:       General: She is not in acute distress.  HENT:      Head: Normocephalic and atraumatic.   Cardiovascular:      Rate and Rhythm: Regular rhythm. Tachycardia present.   Pulmonary:      Effort: Pulmonary effort is normal. No respiratory distress.      Breath sounds: Normal breath sounds. No wheezing.   Abdominal:      General: Abdomen is flat. Bowel sounds are normal.      Palpations: Abdomen is soft.      Tenderness: There is no abdominal tenderness. There is no guarding.   Musculoskeletal:      Right lower leg: No edema.      Left lower leg: No edema.   Skin:     General: Skin  is warm and dry.   Neurological:      General: No focal deficit present.      Mental Status: She is alert and oriented to person, place, and time.           Lines/Drains:        Telemetry:  Telemetry Orders (From admission, onward)               24 Hour Telemetry Monitoring  Continuous x 24 Hours (Telem)        Expiring   Question:  Reason for 24 Hour Telemetry  Answer:  Arrhythmias requiring acute medical intervention / PPM or ICD malfunction                     Telemetry Reviewed: A flutter  Indication for Continued Telemetry Use: Arrthymias requiring medical therapy               Lab Results: I have reviewed the following results:   Results from last 7 days   Lab Units 03/10/25  0423 03/09/25  1955   WBC Thousand/uL 7.28 7.07   HEMOGLOBIN g/dL 12.5 12.4   HEMATOCRIT % 35.6 34.8   PLATELETS Thousands/uL 290 293   SEGS PCT %  --  77*   LYMPHO PCT %  --  14   MONO PCT %  --  8   EOS PCT %  --  1     Results from last 7 days   Lab Units 03/11/25  0415   SODIUM mmol/L 123*   POTASSIUM mmol/L 4.8   CHLORIDE mmol/L 92*   CO2 mmol/L 25   BUN mg/dL 12   CREATININE mg/dL 0.93   ANION GAP mmol/L 6   CALCIUM mg/dL 8.8   GLUCOSE RANDOM mg/dL 88                       Recent Cultures (last 7 days):         Imaging Results Review: I reviewed radiology reports from this admission including: chest xray and CT head.  Other Study Results Review: EKG was reviewed.     Last 24 Hours Medication List:     Current Facility-Administered Medications:     apixaban (ELIQUIS) tablet 5 mg, BID    aspirin (ECOTRIN LOW STRENGTH) EC tablet 81 mg, Daily    bacitracin-polymyxin b (POLYSPORIN) ophthalmic ointment, HS    butalbital-acetaminophen-caffeine (FIORICET,ESGIC) -40 mg per tablet 1 tablet, Q4H PRN    fluorometholone (FML) 0.1 % ophthalmic suspension 1 drop, HS    losartan (COZAAR) tablet 100 mg, QAM    magnesium chloride (MAG64) EC tablet TBEC 64 mg, Daily    metoprolol tartrate (LOPRESSOR) tablet 25 mg, Q12H NATALIE    pantoprazole  (PROTONIX) EC tablet 40 mg, Early Morning    pravastatin (PRAVACHOL) tablet 40 mg, Daily With Dinner    Insert peripheral IV, Once **AND** sodium chloride (PF) 0.9 % injection 3 mL, Q1H PRN    spironolactone (ALDACTONE) tablet 50 mg, Daily    zolpidem (AMBIEN) tablet 5 mg, HS    Administrative Statements   Today, Patient Was Seen By: Leslie Calvo PA-C      **Please Note: This note may have been constructed using a voice recognition system.**

## 2025-03-12 ENCOUNTER — TELEPHONE (OUTPATIENT)
Age: 80
End: 2025-03-12

## 2025-03-12 VITALS
BODY MASS INDEX: 26.71 KG/M2 | DIASTOLIC BLOOD PRESSURE: 49 MMHG | TEMPERATURE: 97.8 F | SYSTOLIC BLOOD PRESSURE: 127 MMHG | HEART RATE: 60 BPM | RESPIRATION RATE: 18 BRPM | HEIGHT: 66 IN | OXYGEN SATURATION: 98 % | WEIGHT: 166.23 LBS

## 2025-03-12 LAB
ANION GAP SERPL CALCULATED.3IONS-SCNC: 9 MMOL/L (ref 4–13)
ATRIAL RATE: 300 BPM
ATRIAL RATE: 300 BPM
BUN SERPL-MCNC: 14 MG/DL (ref 5–25)
CALCIUM SERPL-MCNC: 9.1 MG/DL (ref 8.4–10.2)
CHLORIDE SERPL-SCNC: 89 MMOL/L (ref 96–108)
CO2 SERPL-SCNC: 27 MMOL/L (ref 21–32)
CREAT SERPL-MCNC: 1.08 MG/DL (ref 0.6–1.3)
GFR SERPL CREATININE-BSD FRML MDRD: 49 ML/MIN/1.73SQ M
GLUCOSE SERPL-MCNC: 88 MG/DL (ref 65–140)
MAGNESIUM SERPL-MCNC: 2.1 MG/DL (ref 1.9–2.7)
P AXIS: 82 DEGREES
P AXIS: 87 DEGREES
POTASSIUM SERPL-SCNC: 5.1 MMOL/L (ref 3.5–5.3)
QRS AXIS: 74 DEGREES
QRS AXIS: 80 DEGREES
QRSD INTERVAL: 74 MS
QRSD INTERVAL: 82 MS
QT INTERVAL: 332 MS
QT INTERVAL: 352 MS
QTC INTERVAL: 474 MS
QTC INTERVAL: 475 MS
SODIUM SERPL-SCNC: 125 MMOL/L (ref 135–147)
T WAVE AXIS: 72 DEGREES
T WAVE AXIS: 86 DEGREES
VENTRICULAR RATE: 109 BPM
VENTRICULAR RATE: 123 BPM

## 2025-03-12 PROCEDURE — 99232 SBSQ HOSP IP/OBS MODERATE 35: CPT | Performed by: INTERNAL MEDICINE

## 2025-03-12 PROCEDURE — 99239 HOSP IP/OBS DSCHRG MGMT >30: CPT

## 2025-03-12 PROCEDURE — 83735 ASSAY OF MAGNESIUM: CPT

## 2025-03-12 PROCEDURE — 80048 BASIC METABOLIC PNL TOTAL CA: CPT

## 2025-03-12 PROCEDURE — 93010 ELECTROCARDIOGRAM REPORT: CPT | Performed by: INTERNAL MEDICINE

## 2025-03-12 RX ORDER — METOPROLOL TARTRATE 25 MG/1
25 TABLET, FILM COATED ORAL EVERY 12 HOURS SCHEDULED
Qty: 60 TABLET | Refills: 0 | Status: SHIPPED | OUTPATIENT
Start: 2025-03-12 | End: 2025-03-17

## 2025-03-12 RX ADMIN — APIXABAN 5 MG: 5 TABLET, FILM COATED ORAL at 08:04

## 2025-03-12 RX ADMIN — ASPIRIN 81 MG: 81 TABLET, COATED ORAL at 08:04

## 2025-03-12 RX ADMIN — PANTOPRAZOLE SODIUM 40 MG: 40 TABLET, DELAYED RELEASE ORAL at 06:17

## 2025-03-12 RX ADMIN — METOPROLOL TARTRATE 25 MG: 25 TABLET, FILM COATED ORAL at 08:04

## 2025-03-12 RX ADMIN — LOSARTAN POTASSIUM 100 MG: 50 TABLET, FILM COATED ORAL at 06:17

## 2025-03-12 RX ADMIN — MAGNESIUM 64 MG (MAGNESIUM CHLORIDE) TABLET,DELAYED RELEASE 64 MG: at 08:04

## 2025-03-12 RX ADMIN — SPIRONOLACTONE 50 MG: 25 TABLET ORAL at 08:04

## 2025-03-12 NOTE — NURSING NOTE
Patient was discharged in walking stable condition. IV was taken out and patients belongings were gathered. AVS was reviewed with patient with a verbal understanding given. Patients spouse came to transport patient to her home.

## 2025-03-12 NOTE — ASSESSMENT & PLAN NOTE
Required IV Cardizem 10mg x 1 with symptomatic A flutter with palpitations & headache, rate up to 150s on 3/11. Remained HD stable. Now rate controlled & symptoms resolved   Appreciate cardiology consult who d/w EP   Plan to continue metoprolol succinate 25mg bid   Continue Eliquis 5mg bid. Dc aspirin   Outpatient follow-up with cardiology

## 2025-03-12 NOTE — ASSESSMENT & PLAN NOTE
Acute exacerbation of chronic hyponatremia. Symptomatic, hypo-osmolar, severe on presentation.     Garry sodium 113 mmol/L on 3/9. Serum osm 252, urine sodium 40, urine osm 333, UA bland, TSH nl, CTH nl, CXR nl    Suspect acute exacerbation of SIADH +/- volume depletion and was initially treated with crystalloid. Headache, nausea potential precipitating factor. Also- tele potentially showing atrial flutter without documented hx of such.     --Na 125 today, from previous documentation this is acceptable, her baseline is 127-129. Follows with Dr Cortes as op. Yevgeniy FR 1.5 L. Will need repeat labs 48-72 hours and fu in 2 weeks.

## 2025-03-12 NOTE — TELEPHONE ENCOUNTER
Power srpague'sage 3/31 with Dr. Mathis in [place].     This was the only availability with Pt due to other appts in [place] and Pt prefers [place] location.

## 2025-03-12 NOTE — ASSESSMENT & PLAN NOTE
Lab Results   Component Value Date    EGFR 49 03/12/2025    EGFR 53 03/11/2025    EGFR 59 03/11/2025    CREATININE 1.08 03/12/2025    CREATININE 1.01 03/11/2025    CREATININE 0.93 03/11/2025     Estimated Creatinine Clearance: 44.5 mL/min (by C-G formula based on SCr of 1.08 mg/dL).

## 2025-03-12 NOTE — TELEPHONE ENCOUNTER
Patient called in to schedule an appointment. She was just in the hospital and they told her she should see Dr Cortes within 2 weeks. I do not show any appointments available in that time frame.    Please call patient back to schedule.

## 2025-03-12 NOTE — CASE MANAGEMENT
Case Management Discharge Planning Note    Patient name Jacklyn Garcia  Location /401-01 MRN [id number]  : 1945 Date 3/12/2025       Current Admission Date: 3/9/2025  Current Admission Diagnosis:Hyponatremia   Patient Active Problem List    Diagnosis Date Noted Date Diagnosed    Abnormal EKG 03/10/2025     CKD stage 3a, GFR 45-59 ml/min ([facility]) 03/10/2025     Atrial fibrillation ([facility]) 03/10/2025     Atrial flutter ([facility]) 03/10/2025     History of corneal transplant 2025     SIADH (syndrome of inappropriate ADH production) ([facility]) 2025     RÍOS (dyspnea on exertion) 2025     Bunion of great toe of left foot 2023     Hammer toe of left foot 2023     Obesity, morbid ([facility]) 2022     Stage 3a chronic kidney disease ([facility]) 2021     DDD (degenerative disc disease), lumbar      Lumbar spondylosis      Lumbar disc herniation      Pulmonary emphysema, unspecified emphysema type ([facility]) 2021     Hyperaldosteronism ([facility]) 10/09/2019     Hypertensive nephrosclerosis 10/09/2019     Insomnia, unspecified 2019     Primary osteoarthritis of right knee 2019     Osteoarthritis of right hip 2019     Gastroesophageal reflux disease without esophagitis 2019     Hyponatremia 2019     Chronic pain disorder 2018     DDD (degenerative disc disease), cervical 2018     Mitral regurgitation 12/10/2013     Grade II diastolic dysfunction 2013     Hypercholesterolemia 2013       LOS (days): 3  Geometric Mean LOS (GMLOS) (days): 3.4  Days to GMLOS:0.8     OBJECTIVE:  Risk of Unplanned Readmission Score: 13.11         Current admission status: Inpatient   Preferred Pharmacy:   [id number] - [place] - [address]  [address]  [place] [address]  Phone: [phone] Fax: [phone]    [facility] - [place], [place] - [address]  [address] [facility] [address]  Phone: [phone]  Fax: [phone]    Primary Care Provider: Ernie Wilkinson, DO    Primary Insurance: MEDICARE  Secondary Insurance: [employer]    DISCHARGE DETAILS:    Discharge planning discussed with:: patient        CM contacted family/caregiver?: Yes (spouse at bedside)  Were Treatment Team discharge recommendations reviewed with patient/caregiver?: Yes  Did patient/caregiver verbalize understanding of patient care needs?: Yes  Were patient/caregiver advised of the risks associated with not following Treatment Team discharge recommendations?: Yes    Contacts  Contact Method: In Person  Reason/Outcome: Discharge Planning    Requested Home Health Care         Is the patient interested in HHC at discharge?: No         Other Referral/Resources/Interventions Provided:  Financial Resources Provided: Prescription Discount Card    Would you like to participate in our [facility] service program?  : No - Declined    Treatment Team Recommendation: Home  Discharge Destination Plan:: Home     IMM Given (Date):: 03/12/25  IMM Given to:: Patient   Pt starting on eliquis. Price check-448$ until pt meets her deductible of 535$ then copay of 30.76. pt agreeable and provided with 30 free card. [facility] does have in stock. Pt also going to check with her mail order pharmacy to see if cheaper. No other discharge needs noted. Nurse to review AVS, all follow up providers listed. Spouse to transport home.

## 2025-03-12 NOTE — DISCHARGE SUMMARY
Discharge Summary - Hospitalist   Name: Jacklyn Garcia 80 y.o. female I MRN: [id number]  Unit/Bed#: 401-01 I Date of Admission: 3/9/2025   Date of Service: 3/12/2025 I Hospital Day: 3     Assessment & Plan  Hyponatremia  With acute on chronic symptomatic hypotension, Na 113 on admission  Na 125 today  Appreciate nephrology consult  Treated with 1.8% saline and fluid restriction over hospitalization  Appreciate nephrology consult who have cleared patient for discharge  To continue with 1.5L FR   BMP in 2-3 days & outpatient follow-up with nephrology in 2 week     Atrial flutter (HCC)  Required IV Cardizem 10mg x 1 with symptomatic A flutter with palpitations & headache, rate up to 150s on 3/11. Remained HD stable. Now rate controlled & symptoms resolved   Appreciate cardiology consult who d/w EP   Plan to continue metoprolol succinate 25mg bid   Continue Eliquis 5mg bid. Dc aspirin   Outpatient follow-up with cardiology   SIADH (syndrome of inappropriate ADH production) (HCC)  Hx of, follows with Dr Cortes   Recommended for follow-up in 2 weeks   Hypertensive nephrosclerosis  Continue with losartan and Aldactone  Hyperaldosteronism (HCC)  Continue Aldactone 50 mg daily  Grade II diastolic dysfunction  Continue with losartan and Aldactone  Gastroesophageal reflux disease without esophagitis  Continue PPI  Hypercholesterolemia  Continue statin  History of corneal transplant  Left eye corneal transplant, continue with Polysporin ointment and fluorometholone  Abnormal EKG  See plan of care above.  CKD stage 3a, GFR 45-59 ml/min (HCC)  Lab Results   Component Value Date    EGFR 49 03/12/2025    EGFR 53 03/11/2025    EGFR 59 03/11/2025    CREATININE 1.08 03/12/2025    CREATININE 1.01 03/11/2025    CREATININE 0.93 03/11/2025     Atrial fibrillation (HCC)       Medical Problems       Resolved Problems  Date Reviewed: 3/3/2025   None       Discharging Physician / Practitioner: Leslie Calvo PA-C  PCP:  Ernie Wilkinson DO  Admission Date:   Admission Orders (From admission, onward)       Ordered        03/09/25 2151  Inpatient Admission  Once                          Discharge Date: 03/12/25    Consultations During Hospital Stay:  Nephrology   Cardiology     Procedures Performed:   None     Significant Findings / Test Results:   CXR: No acute cardiopulmonary disease.   CT head wo: No acute intracranial abnormality.   New onset of typical atrial flutter     Incidental Findings:   None       Test Results Pending at Discharge (will require follow up):   None     Outpatient Tests Requested:  BMP in 2-3 days  Follow-up with nephrology in 2 weeks  Follow-up with cardiology  Follow-up with PCP within 1 week     Complications:  none    Reason for Admission: symptomatic acute on chronic hyponatremia    Hospital Course:   Jacklyn Garcia is a 80 y.o. female patient who originally presented to the hospital on 3/9/2025 due to symptomatic acute on chronic hyponatremia, sodium levels on admission 113.  Nephrology was consulted for further management.  She was treated with 1.8% saline and fluid restriction with gradual improvement to her baseline in the mid 120s and resolution of symptoms.    She was cleared for discharge from a nephrology standpoint with repeat BMP in 2 to 3 days.  For outpatient follow-up in 2 weeks.    Of note, patient found with new onset typical atrial flutter during admission.  Cardiologist consulted and had the EP reviewed patient's EKGs.  Did have 1 episode of rapid ventricular response on 3/11 which resolved with a one-time bolus of IV Cardizem.  She is to continue metoprolol tartrate 25 mg twice daily and Eliquis 5 mg twice daily.  Recommend to continue outpatient follow-up with her primary cardiologist.  Patient discharged home in stable addition.  To follow-up with her primary care provider within 1 week for transition of care appointment.          Please see above list of diagnoses and related  "plan for additional information.     Condition at Discharge: stable    Discharge Day Visit / Exam:   Subjective: Patient reports feeling well today.  No further headache, nausea, abdominal pain.  Denies any further chest pain or palpitations.  Has been ambulatory in her room without symptoms.  Vitals: Blood Pressure: (!) 127/49 (03/12/25 1127)  Pulse: 60 (03/12/25 1127)  Temperature: 97.8 °F (36.6 °C) (03/12/25 1127)  Temp Source: Oral (03/11/25 1540)  Respirations: 18 (03/12/25 1127)  Height: 5' 6\" (167.6 cm) (03/09/25 2308)  Weight - Scale: 75.4 kg (166 lb 3.6 oz) (03/12/25 0600)  SpO2: 98 % (03/12/25 1127)  Physical Exam  HENT:      Head: Normocephalic and atraumatic.   Cardiovascular:      Rate and Rhythm: Normal rate. Rhythm irregular.   Pulmonary:      Effort: Pulmonary effort is normal. No respiratory distress.      Breath sounds: Normal breath sounds.   Musculoskeletal:      Right lower leg: No edema.      Left lower leg: No edema.   Skin:     General: Skin is warm and dry.   Neurological:      Mental Status: She is alert.          Discussion with Family: Patient declined call to .     Discharge instructions/Information to patient and family:   See after visit summary for information provided to patient and family.      Provisions for Follow-Up Care:  See after visit summary for information related to follow-up care and any pertinent home health orders.      Mobility at time of Discharge:   Basic Mobility Inpatient Raw Score: 24  JH-HLM Goal: 8: Walk 250 feet or more  JH-HLM Achieved: 7: Walk 25 feet or more  HLM Goal achieved. Continue to encourage appropriate mobility. Seen ambulating in room      Disposition:   Home    Planned Readmission: none    Discharge Medications:  See after visit summary for reconciled discharge medications provided to patient and/or family.      Administrative Statements   Discharge Statement:  I have spent a total time of 35 minutes in caring for this patient on the " day of the visit/encounter. .    **Please Note: This note may have been constructed using a voice recognition system**

## 2025-03-12 NOTE — TELEPHONE ENCOUNTER
Please call patient for outpatient follow up with [facility]. This is a Dr Mathis patient.   Please order BMP for 2-3 days.

## 2025-03-12 NOTE — PROGRESS NOTES
Progress Note - Nephrology   Name: Jacklyn Garcia 80 y.o. female I MRN: [id number]  Unit/Bed#: 401-01 I Date of Admission: 3/9/2025   Date of Service: 3/12/2025 I Hospital Day: 3     Assessment & Plan  Hyponatremia  Acute exacerbation of chronic hyponatremia. Symptomatic, hypo-osmolar, severe on presentation.     Garry sodium 113 mmol/L on 3/9. Serum osm 252, urine sodium 40, urine osm 333, UA bland, TSH nl, CTH nl, CXR nl    Suspect acute exacerbation of SIADH +/- volume depletion and was initially treated with crystalloid. Headache, nausea potential precipitating factor. Also- tele potentially showing atrial flutter without documented hx of such.     --Na 125 today, from previous documentation this is acceptable, her baseline is 127-129. Follows with Dr Cortes as op. Yevgeniy FR 1.5 L. Will need repeat labs 48-72 hours and fu in 2 weeks.       SIADH (syndrome of inappropriate ADH production) (HCC)  See documentation as above   Hypertensive nephrosclerosis  BP okay, BB per cardiology.   Grade II diastolic dysfunction  Examines compensated. Continue low sodium diet. Avoid salt tablets.  Hyperaldosteronism (HCC)  Remains on 50 mg spironolactone daily with appropriate blood pressure   Abnormal EKG  Repeat EKG noting SVT, probable atrial tachycardia on 3/10. Cardiology consulted by primary team.   CKD stage 3a, GFR 45-59 ml/min (HCC)  Lab Results   Component Value Date    EGFR 49 03/12/2025    EGFR 53 03/11/2025    EGFR 59 03/11/2025    CREATININE 1.08 03/12/2025    CREATININE 1.01 03/11/2025    CREATININE 0.93 03/11/2025   Kidney function at baseline level of function. Outpt follow-up with nephrology already scheduled.   Hypercholesterolemia    Gastroesophageal reflux disease without esophagitis    History of corneal transplant    Atrial flutter (HCC)  EKG noting aflutter with variable AV block. Continue management per hospitalist and cardiology.   Atrial fibrillation (HCC)      I have reviewed the nephrology  recommendations including DC recommendation, with primary, and we are in agreement with renal plan including the information outlined above.     Subjective   Patient seen and examined today. Patient eager for DC. No complaints at present, cleared by cardio for DC per patient.  A complete 10 point review of systems was performed and is otherwise negative.     Objective :  Temp:  [97.5 °F (36.4 °C)-98.2 °F (36.8 °C)] 97.5 °F (36.4 °C)  HR:  [64-94] 64  BP: (107-134)/(52-60) 134/60  Resp:  [18-20] 20  SpO2:  [95 %-98 %] 98 %  O2 Device: None (Room air)    Current Weight: Weight - Scale: 75.4 kg (166 lb 3.6 oz)  First Weight: Weight - Scale: 76.2 kg (168 lb)  I/O         03/10 0701  03/11 0700 03/11 0701  03/12 0700 03/12 0701  03/13 0700    P.O. 1400 1270 240    I.V. (mL/kg) 240 (3.2)      Total Intake(mL/kg) 1640 (21.7) 1270 (16.8) 240 (3.2)    Urine (mL/kg/hr) 2500 (1.4) 900 (0.5) 400 (1.9)    Total Output 2500 900 400    Net -860 +370 -160                 Physical Exam  Vitals reviewed.   Constitutional:       General: She is not in acute distress.     Appearance: She is not ill-appearing.   HENT:      Head: Normocephalic and atraumatic.      Nose: Nose normal.      Mouth/Throat:      Mouth: Mucous membranes are moist.      Pharynx: Oropharynx is clear.   Cardiovascular:      Rate and Rhythm: Normal rate and regular rhythm.      Heart sounds:      No friction rub.   Pulmonary:      Breath sounds: Normal breath sounds. No wheezing or rales.   Abdominal:      Palpations: Abdomen is soft. There is no mass.      Tenderness: There is no abdominal tenderness. There is no guarding.   Musculoskeletal:      Right lower leg: No edema.      Left lower leg: No edema.   Skin:     Coloration: Skin is not jaundiced.      Findings: No bruising.   Neurological:      General: No focal deficit present.      Mental Status: She is alert and oriented to person, place, and time. Mental status is at baseline.      Cranial Nerves: No  cranial nerve deficit.   Psychiatric:         Mood and Affect: Mood normal.         Medications:    Current Facility-Administered Medications:     apixaban (ELIQUIS) tablet 5 mg, 5 mg, Oral, BID, Leslie Calvo PA-C, 5 mg at 03/12/25 0804    bacitracin-polymyxin b (POLYSPORIN) ophthalmic ointment, , Left Eye, HS, DAYNE Condon, Given at 03/10/25 2114    butalbital-acetaminophen-caffeine (FIORICET,ESGIC) -40 mg per tablet 1 tablet, 1 tablet, Oral, Q4H PRN, DAYNE Condon, 1 tablet at 03/11/25 2321    fluorometholone (FML) 0.1 % ophthalmic suspension 1 drop, 1 drop, Left Eye, HS, DAYNE Condon, 1 drop at 03/10/25 2114    losartan (COZAAR) tablet 100 mg, 100 mg, Oral, QAM, DAYNE Condon, 100 mg at 03/12/25 0617    magnesium chloride (MAG64) EC tablet TBEC 64 mg, 64 mg, Oral, Daily, DAYNE Condon, 64 mg at 03/12/25 0804    metoprolol tartrate (LOPRESSOR) tablet 25 mg, 25 mg, Oral, Q12H NATALIE, Leslie Calvo PA-C, 25 mg at 03/12/25 0804    pantoprazole (PROTONIX) EC tablet 40 mg, 40 mg, Oral, Early Morning, DAYNE Condon, 40 mg at 03/12/25 0617    pravastatin (PRAVACHOL) tablet 40 mg, 40 mg, Oral, Daily With Dinner, DAYNE Condon, 40 mg at 03/11/25 1703    Insert peripheral IV, , , Once **AND** sodium chloride (PF) 0.9 % injection 3 mL, 3 mL, Intravenous, Q1H PRN, DAYNE Condon    spironolactone (ALDACTONE) tablet 50 mg, 50 mg, Oral, Daily, DAYNE Condon, 50 mg at 03/12/25 0804    zolpidem (AMBIEN) tablet 5 mg, 5 mg, Oral, HS, DAYNE Condon, 5 mg at 03/11/25 2316      Lab Results: I have reviewed the following results:  Results from last 7 days   Lab Units 03/12/25  0450 03/11/25  1552 03/11/25  0415 03/11/25  0018 03/10/25  2000 03/10/25  1630 03/10/25  0952 03/10/25  0423 03/10/25  0010 03/09/25  1955   WBC Thousand/uL  --   --   --   --  "  --   --   --  7.28  --  7.07   HEMOGLOBIN g/dL  --   --   --   --   --   --   --  12.5  --  12.4   HEMATOCRIT %  --   --   --   --   --   --   --  35.6  --  34.8   PLATELETS Thousands/uL  --   --   --   --   --   --   --  290  --  293   POTASSIUM mmol/L 5.1 5.0 4.8 4.9 4.8 4.7 3.9 4.3   < > 5.3   CHLORIDE mmol/L 89* 92* 92* 91* 87* 86* 87* 87*   < > 84*   CO2 mmol/L 27 26 25 24 24 25 24 27   < > 21   BUN mg/dL 14 15 12 12 15 16 11 11   < > 10   CREATININE mg/dL 1.08 1.01 0.93 0.84 0.91 1.00 0.84 0.90   < > 0.93   CALCIUM mg/dL 9.1 8.9 8.8 8.6 8.7 8.7 8.8 8.9   < > 8.6   MAGNESIUM mg/dL 2.1  --  2.0  --   --   --   --  2.0  --  2.0    < > = values in this interval not displayed.       Administrative Statements     Portions of the record may have been created with voice recognition software. Occasional wrong word or \"sound a like\" substitutions may have occurred due to the inherent limitations of voice recognition software. Read the chart carefully and recognize, using context, where substitutions have occurred.If you have any questions, please contact the dictating provider.  "

## 2025-03-12 NOTE — ASSESSMENT & PLAN NOTE
With acute on chronic symptomatic hypotension, Na 113 on admission  Na 125 today  Appreciate nephrology consult  Treated with 1.8% saline and fluid restriction over hospitalization  Appreciate nephrology consult who have cleared patient for discharge  To continue with 1.5L FR   BMP in 2-3 days & outpatient follow-up with nephrology in 2 week

## 2025-03-12 NOTE — PLAN OF CARE
Problem: PAIN - ADULT  Goal: Verbalizes/displays adequate comfort level or baseline comfort level  Description: Interventions:  - Encourage patient to monitor pain and request assistance  - Assess pain using appropriate pain scale  - Administer analgesics based on type and severity of pain and evaluate response  - Implement non-pharmacological measures as appropriate and evaluate response  - Consider cultural and social influences on pain and pain management  - Notify physician/advanced practitioner if interventions unsuccessful or patient reports new pain  Outcome: Progressing     Problem: INFECTION - ADULT  Goal: Absence or prevention of progression during hospitalization  Description: INTERVENTIONS:  - Assess and monitor for signs and symptoms of infection  - Monitor lab/diagnostic results  - Monitor all insertion sites, i.e. indwelling lines, tubes, and drains  - Monitor endotracheal if appropriate and nasal secretions for changes in amount and color  - [place] appropriate cooling/warming therapies per order  - Administer medications as ordered  - Instruct and encourage patient and family to use good hand hygiene technique  - Identify and instruct in appropriate isolation precautions for identified infection/condition  Outcome: Progressing     Problem: DISCHARGE PLANNING  Goal: Discharge to home or other facility with appropriate resources  Description: INTERVENTIONS:  - Identify barriers to discharge w/patient and caregiver  - Arrange for needed discharge resources and transportation as appropriate  - Identify discharge learning needs (meds, wound care, etc.)  - Arrange for interpretive services to assist at discharge as needed  - Refer to Case Management Department for coordinating discharge planning if the patient needs post-hospital services based on physician/advanced practitioner order or complex needs related to functional status, cognitive ability, or social support system  Outcome: Progressing      Problem: Knowledge Deficit  Goal: Patient/family/caregiver demonstrates understanding of disease process, treatment plan, medications, and discharge instructions  Description: Complete learning assessment and assess knowledge base.  Interventions:  - Provide teaching at level of understanding  - Provide teaching via preferred learning methods  Outcome: Progressing     Problem: CARDIOVASCULAR - ADULT  Goal: Maintains optimal cardiac output and hemodynamic stability  Description: INTERVENTIONS:  - Monitor I/O, vital signs and rhythm  - Monitor for S/S and trends of decreased cardiac output  - Administer and titrate ordered vasoactive medications to optimize hemodynamic stability  - Assess quality of pulses, skin color and temperature  - Assess for signs of decreased coronary artery perfusion  - Instruct patient to report change in severity of symptoms  Outcome: Progressing  Goal: Absence of cardiac dysrhythmias or at baseline rhythm  Description: INTERVENTIONS:  - Continuous cardiac monitoring, vital signs, obtain 12 lead EKG if ordered  - Administer antiarrhythmic and heart rate control medications as ordered  - Monitor electrolytes and administer replacement therapy as ordered  Outcome: Progressing     Problem: METABOLIC, FLUID AND ELECTROLYTES - ADULT  Goal: Electrolytes maintained within normal limits  Description: INTERVENTIONS:  - Monitor labs and assess patient for signs and symptoms of electrolyte imbalances  - Administer electrolyte replacement as ordered  - Monitor response to electrolyte replacements, including repeat lab results as appropriate  - Instruct patient on fluid and nutrition as appropriate  Outcome: Progressing  Goal: Fluid balance maintained  Description: INTERVENTIONS:  - Monitor labs   - Monitor I/O and WT  - Instruct patient on fluid and nutrition as appropriate  - Assess for signs & symptoms of volume excess or deficit  Outcome: Progressing

## 2025-03-12 NOTE — ASSESSMENT & PLAN NOTE
Lab Results   Component Value Date    EGFR 49 03/12/2025    EGFR 53 03/11/2025    EGFR 59 03/11/2025    CREATININE 1.08 03/12/2025    CREATININE 1.01 03/11/2025    CREATININE 0.93 03/11/2025

## 2025-03-12 NOTE — ASSESSMENT & PLAN NOTE
Lab Results   Component Value Date    EGFR 49 03/12/2025    EGFR 53 03/11/2025    EGFR 59 03/11/2025    CREATININE 1.08 03/12/2025    CREATININE 1.01 03/11/2025    CREATININE 0.93 03/11/2025   Kidney function at baseline level of function. Outpt follow-up with nephrology already scheduled.

## 2025-03-12 NOTE — PROGRESS NOTES
Progress Note - Cardiology   Name: Jacklyn Garcia 80 y.o. female I MRN: [id number]  Unit/Bed#: 401-01 I Date of Admission: 3/9/2025   Date of Service: 3/12/2025 I Hospital Day: 3     Assessment & Plan  Atrial flutter (HCC)    Reviewed ECGs with EP.      Atypical Atrial Flutter    Recommend:    Eliquis 5 mg BID  D/C ASA  Agree with Lopressor 25 mg BID  F/up with Dr Cottrell as outpatient      Subjective   She feels back to normal and is ambulating in her room with no symptoms.   /60  HR 64 - 94    Na+ 125  K+ 5.1  BUN 14  Creatinine 1.08    Objective :  Temp:  [97.5 °F (36.4 °C)-98.2 °F (36.8 °C)] 97.5 °F (36.4 °C)  HR:  [64-94] 64  BP: (107-134)/(52-60) 134/60  Resp:  [18-20] 20  SpO2:  [95 %-98 %] 98 %  O2 Device: None (Room air)  Orthostatic Blood Pressures      Flowsheet Row Most Recent Value   Blood Pressure 134/60 filed at 03/12/2025 0615   Patient Position - Orthostatic VS Lying filed at 03/11/2025 1540          First Weight: Weight - Scale: 76.2 kg (168 lb) (03/09/25 1914)  Vitals:    03/11/25 0600 03/12/25 0600   Weight: 75.7 kg (166 lb 14.2 oz) 75.4 kg (166 lb 3.6 oz)     Physical Exam  GEN:  appears well, alert and oriented x 3, pleasant and cooperative   HEENT: pupils equal, round, and reactive to light; extraocular muscles intact  NECK: supple, no carotid bruits   HEART: irregular, normal S1 and S2, no murmurs, clicks, gallops or rubs   LUNGS: clear to auscultation bilaterally; no wheezes, rales, or rhonchi   ABDOMEN: normal bowel sounds, soft, no tenderness, no distention  EXTREMITIES: peripheral pulses normal; no clubbing, cyanosis, or edema  NEURO: no focal findings   SKIN: normal without suspicious lesions on exposed skin    Lab Results: I have reviewed the following results:    Results from last 7 days   Lab Units 03/12/25  0450 03/11/25  1552 03/11/25  0415   POTASSIUM mmol/L 5.1 5.0 4.8   CHLORIDE mmol/L 89* 92* 92*   CO2 mmol/L 27 26 25   BUN mg/dL 14 15 12   CREATININE mg/dL 1.08 1.01  0.93   CALCIUM mg/dL 9.1 8.9 8.8         Lab Results   Component Value Date    HGBA1C 5.0 07/22/2020     Lab Results   Component Value Date    TROPONINI <0.02 02/06/2019       Imaging Results Review: No pertinent imaging studies reviewed.  Other Study Results Review: No additional pertinent studies reviewed.

## 2025-03-12 NOTE — ASSESSMENT & PLAN NOTE
Reviewed ECGs with EP.      Atypical Atrial Flutter    Recommend:    Eliquis 5 mg BID  D/C ASA  Agree with Lopressor 25 mg BID  F/up with Dr Cottrell as outpatient

## 2025-03-12 NOTE — DISCHARGE INSTR - AVS FIRST PAGE
Continue to follow a 1.5 L fluid restriction daily.  Nephrology would like repeat lab work in the next 2 to 3 days to monitor your sodium level.  They also recommend outpatient follow-up in their office in 2 weeks which will be arranged  Encourage higher protein intake as able, can try bars or shakes if remaining within fluid restriction  Discontinuous bisoprolol.  Start metoprolol to tartrate 25 mg twice daily for rate control of the atrial flutter  Discontinue aspirin. Start Eliquis 5mg twice daily for stroke risk reduction with atrial flutter  Recommend outpatient follow-up with your primary cardiologist.   Follow-up with your PCP within 1 week for transition of care appointment

## 2025-03-12 NOTE — NURSING NOTE
Pt found to have home eye drops at bedside when nursing came in with eye drops from pharmacy. Pt states already took drops from home. At this time pt refusing to give medication to nursing staff after nursing made pt aware medication is not allowed at bedside and should not be taken while at hospital.     Nursing supervisor and provider Santhosh made aware of situation via [email or web address].

## 2025-03-13 ENCOUNTER — HOSPITAL ENCOUNTER (OUTPATIENT)
Dept: NON INVASIVE DIAGNOSTICS | Facility: HOSPITAL | Age: 80
Discharge: HOME | End: 2025-03-13
Attending: INTERNAL MEDICINE
Payer: COMMERCIAL

## 2025-03-13 ENCOUNTER — APPOINTMENT (OUTPATIENT)
Dept: LAB | Facility: HOSPITAL | Age: 80
End: 2025-03-13
Payer: MEDICARE

## 2025-03-13 ENCOUNTER — RESULTS FOLLOW-UP (OUTPATIENT)
Dept: NEPHROLOGY | Facility: CLINIC | Age: 80
End: 2025-03-13

## 2025-03-13 DIAGNOSIS — I05.1 RHEUMATIC MITRAL REGURGITATION: ICD-10-CM

## 2025-03-13 DIAGNOSIS — I12.9 HYPERTENSIVE NEPHROSCLEROSIS, STAGE 1 THROUGH STAGE 4 OR UNSPECIFIED CHRONIC KIDNEY DISEASE: ICD-10-CM

## 2025-03-13 DIAGNOSIS — N18.31 STAGE 3A CHRONIC KIDNEY DISEASE (HCC): ICD-10-CM

## 2025-03-13 DIAGNOSIS — E22.2 SIADH (SYNDROME OF INAPPROPRIATE ADH PRODUCTION) (HCC): ICD-10-CM

## 2025-03-13 DIAGNOSIS — E87.1 HYPONATREMIA: ICD-10-CM

## 2025-03-13 LAB
ALBUMIN SERPL BCG-MCNC: 4.4 G/DL (ref 3.5–5)
ALP SERPL-CCNC: 99 U/L (ref 34–104)
ALT SERPL W P-5'-P-CCNC: 18 U/L (ref 7–52)
ANION GAP SERPL CALCULATED.3IONS-SCNC: 7 MMOL/L (ref 4–13)
AST SERPL W P-5'-P-CCNC: 18 U/L (ref 13–39)
BACTERIA UR QL AUTO: ABNORMAL /HPF
BILIRUB SERPL-MCNC: 0.48 MG/DL (ref 0.2–1)
BILIRUB UR QL STRIP: NEGATIVE
BUN SERPL-MCNC: 16 MG/DL (ref 5–25)
CALCIUM SERPL-MCNC: 9.3 MG/DL (ref 8.4–10.2)
CHLORIDE SERPL-SCNC: 91 MMOL/L (ref 96–108)
CLARITY UR: CLEAR
CO2 SERPL-SCNC: 28 MMOL/L (ref 21–32)
COLOR UR: YELLOW
CREAT SERPL-MCNC: 1.07 MG/DL (ref 0.6–1.3)
CREAT UR-MCNC: 89.6 MG/DL
GFR SERPL CREATININE-BSD FRML MDRD: 49 ML/MIN/1.73SQ M
GLUCOSE P FAST SERPL-MCNC: 95 MG/DL (ref 65–99)
GLUCOSE UR STRIP-MCNC: NEGATIVE MG/DL
HGB UR QL STRIP.AUTO: NEGATIVE
KETONES UR STRIP-MCNC: NEGATIVE MG/DL
LEUKOCYTE ESTERASE UR QL STRIP: ABNORMAL
MAGNESIUM SERPL-MCNC: 2 MG/DL (ref 1.9–2.7)
MICROALBUMIN UR-MCNC: 13 MG/L
MICROALBUMIN/CREAT 24H UR: 15 MG/G CREATININE (ref 0–30)
NITRITE UR QL STRIP: NEGATIVE
NON-SQ EPI CELLS URNS QL MICRO: ABNORMAL /HPF
OSMOLALITY UR: 350 MMOL/KG (ref 250–900)
PH UR STRIP.AUTO: 8 [PH]
POTASSIUM SERPL-SCNC: 4.6 MMOL/L (ref 3.5–5.3)
PROT SERPL-MCNC: 6.7 G/DL (ref 6.4–8.4)
PROT UR STRIP-MCNC: NEGATIVE MG/DL
RBC #/AREA URNS AUTO: ABNORMAL /HPF
SODIUM SERPL-SCNC: 126 MMOL/L (ref 135–147)
SP GR UR STRIP.AUTO: 1.01 (ref 1–1.03)
UROBILINOGEN UR STRIP-ACNC: <2 MG/DL
WBC #/AREA URNS AUTO: ABNORMAL /HPF

## 2025-03-13 PROCEDURE — 93922 UPR/L XTREMITY ART 2 LEVELS: CPT

## 2025-03-13 PROCEDURE — 93922 UPR/L XTREMITY ART 2 LEVELS: CPT | Performed by: SURGERY

## 2025-03-13 PROCEDURE — 80053 COMPREHEN METABOLIC PANEL: CPT

## 2025-03-13 PROCEDURE — 82043 UR ALBUMIN QUANTITATIVE: CPT

## 2025-03-13 PROCEDURE — 36415 COLL VENOUS BLD VENIPUNCTURE: CPT

## 2025-03-13 PROCEDURE — 83735 ASSAY OF MAGNESIUM: CPT

## 2025-03-13 PROCEDURE — 93880 EXTRACRANIAL BILAT STUDY: CPT | Performed by: SURGERY

## 2025-03-13 PROCEDURE — 93979 VASCULAR STUDY: CPT | Performed by: SURGERY

## 2025-03-13 PROCEDURE — 82570 ASSAY OF URINE CREATININE: CPT

## 2025-03-13 PROCEDURE — 81001 URINALYSIS AUTO W/SCOPE: CPT

## 2025-03-13 PROCEDURE — 83935 ASSAY OF URINE OSMOLALITY: CPT

## 2025-03-13 NOTE — TELEPHONE ENCOUNTER
I called and left a VM for Jacklyn to contact the office to discuss the following message:      ----- Message from Kin Cortes DO sent at 3/13/2025  1:39 PM EDT -----  Please ask her if she is feeling lightheaded like she is going to pass out, or if she is feeling like the room is spinning around her like vertigo.  This is something that should be discussed with the heart doctor, please make sure that she contacts   them and specifically states whether she feels like she is going to pass out or if she is having vertigo type symptoms.

## 2025-03-13 NOTE — TELEPHONE ENCOUNTER
Patient called stating she was put on Eliquis 5 mg twice daily while in the hospital. She states when they gave her the medication she was always in bed. Now that she is home, she took the first dose at 8 AM and she felt dizzy after taking it.   Please advise

## 2025-03-14 ENCOUNTER — TRANSITIONAL CARE MANAGEMENT (OUTPATIENT)
Dept: FAMILY MEDICINE CLINIC | Facility: CLINIC | Age: 80
End: 2025-03-14

## 2025-03-14 NOTE — TELEPHONE ENCOUNTER
Patient called back, informed her of provider's message. She verbalized understanding, she will contact her cardiologist.

## 2025-03-16 ENCOUNTER — HOSPITAL ENCOUNTER (OUTPATIENT)
Facility: HOSPITAL | Age: 80
Setting detail: OBSERVATION
Discharge: HOME | End: 2025-03-17
Attending: EMERGENCY MEDICINE | Admitting: INTERNAL MEDICINE
Payer: MEDICARE

## 2025-03-16 ENCOUNTER — APPOINTMENT (EMERGENCY)
Dept: RADIOLOGY | Facility: HOSPITAL | Age: 80
End: 2025-03-16
Payer: MEDICARE

## 2025-03-16 DIAGNOSIS — I48.92 ATRIAL FLUTTER, UNSPECIFIED TYPE (HCC): Primary | ICD-10-CM

## 2025-03-16 DIAGNOSIS — E87.1 HYPONATREMIA: ICD-10-CM

## 2025-03-16 DIAGNOSIS — R79.89 ELEVATED TROPONIN I LEVEL: ICD-10-CM

## 2025-03-16 DIAGNOSIS — R07.89 OTHER CHEST PAIN: ICD-10-CM

## 2025-03-16 LAB
2HR DELTA HS TROPONIN: 24 NG/L
ALBUMIN SERPL BCG-MCNC: 4.3 G/DL (ref 3.5–5)
ALP SERPL-CCNC: 95 U/L (ref 34–104)
ALT SERPL W P-5'-P-CCNC: 15 U/L (ref 7–52)
ANION GAP SERPL CALCULATED.3IONS-SCNC: 8 MMOL/L (ref 4–13)
AST SERPL W P-5'-P-CCNC: 17 U/L (ref 13–39)
BASOPHILS # BLD AUTO: 0.02 THOUSANDS/ÂΜL (ref 0–0.1)
BASOPHILS NFR BLD AUTO: 0 % (ref 0–1)
BILIRUB SERPL-MCNC: 0.28 MG/DL (ref 0.2–1)
BNP SERPL-MCNC: 202 PG/ML (ref 0–100)
BUN SERPL-MCNC: 16 MG/DL (ref 5–25)
CALCIUM SERPL-MCNC: 9.2 MG/DL (ref 8.4–10.2)
CARDIAC TROPONIN I PNL SERPL HS: 31 NG/L (ref ?–50)
CARDIAC TROPONIN I PNL SERPL HS: 7 NG/L (ref ?–50)
CHLORIDE SERPL-SCNC: 94 MMOL/L (ref 96–108)
CO2 SERPL-SCNC: 25 MMOL/L (ref 21–32)
CREAT SERPL-MCNC: 0.92 MG/DL (ref 0.6–1.3)
D DIMER PPP FEU-MCNC: 0.28 UG/ML FEU
EOSINOPHIL # BLD AUTO: 0.21 THOUSAND/ÂΜL (ref 0–0.61)
EOSINOPHIL NFR BLD AUTO: 4 % (ref 0–6)
ERYTHROCYTE [DISTWIDTH] IN BLOOD BY AUTOMATED COUNT: 12.7 % (ref 11.6–15.1)
GFR SERPL CREATININE-BSD FRML MDRD: 59 ML/MIN/1.73SQ M
GLUCOSE SERPL-MCNC: 139 MG/DL (ref 65–140)
HCT VFR BLD AUTO: 33.4 % (ref 34.8–46.1)
HGB BLD-MCNC: 11.6 G/DL (ref 11.5–15.4)
IMM GRANULOCYTES # BLD AUTO: 0.01 THOUSAND/UL (ref 0–0.2)
IMM GRANULOCYTES NFR BLD AUTO: 0 % (ref 0–2)
LYMPHOCYTES # BLD AUTO: 1.31 THOUSANDS/ÂΜL (ref 0.6–4.47)
LYMPHOCYTES NFR BLD AUTO: 22 % (ref 14–44)
MCH RBC QN AUTO: 30.7 PG (ref 26.8–34.3)
MCHC RBC AUTO-ENTMCNC: 34.7 G/DL (ref 31.4–37.4)
MCV RBC AUTO: 88 FL (ref 82–98)
MONOCYTES # BLD AUTO: 0.49 THOUSAND/ÂΜL (ref 0.17–1.22)
MONOCYTES NFR BLD AUTO: 8 % (ref 4–12)
NEUTROPHILS # BLD AUTO: 3.91 THOUSANDS/ÂΜL (ref 1.85–7.62)
NEUTS SEG NFR BLD AUTO: 66 % (ref 43–75)
NRBC BLD AUTO-RTO: 0 /100 WBCS
PLATELET # BLD AUTO: 230 THOUSANDS/UL (ref 149–390)
PMV BLD AUTO: 8.5 FL (ref 8.9–12.7)
POTASSIUM SERPL-SCNC: 4 MMOL/L (ref 3.5–5.3)
PROT SERPL-MCNC: 6.8 G/DL (ref 6.4–8.4)
RBC # BLD AUTO: 3.78 MILLION/UL (ref 3.81–5.12)
SODIUM SERPL-SCNC: 127 MMOL/L (ref 135–147)
WBC # BLD AUTO: 5.95 THOUSAND/UL (ref 4.31–10.16)

## 2025-03-16 PROCEDURE — 84484 ASSAY OF TROPONIN QUANT: CPT | Performed by: EMERGENCY MEDICINE

## 2025-03-16 PROCEDURE — 99222 1ST HOSP IP/OBS MODERATE 55: CPT | Performed by: PHYSICIAN ASSISTANT

## 2025-03-16 PROCEDURE — 83880 ASSAY OF NATRIURETIC PEPTIDE: CPT | Performed by: EMERGENCY MEDICINE

## 2025-03-16 PROCEDURE — 85379 FIBRIN DEGRADATION QUANT: CPT | Performed by: EMERGENCY MEDICINE

## 2025-03-16 PROCEDURE — 93005 ELECTROCARDIOGRAM TRACING: CPT

## 2025-03-16 PROCEDURE — 71045 X-RAY EXAM CHEST 1 VIEW: CPT

## 2025-03-16 PROCEDURE — 85025 COMPLETE CBC W/AUTO DIFF WBC: CPT | Performed by: EMERGENCY MEDICINE

## 2025-03-16 PROCEDURE — 80053 COMPREHEN METABOLIC PANEL: CPT | Performed by: EMERGENCY MEDICINE

## 2025-03-16 PROCEDURE — 36415 COLL VENOUS BLD VENIPUNCTURE: CPT | Performed by: EMERGENCY MEDICINE

## 2025-03-16 PROCEDURE — 99285 EMERGENCY DEPT VISIT HI MDM: CPT

## 2025-03-16 PROCEDURE — 99285 EMERGENCY DEPT VISIT HI MDM: CPT | Performed by: EMERGENCY MEDICINE

## 2025-03-16 RX ORDER — LANOLIN ALCOHOL/MO/W.PET/CERES
400 CREAM (GRAM) TOPICAL DAILY
Status: DISCONTINUED | OUTPATIENT
Start: 2025-03-17 | End: 2025-03-17 | Stop reason: HOSPADM

## 2025-03-16 RX ORDER — ZOLPIDEM TARTRATE 5 MG/1
5 TABLET ORAL
Status: DISCONTINUED | OUTPATIENT
Start: 2025-03-16 | End: 2025-03-17 | Stop reason: HOSPADM

## 2025-03-16 RX ORDER — PANTOPRAZOLE SODIUM 40 MG/1
40 TABLET, DELAYED RELEASE ORAL
Status: DISCONTINUED | OUTPATIENT
Start: 2025-03-17 | End: 2025-03-17 | Stop reason: HOSPADM

## 2025-03-16 RX ORDER — METOPROLOL SUCCINATE 25 MG/1
25 TABLET, EXTENDED RELEASE ORAL 2 TIMES DAILY
Qty: 60 TABLET | Refills: 0 | Status: SHIPPED | OUTPATIENT
Start: 2025-03-16 | End: 2025-03-17

## 2025-03-16 RX ORDER — ONDANSETRON 2 MG/ML
4 INJECTION INTRAMUSCULAR; INTRAVENOUS EVERY 6 HOURS PRN
Status: DISCONTINUED | OUTPATIENT
Start: 2025-03-16 | End: 2025-03-16

## 2025-03-16 RX ORDER — PRAVASTATIN SODIUM 40 MG
40 TABLET ORAL DAILY
Status: DISCONTINUED | OUTPATIENT
Start: 2025-03-17 | End: 2025-03-17 | Stop reason: HOSPADM

## 2025-03-16 RX ORDER — SPIRONOLACTONE 25 MG/1
50 TABLET ORAL DAILY
Status: DISCONTINUED | OUTPATIENT
Start: 2025-03-17 | End: 2025-03-17 | Stop reason: HOSPADM

## 2025-03-16 RX ORDER — BUTALBITAL, ACETAMINOPHEN AND CAFFEINE 50; 325; 40 MG/1; MG/1; MG/1
1 TABLET ORAL EVERY 6 HOURS PRN
Status: DISCONTINUED | OUTPATIENT
Start: 2025-03-16 | End: 2025-03-17 | Stop reason: HOSPADM

## 2025-03-16 RX ORDER — MECLIZINE HCL 12.5 MG 12.5 MG/1
12.5 TABLET ORAL 3 TIMES DAILY PRN
Status: DISCONTINUED | OUTPATIENT
Start: 2025-03-16 | End: 2025-03-17 | Stop reason: HOSPADM

## 2025-03-16 RX ORDER — FLUTICASONE PROPIONATE 50 MCG
2 SPRAY, SUSPENSION (ML) NASAL DAILY
Status: DISCONTINUED | OUTPATIENT
Start: 2025-03-17 | End: 2025-03-17 | Stop reason: HOSPADM

## 2025-03-16 RX ORDER — PROCHLORPERAZINE MALEATE 5 MG/1
5 TABLET ORAL EVERY 6 HOURS PRN
Status: DISCONTINUED | OUTPATIENT
Start: 2025-03-16 | End: 2025-03-17 | Stop reason: HOSPADM

## 2025-03-16 RX ORDER — LOSARTAN POTASSIUM 50 MG/1
100 TABLET ORAL EVERY MORNING
Status: DISCONTINUED | OUTPATIENT
Start: 2025-03-17 | End: 2025-03-17 | Stop reason: HOSPADM

## 2025-03-16 RX ORDER — NEOMYCIN SULFATE, POLYMYXIN B SULFATE AND BACITRACIN ZINC 3.5; 10000; 4 MG/G; [USP'U]/G; [USP'U]/G
OINTMENT OPHTHALMIC
Status: DISCONTINUED | OUTPATIENT
Start: 2025-03-17 | End: 2025-03-17 | Stop reason: HOSPADM

## 2025-03-16 RX ORDER — ACETAMINOPHEN 325 MG/1
650 TABLET ORAL EVERY 6 HOURS PRN
Status: DISCONTINUED | OUTPATIENT
Start: 2025-03-16 | End: 2025-03-17 | Stop reason: HOSPADM

## 2025-03-16 NOTE — ED PROVIDER NOTES
Time reflects when diagnosis was documented in both MDM as applicable and the Disposition within this note       Time User Action Codes Description Comment    3/16/2025  9:27 PM Aishwarya Polo Add [I48.92] Atrial flutter, unspecified type (HCC)     3/16/2025 10:17 PM Aishwarya Polo Add [R79.89] Elevated troponin I level     3/16/2025 10:19 PM Aishwarya Polo Add [E87.1] Hyponatremia     3/16/2025 11:49 PM Joon Dubon Add [R07.89] Other chest pain           ED Disposition       ED Disposition   Admit    Condition   Stable    Date/Time   Sun Mar 16, 2025 10:19 PM    Comment   Case was discussed with YESSENIA and the patient's admission status was agreed to be Admission Status: observation status to the service of Dr. Garzon.               Assessment & Plan       Medical Decision Making  Amount and/or Complexity of Data Reviewed  Labs: ordered.  Radiology: ordered.    Risk  Decision regarding hospitalization.      80-year-old female with history of COPD, CKD, diabetes, hypertension, hyperlipidemia, and new onset a flutter presenting for evaluation of chest pain or shortness of breath, patient was having chest pain and palpitations about an hour ago, noted her heart rate was in the 170s at home, took her evening meds prior to coming to the ER.  Had recent admission for hyponatremia and was found to have new onset a flutter and was started on metoprolol 25 mg twice daily as well as Eliquis.  Patient has heart rate in the 80s to 90s here, EKG reviewed which shows sinus rhythm.  Will check CBC to evaluate for anemia, CMP for metabolic abnormality or electrolyte abnormality, EKG and troponin to evaluate for ACS or arrhythmia, BNP to evaluate for fluid overload or heart strain, D-dimer to evaluate for PE.  Will obtain chest x-ray to evaluate for pneumothorax, cardiomegaly, pleural effusion.  Reviewed labs, no marked abnormalities,mild hyponatremia but similar to prior from history of siadh.  Initial troponin 7.  Reviewed and  interpreted chest x-ray, no evidence of fluid overload, no acute cardiopulmonary disease.  Discussed with on-call cardiology, patient states symptoms have completely resolved at this time.  Her heart rate remains in the 70s to 80s and normal sinus rhythm.  They recommend switching from metoprolol to tartrate to metoprolol succinate 25 mg twice daily.  After obtaining delta troponin, delta did go up to 31, discussed again with on-call cardiology, given rising troponins, recommend admission for observation for cardiology to see in the morning to make medication changes.  Discussed plan with patient who is agreeable for admission.  Discussed with medicine for admission.         Medications       ED Risk Strat Scores                            SBIRT 20yo+      Flowsheet Row Most Recent Value   Initial Alcohol Screen: US AUDIT-C     1. How often do you have a drink containing alcohol? 0 Filed at: 03/16/2025 2006   2. How many drinks containing alcohol do you have on a typical day you are drinking?  0 Filed at: 03/16/2025 2006   3a. Male UNDER 65: How often do you have five or more drinks on one occasion? 0 Filed at: 03/16/2025 2006   3b. FEMALE Any Age, or MALE 65+: How often do you have 4 or more drinks on one occassion? 0 Filed at: 03/16/2025 2006   Audit-C Score 0 Filed at: 03/16/2025 2006   REYNA: How many times in the past year have you...    Used an illegal drug or used a prescription medication for non-medical reasons? Never Filed at: 03/16/2025 2006          ELBERT Risk Score      Flowsheet Row Most Recent Value   Age >= 65 1 Filed at: 03/16/2025 2351   Known CAD (stenosis >= 50%) 0 Filed at: 03/16/2025 2351   Recent (<=24 hrs) Service Angina 0 Filed at: 03/16/2025 2351   ST Deviation >= 0.5 mm 0 Filed at: 03/16/2025 2351   3+ CAD Risk Factors (FHx, HTN, HLP, DM, Smoker) 0 Filed at: 03/16/2025 2351   Aspirin Use Past 7 Days 1 Filed at: 03/16/2025 2351   Elevated Cardiac Markers 1 Filed at: 03/16/2025 2351   ELBERT  Risk Score (Calculated) 3 Filed at: 03/16/2025 7307                          History of Present Illness       Chief Complaint   Patient presents with    Chest Pain     Pt started with rapid heart rate and chest pain at 6pm tonight.       Past Medical History:   Diagnosis Date    Allergies     Anxiety     Arthritis     Benign arteriolar nephrosclerosis     Bereavement     Cataract     Chronic kidney disease     Chronic kidney disease in type 2 diabetes mellitus (HCC)     Chronic kidney disease, stage 2 (mild)     Chronic pain disorder     Chronic pain syndrome     COPD (chronic obstructive pulmonary disease) ([facility])     DDD (degenerative disc disease), cervical     DDD (degenerative disc disease), lumbar     Degenerative joint disease of right hip     Depression     Diastolic dysfunction     DJD (degenerative joint disease), ankle and foot, right     DJD of right shoulder     Edema     Fracture of left calcaneus     Generalized anxiety disorder     GERD (gastroesophageal reflux disease)     Heart murmur     Hyperaldosteronism ([facility])     Hyperlipidemia     Hypertension     Hypertensive nephrosclerosis     Hypo-osmolality and hyponatremia     IBS (irritable bowel syndrome)     Insomnia     Migraines     Mitral regurgitation     MVP (mitral valve prolapse)     Obstructive sleep apnea syndrome     Osteoarthritis     Osteoarthritis     Pernicious anemia     Plantar fascia rupture     Rheumatoid arthritis ([facility])     Right knee DJD     S/P insertion of spinal cord stimulator     Shingles     Sinobronchitis     Sleep apnea     Status post total right knee replacement 07/07/2020    Stroke ([facility])     tia    TIA (transient ischemic attack)     Vertigo       Past Surgical History:   Procedure Laterality Date    APPENDECTOMY      CARPAL TUNNEL RELEASE Bilateral     COLONOSCOPY      several    COLONOSCOPY  04/02/2012    by Dr. Mckinley Ruvalcaba    CORNEAL TRANSPLANT Left 04/11/2022    DXA PROCEDURE (HISTORICAL)  10/26/2016, 9/17/2014,  6/18/2007    HAND SURGERY Right     ring finger has pin    HYSTERECTOMY      34    INSERT / REPLACE PERIPHERAL NEUROSTIMULATOR PULSE GENERATOR /  Left     buttocks    JOINT REPLACEMENT Left     knee    JOINT REPLACEMENT Right 04/2019    Hip    KNEE ARTHROPLASTY Left 01/15/2009    by Dr. Avery Leigh at [facility]     KNEE ARTHROSCOPY Bilateral     KNEE CARTILAGE SURGERY  09/10/2009    by Dr. Avery Leigh at [facility]     MAMMO (HISTORICAL)  12/10/2018, 10/30/2017, 10/26/2016    NASAL SEPTUM SURGERY  2008    NERVE BLOCK Left 02/20/2018    Procedure: BLOCK MEDIAL BRANCH - C4, C5, C6;  Surgeon: Jaycob Cruz MD;  Location: [facility];  Service: Pain Management     NERVE BLOCK Left 03/06/2018    Procedure: C4, C5, C6 MEDIAL BRANCH BLOCK;  Surgeon: Jaycob Cruz MD;  Location: [facility];  Service: Pain Management     NERVE BLOCK Left 2/20/2025    Procedure: BLOCK MEDIAL BRANCH Left C4-5 and C5-6;  Surgeon: Jaycob Cruz MD;  Location: [facility];  Service: Pain Management     DE ARTHRP ACETBLR/PROX FEM PROSTC AGRFT/ALGRFT Right 02/27/2019    Procedure: ARTHROPLASTY HIP TOTAL;  Surgeon: Bruno Pina DO;  Location:  MAIN OR;  Service: Orthopedics    DE ARTHRP KNE CONDYLE&PLATU MEDIAL&LAT COMPARTMENTS Right 06/24/2020    Procedure: KNEE TOTAL ARTHROPLASTY;  Surgeon: Bruno Pina DO;  Location:  MAIN OR;  Service: Orthopedics    DE COLONOSCOPY FLX DX W/COLLJ SPEC WHEN PFRMD N/A 04/24/2017    Procedure: FLEXIBLE COLONOSCOPY;  Surgeon: Mckinley Ruvalcaba MD;  Location: [facility];  Service: Colorectal    RADIOFREQUENCY ABLATION Left 04/17/2018    Procedure: ABLATION RADIO FREQUENCY (RFA) - C4, C5, C6;  Surgeon: Jaycob Cruz MD;  Location: [facility];  Service: Pain Management     REPLACEMENT TOTAL KNEE Left 07/05/2011    SINUS SURGERY      TONSILLECTOMY      TOTAL HIP ARTHROPLASTY Left     TRIGGER FINGER RELEASE      R 4th     UPPER GASTROINTESTINAL  ENDOSCOPY  01/12/2007    with Donaldson dilatation by Dr. Misael Santiago at [facility]    WRIST SURGERY Right       Family History   Problem Relation Age of Onset    Heart disease Mother     Hypertension Mother     Arthritis Mother     Dementia Mother     Rheum arthritis Mother     Hypertension Father     Heart disease Father     Colon cancer Father     Hypertension Sister     Anxiety disorder Sister     Thyroid disease Sister     Prostate cancer Maternal Grandfather     No Known Problems Paternal Grandmother     No Known Problems Paternal Grandfather     No Known Problems Paternal Aunt     Pseudochol deficiency Neg Hx       Social History     Tobacco Use    Smoking status: Former     Types: Cigarettes    Smokeless tobacco: Never    Tobacco comments:     quit 40 yrs ago   Vaping Use    Vaping status: Never Used   Substance Use Topics    Alcohol use: Yes     Comment: 2 beer a week    Drug use: Never      E-Cigarette/Vaping    E-Cigarette Use Never User       E-Cigarette/Vaping Substances    Nicotine No     THC No     CBD No     Flavoring No     Other No     Unknown No       I have reviewed and agree with the history as documented.     HPI    80-year-old female with history of COPD, CKD, diabetes, hypertension, hyperlipidemia, and new onset a flutter presenting for evaluation of chest pain or shortness of breath.  Patient was recently hospitalized for hyponatremia, during her hospitalization, she was noted to have a flutter with RVR.  She was started on metoprolol 25 mg twice daily as well as Eliquis.  She has been compliant with her medications.  She states today, she noticed her heart rate was up in the 170s just prior to taking her evening meds.  She states she noticed this 2 days ago as well but her heart rate improved after taking her evening meds.  She tried taking her meds tonight and still did not feel improvement in her heart rate so came to the emergency department.  She states palpitations have improved  somewhat.  She is no longer having chest pain right now but was having chest pain an hour ago when she noticed her heart rate was high.  She has also been having ongoing shortness of breath since her last hospitalization.  She has been having some lightheadedness and dizziness.  Denies fevers or chills.  Denies cough or congestion.  Denies abdominal pain, nausea, vomiting, or diarrhea.  Denies leg pain or swelling.  Denies history of DVT or PE.  Per chart review, patient had an echocardiogram a month ago which showed grade 2 diastolic dysfunction.  She also had a very small pericardial effusion.    Review of Systems   Constitutional:  Negative for appetite change, chills and fever.   HENT:  Negative for congestion, rhinorrhea and sore throat.    Respiratory:  Positive for shortness of breath. Negative for cough.    Cardiovascular:  Positive for chest pain and palpitations. Negative for leg swelling.   Gastrointestinal:  Negative for abdominal pain, diarrhea, nausea and vomiting.   Genitourinary:  Negative for dysuria, frequency, hematuria and urgency.   Musculoskeletal:  Negative for arthralgias and myalgias.   Skin:  Negative for rash.   Neurological:  Positive for light-headedness. Negative for dizziness, weakness, numbness and headaches.   All other systems reviewed and are negative.          Objective       ED Triage Vitals   Temperature Pulse Blood Pressure Respirations SpO2 Patient Position - Orthostatic VS   03/16/25 1846 03/16/25 1846 03/16/25 1904 03/16/25 1846 03/16/25 1846 03/16/25 1846   98.1 °F (36.7 °C) 100 (!) 189/80 20 99 % Lying      Temp Source Heart Rate Source BP Location FiO2 (%) Pain Score    03/16/25 1846 03/16/25 1846 03/16/25 1846 -- 03/16/25 1846    Temporal Monitor Left arm  8      Vitals      Date and Time Temp Pulse SpO2 Resp BP Pain Score FACES Pain Rating User   03/17/25 1406 98.6 °F (37 °C) 73 94 % 19 125/55 -- -- DII   03/17/25 0828 -- -- -- -- -- 7 -- AN   03/17/25 0708 98.1 °F  (36.7 °C) 75 97 % 20 148/75 -- -- DII   03/17/25 0027 -- -- -- -- -- 8 -- SM   03/16/25 2309 -- -- -- -- -- No Pain -- AS   03/16/25 2309 -- -- 98 % -- -- -- -- SM   03/16/25 2238 97.8 °F (36.6 °C) 71 97 % 18 155/84 -- -- DII   03/16/25 2237 97.8 °F (36.6 °C) -- -- 18 155/84 -- -- DII   03/16/25 2130 -- 70 97 % 16 137/63 -- -- CD   03/16/25 2015 -- 85 96 % 20 142/74 -- -- CD   03/16/25 1904 -- -- -- -- 189/80 -- -- CD   03/16/25 1846 98.1 °F (36.7 °C) 100 99 % 20 -- 8 -- KTR            Physical Exam  Vitals and nursing note reviewed.   Constitutional:       General: She is not in acute distress.     Appearance: Normal appearance. She is well-developed and normal weight. She is not ill-appearing, toxic-appearing or diaphoretic.   HENT:      Head: Normocephalic and atraumatic.      Right Ear: External ear normal.      Left Ear: External ear normal.      Nose: Nose normal.      Mouth/Throat:      Mouth: Mucous membranes are moist.      Pharynx: Oropharynx is clear.   Eyes:      Extraocular Movements: Extraocular movements intact.      Conjunctiva/sclera: Conjunctivae normal.   Cardiovascular:      Rate and Rhythm: Normal rate and regular rhythm.      Pulses: Normal pulses.      Heart sounds: Normal heart sounds. No murmur heard.     No friction rub. No gallop.   Pulmonary:      Effort: Pulmonary effort is normal. No respiratory distress.      Breath sounds: Normal breath sounds. No decreased breath sounds, wheezing, rhonchi or rales.   Abdominal:      General: There is no distension.      Palpations: Abdomen is soft.      Tenderness: There is no abdominal tenderness. There is no guarding or rebound.   Musculoskeletal:         General: No tenderness.      Cervical back: Neck supple.      Right lower leg: No tenderness. No edema.      Left lower leg: No tenderness. No edema.   Skin:     General: Skin is warm and dry.      Coloration: Skin is not pale.      Findings: No erythema or rash.   Neurological:      General: No  focal deficit present.      Mental Status: She is alert and oriented to person, place, and time.      Cranial Nerves: No cranial nerve deficit.      Sensory: No sensory deficit.      Motor: No weakness.   Psychiatric:         Mood and Affect: Mood normal.         Behavior: Behavior normal.         Results Reviewed       Procedure Component Value Units Date/Time    HS Troponin I 4hr [[id number]]  (Abnormal) Collected: 03/17/25 0045    Lab Status: Final result Specimen: Blood from Arm, Left Updated: 03/17/25 0114     hs TnI 4hr 30 ng/L      Delta 4hr hsTnI 23 ng/L     HS Troponin I 2hr [[id number]]  (Abnormal) Collected: 03/16/25 2119    Lab Status: Final result Specimen: Blood from Arm, Right Updated: 03/16/25 2148     hs TnI 2hr 31 ng/L      Delta 2hr hsTnI 24 ng/L     HS Troponin 0hr (reflex protocol) [[id number]]  (Normal) Collected: 03/16/25 1917    Lab Status: Final result Specimen: Blood from Arm, Right Updated: 03/16/25 1951     hs TnI 0hr 7 ng/L     B-Type Natriuretic Peptide(BNP) [[id number]]  (Abnormal) Collected: 03/16/25 1917    Lab Status: Final result Specimen: Blood from Arm, Right Updated: 03/16/25 1949      pg/mL     Comprehensive metabolic panel [[id number]]  (Abnormal) Collected: 03/16/25 1917    Lab Status: Final result Specimen: Blood from Arm, Right Updated: 03/16/25 1944     Sodium 127 mmol/L      Potassium 4.0 mmol/L      Chloride 94 mmol/L      CO2 25 mmol/L      ANION GAP 8 mmol/L      BUN 16 mg/dL      Creatinine 0.92 mg/dL      Glucose 139 mg/dL      Calcium 9.2 mg/dL      AST 17 U/L      ALT 15 U/L      Alkaline Phosphatase 95 U/L      Total Protein 6.8 g/dL      Albumin 4.3 g/dL      Total Bilirubin 0.28 mg/dL      eGFR 59 ml/min/1.73sq m     Narrative:      National Kid[employer] guidelines for Chronic Kidney Disease (CKD):     Stage 1 with normal or high GFR (GFR > 90 mL/min/1.73 square meters)    Stage 2 Mild CKD (GFR = 60-89 mL/min/1.73 square meters)    Stage 3A  Moderate CKD (GFR = 45-59 mL/min/1.73 square meters)    Stage 3B Moderate CKD (GFR = 30-44 mL/min/1.73 square meters)    Stage 4 Severe CKD (GFR = 15-29 mL/min/1.73 square meters)    Stage 5 End Stage CKD (GFR <15 mL/min/1.73 square meters)  Note: GFR calculation is accurate only with a steady state creatinine    D-dimer, quantitative [[id number]]  (Normal) Collected: 03/16/25 1917    Lab Status: Final result Specimen: Blood from Arm, Right Updated: 03/16/25 1940     D-Dimer, Quant 0.28 ug/ml FEU     Narrative:      In the evaluation for possible pulmonary embolism, in the appropriate (Well's Score of 4 or less) patient, the age adjusted d-dimer cutoff for this patient can be calculated as:    Age x 0.01 (in ug/mL) for Age-adjusted D-dimer exclusion threshold for a patient over 50 years.    CBC and differential [[id number]]  (Abnormal) Collected: 03/16/25 1917    Lab Status: Final result Specimen: Blood from Arm, Right Updated: 03/16/25 1928     WBC 5.95 Thousand/uL      RBC 3.78 Million/uL      Hemoglobin 11.6 g/dL      Hematocrit 33.4 %      MCV 88 fL      MCH 30.7 pg      MCHC 34.7 g/dL      RDW 12.7 %      MPV 8.5 fL      Platelets 230 Thousands/uL      nRBC 0 /100 WBCs      Segmented % 66 %      Immature Grans % 0 %      Lymphocytes % 22 %      Monocytes % 8 %      Eosinophils Relative 4 %      Basophils Relative 0 %      Absolute Neutrophils 3.91 Thousands/µL      Absolute Immature Grans 0.01 Thousand/uL      Absolute Lymphocytes 1.31 Thousands/µL      Absolute Monocytes 0.49 Thousand/µL      Eosinophils Absolute 0.21 Thousand/µL      Basophils Absolute 0.02 Thousands/µL             XR chest 1 view portable   Final Interpretation by Carlos Mario MD (03/17 0919)      No acute cardiopulmonary disease.            Workstation performed: [id number]             ECG 12 Lead Documentation Only    Date/Time: 3/16/2025 7:14 PM    Performed by: Aishwarya Polo MD  Authorized by: Aishwarya Polo MD    Indications  / Diagnosis:  Chest pain  ECG reviewed by me, the ED Provider: yes    Patient location:  ED  Previous ECG:     Previous ECG:  Compared to current    Similarity:  No change    Comparison to cardiac monitor: Yes    Interpretation:     Interpretation: normal    Rate:     ECG rate:  97    ECG rate assessment: normal    Rhythm:     Rhythm: sinus rhythm      Rhythm comment:  First degree AV block  Ectopy:     Ectopy: none    QRS:     QRS axis:  Normal  Conduction:     Conduction: normal    ST segments:     ST segments:  Normal  T waves:     T waves: normal        ED Medication and Procedure Management   Prior to Admission Medications   Prescriptions Last Dose Informant Patient Reported? Taking?   Ergocalciferol (VITAMIN D2 PO) 3/16/2025 Morning Self Yes Yes   Sig: Take 1,000 Units by mouth in the morning   Multiple Vitamins-Minerals (MULTIVITAMIN WITH MINERALS) tablet 3/16/2025 Morning Self Yes Yes   Sig: Take 1 tablet by mouth every morning   apixaban (ELIQUIS) 5 mg 3/16/2025 Morning  No Yes   Sig: Take 1 tablet (5 mg total) by mouth 2 (two) times a day   bacitracin-polymyxin b ophthalmic ointment  Self Yes No   Sig: INSTILL A 1/4 INCH INTO LEFT EYE AT BEDTIME   butalbital-acetaminophen-caffeine (FIORICET,ESGIC) -40 mg per tablet 3/15/2025 Morning Self No Yes   Sig: Take 1 tablet by mouth every 6 (six) hours as needed for migraine Do not start before 2025.   calcium carbonate (OS-MARLENY) 600 MG tablet 3/16/2025 Morning Self Yes Yes   Sig: Take 600 mg by mouth 2 (two) times a day with meals   clindamycin (CLEOCIN) 150 mg capsule   Yes No   Sig: take 4 capsules 1 hour before APPOINTMENT   Patient not taking: Reported on 3/9/2025   fluorometholone (FML) 0.1 % ophthalmic suspension  Self Yes No   Sig: INSTILL 1 DROP BY OPTHALMIC ROUTE FOUR TIMES DAILY INTO LEFT EYE   Patient not taking: Reported on 3/9/2025   fluticasone (FLONASE) 50 mcg/act nasal spray 3/15/2025 Self Yes Yes   Si sprays into each nostril  daily   losartan (COZAAR) 100 MG tablet 3/16/2025 Morning  No Yes   Sig: TAKE 1 TABLET EVERY MORNING   magnesium (MAGTAB) 84 MG (7MEQ) TBCR 3/16/2025 Morning Self No Yes   Sig: Take 1 tablet (84 mg total) by mouth daily   meclizine (ANTIVERT) 12.5 MG tablet Past Month Self No Yes   Sig: Take 1 tablet (12.5 mg total) by mouth 3 (three) times a day as needed for dizziness   metoprolol tartrate (LOPRESSOR) 25 mg tablet 3/16/2025 Evening  No Yes   Sig: Take 1 tablet (25 mg total) by mouth every 12 (twelve) hours   neomycin-bacitracin-polymyxin (NEOSPORIN) ophthalmic ointment 3/15/2025 Bedtime Self Yes Yes   Sig: INSTILL 1/4' INTO LEFT EYE AT BEDTIME   omeprazole (PriLOSEC) 20 mg delayed release capsule 3/16/2025 Self No Yes   Sig: TAKE 1 CAPSULE TWICE DAILY   ondansetron (ZOFRAN) 4 mg tablet  Self No No   Sig: Take 1 tablet (4 mg total) by mouth every 8 (eight) hours as needed for nausea or vomiting   Patient not taking: Reported on 3/9/2025   pravastatin (PRAVACHOL) 40 mg tablet 3/16/2025 Evening Self No Yes   Sig: TAKE 1 TABLET EVERY DAY   prochlorperazine (COMPAZINE) 5 mg tablet More than a month  Yes No   Sig: Take 5 mg by mouth every 6 (six) hours as needed for nausea or vomiting   spironolactone (ALDACTONE) 50 mg tablet 3/16/2025 Noon Self No Yes   Sig: TAKE 1 TABLET DAILY AFTER LUNCH   zolpidem (AMBIEN) 5 mg tablet 3/15/2025 Bedtime Self No Yes   Sig: Take 1 tablet (5 mg total) by mouth daily at bedtime as needed for sleep Do not start before March 8, 2025.      Facility-Administered Medications: None     Discharge Medication List as of 3/17/2025  5:27 PM        START taking these medications    Details   metoprolol succinate (TOPROL-XL) 50 mg 24 hr tablet Take 1 tablet (50 mg total) by mouth 2 (two) times a day, Starting Mon 3/17/2025, Until Wed 4/16/2025, Normal           CONTINUE these medications which have NOT CHANGED    Details   apixaban (ELIQUIS) 5 mg Take 1 tablet (5 mg total) by mouth 2 (two) times a  day, Starting Tue 3/11/2025, Until Thu 4/10/2025, Normal      butalbital-acetaminophen-caffeine (FIORICET,ESGIC) -40 mg per tablet Take 1 tablet by mouth every 6 (six) hours as needed for migraine Do not start before March 16, 2025., Starting Sun 3/16/2025, Normal      calcium carbonate (OS-MARLENY) 600 MG tablet Take 600 mg by mouth 2 (two) times a day with meals, Historical Med      Ergocalciferol (VITAMIN D2 PO) Take 1,000 Units by mouth in the morning, Historical Med      fluticasone (FLONASE) 50 mcg/act nasal spray 2 sprays into each nostril daily, Historical Med      losartan (COZAAR) 100 MG tablet TAKE 1 TABLET EVERY MORNING, Starting Tue 3/11/2025, Normal      magnesium (MAGTAB) 84 MG (7MEQ) TBCR Take 1 tablet (84 mg total) by mouth daily, Starting Mon 3/18/2024, Normal      meclizine (ANTIVERT) 12.5 MG tablet Take 1 tablet (12.5 mg total) by mouth 3 (three) times a day as needed for dizziness, Starting Mon 12/16/2024, Normal      Multiple Vitamins-Minerals (MULTIVITAMIN WITH MINERALS) tablet Take 1 tablet by mouth every morning, Historical Med      neomycin-bacitracin-polymyxin (NEOSPORIN) ophthalmic ointment INSTILL 1/4' INTO LEFT EYE AT BEDTIME, Historical Med      omeprazole (PriLOSEC) 20 mg delayed release capsule TAKE 1 CAPSULE TWICE DAILY, Normal      pravastatin (PRAVACHOL) 40 mg tablet TAKE 1 TABLET EVERY DAY, Starting u 12/5/2024, Normal      spironolactone (ALDACTONE) 50 mg tablet TAKE 1 TABLET DAILY AFTER LUNCH, Normal      zolpidem (AMBIEN) 5 mg tablet Take 1 tablet (5 mg total) by mouth daily at bedtime as needed for sleep Do not start before March 8, 2025., Starting Sat 3/8/2025, Normal      bacitracin-polymyxin b ophthalmic ointment INSTILL A 1/4 INCH INTO LEFT EYE AT BEDTIME, Historical Med      clindamycin (CLEOCIN) 150 mg capsule take 4 capsules 1 hour before APPOINTMENT, Historical Med      fluorometholone (FML) 0.1 % ophthalmic suspension INSTILL 1 DROP BY OPTHALMIC ROUTE FOUR TIMES  DAILY INTO LEFT EYE, Historical Med      ondansetron (ZOFRAN) 4 mg tablet Take 1 tablet (4 mg total) by mouth every 8 (eight) hours as needed for nausea or vomiting, Starting Mon 12/30/2024, Normal      prochlorperazine (COMPAZINE) 5 mg tablet Take 5 mg by mouth every 6 (six) hours as needed for nausea or vomiting, Historical Med           STOP taking these medications       metoprolol tartrate (LOPRESSOR) 25 mg tablet Comments:   Reason for Stopping:             No discharge procedures on file.  ED SEPSIS DOCUMENTATION   Time reflects when diagnosis was documented in both MDM as applicable and the Disposition within this note       Time User Action Codes Description Comment    3/16/2025  9:27 PM Aishwarya Polo Add [I48.92] Atrial flutter, unspecified type (HCC)     3/16/2025 10:17 PM Aishwarya Polo Add [R79.89] Elevated troponin I level     3/16/2025 10:19 PM Aishwarya Polo Add [E87.1] Hyponatremia     3/16/2025 11:49 PM Joon Dubon Add [R07.89] Other chest pain                  Aishwarya Polo MD  03/18/25 0035

## 2025-03-17 VITALS
HEIGHT: 66 IN | OXYGEN SATURATION: 94 % | SYSTOLIC BLOOD PRESSURE: 125 MMHG | HEART RATE: 73 BPM | WEIGHT: 166.89 LBS | BODY MASS INDEX: 26.82 KG/M2 | RESPIRATION RATE: 19 BRPM | TEMPERATURE: 98.6 F | DIASTOLIC BLOOD PRESSURE: 55 MMHG

## 2025-03-17 LAB
4HR DELTA HS TROPONIN: 23 NG/L
ALBUMIN SERPL BCG-MCNC: 4.6 G/DL (ref 3.5–5)
ALP SERPL-CCNC: 109 U/L (ref 34–104)
ALT SERPL W P-5'-P-CCNC: 16 U/L (ref 7–52)
ANION GAP SERPL CALCULATED.3IONS-SCNC: 7 MMOL/L (ref 4–13)
AST SERPL W P-5'-P-CCNC: 17 U/L (ref 13–39)
BASOPHILS # BLD AUTO: 0.04 THOUSANDS/ÂΜL (ref 0–0.1)
BASOPHILS NFR BLD AUTO: 1 % (ref 0–1)
BILIRUB SERPL-MCNC: 0.4 MG/DL (ref 0.2–1)
BUN SERPL-MCNC: 12 MG/DL (ref 5–25)
CALCIUM SERPL-MCNC: 9.8 MG/DL (ref 8.4–10.2)
CARDIAC TROPONIN I PNL SERPL HS: 30 NG/L (ref ?–50)
CHLORIDE SERPL-SCNC: 95 MMOL/L (ref 96–108)
CO2 SERPL-SCNC: 27 MMOL/L (ref 21–32)
CREAT SERPL-MCNC: 0.89 MG/DL (ref 0.6–1.3)
EOSINOPHIL # BLD AUTO: 0.18 THOUSAND/ÂΜL (ref 0–0.61)
EOSINOPHIL NFR BLD AUTO: 3 % (ref 0–6)
ERYTHROCYTE [DISTWIDTH] IN BLOOD BY AUTOMATED COUNT: 12.6 % (ref 11.6–15.1)
GFR SERPL CREATININE-BSD FRML MDRD: 62 ML/MIN/1.73SQ M
GLUCOSE P FAST SERPL-MCNC: 92 MG/DL (ref 65–99)
GLUCOSE SERPL-MCNC: 92 MG/DL (ref 65–140)
HCT VFR BLD AUTO: 36.7 % (ref 34.8–46.1)
HGB BLD-MCNC: 12.4 G/DL (ref 11.5–15.4)
IMM GRANULOCYTES # BLD AUTO: 0.02 THOUSAND/UL (ref 0–0.2)
IMM GRANULOCYTES NFR BLD AUTO: 0 % (ref 0–2)
LYMPHOCYTES # BLD AUTO: 2.09 THOUSANDS/ÂΜL (ref 0.6–4.47)
LYMPHOCYTES NFR BLD AUTO: 34 % (ref 14–44)
MAGNESIUM SERPL-MCNC: 2.1 MG/DL (ref 1.9–2.7)
MCH RBC QN AUTO: 30 PG (ref 26.8–34.3)
MCHC RBC AUTO-ENTMCNC: 33.8 G/DL (ref 31.4–37.4)
MCV RBC AUTO: 89 FL (ref 82–98)
MONOCYTES # BLD AUTO: 0.55 THOUSAND/ÂΜL (ref 0.17–1.22)
MONOCYTES NFR BLD AUTO: 9 % (ref 4–12)
NEUTROPHILS # BLD AUTO: 3.28 THOUSANDS/ÂΜL (ref 1.85–7.62)
NEUTS SEG NFR BLD AUTO: 53 % (ref 43–75)
NRBC BLD AUTO-RTO: 0 /100 WBCS
OSMOLALITY UR: 306 MMOL/KG (ref 250–900)
PHOSPHATE SERPL-MCNC: 3.7 MG/DL (ref 2.3–4.1)
PLATELET # BLD AUTO: 248 THOUSANDS/UL (ref 149–390)
PMV BLD AUTO: 8.8 FL (ref 8.9–12.7)
POTASSIUM SERPL-SCNC: 4.3 MMOL/L (ref 3.5–5.3)
PROT SERPL-MCNC: 7.3 G/DL (ref 6.4–8.4)
RBC # BLD AUTO: 4.13 MILLION/UL (ref 3.81–5.12)
SODIUM SERPL-SCNC: 129 MMOL/L (ref 135–147)
SODIUM UR-SCNC: 62 MMOL/L
WBC # BLD AUTO: 6.16 THOUSAND/UL (ref 4.31–10.16)

## 2025-03-17 PROCEDURE — 80053 COMPREHEN METABOLIC PANEL: CPT | Performed by: PHYSICIAN ASSISTANT

## 2025-03-17 PROCEDURE — 99214 OFFICE O/P EST MOD 30 MIN: CPT | Performed by: INTERNAL MEDICINE

## 2025-03-17 PROCEDURE — 99239 HOSP IP/OBS DSCHRG MGMT >30: CPT

## 2025-03-17 PROCEDURE — 83935 ASSAY OF URINE OSMOLALITY: CPT | Performed by: PHYSICIAN ASSISTANT

## 2025-03-17 PROCEDURE — 83735 ASSAY OF MAGNESIUM: CPT | Performed by: PHYSICIAN ASSISTANT

## 2025-03-17 PROCEDURE — 85025 COMPLETE CBC W/AUTO DIFF WBC: CPT | Performed by: PHYSICIAN ASSISTANT

## 2025-03-17 PROCEDURE — 84300 ASSAY OF URINE SODIUM: CPT | Performed by: PHYSICIAN ASSISTANT

## 2025-03-17 PROCEDURE — 84484 ASSAY OF TROPONIN QUANT: CPT | Performed by: PHYSICIAN ASSISTANT

## 2025-03-17 PROCEDURE — 84100 ASSAY OF PHOSPHORUS: CPT | Performed by: PHYSICIAN ASSISTANT

## 2025-03-17 RX ORDER — METOPROLOL SUCCINATE 25 MG/1
25 TABLET, EXTENDED RELEASE ORAL 2 TIMES DAILY
Status: DISCONTINUED | OUTPATIENT
Start: 2025-03-17 | End: 2025-03-17

## 2025-03-17 RX ORDER — METOPROLOL SUCCINATE 25 MG/1
25 TABLET, EXTENDED RELEASE ORAL ONCE
Status: COMPLETED | OUTPATIENT
Start: 2025-03-17 | End: 2025-03-17

## 2025-03-17 RX ORDER — METOPROLOL SUCCINATE 50 MG/1
50 TABLET, EXTENDED RELEASE ORAL 2 TIMES DAILY
Qty: 60 TABLET | Refills: 0 | Status: SHIPPED | OUTPATIENT
Start: 2025-03-17 | End: 2025-04-16

## 2025-03-17 RX ORDER — METOPROLOL SUCCINATE 50 MG/1
50 TABLET, EXTENDED RELEASE ORAL 2 TIMES DAILY
Status: DISCONTINUED | OUTPATIENT
Start: 2025-03-17 | End: 2025-03-17 | Stop reason: HOSPADM

## 2025-03-17 RX ADMIN — Medication 400 MG: at 08:28

## 2025-03-17 RX ADMIN — Medication 1 TABLET: at 08:28

## 2025-03-17 RX ADMIN — METOPROLOL SUCCINATE 25 MG: 25 TABLET, EXTENDED RELEASE ORAL at 10:03

## 2025-03-17 RX ADMIN — PRAVASTATIN SODIUM 40 MG: 40 TABLET ORAL at 08:28

## 2025-03-17 RX ADMIN — PANTOPRAZOLE SODIUM 40 MG: 40 TABLET, DELAYED RELEASE ORAL at 05:35

## 2025-03-17 RX ADMIN — SPIRONOLACTONE 50 MG: 25 TABLET ORAL at 08:28

## 2025-03-17 RX ADMIN — BUTALBITAL, ACETAMINOPHEN, AND CAFFEINE 1 TABLET: 325; 50; 40 TABLET ORAL at 08:28

## 2025-03-17 RX ADMIN — ZOLPIDEM TARTRATE 5 MG: 5 TABLET, FILM COATED ORAL at 00:27

## 2025-03-17 RX ADMIN — BUTALBITAL, ACETAMINOPHEN, AND CAFFEINE 1 TABLET: 325; 50; 40 TABLET ORAL at 00:27

## 2025-03-17 RX ADMIN — APIXABAN 5 MG: 5 TABLET, FILM COATED ORAL at 17:26

## 2025-03-17 RX ADMIN — LOSARTAN POTASSIUM 100 MG: 50 TABLET, FILM COATED ORAL at 08:28

## 2025-03-17 RX ADMIN — METOPROLOL SUCCINATE 25 MG: 25 TABLET, EXTENDED RELEASE ORAL at 08:28

## 2025-03-17 RX ADMIN — FLUTICASONE PROPIONATE 2 SPRAY: 50 SPRAY, METERED NASAL at 08:28

## 2025-03-17 RX ADMIN — APIXABAN 5 MG: 5 TABLET, FILM COATED ORAL at 08:28

## 2025-03-17 NOTE — ASSESSMENT & PLAN NOTE
History of atrial flutter  Anticoagulated on Eliquis  Patient is currently on Lopressor 25 mg twice daily  --Will hold Lopressor 25 mg twice daily  --Initiate metoprolol succinate 25 mg twice daily per cardiology recommendation  --Continue Eliquis  --Monitor while admitted

## 2025-03-17 NOTE — ASSESSMENT & PLAN NOTE
Appreciate cardiology consult  Increased to Toprol-XL 50mg bid   Continue Eliquis 5mg bid  Cardiology arranging outpatient EP follow-up for consideration of ablation   Patient ambulating without recurernt symptoms, cleared for discharge home

## 2025-03-17 NOTE — PLAN OF CARE
Problem: PAIN - ADULT  Goal: Verbalizes/displays adequate comfort level or baseline comfort level  Description: Interventions:  - Encourage patient to monitor pain and request assistance  - Assess pain using appropriate pain scale  - Administer analgesics based on type and severity of pain and evaluate response  - Implement non-pharmacological measures as appropriate and evaluate response  - Consider cultural and social influences on pain and pain management  - Notify physician/advanced practitioner if interventions unsuccessful or patient reports new pain  Outcome: Progressing     Problem: SAFETY ADULT  Goal: Patient will remain free of falls  Description: INTERVENTIONS:  - Educate patient/family on patient safety including physical limitations  - Instruct patient to call for assistance with activity   - Consult OT/PT to assist with strengthening/mobility   - Keep Call bell within reach  - Keep bed low and locked with side rails adjusted as appropriate  - Keep care items and personal belongings within reach  - Initiate and maintain comfort rounds  - Make Fall Risk Sign visible to staff  - Offer Toileting every 2 Hours, in advance of need  - Initiate/Maintain bed/chair alarm  - Obtain necessary fall risk management equipment: non skid socks   - Apply yellow socks and bracelet for high fall risk patients  - Consider moving patient to room near nurses station  Outcome: Progressing  Goal: Maintain or return to baseline ADL function  Description: INTERVENTIONS:  -  Assess patient's ability to carry out ADLs; assess patient's baseline for ADL function and identify physical deficits which impact ability to perform ADLs (bathing, care of mouth/teeth, toileting, grooming, dressing, etc.)  - Assess/evaluate cause of self-care deficits   - Assess range of motion  - Assess patient's mobility; develop plan if impaired  - Assess patient's need for assistive devices and provide as appropriate  - Encourage maximum independence  but intervene and supervise when necessary  - Involve family in performance of ADLs  - Assess for home care needs following discharge   - Consider OT consult to assist with ADL evaluation and planning for discharge  - Provide patient education as appropriate  Outcome: Progressing     Problem: DISCHARGE PLANNING  Goal: Discharge to home or other facility with appropriate resources  Description: INTERVENTIONS:  - Identify barriers to discharge w/patient and caregiver  - Arrange for needed discharge resources and transportation as appropriate  - Identify discharge learning needs (meds, wound care, etc.)  - Arrange for interpretive services to assist at discharge as needed  - Refer to Case Management Department for coordinating discharge planning if the patient needs post-hospital services based on physician/advanced practitioner order or complex needs related to functional status, cognitive ability, or social support system  Outcome: Progressing     Problem: CARDIOVASCULAR - ADULT  Goal: Absence of cardiac dysrhythmias or at baseline rhythm  Description: INTERVENTIONS:  - Continuous cardiac monitoring, vital signs, obtain 12 lead EKG if ordered  - Administer antiarrhythmic and heart rate control medications as ordered  - Monitor electrolytes and administer replacement therapy as ordered  Outcome: Progressing     Problem: METABOLIC, FLUID AND ELECTROLYTES - ADULT  Goal: Electrolytes maintained within normal limits  Description: INTERVENTIONS:  - Monitor labs and assess patient for signs and symptoms of electrolyte imbalances  - Administer electrolyte replacement as ordered  - Monitor response to electrolyte replacements, including repeat lab results as appropriate  - Instruct patient on fluid and nutrition as appropriate  Outcome: Progressing  Goal: Fluid balance maintained  Description: INTERVENTIONS:  - Monitor labs   - Monitor I/O and WT  - Instruct patient on fluid and nutrition as appropriate  - Assess for signs &  symptoms of volume excess or deficit  Outcome: Progressing

## 2025-03-17 NOTE — ASSESSMENT & PLAN NOTE
Presented to the ER for evaluation of chest pain  Patient reports having onset of chest pain on palpitation just prior to taking her medic medication  Patient reports heart rate as high as 170 upon arrival to the ER  EKG shows-sinus rhythm with a first-degree block at a rate of 97 bpm  0-hour troponin at 7, 2-hour troponin of 31, 4-hour troponin of 30  ELBERT score 3  ER attending discussed case with on-call cardiology.  Recommendations given to admit on observation adjustments to beta-blocker medication evaluation by cardiology in the a.m.  --Admit on observation  --Telemetry monitoring  --Trend troponin  --Repeat EKG  --Continue metoprolol succinate 25 mg twice daily as recommended by cardiology  --Cardiology consult

## 2025-03-17 NOTE — ASSESSMENT & PLAN NOTE
Lab Results   Component Value Date    EGFR 62 03/17/2025    EGFR 59 03/16/2025    EGFR 49 03/13/2025    CREATININE 0.89 03/17/2025    CREATININE 0.92 03/16/2025    CREATININE 1.07 03/13/2025     At baseline  Continue outpatient follow-up with nephrology

## 2025-03-17 NOTE — ASSESSMENT & PLAN NOTE
Lab Results   Component Value Date    EGFR 59 03/16/2025    EGFR 49 03/13/2025    EGFR 49 03/12/2025    CREATININE 0.92 03/16/2025    CREATININE 1.07 03/13/2025    CREATININE 1.08 03/12/2025   Creatinine level appears to be at baseline  --Avoid nephrotoxic agent  --Monitor creatinine levels, electrolytes  --Check a.m. CMP  --Continue outpatient nephrology follow-up

## 2025-03-17 NOTE — DISCHARGE INSTR - AVS FIRST PAGE
Start taking Toprol XL 50 mg twice daily  Continue Eliquis  Continue to follow 1500 mL fluid restriction at home.  Continue follow-up with nephrology for management of hyponatremia  Continue outpatient follow-up with cardiology

## 2025-03-17 NOTE — CASE MANAGEMENT
Case Management Discharge Planning Note    Patient name Jacklyn Garcia  Location /422-01 MRN [id number]  : 1945 Date 3/17/2025       Current Admission Date: 3/16/2025  Current Admission Diagnosis:Other chest pain   Patient Active Problem List    Diagnosis Date Noted Date Diagnosed    Other chest pain 2025     Abnormal EKG 03/10/2025     CKD stage 3a, GFR 45-59 ml/min ([facility]) 03/10/2025     Atrial fibrillation ([facility]) 03/10/2025     Atrial flutter ([facility]) 03/10/2025     History of corneal transplant 2025     SIADH (syndrome of inappropriate ADH production) ([facility]) 2025     RÍOS (dyspnea on exertion) 2025     Bunion of great toe of left foot 2023     Hammer toe of left foot 2023     Obesity, morbid ([facility]) 2022     Stage 3a chronic kidney disease ([facility]) 2021     DDD (degenerative disc disease), lumbar      Lumbar spondylosis      Lumbar disc herniation      Pulmonary emphysema, unspecified emphysema type ([facility]) 2021     Hyperaldosteronism ([facility]) 10/09/2019     Hypertensive nephrosclerosis 10/09/2019     Insomnia, unspecified 2019     Primary osteoarthritis of right knee 2019     Osteoarthritis of right hip 2019     Gastroesophageal reflux disease without esophagitis 2019     Hyponatremia 2019     Chronic pain disorder 2018     DDD (degenerative disc disease), cervical 2018     Mitral regurgitation 12/10/2013     Grade II diastolic dysfunction 2013     Hypercholesterolemia 2013       LOS (days): 0  Geometric Mean LOS (GMLOS) (days):   Days to GMLOS:     OBJECTIVE:            Current admission status: Observation   Preferred Pharmacy:   [id number] - [place] - [address] [place]  [address] [place] [address]  Phone: [phone] Fax: [phone]    [facility] - [place], [place] - [address]  [address] [address]  Phone: [phone] Fax:  [phone]    Primary Care Provider: Ernie Wilkinson DO    Primary Insurance: MEDICARE  Secondary Insurance: [employer]    DISCHARGE DETAILS:      Patient stable for discharge home today with outpatient medical follow up.    Follow up providers listed on AVS.    CM did offer [facility] for cardio assessment and medication management, as patient is a readmission. Patient declined.    Family to transport patient home.

## 2025-03-17 NOTE — ASSESSMENT & PLAN NOTE
Stable  Patient currently on Prilosec  --Will give pulmonary alternative Protonix 20 mg p.o. daily while admitted

## 2025-03-17 NOTE — ASSESSMENT & PLAN NOTE
No respiratory distress or evidence of acute exacerbation  Not currently on home oxygen  --Respiratory protocol  --Continue PTA COPD medication  --Monitor respiratory status, SpO2  --Continue outpatient pulmonary follow-up

## 2025-03-17 NOTE — ASSESSMENT & PLAN NOTE
Last echo on 2/13/2025 reviewed EF at 60% with grade 2 diastolic dysfunction  Compensated  Continue outpatient follow-up with cardiology

## 2025-03-17 NOTE — CONSULTS
Consultation - Cardiology   Jakclyn Garcia 80 y.o. female MRN: [id number]  Unit/Bed#: 422-01 Encounter: [id number]    Inpatient consult to Cardiology  Consult performed by: Williams Blas DO  Consult ordered by: Joon Dubon PA-C      Assessment and plan  #1 paroxysmal atrial flutter  #2 chronic diastolic congestive heart failure currently compensated  #3 hyponatremia  #4 mild to moderate mitral regurgitation  #5 hypertension    Recommendations: Increase Toprol to 50 mg twice daily.  Continue Eliquis for anticoagulation.  Patient will be scheduled for outpatient electrophysiology evaluation for ablation.  Would consider ablating the flutter and implanting a loop recorder for surveillance of atrial fibrillation.  If patient is up ambulating and feels well can be discharged later today.      Physician Requesting Consult: Mike Garzon DO  Reason for Consult / Principal Problem: Flutter    History of Present Illness   HPI: Jacklyn Garcia is a 80 y.o. year old female who is well-known to me from my outpatient practice.  She has a history of chronic diastolic congestive heart failure, mitral regurgitation, hyponatremia, hypertension who presented last week with atrial flutter.  She was sent home on Toprol XL 25 mg daily which does not seem to be enough for rate control she was having tachycardia and came back in.  She is now feeling well is currently in sinus rhythm.  She has been tolerating Eliquis well.  Denies any chest pain, shortness of breath, lower extremity edema, PND, orthopnea.  Sinus with PACs on telemetry.  Recent echo from February was reviewed.    Review of Systems   Constitutional: Negative.   HENT: Negative.     Eyes: Negative.    Cardiovascular:  Positive for palpitations.   Respiratory: Negative.     Endocrine: Negative.    Hematologic/Lymphatic: Negative.    Skin: Negative.    Musculoskeletal: Negative.    Gastrointestinal: Negative.    Genitourinary: Negative.    Neurological:  Negative.    Psychiatric/Behavioral: Negative.     All other systems reviewed and are negative.    Historical Information   Past Medical History:   Diagnosis Date    Allergies     Anxiety     Arthritis     Benign arteriolar nephrosclerosis     Bereavement     Cataract     Chronic kidney disease     Chronic kidney disease in type 2 diabetes mellitus ([facility])     Chronic kidney disease, stage 2 (mild)     Chronic pain disorder     Chronic pain syndrome     COPD (chronic obstructive pulmonary disease) ([facility])     DDD (degenerative disc disease), cervical     DDD (degenerative disc disease), lumbar     Degenerative joint disease of right hip     Depression     Diastolic dysfunction     DJD (degenerative joint disease), ankle and foot, right     DJD of right shoulder     Edema     Fracture of left calcaneus     Generalized anxiety disorder     GERD (gastroesophageal reflux disease)     Heart murmur     Hyperaldosteronism ([facility])     Hyperlipidemia     Hypertension     Hypertensive nephrosclerosis     Hypo-osmolality and hyponatremia     IBS (irritable bowel syndrome)     Insomnia     Migraines     Mitral regurgitation     MVP (mitral valve prolapse)     Obstructive sleep apnea syndrome     Osteoarthritis     Osteoarthritis     Pernicious anemia     Plantar fascia rupture     Rheumatoid arthritis ([facility])     Right knee DJD     S/P insertion of spinal cord stimulator     Shingles     Sinobronchitis     Sleep apnea     Status post total right knee replacement 07/07/2020    Stroke ([facility])     tia    TIA (transient ischemic attack)     Vertigo      Past Surgical History:   Procedure Laterality Date    APPENDECTOMY      CARPAL TUNNEL RELEASE Bilateral     COLONOSCOPY      several    COLONOSCOPY  04/02/2012    by Dr. Mckinley Ruvalcaba    CORNEAL TRANSPLANT Left 04/11/2022    DXA PROCEDURE (HISTORICAL)  10/26/2016, 9/17/2014, 6/18/2007    HAND SURGERY Right     ring finger has pin    HYSTERECTOMY      34    INSERT / REPLACE PERIPHERAL  NEUROSTIMULATOR PULSE GENERATOR /  Left     buttocks    JOINT REPLACEMENT Left     knee    JOINT REPLACEMENT Right 04/2019    Hip    KNEE ARTHROPLASTY Left 01/15/2009    by Dr. Avery Leigh at [facility]     KNEE ARTHROSCOPY Bilateral     KNEE CARTILAGE SURGERY  09/10/2009    by Dr. Avery Leigh at [facility]     MAMMO (HISTORICAL)  12/10/2018, 10/30/2017, 10/26/2016    NASAL SEPTUM SURGERY  2008    NERVE BLOCK Left 02/20/2018    Procedure: BLOCK MEDIAL BRANCH - C4, C5, C6;  Surgeon: Jaycob Cruz MD;  Location: [facility];  Service: Pain Management     NERVE BLOCK Left 03/06/2018    Procedure: C4, C5, C6 MEDIAL BRANCH BLOCK;  Surgeon: Jaycob Cruz MD;  Location: [facility];  Service: Pain Management     NERVE BLOCK Left 2/20/2025    Procedure: BLOCK MEDIAL BRANCH Left C4-5 and C5-6;  Surgeon: Jaycob Cruz MD;  Location: [facility];  Service: Pain Management     NJ ARTHRP ACETBLR/PROX FEM PROSTC AGRFT/ALGRFT Right 02/27/2019    Procedure: ARTHROPLASTY HIP TOTAL;  Surgeon: Bruno Pina DO;  Location:  MAIN OR;  Service: Orthopedics    NJ ARTHRP KNE CONDYLE&PLATU MEDIAL&LAT COMPARTMENTS Right 06/24/2020    Procedure: KNEE TOTAL ARTHROPLASTY;  Surgeon: Bruno Pina DO;  Location:  MAIN OR;  Service: Orthopedics    NJ COLONOSCOPY FLX DX W/COLLJ SPEC WHEN PFRMD N/A 04/24/2017    Procedure: FLEXIBLE COLONOSCOPY;  Surgeon: Mckinley Ruvalcaba MD;  Location: [facility];  Service: Colorectal    RADIOFREQUENCY ABLATION Left 04/17/2018    Procedure: ABLATION RADIO FREQUENCY (RFA) - C4, C5, C6;  Surgeon: Jaycob Cruz MD;  Location: [facility];  Service: Pain Management     REPLACEMENT TOTAL KNEE Left 07/05/2011    SINUS SURGERY      TONSILLECTOMY      TOTAL HIP ARTHROPLASTY Left     TRIGGER FINGER RELEASE      R 4th     UPPER GASTROINTESTINAL ENDOSCOPY  01/12/2007    with Donaldson dilatation by Dr. Misael Santiago at [facility]    WRIST SURGERY Right       Social History     Substance and Sexual Activity   Alcohol Use Yes    Comment: 2 beer a week     Social History     Substance and Sexual Activity   Drug Use Never     Social History     Tobacco Use   Smoking Status Former    Types: Cigarettes   Smokeless Tobacco Never   Tobacco Comments    quit 40 yrs ago     Family History: Family history non-contributory    Meds/Allergies   all current active meds have been reviewed       Allergies   Allergen Reactions    Procaine Hives    Adhesive [Medical Tape] Rash    Lidocaine Rash    Penicillins Rash       Objective   Vitals: Blood pressure 148/75, pulse 75, temperature 98.1 °F (36.7 °C), resp. rate 20, weight 75.7 kg (166 lb 14.2 oz), SpO2 97%., Body mass index is 26.94 kg/m².,   Orthostatic Blood Pressures      Flowsheet Row Most Recent Value   Blood Pressure 148/75 filed at 2025 0708   Patient Position - Orthostatic VS Lying filed at 2025 2130          Systolic (24hrs), Av , Min:137 , Max:189     Diastolic (24hrs), Av, Min:63, Max:84      Intake/Output Summary (Last 24 hours) at 3/17/2025 0915  Last data filed at 3/17/2025 0832  Gross per 24 hour   Intake 240 ml   Output --   Net 240 ml       Weight (last 2 days)       Date/Time Weight    25 22:38:35 75.7 (166.89)    25 1846 87.2 (192.24)            Invasive Devices       Peripheral Intravenous Line  Duration             Peripheral IV 25 Dorsal (posterior);Right Forearm <1 day                      Physical Exam  Vitals and nursing note reviewed.   Constitutional:       General: She is not in acute distress.     Appearance: She is well-developed.   HENT:      Head: Normocephalic and atraumatic.   Eyes:      Conjunctiva/sclera: Conjunctivae normal.      Pupils: Pupils are equal, round, and reactive to light.   Cardiovascular:      Rate and Rhythm: Normal rate and regular rhythm.      Pulses: Normal pulses.      Heart sounds: Normal heart sounds. No murmur heard.     No friction rub.    Pulmonary:      Effort: Pulmonary effort is normal. No respiratory distress.      Breath sounds: Normal breath sounds. No wheezing or rales.   Abdominal:      General: Bowel sounds are normal. There is no distension.      Palpations: Abdomen is soft.      Tenderness: There is no abdominal tenderness. There is no rebound.   Musculoskeletal:         General: No tenderness or deformity. Normal range of motion.      Cervical back: Neck supple.      Right lower leg: No edema.      Left lower leg: No edema.   Skin:     General: Skin is warm and dry.      Findings: No erythema.   Neurological:      Mental Status: She is alert and oriented to person, place, and time.      Cranial Nerves: No cranial nerve deficit.             Laboratory Results:        CBC with diff:   Results from last 7 days   Lab Units 03/17/25  0519 03/16/25  1917   WBC Thousand/uL 6.16 5.95   HEMOGLOBIN g/dL 12.4 11.6   HEMATOCRIT % 36.7 33.4*   MCV fL 89 88   PLATELETS Thousands/uL 248 230   RBC Million/uL 4.13 3.78*   MCH pg 30.0 30.7   MCHC g/dL 33.8 34.7   RDW % 12.6 12.7   MPV fL 8.8* 8.5*   NRBC AUTO /100 WBCs 0 0         CMP:  Results from last 7 days   Lab Units 03/17/25  0519 03/16/25  1917 03/13/25  0823 03/12/25  0450 03/11/25  1552 03/11/25  0415 03/11/25  0018   POTASSIUM mmol/L 4.3 4.0 4.6 5.1 5.0 4.8 4.9   CHLORIDE mmol/L 95* 94* 91* 89* 92* 92* 91*   CO2 mmol/L 27 25 28 27 26 25 24   BUN mg/dL 12 16 16 14 15 12 12   CREATININE mg/dL 0.89 0.92 1.07 1.08 1.01 0.93 0.84   CALCIUM mg/dL 9.8 9.2 9.3 9.1 8.9 8.8 8.6   AST U/L 17 17 18  --   --   --   --    ALT U/L 16 15 18  --   --   --   --    ALK PHOS U/L 109* 95 99  --   --   --   --    EGFR ml/min/1.73sq m 62 59 49 49 53 59 66         BMP:  Results from last 7 days   Lab Units 03/17/25  0519 03/16/25  1917 03/13/25  0823 03/12/25  0450 03/11/25  1552 03/11/25  0415 03/11/25  0018   POTASSIUM mmol/L 4.3 4.0 4.6 5.1 5.0 4.8 4.9   CHLORIDE mmol/L 95* 94* 91* 89* 92* 92* 91*   CO2 mmol/L 27  25 28 27 26 25 24   BUN mg/dL 12 16 16 14 15 12 12   CREATININE mg/dL 0.89 0.92 1.07 1.08 1.01 0.93 0.84   CALCIUM mg/dL 9.8 9.2 9.3 9.1 8.9 8.8 8.6       BNP:    Recent Labs     25  1917   *       Magnesium:   Results from last 7 days   Lab Units 25  0519 25  0823 25  0450 25  0415   MAGNESIUM mg/dL 2.1 2.0 2.1 2.0       Coags:       TSH:       Hemoglobin A1C       Lipid Profile:         Cardiac testing:   Results for orders placed during the hospital encounter of 21    Echo complete with contrast if indicated    Narrative  [address] [address]  [phone]    Transthoracic Echocardiogram  2D, M-mode, Doppler, and Color Doppler    Study date:  09-Sep-2021    Patient: QUYNH AVENDAÑO  MR number: [id number]  Account number: [id number]  : 1945  Age: 76 years  Gender: Female  Status: Outpatient  Location: Echo lab  Height: 63 in  Weight: 195 lb  BP: 126/ 80 mmHg    Indications: Shortness of breath    Diagnoses: 401.9 - HYPERTENSION NOS    Sonographer:  Annalise Bruce [facility], CCT  Primary Physician:  Ernie Wilkinson DO  Referring Physician:  Williams Blas DO  Group:  [facility]  Interpreting Physician:  Lucia Her MD    SUMMARY    LEFT VENTRICLE:  Systolic function was normal. Ejection fraction was estimated to be 60 %.  There were no regional wall motion abnormalities.  Features were consistent with a pseudonormal left ventricular filling pattern, with concomitant abnormal relaxation and increased filling pressure (grade 2 diastolic dysfunction).    RIGHT VENTRICLE:  The size was normal.  Systolic function was normal.    MITRAL VALVE:  There was trace regurgitation.    TRICUSPID VALVE:  There was trace regurgitation.  Pulmonary artery systolic pressure was at the upper limits of normal.  Estimated peak PA pressure was 36 mmHg.    PERICARDIUM:  A small pericardial effusion was  identified.  There was no associated chamber collapse.    HISTORY: PRIOR HISTORY: hypertension Risk factors: hypertension.    PROCEDURE: The procedure was performed in the echo lab. This was a routine study. The transthoracic approach was used. The study included complete 2D imaging, M-mode, complete spectral Doppler, and color Doppler. The heart rate was 80 bpm,  at the start of the study. Image quality was adequate.    LEFT VENTRICLE: Size was normal. Systolic function was normal. Ejection fraction was estimated to be 60 %. There were no regional wall motion abnormalities. Wall thickness was normal. DOPPLER: Features were consistent with a pseudonormal  left ventricular filling pattern, with concomitant abnormal relaxation and increased filling pressure (grade 2 diastolic dysfunction).    RIGHT VENTRICLE: The size was normal. Systolic function was normal. Wall thickness was normal.    LEFT ATRIUM: Size was normal.    RIGHT ATRIUM: Size was normal.    MITRAL VALVE: Valve structure was normal. There was normal leaflet separation. DOPPLER: The transmitral velocity was within the normal range. There was no evidence for stenosis. There was trace regurgitation.    AORTIC VALVE: The valve was trileaflet. Leaflets exhibited normal thickness and normal cuspal separation. DOPPLER: Transaortic velocity was within the normal range. There was no evidence for stenosis. There was no significant  regurgitation.    TRICUSPID VALVE: The valve structure was normal. There was normal leaflet separation. DOPPLER: The transtricuspid velocity was within the normal range. There was no evidence for stenosis. There was trace regurgitation. Pulmonary artery  systolic pressure was at the upper limits of normal. Estimated peak PA pressure was 36 mmHg.    PULMONIC VALVE: Leaflets exhibited normal thickness, no calcification, and normal cuspal separation. DOPPLER: The transpulmonic velocity was within the normal range. There was no significant  regurgitation.    PERICARDIUM: A small pericardial effusion was identified. There was no associated chamber collapse. The pericardium was normal in appearance.    AORTA: The root exhibited normal size.    SYSTEMIC VEINS: IVC: The inferior vena cava was normal in size.    SYSTEM MEASUREMENT TABLES    2D  %FS: 35.24 %  Ao Diam: 2.75 cm  EDV(Teich): 115.61 ml  EF(Teich): 64.39 %  ESV(Teich): 41.17 ml  IVSd: 0.9 cm  LA Area: 18.73 cm2  LA Diam: 3.8 cm  LVEDV MOD A4C: 68.5 ml  LVEF MOD A4C: 63.04 %  LVESV MOD A4C: 25.32 ml  LVIDd: 4.95 cm  LVIDs: 3.21 cm  LVLd A4C: 6.64 cm  LVLs A4C: 5.95 cm  LVPWd: 0.93 cm  RA Area: 6.11 cm2  RVIDd: 2.02 cm  RWT: 0.38  SV MOD A4C: 43.18 ml  SV(Teich): 74.44 ml    CW  RAP: 0 mmHg  TR Vmax: 2.78 m/s  TR maxP.05 mmHg    MM  TAPSE: 2.01 cm    PW  E' Sept: 0.06 m/s  E/E' Sept: 13.81  MV A [place]: 0.74 m/s  MV Dec [place]: 3.91 m/s2  MV DecT: 218.49 ms  MV E [place]: 0.85 m/s  MV E/A Ratio: 1.15  RVSP: 31.05 mmHg    [facility]    Prepared and electronically signed by    Lucia Her MD  Signed 09-Sep-2021 16:14:06    No results found for this or any previous visit.    No results found for this or any previous visit.    No results found for this or any previous visit.        Imaging: Results Review Statement: No pertinent imaging studies reviewed.  VAS screening  Result Date: 3/13/2025  Narrative:  [facility] REPORT CLINICAL: Indications: Vascular screening for Carotid artery stenosis, AAA and PAD. Operative History: No cardiovascular surgeries noted. Risk Factors The patient has history of HTN, Hyperlipidemia, CKD and Recent Trauma.  She has no history of DVT.  FINDINGS:  Segment    Rig                        Left                  PSV  EDV  ICA/CCA     PPS  PSV  EDV  ICA/CCA    PPS  Mid CCA    102   17                    82   16                  Prox. ICA   59   14     0.58  101.61   80   27     0.97  82.29  Dist. ICA                      101.61                     82.29     CONCLUSION: Impression CAROTID SCREENING: Evaluation reveals a <50% stenosis internal carotid artery on the right. Evaluation reveals a <50% stenosis  internal carotid artery on the left. AORTA SCREENING: The abdominal aorta is patent and normal in caliber with the greatest diameter measurement of 2.2 x 2.2 cm. PAD SCREENING: The ankle/brachial index on the right is 1.2  , which is within normal limits. The ankle/brachial index on the left is 1.1 , which is within normal limits.  SIGNATURE: Electronically Signed by: MADI BRENNAN on 2025-03-13 01:34:00 PM    X-ray chest 2 views  Result Date: 3/10/2025  Narrative: XR CHEST PA AND LATERAL INDICATION: palpitations/dizziness. COMPARISON: CXR 6/10/2020, CTA neck 8/1/2017. FINDINGS: Clear lungs. No pneumothorax or pleural effusion. Normal cardiomediastinal silhouette. Bones are unremarkable for age. Spinal cord stimulator. Normal upper abdomen.     Impression: No acute cardiopulmonary disease. Workstation performed: [id number]     CT head without contrast  Result Date: 3/9/2025  Narrative: CT BRAIN - WITHOUT CONTRAST INDICATION:   Bifrontal headache +blurred vision of L eye +gait disturbance x2d. COMPARISON: CT brain dated February 6, 2019. TECHNIQUE:  CT examination of the brain was performed.  Multiplanar 2D reformatted images were created from the source data. Radiation dose length product (DLP) for this visit:  764.35 mGy-cm .  This examination, like all CT scans performed in the [facility], was performed utilizing techniques to minimize radiation dose exposure, including the use of iterative  reconstruction and automated exposure control. IMAGE QUALITY:  Diagnostic. FINDINGS: PARENCHYMA:No intracranial mass, mass effect or midline shift. No CT signs of acute infarction.  No acute parenchymal hemorrhage. VENTRICLES AND EXTRA-AXIAL SPACES:  Normal for the patient's age. VISUALIZED ORBITS: Normal visualized  orbits. PARANASAL SINUSES: Normal visualized paranasal sinuses. CALVARIUM AND EXTRACRANIAL SOFT TISSUES: Normal.     Impression: No acute intracranial abnormality. Workstation performed: [id number]     XR sinuses < 3 vw  Result Date: 3/6/2025  Narrative: PARANASAL SINUSES INDICATION:  J01.01: Acute recurrent maxillary sinusitis. COMPARISON: Sinus x-ray 12/10/2019 VIEWS:  XR SINUSES < 3 VW Images: 5 FINDINGS: No evidence of sinus opacification or air-fluid levels. No lytic or blastic lesions are identified.     Impression: No evidence of sinusitis. Workstation performed: [id number]     FL spine and pain procedure  Result Date: 2/20/2025  Narrative: A spine and pain study was performed by the Department of Spine and Pain. Please refer to the report for the official interpretation. The images are stored for archival purposes only. Study images were not formally reviewed by the Radiology Department.      EKG reviewed personally: flutter  Telemetry reviewed personally: nsr    Assessment:  Principal Problem:    Other chest pain  Active Problems:    Grade II diastolic dysfunction    Gastroesophageal reflux disease without esophagitis    Hyponatremia    Pulmonary emphysema, unspecified emphysema type (HCC)    CKD stage 3a, GFR 45-59 ml/min (HCC)    Atrial flutter (HCC)      Assesment/ Plan:        Counseling / Coordination of Care  Total floor / unit time spent today 45 minutes.  Greater than 50% of total time was spent with the patient and / or family counseling and / or coordination of care.  A description of the counseling / coordination of care: Assessment and plan.        Code Status: Level 1 - Full Code

## 2025-03-17 NOTE — H&P
H&P - Hospitalist   Name: Jacklyn Garcia 80 y.o. female I MRN: [id number]  Unit/Bed#: 422-01 I Date of Admission: 3/16/2025   Date of Service: 3/17/2025 I Hospital Day: 0     Assessment & Plan  Other chest pain  Presented to the ER for evaluation of chest pain  Patient reports having onset of chest pain on palpitation just prior to taking her medic medication  Patient reports heart rate as high as 170 upon arrival to the ER  EKG shows-sinus rhythm with a first-degree block at a rate of 97 bpm  0-hour troponin at 7, 2-hour troponin of 31, 4-hour troponin of 30  ELBERT score 3  ER attending discussed case with on-call cardiology.  Recommendations given to admit on observation adjustments to beta-blocker medication evaluation by cardiology in the a.m.  --Admit on observation  --Telemetry monitoring  --Trend troponin  --Repeat EKG  --Continue metoprolol succinate 25 mg twice daily as recommended by cardiology  --Cardiology consult  Hyponatremia  Sodium level at 127  Patient appears to have chronic decrease in sodium levels  --Will check sodium urine random, osmolality urine  --Monitor sodium levels   --Check Am CMP  --Continue nephrology follow-up  Atrial flutter (HCC)  History of atrial flutter  Anticoagulated on Eliquis  Patient is currently on Lopressor 25 mg twice daily  --Will hold Lopressor 25 mg twice daily  --Initiate metoprolol succinate 25 mg twice daily per cardiology recommendation  --Continue Eliquis  --Monitor while admitted  Grade II diastolic dysfunction  History of grade 2 diastolic dysfunction  No evidence of acute exacerbation  Last echo on 2/13/2025 reviewed EF at 60% with grade 2 diastolic dysfunction  BNP level at 202  --Continue PTA medication  --Monitor I/O, daily weights  --Cardiology consult ordered   Gastroesophageal reflux disease without esophagitis  Stable  Patient currently on Prilosec  --Will give pulmonary alternative Protonix 20 mg p.o. daily while admitted  Pulmonary emphysema,  unspecified emphysema type ([facility])  No respiratory distress or evidence of acute exacerbation  Not currently on home oxygen  --Respiratory protocol  --Continue PTA COPD medication  --Monitor respiratory status, SpO2  --Continue outpatient pulmonary follow-up  CKD stage 3a, GFR 45-59 ml/min ([facility])  Lab Results   Component Value Date    EGFR 59 03/16/2025    EGFR 49 03/13/2025    EGFR 49 03/12/2025    CREATININE 0.92 03/16/2025    CREATININE 1.07 03/13/2025    CREATININE 1.08 03/12/2025   Creatinine level appears to be at baseline  --Avoid nephrotoxic agent  --Monitor creatinine levels, electrolytes  --Check a.m. CMP  --Continue outpatient nephrology follow-up      VTE Pharmacologic Prophylaxis:   Moderate Risk (Score 3-4) - Pharmacological DVT Prophylaxis Ordered: apixaban (Eliquis).  Code Status: Level 1 - Full Code   Discussion with family: Patient declined call to .     Anticipated Length of Stay: Patient will be admitted on an observation basis with an anticipated length of stay of less than 2 midnights secondary to chest pain, hyponatremia,.    History of Present Illness   Chief Complaint: Chest pain    Jacklyn Garcia is a 80 y.o. female with a PMH of CHF, COPD, atrial flutter,hypertension, hyperlipidemia who presents to the emergency room for evaluation of complaint of chest pain.  Patient reports that her symptoms started about an hour prior to coming to the emergency room patient states that just prior to taking her medication she developed chest pain as well as palpitations and upon checking her heart rate it was in the 170s.  Reports taking her medication prior to coming to the emergency room.  She states that the palpitations subsided while en route to the hospital however she still had some chest pain.  Patient also admitted to having some shortness of breath at onset of chest pain.  She denies having any cough, fever, chills, nausea, vomiting, diarrhea, abdominal pain.    Workup in the  emergency room included labs significant for sodium of 127, chloride of 94, 0-hour troponin of 7, 2-hour troponin of 31, 4-hour troponin of 30, BNP of 202.  X-ray completed official report pending.  EKG shows a sinus rhythm with a first-degree AV block at a rate of 97 bpm.  ER attending discussed case with on-call cardiology.  Recommendations given to admit, observation status, adjust medication from Lopressor 25 mg twice daily to metoprolol succinate 25 mg twice daily with evaluation by cardiology in the a.m.    Patient is being admitted on observation status [facility] for further evaluation of chest pain, hyponatremia.    Review of Systems   Constitutional:  Positive for activity change. Negative for appetite change, chills, fatigue and fever.   HENT:  Negative for congestion.    Eyes:  Positive for discharge. Negative for photophobia and visual disturbance.   Respiratory:  Negative for cough, chest tightness, shortness of breath and wheezing.    Cardiovascular:  Positive for chest pain and palpitations. Negative for leg swelling.   Gastrointestinal:  Negative for abdominal pain, diarrhea, nausea and vomiting.   Genitourinary:  Negative for difficulty urinating, dysuria, flank pain and hematuria.   Musculoskeletal:  Negative for back pain, gait problem, neck pain and neck stiffness.   Skin:  Negative for rash and wound.   Neurological:  Positive for headaches. Negative for dizziness, tremors, syncope and weakness.   Psychiatric/Behavioral:  Negative for agitation and confusion. The patient is not nervous/anxious.        Historical Information   Past Medical History:   Diagnosis Date    Allergies     Anxiety     Arthritis     Benign arteriolar nephrosclerosis     Bereavement     Cataract     Chronic kidney disease     Chronic kidney disease in type 2 diabetes mellitus (HCC)     Chronic kidney disease, stage 2 (mild)     Chronic pain disorder     Chronic pain syndrome     COPD (chronic obstructive pulmonary  disease) (HCC)     DDD (degenerative disc disease), cervical     DDD (degenerative disc disease), lumbar     Degenerative joint disease of right hip     Depression     Diastolic dysfunction     DJD (degenerative joint disease), ankle and foot, right     DJD of right shoulder     Edema     Fracture of left calcaneus     Generalized anxiety disorder     GERD (gastroesophageal reflux disease)     Heart murmur     Hyperaldosteronism (HCC)     Hyperlipidemia     Hypertension     Hypertensive nephrosclerosis     Hypo-osmolality and hyponatremia     IBS (irritable bowel syndrome)     Insomnia     Migraines     Mitral regurgitation     MVP (mitral valve prolapse)     Obstructive sleep apnea syndrome     Osteoarthritis     Osteoarthritis     Pernicious anemia     Plantar fascia rupture     Rheumatoid arthritis (HCC)     Right knee DJD     S/P insertion of spinal cord stimulator     Shingles     Sinobronchitis     Sleep apnea     Status post total right knee replacement 07/07/2020    Stroke ([facility])     tia    TIA (transient ischemic attack)     Vertigo      Past Surgical History:   Procedure Laterality Date    APPENDECTOMY      CARPAL TUNNEL RELEASE Bilateral     COLONOSCOPY      several    COLONOSCOPY  04/02/2012    by Dr. Mckinley Ruvalcaba    CORNEAL TRANSPLANT Left 04/11/2022    DXA PROCEDURE (HISTORICAL)  10/26/2016, 9/17/2014, 6/18/2007    HAND SURGERY Right     ring finger has pin    HYSTERECTOMY      34    INSERT / REPLACE PERIPHERAL NEUROSTIMULATOR PULSE GENERATOR /  Left     buttocks    JOINT REPLACEMENT Left     knee    JOINT REPLACEMENT Right 04/2019    Hip    KNEE ARTHROPLASTY Left 01/15/2009    by Dr. Avery Leigh at [facility]     KNEE ARTHROSCOPY Bilateral     KNEE CARTILAGE SURGERY  09/10/2009    by Dr. Avery Leigh at [facility]     MAMMO (HISTORICAL)  12/10/2018, 10/30/2017, 10/26/2016    NASAL SEPTUM SURGERY  2008    NERVE BLOCK Left 02/20/2018    Procedure: BLOCK MEDIAL  BRANCH - C4, C5, C6;  Surgeon: Jaycob Cruz MD;  Location: [facility] MA[facility];  Service: Pain Management     NERVE BLOCK Left 03/06/2018    Procedure: C4, C5, C6 MEDIAL BRANCH BLOCK;  Surgeon: Jaycob Cruz MD;  Location: [facility];  Service: Pain Management     NERVE BLOCK Left 2/20/2025    Procedure: BLOCK MEDIAL BRANCH Left C4-5 and C5-6;  Surgeon: Jaycob Cruz MD;  Location: [facility];  Service: Pain Management     NJ ARTHRP ACETBLR/PROX FEM PROSTC AGRFT/ALGRFT Right 02/27/2019    Procedure: ARTHROPLASTY HIP TOTAL;  Surgeon: Bruno Pina DO;  Location:  MAIN OR;  Service: Orthopedics    NJ ARTHRP KNE CONDYLE&PLATU MEDIAL&LAT COMPARTMENTS Right 06/24/2020    Procedure: KNEE TOTAL ARTHROPLASTY;  Surgeon: Bruno Pina DO;  Location:  MAIN OR;  Service: Orthopedics    NJ COLONOSCOPY FLX DX W/COLLJ SPEC WHEN PFRMD N/A 04/24/2017    Procedure: FLEXIBLE COLONOSCOPY;  Surgeon: Mckinley Ruvalcaba MD;  Location: [facility];  Service: Colorectal    RADIOFREQUENCY ABLATION Left 04/17/2018    Procedure: ABLATION RADIO FREQUENCY (RFA) - C4, C5, C6;  Surgeon: Jaycob Cruz MD;  Location: [facility];  Service: Pain Management     REPLACEMENT TOTAL KNEE Left 07/05/2011    SINUS SURGERY      TONSILLECTOMY      TOTAL HIP ARTHROPLASTY Left     TRIGGER FINGER RELEASE      R 4th     UPPER GASTROINTESTINAL ENDOSCOPY  01/12/2007    with Donaldson dilatation by Dr. Misael Santiago at [facility] SURGERY Right      Social History     Tobacco Use    Smoking status: Former     Types: Cigarettes    Smokeless tobacco: Never    Tobacco comments:     quit 40 yrs ago   Vaping Use    Vaping status: Never Used   Substance and Sexual Activity    Alcohol use: Yes     Comment: 2 beer a week    Drug use: Never    Sexual activity: Not Currently     Birth control/protection: Post-menopausal     E-Cigarette/Vaping    E-Cigarette Use Never User      E-Cigarette/Vaping Substances    Nicotine No     THC No     CBD No      Flavoring No     Other No     Unknown No      Family History   Problem Relation Age of Onset    Heart disease Mother     Hypertension Mother     Arthritis Mother     Dementia Mother     Rheum arthritis Mother     Hypertension Father     Heart disease Father     Colon cancer Father     Hypertension Sister     Anxiety disorder Sister     Thyroid disease Sister     Prostate cancer Maternal Grandfather     No Known Problems Paternal Grandmother     No Known Problems Paternal Grandfather     No Known Problems Paternal Aunt     Pseudochol deficiency Neg Hx      Social History:  Marital Status: /Civil Union   Occupation:   Patient Pre-hospital Living Situation: Home  Patient Pre-hospital Level of Mobility: walks  Patient Pre-hospital Diet Restrictions: None reported    Meds/Allergies   I have reviewed home medications using recent Epic encounter.  Prior to Admission medications    Medication Sig Start Date End Date Taking? Authorizing Provider   metoprolol succinate (TOPROL-XL) 25 mg 24 hr tablet Take 1 tablet (25 mg total) by mouth 2 (two) times a day 3/16/25 4/15/25 Yes Aishwarya Polo MD   apixaban (ELIQUIS) 5 mg Take 1 tablet (5 mg total) by mouth 2 (two) times a day 3/11/25 4/10/25  Leslie Calvo PA-C   bacitracin-polymyxin b ophthalmic ointment INSTILL A 1/4 INCH INTO LEFT EYE AT BEDTIME 8/30/22   Historical Provider, MD   butalbital-acetaminophen-caffeine (FIORICET,ESGIC) -40 mg per tablet Take 1 tablet by mouth every 6 (six) hours as needed for migraine Do not start before March 16, 2025. 3/16/25   Ernie Wilkinson,    calcium carbonate (OS-MARLENY) 600 MG tablet Take 600 mg by mouth 2 (two) times a day with meals    Historical Provider, MD   clindamycin (CLEOCIN) 150 mg capsule take 4 capsules 1 hour before APPOINTMENT  Patient not taking: Reported on 3/9/2025 2/6/25   Historical Provider, MD   Ergocalciferol (VITAMIN D2 PO) Take 1,000 Units by mouth in the morning    Historical Provider,  MD   fluorometholone (FML) 0.1 % ophthalmic suspension INSTILL 1 DROP BY OPTHALMIC ROUTE FOUR TIMES DAILY INTO LEFT EYE  Patient not taking: Reported on 3/9/2025 2/6/23   Historical Provider, MD   fluticasone (FLONASE) 50 mcg/act nasal spray 2 sprays into each nostril daily    Historical Provider, MD   losartan (COZAAR) 100 MG tablet TAKE 1 TABLET EVERY MORNING 3/11/25   DAYNE Dodge   magnesium (MAGTAB) 84 MG (7MEQ) TBCR Take 1 tablet (84 mg total) by mouth daily 3/18/24   Ernie Wilkinson, DO   meclizine (ANTIVERT) 12.5 MG tablet Take 1 tablet (12.5 mg total) by mouth 3 (three) times a day as needed for dizziness 12/16/24   Ernie Wilkinson, DO   metoprolol tartrate (LOPRESSOR) 25 mg tablet Take 1 tablet (25 mg total) by mouth every 12 (twelve) hours 3/12/25 4/11/25  Leslie Calvo PA-C   Multiple Vitamins-Minerals (MULTIVITAMIN WITH MINERALS) tablet Take 1 tablet by mouth every morning    Historical Provider, MD   neomycin-bacitracin-polymyxin (NEOSPORIN) ophthalmic ointment INSTILL 1/4' INTO LEFT EYE AT BEDTIME 10/4/24   Historical Provider, MD   omeprazole (PriLOSEC) 20 mg delayed release capsule TAKE 1 CAPSULE TWICE DAILY 5/30/22   Ernie Wilkinson, DO   ondansetron (ZOFRAN) 4 mg tablet Take 1 tablet (4 mg total) by mouth every 8 (eight) hours as needed for nausea or vomiting  Patient not taking: Reported on 3/9/2025 12/30/24   Ernie Wilkinson, DO   pravastatin (PRAVACHOL) 40 mg tablet TAKE 1 TABLET EVERY DAY 12/5/24   Kyra Pyle PA-C   prochlorperazine (COMPAZINE) 5 mg tablet Take 5 mg by mouth every 6 (six) hours as needed for nausea or vomiting    Historical Provider, MD   spironolactone (ALDACTONE) 50 mg tablet TAKE 1 TABLET DAILY AFTER LUNCH 10/31/24   Ernie Wilkinson, DO   zolpidem (AMBIEN) 5 mg tablet Take 1 tablet (5 mg total) by mouth daily at bedtime as needed for sleep Do not start before March 8, 2025. 3/8/25   Ernie Wilkinson, DO     Allergies   Allergen Reactions     Procaine Hives    Adhesive [Medical Tape] Rash    Lidocaine Rash    Penicillins Rash       Objective :  Temp:  [97.8 °F (36.6 °C)-98.1 °F (36.7 °C)] 97.8 °F (36.6 °C)  HR:  [] 71  BP: (137-189)/(63-84) 155/84  Resp:  [16-20] 18  SpO2:  [96 %-99 %] 98 %  O2 Device: None (Room air)    Physical Exam  Constitutional:       General: She is not in acute distress.     Appearance: She is not ill-appearing.   HENT:      Head: Normocephalic and atraumatic.      Nose: Nose normal.      Mouth/Throat:      Mouth: Mucous membranes are moist.      Pharynx: Oropharynx is clear.   Eyes:      Extraocular Movements: Extraocular movements intact.      Pupils: Pupils are equal, round, and reactive to light.   Cardiovascular:      Rate and Rhythm: Normal rate and regular rhythm.      Pulses: Normal pulses.   Pulmonary:      Effort: No respiratory distress.      Breath sounds: No stridor. No wheezing, rhonchi or rales.   Abdominal:      General: There is no distension.      Palpations: Abdomen is soft.      Tenderness: There is no abdominal tenderness.   Musculoskeletal:      Cervical back: Normal range of motion and neck supple.      Right lower leg: No edema.      Left lower leg: No edema.   Skin:     General: Skin is warm and dry.      Capillary Refill: Capillary refill takes less than 2 seconds.      Coloration: Skin is not jaundiced or pale.      Findings: No bruising, lesion or rash.   Neurological:      Mental Status: She is alert and oriented to person, place, and time.   Psychiatric:         Mood and Affect: Mood normal.          Lines/Drains:            Lab Results: I have reviewed the following results:  Results from last 7 days   Lab Units 03/16/25 1917   WBC Thousand/uL 5.95   HEMOGLOBIN g/dL 11.6   HEMATOCRIT % 33.4*   PLATELETS Thousands/uL 230   SEGS PCT % 66   LYMPHO PCT % 22   MONO PCT % 8   EOS PCT % 4     Results from last 7 days   Lab Units 03/16/25 1917   SODIUM mmol/L 127*   POTASSIUM mmol/L 4.0   CHLORIDE  mmol/L 94*   CO2 mmol/L 25   BUN mg/dL 16   CREATININE mg/dL 0.92   ANION GAP mmol/L 8   CALCIUM mg/dL 9.2   ALBUMIN g/dL 4.3   TOTAL BILIRUBIN mg/dL 0.28   ALK PHOS U/L 95   ALT U/L 15   AST U/L 17   GLUCOSE RANDOM mg/dL 139             Lab Results   Component Value Date    HGBA1C 5.0 07/22/2020    HGBA1C 5.2 06/10/2020    HGBA1C 5.2 10/19/2014           Imaging Results Review: I reviewed radiology reports from this admission including: chest xray.  Other Study Results Review: EKG was reviewed.     Administrative Statements   I have spent a total time of 40 minutes in caring for this patient on the day of the visit/encounter including Documenting in the medical record, Reviewing/placing orders in the medical record (including tests, medications, and/or procedures), Obtaining or reviewing history  , and Communicating with other healthcare professionals .    ** Please Note: This note has been constructed using a voice recognition system. **

## 2025-03-17 NOTE — ASSESSMENT & PLAN NOTE
Sodium level at 127  Patient appears to have chronic decrease in sodium levels  --Will check sodium urine random, osmolality urine  --Monitor sodium levels   --Check Am CMP  --Continue nephrology follow-up

## 2025-03-17 NOTE — ASSESSMENT & PLAN NOTE
History of grade 2 diastolic dysfunction  No evidence of acute exacerbation  Last echo on 2/13/2025 reviewed EF at 60% with grade 2 diastolic dysfunction  BNP level at 202  --Continue PTA medication  --Monitor I/O, daily weights  --Cardiology consult ordered

## 2025-03-17 NOTE — CASE MANAGEMENT
Case Management Assessment & Discharge Planning Note    Patient name Jacklyn Garcia  Location /422-01 MRN [id number]  : 1945 Date 3/17/2025       Current Admission Date: 3/16/2025  Current Admission Diagnosis:Other chest pain   Patient Active Problem List    Diagnosis Date Noted Date Diagnosed    Other chest pain 2025     Abnormal EKG 03/10/2025     CKD stage 3a, GFR 45-59 ml/min ([facility]) 03/10/2025     Atrial fibrillation ([facility]) 03/10/2025     Atrial flutter ([facility]) 03/10/2025     History of corneal transplant 2025     SIADH (syndrome of inappropriate ADH production) ([facility]) 2025     RÍOS (dyspnea on exertion) 2025     Bunion of great toe of left foot 2023     Hammer toe of left foot 2023     Obesity, morbid ([facility]) 2022     Stage 3a chronic kidney disease ([facility]) 2021     DDD (degenerative disc disease), lumbar      Lumbar spondylosis      Lumbar disc herniation      Pulmonary emphysema, unspecified emphysema type ([facility]) 2021     Hyperaldosteronism ([facility]) 10/09/2019     Hypertensive nephrosclerosis 10/09/2019     Insomnia, unspecified 2019     Primary osteoarthritis of right knee 2019     Osteoarthritis of right hip 2019     Gastroesophageal reflux disease without esophagitis 2019     Hyponatremia 2019     Chronic pain disorder 2018     DDD (degenerative disc disease), cervical 2018     Mitral regurgitation 12/10/2013     Grade II diastolic dysfunction 2013     Hypercholesterolemia 2013       LOS (days): 0  Geometric Mean LOS (GMLOS) (days):   Days to GMLOS:     OBJECTIVE:              Current admission status: Observation  Referral Reason:  (discharge planning)    Preferred Pharmacy:   [id number] - [place] - [address]  [address] [facility] [place] [address]  Phone: [phone] Fax: [phone]    [facility] [address] [facility]  [address]  [facility] [address]  Phone: [phone] Fax: [phone]    Primary Care Provider: Ernie Wilkinson DO    Primary Insurance: MEDICARE  Secondary Insurance: MICHEAL    ASSESSMENT:    CM met with patient at the bedside,baseline information  was obtained. CM discussed the role of CM in helping the patient develop a discharge plan and assist the patient in carry out their plan.    Patient lives with spouse 2 story home. Bathroom on first floor and bed on second floor. 14 steps to second floor.    Baseline patient was independent with ALS's prior to hospital.        Active Health Care Proxies    There are no active Health Care Proxies on file.       Advance Directives  Does patient have a Health Care POA?: Yes  Does patient have Advance Directives?: Yes  Advance Directives: Power of  for health care  Primary Contact: Solitario Garcia (Spouse)  [phone] (H)  [phone] (M)    Patient Information  During Assessment patient was accompanied by: Parent  Assessment information provided by:: Patient  Primary Caregiver: Spouse  Caregiver's Name:: Solitario Garcia (Spouse)  [phone] (H)  [phone] (M)  Caregiver's Relationship to Patient:: Significant Other  Caregiver's Telephone Number:: Solitario Garcia (Spouse)    [phone] (H)  [phone] (M)  Support Systems: Spouse/significant other  County [place] Residence: [place]  [place] [facility] do you live [facility]?: [facility]  Home entry access options. Select all that apply.: Stairs  Number of steps to enter home.: 6 (with landings)  Do the steps have railings?: Yes  Type of Current Residence: 2 story home  Upon entering residence, is there a bedroom on the main floor (no further steps)?: No  A bedroom is located on the following floor levels of residence (select all that apply):: 2nd Floor  Upon entering residence, is there a bathroom on the main floor (no further steps)?: Yes  Number of steps to 2nd floor from main floor: One Flight  Living  Arrangements: Lives w/ Spouse/significant other  Is patient a ?: No    Activities of Daily Living Prior to Admission  Functional Status: Independent  Completes ADLs independently?: Yes  Ambulates independently?: Yes  Does patient use assisted devices?: No  Does patient currently own DME?: No  Does patient have a history of Outpatient Therapy (PT/OT)?: No  Does the patient have a history of Short-Term Rehab?: Yes ([facility] 5th floor)  Does patient have a history of HHC?: Yes  Does patient currently have HHC?: No         Patient Information Continued  Income Source: Pension/correction  Does patient have prescription coverage?: Yes  Can the patient afford their medications and any related supplies (such as glucometers or test strips)?: Yes  Does patient receive dialysis treatments?: No  Does patient have a history of substance abuse?: No  Does patient have a history of Mental Health Diagnosis?: No         Means of Transportation  Means of Transport to Appts:: Family transport          DISCHARGE DETAILS:    Discharge planning discussed with:: patient  Freedom of Choice: Yes  Comments - Freedom of Choice: CM discussed HHA for SN cardio assessment and management at home. patient declined stated she will follow up with outpatient medical providers  CM contacted family/caregiver?: No- see comments (declined)  Were Treatment Team discharge recommendations reviewed with patient/caregiver?: Yes  Did patient/caregiver verbalize understanding of patient care needs?: Yes  Were patient/caregiver advised of the risks associated with not following Treatment Team discharge recommendations?: Yes    Contacts  Patient Contacts: Solitario Garcia (Spouse)    [phone] (H)  [phone] (M)  Relationship to Patient:: Family  Reason/Outcome: Discharge Planning    Requested Home Health Care         Is the patient interested in HHC at discharge?: No (declined when offered for SN cardio managment at home)    DME Referral  Provided  Referral made for DME?: No      Would you like to participate in our [facility] service program?  : No - Declined    Treatment Team Recommendation: Home  Discharge Destination Plan:: Home           CM offered HHA as patient is readmission for SN  Cardio and medication management. Patient declined.    CM to follow for any discharge needs.

## 2025-03-17 NOTE — DISCHARGE SUMMARY
Discharge Summary - Hospitalist   Name: Jacklyn Garcia 80 y.o. female I MRN: [id number]  Unit/Bed#: 422-01 I Date of Admission: 3/16/2025   Date of Service: 3/17/2025 I Hospital Day: 0     Assessment & Plan  Atrial flutter (HCC)  Appreciate cardiology consult  Increased to Toprol-XL 50mg bid   Continue Eliquis 5mg bid  Cardiology arranging outpatient EP follow-up for consideration of ablation   Patient ambulating without recurernt symptoms, cleared for discharge home  Hyponatremia  Hx of SIADH   Follows closely with nephrology  Continue 1.5L FR  Na at baseline  Grade II diastolic dysfunction  Last echo on 2/13/2025 reviewed EF at 60% with grade 2 diastolic dysfunction  Compensated  Continue outpatient follow-up with cardiology   Gastroesophageal reflux disease without esophagitis  Continue Prilosec  Pulmonary emphysema, unspecified emphysema type ([facility])  Not in acute exacerbation  Continue home regimen  CKD stage 3a, GFR 45-59 ml/min ([facility])  Lab Results   Component Value Date    EGFR 62 03/17/2025    EGFR 59 03/16/2025    EGFR 49 03/13/2025    CREATININE 0.89 03/17/2025    CREATININE 0.92 03/16/2025    CREATININE 1.07 03/13/2025     At baseline  Continue outpatient follow-up with nephrology     Medical Problems       Resolved Problems  Date Reviewed: 3/16/2025   None       Discharging Physician / Practitioner: Leslie Calvo PA-C  PCP: Ernie Wilkinson DO  Admission Date:   Admission Orders (From admission, onward)       Ordered        03/16/25 2219  Place in Observation  Once                          Discharge Date: 03/17/2025    Consultations During Hospital Stay:  Cardiology     Procedures Performed:   None    Significant Findings / Test Results:   CXR: No acute cardiopulmonary disease.   Na 127 > 129    Incidental Findings:   none       Test Results Pending at Discharge (will require follow up):   none     Outpatient Tests Requested:  Follow-up with cardiology & EP     Complications:  none  "    Reason for Admission: paroxysmal atrial flutter    Hospital Course:   Jacklyn Garcia is a 80 y.o. female patient who originally presented to the hospital on 3/16/2025 due to paroxysmal atrial flutter with accelerated rates. Cardiology was consulted and her AV-jose r agent was adjusted to Toprol-XL 50mg bid. Cardiology arranging outpatient follow-up with EP for consideration of ablation. Patient remained in NSR and felt well ambulating without recurrence of symptoms. Cleared for discharged with ongoing outpatient follow-up with her care team including cardiology, nephrology, PCP.          Please see above list of diagnoses and related plan for additional information.     Condition at Discharge: stable    Discharge Day Visit / Exam:   Vitals: Blood Pressure: 125/55 (03/17/25 1406)  Pulse: 73 (03/17/25 1406)  Temperature: 98.6 °F (37 °C) (03/17/25 1406)  Temp Source: Temporal (03/16/25 1846)  Respirations: 19 (03/17/25 1406)  Height: 5' 6\" (167.6 cm) (03/17/25 0708)  Weight - Scale: 75.7 kg (166 lb 14.2 oz) (03/17/25 0708)  SpO2: 94 % (03/17/25 1406)  Physical Exam  HENT:      Head: Normocephalic and atraumatic.   Cardiovascular:      Rate and Rhythm: Normal rate and regular rhythm.   Pulmonary:      Effort: Pulmonary effort is normal. No respiratory distress.      Breath sounds: Normal breath sounds.   Musculoskeletal:      Right lower leg: No edema.      Left lower leg: No edema.   Skin:     General: Skin is warm and dry.   Neurological:      General: No focal deficit present.      Mental Status: She is alert and oriented to person, place, and time.          Discussion with Family: Updated  () at bedside.    Discharge instructions/Information to patient and family:   See after visit summary for information provided to patient and family.      Provisions for Follow-Up Care:  See after visit summary for information related to follow-up care and any pertinent home health orders.  "     Mobility at time of Discharge:   Basic Mobility Inpatient Raw Score: 23  JH-HLM Goal: 7: Walk 25 feet or more  JH-HLM Achieved: 7: Walk 25 feet or more  HLM Goal achieved. Continue to encourage appropriate mobility.     Disposition:   Home    Planned Readmission: none    Discharge Medications:  See after visit summary for reconciled discharge medications provided to patient and/or family.      Administrative Statements   Discharge Statement:  I have spent a total time of 35 minutes in caring for this patient on the day of the visit/encounter. .    **Please Note: This note may have been constructed using a voice recognition system**

## 2025-03-17 NOTE — DISCHARGE INSTRUCTIONS
Please start toprol xl 25 mg two times per day, stop taking lopressor. Please call cardiology to make a follow up appointment within one week.

## 2025-03-18 ENCOUNTER — TRANSITIONAL CARE MANAGEMENT (OUTPATIENT)
Dept: FAMILY MEDICINE CLINIC | Facility: CLINIC | Age: 80
End: 2025-03-18

## 2025-03-18 LAB
ATRIAL RATE: 97 BPM
P AXIS: 77 DEGREES
PR INTERVAL: 234 MS
QRS AXIS: 71 DEGREES
QRSD INTERVAL: 84 MS
QT INTERVAL: 332 MS
QTC INTERVAL: 421 MS
T WAVE AXIS: 68 DEGREES
VENTRICULAR RATE: 97 BPM

## 2025-03-18 PROCEDURE — 93010 ELECTROCARDIOGRAM REPORT: CPT | Performed by: INTERNAL MEDICINE

## 2025-03-21 ENCOUNTER — OFFICE VISIT (OUTPATIENT)
Dept: FAMILY MEDICINE CLINIC | Facility: CLINIC | Age: 80
End: 2025-03-21
Payer: MEDICARE

## 2025-03-21 VITALS
BODY MASS INDEX: 26.68 KG/M2 | DIASTOLIC BLOOD PRESSURE: 68 MMHG | OXYGEN SATURATION: 95 % | RESPIRATION RATE: 16 BRPM | HEART RATE: 60 BPM | SYSTOLIC BLOOD PRESSURE: 130 MMHG | WEIGHT: 166 LBS | HEIGHT: 66 IN | TEMPERATURE: 96.4 F

## 2025-03-21 DIAGNOSIS — I10 ESSENTIAL HYPERTENSION: ICD-10-CM

## 2025-03-21 DIAGNOSIS — N18.31 STAGE 3A CHRONIC KIDNEY DISEASE (HCC): ICD-10-CM

## 2025-03-21 DIAGNOSIS — K21.9 GASTROESOPHAGEAL REFLUX DISEASE WITHOUT ESOPHAGITIS: ICD-10-CM

## 2025-03-21 DIAGNOSIS — E87.1 HYPONATREMIA: ICD-10-CM

## 2025-03-21 DIAGNOSIS — F51.01 PRIMARY INSOMNIA: ICD-10-CM

## 2025-03-21 DIAGNOSIS — R00.2 PALPITATIONS: ICD-10-CM

## 2025-03-21 DIAGNOSIS — I48.92 ATRIAL FLUTTER, UNSPECIFIED TYPE (HCC): Primary | ICD-10-CM

## 2025-03-21 DIAGNOSIS — E26.9 HYPERALDOSTERONISM (HCC): ICD-10-CM

## 2025-03-21 PROBLEM — R07.89 OTHER CHEST PAIN: Status: RESOLVED | Noted: 2025-03-16 | Resolved: 2025-03-21

## 2025-03-21 PROCEDURE — 99495 TRANSJ CARE MGMT MOD F2F 14D: CPT | Performed by: FAMILY MEDICINE

## 2025-03-21 NOTE — PROGRESS NOTES
FEDERICA Garcia 80 y.o. female   Date:  3/21/2025    TCM Call (since 3/7/2025)     Date and time call was made  3/18/2025 11:38 AM    Hospital care reviewed  Records reviewed    Patient was hospitialized at  [facility]    Date of Admission  03/16/25    Date of discharge  03/17/25    Diagnosis  other chest pain - atrial flutter    Disposition  Home    Were the patients medications reviewed and updated  No  STOP - Lopressor 25 mg START - Toprol XL 50 mg BID    Current Symptoms  None      TCM Call (since 3/7/2025)     Post hospital issues  None    Scheduled for follow up?  Yes    Patients specialists  Cardiologist; Nephrologist    Cardiologist name  Dr. Mairn apt 3/25/2025 @ 2:30 pm    Nephrologist name  Dr. Cortes apt 3/31/25 @ 9:00 am    Did you obtain your prescribed medications  Yes    Do you need help managing your prescriptions or medications  No    Is transportation to your appointment needed  No    I have advised the patient to call PCP with any new or worsening symptoms  Maday Wilkinson MA    Living Arrangements  Spouse or Significiant other    Support System  Spouse    The type of support provided  Emotional; Physical    Are you recieving home care services  No        Hospital records were reviewed. Medications upon discharge reviewed/updated.   Discharge Disposition:    Follow up visits with other specialists:       Assessment and Plan:  Patient presented to the emergency department March 16 with chest pain shortness of breath and palpitations.  Her heart rate was 170s at home.  Patient previously was hospitalized for hyponatremia and found to be in atrial flutter and started on metoprolol 25 mg twice daily    During hospitalization patient had Toprol-XL increased to 50 mg twice daily and was started on Eliquis 5 mg to take twice daily.  The patient will be seeing electrophysiology in 4 days.  They are considering ablation    Hyponatremia with a history of SIADH follows closely with  nephrology she is on 1.5 L fluid restriction    Patient has a grade 2 diastolic dysfunction with an echocardiogram February 13, 2025 showed an ejection fraction of 60%    Patient has gastroesophageal reflux without esophagitis treated with Prilosec 20 mg daily    Patient has chronic kidney disease stage IIIa followed by nephrology    The patient states that she had palpitations this morning and took Toprol XL 50 mg 1 hour earlier than scheduled.  I advised the patient that she may take Toprol-XL 25 additional if needed.    The patient suffers from migraine headaches treated with Fioricet    Patient also has primary insomnia treated with Ambien  Jacklyn was seen today for transition of care management and atrial flutter.    Diagnoses and all orders for this visit:    Atrial flutter, unspecified type (HCC)  -     apixaban (Eliquis) 5 mg; Take 1 tablet (5 mg total) by mouth 2 (two) times a day    Stage 3a chronic kidney disease (HCC)    Hyponatremia    Hyperaldosteronism (HCC)    Essential hypertension    Palpitations    Gastroesophageal reflux disease without esophagitis    Primary insomnia            HPI:  Patient presents for transition of care management she was hospitalized at [facility] from March 16 through March 17 with atrial flutter        ROS: Review of Systems   Constitutional:  Negative for chills and fever.   HENT:  Negative for ear pain and sore throat.    Eyes:  Negative for pain and visual disturbance.   Respiratory:  Negative for cough and shortness of breath.    Cardiovascular:  Positive for palpitations. Negative for chest pain.        Patient complained of palpitations this morning she took her Toprol XL 51-hour earlier than scheduled palpitations did resolve   Gastrointestinal:  Negative for abdominal pain and vomiting.   Genitourinary:  Negative for dysuria and hematuria.   Musculoskeletal:  Negative for arthralgias and back pain.   Skin:  Negative for color change and rash.   Neurological:   Negative for seizures and syncope.   All other systems reviewed and are negative.      Past Medical History:   Diagnosis Date   • Allergies    • Anxiety    • Arthritis    • Benign arteriolar nephrosclerosis    • Bereavement    • Cataract    • Chronic kidney disease    • Chronic kidney disease in type 2 diabetes mellitus (HCC)    • Chronic kidney disease, stage 2 (mild)    • Chronic pain disorder    • Chronic pain syndrome    • COPD (chronic obstructive pulmonary disease) ([facility])    • DDD (degenerative disc disease), cervical    • DDD (degenerative disc disease), lumbar    • Degenerative joint disease of right hip    • Depression    • Diastolic dysfunction    • DJD (degenerative joint disease), ankle and foot, right    • DJD of right shoulder    • Edema    • Fracture of left calcaneus    • Generalized anxiety disorder    • GERD (gastroesophageal reflux disease)    • Heart murmur    • Hyperaldosteronism ([facility])    • Hyperlipidemia    • Hypertension    • Hypertensive nephrosclerosis    • Hypo-osmolality and hyponatremia    • IBS (irritable bowel syndrome)    • Insomnia    • Migraines    • Mitral regurgitation    • MVP (mitral valve prolapse)    • Obstructive sleep apnea syndrome    • Osteoarthritis    • Osteoarthritis    • Pernicious anemia    • Plantar fascia rupture    • Rheumatoid arthritis ([facility])    • Right knee DJD    • S/P insertion of spinal cord stimulator    • Shingles    • Sinobronchitis    • Sleep apnea    • Status post total right knee replacement 07/07/2020   • Stroke ([facility])     tia   • TIA (transient ischemic attack)    • Vertigo        Past Surgical History:   Procedure Laterality Date   • APPENDECTOMY     • CARPAL TUNNEL RELEASE Bilateral    • COLONOSCOPY      several   • COLONOSCOPY  04/02/2012    by Dr. Mckinley Ruvalcaba   • CORNEAL TRANSPLANT Left 04/11/2022   • DXA PROCEDURE (HISTORICAL)  10/26/2016, 9/17/2014, 6/18/2007   • HAND SURGERY Right     ring finger has pin   • HYSTERECTOMY      34   • INSERT /  REPLACE PERIPHERAL NEUROSTIMULATOR PULSE GENERATOR /  Left     buttocks   • JOINT REPLACEMENT Left     knee   • JOINT REPLACEMENT Right 04/2019    Hip   • KNEE ARTHROPLASTY Left 01/15/2009    by Dr. Avery Leigh at [facility]    • KNEE ARTHROSCOPY Bilateral    • KNEE CARTILAGE SURGERY  09/10/2009    by Dr. Avery Leigh at [facility]    • MAMMO (HISTORICAL)  12/10/2018, 10/30/2017, 10/26/2016   • NASAL SEPTUM SURGERY  2008   • NERVE BLOCK Left 02/20/2018    Procedure: BLOCK MEDIAL BRANCH - C4, C5, C6;  Surgeon: Jaycob Cruz MD;  Location: [facility];  Service: Pain Management    • NERVE BLOCK Left 03/06/2018    Procedure: C4, C5, C6 MEDIAL BRANCH BLOCK;  Surgeon: Jaycob Cruz MD;  Location: [facility];  Service: Pain Management    • NERVE BLOCK Left 2/20/2025    Procedure: BLOCK MEDIAL BRANCH Left C4-5 and C5-6;  Surgeon: Jaycob Cruz MD;  Location: [facility];  Service: Pain Management    • UT ARTHRP ACETBLR/PROX FEM PROSTC AGRFT/ALGRFT Right 02/27/2019    Procedure: ARTHROPLASTY HIP TOTAL;  Surgeon: Bruno Pina DO;  Location:  MAIN OR;  Service: Orthopedics   • UT ARTHRP KNE CONDYLE&PLATU MEDIAL&LAT COMPARTMENTS Right 06/24/2020    Procedure: KNEE TOTAL ARTHROPLASTY;  Surgeon: Bruno Pina DO;  Location:  MAIN OR;  Service: Orthopedics   • UT COLONOSCOPY FLX DX W/COLLJ SPEC WHEN PFRMD N/A 04/24/2017    Procedure: FLEXIBLE COLONOSCOPY;  Surgeon: Mckinley Ruvalcaba MD;  Location: [facility];  Service: Colorectal   • RADIOFREQUENCY ABLATION Left 04/17/2018    Procedure: ABLATION RADIO FREQUENCY (RFA) - C4, C5, C6;  Surgeon: Jaycob Cruz MD;  Location: [facility];  Service: Pain Management    • REPLACEMENT TOTAL KNEE Left 07/05/2011   • SINUS SURGERY     • TONSILLECTOMY     • TOTAL HIP ARTHROPLASTY Left    • TRIGGER FINGER RELEASE      R 4th    • UPPER GASTROINTESTINAL ENDOSCOPY  01/12/2007    with Donaldson dilatation [facility]  [place]   • WRIST SURGERY Right        Social History     Socioeconomic History   • Marital status: /Civil Union     Spouse name: None   • Number of children: None   • Years of education: None   • Highest education level: None   Occupational History   • Occupation: Admnistrator    Tobacco Use   • Smoking status: Former     Types: Cigarettes   • Smokeless tobacco: Never   • Tobacco comments:     quit 40 yrs ago   Vaping Use   • Vaping status: Never Used   Substance and Sexual Activity   • Alcohol use: Yes     Comment: 2 beer a week   • Drug use: Never   • Sexual activity: Not Currently     Birth control/protection: Post-menopausal   Other Topics Concern   • None   Social History Narrative    Patient has never smoked - As per [employer]    Consumes 1-2 beers per week - As per [employer]    Consumes on average 1 cup of decaf coffee per day      Social Drivers of Health     Financial Resource Strain: Low Risk  (3/17/2023)    Overall Financial Resource Strain (CARDIA)    • Difficulty of Paying Living Expenses: Not very hard   Food Insecurity: No Food Insecurity (3/17/2025)    Nursing - Inadequate Food Risk Classification    • Worried About Running Out of Food in the Last Year: Not on file    • Ran Out of Food in the Last Year: Not on file    • Ran Out of Food in the Last Year: Never true   Transportation Needs: No Transportation Needs (3/17/2025)    Nursing - Transportation Risk Classification    • Lack of Transportation: Not on file    • Lack of Transportation: No   Physical Activity: Not on file   Stress: Not on file   Social Connections: Not on file   Intimate Partner Violence: Unknown (3/17/2025)    Nursing IPS    • Feels Physically and Emotionally Safe: Not on file    • Physically Hurt by Someone: Not on file    • Humiliated or Emotionally Abused by Someone: Not on file    • Physically Hurt by Someone: No    • Hurt or Threatened by Someone: No   Housing Stability: Unknown (3/17/2025)    Nursing: Inadequate  Housing Risk Classification    • Has Housing: Not on file    • Worried About Losing Housing: Not on file    • Unable to Get Utilities: Not on file    • Unable to Pay for Housing in the Last Year: No    • Has Housin       Family History   Problem Relation Age of Onset   • Heart disease Mother    • Hypertension Mother    • Arthritis Mother    • Dementia Mother    • Rheum arthritis Mother    • Hypertension Father    • Heart disease Father    • Colon cancer Father    • Hypertension Sister    • Anxiety disorder Sister    • Thyroid disease Sister    • Prostate cancer Maternal Grandfather    • No Known Problems Paternal Grandmother    • No Known Problems Paternal Grandfather    • No Known Problems Paternal Aunt    • Pseudochol deficiency Neg Hx        Allergies   Allergen Reactions   • Procaine Hives   • Adhesive [Medical Tape] Rash   • Lidocaine Rash   • Penicillins Rash         Current Outpatient Medications:   •  apixaban (Eliquis) 5 mg, Take 1 tablet (5 mg total) by mouth 2 (two) times a day, Disp: 56 tablet, Rfl: 0  •  apixaban (ELIQUIS) 5 mg, Take 1 tablet (5 mg total) by mouth 2 (two) times a day, Disp: 60 tablet, Rfl: 0  •  bacitracin-polymyxin b ophthalmic ointment, INSTILL A 1/4 INCH INTO LEFT EYE AT BEDTIME, Disp: , Rfl:   •  butalbital-acetaminophen-caffeine (FIORICET,ESGIC) -40 mg per tablet, Take 1 tablet by mouth every 6 (six) hours as needed for migraine Do not start before 2025., Disp: 120 tablet, Rfl: 3  •  calcium carbonate (OS-MARLENY) 600 MG tablet, Take 600 mg by mouth 2 (two) times a day with meals, Disp: , Rfl:   •  clindamycin (CLEOCIN) 150 mg capsule, take 4 capsules 1 hour before APPOINTMENT (Patient not taking: Reported on 3/9/2025), Disp: , Rfl:   •  Ergocalciferol (VITAMIN D2 PO), Take 1,000 Units by mouth in the morning, Disp: , Rfl:   •  fluorometholone (FML) 0.1 % ophthalmic suspension, INSTILL 1 DROP BY OPTHALMIC ROUTE FOUR TIMES DAILY INTO LEFT EYE (Patient not taking:  "Reported on 3/9/2025), Disp: , Rfl:   •  fluticasone (FLONASE) 50 mcg/act nasal spray, 2 sprays into each nostril daily, Disp: , Rfl:   •  losartan (COZAAR) 100 MG tablet, TAKE 1 TABLET EVERY MORNING, Disp: 90 tablet, Rfl: 1  •  magnesium (MAGTAB) 84 MG (7MEQ) TBCR, Take 1 tablet (84 mg total) by mouth daily, Disp: 90 tablet, Rfl: 1  •  meclizine (ANTIVERT) 12.5 MG tablet, Take 1 tablet (12.5 mg total) by mouth 3 (three) times a day as needed for dizziness, Disp: 30 tablet, Rfl: 0  •  metoprolol succinate (TOPROL-XL) 50 mg 24 hr tablet, Take 1 tablet (50 mg total) by mouth 2 (two) times a day, Disp: 60 tablet, Rfl: 0  •  Multiple Vitamins-Minerals (MULTIVITAMIN WITH MINERALS) tablet, Take 1 tablet by mouth every morning, Disp: , Rfl:   •  neomycin-bacitracin-polymyxin (NEOSPORIN) ophthalmic ointment, INSTILL 1/4' INTO LEFT EYE AT BEDTIME, Disp: , Rfl:   •  omeprazole (PriLOSEC) 20 mg delayed release capsule, TAKE 1 CAPSULE TWICE DAILY, Disp: 180 capsule, Rfl: 1  •  ondansetron (ZOFRAN) 4 mg tablet, Take 1 tablet (4 mg total) by mouth every 8 (eight) hours as needed for nausea or vomiting (Patient not taking: Reported on 3/9/2025), Disp: 20 tablet, Rfl: 0  •  pravastatin (PRAVACHOL) 40 mg tablet, TAKE 1 TABLET EVERY DAY, Disp: 90 tablet, Rfl: 1  •  prochlorperazine (COMPAZINE) 5 mg tablet, Take 5 mg by mouth every 6 (six) hours as needed for nausea or vomiting, Disp: , Rfl:   •  spironolactone (ALDACTONE) 50 mg tablet, TAKE 1 TABLET DAILY AFTER LUNCH, Disp: 90 tablet, Rfl: 1  •  zolpidem (AMBIEN) 5 mg tablet, Take 1 tablet (5 mg total) by mouth daily at bedtime as needed for sleep Do not start before March 8, 2025., Disp: 30 tablet, Rfl: 3      Physical Exam:  /68   Pulse 60   Temp (!) 96.4 °F (35.8 °C)   Resp 16   Ht 5' 6\" (1.676 m)   Wt 75.3 kg (166 lb)   SpO2 95%   BMI 26.79 kg/m²     Physical Exam  Constitutional:       Appearance: Normal appearance. She is well-developed.   HENT:      Head: " Normocephalic and atraumatic.      Right Ear: Tympanic membrane, ear canal and external ear normal.      Left Ear: Tympanic membrane, ear canal and external ear normal.      Nose: Nose normal.      Mouth/Throat:      Mouth: Mucous membranes are moist.      Pharynx: Oropharynx is clear.   Eyes:      Extraocular Movements: Extraocular movements intact.      Conjunctiva/sclera: Conjunctivae normal.      Pupils: Pupils are equal, round, and reactive to light.   Cardiovascular:      Rate and Rhythm: Normal rate and regular rhythm.      Pulses: Normal pulses.      Heart sounds: Normal heart sounds.   Pulmonary:      Effort: Pulmonary effort is normal.      Breath sounds: Normal breath sounds.   Abdominal:      General: Abdomen is flat. Bowel sounds are normal.      Palpations: Abdomen is soft.      Tenderness: There is no abdominal tenderness.   Musculoskeletal:         General: Normal range of motion.      Cervical back: Normal range of motion and neck supple.   Skin:     General: Skin is warm and dry.      Capillary Refill: Capillary refill takes less than 2 seconds.   Neurological:      General: No focal deficit present.      Mental Status: She is alert and oriented to person, place, and time.   Psychiatric:         Mood and Affect: Mood normal.         Behavior: Behavior normal.         Thought Content: Thought content normal.         Judgment: Judgment normal.             Labs:  Lab Results   Component Value Date    WBC 6.16 03/17/2025    HGB 12.4 03/17/2025    HCT 36.7 03/17/2025    MCV 89 03/17/2025     03/17/2025     Lab Results   Component Value Date     (L) 09/21/2015    K 4.3 03/17/2025    CL 95 (L) 03/17/2025    CO2 27 03/17/2025    ANIONGAP 7 09/21/2015    BUN 12 03/17/2025    CREATININE 0.89 03/17/2025    GLUCOSE 91 09/21/2015    GLUF 92 03/17/2025    CALCIUM 9.8 03/17/2025    AST 17 03/17/2025    ALT 16 03/17/2025    ALKPHOS 109 (H) 03/17/2025    PROT 6.9 09/21/2015    BILITOT 0.29 09/21/2015     EGFR 62 03/17/2025

## 2025-03-21 NOTE — PROGRESS NOTES
A  Jacklyn Garcia 80 y.o. female   Date:  3/21/2025    TCM Call (since 3/7/2025)     Date and time call was made  3/18/2025 11:38 AM    Hospital care reviewed  Records reviewed    Patient was hospitialized at  [facility]    Date of Admission  03/16/25    Date of discharge  03/17/25    Diagnosis  other chest pain - atrial flutter    Disposition  Home    Were the patients medications reviewed and updated  No  STOP - Lopressor 25 mg START - Toprol XL 50 mg BID    Current Symptoms  None      TCM Call (since 3/7/2025)     Post hospital issues  None    Scheduled for follow up?  Yes    Patients specialists  Cardiologist; Nephrologist    Cardiologist name  Dr. Marin apt 3/25/2025 @ 2:30 pm    Nephrologist name  Dr. Cortes apt 3/31/25 @ 9:00 am    Did you obtain your prescribed medications  Yes    Do you need help managing your prescriptions or medications  No    Is transportation to your appointment needed  No    I have advised the patient to call PCP with any new or worsening symptoms  Maday Wilkinson MA    Living Arrangements  Spouse or Significiant other    Support System  Spouse    The type of support provided  Emotional; Physical    Are you recieving home care services  No        Hospital records were reviewed. Medications upon discharge reviewed/updated.   Discharge Disposition:    Follow up visits with other specialists:       Assessment and Plan:    Jacklyn was seen today for transition of care management and atrial flutter.    Diagnoses and all orders for this visit:    Atrial flutter, unspecified type (HCC)            HPI:  HPI    ROS: Review of Systems    Past Medical History:   Diagnosis Date   • Allergies    • Anxiety    • Arthritis    • Benign arteriolar nephrosclerosis    • Bereavement    • Cataract    • Chronic kidney disease    • Chronic kidney disease in type 2 diabetes mellitus (HCC)    • Chronic kidney disease, stage 2 (mild)    • Chronic pain disorder    • Chronic pain syndrome    • COPD  (chronic obstructive pulmonary disease) ([facility])    • DDD (degenerative disc disease), cervical    • DDD (degenerative disc disease), lumbar    • Degenerative joint disease of right hip    • Depression    • Diastolic dysfunction    • DJD (degenerative joint disease), ankle and foot, right    • DJD of right shoulder    • Edema    • Fracture of left calcaneus    • Generalized anxiety disorder    • GERD (gastroesophageal reflux disease)    • Heart murmur    • Hyperaldosteronism ([facility])    • Hyperlipidemia    • Hypertension    • Hypertensive nephrosclerosis    • Hypo-osmolality and hyponatremia    • IBS (irritable bowel syndrome)    • Insomnia    • Migraines    • Mitral regurgitation    • MVP (mitral valve prolapse)    • Obstructive sleep apnea syndrome    • Osteoarthritis    • Osteoarthritis    • Pernicious anemia    • Plantar fascia rupture    • Rheumatoid arthritis ([facility])    • Right knee DJD    • S/P insertion of spinal cord stimulator    • Shingles    • Sinobronchitis    • Sleep apnea    • Status post total right knee replacement 07/07/2020   • Stroke ([facility])     tia   • TIA (transient ischemic attack)    • Vertigo        Past Surgical History:   Procedure Laterality Date   • APPENDECTOMY     • CARPAL TUNNEL RELEASE Bilateral    • COLONOSCOPY      several   • COLONOSCOPY  04/02/2012    by Dr. Mckinley Ruvalcaba   • CORNEAL TRANSPLANT Left 04/11/2022   • DXA PROCEDURE (HISTORICAL)  10/26/2016, 9/17/2014, 6/18/2007   • HAND SURGERY Right     ring finger has pin   • HYSTERECTOMY      34   • INSERT / REPLACE PERIPHERAL NEUROSTIMULATOR PULSE GENERATOR /  Left     buttocks   • JOINT REPLACEMENT Left     knee   • JOINT REPLACEMENT Right 04/2019    Hip   • KNEE ARTHROPLASTY Left 01/15/2009    by Dr. Avery Leigh at [facility]    • KNEE ARTHROSCOPY Bilateral    • KNEE CARTILAGE SURGERY  09/10/2009    by Dr. Avery Leigh at [facility]    • MAMMO (HISTORICAL)  12/10/2018, 10/30/2017, 10/26/2016   •  NASAL SEPTUM SURGERY  2008   • NERVE BLOCK Left 02/20/2018    Procedure: BLOCK MEDIAL BRANCH - C4, C5, C6;  Surgeon: Jaycob Cruz MD;  Location: [facility];  Service: Pain Management    • NERVE BLOCK Left 03/06/2018    Procedure: C4, C5, C6 MEDIAL BRANCH BLOCK;  Surgeon: Jaycob Cruz MD;  Location: [facility];  Service: Pain Management    • NERVE BLOCK Left 2/20/2025    Procedure: BLOCK MEDIAL BRANCH Left C4-5 and C5-6;  Surgeon: Jaycob Cruz MD;  Location: [facility];  Service: Pain Management    • PA ARTHRP ACETBLR/PROX FEM PROSTC AGRFT/ALGRFT Right 02/27/2019    Procedure: ARTHROPLASTY HIP TOTAL;  Surgeon: Bruno Pina DO;  Location:  MAIN OR;  Service: Orthopedics   • PA ARTHRP KNE CONDYLE&PLATU MEDIAL&LAT COMPARTMENTS Right 06/24/2020    Procedure: KNEE TOTAL ARTHROPLASTY;  Surgeon: Bruno Pina DO;  Location:  MAIN OR;  Service: Orthopedics   • PA COLONOSCOPY FLX DX W/COLLJ SPEC WHEN PFRMD N/A 04/24/2017    Procedure: FLEXIBLE COLONOSCOPY;  Surgeon: Mckinley Ruvalcaba MD;  Location: [facility];  Service: Colorectal   • RADIOFREQUENCY ABLATION Left 04/17/2018    Procedure: ABLATION RADIO FREQUENCY (RFA) - C4, C5, C6;  Surgeon: Jaycob Cruz MD;  Location: [facility];  Service: Pain Management    • REPLACEMENT TOTAL KNEE Left 07/05/2011   • SINUS SURGERY     • TONSILLECTOMY     • TOTAL HIP ARTHROPLASTY Left    • TRIGGER FINGER RELEASE      R 4th    • UPPER GASTROINTESTINAL ENDOSCOPY  01/12/2007    with Donaldson dilatation by Dr. Misael Santiago at [facility]   • WRIST SURGERY Right        Social History     Socioeconomic History   • Marital status: /Civil Union     Spouse name: None   • Number of children: None   • Years of education: None   • Highest education level: None   Occupational History   • Occupation: Admnistrator    Tobacco Use   • Smoking status: Former     Types: Cigarettes   • Smokeless tobacco: Never   • Tobacco comments:     quit 40 yrs ago   Vaping Use   •  Vaping status: Never Used   Substance and Sexual Activity   • Alcohol use: Yes     Comment: 2 beer a week   • Drug use: Never   • Sexual activity: Not Currently     Birth control/protection: Post-menopausal   Other Topics Concern   • None   Social History Narrative    Patient has never smoked - As per [employer]    Consumes 1-2 beers per week - As per [employer]    Consumes on average 1 cup of decaf coffee per day      Social Drivers of Health     Financial Resource Strain: Low Risk  (3/17/2023)    Overall Financial Resource Strain (CARDIA)    • Difficulty of Paying Living Expenses: Not very hard   Food Insecurity: No Food Insecurity (3/17/2025)    Nursing - Inadequate Food Risk Classification    • Worried About Running Out of Food in the Last Year: Not on file    • Ran Out of Food in the Last Year: Not on file    • Ran Out of Food in the Last Year: Never true   Transportation Needs: No Transportation Needs (3/17/2025)    Nursing - Transportation Risk Classification    • Lack of Transportation: Not on file    • Lack of Transportation: No   Physical Activity: Not on file   Stress: Not on file   Social Connections: Not on file   Intimate Partner Violence: Unknown (3/17/2025)    Nursing IPS    • Feels Physically and Emotionally Safe: Not on file    • Physically Hurt by Someone: Not on file    • Humiliated or Emotionally Abused by Someone: Not on file    • Physically Hurt by Someone: No    • Hurt or Threatened by Someone: No   Housing Stability: Unknown (3/17/2025)    Nursing: Inadequate Housing Risk Classification    • Has Housing: Not on file    • Worried About Losing Housing: Not on file    • Unable to Get Utilities: Not on file    • Unable to Pay for Housing in the Last Year: No    • Has Housin       Family History   Problem Relation Age of Onset   • Heart disease Mother    • Hypertension Mother    • Arthritis Mother    • Dementia Mother    • Rheum arthritis Mother    • Hypertension Father    • Heart disease Father    •  Colon cancer Father    • Hypertension Sister    • Anxiety disorder Sister    • Thyroid disease Sister    • Prostate cancer Maternal Grandfather    • No Known Problems Paternal Grandmother    • No Known Problems Paternal Grandfather    • No Known Problems Paternal Aunt    • Pseudochol deficiency Neg Hx        Allergies   Allergen Reactions   • Procaine Hives   • Adhesive [Medical Tape] Rash   • Lidocaine Rash   • Penicillins Rash         Current Outpatient Medications:   •  apixaban (ELIQUIS) 5 mg, Take 1 tablet (5 mg total) by mouth 2 (two) times a day, Disp: 60 tablet, Rfl: 0  •  bacitracin-polymyxin b ophthalmic ointment, INSTILL A 1/4 INCH INTO LEFT EYE AT BEDTIME, Disp: , Rfl:   •  butalbital-acetaminophen-caffeine (FIORICET,ESGIC) -40 mg per tablet, Take 1 tablet by mouth every 6 (six) hours as needed for migraine Do not start before March 16, 2025., Disp: 120 tablet, Rfl: 3  •  calcium carbonate (OS-MARLENY) 600 MG tablet, Take 600 mg by mouth 2 (two) times a day with meals, Disp: , Rfl:   •  clindamycin (CLEOCIN) 150 mg capsule, take 4 capsules 1 hour before APPOINTMENT (Patient not taking: Reported on 3/9/2025), Disp: , Rfl:   •  Ergocalciferol (VITAMIN D2 PO), Take 1,000 Units by mouth in the morning, Disp: , Rfl:   •  fluorometholone (FML) 0.1 % ophthalmic suspension, INSTILL 1 DROP BY OPTHALMIC ROUTE FOUR TIMES DAILY INTO LEFT EYE (Patient not taking: Reported on 3/9/2025), Disp: , Rfl:   •  fluticasone (FLONASE) 50 mcg/act nasal spray, 2 sprays into each nostril daily, Disp: , Rfl:   •  losartan (COZAAR) 100 MG tablet, TAKE 1 TABLET EVERY MORNING, Disp: 90 tablet, Rfl: 1  •  magnesium (MAGTAB) 84 MG (7MEQ) TBCR, Take 1 tablet (84 mg total) by mouth daily, Disp: 90 tablet, Rfl: 1  •  meclizine (ANTIVERT) 12.5 MG tablet, Take 1 tablet (12.5 mg total) by mouth 3 (three) times a day as needed for dizziness, Disp: 30 tablet, Rfl: 0  •  metoprolol succinate (TOPROL-XL) 50 mg 24 hr tablet, Take 1 tablet (50  "mg total) by mouth 2 (two) times a day, Disp: 60 tablet, Rfl: 0  •  Multiple Vitamins-Minerals (MULTIVITAMIN WITH MINERALS) tablet, Take 1 tablet by mouth every morning, Disp: , Rfl:   •  neomycin-bacitracin-polymyxin (NEOSPORIN) ophthalmic ointment, INSTILL 1/4' INTO LEFT EYE AT BEDTIME, Disp: , Rfl:   •  omeprazole (PriLOSEC) 20 mg delayed release capsule, TAKE 1 CAPSULE TWICE DAILY, Disp: 180 capsule, Rfl: 1  •  ondansetron (ZOFRAN) 4 mg tablet, Take 1 tablet (4 mg total) by mouth every 8 (eight) hours as needed for nausea or vomiting (Patient not taking: Reported on 3/9/2025), Disp: 20 tablet, Rfl: 0  •  pravastatin (PRAVACHOL) 40 mg tablet, TAKE 1 TABLET EVERY DAY, Disp: 90 tablet, Rfl: 1  •  prochlorperazine (COMPAZINE) 5 mg tablet, Take 5 mg by mouth every 6 (six) hours as needed for nausea or vomiting, Disp: , Rfl:   •  spironolactone (ALDACTONE) 50 mg tablet, TAKE 1 TABLET DAILY AFTER LUNCH, Disp: 90 tablet, Rfl: 1  •  zolpidem (AMBIEN) 5 mg tablet, Take 1 tablet (5 mg total) by mouth daily at bedtime as needed for sleep Do not start before March 8, 2025., Disp: 30 tablet, Rfl: 3      Physical Exam:  /68   Pulse 60   Temp (!) 96.4 °F (35.8 °C)   Resp 16   Ht 5' 6\" (1.676 m)   Wt 75.3 kg (166 lb)   SpO2 95%   BMI 26.79 kg/m²     Physical Exam        Labs:  Lab Results   Component Value Date    WBC 6.16 03/17/2025    HGB 12.4 03/17/2025    HCT 36.7 03/17/2025    MCV 89 03/17/2025     03/17/2025     Lab Results   Component Value Date     (L) 09/21/2015    K 4.3 03/17/2025    CL 95 (L) 03/17/2025    CO2 27 03/17/2025    ANIONGAP 7 09/21/2015    BUN 12 03/17/2025    CREATININE 0.89 03/17/2025    GLUCOSE 91 09/21/2015    GLUF 92 03/17/2025    CALCIUM 9.8 03/17/2025    AST 17 03/17/2025    ALT 16 03/17/2025    ALKPHOS 109 (H) 03/17/2025    PROT 6.9 09/21/2015    BILITOT 0.29 09/21/2015    EGFR 62 03/17/2025     "

## 2025-03-24 ENCOUNTER — OFFICE VISIT (OUTPATIENT)
Dept: PAIN MEDICINE | Facility: CLINIC | Age: 80
End: 2025-03-24
Payer: MEDICARE

## 2025-03-24 VITALS
BODY MASS INDEX: 26.45 KG/M2 | HEIGHT: 66 IN | OXYGEN SATURATION: 98 % | HEART RATE: 78 BPM | RESPIRATION RATE: 16 BRPM | TEMPERATURE: 98 F | WEIGHT: 164.6 LBS

## 2025-03-24 DIAGNOSIS — M47.812 CERVICAL SPONDYLOSIS: Primary | ICD-10-CM

## 2025-03-24 DIAGNOSIS — G89.4 CHRONIC PAIN SYNDROME: ICD-10-CM

## 2025-03-24 PROCEDURE — 99213 OFFICE O/P EST LOW 20 MIN: CPT | Performed by: ANESTHESIOLOGY

## 2025-03-24 NOTE — PATIENT INSTRUCTIONS
Patient Education     Neck Pain Exercises   About this topic   The neck or cervical spine has 7 spinal bones that run from the base of your skull to the upper back. These spinal bones have discs in between them. Discs act as shock absorbers. Ligaments are strong bands of tissue that hold the bones together. Many muscles surround and attach on these bones. Nerves come off of the spinal cord and exit out of small spaces in between the spinal bones. You can have neck pain if any of these are injured or damaged. Exercises may help to make this problem better.  General   Before starting with a program, ask your doctor if you are healthy enough to do these exercises. Your doctor may have you work with a  or physical therapist to make a safe exercise program to meet your needs. You should not do the exercises if they cause sharp pains, if you feel dizzy, or if you have vision changes.  Stretching Exercises   Stretching exercises keep your muscles flexible. They also stop them from getting tight. Start by doing each of these stretches 2 to 3 times. In order for your body to make changes, you will need to hold these stretches for 20 to 30 seconds. Try to do the stretches 2 to 3 times each day. Do all exercises slowly.  Passive neck stretches:  Put your left hand on top of your head. Your other arm can be at your side or behind your back. Pull your head toward your left shoulder until you feel a gentle stretch on the right side of your neck. Repeat on the other side using your other hand.  Also, try this stretch by pulling in a diagonal direction. With your left hand on top of your head, pull your head down towards the direction of your left knee. You should feel this stretch toward the back on the right side of your neck. Repeat on the other side.  Active neck stretches:  Neck front-to-back motion ? Look down to the floor until you feel a stretch in the back of your neck. Hold. Next, look up to the ceiling until you  feel a stretch in the front of your neck. Hold.  Neck side-to-side motion ? Tilt your head to the side and bring your ear to your shoulder until you feel a stretch on the other side of your neck. Hold. Next, tilt your head to the other side until you feel a stretch. Hold.  Neck turning ? Turn only your head and look over your left shoulder until you feel stretching in the right side of your neck. Hold. Now turn only your head and look over your right shoulder until you feel a stretch in the left side of your neck. Hold.  Scalene stretches ? Grasp your head with the hand opposite the side you want to stretch. Pull your head to the side until you feel a stretch. Now, slowly turn your head so your chin is pointed upwards.  Chin tucks ? Stand straight or lie down on your back. Tuck your chin in and lengthen the back of your neck. Return to the starting position and repeat. It may help to stand up against a wall during this exercise. Try gently pushing your chin with two fingers while trying to flatten your neck against the wall. If you do this exercise lying down, try using a small rolled up washcloth under your neck. Push down into the washcloth when tucking in your chin.  Strengthening Exercises   Strengthening exercises keep your muscles firm and strong. Start by repeating each exercise 2 to 3 times. Work up to doing each exercise 10 times. Try to do the exercises 2 to 3 times each day. Hold each exercise for 3 to 5 seconds. Do all exercises slowly.  Shoulder blade squeezes ? Pinch your shoulder blades together on your upper back and hold 3 to 5 seconds. Relax.             What will the results be?   Less pain and stiffness  Better range of motion  Increased strength  Help you heal faster after an injury or surgery  Increase blood flow to a body part  Help you feel better and more relaxed  Give you more energy  More toned looking muscles  Better posture  Easier to do daily activities  Helpful tips   Stay active and  work out to keep your muscles strong and flexible.  Be sure you do not hold your breath when exercising. This can raise your blood pressure. If you tend to hold your breath, try counting out loud when exercising. If any exercise bothers you, stop right away.  Try swinging your arms at an easy pace for a few minutes to warm up your muscles. Do this again after exercising.  Doing exercises before a meal may be a good way to get into a routine.  Exercise may be slightly uncomfortable, but you should not have sharp pains. If you do get sharp pains, stop what you are doing. If the sharp pains continue, call your doctor.  Last Reviewed Date   2020-03-10  Consumer Information Use and Disclaimer   This generalized information is a limited summary of diagnosis, treatment, and/or medication information. It is not meant to be comprehensive and should be used as a tool to help the user understand and/or assess potential diagnostic and treatment options. It does NOT include all information about conditions, treatments, medications, side effects, or risks that may apply to a specific patient. It is not intended to be medical advice or a substitute for the medical advice, diagnosis, or treatment of a health care provider based on the health care provider's examination and assessment of a patient’s specific and unique circumstances. Patients must speak with a health care provider for complete information about their health, medical questions, and treatment options, including any risks or benefits regarding use of medications. This information does not endorse any treatments or medications as safe, effective, or approved for treating a specific patient. [employer] and its affiliates disclaim any warranty or liability relating to this information or the use thereof. The use of this information is governed by the Terms of Use, available at https://www.wolters[employer]Puwer.com/en/know/clinical-effectiveness-terms   Copyright   Copyright ©  2024 [employer] and its affiliates and/or licensors. All rights reserved.

## 2025-03-24 NOTE — PROGRESS NOTES
Assessment:  1. Cervical spondylosis    2. Chronic pain syndrome        Plan:  Patient is a 79-year-old female complains of neck pain and bilateral shoulder pain with chronic pain syndrome secondary to cervical spondylosis and cervical degenerative disc disease presents to office for for follow-up visit.  Patient is status post left C4-C5 and C5-C6 medial branch block performed on 2/20/2025 to which she continues to have ongoing relief of 95%.  1.  Patient will call when the pain returns we can schedule her for medial branch #2      History of Present Illness:  The patient is a 80 y.o. female who presents for a follow up office visit in regards to Back Pain.   The patient’s current symptoms include 1 out of 10 pain    Current pain medications includes:  none.    I have personally reviewed and/or updated the patient's past medical history, past surgical history, family history, social history, current medications, allergies, and vital signs today.         Review of Systems  Review of Systems   Cardiovascular:  Positive for chest pain.   Musculoskeletal:  Positive for back pain and neck pain.   All other systems reviewed and are negative.          Past Medical History:   Diagnosis Date    Allergies     Anxiety     Arthritis     Benign arteriolar nephrosclerosis     Bereavement     Cataract     Chronic kidney disease     Chronic kidney disease in type 2 diabetes mellitus (HCC)     Chronic kidney disease, stage 2 (mild)     Chronic pain disorder     Chronic pain syndrome     COPD (chronic obstructive pulmonary disease) ([facility])     DDD (degenerative disc disease), cervical     DDD (degenerative disc disease), lumbar     Degenerative joint disease of right hip     Depression     Diastolic dysfunction     DJD (degenerative joint disease), ankle and foot, right     DJD of right shoulder     Edema     Fracture of left calcaneus     Generalized anxiety disorder     GERD (gastroesophageal reflux disease)     Heart murmur      Hyperaldosteronism (HCC)     Hyperlipidemia     Hypertension     Hypertensive nephrosclerosis     Hypo-osmolality and hyponatremia     IBS (irritable bowel syndrome)     Insomnia     Migraines     Mitral regurgitation     MVP (mitral valve prolapse)     Obstructive sleep apnea syndrome     Osteoarthritis     Osteoarthritis     Pernicious anemia     Plantar fascia rupture     Rheumatoid arthritis (HCC)     Right knee DJD     S/P insertion of spinal cord stimulator     Shingles     Sinobronchitis     Sleep apnea     Status post total right knee replacement 07/07/2020    Stroke (HCC)     tia    TIA (transient ischemic attack)     Vertigo        Past Surgical History:   Procedure Laterality Date    APPENDECTOMY      CARPAL TUNNEL RELEASE Bilateral     COLONOSCOPY      several    COLONOSCOPY  04/02/2012    by Dr. Mckinley Ruvalcaba    CORNEAL TRANSPLANT Left 04/11/2022    DXA PROCEDURE (HISTORICAL)  10/26/2016, 9/17/2014, 6/18/2007    HAND SURGERY Right     ring finger has pin    HYSTERECTOMY      34    INSERT / REPLACE PERIPHERAL NEUROSTIMULATOR PULSE GENERATOR /  Left     buttocks    JOINT REPLACEMENT Left     knee    JOINT REPLACEMENT Right 04/2019    Hip    KNEE ARTHROPLASTY Left 01/15/2009    by Dr. Avery Leigh at [facility]     KNEE ARTHROSCOPY Bilateral     KNEE CARTILAGE SURGERY  09/10/2009    by Dr. Avery Leigh at [facility]     MAMMO (HISTORICAL)  12/10/2018, 10/30/2017, 10/26/2016    NASAL SEPTUM SURGERY  2008    NERVE BLOCK Left 02/20/2018    Procedure: BLOCK MEDIAL BRANCH - C4, C5, C6;  Surgeon: Jaycob Cruz MD;  Location: [facility];  Service: Pain Management     NERVE BLOCK Left 03/06/2018    Procedure: C4, C5, C6 MEDIAL BRANCH BLOCK;  Surgeon: Jaycob Cruz MD;  Location: [facility];  Service: Pain Management     NERVE BLOCK Left 2/20/2025    Procedure: BLOCK MEDIAL BRANCH Left C4-5 and C5-6;  Surgeon: Jaycob Cruz MD;  Location: [facility];   Service: Pain Management     IN ARTHRP ACETBLR/PROX FEM PROSTC AGRFT/ALGRFT Right 02/27/2019    Procedure: ARTHROPLASTY HIP TOTAL;  Surgeon: Bruno Pina DO;  Location:  MAIN OR;  Service: Orthopedics    IN ARTHRP KNE CONDYLE&PLATU MEDIAL&LAT COMPARTMENTS Right 06/24/2020    Procedure: KNEE TOTAL ARTHROPLASTY;  Surgeon: Bruno Pina DO;  Location:  MAIN OR;  Service: Orthopedics    IN COLONOSCOPY FLX DX W/COLLJ SPEC WHEN PFRMD N/A 04/24/2017    Procedure: FLEXIBLE COLONOSCOPY;  Surgeon: Mckinley Ruvalcaba MD;  Location: [facility];  Service: Colorectal    RADIOFREQUENCY ABLATION Left 04/17/2018    Procedure: ABLATION RADIO FREQUENCY (RFA) - C4, C5, C6;  Surgeon: Jaycob Cruz MD;  Location: [facility];  Service: Pain Management     REPLACEMENT TOTAL KNEE Left 07/05/2011    SINUS SURGERY      TONSILLECTOMY      TOTAL HIP ARTHROPLASTY Left     TRIGGER FINGER RELEASE      R 4th     UPPER GASTROINTESTINAL ENDOSCOPY  01/12/2007    with Donaldson dilatation by Dr. Misael Santiago at [facility]    WRIST SURGERY Right        Family History   Problem Relation Age of Onset    Heart disease Mother     Hypertension Mother     Arthritis Mother     Dementia Mother     Rheum arthritis Mother     Hypertension Father     Heart disease Father     Colon cancer Father     Hypertension Sister     Anxiety disorder Sister     Thyroid disease Sister     Prostate cancer Maternal Grandfather     No Known Problems Paternal Grandmother     No Known Problems Paternal Grandfather     No Known Problems Paternal Aunt     Pseudochol deficiency Neg Hx        Social History     Occupational History    Occupation: Admnistrator    Tobacco Use    Smoking status: Former     Types: Cigarettes    Smokeless tobacco: Never    Tobacco comments:     quit 40 yrs ago   Vaping Use    Vaping status: Never Used   Substance and Sexual Activity    Alcohol use: Yes     Comment: 2 beer a week    Drug use: Never    Sexual activity: Not Currently     Birth  control/protection: Post-menopausal         Current Outpatient Medications:     apixaban (ELIQUIS) 5 mg, Take 1 tablet (5 mg total) by mouth 2 (two) times a day, Disp: 60 tablet, Rfl: 0    apixaban (Eliquis) 5 mg, Take 1 tablet (5 mg total) by mouth 2 (two) times a day, Disp: 56 tablet, Rfl: 0    bacitracin-polymyxin b ophthalmic ointment, INSTILL A 1/4 INCH INTO LEFT EYE AT BEDTIME, Disp: , Rfl:     butalbital-acetaminophen-caffeine (FIORICET,ESGIC) -40 mg per tablet, Take 1 tablet by mouth every 6 (six) hours as needed for migraine Do not start before March 16, 2025., Disp: 120 tablet, Rfl: 3    calcium carbonate (OS-MARLENY) 600 MG tablet, Take 600 mg by mouth 2 (two) times a day with meals, Disp: , Rfl:     clindamycin (CLEOCIN) 150 mg capsule, take 4 capsules 1 hour before APPOINTMENT (Patient not taking: Reported on 3/9/2025), Disp: , Rfl:     Ergocalciferol (VITAMIN D2 PO), Take 1,000 Units by mouth in the morning, Disp: , Rfl:     fluorometholone (FML) 0.1 % ophthalmic suspension, INSTILL 1 DROP BY OPTHALMIC ROUTE FOUR TIMES DAILY INTO LEFT EYE (Patient not taking: Reported on 3/9/2025), Disp: , Rfl:     fluticasone (FLONASE) 50 mcg/act nasal spray, 2 sprays into each nostril daily, Disp: , Rfl:     losartan (COZAAR) 100 MG tablet, TAKE 1 TABLET EVERY MORNING, Disp: 90 tablet, Rfl: 1    magnesium (MAGTAB) 84 MG (7MEQ) TBCR, Take 1 tablet (84 mg total) by mouth daily, Disp: 90 tablet, Rfl: 1    meclizine (ANTIVERT) 12.5 MG tablet, Take 1 tablet (12.5 mg total) by mouth 3 (three) times a day as needed for dizziness, Disp: 30 tablet, Rfl: 0    metoprolol succinate (TOPROL-XL) 50 mg 24 hr tablet, Take 1 tablet (50 mg total) by mouth 2 (two) times a day, Disp: 60 tablet, Rfl: 0    Multiple Vitamins-Minerals (MULTIVITAMIN WITH MINERALS) tablet, Take 1 tablet by mouth every morning, Disp: , Rfl:     neomycin-bacitracin-polymyxin (NEOSPORIN) ophthalmic ointment, INSTILL 1/4' INTO LEFT EYE AT BEDTIME, Disp: , Rfl:  "    omeprazole (PriLOSEC) 20 mg delayed release capsule, TAKE 1 CAPSULE TWICE DAILY, Disp: 180 capsule, Rfl: 1    ondansetron (ZOFRAN) 4 mg tablet, Take 1 tablet (4 mg total) by mouth every 8 (eight) hours as needed for nausea or vomiting (Patient not taking: Reported on 3/9/2025), Disp: 20 tablet, Rfl: 0    pravastatin (PRAVACHOL) 40 mg tablet, TAKE 1 TABLET EVERY DAY, Disp: 90 tablet, Rfl: 1    prochlorperazine (COMPAZINE) 5 mg tablet, Take 5 mg by mouth every 6 (six) hours as needed for nausea or vomiting, Disp: , Rfl:     spironolactone (ALDACTONE) 50 mg tablet, TAKE 1 TABLET DAILY AFTER LUNCH, Disp: 90 tablet, Rfl: 1    zolpidem (AMBIEN) 5 mg tablet, Take 1 tablet (5 mg total) by mouth daily at bedtime as needed for sleep Do not start before March 8, 2025., Disp: 30 tablet, Rfl: 3    Allergies   Allergen Reactions    Procaine Hives    Adhesive [Medical Tape] Rash    Lidocaine Rash    Penicillins Rash       Physical Exam:    Pulse 78   Temp 98 °F (36.7 °C)   Resp 16   Ht 5' 6\" (1.676 m)   Wt 74.7 kg (164 lb 9.6 oz)   SpO2 98%   BMI 26.57 kg/m²     Constitutional:normal, well developed, well nourished, alert, in no distress and non-toxic and no overt pain behavior.  Eyes:anicteric  HEENT:grossly intact  Neck:supple, symmetric, trachea midline and no masses   Pulmonary:even and unlabored  Cardiovascular:No edema or pitting edema present  Skin:Normal without rashes or lesions and well hydrated  Psychiatric:Mood and affect appropriate  Neurologic:Cranial Nerves II-XII grossly intact  Musculoskeletal:normal    Cervical Spine examination demonstrates. Decreased ROM secondary to pain with lateral rotation to the left/right and bending to the left/right, in addition to neck flexion. 5/5 upper extremity strength in all muscle groups bilaterally. Negative Spurling's maneuver to the b/l Ue, sensitivity to light touch intact b/l Ue.    Imaging    Study Result    Narrative & Impression   LUMBAR SPINE     INDICATION:   " M51.36: Other intervertebral disc degeneration, lumbar region.     COMPARISON:  Lumbar spine x-ray dated June 4, 2021.     VIEWS:  XR SPINE LUMBAR MINIMUM 4 VIEWS NON INJURY        FINDINGS:     There are 5 non rib bearing lumbar vertebral bodies.      There is no evidence of acute fracture or destructive osseous lesion.     There is mild convexity of the lumbar spine to the left.      There is intervertebral disc space narrowing at L1/L2 L5/S1.  There is bilateral facet arthropathy at L4/L5 and L5/S1.     The pedicles appear intact. There is a spinal stimulator electrode with the tip not included on this study.     There is a partially visualized right hip prosthesis.     IMPRESSION:     No acute osseous abnormality.       Degenerative changes as described.        Workstation performed: [id number]         No orders to display       No orders of the defined types were placed in this encounter.

## 2025-03-25 ENCOUNTER — TELEPHONE (OUTPATIENT)
Dept: CARDIOLOGY CLINIC | Facility: CLINIC | Age: 80
End: 2025-03-25

## 2025-03-25 ENCOUNTER — PREP FOR PROCEDURE (OUTPATIENT)
Dept: CARDIOLOGY CLINIC | Facility: CLINIC | Age: 80
End: 2025-03-25

## 2025-03-25 ENCOUNTER — OFFICE VISIT (OUTPATIENT)
Dept: CARDIOLOGY CLINIC | Facility: CLINIC | Age: 80
End: 2025-03-25
Payer: MEDICARE

## 2025-03-25 VITALS
DIASTOLIC BLOOD PRESSURE: 66 MMHG | BODY MASS INDEX: 25.97 KG/M2 | HEIGHT: 66 IN | WEIGHT: 161.6 LBS | HEART RATE: 64 BPM | SYSTOLIC BLOOD PRESSURE: 166 MMHG

## 2025-03-25 DIAGNOSIS — E22.2 SIADH (SYNDROME OF INAPPROPRIATE ADH PRODUCTION) (HCC): ICD-10-CM

## 2025-03-25 DIAGNOSIS — I48.92 ATRIAL FLUTTER, UNSPECIFIED TYPE (HCC): Primary | ICD-10-CM

## 2025-03-25 DIAGNOSIS — E87.1 HYPONATREMIA: ICD-10-CM

## 2025-03-25 DIAGNOSIS — G47.00 INSOMNIA, UNSPECIFIED TYPE: ICD-10-CM

## 2025-03-25 DIAGNOSIS — I12.9 HYPERTENSIVE NEPHROSCLEROSIS, STAGE 1 THROUGH STAGE 4 OR UNSPECIFIED CHRONIC KIDNEY DISEASE: ICD-10-CM

## 2025-03-25 PROCEDURE — 93000 ELECTROCARDIOGRAM COMPLETE: CPT | Performed by: STUDENT IN AN ORGANIZED HEALTH CARE EDUCATION/TRAINING PROGRAM

## 2025-03-25 PROCEDURE — 99214 OFFICE O/P EST MOD 30 MIN: CPT | Performed by: STUDENT IN AN ORGANIZED HEALTH CARE EDUCATION/TRAINING PROGRAM

## 2025-03-25 NOTE — TELEPHONE ENCOUNTER
"Patient scheduled for HOANG/ATYPICAL FLUTTER / SVT ABLATION on 5/7/25 at [facility] with Dr. Marin.      Instructions sent to patient through [employer] and in person in office.      Patient aware of all general instructions.    Medication holds:   Losartan - Do not take day before and morning of procedure.      Spironolactone - Do not take morning of procedure.    Blood Thinners:  Eliquis - Do not take morning of procedure.     Labs to be done on 4/23/25:  CMP / CBC (FASTING 8 HOURS)    HOANG ordered/completed.    Thank you,  Radha \"Karolyn\" Sharath    "

## 2025-03-25 NOTE — PROGRESS NOTES
HEART AND VASCULAR  CARDIAC ELECTROPHYSIOLOGY   HEART RHYTHM CENTER  [facility]    Outpatient New Consult for assessment and management of atrial flutter  Today's Date: 03/25/25        Patient name: Jacklyn Garcia  YOB: 1945  Sex: female         Chief Complaint: as above      ASSESSMENT:  Problem List Items Addressed This Visit       Hyponatremia    Hypertensive nephrosclerosis    SIADH (syndrome of inappropriate ADH production) (HCC)    Insomnia, unspecified    Atrial flutter (HCC) - Primary    Relevant Orders    POCT ECG             PLAN:  Atrial flutter, possibly atypical  -Continue apixaban 5 mg p.o. twice daily  -Continue metoprolol succinate 50 mg twice daily  -Will plan for EP study and ablation  -Will have PFA available in the event is left-sided or atrial fibrillation was induced    Hypertension  -Today's blood pressure 166/66  -Patient notes blood pressures are better at home and other appointments with systolics in the 130s  -Will make no changes at this time  -Continue losartan 100 mg daily  -Continue metoprolol succinate 50 mg twice daily  -Continue spironolactone 50 mg    Hyperlipidemia  -Continue pravastatin 40 mg daily    SIADH  -History of hyponatremia    Insomnia  -Patient takes zolpidem    Follow up in: 8 months    Orders Placed This Encounter   Procedures    POCT ECG     There are no discontinued medications.      .............................................................................................    HPI/Subjective:     Ms. Jacklyn Garcia is a 80-year-old female with a past medical history of essential hypertension, combined systolic and diastolic congestive heart failure, CKD stage IIIa, and atrial flutter.    With regards for atrial flutter, she was recently hospitalized for atrial flutter for which rate control was started.  Unfortunately, she had breakthrough on that medication and she was rehospitalized with up titration of medications.  It  is of note patient sodium on presentation was 127 mmol/L (hx of hyponatremia/SIADH) and /80.  She was referred to outpatient which radiology for evaluation for ablation.    Review of her echocardiogram reveals mild to moderate mitral regurgitation, and a severely dilated left atrium.  Review of EKGs available in system reveal atrial flutter that appears to be atypical in nature (possibly left-sided given severely dilated left atrium).  No atrial fibrillation seen.  No recent monitor performed.    Today, she notes that since increasing metoprolol dosage to 50 mg twice daily she has only had 2 occurrences of her arrhythmia both lasting less than 10 minutes.  We discussed options for management including continuing current medications versus antiarrhythmic versus EP study and ablation.  At this time, patient opts for EP study and ablation.  We reviewed the risks which include but are not limited to bleeding, bruising of hematomas, fistulas, retroperitoneal bleed, abdominal bleeding, cardiac perforation requiring a drain or surgical repair, damage to conduction system requiring pacemaker, stroke, heart attack, death.    We reviewed that due to her severe left atrial enlargement she is at risk for atrial fibrillation, and her arrhythmia may be left-sided.  Given this, we will have PFA present in the event that PVI is necessary.    Patient sent to scheduling to arrange.    Lastly, patient's blood pressure elevated 166/66.  Patient notes blood pressures are better controlled at home with systolics in the 130s.  Will not make any changes today based on the provided information.      Complete 12 point ROS reviewed and otherwise non pertinent or negative except as per HPI pertinent positives in Cardiovascular and Respiratory emphasized. Please see paper chart for outpatient clinic patients where the patient completed the 12 point ROS survey.           Past Medical History:   Diagnosis Date    Allergies     Anxiety      Arthritis     Benign arteriolar nephrosclerosis     Bereavement     Cataract     Chronic kidney disease     Chronic kidney disease in type 2 diabetes mellitus ([facility])     Chronic kidney disease, stage 2 (mild)     Chronic pain disorder     Chronic pain syndrome     COPD (chronic obstructive pulmonary disease) ([facility])     DDD (degenerative disc disease), cervical     DDD (degenerative disc disease), lumbar     Degenerative joint disease of right hip     Depression     Diastolic dysfunction     DJD (degenerative joint disease), ankle and foot, right     DJD of right shoulder     Edema     Fracture of left calcaneus     Generalized anxiety disorder     GERD (gastroesophageal reflux disease)     Heart murmur     Hyperaldosteronism ([facility])     Hyperlipidemia     Hypertension     Hypertensive nephrosclerosis     Hypo-osmolality and hyponatremia     IBS (irritable bowel syndrome)     Insomnia     Migraines     Mitral regurgitation     MVP (mitral valve prolapse)     Obstructive sleep apnea syndrome     Osteoarthritis     Osteoarthritis     Pernicious anemia     Plantar fascia rupture     Rheumatoid arthritis ([facility])     Right knee DJD     S/P insertion of spinal cord stimulator     Shingles     Sinobronchitis     Sleep apnea     Status post total right knee replacement 07/07/2020    Stroke ([facility])     tia    TIA (transient ischemic attack)     Vertigo        Allergies   Allergen Reactions    Procaine Hives    Adhesive [Medical Tape] Rash    Lidocaine Rash    Penicillins Rash     I reviewed the Home Medication list and Allergies in the chart.   Scheduled Meds:  Current Outpatient Medications   Medication Sig Dispense Refill    apixaban (ELIQUIS) 5 mg Take 1 tablet (5 mg total) by mouth 2 (two) times a day 60 tablet 0    apixaban (Eliquis) 5 mg Take 1 tablet (5 mg total) by mouth 2 (two) times a day 56 tablet 0    bacitracin-polymyxin b ophthalmic ointment INSTILL A 1/4 INCH INTO LEFT EYE AT BEDTIME       butalbital-acetaminophen-caffeine (FIORICET,ESGIC) -40 mg per tablet Take 1 tablet by mouth every 6 (six) hours as needed for migraine Do not start before March 16, 2025. 120 tablet 3    calcium carbonate (OS-MARLENY) 600 MG tablet Take 600 mg by mouth 2 (two) times a day with meals      Ergocalciferol (VITAMIN D2 PO) Take 1,000 Units by mouth in the morning      fluticasone (FLONASE) 50 mcg/act nasal spray 2 sprays into each nostril daily      losartan (COZAAR) 100 MG tablet TAKE 1 TABLET EVERY MORNING 90 tablet 1    magnesium (MAGTAB) 84 MG (7MEQ) TBCR Take 1 tablet (84 mg total) by mouth daily 90 tablet 1    meclizine (ANTIVERT) 12.5 MG tablet Take 1 tablet (12.5 mg total) by mouth 3 (three) times a day as needed for dizziness 30 tablet 0    metoprolol succinate (TOPROL-XL) 50 mg 24 hr tablet Take 1 tablet (50 mg total) by mouth 2 (two) times a day 60 tablet 0    Multiple Vitamins-Minerals (MULTIVITAMIN WITH MINERALS) tablet Take 1 tablet by mouth every morning      neomycin-bacitracin-polymyxin (NEOSPORIN) ophthalmic ointment INSTILL 1/4' INTO LEFT EYE AT BEDTIME      omeprazole (PriLOSEC) 20 mg delayed release capsule TAKE 1 CAPSULE TWICE DAILY 180 capsule 1    pravastatin (PRAVACHOL) 40 mg tablet TAKE 1 TABLET EVERY DAY 90 tablet 1    prochlorperazine (COMPAZINE) 5 mg tablet Take 5 mg by mouth every 6 (six) hours as needed for nausea or vomiting      spironolactone (ALDACTONE) 50 mg tablet TAKE 1 TABLET DAILY AFTER LUNCH 90 tablet 1    zolpidem (AMBIEN) 5 mg tablet Take 1 tablet (5 mg total) by mouth daily at bedtime as needed for sleep Do not start before March 8, 2025. 30 tablet 3    clindamycin (CLEOCIN) 150 mg capsule take 4 capsules 1 hour before APPOINTMENT (Patient not taking: Reported on 3/9/2025)      fluorometholone (FML) 0.1 % ophthalmic suspension INSTILL 1 DROP BY OPTHALMIC ROUTE FOUR TIMES DAILY INTO LEFT EYE (Patient not taking: Reported on 3/9/2025)      ondansetron (ZOFRAN) 4 mg tablet Take  1 tablet (4 mg total) by mouth every 8 (eight) hours as needed for nausea or vomiting (Patient not taking: Reported on 3/9/2025) 20 tablet 0     No current facility-administered medications for this visit.     PRN Meds:.        Family History   Problem Relation Age of Onset    Heart disease Mother     Hypertension Mother     Arthritis Mother     Dementia Mother     Rheum arthritis Mother     Hypertension Father     Heart disease Father     Colon cancer Father     Hypertension Sister     Anxiety disorder Sister     Thyroid disease Sister     Prostate cancer Maternal Grandfather     No Known Problems Paternal Grandmother     No Known Problems Paternal Grandfather     No Known Problems Paternal Aunt     Pseudochol deficiency Neg Hx        Social History     Socioeconomic History    Marital status: /Civil Union     Spouse name: Not on file    Number of children: Not on file    Years of education: Not on file    Highest education level: Not on file   Occupational History    Occupation: Admnistrator    Tobacco Use    Smoking status: Former     Types: Cigarettes    Smokeless tobacco: Never    Tobacco comments:     quit 40 yrs ago   Vaping Use    Vaping status: Never Used   Substance and Sexual Activity    Alcohol use: Yes     Comment: 2 beer a week    Drug use: Never    Sexual activity: Not Currently     Birth control/protection: Post-menopausal   Other Topics Concern    Not on file   Social History Narrative    Patient has never smoked - As per [facility]    Consumes 1-2 beers per week - As per [employer]    Consumes on average 1 cup of decaf coffee per day      Social Drivers of Health     Financial Resource Strain: Low Risk  (3/17/2023)    Overall Financial Resource Strain (CARDIA)     Difficulty of Paying Living Expenses: Not very hard   Food Insecurity: No Food Insecurity (3/17/2025)    Nursing - Inadequate Food Risk Classification     Worried About Running Out of Food in the Last Year: Not on file     Ran Out of Food  "in the Last Year: Not on file     Ran Out of Food in the Last Year: Never true   Transportation Needs: No Transportation Needs (3/17/2025)    Nursing - Transportation Risk Classification     Lack of Transportation: Not on file     Lack of Transportation: No   Physical Activity: Not on file   Stress: Not on file   Social Connections: Not on file   Intimate Partner Violence: Unknown (3/17/2025)    Nursing IPS     Feels Physically and Emotionally Safe: Not on file     Physically Hurt by Someone: Not on file     Humiliated or Emotionally Abused by Someone: Not on file     Physically Hurt by Someone: No     Hurt or Threatened by Someone: No   Housing Stability: Unknown (3/17/2025)    Nursing: Inadequate Housing Risk Classification     Has Housing: Not on file     Worried About Losing Housing: Not on file     Unable to Get Utilities: Not on file     Unable to Pay for Housing in the Last Year: No     Has Housin         OBJECTIVE:    /66 (BP Location: Left arm, Patient Position: Sitting, Cuff Size: Standard)   Pulse 64   Ht 5' 6\" (1.676 m)   Wt 73.3 kg (161 lb 9.6 oz)   BMI 26.08 kg/m²   Vitals:    25 1358   Weight: 73.3 kg (161 lb 9.6 oz)     GEN: No acute distress, Alert and oriented, well appearing  HEENT:External ears normal, oral pharynx clear, mucous membranes moist  CARDIOVASCULAR:  RRR, No murmur, rub, gallops S1,S2  LUNGS: Clear To auscultation bilaterally, normal effort, no rales, rhonchi, crackles   ABDOMEN:  nondistended, soft, nontender  EXTREMITIES/VASCULAR:  No edema. warm an well perfused.  PSYCH: Normal Affect,  linear speech pattern without evidence of psychosis.   NEURO: Grossly intact, moving all extremiteis equal, face symmetric, alert and responsive, no obvious focal defecits   GAIT:  Ambulates normally without difficulty  HEME: No bleeding, bruising, petechia, purpura   SKIN: No significant rashes on visibile skin, warm, no diaphoresis or pallor.     Lab Results:       LABS:      " "Chemistry        Component Value Date/Time     (L) 09/21/2015 1057    K 4.3 03/17/2025 0519    K 4.3 09/21/2015 1057    CL 95 (L) 03/17/2025 0519    CL 94 (L) 09/21/2015 1057    CO2 27 03/17/2025 0519    CO2 31.2 09/21/2015 1057    BUN 12 03/17/2025 0519    BUN 8 09/21/2015 1057    CREATININE 0.89 03/17/2025 0519    CREATININE 0.74 09/21/2015 1057        Component Value Date/Time    CALCIUM 9.8 03/17/2025 0519    CALCIUM 9.5 09/21/2015 1057    ALKPHOS 109 (H) 03/17/2025 0519    ALKPHOS 123 (H) 09/21/2015 1057    AST 17 03/17/2025 0519    AST 21 09/21/2015 1057    ALT 16 03/17/2025 0519    ALT 34 09/21/2015 1057    BILITOT 0.29 09/21/2015 1057            Lab Results   Component Value Date    CHOL 290 06/04/2015    CHOL 240 10/19/2014    CHOL 277 03/12/2014     Lab Results   Component Value Date    HDL 88 02/24/2025    HDL 97 06/27/2024    HDL 87 03/11/2024     Lab Results   Component Value Date    LDLCALC 95 02/24/2025    LDLCALC 113 (H) 06/27/2024    LDLCALC 102 (H) 03/11/2024     Lab Results   Component Value Date    TRIG 67 02/24/2025    TRIG 64 06/27/2024    TRIG 93 03/11/2024     No results found for: \"CHOLHDL\"    IMAGING: XR chest 1 view portable  Result Date: 3/17/2025  Narrative: XR CHEST PORTABLE INDICATION: chest pain, sob. COMPARISON: March 9, 2025 FINDINGS: Clear lungs. No pneumothorax or pleural effusion. Normal cardiomediastinal silhouette. Thoracic epidural stimulator leads, unchanged. No acute osseous abnormality. Normal upper abdomen.     Impression: No acute cardiopulmonary disease. Workstation performed: [id number]     VAS screening  Result Date: 3/13/2025  Narrative:  [facility] REPORT CLINICAL: Indications: Vascular screening for Carotid artery stenosis, AAA and PAD. Operative History: No cardiovascular surgeries noted. Risk Factors The patient has history of HTN, Hyperlipidemia, CKD and Recent Trauma.  She has no history of DVT.  FINDINGS:  Segment    Rig                        " Left                  PSV  EDV  ICA/CCA     PPS  PSV  EDV  ICA/CCA    PPS  Mid CCA    102   17                    82   16                  Prox. ICA   59   14     0.58  101.61   80   27     0.97  82.29  Dist. ICA                     101.61                     82.29     CONCLUSION: Impression CAROTID SCREENING: Evaluation reveals a <50% stenosis internal carotid artery on the right. Evaluation reveals a <50% stenosis  internal carotid artery on the left. AORTA SCREENING: The abdominal aorta is patent and normal in caliber with the greatest diameter measurement of 2.2 x 2.2 cm. PAD SCREENING: The ankle/brachial index on the right is 1.2  , which is within normal limits. The ankle/brachial index on the left is 1.1 , which is within normal limits.  SIGNATURE: Electronically Signed by: MADI BRENNAN on 2025-03-13 01:34:00 PM    X-ray chest 2 views  Result Date: 3/10/2025  Narrative: XR CHEST PA AND LATERAL INDICATION: palpitations/dizziness. COMPARISON: CXR 6/10/2020, CTA neck 8/1/2017. FINDINGS: Clear lungs. No pneumothorax or pleural effusion. Normal cardiomediastinal silhouette. Bones are unremarkable for age. Spinal cord stimulator. Normal upper abdomen.     Impression: No acute cardiopulmonary disease. Workstation performed: [id number]     CT head without contrast  Result Date: 3/9/2025  Narrative: CT BRAIN - WITHOUT CONTRAST INDICATION:   Bifrontal headache +blurred vision of L eye +gait disturbance x2d. COMPARISON: CT brain dated February 6, 2019. TECHNIQUE:  CT examination of the brain was performed.  Multiplanar 2D reformatted images were created from the source data. Radiation dose length product (DLP) for this visit:  764.35 mGy-cm .  This examination, like all CT scans performed in the [facility], was performed utilizing techniques to minimize radiation dose exposure, including the use of iterative  reconstruction and automated exposure control. IMAGE QUALITY:  Diagnostic. FINDINGS:  PARENCHYMA:No intracranial mass, mass effect or midline shift. No CT signs of acute infarction.  No acute parenchymal hemorrhage. VENTRICLES AND EXTRA-AXIAL SPACES:  Normal for the patient's age. VISUALIZED ORBITS: Normal visualized orbits. PARANASAL SINUSES: Normal visualized paranasal sinuses. CALVARIUM AND EXTRACRANIAL SOFT TISSUES: Normal.     Impression: No acute intracranial abnormality. Workstation performed: [id number]     XR sinuses < 3 vw  Result Date: 3/6/2025  Narrative: PARANASAL SINUSES INDICATION:  J01.01: Acute recurrent maxillary sinusitis. COMPARISON: Sinus x-ray 12/10/2019 VIEWS:  XR SINUSES < 3 VW Images: 5 FINDINGS: No evidence of sinus opacification or air-fluid levels. No lytic or blastic lesions are identified.     Impression: No evidence of sinusitis. Workstation performed: [id number]        Cardiac testing:   Results for orders placed during the hospital encounter of 21    Echo complete with contrast if [place] [address]  [phone]    Transthoracic Echocardiogram  2D, M-mode, Doppler, and Color Doppler    Study date:  09-Sep-2021    Patient: QUYNH AVENDAÑO  MR number: [id number]  Account number: [id number]  : 1945  Age: 76 years  Gender: Female  Status: Outpatient  Location: Echo lab  Height: 63 in  Weight: 195 lb  BP: 126/ 80 mmHg    Indications: Shortness of breath    Diagnoses: 401.9 - HYPERTENSION NOS    Sonographer:  Annalise Bruce [facility], CCT  Primary Physician:  Ernie Wilkinson DO  Referring Physician:  Williams Blas DO  Group:  [facility]  Interpreting Physician:  Lucia Her MD    SUMMARY    LEFT VENTRICLE:  Systolic function was normal. Ejection fraction was estimated to be 60 %.  There were no regional wall motion abnormalities.  Features were consistent with a pseudonormal left ventricular filling pattern, with concomitant abnormal relaxation  and increased filling pressure (grade 2 diastolic dysfunction).    RIGHT VENTRICLE:  The size was normal.  Systolic function was normal.    MITRAL VALVE:  There was trace regurgitation.    TRICUSPID VALVE:  There was trace regurgitation.  Pulmonary artery systolic pressure was at the upper limits of normal.  Estimated peak PA pressure was 36 mmHg.    PERICARDIUM:  A small pericardial effusion was identified.  There was no associated chamber collapse.    HISTORY: PRIOR HISTORY: hypertension Risk factors: hypertension.    PROCEDURE: The procedure was performed in the echo lab. This was a routine study. The transthoracic approach was used. The study included complete 2D imaging, M-mode, complete spectral Doppler, and color Doppler. The heart rate was 80 bpm,  at the start of the study. Image quality was adequate.    LEFT VENTRICLE: Size was normal. Systolic function was normal. Ejection fraction was estimated to be 60 %. There were no regional wall motion abnormalities. Wall thickness was normal. DOPPLER: Features were consistent with a pseudonormal  left ventricular filling pattern, with concomitant abnormal relaxation and increased filling pressure (grade 2 diastolic dysfunction).    RIGHT VENTRICLE: The size was normal. Systolic function was normal. Wall thickness was normal.    LEFT ATRIUM: Size was normal.    RIGHT ATRIUM: Size was normal.    MITRAL VALVE: Valve structure was normal. There was normal leaflet separation. DOPPLER: The transmitral velocity was within the normal range. There was no evidence for stenosis. There was trace regurgitation.    AORTIC VALVE: The valve was trileaflet. Leaflets exhibited normal thickness and normal cuspal separation. DOPPLER: Transaortic velocity was within the normal range. There was no evidence for stenosis. There was no significant  regurgitation.    TRICUSPID VALVE: The valve structure was normal. There was normal leaflet separation. DOPPLER: The transtricuspid velocity  was within the normal range. There was no evidence for stenosis. There was trace regurgitation. Pulmonary artery  systolic pressure was at the upper limits of normal. Estimated peak PA pressure was 36 mmHg.    PULMONIC VALVE: Leaflets exhibited normal thickness, no calcification, and normal cuspal separation. DOPPLER: The transpulmonic velocity was within the normal range. There was no significant regurgitation.    PERICARDIUM: A small pericardial effusion was identified. There was no associated chamber collapse. The pericardium was normal in appearance.    AORTA: The root exhibited normal size.    SYSTEMIC VEINS: IVC: The inferior vena cava was normal in size.    SYSTEM MEASUREMENT TABLES    2D  %FS: 35.24 %  Ao Diam: 2.75 cm  EDV(Teich): 115.61 ml  EF(Teich): 64.39 %  ESV(Teich): 41.17 ml  IVSd: 0.9 cm  LA Area: 18.73 cm2  LA Diam: 3.8 cm  LVEDV MOD A4C: 68.5 ml  LVEF MOD A4C: 63.04 %  LVESV MOD A4C: 25.32 ml  LVIDd: 4.95 cm  LVIDs: 3.21 cm  LVLd A4C: 6.64 cm  LVLs A4C: 5.95 cm  LVPWd: 0.93 cm  RA Area: 6.11 cm2  RVIDd: 2.02 cm  RWT: 0.38  SV MOD A4C: 43.18 ml  SV(Teich): 74.44 ml    CW  RAP: 0 mmHg  TR Vmax: 2.78 m/s  TR maxP.05 mmHg    MM  TAPSE: 2.01 cm    PW  E' Sept: 0.06 m/s  E/E' Sept: 13.81  MV A Lorenzo: 0.74 m/s  MV Dec Harnett: 3.91 m/s2  MV DecT: 218.49 ms  MV E Lorenzo: 0.85 m/s  MV E/A Ratio: 1.15  RVSP: 31.05 mmHg    [employer] Accredited Echocardiography Laboratory    Prepared and electronically signed by    Lucia Her MD  Signed 09-Sep-2021 16:14:06      I reviewed and interpreted the following LABS/EKG/TELE/IMAGING and below is summary of my interpretation (if data available):    LABS:    Current EKG reveals normal sinus rhythm    Past EKG reviewed.  Documented atrial flutter, possibly atypical       Holter monitor performed 3/26/25  The patient had predominantly sinus rhythm.   Heart rate varied from 49 bpm to 141 bpm.  The patient’s average heart rate was 60 bpm. HR in the 140s  "occurred when she was in SVT.  The patient had a holter monitor tracing done for 23 hours and 59 minutes.  The patient had 11 PVCs.  The patient had 281 supraventricular ectopic beats. There were 17 couplets and 5 runs. The longest run occurred at 7:56:15 AM and lasted at least 30 seconds. It is a narrow complex regular SVT which is likely PAT.    The longest R/R interval was 1.7 seconds.  A diary was submitted.  SYMPTOMS OF \" HEART BEAT FAST \" AT 7:02 AM APPEAR TO CORRELATE WITH PSVT.     8.  BRIEF PSVT IS SEEN LASTING AT LEAST 30 SECONDS. THIS DOES NOT APPEAR TO BE ATRIAL FIBRILLATION.    ECHO 2/13/25      Left Ventricle: Left ventricular cavity size is normal. Wall thickness is mildly increased. There is concentric remodeling. The left ventricular ejection fraction is 60%. Systolic function is normal. Wall motion is normal. Diastolic function is moderately abnormal, consistent with grade II (pseudonormal) relaxation.    Right Ventricle: Right ventricular cavity size is normal. Systolic function is normal.    Left Atrium: The atrium is severely dilated (>48 mL/m2).    Mitral Valve: There is mild annular calcification. There is mild to moderate regurgitation.    Tricuspid Valve: There is mild regurgitation.    Pericardium: There is a small pericardial effusion circumferential to the heart. The fluid exhibits no internal echoes. There is no echocardiographic evidence of tamponade.           "

## 2025-03-25 NOTE — LETTER
3/25/2025               CARDIAC ABLATION INSTRUCTIONS     Jacklyn Garcia   : 1945  MRN: [id number]  [address] [facility] [id number]    Procedure: HOANG/ATYPICAL FLUTTER / SVT ABLATION      Procedure Date: 25    Location: [facility]  Address: [address] [place] [address], [place] [address]        Labs to be done on 25: CMP /  CBC (FASTING 8 HOURS)    BLOOD THINNER INSTRUCTIONS (Coumadin / Warfarin / Pradaxa / Eliquis / Xarelto):     Eliquis - Do not take morning of procedure.     Medication Hold:     Losartan - Do not take day before and morning of procedure.     Spironolactone - Do not take morning of procedure.    Arrival Time: The Hospital will contact you the day prior to your procedure, usually between 4PM - 6PM to instruct you on the time and place to report. If you do not hear from a [facility]  by 5PM the evening prior to your procedure, please contact the [facility] at [phone].    DO NOT eat or drink ANYTHING after midnight the night prior to your procedure including gum & candy.     You may have a SIP of WATER with your morning medications, UNLESS ADVISE OTHERWISE.     Please notify us if you have been prescribed a NEW MEDICATION prior to your procedure or admitted to the hospital within 30 days.      If you develop a cold, sore throat, fever or any other illness prior to your procedure date, notify your surgeon immediately.    Arrange for a responsible adult to drive you to and from the hospital.    DO NOT stop taking Plavix or Aspirin unless advised otherwise.     If you are currently taking Fish Oil, Krill oil and/or Vitamin E please DO NOT TAKE FOR A WEEK PRIOR TO PROCEDURE.     If you are diabetic, DO NOT take any of your diabetic pills the morning of your procedure. Oral diabetic medications may include Glucophage, Prandin, Glyburide, Micronase, Avandia, Glucovance, Precose, Glynase, and Starlix.     Bring a list of daily medications, vitamins,  "minerals, herbals and nutritional supplements you take. Please include dosages and the times you take them each day.     If you are packing an overnight bag, pack minimal clothing, you will be given hospital sleepwear.   DO NOT bring money, valuables or jewelry. The wedding band is ok.     If you use CPAP machine, bring it to the hospital.      Bring your Photo ID and Insurance cards with you.       FAILURE TO FOLLOW ANY OF THESE INSTRUCTIONS COULD RESULT IN THE CANCELLATION OF YOUR PROCEDURE      Please call [phone] if you do not hear from the  by 5:00PM the night before your procedure.    All patients enter through ENTRANCE B. [employer] Parking is available free of charge or park on Parking Deck B.        Thank you,   Radha \"Karolyn\" Sharath    [facility]   [address] [place] [id number]  Teams: [phone]             "

## 2025-03-27 DIAGNOSIS — E26.9 HYPERALDOSTERONISM (HCC): ICD-10-CM

## 2025-03-27 RX ORDER — SPIRONOLACTONE 50 MG/1
TABLET, FILM COATED ORAL
Qty: 90 TABLET | Refills: 1 | Status: SHIPPED | OUTPATIENT
Start: 2025-03-27

## 2025-03-31 ENCOUNTER — OFFICE VISIT (OUTPATIENT)
Dept: NEPHROLOGY | Facility: CLINIC | Age: 80
End: 2025-03-31
Payer: MEDICARE

## 2025-03-31 VITALS
HEART RATE: 59 BPM | WEIGHT: 164 LBS | RESPIRATION RATE: 16 BRPM | HEIGHT: 66 IN | BODY MASS INDEX: 26.36 KG/M2 | OXYGEN SATURATION: 97 % | TEMPERATURE: 98.1 F | SYSTOLIC BLOOD PRESSURE: 130 MMHG | DIASTOLIC BLOOD PRESSURE: 77 MMHG

## 2025-03-31 DIAGNOSIS — E26.9 HYPERALDOSTERONISM (HCC): ICD-10-CM

## 2025-03-31 DIAGNOSIS — E22.2 SIADH (SYNDROME OF INAPPROPRIATE ADH PRODUCTION) (HCC): Primary | ICD-10-CM

## 2025-03-31 DIAGNOSIS — N18.31 CKD STAGE 3A, GFR 45-59 ML/MIN (HCC): ICD-10-CM

## 2025-03-31 DIAGNOSIS — I51.89 GRADE II DIASTOLIC DYSFUNCTION: ICD-10-CM

## 2025-03-31 PROCEDURE — G2211 COMPLEX E/M VISIT ADD ON: HCPCS | Performed by: INTERNAL MEDICINE

## 2025-03-31 PROCEDURE — 99214 OFFICE O/P EST MOD 30 MIN: CPT | Performed by: INTERNAL MEDICINE

## 2025-03-31 NOTE — ASSESSMENT & PLAN NOTE
Continue fluid restriction, patient is likely on less than 1.5 L daily.  Follow-up blood work in a few weeks, will add urine study to confirm urine osmolality at the same time as the serum sodium level.

## 2025-03-31 NOTE — ASSESSMENT & PLAN NOTE
Lab Results   Component Value Date    EGFR 62 03/17/2025    EGFR 59 03/16/2025    EGFR 49 03/13/2025    CREATININE 0.89 03/17/2025    CREATININE 0.92 03/16/2025    CREATININE 1.07 03/13/2025   Kidney function stable, baseline creatinine right around 0.9 mg/dL.  Continue to optimize care.

## 2025-03-31 NOTE — ASSESSMENT & PLAN NOTE
Blood pressure is well-controlled at this time, continue current medications including current dosing of spironolactone.

## 2025-03-31 NOTE — ASSESSMENT & PLAN NOTE
Continue to encourage low-sodium diet, patient is on metoprolol and losartan.  Further medical management and adjustment according to cardiology recommendations.

## 2025-03-31 NOTE — PROGRESS NOTES
[facility]  Kin Cortes DO    Name: Jacklyn Garcia  YOB: 1945      Assessment/Plan:    SIADH (syndrome of inappropriate ADH production) ([facility])  Continue fluid restriction, patient is likely on less than 1.5 L daily.  Follow-up blood work in a few weeks, will add urine study to confirm urine osmolality at the same time as the serum sodium level.    Hyperaldosteronism (HCC)  Blood pressure is well-controlled at this time, continue current medications including current dosing of spironolactone.    CKD stage 3a, GFR 45-59 ml/min ([facility])  Lab Results   Component Value Date    EGFR 62 03/17/2025    EGFR 59 03/16/2025    EGFR 49 03/13/2025    CREATININE 0.89 03/17/2025    CREATININE 0.92 03/16/2025    CREATININE 1.07 03/13/2025   Kidney function stable, baseline creatinine right around 0.9 mg/dL.  Continue to optimize care.    Grade II diastolic dysfunction  Continue to encourage low-sodium diet, patient is on metoprolol and losartan.  Further medical management and adjustment according to cardiology recommendations.         Problem List Items Addressed This Visit          Endocrine    Hyperaldosteronism (HCC)    Blood pressure is well-controlled at this time, continue current medications including current dosing of spironolactone.         SIADH (syndrome of inappropriate ADH production) ([facility]) - Primary    Continue fluid restriction, patient is likely on less than 1.5 L daily.  Follow-up blood work in a few weeks, will add urine study to confirm urine osmolality at the same time as the serum sodium level.         Relevant Orders    Osmolality, urine       Genitourinary    CKD stage 3a, GFR 45-59 ml/min ([facility])    Lab Results   Component Value Date    EGFR 62 03/17/2025    EGFR 59 03/16/2025    EGFR 49 03/13/2025    CREATININE 0.89 03/17/2025    CREATININE 0.92 03/16/2025    CREATININE 1.07 03/13/2025   Kidney function stable, baseline creatinine right around  0.9 mg/dL.  Continue to optimize care.            Other    Grade II diastolic dysfunction    Continue to encourage low-sodium diet, patient is on metoprolol and losartan.  Further medical management and adjustment according to cardiology recommendations.            Patient seen today posthospitalization for hyponatremia.  She is progressing well in the outpatient setting.  She is preparing for cardiac ablation procedure in early May 2025.  There are no specific issues from the renal standpoint at this time to proceed.    Subjective:      Patient ID: Jacklyn Garcia is a 80 y.o. female.    Patient presents for follow up.    We reviewed labs in detail, most recent creatinine noted to be     There were no significant electrolyte abnormalities noted.  Patient is taking all medications as prescribed with no specific side effects and denies use of nonsteroid anti-inflammatory medications.              The following portions of the patient's history were reviewed and updated as appropriate: allergies, current medications, past family history, past medical history, past social history, past surgical history and problem list.    Review of Systems   All other systems reviewed and are negative.        Social History     Socioeconomic History    Marital status: /Civil Union     Spouse name: Not on file    Number of children: Not on file    Years of education: Not on file    Highest education level: Not on file   Occupational History    Occupation: Admnistrator    Tobacco Use    Smoking status: Former     Types: Cigarettes    Smokeless tobacco: Never    Tobacco comments:     quit 40 yrs ago   Vaping Use    Vaping status: Never Used   Substance and Sexual Activity    Alcohol use: Yes     Comment: 2 beer a week    Drug use: Never    Sexual activity: Not Currently     Birth control/protection: Post-menopausal   Other Topics Concern    Not on file   Social History Narrative    Patient has never smoked - As per [employer]     Consumes 1-2 beers per week - As per [employer]    Consumes on average 1 cup of decaf coffee per day      Social Drivers of Health     Financial Resource Strain: Low Risk  (3/17/2023)    Overall Financial Resource Strain (CARDIA)     Difficulty of Paying Living Expenses: Not very hard   Food Insecurity: No Food Insecurity (3/17/2025)    Nursing - Inadequate Food Risk Classification     Worried About Running Out of Food in the Last Year: Not on file     Ran Out of Food in the Last Year: Not on file     Ran Out of Food in the Last Year: Never true   Transportation Needs: No Transportation Needs (3/17/2025)    Nursing - Transportation Risk Classification     Lack of Transportation: Not on file     Lack of Transportation: No   Physical Activity: Not on file   Stress: Not on file   Social Connections: Not on file   Intimate Partner Violence: Unknown (3/17/2025)    Nursing IPS     Feels Physically and Emotionally Safe: Not on file     Physically Hurt by Someone: Not on file     Humiliated or Emotionally Abused by Someone: Not on file     Physically Hurt by Someone: No     Hurt or Threatened by Someone: No   Housing Stability: Unknown (3/17/2025)    Nursing: Inadequate Housing Risk Classification     Has Housing: Not on file     Worried About Losing Housing: Not on file     Unable to Get Utilities: Not on file     Unable to Pay for Housing in the Last Year: No     Has Housin     Past Medical History:   Diagnosis Date    Allergies     Anxiety     Arthritis     Benign arteriolar nephrosclerosis     Bereavement     Cataract     Chronic kidney disease     Chronic kidney disease in type 2 diabetes mellitus (HCC)     Chronic kidney disease, stage 2 (mild)     Chronic pain disorder     Chronic pain syndrome     COPD (chronic obstructive pulmonary disease) (HCC)     DDD (degenerative disc disease), cervical     DDD (degenerative disc disease), lumbar     Degenerative joint disease of right hip     Depression     Diastolic  dysfunction     DJD (degenerative joint disease), ankle and foot, right     DJD of right shoulder     Edema     Fracture of left calcaneus     Generalized anxiety disorder     GERD (gastroesophageal reflux disease)     Heart murmur     Hyperaldosteronism (HCC)     Hyperlipidemia     Hypertension     Hypertensive nephrosclerosis     Hypo-osmolality and hyponatremia     IBS (irritable bowel syndrome)     Insomnia     Migraines     Mitral regurgitation     MVP (mitral valve prolapse)     Obstructive sleep apnea syndrome     Osteoarthritis     Osteoarthritis     Pernicious anemia     Plantar fascia rupture     Rheumatoid arthritis (HCC)     Right knee DJD     S/P insertion of spinal cord stimulator     Shingles     Sinobronchitis     Sleep apnea     Status post total right knee replacement 07/07/2020    Stroke (HCC)     tia    TIA (transient ischemic attack)     Vertigo      Past Surgical History:   Procedure Laterality Date    APPENDECTOMY      CARPAL TUNNEL RELEASE Bilateral     COLONOSCOPY      several    COLONOSCOPY  04/02/2012    by Dr. Mckinley Ruvalcaba    CORNEAL TRANSPLANT Left 04/11/2022    DXA PROCEDURE (HISTORICAL)  10/26/2016, 9/17/2014, 6/18/2007    HAND SURGERY Right     ring finger has pin    HYSTERECTOMY      34    INSERT / REPLACE PERIPHERAL NEUROSTIMULATOR PULSE GENERATOR /  Left     buttocks    JOINT REPLACEMENT Left     knee    JOINT REPLACEMENT Right 04/2019    Hip    KNEE ARTHROPLASTY Left 01/15/2009    by Dr. Avery Leigh at [facility] [place]     KNEE ARTHROSCOPY Bilateral     KNEE CARTILAGE SURGERY  09/10/2009    by Dr. Avery Leigh at [facility] [place]     MAMMO (HISTORICAL)  12/10/2018, 10/30/2017, 10/26/2016    NASAL SEPTUM SURGERY  2008    NERVE BLOCK Left 02/20/2018    Procedure: BLOCK MEDIAL BRANCH - C4, C5, C6;  Surgeon: Jaycob Cruz MD;  Location: [facility];  Service: Pain Management     NERVE BLOCK Left 03/06/2018    Procedure: C4, C5, C6 MEDIAL BRANCH  BLOCK;  Surgeon: Jaycob Cruz MD;  Location: [facility];  Service: Pain Management     NERVE BLOCK Left 2/20/2025    Procedure: BLOCK MEDIAL BRANCH Left C4-5 and C5-6;  Surgeon: Jaycob Cruz MD;  Location: [facility];  Service: Pain Management     NE ARTHRP ACETBLR/PROX FEM PROSTC AGRFT/ALGRFT Right 02/27/2019    Procedure: ARTHROPLASTY HIP TOTAL;  Surgeon: Bruno Pina DO;  Location:  MAIN OR;  Service: Orthopedics    NE ARTHRP KNE CONDYLE&PLATU MEDIAL&LAT COMPARTMENTS Right 06/24/2020    Procedure: KNEE TOTAL ARTHROPLASTY;  Surgeon: Bruno Pina DO;  Location:  MAIN OR;  Service: Orthopedics    NE COLONOSCOPY FLX DX W/COLLJ SPEC WHEN PFRMD N/A 04/24/2017    Procedure: FLEXIBLE COLONOSCOPY;  Surgeon: Mckinley Ruvalcaba MD;  Location: [facility];  Service: Colorectal    RADIOFREQUENCY ABLATION Left 04/17/2018    Procedure: ABLATION RADIO FREQUENCY (RFA) - C4, C5, C6;  Surgeon: Jaycob Cruz MD;  Location: [facility];  Service: Pain Management     REPLACEMENT TOTAL KNEE Left 07/05/2011    SINUS SURGERY      TONSILLECTOMY      TOTAL HIP ARTHROPLASTY Left     TRIGGER FINGER RELEASE      R 4th     UPPER GASTROINTESTINAL ENDOSCOPY  01/12/2007    with Donaldson dilatation by Dr. Misael Santiago at [facility] Right        Current Outpatient Medications:     apixaban (ELIQUIS) 5 mg, Take 1 tablet (5 mg total) by mouth 2 (two) times a day, Disp: 60 tablet, Rfl: 0    apixaban (Eliquis) 5 mg, Take 1 tablet (5 mg total) by mouth 2 (two) times a day, Disp: 56 tablet, Rfl: 0    bacitracin-polymyxin b ophthalmic ointment, INSTILL A 1/4 INCH INTO LEFT EYE AT BEDTIME, Disp: , Rfl:     butalbital-acetaminophen-caffeine (FIORICET,ESGIC) -40 mg per tablet, Take 1 tablet by mouth every 6 (six) hours as needed for migraine Do not start before March 16, 2025., Disp: 120 tablet, Rfl: 3    calcium carbonate (OS-MARLENY) 600 MG tablet, Take 600 mg by mouth 2 (two) times a day with meals, Disp: ,  Rfl:     Ergocalciferol (VITAMIN D2 PO), Take 1,000 Units by mouth in the morning, Disp: , Rfl:     fluticasone (FLONASE) 50 mcg/act nasal spray, 2 sprays into each nostril daily, Disp: , Rfl:     losartan (COZAAR) 100 MG tablet, TAKE 1 TABLET EVERY MORNING, Disp: 90 tablet, Rfl: 1    magnesium (MAGTAB) 84 MG (7MEQ) TBCR, Take 1 tablet (84 mg total) by mouth daily, Disp: 90 tablet, Rfl: 1    meclizine (ANTIVERT) 12.5 MG tablet, Take 1 tablet (12.5 mg total) by mouth 3 (three) times a day as needed for dizziness, Disp: 30 tablet, Rfl: 0    metoprolol succinate (TOPROL-XL) 50 mg 24 hr tablet, Take 1 tablet (50 mg total) by mouth 2 (two) times a day, Disp: 60 tablet, Rfl: 0    Multiple Vitamins-Minerals (MULTIVITAMIN WITH MINERALS) tablet, Take 1 tablet by mouth every morning, Disp: , Rfl:     neomycin-bacitracin-polymyxin (NEOSPORIN) ophthalmic ointment, INSTILL 1/4' INTO LEFT EYE AT BEDTIME, Disp: , Rfl:     omeprazole (PriLOSEC) 20 mg delayed release capsule, TAKE 1 CAPSULE TWICE DAILY, Disp: 180 capsule, Rfl: 1    ondansetron (ZOFRAN) 4 mg tablet, Take 1 tablet (4 mg total) by mouth every 8 (eight) hours as needed for nausea or vomiting, Disp: 20 tablet, Rfl: 0    pravastatin (PRAVACHOL) 40 mg tablet, TAKE 1 TABLET EVERY DAY, Disp: 90 tablet, Rfl: 1    prochlorperazine (COMPAZINE) 5 mg tablet, Take 5 mg by mouth every 6 (six) hours as needed for nausea or vomiting, Disp: , Rfl:     spironolactone (ALDACTONE) 50 mg tablet, TAKE 1 TABLET DAILY AFTER LUNCH, Disp: 90 tablet, Rfl: 1    zolpidem (AMBIEN) 5 mg tablet, Take 1 tablet (5 mg total) by mouth daily at bedtime as needed for sleep Do not start before March 8, 2025., Disp: 30 tablet, Rfl: 3    Lab Results   Component Value Date     (L) 09/21/2015    SODIUM 129 (L) 03/17/2025    K 4.3 03/17/2025    CL 95 (L) 03/17/2025    CO2 27 03/17/2025    ANIONGAP 7 09/21/2015    AGAP 7 03/17/2025    BUN 12 03/17/2025    CREATININE 0.89 03/17/2025    GLUC 92 03/17/2025     "GLUF 92 03/17/2025    CALCIUM 9.8 03/17/2025    AST 17 03/17/2025    ALT 16 03/17/2025    ALKPHOS 109 (H) 03/17/2025    PROT 6.9 09/21/2015    TP 7.3 03/17/2025    BILITOT 0.29 09/21/2015    TBILI 0.40 03/17/2025    EGFR 62 03/17/2025     Lab Results   Component Value Date    WBC 6.16 03/17/2025    HGB 12.4 03/17/2025    HCT 36.7 03/17/2025    MCV 89 03/17/2025     03/17/2025     Lab Results   Component Value Date    CHOLESTEROL 196 02/24/2025    CHOLESTEROL 223 (H) 06/27/2024    CHOLESTEROL 208 (H) 03/11/2024     Lab Results   Component Value Date    HDL 88 02/24/2025    HDL 97 06/27/2024    HDL 87 03/11/2024     Lab Results   Component Value Date    LDLCALC 95 02/24/2025    LDLCALC 113 (H) 06/27/2024    LDLCALC 102 (H) 03/11/2024     Lab Results   Component Value Date    TRIG 67 02/24/2025    TRIG 64 06/27/2024    TRIG 93 03/11/2024     No results found for: \"CHOLHDL\"  Lab Results   Component Value Date    NXT2FMHMUPHJ 2.058 03/09/2025     Lab Results   Component Value Date    PTH 67.2 11/22/2022    CALCIUM 9.8 03/17/2025    PHOS 3.7 03/17/2025     No results found for: \"SPEP\", \"UPEP\"  No results found for: \"MICROALBUR\", \"WVNA18QHK\"        Objective:      /77 (Patient Position: Sitting, Cuff Size: Standard)   Pulse 59   Temp 98.1 °F (36.7 °C) (Temporal)   Resp 16   Ht 5' 6\" (1.676 m)   Wt 74.4 kg (164 lb)   SpO2 97%   BMI 26.47 kg/m²          Physical Exam  Vitals reviewed.   Constitutional:       General: She is not in acute distress.     Appearance: Normal appearance.   HENT:      Head: Normocephalic and atraumatic.      Right Ear: External ear normal.      Left Ear: External ear normal.   Eyes:      Conjunctiva/sclera: Conjunctivae normal.   Cardiovascular:      Rate and Rhythm: Normal rate and regular rhythm.      Heart sounds: Normal heart sounds.   Pulmonary:      Effort: No respiratory distress.      Breath sounds: No wheezing.   Abdominal:      Palpations: Abdomen is soft.   Skin:     " General: Skin is warm and dry.   Neurological:      General: No focal deficit present.      Mental Status: She is alert and oriented to person, place, and time.   Psychiatric:         Mood and Affect: Mood normal.         Behavior: Behavior normal.

## 2025-04-01 ENCOUNTER — NURSE TRIAGE (OUTPATIENT)
Age: 80
End: 2025-04-01

## 2025-04-01 NOTE — TELEPHONE ENCOUNTER
OK to hold Eliquis prior to the dental extraction.  Will have elevated stroke risk during hold, but risk of bleeding during the extraction warrants holding the medication. She will not be able to hold medications for 3 months after her ablation (FYI)

## 2025-04-01 NOTE — TELEPHONE ENCOUNTER
"REASON FOR CONVERSATION: medication question  Pt has an upcoming dental appointment and possible dental extraction 4/23. Pt is asking if she will be able to hold her Eliquis if needed for 1 tooth extraction. Pt reports this is a back tooth, but not a molar. Pt was a new onset of Afib/flutter (3/9) since her last visit with regular cardiologist Dr. Blas, 2/4/25. She was seen by Dr. Marin 3/25/25 and is scheduled for an Aflutter ablation 5/7. Pt started on Eliquis in the hospital on 3/9/25.    Advised the patient that the anitcoagulation is not always needed to be held for one tooth extraction. But would ask if it is acceptable for her to hold at this time.    FOLLOW UP: Review with provider and call patient back with recommendation     DISPOSITION: Discuss with Provider and Call Back Patient    Answer Assessment - Initial Assessment Questions  1. NAME of MEDICINE: \"What medicine(s) are you calling about?\"      Eliquis  2. QUESTION: \"What is your question?\" (e.g., double dose of medicine, side effect)      Pt has an upcoming dental appointment and possible dental extraction 4/23. Pt is asking if she will be able to hold her Eliquis if needed for 1 tooth extraction. Pt reports this is a back tooth, but not a molar  3. PRESCRIBER: \"Who prescribed the medicine?\" Reason: if prescribed by specialist, call should be referred to that group.     Rich Marin/ Williams Blas    Protocols used: Medication Question Call-Adult-[place]  /////  "

## 2025-04-09 DIAGNOSIS — I48.92 ATRIAL FLUTTER, UNSPECIFIED TYPE (HCC): ICD-10-CM

## 2025-04-09 NOTE — TELEPHONE ENCOUNTER
Reason for call:   [x] Refill   [] Prior Auth  [x] Other: Patient wants eliquis to stay as a 30 day supply     Office:   [] PCP/Provider -   [x] Specialty/Provider - [facility]     metoprolol succinate (TOPROL-XL) 50 mg 24 hr tab    50 mg, 2 times daily    180     apixaban (Eliquis) 5 mg    5 mg, 2 times daily    60    Pharmacy: [id number]    Local Pharmacy   Does the patient have enough for 3 days?   [x] Yes   [] No - Send as HP to POD    Mail Away Pharmacy   Does the patient have enough for 10 days?   [] Yes   [] No - Send as HP to POD

## 2025-04-10 RX ORDER — METOPROLOL SUCCINATE 50 MG/1
50 TABLET, EXTENDED RELEASE ORAL 2 TIMES DAILY
Qty: 180 TABLET | Refills: 3 | Status: SHIPPED | OUTPATIENT
Start: 2025-04-10 | End: 2025-05-10

## 2025-04-23 ENCOUNTER — APPOINTMENT (OUTPATIENT)
Dept: LAB | Facility: HOSPITAL | Age: 80
End: 2025-04-23
Payer: MEDICARE

## 2025-04-23 DIAGNOSIS — E22.2 SIADH (SYNDROME OF INAPPROPRIATE ADH PRODUCTION) (HCC): ICD-10-CM

## 2025-04-23 LAB
ALBUMIN SERPL BCG-MCNC: 4.4 G/DL (ref 3.5–5)
ALP SERPL-CCNC: 104 U/L (ref 34–104)
ALT SERPL W P-5'-P-CCNC: 15 U/L (ref 7–52)
ANION GAP SERPL CALCULATED.3IONS-SCNC: 8 MMOL/L (ref 4–13)
AST SERPL W P-5'-P-CCNC: 17 U/L (ref 13–39)
BASOPHILS # BLD AUTO: 0.02 THOUSANDS/ÂΜL (ref 0–0.1)
BASOPHILS NFR BLD AUTO: 1 % (ref 0–1)
BILIRUB SERPL-MCNC: 0.43 MG/DL (ref 0.2–1)
BUN SERPL-MCNC: 13 MG/DL (ref 5–25)
CALCIUM SERPL-MCNC: 9.5 MG/DL (ref 8.4–10.2)
CHLORIDE SERPL-SCNC: 96 MMOL/L (ref 96–108)
CO2 SERPL-SCNC: 27 MMOL/L (ref 21–32)
CREAT SERPL-MCNC: 0.98 MG/DL (ref 0.6–1.3)
EOSINOPHIL # BLD AUTO: 0.24 THOUSAND/ÂΜL (ref 0–0.61)
EOSINOPHIL NFR BLD AUTO: 6 % (ref 0–6)
ERYTHROCYTE [DISTWIDTH] IN BLOOD BY AUTOMATED COUNT: 12.4 % (ref 11.6–15.1)
GFR SERPL CREATININE-BSD FRML MDRD: 55 ML/MIN/1.73SQ M
GLUCOSE P FAST SERPL-MCNC: 94 MG/DL (ref 65–99)
HCT VFR BLD AUTO: 35 % (ref 34.8–46.1)
HGB BLD-MCNC: 11.9 G/DL (ref 11.5–15.4)
IMM GRANULOCYTES # BLD AUTO: 0.01 THOUSAND/UL (ref 0–0.2)
IMM GRANULOCYTES NFR BLD AUTO: 0 % (ref 0–2)
LYMPHOCYTES # BLD AUTO: 1.33 THOUSANDS/ÂΜL (ref 0.6–4.47)
LYMPHOCYTES NFR BLD AUTO: 32 % (ref 14–44)
MCH RBC QN AUTO: 29.8 PG (ref 26.8–34.3)
MCHC RBC AUTO-ENTMCNC: 34 G/DL (ref 31.4–37.4)
MCV RBC AUTO: 88 FL (ref 82–98)
MONOCYTES # BLD AUTO: 0.32 THOUSAND/ÂΜL (ref 0.17–1.22)
MONOCYTES NFR BLD AUTO: 8 % (ref 4–12)
NEUTROPHILS # BLD AUTO: 2.21 THOUSANDS/ÂΜL (ref 1.85–7.62)
NEUTS SEG NFR BLD AUTO: 53 % (ref 43–75)
NRBC BLD AUTO-RTO: 0 /100 WBCS
OSMOLALITY UR: 284 MMOL/KG (ref 250–900)
PLATELET # BLD AUTO: 218 THOUSANDS/UL (ref 149–390)
PMV BLD AUTO: 9.2 FL (ref 8.9–12.7)
POTASSIUM SERPL-SCNC: 4 MMOL/L (ref 3.5–5.3)
PROT SERPL-MCNC: 7 G/DL (ref 6.4–8.4)
RBC # BLD AUTO: 3.99 MILLION/UL (ref 3.81–5.12)
SODIUM SERPL-SCNC: 131 MMOL/L (ref 135–147)
WBC # BLD AUTO: 4.13 THOUSAND/UL (ref 4.31–10.16)

## 2025-04-23 PROCEDURE — 83935 ASSAY OF URINE OSMOLALITY: CPT

## 2025-04-24 ENCOUNTER — OFFICE VISIT (OUTPATIENT)
Dept: CARDIOLOGY CLINIC | Facility: CLINIC | Age: 80
End: 2025-04-24
Payer: MEDICARE

## 2025-04-24 VITALS
HEART RATE: 54 BPM | RESPIRATION RATE: 16 BRPM | HEIGHT: 62 IN | OXYGEN SATURATION: 97 % | DIASTOLIC BLOOD PRESSURE: 76 MMHG | SYSTOLIC BLOOD PRESSURE: 134 MMHG | WEIGHT: 164 LBS | BODY MASS INDEX: 30.18 KG/M2

## 2025-04-24 DIAGNOSIS — I48.92 ATRIAL FLUTTER, UNSPECIFIED TYPE (HCC): ICD-10-CM

## 2025-04-24 DIAGNOSIS — I48.11 LONGSTANDING PERSISTENT ATRIAL FIBRILLATION (HCC): Primary | ICD-10-CM

## 2025-04-24 DIAGNOSIS — I34.0 MITRAL VALVE INSUFFICIENCY, UNSPECIFIED ETIOLOGY: ICD-10-CM

## 2025-04-24 PROCEDURE — 99213 OFFICE O/P EST LOW 20 MIN: CPT | Performed by: NURSE PRACTITIONER

## 2025-04-24 NOTE — PROGRESS NOTES
Patient ID: Jacklyn Garcia is a 80 y.o. female.        Plan:      Assessment & Plan  Atrial flutter, unspecified type (HCC)  Continue metoprolol and Eliquis  Ablation planned for 5/7/2025  Mitral valve insufficiency, unspecified etiology  Continue routine echo surveillance      Follow up Plan/Summary Comments:  Recommend continuing current medications.  Plan in place for ablation 5/7/2025.    Patient prefers to continue follow-up with Dr. Blas following her ablation.    HPI: The pleasure of seeing Jacklyn in the office today for a hospital follow-up visit.    Jacklyn is a pleasant 80-year-old female, routinely followed by Dr. Blas for cardiac care.  She was last seen thank you February 4, 2025 and was doing well.  She is followed for mitral regurgitation, hypertension, hyperlipidemia.  She also has a history of CKD, hyperaldosteronism, and SIADH.    In March, she was hospitalized at [facility] after presenting with dizziness and lightheadedness.  She was significantly hyponatremic, NA level 113 and noted to be in SVT, suggestive of atypical atrial flutter.  She was started on metoprolol and Eliquis.  She returned to the hospital a few days after discharge with tachycardia.  Medications were adjusted and she had follow-up with EP.  Arrangements have been made for an a flutter ablation which is scheduled for 5/7/2025.    Since her discharge from the hospital, Jacklyn has been doing well.  She denies any chest discomfort, palpitations, lightheadedness or dizziness.  She reports compliance with her Eliquis and denies any bleeding problems.    Review of Systems   10  point ROS  was otherwise non pertinent or negative except as per HPI or as below.   Gait: Normal      Most recent or relevant cardiac/vascular testing:    Echo 2/13/2025  EF 60%, G2 DD  Severely dilated LA  Mild to moderate MR  Mild TR  Small pericardial effusion without evidence of tamponade    Objective:     /76 (BP Location: Left  "arm, Patient Position: Sitting, Cuff Size: Large)   Pulse (!) 54   Resp 16   Ht 5' 1.81\" (1.57 m)   Wt 74.4 kg (164 lb)   SpO2 97%   BMI 30.18 kg/m²     PHYSICAL EXAM:    General:  Normal appearance, no acute distress  Eyes:  Anicteric.  Oral mucosa:  Moist.  Neck:  No JVD. Carotid upstrokes are brisk without bruits.  No masses.  Chest:  Clear to auscultation   Cardiac:  No palpable PMI.  Normal S1 and S2.  No murmur gallop or rub.  Abdomen:  Soft and nontender. No palpable organomegaly or aortic enlargement.  Extremities:  No peripheral edema.  Musculoskeletal:  Symmetric.   Vascular:  Pedal pulses are intact.  Neuro:  Grossly symmetric.  Psych:  Alert and oriented x3.    Allergies   Allergen Reactions    Latex      Added based on information entered during case entry, please review and add reactions, type, and severity as needed    Procaine Hives    Adhesive [Medical Tape] Rash    Lidocaine Rash    Penicillins Rash       Current Outpatient Medications:     apixaban (Eliquis) 5 mg, Take 1 tablet (5 mg total) by mouth 2 (two) times a day, Disp: 60 tablet, Rfl: 6    bacitracin-polymyxin b ophthalmic ointment, INSTILL A 1/4 INCH INTO LEFT EYE AT BEDTIME, Disp: , Rfl:     butalbital-acetaminophen-caffeine (FIORICET,ESGIC) -40 mg per tablet, Take 1 tablet by mouth every 6 (six) hours as needed for migraine Do not start before March 16, 2025., Disp: 120 tablet, Rfl: 3    calcium carbonate (OS-MARLENY) 600 MG tablet, Take 600 mg by mouth 2 (two) times a day with meals, Disp: , Rfl:     Ergocalciferol (VITAMIN D2 PO), Take 1,000 Units by mouth in the morning, Disp: , Rfl:     fluticasone (FLONASE) 50 mcg/act nasal spray, 2 sprays into each nostril daily, Disp: , Rfl:     losartan (COZAAR) 100 MG tablet, TAKE 1 TABLET EVERY MORNING, Disp: 90 tablet, Rfl: 1    magnesium (MAGTAB) 84 MG (7MEQ) TBCR, Take 1 tablet (84 mg total) by mouth daily, Disp: 90 tablet, Rfl: 1    meclizine (ANTIVERT) 12.5 MG tablet, Take 1 tablet " (12.5 mg total) by mouth 3 (three) times a day as needed for dizziness, Disp: 30 tablet, Rfl: 0    metoprolol succinate (TOPROL-XL) 50 mg 24 hr tablet, Take 1 tablet (50 mg total) by mouth 2 (two) times a day, Disp: 180 tablet, Rfl: 3    Multiple Vitamins-Minerals (MULTIVITAMIN WITH MINERALS) tablet, Take 1 tablet by mouth every morning, Disp: , Rfl:     neomycin-bacitracin-polymyxin (NEOSPORIN) ophthalmic ointment, INSTILL 1/4' INTO LEFT EYE AT BEDTIME, Disp: , Rfl:     omeprazole (PriLOSEC) 20 mg delayed release capsule, TAKE 1 CAPSULE TWICE DAILY, Disp: 180 capsule, Rfl: 1    ondansetron (ZOFRAN) 4 mg tablet, Take 1 tablet (4 mg total) by mouth every 8 (eight) hours as needed for nausea or vomiting, Disp: 20 tablet, Rfl: 0    pravastatin (PRAVACHOL) 40 mg tablet, TAKE 1 TABLET EVERY DAY, Disp: 90 tablet, Rfl: 1    prochlorperazine (COMPAZINE) 5 mg tablet, Take 5 mg by mouth every 6 (six) hours as needed for nausea or vomiting, Disp: , Rfl:     spironolactone (ALDACTONE) 50 mg tablet, TAKE 1 TABLET DAILY AFTER LUNCH, Disp: 90 tablet, Rfl: 1    zolpidem (AMBIEN) 5 mg tablet, Take 1 tablet (5 mg total) by mouth daily at bedtime as needed for sleep Do not start before March 8, 2025., Disp: 30 tablet, Rfl: 3  Past Medical History:   Diagnosis Date    Allergies     Anxiety     Arthritis     Benign arteriolar nephrosclerosis     Bereavement     Cataract     Chronic kidney disease     Chronic kidney disease in type 2 diabetes mellitus (HCC)     Chronic kidney disease, stage 2 (mild)     Chronic pain disorder     Chronic pain syndrome     COPD (chronic obstructive pulmonary disease) ([facility]ly [facility])     DDD (degenerative disc disease), cervical     DDD (degenerative disc disease), lumbar     Degenerative joint disease of right hip     Depression     Diastolic dysfunction     DJD (degenerative joint disease), ankle and foot, right     DJD of right shoulder     RÍOS (dyspnea on exertion)     Edema     Fracture of left calcaneus      Generalized anxiety disorder     GERD (gastroesophageal reflux disease)     Heart murmur     Hyperaldosteronism (HCC)     Hypercholesterolemia     Hyperlipidemia     Hypertension     Hypertensive nephrosclerosis     Hypo-osmolality and hyponatremia     IBS (irritable bowel syndrome)     Insomnia     Migraines     Mitral regurgitation     MVP (mitral valve prolapse)     Obstructive sleep apnea syndrome     Osteoarthritis     Osteoarthritis     Pernicious anemia     Plantar fascia rupture     Rheumatic mitral regurgitation     Rheumatoid arthritis (HCC)     Right knee DJD     S/P insertion of spinal cord stimulator     Shingles     Sinobronchitis     Sleep apnea     Status post total right knee replacement 07/07/2020    Stroke (HCC)     tia    TIA (transient ischemic attack)     Vertigo      Past Surgical History:   Procedure Laterality Date    APPENDECTOMY      CARPAL TUNNEL RELEASE Bilateral     COLONOSCOPY      several    COLONOSCOPY  04/02/2012    by Dr. Mckinley Ruvalcaba    CORNEAL TRANSPLANT Left 04/11/2022    DXA PROCEDURE (HISTORICAL)  10/26/2016, 9/17/2014, 6/18/2007    HAND SURGERY Right     ring finger has pin    HYSTERECTOMY      34    INSERT / REPLACE PERIPHERAL NEUROSTIMULATOR PULSE GENERATOR /  Left     buttocks    JOINT REPLACEMENT Left     knee    JOINT REPLACEMENT Right 04/2019    Hip    KNEE ARTHROPLASTY Left 01/15/2009    by Dr. Avery Leigh at [facility], [place]     KNEE ARTHROSCOPY Bilateral     KNEE CARTILAGE SURGERY  09/10/2009    by Dr. Avery Leigh at [facility] [place]     MAMMO (HISTORICAL)  12/10/2018, 10/30/2017, 10/26/2016    NASAL SEPTUM SURGERY  2008    NERVE BLOCK Left 02/20/2018    Procedure: BLOCK MEDIAL BRANCH - C4, C5, C6;  Surgeon: Jaycob Cruz MD;  Location: [facility];  Service: Pain Management     NERVE BLOCK Left 03/06/2018    Procedure: C4, C5, C6 MEDIAL BRANCH BLOCK;  Surgeon: Jaycob Cruz MD;  Location: [facility];  Service: Pain  Management     NERVE BLOCK Left 2/20/2025    Procedure: BLOCK MEDIAL BRANCH Left C4-5 and C5-6;  Surgeon: Jaycob Cruz MD;  Location: [facility];  Service: Pain Management     PA ARTHRP ACETBLR/PROX FEM PROSTC AGRFT/ALGRFT Right 02/27/2019    Procedure: ARTHROPLASTY HIP TOTAL;  Surgeon: Bruno Pina DO;  Location:  MAIN OR;  Service: Orthopedics    PA ARTHRP KNE CONDYLE&PLATU MEDIAL&LAT COMPARTMENTS Right 06/24/2020    Procedure: KNEE TOTAL ARTHROPLASTY;  Surgeon: Bruno Pina DO;  Location:  MAIN OR;  Service: Orthopedics    PA COLONOSCOPY FLX DX W/COLLJ SPEC WHEN PFRMD N/A 04/24/2017    Procedure: FLEXIBLE COLONOSCOPY;  Surgeon: Mckinley Ruvalcaba MD;  Location: [facility];  Service: Colorectal    RADIOFREQUENCY ABLATION Left 04/17/2018    Procedure: ABLATION RADIO FREQUENCY (RFA) - C4, C5, C6;  Surgeon: Jaycob Cruz MD;  Location: [facility];  Service: Pain Management     REPLACEMENT TOTAL KNEE Left 07/05/2011    SINUS SURGERY      TONSILLECTOMY      TOTAL HIP ARTHROPLASTY Left     TRIGGER FINGER RELEASE      R 4th     UPPER GASTROINTESTINAL ENDOSCOPY  01/12/2007    with Donaldson dilatation by Dr. Misael Santiago at [facility]    WRIST SURGERY Right        CMP:   Lab Results   Component Value Date     (L) 09/21/2015    K 4.0 04/23/2025    K 4.3 09/21/2015    CL 96 04/23/2025    CL 94 (L) 09/21/2015    CO2 27 04/23/2025    CO2 31.2 09/21/2015    BUN 13 04/23/2025    BUN 8 09/21/2015    CREATININE 0.98 04/23/2025    CREATININE 0.74 09/21/2015    GLUCOSE 91 09/21/2015    EGFR 55 04/23/2025     Lipid Profile:    Lab Results   Component Value Date    CHOL 290 06/04/2015    TRIG 67 02/24/2025    TRIG 44 06/04/2015    HDL 88 02/24/2025     06/04/2015         Social History     Tobacco Use   Smoking Status Former    Types: Cigarettes   Smokeless Tobacco Never   Tobacco Comments    quit 40 yrs ago

## 2025-04-30 ENCOUNTER — RA CDI HCC (OUTPATIENT)
Dept: OTHER | Facility: HOSPITAL | Age: 80
End: 2025-04-30

## 2025-05-01 ENCOUNTER — OFFICE VISIT (OUTPATIENT)
Dept: FAMILY MEDICINE CLINIC | Facility: CLINIC | Age: 80
End: 2025-05-01
Payer: MEDICARE

## 2025-05-01 VITALS
DIASTOLIC BLOOD PRESSURE: 76 MMHG | RESPIRATION RATE: 16 BRPM | HEIGHT: 65 IN | HEART RATE: 76 BPM | BODY MASS INDEX: 27.49 KG/M2 | WEIGHT: 165 LBS | TEMPERATURE: 96.4 F | SYSTOLIC BLOOD PRESSURE: 126 MMHG

## 2025-05-01 DIAGNOSIS — J01.01 ACUTE RECURRENT MAXILLARY SINUSITIS: ICD-10-CM

## 2025-05-01 DIAGNOSIS — E87.1 HYPONATREMIA: ICD-10-CM

## 2025-05-01 DIAGNOSIS — N18.31 STAGE 3A CHRONIC KIDNEY DISEASE (HCC): ICD-10-CM

## 2025-05-01 DIAGNOSIS — Z12.11 COLON CANCER SCREENING: ICD-10-CM

## 2025-05-01 DIAGNOSIS — F51.01 PRIMARY INSOMNIA: ICD-10-CM

## 2025-05-01 DIAGNOSIS — E22.2 SIADH (SYNDROME OF INAPPROPRIATE ADH PRODUCTION) (HCC): ICD-10-CM

## 2025-05-01 DIAGNOSIS — Z00.00 MEDICARE ANNUAL WELLNESS VISIT, SUBSEQUENT: Primary | ICD-10-CM

## 2025-05-01 DIAGNOSIS — I34.0 MITRAL VALVE INSUFFICIENCY, UNSPECIFIED ETIOLOGY: ICD-10-CM

## 2025-05-01 DIAGNOSIS — I10 ESSENTIAL HYPERTENSION: ICD-10-CM

## 2025-05-01 DIAGNOSIS — I48.92 ATRIAL FLUTTER, UNSPECIFIED TYPE (HCC): ICD-10-CM

## 2025-05-01 DIAGNOSIS — E78.00 HYPERCHOLESTEROLEMIA: ICD-10-CM

## 2025-05-01 DIAGNOSIS — R00.2 PALPITATIONS: ICD-10-CM

## 2025-05-01 PROBLEM — E66.01 OBESITY, MORBID (HCC): Status: RESOLVED | Noted: 2022-05-20 | Resolved: 2025-05-01

## 2025-05-01 PROBLEM — M21.612 BUNION OF GREAT TOE OF LEFT FOOT: Status: RESOLVED | Noted: 2023-12-21 | Resolved: 2025-05-01

## 2025-05-01 PROCEDURE — G0439 PPPS, SUBSEQ VISIT: HCPCS | Performed by: FAMILY MEDICINE

## 2025-05-01 PROCEDURE — 99214 OFFICE O/P EST MOD 30 MIN: CPT | Performed by: FAMILY MEDICINE

## 2025-05-01 RX ORDER — ONDANSETRON 4 MG/1
4 TABLET, FILM COATED ORAL EVERY 8 HOURS PRN
Qty: 20 TABLET | Refills: 2 | Status: SHIPPED | OUTPATIENT
Start: 2025-05-01

## 2025-05-01 RX ORDER — ONDANSETRON 4 MG/1
4 TABLET, FILM COATED ORAL EVERY 8 HOURS PRN
Qty: 20 TABLET | Refills: 2 | Status: SHIPPED | OUTPATIENT
Start: 2025-05-01 | End: 2025-05-01 | Stop reason: SDUPTHER

## 2025-05-01 NOTE — PATIENT INSTRUCTIONS
Medicare Preventive Visit Patient Instructions  Thank you for completing your Welcome to Medicare Visit or Medicare Annual Wellness Visit today. Your next wellness visit will be due in one year (5/2/2026).  The screening/preventive services that you may require over the next 5-10 years are detailed below. Some tests may not apply to you based off risk factors and/or age. Screening tests ordered at today's visit but not completed yet may show as past due. Also, please note that scanned in results may not display below.  Preventive Screenings:  Service Recommendations Previous Testing/Comments   Colorectal Cancer Screening  * Colonoscopy    * Fecal Occult Blood Test (FOBT)/Fecal Immunochemical Test (FIT)  * Fecal DNA/Cologuard Test  * Flexible Sigmoidoscopy Age: 45-75 years old   Colonoscopy: every 10 years (may be performed more frequently if at higher risk)  OR  FOBT/FIT: every 1 year  OR  Cologuard: every 3 years  OR  Sigmoidoscopy: every 5 years  Screening may be recommended earlier than age 45 if at higher risk for colorectal cancer. Also, an individualized decision between you and your healthcare provider will decide whether screening between the ages of 76-85 would be appropriate. Colonoscopy: 04/24/2017  FOBT/FIT: Not on file  Cologuard: Not on file  Sigmoidoscopy: Not on file          Breast Cancer Screening Age: 40+ years old  Frequency: every 1-2 years  Not required if history of left and right mastectomy Mammogram: 01/17/2025    Screening Current   Cervical Cancer Screening Between the ages of 21-29, pap smear recommended once every 3 years.   Between the ages of 30-65, can perform pap smear with HPV co-testing every 5 years.   Recommendations may differ for women with a history of total hysterectomy, cervical cancer, or abnormal pap smears in past. Pap Smear: 04/04/2022    Screening Not Indicated   Hepatitis C Screening Once for adults born between 1945 and 1965  More frequently in patients at high risk  for Hepatitis C Hep C Antibody: 01/12/2022    Screening Current   Diabetes Screening 1-2 times per year if you're at risk for diabetes or have pre-diabetes Fasting glucose: 94 mg/dL (4/23/2025)  A1C: 5.0 % (7/22/2020)  Screening Current   Cholesterol Screening Once every 5 years if you don't have a lipid disorder. May order more often based on risk factors. Lipid panel: 02/24/2025    Screening Not Indicated  History Lipid Disorder     Other Preventive Screenings Covered by Medicare:  Abdominal Aortic Aneurysm (AAA) Screening: covered once if your at risk. You're considered to be at risk if you have a family history of AAA.  Lung Cancer Screening: covers low dose CT scan once per year if you meet all of the following conditions: (1) Age 55-77; (2) No signs or symptoms of lung cancer; (3) Current smoker or have quit smoking within the last 15 years; (4) You have a tobacco smoking history of at least 20 pack years (packs per day multiplied by number of years you smoked); (5) You get a written order from a healthcare provider.  Glaucoma Screening: covered annually if you're considered high risk: (1) You have diabetes OR (2) Family history of glaucoma OR (3)  aged 50 and older OR (4)  American aged 65 and older  Osteoporosis Screening: covered every 2 years if you meet one of the following conditions: (1) You're estrogen deficient and at risk for osteoporosis based off medical history and other findings; (2) Have a vertebral abnormality; (3) On glucocorticoid therapy for more than 3 months; (4) Have primary hyperparathyroidism; (5) On osteoporosis medications and need to assess response to drug therapy.   Last bone density test (DXA Scan): 05/03/2022.  HIV Screening: covered annually if you're between the age of 15-65. Also covered annually if you are younger than 15 and older than 65 with risk factors for HIV infection. For pregnant patients, it is covered up to 3 times per  pregnancy.    Immunizations:  Immunization Recommendations   Influenza Vaccine Annual influenza vaccination during flu season is recommended for all persons aged >= 6 months who do not have contraindications   Pneumococcal Vaccine   * Pneumococcal conjugate vaccine = PCV13 (Prevnar 13), PCV15 (Vaxneuvance), PCV20 (Prevnar 20)  * Pneumococcal polysaccharide vaccine = PPSV23 (Pneumovax) Adults 19-63 yo with certain risk factors or if 65+ yo  If never received any pneumonia vaccine: recommend Prevnar 20 (PCV20)  Give PCV20 if previously received 1 dose of PCV13 or PPSV23   Hepatitis B Vaccine 3 dose series if at intermediate or high risk (ex: diabetes, end stage renal disease, liver disease)   Respiratory syncytial virus (RSV) Vaccine - COVERED BY MEDICARE PART D  * RSVPreF3 (Arexvy) CDC recommends that adults 60 years of age and older may receive a single dose of RSV vaccine using shared clinical decision-making (SCDM)   Tetanus (Td) Vaccine - COST NOT COVERED BY MEDICARE PART B Following completion of primary series, a booster dose should be given every 10 years to maintain immunity against tetanus. Td may also be given as tetanus wound prophylaxis.   Tdap Vaccine - COST NOT COVERED BY MEDICARE PART B Recommended at least once for all adults. For pregnant patients, recommended with each pregnancy.   Shingles Vaccine (Shingrix) - COST NOT COVERED BY MEDICARE PART B  2 shot series recommended in those 19 years and older who have or will have weakened immune systems or those 50 years and older     Health Maintenance Due:      Topic Date Due   • Colorectal Cancer Screening  04/24/2022   • Breast Cancer Screening: Mammogram  01/17/2026   • Hepatitis C Screening  Completed     Immunizations Due:      Topic Date Due   • COVID-19 Vaccine (6 - 2024-25 season) 09/01/2024     Advance Directives   What are advance directives?  Advance directives are legal documents that state your wishes and plans for medical care. These plans  are made ahead of time in case you lose your ability to make decisions for yourself. Advance directives can apply to any medical decision, such as the treatments you want, and if you want to donate organs.   What are the types of advance directives?  There are many types of advance directives, and each state has rules about how to use them. You may choose a combination of any of the following:  Living will:  This is a written record of the treatment you want. You can also choose which treatments you do not want, which to limit, and which to stop at a certain time. This includes surgery, medicine, IV fluid, and tube feedings.   Durable power of  for healthcare (DPAHC):  This is a written record that states who you want to make healthcare choices for you when you are unable to make them for yourself. This person, called a proxy, is usually a family member or a friend. You may choose more than 1 proxy.  Do not resuscitate (DNR) order:  A DNR order is used in case your heart stops beating or you stop breathing. It is a request not to have certain forms of treatment, such as CPR. A DNR order may be included in other types of advance directives.  Medical directive:  This covers the care that you want if you are in a coma, near death, or unable to make decisions for yourself. You can list the treatments you want for each condition. Treatment may include pain medicine, surgery, blood transfusions, dialysis, IV or tube feedings, and a ventilator (breathing machine).  Values history:  This document has questions about your views, beliefs, and how you feel and think about life. This information can help others choose the care that you would choose.  Why are advance directives important?  An advance directive helps you control your care. Although spoken wishes may be used, it is better to have your wishes written down. Spoken wishes can be misunderstood, or not followed. Treatments may be given even if you do not want  them. An advance directive may make it easier for your family to make difficult choices about your care.   Weight Management   Why it is important to manage your weight:  Being overweight increases your risk of health conditions such as heart disease, high blood pressure, type 2 diabetes, and certain types of cancer. It can also increase your risk for osteoarthritis, sleep apnea, and other respiratory problems. Aim for a slow, steady weight loss. Even a small amount of weight loss can lower your risk of health problems.  How to lose weight safely:  A safe and healthy way to lose weight is to eat fewer calories and get regular exercise. You can lose up about 1 pound a week by decreasing the number of calories you eat by 500 calories each day.   Healthy meal plan for weight management:  A healthy meal plan includes a variety of foods, contains fewer calories, and helps you stay healthy. A healthy meal plan includes the following:  Eat whole-grain foods more often.  A healthy meal plan should contain fiber. Fiber is the part of grains, fruits, and vegetables that is not broken down by your body. Whole-grain foods are healthy and provide extra fiber in your diet. Some examples of whole-grain foods are whole-wheat breads and pastas, oatmeal, brown rice, and bulgur.  Eat a variety of vegetables every day.  Include dark, leafy greens such as spinach, kale, daniela greens, and mustard greens. Eat yellow and orange vegetables such as carrots, sweet potatoes, and winter squash.   Eat a variety of fruits every day.  Choose fresh or canned fruit (canned in its own juice or light syrup) instead of juice. Fruit juice has very little or no fiber.  Eat low-fat dairy foods.  Drink fat-free (skim) milk or 1% milk. Eat fat-free yogurt and low-fat cottage cheese. Try low-fat cheeses such as mozzarella and other reduced-fat cheeses.  Choose meat and other protein foods that are low in fat.  Choose beans or other legumes such as split  peas or lentils. Choose fish, skinless poultry (chicken or turkey), or lean cuts of red meat (beef or pork). Before you cook meat or poultry, cut off any visible fat.   Use less fat and oil.  Try baking foods instead of frying them. Add less fat, such as margarine, sour cream, regular salad dressing and mayonnaise to foods. Eat fewer high-fat foods. Some examples of high-fat foods include french fries, doughnuts, ice cream, and cakes.  Eat fewer sweets.  Limit foods and drinks that are high in sugar. This includes candy, cookies, regular soda, and sweetened drinks.  Exercise:  Exercise at least 30 minutes per day on most days of the week. Some examples of exercise include walking, biking, dancing, and swimming. You can also fit in more physical activity by taking the stairs instead of the elevator or parking farther away from stores. Ask your healthcare provider about the best exercise plan for you.      © Copyright [employer] 2018 Information is for End User's use only and may not be sold, redistributed or otherwise used for commercial purposes. All illustrations and images included in CareNotes® are the copyrighted property of [employer] or [employer]

## 2025-05-01 NOTE — PROGRESS NOTES
Name: Jacklyn Garcia      : 1945      MRN: [id number]  Encounter Provider: Ernie Wilkinson DO  Encounter Date: 2025   Encounter department: [facility]  :  Assessment & Plan  Medicare annual wellness visit, subsequent         Atrial flutter, unspecified type (HCC)  Atrial fibrillation atrial flutter the patient is scheduled for ablation in 6 days       Palpitations  Associated with atrial fibrillation and atrial flutter       Mitral valve insufficiency, unspecified etiology  Stable       Stage 3a chronic kidney disease (HCC)  Lab Results   Component Value Date    EGFR 55 2025    EGFR 62 2025    EGFR 59 2025    CREATININE 0.98 2025    CREATININE 0.89 2025    CREATININE 0.92 2025   Followed by nephrology         Hyponatremia  Sodium is at 131       SIADH (syndrome of inappropriate ADH production) (HCC)         Essential hypertension  With a blood pressure controlled on the current regimen       Primary insomnia  Ambien is effective therapy       Colon cancer screening  The patient had colonoscopy in 2017 no polyps were found she no longer needs colon cancer screening.          Preventive health issues were discussed with patient, and age appropriate screening tests were ordered as noted in patient's After Visit Summary. Personalized health advice and appropriate referrals for health education or preventive services given if needed, as noted in patient's After Visit Summary.    History of Present Illness     HPI   Patient Care Team:  Ernie Wilkinson DO as PCP - General  DO Erickson Abbott MD Daniel Joseph Bowers, MD as Endoscopist  Williams Hernandez MD as Consulting Physician (Internal Medicine)  Kin Cortes DO (Nephrology)  DAYNE Acosta as Nurse Practitioner (Nephrology)  Jaycob Cruz MD (Pain Medicine)    Review of Systems   Constitutional:  Negative for chills and fever.   HENT:   Negative for ear pain and sore throat.    Eyes:  Negative for pain and visual disturbance.   Respiratory:  Negative for cough and shortness of breath.    Cardiovascular:  Positive for palpitations. Negative for chest pain.   Gastrointestinal:  Negative for abdominal pain and vomiting.   Genitourinary:  Negative for dysuria and hematuria.   Musculoskeletal:  Negative for arthralgias and back pain.   Skin:  Negative for color change and rash.   Neurological:  Negative for seizures and syncope.   All other systems reviewed and are negative.    Medical History Reviewed by provider this encounter:  Tobacco  Allergies  Meds  Problems  Med Hx  Surg Hx  Fam Hx       Annual Wellness Visit Questionnaire       Health Risk Assessment:   Patient rates overall health as good. Patient feels that their physical health rating is same. Eyesight was rated as same. Hearing was rated as same. Patient feels that their emotional and mental health rating is same. Patients states they are never, rarely angry. Patient states they are sometimes unusually tired/fatigued. Pain experienced in the last 7 days has been some. Patient's pain rating has been 5/10. Patient states that she has experienced no weight loss or gain in last 6 months.     Depression Screening:   PHQ-2 Score: 0      Fall Risk Screening:   In the past year, patient has experienced: no history of falling in past year      Urinary Incontinence Screening:   Patient has not leaked urine accidently in the last six months.     Home Safety:  Patient does not have trouble with stairs inside or outside of their home.     Nutrition:   Current diet is Regular.     Medications:   Patient is currently taking over-the-counter supplements. OTC medications include: see medication list. Patient is able to manage medications.     Activities of Daily Living (ADLs)/Instrumental Activities of Daily Living (IADLs):   Walk and transfer into and out of bed and chair?: Yes  Dress and groom  yourself?: Yes    Bathe or shower yourself?: Yes    Feed yourself? Yes  Do your laundry/housekeeping?: Yes  Manage your money, pay your bills and track your expenses?: Yes  Make your own meals?: Yes    Do your own shopping?: Yes    Previous Hospitalizations:   Any hospitalizations or ED visits within the last 12 months?: Yes    How many hospitalizations have you had in the last year?: 3-4    Advance Care Planning:   Living will: Yes    Advanced directive: Yes      Preventive Screenings      Cardiovascular Screening:    General: Screening Not Indicated and History Lipid Disorder      Diabetes Screening:     General: Screening Current      Breast Cancer Screening:     General: Screening Current      Cervical Cancer Screening:    General: Screening Not Indicated      Lung Cancer Screening:     General: Screening Not Indicated      Hepatitis C Screening:    General: Screening Current    Immunizations:  - Immunizations due: Zoster (Shingrix)    Screening, Brief Intervention, and Referral to Treatment (SBIRT)     Screening    Typical number of drinks in a week: 2    Single Item Drug Screening:  How often have you used an illegal drug (including marijuana) or a prescription medication for non-medical reasons in the past year? never    Single Item Drug Screen Score: 0  Interpretation: Negative screen for possible drug use disorder    Social Drivers of Health     Financial Resource Strain: Low Risk  (3/17/2023)    Overall Financial Resource Strain (CARDIA)    • Difficulty of Paying Living Expenses: Not very hard   Food Insecurity: No Food Insecurity (3/17/2025)    Nursing - Inadequate Food Risk Classification    • Ran Out of Food in the Last Year: Never true   Transportation Needs: No Transportation Needs (3/17/2025)    Nursing - Transportation Risk Classification    • Lack of Transportation: No   Housing Stability: Unknown (3/17/2025)    Nursing: Inadequate Housing Risk Classification    • Unable to Pay for Housing in the  "Last Year: No    • Has Housin   Utilities: Not At Risk (3/17/2025)    Nursing - Utilities Risk Classification    • Threatened with loss of utilities: No     No results found.    Objective   /76   Pulse 76   Temp (!) 96.4 °F (35.8 °C)   Resp 16   Ht 5' 5\" (1.651 m) Comment: shoes  Wt 74.8 kg (165 lb)   BMI 27.46 kg/m²     Physical Exam  Constitutional:       Appearance: Normal appearance.   HENT:      Head: Normocephalic and atraumatic.      Right Ear: Tympanic membrane, ear canal and external ear normal.      Left Ear: Tympanic membrane, ear canal and external ear normal.      Nose: Nose normal.      Mouth/Throat:      Mouth: Mucous membranes are moist.      Pharynx: Oropharynx is clear.   Eyes:      Extraocular Movements: Extraocular movements intact.      Conjunctiva/sclera: Conjunctivae normal.      Pupils: Pupils are equal, round, and reactive to light.   Cardiovascular:      Rate and Rhythm: Normal rate and regular rhythm.      Pulses: Normal pulses.      Heart sounds: Murmur heard.   Pulmonary:      Effort: Pulmonary effort is normal.      Breath sounds: Normal breath sounds.   Abdominal:      General: Abdomen is flat. Bowel sounds are normal.      Palpations: Abdomen is soft.   Musculoskeletal:         General: Normal range of motion.      Cervical back: Normal range of motion and neck supple.   Skin:     General: Skin is warm and dry.   Neurological:      General: No focal deficit present.      Mental Status: She is alert and oriented to person, place, and time.   Psychiatric:         Mood and Affect: Mood normal.         Behavior: Behavior normal.         "

## 2025-05-02 RX ORDER — PRAVASTATIN SODIUM 40 MG
40 TABLET ORAL DAILY
Qty: 90 TABLET | Refills: 1 | Status: SHIPPED | OUTPATIENT
Start: 2025-05-02

## 2025-05-06 ENCOUNTER — ANESTHESIA EVENT (OUTPATIENT)
Dept: NON INVASIVE DIAGNOSTICS | Facility: HOSPITAL | Age: 80
End: 2025-05-06
Payer: MEDICARE

## 2025-05-07 ENCOUNTER — HOSPITAL ENCOUNTER (OUTPATIENT)
Facility: HOSPITAL | Age: 80
Discharge: HOME | End: 2025-05-08
Attending: STUDENT IN AN ORGANIZED HEALTH CARE EDUCATION/TRAINING PROGRAM | Admitting: STUDENT IN AN ORGANIZED HEALTH CARE EDUCATION/TRAINING PROGRAM
Payer: MEDICARE

## 2025-05-07 ENCOUNTER — ANESTHESIA (OUTPATIENT)
Dept: NON INVASIVE DIAGNOSTICS | Facility: HOSPITAL | Age: 80
End: 2025-05-07
Payer: MEDICARE

## 2025-05-07 ENCOUNTER — APPOINTMENT (OUTPATIENT)
Dept: NON INVASIVE DIAGNOSTICS | Facility: HOSPITAL | Age: 80
End: 2025-05-07
Attending: STUDENT IN AN ORGANIZED HEALTH CARE EDUCATION/TRAINING PROGRAM
Payer: MEDICARE

## 2025-05-07 DIAGNOSIS — I48.11 LONGSTANDING PERSISTENT ATRIAL FIBRILLATION (HCC): Primary | ICD-10-CM

## 2025-05-07 DIAGNOSIS — I48.92 ATRIAL FLUTTER, UNSPECIFIED TYPE (HCC): ICD-10-CM

## 2025-05-07 LAB
ANION GAP SERPL CALCULATED.3IONS-SCNC: 7 MMOL/L (ref 4–13)
AORTIC ROOT: 3.1 CM
ASCENDING AORTA: 3.2 CM
ATRIAL RATE: 58 BPM
ATRIAL RATE: 80 BPM
ATRIAL RATE: 82 BPM
BUN SERPL-MCNC: 11 MG/DL (ref 5–25)
CALCIUM SERPL-MCNC: 9.4 MG/DL (ref 8.4–10.2)
CHLORIDE SERPL-SCNC: 97 MMOL/L (ref 96–108)
CO2 SERPL-SCNC: 28 MMOL/L (ref 21–32)
CREAT SERPL-MCNC: 0.93 MG/DL (ref 0.6–1.3)
ERYTHROCYTE [DISTWIDTH] IN BLOOD BY AUTOMATED COUNT: 12.7 % (ref 11.6–15.1)
GFR SERPL CREATININE-BSD FRML MDRD: 59 ML/MIN/1.73SQ M
GLUCOSE P FAST SERPL-MCNC: 97 MG/DL (ref 65–99)
GLUCOSE SERPL-MCNC: 97 MG/DL (ref 65–140)
HCT VFR BLD AUTO: 36.7 % (ref 34.8–46.1)
HGB BLD-MCNC: 12.4 G/DL (ref 11.5–15.4)
INR PPP: 1.09 (ref 0.85–1.19)
KCT BLD-ACNC: 318 SEC (ref 89–137)
KCT BLD-ACNC: 376 SEC (ref 89–137)
KCT BLD-ACNC: 382 SEC (ref 89–137)
KCT BLD-ACNC: 408 SEC (ref 89–137)
KCT BLD-ACNC: 415 SEC (ref 89–137)
KCT BLD-ACNC: 434 SEC (ref 89–137)
KCT BLD-ACNC: 452 SEC (ref 89–137)
KCT BLD-ACNC: 465 SEC (ref 89–137)
MCH RBC QN AUTO: 30.1 PG (ref 26.8–34.3)
MCHC RBC AUTO-ENTMCNC: 33.8 G/DL (ref 31.4–37.4)
MCV RBC AUTO: 89 FL (ref 82–98)
P AXIS: 36 DEGREES
P AXIS: 68 DEGREES
P AXIS: 78 DEGREES
PLATELET # BLD AUTO: 223 THOUSANDS/UL (ref 149–390)
PMV BLD AUTO: 8.9 FL (ref 8.9–12.7)
POTASSIUM SERPL-SCNC: 4 MMOL/L (ref 3.5–5.3)
PR INTERVAL: 184 MS
PR INTERVAL: 224 MS
PR INTERVAL: 232 MS
PROTHROMBIN TIME: 14.4 SECONDS (ref 12.3–15)
QRS AXIS: 35 DEGREES
QRS AXIS: 76 DEGREES
QRS AXIS: 78 DEGREES
QRSD INTERVAL: 80 MS
QRSD INTERVAL: 82 MS
QRSD INTERVAL: 84 MS
QT INTERVAL: 388 MS
QT INTERVAL: 396 MS
QT INTERVAL: 428 MS
QTC INTERVAL: 421 MS
QTC INTERVAL: 448 MS
QTC INTERVAL: 462 MS
RBC # BLD AUTO: 4.12 MILLION/UL (ref 3.81–5.12)
SL CV LV EF: 60
SODIUM SERPL-SCNC: 132 MMOL/L (ref 135–147)
SPECIMEN SOURCE: ABNORMAL
T WAVE AXIS: 36 DEGREES
T WAVE AXIS: 68 DEGREES
T WAVE AXIS: 80 DEGREES
VENTRICULAR RATE: 58 BPM
VENTRICULAR RATE: 80 BPM
VENTRICULAR RATE: 82 BPM
WBC # BLD AUTO: 4.99 THOUSAND/UL (ref 4.31–10.16)

## 2025-05-07 PROCEDURE — NC001 PR NO CHARGE: Performed by: PHYSICIAN ASSISTANT

## 2025-05-07 PROCEDURE — C1769 GUIDE WIRE: HCPCS | Performed by: STUDENT IN AN ORGANIZED HEALTH CARE EDUCATION/TRAINING PROGRAM

## 2025-05-07 PROCEDURE — 93655 ICAR CATH ABLTJ DSCRT ARRHYT: CPT | Performed by: STUDENT IN AN ORGANIZED HEALTH CARE EDUCATION/TRAINING PROGRAM

## 2025-05-07 PROCEDURE — 93325 DOPPLER ECHO COLOR FLOW MAPG: CPT | Performed by: INTERNAL MEDICINE

## 2025-05-07 PROCEDURE — 93312 ECHO TRANSESOPHAGEAL: CPT

## 2025-05-07 PROCEDURE — C1733 CATH, EP, OTHR THAN COOL-TIP: HCPCS | Performed by: STUDENT IN AN ORGANIZED HEALTH CARE EDUCATION/TRAINING PROGRAM

## 2025-05-07 PROCEDURE — 93656 COMPRE EP EVAL ABLTJ ATR FIB: CPT | Performed by: STUDENT IN AN ORGANIZED HEALTH CARE EDUCATION/TRAINING PROGRAM

## 2025-05-07 PROCEDURE — 93320 DOPPLER ECHO COMPLETE: CPT | Performed by: INTERNAL MEDICINE

## 2025-05-07 PROCEDURE — 85027 COMPLETE CBC AUTOMATED: CPT | Performed by: PHYSICIAN ASSISTANT

## 2025-05-07 PROCEDURE — 93005 ELECTROCARDIOGRAM TRACING: CPT

## 2025-05-07 PROCEDURE — C1894 INTRO/SHEATH, NON-LASER: HCPCS | Performed by: STUDENT IN AN ORGANIZED HEALTH CARE EDUCATION/TRAINING PROGRAM

## 2025-05-07 PROCEDURE — 85610 PROTHROMBIN TIME: CPT | Performed by: PHYSICIAN ASSISTANT

## 2025-05-07 PROCEDURE — 85347 COAGULATION TIME ACTIVATED: CPT

## 2025-05-07 PROCEDURE — 93657 TX L/R ATRIAL FIB ADDL: CPT | Performed by: STUDENT IN AN ORGANIZED HEALTH CARE EDUCATION/TRAINING PROGRAM

## 2025-05-07 PROCEDURE — C1732 CATH, EP, DIAG/ABL, 3D/VECT: HCPCS | Performed by: STUDENT IN AN ORGANIZED HEALTH CARE EDUCATION/TRAINING PROGRAM

## 2025-05-07 PROCEDURE — C1759 CATH, INTRA ECHOCARDIOGRAPHY: HCPCS | Performed by: STUDENT IN AN ORGANIZED HEALTH CARE EDUCATION/TRAINING PROGRAM

## 2025-05-07 PROCEDURE — 93010 ELECTROCARDIOGRAM REPORT: CPT | Performed by: INTERNAL MEDICINE

## 2025-05-07 PROCEDURE — 93312 ECHO TRANSESOPHAGEAL: CPT | Performed by: INTERNAL MEDICINE

## 2025-05-07 PROCEDURE — 80048 BASIC METABOLIC PNL TOTAL CA: CPT | Performed by: PHYSICIAN ASSISTANT

## 2025-05-07 PROCEDURE — C1730 CATH, EP, 19 OR FEW ELECT: HCPCS | Performed by: STUDENT IN AN ORGANIZED HEALTH CARE EDUCATION/TRAINING PROGRAM

## 2025-05-07 PROCEDURE — C1766 INTRO/SHEATH,STRBLE,NON-PEEL: HCPCS | Performed by: STUDENT IN AN ORGANIZED HEALTH CARE EDUCATION/TRAINING PROGRAM

## 2025-05-07 PROCEDURE — 76937 US GUIDE VASCULAR ACCESS: CPT | Performed by: STUDENT IN AN ORGANIZED HEALTH CARE EDUCATION/TRAINING PROGRAM

## 2025-05-07 PROCEDURE — C1760 CLOSURE DEV, VASC: HCPCS | Performed by: STUDENT IN AN ORGANIZED HEALTH CARE EDUCATION/TRAINING PROGRAM

## 2025-05-07 PROCEDURE — 92960 CARDIOVERSION ELECTRIC EXT: CPT | Performed by: STUDENT IN AN ORGANIZED HEALTH CARE EDUCATION/TRAINING PROGRAM

## 2025-05-07 PROCEDURE — 93653 COMPRE EP EVAL TX SVT: CPT | Performed by: STUDENT IN AN ORGANIZED HEALTH CARE EDUCATION/TRAINING PROGRAM

## 2025-05-07 DEVICE — DVC VASC CLSR VASCADE MVP 6-12FR: Type: IMPLANTABLE DEVICE | Site: GROIN | Status: FUNCTIONAL

## 2025-05-07 DEVICE — PERCLOSE™ PROSTYLE™ SUTURE-MEDIATED CLOSURE AND REPAIR SYSTEM
Type: IMPLANTABLE DEVICE | Site: GROIN | Status: FUNCTIONAL
Brand: PERCLOSE™ PROSTYLE™

## 2025-05-07 RX ORDER — SODIUM CHLORIDE 9 MG/ML
INJECTION, SOLUTION INTRAVENOUS CONTINUOUS PRN
Status: DISCONTINUED | OUTPATIENT
Start: 2025-05-07 | End: 2025-05-07

## 2025-05-07 RX ORDER — FENTANYL CITRATE 50 UG/ML
INJECTION, SOLUTION INTRAMUSCULAR; INTRAVENOUS AS NEEDED
Status: DISCONTINUED | OUTPATIENT
Start: 2025-05-07 | End: 2025-05-07

## 2025-05-07 RX ORDER — ONDANSETRON 2 MG/ML
INJECTION INTRAMUSCULAR; INTRAVENOUS AS NEEDED
Status: DISCONTINUED | OUTPATIENT
Start: 2025-05-07 | End: 2025-05-07

## 2025-05-07 RX ORDER — ZOLPIDEM TARTRATE 5 MG/1
5 TABLET ORAL ONCE AS NEEDED
Status: COMPLETED | OUTPATIENT
Start: 2025-05-07 | End: 2025-05-07

## 2025-05-07 RX ORDER — SODIUM CHLORIDE 9 MG/ML
20 INJECTION, SOLUTION INTRAVENOUS CONTINUOUS
Status: DISCONTINUED | OUTPATIENT
Start: 2025-05-07 | End: 2025-05-08 | Stop reason: HOSPADM

## 2025-05-07 RX ORDER — SODIUM CHLORIDE 9 MG/ML
20 INJECTION, SOLUTION INTRAVENOUS ONCE
Status: DISCONTINUED | OUTPATIENT
Start: 2025-05-07 | End: 2025-05-07 | Stop reason: HOSPADM

## 2025-05-07 RX ORDER — LOSARTAN POTASSIUM 50 MG/1
100 TABLET ORAL EVERY MORNING
Status: DISCONTINUED | OUTPATIENT
Start: 2025-05-08 | End: 2025-05-08 | Stop reason: HOSPADM

## 2025-05-07 RX ORDER — GLYCOPYRROLATE 0.2 MG/ML
INJECTION INTRAMUSCULAR; INTRAVENOUS AS NEEDED
Status: DISCONTINUED | OUTPATIENT
Start: 2025-05-07 | End: 2025-05-07

## 2025-05-07 RX ORDER — AMIODARONE HYDROCHLORIDE 200 MG/1
200 TABLET ORAL 2 TIMES DAILY WITH MEALS
Status: DISCONTINUED | OUTPATIENT
Start: 2025-05-07 | End: 2025-05-08 | Stop reason: HOSPADM

## 2025-05-07 RX ORDER — HEPARIN SODIUM 1000 [USP'U]/ML
INJECTION, SOLUTION INTRAVENOUS; SUBCUTANEOUS CODE/TRAUMA/SEDATION MEDICATION
Status: DISCONTINUED | OUTPATIENT
Start: 2025-05-07 | End: 2025-05-07 | Stop reason: HOSPADM

## 2025-05-07 RX ORDER — HEPARIN SODIUM 10000 [USP'U]/100ML
INJECTION, SOLUTION INTRAVENOUS
Status: DISCONTINUED | OUTPATIENT
Start: 2025-05-07 | End: 2025-05-07 | Stop reason: HOSPADM

## 2025-05-07 RX ORDER — ROCURONIUM BROMIDE 10 MG/ML
INJECTION, SOLUTION INTRAVENOUS AS NEEDED
Status: DISCONTINUED | OUTPATIENT
Start: 2025-05-07 | End: 2025-05-07

## 2025-05-07 RX ORDER — ACETAMINOPHEN 325 MG/1
650 TABLET ORAL EVERY 4 HOURS PRN
Status: DISCONTINUED | OUTPATIENT
Start: 2025-05-07 | End: 2025-05-08 | Stop reason: HOSPADM

## 2025-05-07 RX ORDER — PRAVASTATIN SODIUM 40 MG
40 TABLET ORAL DAILY
Status: DISCONTINUED | OUTPATIENT
Start: 2025-05-07 | End: 2025-05-08 | Stop reason: HOSPADM

## 2025-05-07 RX ORDER — PROTAMINE SULFATE 10 MG/ML
INJECTION, SOLUTION INTRAVENOUS AS NEEDED
Status: DISCONTINUED | OUTPATIENT
Start: 2025-05-07 | End: 2025-05-07

## 2025-05-07 RX ORDER — CEFAZOLIN SODIUM 2 G/50ML
SOLUTION INTRAVENOUS AS NEEDED
Status: DISCONTINUED | OUTPATIENT
Start: 2025-05-07 | End: 2025-05-07

## 2025-05-07 RX ORDER — METOPROLOL SUCCINATE 50 MG/1
50 TABLET, EXTENDED RELEASE ORAL 2 TIMES DAILY
Status: DISCONTINUED | OUTPATIENT
Start: 2025-05-07 | End: 2025-05-08 | Stop reason: HOSPADM

## 2025-05-07 RX ORDER — ONDANSETRON 2 MG/ML
4 INJECTION INTRAMUSCULAR; INTRAVENOUS ONCE AS NEEDED
Status: DISCONTINUED | OUTPATIENT
Start: 2025-05-07 | End: 2025-05-07 | Stop reason: HOSPADM

## 2025-05-07 RX ORDER — PROPOFOL 10 MG/ML
INJECTION, EMULSION INTRAVENOUS AS NEEDED
Status: DISCONTINUED | OUTPATIENT
Start: 2025-05-07 | End: 2025-05-07

## 2025-05-07 RX ORDER — FENTANYL CITRATE/PF 50 MCG/ML
25 SYRINGE (ML) INJECTION
Status: DISCONTINUED | OUTPATIENT
Start: 2025-05-07 | End: 2025-05-07 | Stop reason: HOSPADM

## 2025-05-07 RX ORDER — LIDOCAINE HYDROCHLORIDE 10 MG/ML
INJECTION, SOLUTION EPIDURAL; INFILTRATION; INTRACAUDAL; PERINEURAL CODE/TRAUMA/SEDATION MEDICATION
Status: DISCONTINUED | OUTPATIENT
Start: 2025-05-07 | End: 2025-05-07 | Stop reason: HOSPADM

## 2025-05-07 RX ADMIN — PROTAMINE SULFATE 10 MG: 10 INJECTION, SOLUTION INTRAVENOUS at 12:53

## 2025-05-07 RX ADMIN — SUGAMMADEX 150 MG: 100 INJECTION, SOLUTION INTRAVENOUS at 12:51

## 2025-05-07 RX ADMIN — ROCURONIUM BROMIDE 10 MG: 10 INJECTION, SOLUTION INTRAVENOUS at 10:49

## 2025-05-07 RX ADMIN — ROCURONIUM BROMIDE 10 MG: 10 INJECTION, SOLUTION INTRAVENOUS at 11:06

## 2025-05-07 RX ADMIN — ZOLPIDEM TARTRATE 5 MG: 5 TABLET ORAL at 23:27

## 2025-05-07 RX ADMIN — PROTAMINE SULFATE 10 MG: 10 INJECTION, SOLUTION INTRAVENOUS at 12:54

## 2025-05-07 RX ADMIN — GLYCOPYRROLATE 0.3 MG: 0.2 INJECTION, SOLUTION INTRAMUSCULAR; INTRAVENOUS at 10:32

## 2025-05-07 RX ADMIN — PROTAMINE SULFATE 20 MG: 10 INJECTION, SOLUTION INTRAVENOUS at 12:49

## 2025-05-07 RX ADMIN — PHENYLEPHRINE HYDROCHLORIDE 100 MCG: 10 INJECTION INTRAVENOUS at 11:15

## 2025-05-07 RX ADMIN — PHENYLEPHRINE HYDROCHLORIDE 100 MCG: 10 INJECTION INTRAVENOUS at 11:30

## 2025-05-07 RX ADMIN — APIXABAN 5 MG: 5 TABLET, FILM COATED ORAL at 18:33

## 2025-05-07 RX ADMIN — FENTANYL CITRATE 50 MCG: 50 INJECTION INTRAMUSCULAR; INTRAVENOUS at 08:33

## 2025-05-07 RX ADMIN — FENTANYL CITRATE 50 MCG: 50 INJECTION INTRAMUSCULAR; INTRAVENOUS at 08:27

## 2025-05-07 RX ADMIN — ROCURONIUM BROMIDE 30 MG: 10 INJECTION, SOLUTION INTRAVENOUS at 09:00

## 2025-05-07 RX ADMIN — PROPOFOL 150 MG: 10 INJECTION, EMULSION INTRAVENOUS at 08:28

## 2025-05-07 RX ADMIN — PROTAMINE SULFATE 10 MG: 10 INJECTION, SOLUTION INTRAVENOUS at 12:47

## 2025-05-07 RX ADMIN — AMIODARONE HYDROCHLORIDE 200 MG: 200 TABLET ORAL at 18:33

## 2025-05-07 RX ADMIN — PROTAMINE SULFATE 10 MG: 10 INJECTION, SOLUTION INTRAVENOUS at 12:51

## 2025-05-07 RX ADMIN — SODIUM CHLORIDE: 0.9 INJECTION, SOLUTION INTRAVENOUS at 08:18

## 2025-05-07 RX ADMIN — PRAVASTATIN SODIUM 40 MG: 40 TABLET ORAL at 18:33

## 2025-05-07 RX ADMIN — SODIUM CHLORIDE 20 ML/HR: 0.9 INJECTION, SOLUTION INTRAVENOUS at 07:20

## 2025-05-07 RX ADMIN — CEFAZOLIN SODIUM 2000 MG: 2 SOLUTION INTRAVENOUS at 08:48

## 2025-05-07 RX ADMIN — PROTAMINE SULFATE 10 MG: 10 INJECTION, SOLUTION INTRAVENOUS at 12:55

## 2025-05-07 RX ADMIN — ONDANSETRON 4 MG: 2 INJECTION INTRAMUSCULAR; INTRAVENOUS at 12:43

## 2025-05-07 RX ADMIN — PHENYLEPHRINE HYDROCHLORIDE 30 MCG/MIN: 10 INJECTION INTRAVENOUS at 09:05

## 2025-05-07 RX ADMIN — ROCURONIUM BROMIDE 50 MG: 10 INJECTION, SOLUTION INTRAVENOUS at 08:28

## 2025-05-07 NOTE — DISCHARGE INSTR - AVS FIRST PAGE
Medication Plan:    Take:   Eliquis 5mg twice daily.  Do not stop blood thinner until discussion with provider at post op appointment.   Start amiodarone 200mg twice daily for 2 weeks and then will take once daily.  This is a medication to keep you in normal rhythm.  When patients are maintained on this medication for long-term, there are some toxicities associated with it.  Baseline blood work has been done during your stay, we will see if you are maintained on this medication for 6 months or more.  Please take this medication with food.    Post Procedure Care instructions:    For 7 days:  You may shower after the first 24 hours   Allow gentle soap and water to wash over incision.  Do not scrub. Pat to dry.  You do have glue over top of the skin tear on the left groin site, please just allow soap and water to rinse over top of this.  Do not pick at this.  If chaffing with undergarments, wear bandage during the day to prevent irritation. Maximum 5 days and change bandage daily.  Do not use lotions/powders/creams    You may walk or go up/down stairs      Restrictions for 1 week:   Do not lift greater than 10lbs    Avoid running/jumping    No submerging wound in water (No bath/hot tubs/pools/etc)    Normal healing process   You may have bruising that extends into your genitalia and/or down your thigh toward the knee. This can take several weeks to resolve.  Pain may worsen over the first 48 hours as you increase your activity and then will improve   Tylenol (acetaminophen)  Ice   You may have a small pea sized lump   Your arrhythmia may reoccur in the next 3 months  This is due to inflammation/irritation from the procedure    When to call clinic:    Redness or swelling at incision site   Bleeding or expanding lump in groin    Opening and/or drainage from the incision   Temperature >100.4 F   If your arrhythmia reoccurs call if:  You are symptomatic (dizziness/lightheadedness/heart racing/etc)    If you have any  questions:   [phone] 8:30am-5:30pm  [phone] After hours  [phone] Appointments

## 2025-05-07 NOTE — ANESTHESIA PREPROCEDURE EVALUATION
Procedure:  Cardiac eps/Atypical Aflutter Ablation PFA (Chest)  Cardiac eps/svt ablation (Chest)    Relevant Problems   CARDIO   (+) Atrial fibrillation (HCC)   (+) Atrial flutter (HCC)   (+) RÍOS (dyspnea on exertion)   (+) Hypercholesterolemia   (+) Hypertensive nephrosclerosis   (+) Mitral regurgitation      GI/HEPATIC   (+) Gastroesophageal reflux disease without esophagitis      /RENAL   (+) CKD stage 3a, GFR 45-59 ml/min (HCC)   (+) Hypertensive nephrosclerosis      MUSCULOSKELETAL   (+) Osteoarthritis of right hip   (+) Primary osteoarthritis of right knee      NEURO/PSYCH   (+) Chronic pain syndrome      PULMONARY   (+) RÍOS (dyspnea on exertion)   (+) Pulmonary emphysema, unspecified emphysema type (HCC)      2/2025: normal biventricular systolic function, mild-mod MR, mild TR    Hgb 12.4, plt 223  Cr 0.98    Prior Mil2 grade 2a view; MAC3 grade 1 view    Physical Exam    Airway    Mallampati score: II         Dental       Cardiovascular      Pulmonary      Other Findings  post-pubertal.      Anesthesia Plan  ASA Score- 3     Anesthesia Type- general with ASA Monitors.         Additional Monitors:     Airway Plan: ETT.    Comment: General anesthesia, endotracheal tube; standard ASA monitors. Risks and benefits discussed with patient; patient consented and agrees to proceed.    I saw and evaluated the patient. If seen with CRNA, we have discussed the anesthetic plan and I am in agreement that the plan is appropriate for the patient.  HOANG - I consented and told her either I can do it or cardiology staff can do it; r/o NADEGE clot.       Plan Factors-    Chart reviewed.   Existing labs reviewed.                   Induction- intravenous.    Postoperative Plan- . Planned trial extubation    Perioperative Resuscitation Plan - Level 1 - Full Code.       Informed Consent- Anesthetic plan and risks discussed with patient.  I personally reviewed this patient with the CRNA. Discussed and agreed on the Anesthesia Plan  with the CRNA..      NPO Status:  Vitals Value Taken Time   Date of last liquid 05/06/25 05/07/25 0712   Time of last liquid 1900 05/07/25 0712   Date of last solid 05/06/25 05/07/25 0712   Time of last solid 1900 05/07/25 0712

## 2025-05-07 NOTE — ANESTHESIA POSTPROCEDURE EVALUATION
Post-Op Assessment Note    CV Status:  Stable    Pain management: adequate       Mental Status:  Awake   Hydration Status:  Stable   PONV Controlled:  None     Post Op Vitals Reviewed: Yes    No anethesia notable event occurred.    Staff: Anesthesiologist           Last Filed PACU Vitals:  Vitals Value Taken Time   Temp 97.7 °F (36.5 °C) 05/07/25 1323   Pulse 75 05/07/25 1405   /63 05/07/25 1400   Resp 16 05/07/25 1405   SpO2 94 % 05/07/25 1405   Vitals shown include unfiled device data.    Modified Farooq:     Vitals Value Taken Time   Activity 2 05/07/25 1323   Respiration 2 05/07/25 1323   Circulation 2 05/07/25 1323   Consciousness 2 05/07/25 1323   Oxygen Saturation 2 05/07/25 1323     Modified Farooq Score: 10

## 2025-05-07 NOTE — ASSESSMENT & PLAN NOTE
- New onset A-fib 3/9/2025  -Started on Eliquis 3/9/2025  -Plan for EPS/A-fib flutter ablation 5/7/2025

## 2025-05-07 NOTE — H&P
H&P Exam - Electrophysiology - Cardiology   Jacklyn Garcia 80 y.o. female MRN: [id number]  Unit/Bed#: BE CATH LAB ROOM Encounter: [id number]  Assessment & Plan  Atrial fibrillation (HCC)  - New onset A-fib 3/9/2025  -Started on Eliquis 3/9/2025  -Plan for EPS/A-fib flutter ablation 5/7/2025  Atrial flutter (HCC)  - Possibly atypical  -Takes Eliquis 5 mg p.o. daily  -Maintained on metoprolol succinate 50 mg twice daily  -Here today for PFA ablation  Chronic pain syndrome  - Spine stimulator-battery currently not working  CKD stage 3a, GFR 45-59 ml/min (Formerly [facility])  Lab Results   Component Value Date    EGFR 59 05/07/2025    EGFR 55 04/23/2025    EGFR 62 03/17/2025    CREATININE 0.93 05/07/2025    CREATININE 0.98 04/23/2025    CREATININE 0.89 03/17/2025     Pulmonary emphysema, unspecified emphysema type (Formerly [facility])  - As needed oxygen, nebs as needed    History of Present Illness   HPI:  Jacklyn Garcia is a 80 y.o. year old female with PMH hypertension systolic and diastolic heart failure, CKD 3, A-flutter.  Patient has severe left atrial enlargement she is at risk for A-fib and her arrhythmia may be left-sided for Dr. Marin.  Here today for a flutter possible atypical ablation.  Case and risks discussed with the patient.    EKG: nsr    Review of Systems  ROS as noted above, otherwise 12 point review of systems was performed and is negative.     Historical Information   Past Medical History:   Diagnosis Date    Allergies     Anxiety     Arthritis     Benign arteriolar nephrosclerosis     Bereavement     Cataract     Chronic kidney disease     Chronic kidney disease in type 2 diabetes mellitus (HCC)     Chronic kidney disease, stage 2 (mild)     Chronic pain disorder     Chronic pain syndrome     COPD (chronic obstructive pulmonary disease) (Formerly [facility])     DDD (degenerative disc disease), cervical     DDD (degenerative disc disease), lumbar     Degenerative joint disease of right hip     Depression     Diastolic dysfunction      DJD (degenerative joint disease), ankle and foot, right     DJD of right shoulder     RÍOS (dyspnea on exertion)     Edema     Fracture of left calcaneus     Generalized anxiety disorder     GERD (gastroesophageal reflux disease)     Heart murmur     Hyperaldosteronism (HCC)     Hypercholesterolemia     Hyperlipidemia     Hypertension     Hypertensive nephrosclerosis     Hypo-osmolality and hyponatremia     IBS (irritable bowel syndrome)     Insomnia     Irregular heart beat     Migraines     Mitral regurgitation     MVP (mitral valve prolapse)     Obstructive sleep apnea syndrome     Osteoarthritis     Osteoarthritis     Pernicious anemia     Plantar fascia rupture     Rheumatic mitral regurgitation     Rheumatoid arthritis (HCC)     Right knee DJD     S/P insertion of spinal cord stimulator     Shingles     Sinobronchitis     Sleep apnea     Status post total right knee replacement 07/07/2020    Stroke (HCC)     tia    TIA (transient ischemic attack)     Vertigo      Past Surgical History:   Procedure Laterality Date    APPENDECTOMY      CARPAL TUNNEL RELEASE Bilateral     COLONOSCOPY      several    COLONOSCOPY  04/02/2012    by Dr. Mckinley Ruvalcaba    CORNEAL TRANSPLANT Left 04/11/2022    DXA PROCEDURE (HISTORICAL)  10/26/2016, 9/17/2014, 6/18/2007    HAND SURGERY Right     ring finger has pin    HYSTERECTOMY      34    INSERT / REPLACE PERIPHERAL NEUROSTIMULATOR PULSE GENERATOR /  Left     buttocks    JOINT REPLACEMENT Left     knee    JOINT REPLACEMENT Right 04/2019    Hip    KNEE ARTHROPLASTY Left 01/15/2009    by Dr. Avery Leigh at [facility]     KNEE ARTHROSCOPY Bilateral     KNEE CARTILAGE SURGERY  09/10/2009    by Dr. Avery Leigh at [facility]     MAMMO (HISTORICAL)  12/10/2018, 10/30/2017, 10/26/2016    NASAL SEPTUM SURGERY  2008    NERVE BLOCK Left 02/20/2018    Procedure: BLOCK MEDIAL BRANCH - C4, C5, C6;  Surgeon: Jaycob Cruz MD;  Location: [facility];   Service: Pain Management     NERVE BLOCK Left 03/06/2018    Procedure: C4, C5, C6 MEDIAL BRANCH BLOCK;  Surgeon: Jaycob Cruz MD;  Location: [facility];  Service: Pain Management     NERVE BLOCK Left 2/20/2025    Procedure: BLOCK MEDIAL BRANCH Left C4-5 and C5-6;  Surgeon: Jaycob Cruz MD;  Location: [facility];  Service: Pain Management     CT ARTHRP ACETBLR/PROX FEM PROSTC AGRFT/ALGRFT Right 02/27/2019    Procedure: ARTHROPLASTY HIP TOTAL;  Surgeon: Bruno Pina DO;  Location:  MAIN OR;  Service: Orthopedics    CT ARTHRP KNE CONDYLE&PLATU MEDIAL&LAT COMPARTMENTS Right 06/24/2020    Procedure: KNEE TOTAL ARTHROPLASTY;  Surgeon: Bruno Pina DO;  Location:  MAIN OR;  Service: Orthopedics    CT COLONOSCOPY FLX DX W/COLLJ SPEC WHEN PFRMD N/A 04/24/2017    Procedure: FLEXIBLE COLONOSCOPY;  Surgeon: Mckinley Ruvalcaba MD;  Location: [facility];  Service: Colorectal    RADIOFREQUENCY ABLATION Left 04/17/2018    Procedure: ABLATION RADIO FREQUENCY (RFA) - C4, C5, C6;  Surgeon: Jaycob Cruz MD;  Location: [facility];  Service: Pain Management     REPLACEMENT TOTAL KNEE Left 07/05/2011    SINUS SURGERY      TONSILLECTOMY      TOTAL HIP ARTHROPLASTY Left     TRIGGER FINGER RELEASE      R 4th     UPPER GASTROINTESTINAL ENDOSCOPY  01/12/2007    with Donaldson dilatation by Dr. Misael Santiago at [facility]    WRIST SURGERY Right      Family History:   Family History   Problem Relation Age of Onset    Heart disease Mother     Hypertension Mother     Arthritis Mother     Dementia Mother     Rheum arthritis Mother     Hypertension Father     Heart disease Father     Colon cancer Father     Hypertension Sister     Anxiety disorder Sister     Thyroid disease Sister     Prostate cancer Maternal Grandfather     No Known Problems Paternal Grandmother     No Known Problems Paternal Grandfather     No Known Problems Paternal Aunt     Pseudochol deficiency Neg Hx        Social History   Social History  "    Substance and Sexual Activity   Alcohol Use Yes    Comment: 2 beer a week     Social History     Substance and Sexual Activity   Drug Use Never     Social History     Tobacco Use   Smoking Status Former    Types: Cigarettes   Smokeless Tobacco Never   Tobacco Comments    quit 40 yrs ago       Meds/Allergies   all medications and allergies reviewed  Home Medications:   Medications Prior to Admission:     apixaban (Eliquis) 5 mg    bacitracin-polymyxin b ophthalmic ointment    butalbital-acetaminophen-caffeine (FIORICET,ESGIC) -40 mg per tablet    calcium carbonate (OS-MARLENY) 600 MG tablet    Ergocalciferol (VITAMIN D2 PO)    fluticasone (FLONASE) 50 mcg/act nasal spray    losartan (COZAAR) 100 MG tablet    magnesium (MAGTAB) 84 MG (7MEQ) TBCR    metoprolol succinate (TOPROL-XL) 50 mg 24 hr tablet    Multiple Vitamins-Minerals (MULTIVITAMIN WITH MINERALS) tablet    neomycin-bacitracin-polymyxin (NEOSPORIN) ophthalmic ointment    omeprazole (PriLOSEC) 20 mg delayed release capsule    ondansetron (ZOFRAN) 4 mg tablet    pravastatin (PRAVACHOL) 40 mg tablet    spironolactone (ALDACTONE) 50 mg tablet    zolpidem (AMBIEN) 5 mg tablet    meclizine (ANTIVERT) 12.5 MG tablet    ondansetron (ZOFRAN) 4 mg tablet    prochlorperazine (COMPAZINE) 5 mg tablet    Allergies   Allergen Reactions    Latex      Added based on information entered during case entry, please review and add reactions, type, and severity as needed    Procaine Hives    Adhesive [Medical Tape] Rash    Lidocaine Rash    Penicillins Rash       Objective   Vitals: Blood pressure (!) 184/76, pulse 59, temperature (!) 96.8 °F (36 °C), temperature source Temporal, resp. rate 16, height 5' 4\" (1.626 m), weight 72.6 kg (160 lb), SpO2 97%.  Orthostatic Blood Pressures      Flowsheet Row Most Recent Value   Blood Pressure 184/76 filed at 05/07/2025 0724            No intake or output data in the 24 hours ending 05/07/25 0831    Invasive Devices       Peripheral " Intravenous Line  Duration             Peripheral IV 05/07/25 Left Antecubital <1 day    Peripheral IV 05/07/25 Right Antecubital <1 day                    Physical Exam vss  GEN: NAD, alert and oriented, well appearing  SKIN: dry without significant lesions or rashes  HEENT: NCAT, PERRL, EOMs intact  NECK: No JVD or carotid bruits appreciated  CARDIOVASCULAR: RRR, normal S1, S2 without murmurs, rubs, or gallops appreciated  LUNGS: Clear to auscultation bilaterally without wheezes, rhonchi, or rales  ABDOMEN: Soft, nontender, nondistended  EXTREMITIES/VASCULAR: perfused without clubbing, cyanosis, +1 edema b/l  PSYCH: Normal mood and affect  NEURO: CN ll-Xll grossly intact    Lab Results: I have personally reviewed pertinent lab results.    Results from last 7 days   Lab Units 05/07/25  0716   WBC Thousand/uL 4.99   HEMOGLOBIN g/dL 12.4   HEMATOCRIT % 36.7   PLATELETS Thousands/uL 223     Results from last 7 days   Lab Units 05/07/25  0716   POTASSIUM mmol/L 4.0   CHLORIDE mmol/L 97   CO2 mmol/L 28   BUN mg/dL 11   CREATININE mg/dL 0.93   CALCIUM mg/dL 9.4     Results from last 7 days   Lab Units 05/07/25  0716   INR  1.09             Imaging: Results Review Statement: I personally reviewed the following image studies in PACS and associated radiology reports: Echocardiogram. My interpretation of the radiology images/reports is:  .    ECHO 2/13/2025  Interpretation Summary  Show Result Comparison     Left Ventricle: Left ventricular cavity size is normal. Wall thickness is mildly increased. There is concentric remodeling. The left ventricular ejection fraction is 60%. Systolic function is normal. Wall motion is normal. Diastolic function is moderately abnormal, consistent with grade II (pseudonormal) relaxation.    Right Ventricle: Right ventricular cavity size is normal. Systolic function is normal.    Left Atrium: The atrium is severely dilated (>48 mL/m2).    Mitral Valve: There is mild annular calcification.  There is mild to moderate regurgitation.    Tricuspid Valve: There is mild regurgitation.    Pericardium: There is a small pericardial effusion circumferential to the heart. The fluid exhibits no internal echoes. There is no echocardiographic evidence of tamponade.       Results for orders placed during the hospital encounter of 21    Echo complete with contrast if indicated    Narrative  [address] [address]  [phone]    Transthoracic Echocardiogram  2D, M-mode, Doppler, and Color Doppler    Study date:  09-Sep-2021    Patient: QUYNH AVENDAÑO  MR number: [id number]  Account number: [id number]  : 1945  Age: 76 years  Gender: Female  Status: Outpatient  Location: Echo lab  Height: 63 in  Weight: 195 lb  BP: 126/ 80 mmHg    Indications: Shortness of breath    Diagnoses: 401.9 - HYPERTENSION NOS    Sonographer:  Annalise Bruce RDCS, CCT  Primary Physician:  Ernie Wilkinson DO  Referring Physician:  Williams Blas DO  Group:  [facility]  Interpreting Physician:  Lucia Her MD    SUMMARY    LEFT VENTRICLE:  Systolic function was normal. Ejection fraction was estimated to be 60 %.  There were no regional wall motion abnormalities.  Features were consistent with a pseudonormal left ventricular filling pattern, with concomitant abnormal relaxation and increased filling pressure (grade 2 diastolic dysfunction).    RIGHT VENTRICLE:  The size was normal.  Systolic function was normal.    MITRAL VALVE:  There was trace regurgitation.    TRICUSPID VALVE:  There was trace regurgitation.  Pulmonary artery systolic pressure was at the upper limits of normal.  Estimated peak PA pressure was 36 mmHg.    PERICARDIUM:  A small pericardial effusion was identified.  There was no associated chamber collapse.    HISTORY: PRIOR HISTORY: hypertension Risk factors: hypertension.    PROCEDURE: The procedure was performed in the  echo lab. This was a routine study. The transthoracic approach was used. The study included complete 2D imaging, M-mode, complete spectral Doppler, and color Doppler. The heart rate was 80 bpm,  at the start of the study. Image quality was adequate.    LEFT VENTRICLE: Size was normal. Systolic function was normal. Ejection fraction was estimated to be 60 %. There were no regional wall motion abnormalities. Wall thickness was normal. DOPPLER: Features were consistent with a pseudonormal  left ventricular filling pattern, with concomitant abnormal relaxation and increased filling pressure (grade 2 diastolic dysfunction).    RIGHT VENTRICLE: The size was normal. Systolic function was normal. Wall thickness was normal.    LEFT ATRIUM: Size was normal.    RIGHT ATRIUM: Size was normal.    MITRAL VALVE: Valve structure was normal. There was normal leaflet separation. DOPPLER: The transmitral velocity was within the normal range. There was no evidence for stenosis. There was trace regurgitation.    AORTIC VALVE: The valve was trileaflet. Leaflets exhibited normal thickness and normal cuspal separation. DOPPLER: Transaortic velocity was within the normal range. There was no evidence for stenosis. There was no significant  regurgitation.    TRICUSPID VALVE: The valve structure was normal. There was normal leaflet separation. DOPPLER: The transtricuspid velocity was within the normal range. There was no evidence for stenosis. There was trace regurgitation. Pulmonary artery  systolic pressure was at the upper limits of normal. Estimated peak PA pressure was 36 mmHg.    PULMONIC VALVE: Leaflets exhibited normal thickness, no calcification, and normal cuspal separation. DOPPLER: The transpulmonic velocity was within the normal range. There was no significant regurgitation.    PERICARDIUM: A small pericardial effusion was identified. There was no associated chamber collapse. The pericardium was normal in appearance.    AORTA: The  root exhibited normal size.    SYSTEMIC VEINS: IVC: The inferior vena cava was normal in size.    SYSTEM MEASUREMENT TABLES    2D  %FS: 35.24 %  Ao Diam: 2.75 cm  EDV(Teich): 115.61 ml  EF(Teich): 64.39 %  ESV(Teich): 41.17 ml  IVSd: 0.9 cm  LA Area: 18.73 cm2  LA Diam: 3.8 cm  LVEDV MOD A4C: 68.5 ml  LVEF MOD A4C: 63.04 %  LVESV MOD A4C: 25.32 ml  LVIDd: 4.95 cm  LVIDs: 3.21 cm  LVLd A4C: 6.64 cm  LVLs A4C: 5.95 cm  LVPWd: 0.93 cm  RA Area: 6.11 cm2  RVIDd: 2.02 cm  RWT: 0.38  SV MOD A4C: 43.18 ml  SV(Teich): 74.44 ml    CW  RAP: 0 mmHg  TR Vmax: 2.78 m/s  TR maxP.05 mmHg    MM  TAPSE: 2.01 cm    PW  E' Sept: 0.06 m/s  E/E' Sept: 13.81  MV A Lorenzo: 0.74 m/s  MV Dec Foster: 3.91 m/s2  MV DecT: 218.49 ms  MV E Lorenzo: 0.85 m/s  MV E/A Ratio: 1.15  RVSP: 31.05 mmHg    [facility] [place] [facility] Accredited Echocardiography Laboratory    Prepared and electronically signed by    Lucia Her MD  Signed 09-Sep-2021 16:14:06      Code Status: Level 1 - Full Code    ** Please Note: Dictation voice to text software may have been used in the creation of this document. **

## 2025-05-07 NOTE — ASSESSMENT & PLAN NOTE
- Possibly atypical  -Takes Eliquis 5 mg p.o. daily  -Maintained on metoprolol succinate 50 mg twice daily  -Here today for PFA ablation

## 2025-05-07 NOTE — DISCHARGE SUMMARY
Discharge Summary - Electrophysiology   Name: Jacklyn Garcia 80 y.o. female I MRN: [id number]  Unit/Bed#: [facility] 407-01 I Date of Admission: 5/7/2025   Date of Service: 5/7/2025 I Hospital Day: 0      Discharge Summary - Jacklyn Garcia 80 y.o. female MRN: [id number]    Unit/Bed#: [facility] 407-01 Encounter: [id number]      Admission Date: 5/7/2025   Discharge Date:     Discharge Diagnosis:   Atrial flutter  Hypertension  HLD    Procedures Performed:   Atypical flutter ablation 5/7/2025    Orders Placed This Encounter   Procedures    Cardiac ep lab eps/ablations       Consultants: None    HPI: Please refer to the initial history and physical as well as procedure notes for full details. Briefly, Jacklyn Garcia is a 80 y.o. year old female with PMH of HTN, systolic and diastolic heart failure, CKD 3, A-flutter, HLD.  She was seen by Dr. Marin as an outpatient, and ablation was recommended. She presented this hospital admission to undergo this procedure.     Hospital Course: Jacklyn Garcia presented at her [facility]. After the procedure was explained in detail and consent was obtained, she underwent a flutter ablation without complications. She tolerated the procedure well. She was then monitored overnight for further observation.    There were no acute issues or events overnight. The following morning She denied all cardiac complaints, including chest pain/pressure, dyspnea, palpitations, dizziness, lightheadedness, or syncope. Her vital signs were reviewed and labs are stable. Telemetry showed normal sinus rhythm with first-degree AV block. Her groins were soft without significant hematoma or recurrent bleeding. Physical exam on the day of discharge was as follows:    GEN: NAD, alert and oriented, well appearing  SKIN: dry without significant lesions or rashes  HEENT: NCAT, PERRL, EOMs intact  NECK: No JVD or carotid bruits appreciated  CARDIOVASCULAR: RRR, normal S1, S2 without murmurs, rubs,  or gallops appreciated  LUNGS: Clear to auscultation bilaterally without wheezes, rhonchi, or rales  ABDOMEN: Soft, nontender, nondistended  EXTREMITIES/VASCULAR: perfused without clubbing, cyanosis, or edema b/l  PSYCH: Normal mood and affect  NEURO: CN ll-Xll grossly intact    She was given routine post ablation discharge instructions and restrictions, and these were explained in detail. She was given a follow up appointment with , and she was instructed to follow up with her primary cardiologist as previously instructed.    In terms of her medications, she will start amiodarone 200 mg twice a day for 2 weeks and then decrease to 200 mg once a day.    She is stable for discharge at this time with all questions answered. She was discussed in detail with Dr. Marin who is in agreement with this discharge summary.     Discharge Medications:  See after visit summary for reconciled discharge medications provided to patient and family.      Medications Prior to Admission:     apixaban (Eliquis) 5 mg    bacitracin-polymyxin b ophthalmic ointment    butalbital-acetaminophen-caffeine (FIORICET,ESGIC) -40 mg per tablet    calcium carbonate (OS-MARLENY) 600 MG tablet    Ergocalciferol (VITAMIN D2 PO)    fluticasone (FLONASE) 50 mcg/act nasal spray    losartan (COZAAR) 100 MG tablet    magnesium (MAGTAB) 84 MG (7MEQ) TBCR    metoprolol succinate (TOPROL-XL) 50 mg 24 hr tablet    Multiple Vitamins-Minerals (MULTIVITAMIN WITH MINERALS) tablet    neomycin-bacitracin-polymyxin (NEOSPORIN) ophthalmic ointment    omeprazole (PriLOSEC) 20 mg delayed release capsule    ondansetron (ZOFRAN) 4 mg tablet    pravastatin (PRAVACHOL) 40 mg tablet    spironolactone (ALDACTONE) 50 mg tablet    zolpidem (AMBIEN) 5 mg tablet    meclizine (ANTIVERT) 12.5 MG tablet    ondansetron (ZOFRAN) 4 mg tablet    prochlorperazine (COMPAZINE) 5 mg tablet      Pertininet Labs/diagnostics:  CBC with diff:   Results from last 7 days   Lab Units  05/07/25  0716   WBC Thousand/uL 4.99   HEMOGLOBIN g/dL 12.4   HEMATOCRIT % 36.7   MCV fL 89   PLATELETS Thousands/uL 223   RBC Million/uL 4.12   MCH pg 30.1   MCHC g/dL 33.8   RDW % 12.7   MPV fL 8.9       BMP:  Results from last 7 days   Lab Units 05/07/25  0716   POTASSIUM mmol/L 4.0   CHLORIDE mmol/L 97   CO2 mmol/L 28   BUN mg/dL 11   CREATININE mg/dL 0.93   CALCIUM mg/dL 9.4       Magnesium:       Coags:   Results from last 7 days   Lab Units 05/07/25  0716   INR  1.09         Complications: none    Condition at Discharge: good     Discharge instructions/Information to patient and family:   See after visit summary for information provided to patient and family.      Provisions for Follow-Up Care:  See after visit summary for information related to follow-up care and any pertinent home health orders.     Disposition: Home    Planned Readmission: No    Discharge Statement   I spent 45 minutes minutes discharging the patient. This time was spent on the day of discharge. I had direct contact with the patient on the day of discharge. Additional documentation is required if more than 30 minutes were spent on discharge. Evaluating the incision, discussing discharge instructions and restrictions, arranging follow up appointments, discussing medications

## 2025-05-07 NOTE — PLAN OF CARE
Problem: PAIN - ADULT  Goal: Verbalizes/displays adequate comfort level or baseline comfort level  Description: Interventions:- Encourage patient to monitor pain and request assistance- Assess pain using appropriate pain scale- Administer analgesics based on type and severity of pain and evaluate response- Implement non-pharmacological measures as appropriate and evaluate response- Consider cultural and social influences on pain and pain management- Notify physician/advanced practitioner if interventions unsuccessful or patient reports new pain  Outcome: Progressing     Problem: INFECTION - ADULT  Goal: Absence or prevention of progression during hospitalization  Description: INTERVENTIONS:- Assess and monitor for signs and symptoms of infection- Monitor lab/diagnostic results- Monitor all insertion sites, i.e. indwelling lines, tubes, and drains- Monitor endotracheal if appropriate and nasal secretions for changes in amount and color- [place] appropriate cooling/warming therapies per order- Administer medications as ordered- Instruct and encourage patient and family to use good hand hygiene technique- Identify and instruct in appropriate isolation precautions for identified infection/condition  Outcome: Progressing  Goal: Absence of fever/infection during neutropenic period  Description: INTERVENTIONS:- Monitor WBC  Outcome: Progressing     Problem: SAFETY ADULT  Goal: Patient will remain free of falls  Description: INTERVENTIONS:- Educate patient/family on patient safety including physical limitations- Instruct patient to call for assistance with activity - Consult OT/PT to assist with strengthening/mobility - Keep Call bell within reach- Keep bed low and locked with side rails adjusted as appropriate- Keep care items and personal belongings within reach- Initiate and maintain comfort rounds- Make Fall Risk Sign visible to staff- Offer Toileting every  Hours, in advance of need- Initiate/Maintain alarm- Obtain  necessary fall risk management equipment: - Apply yellow socks and bracelet for high fall risk patients- Consider moving patient to room near nurses station  Outcome: Progressing  Goal: Maintain or return to baseline ADL function  Description: INTERVENTIONS:-  Assess patient's ability to carry out ADLs; assess patient's baseline for ADL function and identify physical deficits which impact ability to perform ADLs (bathing, care of mouth/teeth, toileting, grooming, dressing, etc.)- Assess/evaluate cause of self-care deficits - Assess range of motion- Assess patient's mobility; develop plan if impaired- Assess patient's need for assistive devices and provide as appropriate- Encourage maximum independence but intervene and supervise when necessary- Involve family in performance of ADLs- Assess for home care needs following discharge - Consider OT consult to assist with ADL evaluation and planning for discharge- Provide patient education as appropriate  Outcome: Progressing  Goal: Maintains/Returns to pre admission functional level  Description: INTERVENTIONS:- Perform AM-PAC 6 Click Basic Mobility/ Daily Activity assessment daily.- Set and communicate daily mobility goal to care team and patient/family/caregiver. - Collaborate with rehabilitation services on mobility goals if consulted- Perform Range of Motion  times a day.- Reposition patient every  hours.- Dangle patient  times a day- Stand patient  times a day- Ambulate patient  times a day- Out of bed to chair  times a day - Out of bed for meals times a day- Out of bed for toileting- Record patient progress and toleration of activity level   Outcome: Progressing     Problem: DISCHARGE PLANNING  Goal: Discharge to home or other facility with appropriate resources  Description: INTERVENTIONS:- Identify barriers to discharge w/patient and caregiver- Arrange for needed discharge resources and transportation as appropriate- Identify discharge learning needs (meds, wound  care, etc.)- Arrange for interpretive services to assist at discharge as needed- Refer to Case Management Department for coordinating discharge planning if the patient needs post-hospital services based on physician/advanced practitioner order or complex needs related to functional status, cognitive ability, or social support system  Outcome: Progressing     Problem: Knowledge Deficit  Goal: Patient/family/caregiver demonstrates understanding of disease process, treatment plan, medications, and discharge instructions  Description: Complete learning assessment and assess knowledge base.Interventions:- Provide teaching at level of understanding- Provide teaching via preferred learning methods  Outcome: Progressing     Problem: CARDIOVASCULAR - ADULT  Goal: Maintains optimal cardiac output and hemodynamic stability  Description: INTERVENTIONS:- Monitor I/O, vital signs and rhythm- Monitor for S/S and trends of decreased cardiac output- Administer and titrate ordered vasoactive medications to optimize hemodynamic stability- Assess quality of pulses, skin color and temperature- Assess for signs of decreased coronary artery perfusion- Instruct patient to report change in severity of symptoms  Outcome: Progressing  Goal: Absence of cardiac dysrhythmias or at baseline rhythm  Description: INTERVENTIONS:- Continuous cardiac monitoring, vital signs, obtain 12 lead EKG if ordered- Administer antiarrhythmic and heart rate control medications as ordered- Monitor electrolytes and administer replacement therapy as ordered  Outcome: Progressing

## 2025-05-07 NOTE — ASSESSMENT & PLAN NOTE
Lab Results   Component Value Date    EGFR 59 05/07/2025    EGFR 55 04/23/2025    EGFR 62 03/17/2025    CREATININE 0.93 05/07/2025    CREATININE 0.98 04/23/2025    CREATININE 0.89 03/17/2025

## 2025-05-07 NOTE — ANESTHESIA POSTPROCEDURE EVALUATION
Post-Op Assessment Note    CV Status:  Stable  Pain Score: 0    Pain management: adequate       Mental Status:  Alert and awake   Hydration Status:  Euvolemic   PONV Controlled:  Controlled   Airway Patency:  Patent  Two or more mitigation strategies used for obstructive sleep apnea   Post Op Vitals Reviewed: Yes    No anethesia notable event occurred.    Staff: CRNA           Last Filed PACU Vitals:  Vitals Value Taken Time   Temp     Pulse 82 05/07/25 1324   /64 05/07/25 1324   Resp 14    SpO2 100 % 05/07/25 1324   Vitals shown include unfiled device data.

## 2025-05-08 VITALS
TEMPERATURE: 98.7 F | OXYGEN SATURATION: 93 % | DIASTOLIC BLOOD PRESSURE: 52 MMHG | HEIGHT: 64 IN | HEART RATE: 71 BPM | BODY MASS INDEX: 27.31 KG/M2 | RESPIRATION RATE: 16 BRPM | SYSTOLIC BLOOD PRESSURE: 119 MMHG | WEIGHT: 160 LBS

## 2025-05-08 LAB
ALBUMIN SERPL BCG-MCNC: 3.5 G/DL (ref 3.5–5)
ALP SERPL-CCNC: 95 U/L (ref 34–104)
ALT SERPL W P-5'-P-CCNC: 14 U/L (ref 7–52)
ANION GAP SERPL CALCULATED.3IONS-SCNC: 7 MMOL/L (ref 4–13)
AST SERPL W P-5'-P-CCNC: 61 U/L (ref 13–39)
BILIRUB DIRECT SERPL-MCNC: 0.13 MG/DL (ref 0–0.2)
BILIRUB SERPL-MCNC: 0.59 MG/DL (ref 0.2–1)
BUN SERPL-MCNC: 9 MG/DL (ref 5–25)
CALCIUM SERPL-MCNC: 8.3 MG/DL (ref 8.4–10.2)
CHLORIDE SERPL-SCNC: 101 MMOL/L (ref 96–108)
CO2 SERPL-SCNC: 24 MMOL/L (ref 21–32)
CREAT SERPL-MCNC: 0.85 MG/DL (ref 0.6–1.3)
ERYTHROCYTE [DISTWIDTH] IN BLOOD BY AUTOMATED COUNT: 13.4 % (ref 11.6–15.1)
GFR SERPL CREATININE-BSD FRML MDRD: 65 ML/MIN/1.73SQ M
GLUCOSE SERPL-MCNC: 103 MG/DL (ref 65–140)
HCT VFR BLD AUTO: 30.9 % (ref 34.8–46.1)
HGB BLD-MCNC: 10.4 G/DL (ref 11.5–15.4)
INR PPP: 1.2 (ref 0.85–1.19)
MAGNESIUM SERPL-MCNC: 1.8 MG/DL (ref 1.9–2.7)
MCH RBC QN AUTO: 30.2 PG (ref 26.8–34.3)
MCHC RBC AUTO-ENTMCNC: 33.7 G/DL (ref 31.4–37.4)
MCV RBC AUTO: 90 FL (ref 82–98)
PLATELET # BLD AUTO: 191 THOUSANDS/UL (ref 149–390)
PMV BLD AUTO: 9.1 FL (ref 8.9–12.7)
POTASSIUM SERPL-SCNC: 3.9 MMOL/L (ref 3.5–5.3)
PROT SERPL-MCNC: 5.5 G/DL (ref 6.4–8.4)
PROTHROMBIN TIME: 15.4 SECONDS (ref 12.3–15)
RBC # BLD AUTO: 3.44 MILLION/UL (ref 3.81–5.12)
SODIUM SERPL-SCNC: 132 MMOL/L (ref 135–147)
TSH SERPL DL<=0.05 MIU/L-ACNC: 0.72 UIU/ML (ref 0.45–4.5)
WBC # BLD AUTO: 6.75 THOUSAND/UL (ref 4.31–10.16)

## 2025-05-08 PROCEDURE — 84443 ASSAY THYROID STIM HORMONE: CPT | Performed by: PHYSICIAN ASSISTANT

## 2025-05-08 PROCEDURE — 80048 BASIC METABOLIC PNL TOTAL CA: CPT | Performed by: PHYSICIAN ASSISTANT

## 2025-05-08 PROCEDURE — 85027 COMPLETE CBC AUTOMATED: CPT | Performed by: PHYSICIAN ASSISTANT

## 2025-05-08 PROCEDURE — 83735 ASSAY OF MAGNESIUM: CPT | Performed by: PHYSICIAN ASSISTANT

## 2025-05-08 PROCEDURE — 85610 PROTHROMBIN TIME: CPT | Performed by: PHYSICIAN ASSISTANT

## 2025-05-08 PROCEDURE — NC001 PR NO CHARGE: Performed by: PHYSICIAN ASSISTANT

## 2025-05-08 PROCEDURE — 80076 HEPATIC FUNCTION PANEL: CPT | Performed by: PHYSICIAN ASSISTANT

## 2025-05-08 RX ORDER — AMIODARONE HYDROCHLORIDE 200 MG/1
TABLET ORAL
Qty: 58 TABLET | Refills: 0 | Status: SHIPPED | OUTPATIENT
Start: 2025-05-08 | End: 2025-06-21

## 2025-05-08 RX ORDER — BUTALBITAL, ACETAMINOPHEN AND CAFFEINE 50; 325; 40 MG/1; MG/1; MG/1
1 TABLET ORAL ONCE
Status: COMPLETED | OUTPATIENT
Start: 2025-05-08 | End: 2025-05-08

## 2025-05-08 RX ADMIN — METOPROLOL SUCCINATE 50 MG: 50 TABLET, EXTENDED RELEASE ORAL at 08:19

## 2025-05-08 RX ADMIN — AMIODARONE HYDROCHLORIDE 200 MG: 200 TABLET ORAL at 08:19

## 2025-05-08 RX ADMIN — PRAVASTATIN SODIUM 40 MG: 40 TABLET ORAL at 08:19

## 2025-05-08 RX ADMIN — ACETAMINOPHEN 650 MG: 325 TABLET, FILM COATED ORAL at 07:07

## 2025-05-08 RX ADMIN — APIXABAN 5 MG: 5 TABLET, FILM COATED ORAL at 08:19

## 2025-05-08 RX ADMIN — BUTALBITAL, ACETAMINOPHEN AND CAFFEINE 1 TABLET: 50; 325; 40 TABLET ORAL at 10:23

## 2025-05-08 RX ADMIN — LOSARTAN POTASSIUM 100 MG: 50 TABLET, FILM COATED ORAL at 08:19

## 2025-05-08 NOTE — DISCHARGE SUMMARY
Discharge Summary - Electrophysiology   Name: Jacklyn Garcia 80 y.o. female I MRN: [id number]  Unit/Bed#: [facility] 407-01 I Date of Admission: 5/7/2025   Date of Service: 5/8/2025 I Hospital Day: 0      Discharge Summary - Jacklyn Garcia 80 y.o. female MRN: [id number]    Unit/Bed#: [facility] 407-01 Encounter: [id number]      Admission Date: 5/7/2025   Discharge Date: 5/8/2025    Discharge Diagnosis:   Atrial flutter  Hypertension  HLD    Procedures Performed:   Atypical flutter ablation 5/7/2025  s/p PVI using PFA  s/p posterior wall isolation using PFA  s/p septal anchor placement using PFA  s/p macro-reentrant left atrial tachycardia ablation using RF energy  s/p CTI ablation for atrial flutter using RF energy   -Intracardiac echo  -Transseptal puncture  -3D mapping (Carto)  Orders Placed This Encounter   Procedures    Cardiac ep lab eps/ablations       Consultants: None    HPI: Please refer to the initial history and physical as well as procedure notes for full details. Briefly, Jacklyn Garcia is a 80 y.o. year old female with PMH of HTN, systolic and diastolic heart failure, CKD 3, A-flutter, HLD.  She was seen by Dr. Marin as an outpatient, and ablation was recommended. She presented this hospital admission to undergo this procedure.     Hospital Course: Jacklyn Garcia presented at her baseline state of health. After the procedure was explained in detail and consent was obtained, she underwent a flutter ablation without complications. She tolerated the procedure well. She was then monitored overnight for further observation.    There were no acute issues or events overnight. The following morning She denied all cardiac complaints, including chest pain/pressure, dyspnea, palpitations, dizziness, lightheadedness, or syncope. Her vital signs were reviewed and labs are stable. Telemetry showed normal sinus rhythm with first-degree AV block. Her groins were soft without significant hematoma or recurrent  bleeding.  Groin suture on the right was removed.  However, she did have a skin tear on the left for which glue was applied.  Physical exam on the day of discharge was as follows:  GEN: NAD, alert and oriented, well appearing  SKIN: dry without significant lesions or rashes  HEENT: NCAT, PERRL, EOMs intact  NECK: No JVD or carotid bruits appreciated  CARDIOVASCULAR: RRR, normal S1, S2 without murmurs, rubs, or gallops appreciated  LUNGS: Clear to auscultation bilaterally without wheezes, rhonchi, or rales  ABDOMEN: Soft, nontender, nondistended  EXTREMITIES/VASCULAR: perfused without clubbing, cyanosis, or edema b/l  PSYCH: Normal mood and affect  NEURO: CN ll-Xll grossly intact    She was given routine post ablation discharge instructions and restrictions, and these were explained in detail. She was given a follow up appointment with Jose Raul Levi PA-C, and she was instructed to follow up with her primary cardiologist as previously instructed.    In terms of her medications, she will start amiodarone 200 mg twice a day for 2 weeks and then decrease to 200 mg once a day.  No other changes were made to her current regimen.    She was counseled on need for antiarrhythmic therapy given the fact that she had multiple circuits.  It was discussed that if this ablation does not work that she may require repeat ablation versus pacemaker and AVJ combo.  She is understanding of this.  Baseline blood work has been ordered for this with thyroid and TSH.  It was advised by Dr. Marin that if it is felt that she will need to be on this medication for longer than 6 months, would plan for PFT testing in the future as well.    She is stable for discharge at this time with all questions answered. She was discussed in detail with Dr. Marin who is in agreement with this discharge summary.     Discharge Medications:  See after visit summary for reconciled discharge medications provided to patient and family.      Medications Prior to  Admission:     apixaban (Eliquis) 5 mg    bacitracin-polymyxin b ophthalmic ointment    butalbital-acetaminophen-caffeine (FIORICET,ESGIC) -40 mg per tablet    calcium carbonate (OS-MARLENY) 600 MG tablet    Ergocalciferol (VITAMIN D2 PO)    fluticasone (FLONASE) 50 mcg/act nasal spray    losartan (COZAAR) 100 MG tablet    magnesium (MAGTAB) 84 MG (7MEQ) TBCR    metoprolol succinate (TOPROL-XL) 50 mg 24 hr tablet    Multiple Vitamins-Minerals (MULTIVITAMIN WITH MINERALS) tablet    neomycin-bacitracin-polymyxin (NEOSPORIN) ophthalmic ointment    omeprazole (PriLOSEC) 20 mg delayed release capsule    ondansetron (ZOFRAN) 4 mg tablet    pravastatin (PRAVACHOL) 40 mg tablet    spironolactone (ALDACTONE) 50 mg tablet    zolpidem (AMBIEN) 5 mg tablet    meclizine (ANTIVERT) 12.5 MG tablet    ondansetron (ZOFRAN) 4 mg tablet    prochlorperazine (COMPAZINE) 5 mg tablet      Pertininet Labs/diagnostics:  CBC with diff:   Results from last 7 days   Lab Units 05/08/25 0518 05/07/25  0716   WBC Thousand/uL 6.75 4.99   HEMOGLOBIN g/dL 10.4* 12.4   HEMATOCRIT % 30.9* 36.7   MCV fL 90 89   PLATELETS Thousands/uL 191 223   RBC Million/uL 3.44* 4.12   MCH pg 30.2 30.1   MCHC g/dL 33.7 33.8   RDW % 13.4 12.7   MPV fL 9.1 8.9       BMP:  Results from last 7 days   Lab Units 05/08/25 0518 05/07/25  0716   POTASSIUM mmol/L 3.9 4.0   CHLORIDE mmol/L 101 97   CO2 mmol/L 24 28   BUN mg/dL 9 11   CREATININE mg/dL 0.85 0.93   CALCIUM mg/dL 8.3* 9.4       Magnesium:   Results from last 7 days   Lab Units 05/08/25  0518   MAGNESIUM mg/dL 1.8*       Coags:   Results from last 7 days   Lab Units 05/08/25 0518 05/07/25  0716   INR  1.20* 1.09         Complications: none    Condition at Discharge: good     Discharge instructions/Information to patient and family:   See after visit summary for information provided to patient and family.      Provisions for Follow-Up Care:  See after visit summary for information related to follow-up care and  any pertinent home health orders.      Disposition: Home    Planned Readmission: No    Discharge Statement   I spent 45 minutes minutes discharging the patient. This time was spent on the day of discharge. I had direct contact with the patient on the day of discharge. Additional documentation is required if more than 30 minutes were spent on discharge. Evaluating the incision, discussing discharge instructions and restrictions, arranging follow up appointments, discussing medications

## 2025-05-08 NOTE — PLAN OF CARE
Problem: PAIN - ADULT  Goal: Verbalizes/displays adequate comfort level or baseline comfort level  Description: Interventions:- Encourage patient to monitor pain and request assistance- Assess pain using appropriate pain scale- Administer analgesics based on type and severity of pain and evaluate response- Implement non-pharmacological measures as appropriate and evaluate response- Consider cultural and social influences on pain and pain management- Notify physician/advanced practitioner if interventions unsuccessful or patient reports new pain  Outcome: Adequate for Discharge     Problem: INFECTION - ADULT  Goal: Absence or prevention of progression during hospitalization  Description: INTERVENTIONS:- Assess and monitor for signs and symptoms of infection- Monitor lab/diagnostic results- Monitor all insertion sites, i.e. indwelling lines, tubes, and drains- Monitor endotracheal if appropriate and nasal secretions for changes in amount and color- [place] appropriate cooling/warming therapies per order- Administer medications as ordered- Instruct and encourage patient and family to use good hand hygiene technique- Identify and instruct in appropriate isolation precautions for identified infection/condition  Outcome: Adequate for Discharge  Goal: Absence of fever/infection during neutropenic period  Description: INTERVENTIONS:- Monitor WBC  Outcome: Adequate for Discharge     Problem: SAFETY ADULT  Goal: Patient will remain free of falls  Description: INTERVENTIONS:- Educate patient/family on patient safety including physical limitations- Instruct patient to call for assistance with activity - Consult OT/PT to assist with strengthening/mobility - Keep Call bell within reach- Keep bed low and locked with side rails adjusted as appropriate- Keep care items and personal belongings within reach- Initiate and maintain comfort rounds- Make Fall Risk Sign visible to staff- Offer Toileting every  Hours, in advance of  need- Initiate/Maintain alarm- Obtain necessary fall risk management equipment: - Apply yellow socks and bracelet for high fall risk patients- Consider moving patient to room near nurses station  Outcome: Adequate for Discharge  Goal: Maintain or return to baseline ADL function  Description: INTERVENTIONS:-  Assess patient's ability to carry out ADLs; assess patient's baseline for ADL function and identify physical deficits which impact ability to perform ADLs (bathing, care of mouth/teeth, toileting, grooming, dressing, etc.)- Assess/evaluate cause of self-care deficits - Assess range of motion- Assess patient's mobility; develop plan if impaired- Assess patient's need for assistive devices and provide as appropriate- Encourage maximum independence but intervene and supervise when necessary- Involve family in performance of ADLs- Assess for home care needs following discharge - Consider OT consult to assist with ADL evaluation and planning for discharge- Provide patient education as appropriate  Outcome: Adequate for Discharge  Goal: Maintains/Returns to pre admission functional level  Description: INTERVENTIONS:- Perform AM-PAC 6 Click Basic Mobility/ Daily Activity assessment daily.- Set and communicate daily mobility goal to care team and patient/family/caregiver. - Collaborate with rehabilitation services on mobility goals if consulted- Perform Range of Motion  times a day.- Reposition patient every  hours.- Dangle patient  times a day- Stand patient  times a day- Ambulate patient  times a day- Out of bed to chair  times a day - Out of bed for meals times a day- Out of bed for toileting- Record patient progress and toleration of activity level   Outcome: Adequate for Discharge     Problem: DISCHARGE PLANNING  Goal: Discharge to home or other facility with appropriate resources  Description: INTERVENTIONS:- Identify barriers to discharge w/patient and caregiver- Arrange for needed discharge resources and  transportation as appropriate- Identify discharge learning needs (meds, wound care, etc.)- Arrange for interpretive services to assist at discharge as needed- Refer to Case Management Department for coordinating discharge planning if the patient needs post-hospital services based on physician/advanced practitioner order or complex needs related to functional status, cognitive ability, or social support system  Outcome: Adequate for Discharge     Problem: Knowledge Deficit  Goal: Patient/family/caregiver demonstrates understanding of disease process, treatment plan, medications, and discharge instructions  Description: Complete learning assessment and assess knowledge base.Interventions:- Provide teaching at level of understanding- Provide teaching via preferred learning methods  Outcome: Adequate for Discharge     Problem: CARDIOVASCULAR - ADULT  Goal: Maintains optimal cardiac output and hemodynamic stability  Description: INTERVENTIONS:- Monitor I/O, vital signs and rhythm- Monitor for S/S and trends of decreased cardiac output- Administer and titrate ordered vasoactive medications to optimize hemodynamic stability- Assess quality of pulses, skin color and temperature- Assess for signs of decreased coronary artery perfusion- Instruct patient to report change in severity of symptoms  Outcome: Adequate for Discharge  Goal: Absence of cardiac dysrhythmias or at baseline rhythm  Description: INTERVENTIONS:- Continuous cardiac monitoring, vital signs, obtain 12 lead EKG if ordered- Administer antiarrhythmic and heart rate control medications as ordered- Monitor electrolytes and administer replacement therapy as ordered  Outcome: Adequate for Discharge

## 2025-05-12 ENCOUNTER — OFFICE VISIT (OUTPATIENT)
Dept: FAMILY MEDICINE CLINIC | Facility: CLINIC | Age: 80
End: 2025-05-12
Payer: MEDICARE

## 2025-05-12 ENCOUNTER — TRANSITIONAL CARE MANAGEMENT (OUTPATIENT)
Dept: FAMILY MEDICINE CLINIC | Facility: CLINIC | Age: 80
End: 2025-05-12

## 2025-05-12 VITALS
BODY MASS INDEX: 27.93 KG/M2 | WEIGHT: 163.6 LBS | HEART RATE: 66 BPM | OXYGEN SATURATION: 97 % | SYSTOLIC BLOOD PRESSURE: 132 MMHG | DIASTOLIC BLOOD PRESSURE: 84 MMHG | HEIGHT: 64 IN | TEMPERATURE: 98.6 F

## 2025-05-12 DIAGNOSIS — Z86.79 STATUS POST ABLATION OF ATRIAL FLUTTER: ICD-10-CM

## 2025-05-12 DIAGNOSIS — I10 ESSENTIAL HYPERTENSION: ICD-10-CM

## 2025-05-12 DIAGNOSIS — J20.9 ACUTE BRONCHITIS, UNSPECIFIED ORGANISM: ICD-10-CM

## 2025-05-12 DIAGNOSIS — E26.9 HYPERALDOSTERONISM (HCC): ICD-10-CM

## 2025-05-12 DIAGNOSIS — N18.31 STAGE 3A CHRONIC KIDNEY DISEASE (HCC): ICD-10-CM

## 2025-05-12 DIAGNOSIS — I48.92 ATRIAL FLUTTER, UNSPECIFIED TYPE (HCC): Primary | ICD-10-CM

## 2025-05-12 DIAGNOSIS — Z98.890 STATUS POST ABLATION OF ATRIAL FLUTTER: ICD-10-CM

## 2025-05-12 PROCEDURE — 99495 TRANSJ CARE MGMT MOD F2F 14D: CPT | Performed by: FAMILY MEDICINE

## 2025-05-12 RX ORDER — LOTEPREDNOL ETABONATE 5 MG/ML
SUSPENSION/ DROPS OPHTHALMIC
COMMUNITY
Start: 2025-04-30

## 2025-05-12 RX ORDER — AZITHROMYCIN 250 MG/1
TABLET, FILM COATED ORAL
Qty: 6 TABLET | Refills: 0 | Status: SHIPPED | OUTPATIENT
Start: 2025-05-12 | End: 2025-05-17

## 2025-05-12 NOTE — PROGRESS NOTES
FEDERICA Garcia 80 y.o. female   Date:  5/12/2025    TCM Call (since 4/28/2025)     Date and time call was made  5/12/2025  9:51 AM    Hospital care reviewed  Records reviewed    Patient was hospitialized at  [facility]    Date of Admission  05/07/25    Date of discharge  05/08/25    Diagnosis  Atrial flutter    Disposition  Home    Were the patients medications reviewed and updated  No  START - Amiodarone 200 mg BID x 14 days then one daily    Current Symptoms  Cough  feels like she has a cold - request apt for today      TCM Call (since 4/28/2025)     Post hospital issues  None    Scheduled for follow up?  Yes    Patients specialists  Cardiologist    Cardiologist name  Electrophysiology - apt with Jose Raul Levi PA-C 6/9/2025 @ 9:00 am    Did you obtain your prescribed medications  Yes    Do you need help managing your prescriptions or medications  No    Is transportation to your appointment needed  No    I have advised the patient to call PCP with any new or worsening symptoms  Maday Wilkinson MA    Living Arrangements  Spouse or Significiant other    Support System  Spouse    The type of support provided  Emotional; Physical    Are you recieving home care services  No        Hospital records were reviewed. Medications upon discharge reviewed/updated.   Discharge Disposition:    Follow up visits with other specialists:       Assessment and Plan:  The patient was admitted with a diagnosis of atrial flutter hypertension and HLD patient underwent macro reentrant left atrial tachycardia ablation using radiofrequency energy    Patient is a 80-year-old female with a past medical history of hypertension, systolic and diastolic heart failure, chronic kidney disease stage III atrial flutter and HLD the patient was kept overnight for observation groin sutures were removed she did have a skin tear on the left groin which glued was applied.    The patient was counseled on the need for antiarrhythmic therapy  given the fact that she had multiple circuits.  We discussed that if the ablation did not work that she may require repeat ablation versus pacemaker with AVJ combo.  She is understanding.  The patient was placed on amiodarone 200 mg twice daily for 2 weeks and then once daily    The patient will be following with cardiology    Lab performed May 8 added TSH third generation and free T4 as normal    The patient has a 2-day history of coughing up yellowish phlegm she will be provided with a Zithromax [id number]  HPI:  Patient presents for transition of care management she was hospitalized at the [facility] from May 7 through May 8 with a diagnosis of atrial flutter had radiofrequency ablation performed        ROS: Review of Systems   Constitutional:  Negative for chills and fever.   HENT:  Negative for ear pain and sore throat.    Eyes:  Negative for pain and visual disturbance.   Respiratory:  Negative for cough and shortness of breath.    Cardiovascular:  Negative for chest pain and palpitations.   Gastrointestinal:  Negative for abdominal pain and vomiting.   Genitourinary:  Negative for dysuria and hematuria.   Musculoskeletal:  Negative for arthralgias and back pain.   Skin:  Negative for color change and rash.   Neurological:  Negative for seizures and syncope.   All other systems reviewed and are negative.      Past Medical History:   Diagnosis Date   • Allergies    • Anxiety    • Arthritis    • Benign arteriolar nephrosclerosis    • Bereavement    • Cataract    • Chronic kidney disease    • Chronic kidney disease in type 2 diabetes mellitus (HCC)    • Chronic kidney disease, stage 2 (mild)    • Chronic pain disorder    • Chronic pain syndrome    • COPD (chronic obstructive pulmonary disease) ([facility])    • DDD (degenerative disc disease), cervical    • DDD (degenerative disc disease), lumbar    • Degenerative joint disease of right hip    • Depression    • Diastolic dysfunction    • DJD (degenerative joint  disease), ankle and foot, right    • DJD of right shoulder    • RÍOS (dyspnea on exertion)    • Edema    • Fracture of left calcaneus    • Generalized anxiety disorder    • GERD (gastroesophageal reflux disease)    • Heart murmur    • Hyperaldosteronism (HCC)    • Hypercholesterolemia    • Hyperlipidemia    • Hypertension    • Hypertensive nephrosclerosis    • Hypo-osmolality and hyponatremia    • IBS (irritable bowel syndrome)    • Insomnia    • Irregular heart beat    • Migraines    • Mitral regurgitation    • MVP (mitral valve prolapse)    • Obstructive sleep apnea syndrome    • Osteoarthritis    • Osteoarthritis    • Pernicious anemia    • Plantar fascia rupture    • Rheumatic mitral regurgitation    • Rheumatoid arthritis (HCC)    • Right knee DJD    • S/P insertion of spinal cord stimulator    • Shingles    • Sinobronchitis    • Sleep apnea    • Status post total right knee replacement 07/07/2020   • Stroke (HCC)     tia   • TIA (transient ischemic attack)    • Vertigo        Past Surgical History:   Procedure Laterality Date   • APPENDECTOMY     • CARDIAC ELECTROPHYSIOLOGY PROCEDURE N/A 5/7/2025    Procedure: Cardiac eps/Atypical Aflutter Ablation PFA;  Surgeon: Rich Marin MD;  [facility];  Service: Cardiology   • CARDIAC ELECTROPHYSIOLOGY PROCEDURE N/A 5/7/2025    Procedure: Cardiac eps/svt ablation;  Surgeon: Rich Marin MD;  [facility];  Service: Cardiology   • CARDIAC ELECTROPHYSIOLOGY PROCEDURE N/A 5/7/2025    Procedure: Cardiac eps/afib ablation;  Surgeon: Rich Marin MD;  [facility];  Service: Cardiology   • CARPAL TUNNEL RELEASE Bilateral    • COLONOSCOPY      several   • COLONOSCOPY  04/02/2012    by Dr. Mckinley Ruvalcaba   • CORNEAL TRANSPLANT Left 04/11/2022   • DXA PROCEDURE (HISTORICAL)  10/26/2016, 9/17/2014, 6/18/2007   • HAND SURGERY Right     ring finger has pin   • HYSTERECTOMY      34   • INSERT / REPLACE PERIPHERAL NEUROSTIMULATOR  PULSE GENERATOR /  Left     buttocks   • JOINT REPLACEMENT Left     knee   • JOINT REPLACEMENT Right 04/2019    Hip   • KNEE ARTHROPLASTY Left 01/15/2009    by Dr. Avery Leigh at [facility]    • KNEE ARTHROSCOPY Bilateral    • KNEE CARTILAGE SURGERY  09/10/2009    by Dr. Avery Leigh at [facility]    • MAMMO (HISTORICAL)  12/10/2018, 10/30/2017, 10/26/2016   • NASAL SEPTUM SURGERY  2008   • NERVE BLOCK Left 02/20/2018    Procedure: BLOCK MEDIAL BRANCH - C4, C5, C6;  Surgeon: Jaycob Cruz MD;  Location: [facility];  Service: Pain Management    • NERVE BLOCK Left 03/06/2018    Procedure: C4, C5, C6 MEDIAL BRANCH BLOCK;  Surgeon: Jaycob Cruz MD;  [facility]: [facility];  Service: Pain Management    • NERVE BLOCK Left 2/20/2025    Procedure: BLOCK MEDIAL BRANCH Left C4-5 and C5-6;  Surgeon: Jaycob Cruz MD;  Location: [facility];  Service: Pain Management    • MO ARTHRP ACETBLR/PROX FEM PROSTC AGRFT/ALGRFT Right 02/27/2019    Procedure: ARTHROPLASTY HIP TOTAL;  Surgeon: Bruno Pina DO;  Location:  MAIN OR;  Service: Orthopedics   • MO ARTHRP KNE CONDYLE&PLATU MEDIAL&LAT COMPARTMENTS Right 06/24/2020    Procedure: KNEE TOTAL ARTHROPLASTY;  Surgeon: Bruno Pina DO;  Location:  MAIN OR;  Service: Orthopedics   • MO COLONOSCOPY FLX DX W/COLLJ SPEC WHEN PFRMD N/A 04/24/2017    Procedure: FLEXIBLE COLONOSCOPY;  Surgeon: Mckinley Ruvalcaba MD;  Location: [facility];  Service: Colorectal   • RADIOFREQUENCY ABLATION Left 04/17/2018    Procedure: ABLATION RADIO FREQUENCY (RFA) - C4, C5, C6;  Surgeon: Jaycob Cruz MD;  [facility]: [facility];  Service: Pain Management    • REPLACEMENT TOTAL KNEE Left 07/05/2011   • SINUS SURGERY     • TONSILLECTOMY     • TOTAL HIP ARTHROPLASTY Left    • TRIGGER FINGER RELEASE      R 4th    • UPPER GASTROINTESTINAL ENDOSCOPY  01/12/2007    with Donaldson dilatation by Dr. Misael Santiago at [facility]   • WRIST SURGERY Right         Social History     Socioeconomic History   • Marital status: /Civil Union     Spouse name: None   • Number of children: None   • Years of education: None   • Highest education level: None   Occupational History   • Occupation: Admnistrator    Tobacco Use   • Smoking status: Former     Types: Cigarettes   • Smokeless tobacco: Never   • Tobacco comments:     quit 40 yrs ago   Vaping Use   • Vaping status: Never Used   Substance and Sexual Activity   • Alcohol use: Yes     Comment: 2 beer a week   • Drug use: Never   • Sexual activity: Not Currently     Birth control/protection: Post-menopausal   Other Topics Concern   • None   Social History Narrative    Patient has never smoked - As per [employer]    Consumes 1-2 beers per week - As per [employer]    Consumes on average 1 cup of decaf coffee per day      Social Drivers of Health     Financial Resource Strain: Low Risk  (3/17/2023)    Overall Financial Resource Strain (CARDIA)    • Difficulty of Paying Living Expenses: Not very hard   Food Insecurity: No Food Insecurity (5/1/2025)    Hunger Vital Sign    • Worried About Running Out of Food in the Last Year: Never true    • Ran Out of Food in the Last Year: Never true   Transportation Needs: No Transportation Needs (5/1/2025)    PRAPARE - Transportation    • Lack of Transportation (Medical): No    • Lack of Transportation (Non-Medical): No   Physical Activity: Not on file   Stress: Not on file   Social Connections: Not on file   Intimate Partner Violence: Unknown (3/17/2025)    Nursing IPS    • Feels Physically and Emotionally Safe: Not on file    • Physically Hurt by Someone: Not on file    • Humiliated or Emotionally Abused by Someone: Not on file    • Physically Hurt by Someone: No    • Hurt or Threatened by Someone: No   Housing Stability: Unknown (5/1/2025)    Housing Stability Vital Sign    • Unable to Pay for Housing in the Last Year: No    • Number of Times Moved in the Last Year: Not on file    •  Homeless in the Last Year: No       Family History   Problem Relation Age of Onset   • Heart disease Mother    • Hypertension Mother    • Arthritis Mother    • Dementia Mother    • Rheum arthritis Mother    • Hypertension Father    • Heart disease Father    • Colon cancer Father    • Hypertension Sister    • Anxiety disorder Sister    • Thyroid disease Sister    • Prostate cancer Maternal Grandfather    • No Known Problems Paternal Grandmother    • No Known Problems Paternal Grandfather    • No Known Problems Paternal Aunt    • Pseudochol deficiency Neg Hx        Allergies   Allergen Reactions   • Latex      Added based on information entered during case entry, please review and add reactions, type, and severity as needed   • Procaine Hives   • Adhesive [Medical Tape] Rash   • Lidocaine Rash   • Penicillins Rash         Current Outpatient Medications:   •  amiodarone 200 mg tablet, Take 1 tablet (200 mg total) by mouth 2 (two) times a day with meals for 14 days, THEN 1 tablet (200 mg total) daily., Disp: 58 tablet, Rfl: 0  •  apixaban (Eliquis) 5 mg, Take 1 tablet (5 mg total) by mouth 2 (two) times a day, Disp: 60 tablet, Rfl: 6  •  azithromycin (Zithromax) 250 mg tablet, Take 2 tablets (500 mg total) by mouth daily for 1 day, THEN 1 tablet (250 mg total) daily for 4 days., Disp: 6 tablet, Rfl: 0  •  bacitracin-polymyxin b ophthalmic ointment, INSTILL A 1/4 INCH INTO LEFT EYE AT BEDTIME, Disp: , Rfl:   •  butalbital-acetaminophen-caffeine (FIORICET,ESGIC) -40 mg per tablet, Take 1 tablet by mouth every 6 (six) hours as needed for migraine Do not start before March 16, 2025., Disp: 120 tablet, Rfl: 3  •  calcium carbonate (OS-MARLENY) 600 MG tablet, Take 600 mg by mouth 2 (two) times a day with meals, Disp: , Rfl:   •  Ergocalciferol (VITAMIN D2 PO), Take 1,000 Units by mouth in the morning, Disp: , Rfl:   •  fluticasone (FLONASE) 50 mcg/act nasal spray, 2 sprays into each nostril daily, Disp: , Rfl:   •  losartan  "(COZAAR) 100 MG tablet, TAKE 1 TABLET EVERY MORNING, Disp: 90 tablet, Rfl: 1  •  loteprednol etabonate (LOTEMAX) 0.5 % ophthalmic suspension, instill 1 drop into left eye daily, Disp: , Rfl:   •  magnesium (MAGTAB) 84 MG (7MEQ) TBCR, Take 1 tablet (84 mg total) by mouth daily, Disp: 90 tablet, Rfl: 1  •  meclizine (ANTIVERT) 12.5 MG tablet, Take 1 tablet (12.5 mg total) by mouth 3 (three) times a day as needed for dizziness, Disp: 30 tablet, Rfl: 0  •  metoprolol succinate (TOPROL-XL) 50 mg 24 hr tablet, Take 1 tablet (50 mg total) by mouth 2 (two) times a day, Disp: 180 tablet, Rfl: 3  •  Multiple Vitamins-Minerals (MULTIVITAMIN WITH MINERALS) tablet, Take 1 tablet by mouth every morning, Disp: , Rfl:   •  neomycin-bacitracin-polymyxin (NEOSPORIN) ophthalmic ointment, INSTILL 1/4' INTO LEFT EYE AT BEDTIME, Disp: , Rfl:   •  omeprazole (PriLOSEC) 20 mg delayed release capsule, TAKE 1 CAPSULE TWICE DAILY, Disp: 180 capsule, Rfl: 1  •  ondansetron (ZOFRAN) 4 mg tablet, Take 1 tablet (4 mg total) by mouth every 8 (eight) hours as needed for nausea or vomiting, Disp: 20 tablet, Rfl: 2  •  ondansetron (ZOFRAN) 4 mg tablet, Take 1 tablet (4 mg total) by mouth every 8 (eight) hours as needed for nausea or vomiting, Disp: 20 tablet, Rfl: 2  •  pravastatin (PRAVACHOL) 40 mg tablet, TAKE 1 TABLET EVERY DAY, Disp: 90 tablet, Rfl: 1  •  prochlorperazine (COMPAZINE) 5 mg tablet, Take 5 mg by mouth every 6 (six) hours as needed for nausea or vomiting, Disp: , Rfl:   •  spironolactone (ALDACTONE) 50 mg tablet, TAKE 1 TABLET DAILY AFTER LUNCH, Disp: 90 tablet, Rfl: 1  •  zolpidem (AMBIEN) 5 mg tablet, Take 1 tablet (5 mg total) by mouth daily at bedtime as needed for sleep Do not start before March 8, 2025., Disp: 30 tablet, Rfl: 3      Physical Exam:  /84 (BP Location: Left arm, Patient Position: Sitting)   Pulse 66   Temp 98.6 °F (37 °C) (Tympanic)   Ht 5' 4\" (1.626 m)   Wt 74.2 kg (163 lb 9.6 oz)   SpO2 97%   BMI " 28.08 kg/m²     Physical Exam  Constitutional:       Appearance: Normal appearance. She is well-developed.   HENT:      Head: Normocephalic and atraumatic.      Right Ear: Tympanic membrane, ear canal and external ear normal.      Left Ear: Tympanic membrane, ear canal and external ear normal.      Nose: Nose normal.      Mouth/Throat:      Mouth: Mucous membranes are moist.      Pharynx: Oropharynx is clear.   Eyes:      Extraocular Movements: Extraocular movements intact.      Conjunctiva/sclera: Conjunctivae normal.      Pupils: Pupils are equal, round, and reactive to light.   Cardiovascular:      Rate and Rhythm: Normal rate and regular rhythm.      Pulses: Normal pulses.      Heart sounds: Normal heart sounds.   Pulmonary:      Effort: Pulmonary effort is normal.      Breath sounds: Normal breath sounds.   Abdominal:      General: Abdomen is flat. Bowel sounds are normal.      Palpations: Abdomen is soft.      Tenderness: There is no abdominal tenderness.   Musculoskeletal:         General: Normal range of motion.      Cervical back: Normal range of motion and neck supple.   Skin:     General: Skin is warm and dry.      Capillary Refill: Capillary refill takes less than 2 seconds.   Neurological:      General: No focal deficit present.      Mental Status: She is alert and oriented to person, place, and time.   Psychiatric:         Mood and Affect: Mood normal.         Behavior: Behavior normal.         Thought Content: Thought content normal.         Judgment: Judgment normal.             Labs:  Lab Results   Component Value Date    WBC 6.75 05/08/2025    HGB 10.4 (L) 05/08/2025    HCT 30.9 (L) 05/08/2025    MCV 90 05/08/2025     05/08/2025     Lab Results   Component Value Date     (L) 09/21/2015    K 3.9 05/08/2025     05/08/2025    CO2 24 05/08/2025    ANIONGAP 7 09/21/2015    BUN 9 05/08/2025    CREATININE 0.85 05/08/2025    GLUCOSE 91 09/21/2015    GLUF 97 05/07/2025    CALCIUM 8.3 (L)  05/08/2025    AST 61 (H) 05/08/2025    ALT 14 05/08/2025    ALKPHOS 95 05/08/2025    PROT 6.9 09/21/2015    BILITOT 0.29 09/21/2015    EGFR 65 05/08/2025

## 2025-06-03 NOTE — PROGRESS NOTES
Electrophysiology Follow Up  [facility]  [facility] [address], [place] [address]    Name: Jacklyn Garcia  : 1945  MRN: [id number]    Assessment & Plan  Longstanding persistent atrial fibrillation (HCC)  2025 admission for aflutter w/RVR  Initiated on rate control  Seen by EP 25 - ablation recommended   25 - underwent PFA afib/flutter/AT ablation (PVI, PW, septal anchor, macro-reentrant left AT, CTI) by Dr. Marin  Current medication regimen:  AC: Eliquis 5mg BID ([id number] 4 (age, sex, HTN))  AAD: None  Rate: metoprolol succinate 50mg BID  Hypertensive nephrosclerosis, stage 1 through stage 4 or unspecified chronic kidney disease  Lab Results   Component Value Date    EGFR 65 2025    EGFR 59 2025    EGFR 55 2025    CREATININE 0.85 2025    CREATININE 0.93 2025    CREATININE 0.98 2025   Renal function around baseline per latest lab evaluation  BP in office today 138/68  Continue PCP f/u  Mitral valve insufficiency, unspecified etiology  Mild-moderate per prior ECHO  Continue general cardiology f/u  SIADH (syndrome of inappropriate ADH production) (HCC)  Known hx of SIADH and hyponatremia   Continue PCP f/u       Discussion/Plan:    Patient recently underwent PFA afib/flutter/AT ablation on 25 by Dr. Marin    Since this procedure they have been maintained on metoprolol, amodarone, and eliquis as above    THS 25 - WNL  AST elevated at 61 w/ other LFT's WNL 25    Since this procedure She reports she has noted some intermittent exertional SOB, but notes her palpitations have improved and she feels improved overall    She affirms the groin access sites have healed well    She denies any significant adverse bleeding events whilst on Eliquis    EKG in office today showing sinus bradycardia w/ 1st degree AV block ([place] 226ms) w/ ventricular rate 55bpm    We did discuss long-term monitoring  options today such as smart watch, kardia device, and loop implantation. Patient reports she will think about her options more.     Plan to discontinue amiodarone after 08/08/25 (3 months post-ablation) given the slight bump in her AST (rechecking this lab in August as well). Will update Dr. Marin about this plan.    Modifiable risk factors for atrial fibrillation were discussed today including weight loss, avoiding alcohol/tobacco products, and treatment of CLARKE/HTN/DM     Patient has been instructed to follow up in our EP office in November or as needed. She will call our office with any questions or concerns in the meantime.    Rhythm History:   Atrial fibrillation:      Atrial flutter:      SVT:      VT/VF/PVC:     Device history:   Pacemaker:     Defibrillator:     BIV PPM:     BIV ICD:     ILR:    Interim History/HPI:   Interim history: Jacklyn Garcia is a 80 y.o. female with a PMH of afib/flutter/AT s/p ablation, HTN, CKD, SIADH, hyponatremia, and mitral valve insufficiency.    She presents today for routine outpatient follow up given a recent PFA afib/flutter/AT ablation on 05/08/25. Since this procedure She reports she has noted some intermittent exertional SOB, but notes her palpitations have improved and she feels improved overall. She affirms the groin access sites have healed well. She denies any significant adverse bleeding events whilst on Eliquis.    EKG: sinus bradycardia w/ 1st degree AV block ([place] 226ms) w/ ventricular rate 55bpm    Review of Systems   Constitutional:  Negative for appetite change, chills, fatigue, fever and unexpected weight change.   Respiratory:  Negative for chest tightness and shortness of breath.    Cardiovascular:  Negative for chest pain, palpitations and leg swelling.   Neurological:  Negative for dizziness, syncope, weakness and light-headedness.         OBJECTIVE:   Vitals:   There were no vitals taken for this visit.  There is no height or weight on file to  calculate BMI.        Physical Exam:   Physical Exam  Constitutional:       General: She is not in acute distress.     Appearance: Normal appearance. She is not ill-appearing.   HENT:      Head: Normocephalic and atraumatic.      Nose: Nose normal.     Eyes:      General:         Right eye: No discharge.         Left eye: No discharge.     Neck:      Vascular: No carotid bruit.     Cardiovascular:      Rate and Rhythm: Normal rate and regular rhythm.      Pulses: Normal pulses.      Heart sounds: Normal heart sounds.   Pulmonary:      Effort: Pulmonary effort is normal.      Breath sounds: Normal breath sounds.     Musculoskeletal:      Right lower leg: No edema.      Left lower leg: No edema.     Skin:     General: Skin is warm and dry.      Capillary Refill: Capillary refill takes less than 2 seconds.     Neurological:      Mental Status: She is alert.          Medications:    Current Medications[1]       Historical Information   Past Medical History[2]    Past Surgical History[3]    Social History     Substance and Sexual Activity   Alcohol Use Yes    Comment: 2 beer a week     Social History     Substance and Sexual Activity   Drug Use Never     Tobacco Use History[4]    Family History[5]      Labs & Results:  Below is the patient's most recent value for Albumin, ALT, AST, BUN, Calcium, Chloride, Cholesterol, CO2, Creatinine, GFR, Glucose, HDL, Hematocrit, Hemoglobin, Hemoglobin A1C, LDL, Magnesium, Phosphorus, Platelets, Potassium, PSA, Sodium, Triglycerides, and WBC.   Lab Results   Component Value Date    ALT 14 05/08/2025    AST 61 (H) 05/08/2025    BUN 9 05/08/2025    CALCIUM 8.3 (L) 05/08/2025     05/08/2025    CHOL 290 06/04/2015    CO2 24 05/08/2025    CREATININE 0.85 05/08/2025    HDL 88 02/24/2025    HCT 30.9 (L) 05/08/2025    HGB 10.4 (L) 05/08/2025    HGBA1C 5.0 07/22/2020    MG 1.8 (L) 05/08/2025    PHOS 3.7 03/17/2025     05/08/2025    K 3.9 05/08/2025     (L) 09/21/2015    TRIG  67 02/24/2025    WBC 6.75 05/08/2025     Note: for a comprehensive list of the patient's lab results, access the Results Review activity.         [1]   Current Outpatient Medications:     amiodarone 200 mg tablet, Take 1 tablet (200 mg total) by mouth 2 (two) times a day with meals for 14 days, THEN 1 tablet (200 mg total) daily., Disp: 58 tablet, Rfl: 0    apixaban (Eliquis) 5 mg, Take 1 tablet (5 mg total) by mouth 2 (two) times a day, Disp: 60 tablet, Rfl: 6    bacitracin-polymyxin b ophthalmic ointment, INSTILL A 1/4 INCH INTO LEFT EYE AT BEDTIME, Disp: , Rfl:     butalbital-acetaminophen-caffeine (FIORICET,ESGIC) -40 mg per tablet, Take 1 tablet by mouth every 6 (six) hours as needed for migraine Do not start before March 16, 2025., Disp: 120 tablet, Rfl: 3    calcium carbonate (OS-MARLENY) 600 MG tablet, Take 600 mg by mouth 2 (two) times a day with meals, Disp: , Rfl:     Ergocalciferol (VITAMIN D2 PO), Take 1,000 Units by mouth in the morning, Disp: , Rfl:     fluticasone (FLONASE) 50 mcg/act nasal spray, 2 sprays into each nostril daily, Disp: , Rfl:     losartan (COZAAR) 100 MG tablet, TAKE 1 TABLET EVERY MORNING, Disp: 90 tablet, Rfl: 1    loteprednol etabonate (LOTEMAX) 0.5 % ophthalmic suspension, instill 1 drop into left eye daily, Disp: , Rfl:     magnesium (MAGTAB) 84 MG (7MEQ) TBCR, Take 1 tablet (84 mg total) by mouth daily, Disp: 90 tablet, Rfl: 1    meclizine (ANTIVERT) 12.5 MG tablet, Take 1 tablet (12.5 mg total) by mouth 3 (three) times a day as needed for dizziness, Disp: 30 tablet, Rfl: 0    metoprolol succinate (TOPROL-XL) 50 mg 24 hr tablet, Take 1 tablet (50 mg total) by mouth 2 (two) times a day, Disp: 180 tablet, Rfl: 3    Multiple Vitamins-Minerals (MULTIVITAMIN WITH MINERALS) tablet, Take 1 tablet by mouth every morning, Disp: , Rfl:     neomycin-bacitracin-polymyxin (NEOSPORIN) ophthalmic ointment, INSTILL 1/4' INTO LEFT EYE AT BEDTIME, Disp: , Rfl:     omeprazole (PriLOSEC) 20 mg  delayed release capsule, TAKE 1 CAPSULE TWICE DAILY, Disp: 180 capsule, Rfl: 1    ondansetron (ZOFRAN) 4 mg tablet, Take 1 tablet (4 mg total) by mouth every 8 (eight) hours as needed for nausea or vomiting, Disp: 20 tablet, Rfl: 2    ondansetron (ZOFRAN) 4 mg tablet, Take 1 tablet (4 mg total) by mouth every 8 (eight) hours as needed for nausea or vomiting, Disp: 20 tablet, Rfl: 2    pravastatin (PRAVACHOL) 40 mg tablet, TAKE 1 TABLET EVERY DAY, Disp: 90 tablet, Rfl: 1    prochlorperazine (COMPAZINE) 5 mg tablet, Take 5 mg by mouth every 6 (six) hours as needed for nausea or vomiting, Disp: , Rfl:     spironolactone (ALDACTONE) 50 mg tablet, TAKE 1 TABLET DAILY AFTER LUNCH, Disp: 90 tablet, Rfl: 1    zolpidem (AMBIEN) 5 mg tablet, Take 1 tablet (5 mg total) by mouth daily at bedtime as needed for sleep Do not start before March 8, 2025., Disp: 30 tablet, Rfl: 3  [2]   Past Medical History:  Diagnosis Date    Allergies     Anxiety     Arthritis     Benign arteriolar nephrosclerosis     Bereavement     Cataract     Chronic kidney disease     Chronic kidney disease in type 2 diabetes mellitus ([facility])     Chronic kidney disease, stage 2 (mild)     Chronic pain disorder     Chronic pain syndrome     COPD (chronic obstructive pulmonary disease) ([facility])     DDD (degenerative disc disease), cervical     DDD (degenerative disc disease), lumbar     Degenerative joint disease of right hip     Depression     Diastolic dysfunction     DJD (degenerative joint disease), ankle and foot, right     DJD of right shoulder     RÍOS (dyspnea on exertion)     Edema     Fracture of left calcaneus     Generalized anxiety disorder     GERD (gastroesophageal reflux disease)     Heart murmur     Hyperaldosteronism (HCC)     Hypercholesterolemia     Hyperlipidemia     Hypertension     Hypertensive nephrosclerosis     Hypo-osmolality and hyponatremia     IBS (irritable bowel syndrome)     Insomnia     Irregular heart beat     Migraines     Mitral  regurgitation     MVP (mitral valve prolapse)     Obstructive sleep apnea syndrome     Osteoarthritis     Osteoarthritis     Pernicious anemia     Plantar fascia rupture     Rheumatic mitral regurgitation     Rheumatoid arthritis (HCC)     Right knee DJD     S/P insertion of spinal cord stimulator     Shingles     Sinobronchitis     Sleep apnea     Status post total right knee replacement 07/07/2020    Stroke (HCC)     tia    TIA (transient ischemic attack)     Vertigo    [3]   Past Surgical History:  Procedure Laterality Date    APPENDECTOMY      CARDIAC ELECTROPHYSIOLOGY PROCEDURE N/A 5/7/2025    Procedure: Cardiac eps/Atypical Aflutter Ablation PFA;  Surgeon: Rich Marin MD;  [facility];  Service: Cardiology    CARDIAC ELECTROPHYSIOLOGY PROCEDURE N/A 5/7/2025    Procedure: Cardiac eps/svt ablation;  Surgeon: Rich Marin MD;  [facility];  Service: Cardiology    CARDIAC ELECTROPHYSIOLOGY PROCEDURE N/A 5/7/2025    Procedure: Cardiac eps/afib ablation;  Surgeon: Rich Marin MD;  [facility];  Service: Cardiology    CARPAL TUNNEL RELEASE Bilateral     COLONOSCOPY      several    COLONOSCOPY  04/02/2012    by Dr. Mckinley Ruvalcaba    CORNEAL TRANSPLANT Left 04/11/2022    DXA PROCEDURE (HISTORICAL)  10/26/2016, 9/17/2014, 6/18/2007    HAND SURGERY Right     ring finger has pin    HYSTERECTOMY      34    INSERT / REPLACE PERIPHERAL NEUROSTIMULATOR PULSE GENERATOR /  Left     buttocks    JOINT REPLACEMENT Left     knee    JOINT REPLACEMENT Right 04/2019    Hip    KNEE ARTHROPLASTY Left 01/15/2009    by Dr. Avery Leigh at [facility]     KNEE ARTHROSCOPY Bilateral     KNEE CARTILAGE SURGERY  09/10/2009    by Dr. Avery Leigh at [facility]     MAMMO (HISTORICAL)  12/10/2018, 10/30/2017, 10/26/2016    NASAL SEPTUM SURGERY  2008    NERVE BLOCK Left 02/20/2018    Procedure: BLOCK MEDIAL BRANCH - C4, C5, C6;  Surgeon: Jaycob STALLWORTH  MD Anthony;  Location: [facility];  Service: Pain Management     NERVE BLOCK Left 03/06/2018    Procedure: C4, C5, C6 MEDIAL BRANCH BLOCK;  Surgeon: Jaycob Cruz MD;  Location: [facility];  Service: Pain Management     NERVE BLOCK Left 2/20/2025    Procedure: BLOCK MEDIAL BRANCH Left C4-5 and C5-6;  Surgeon: Jaycob Cruz MD;  Location: [facility];  Service: Pain Management     ID ARTHRP ACETBLR/PROX FEM PROSTC AGRFT/ALGRFT Right 02/27/2019    Procedure: ARTHROPLASTY HIP TOTAL;  Surgeon: Bruno Pina DO;  Location:  MAIN OR;  Service: Orthopedics    ID ARTHRP KNE CONDYLE&PLATU MEDIAL&LAT COMPARTMENTS Right 06/24/2020    Procedure: KNEE TOTAL ARTHROPLASTY;  Surgeon: Bruno Pina DO;  Location:  MAIN OR;  Service: Orthopedics    ID COLONOSCOPY FLX DX W/COLLJ SPEC WHEN PFRMD N/A 04/24/2017    Procedure: FLEXIBLE COLONOSCOPY;  Surgeon: Mckinley Ruvalcaba MD;  Location: [facility];  Service: Colorectal    RADIOFREQUENCY ABLATION Left 04/17/2018    Procedure: ABLATION RADIO FREQUENCY (RFA) - C4, C5, C6;  Surgeon: Jaycob Cruz MD;  [facility]: [facility];  Service: Pain Management     REPLACEMENT TOTAL KNEE Left 07/05/2011    SINUS SURGERY      TONSILLECTOMY      TOTAL HIP ARTHROPLASTY Left     TRIGGER FINGER RELEASE      R 4th     UPPER GASTROINTESTINAL ENDOSCOPY  01/12/2007    with Donaldson dilatation by Dr. Misael Santiago at [facility]    WRIST SURGERY Right    [4]   Social History  Tobacco Use   Smoking Status Former    Types: Cigarettes   Smokeless Tobacco Never   Tobacco Comments    quit 40 yrs ago   [5]   Family History  Problem Relation Name Age of Onset    Heart disease Mother      Hypertension Mother      Arthritis Mother      Dementia Mother      Rheum arthritis Mother      Hypertension Father      Heart disease Father      Colon cancer Father      Hypertension Sister      Anxiety disorder Sister      Thyroid disease Sister      Prostate cancer Maternal Grandfather      No Known  Problems Paternal Grandmother      No Known Problems Paternal Grandfather      No Known Problems Paternal Aunt      Pseudochol deficiency Neg Hx

## 2025-06-03 NOTE — ASSESSMENT & PLAN NOTE
03/2025 admission for aflutter w/RVR  Initiated on rate control  Seen by EP 03/25/25 - ablation recommended   05/08/25 - underwent PFA afib/flutter/AT ablation (PVI, PW, septal anchor, macro-reentrant left AT, CTI) by Dr. Marin  Current medication regimen:  AC: Eliquis 5mg BID ([id number] 4 (age, sex, HTN))  AAD: None  Rate: metoprolol succinate 50mg BID

## 2025-06-03 NOTE — ASSESSMENT & PLAN NOTE
Lab Results   Component Value Date    EGFR 65 05/08/2025    EGFR 59 05/07/2025    EGFR 55 04/23/2025    CREATININE 0.85 05/08/2025    CREATININE 0.93 05/07/2025    CREATININE 0.98 04/23/2025   Renal function around baseline per latest lab evaluation  BP in office today 138/68  Continue PCP f/u

## 2025-06-04 ENCOUNTER — NURSE TRIAGE (OUTPATIENT)
Age: 80
End: 2025-06-04

## 2025-06-04 NOTE — TELEPHONE ENCOUNTER
Called and spoke to patient. Explained to patient she can not hold eliquis until 8/7/25. If the dentist does not need eliquis hold she can proceed with any dental work.     Patient verbalized understanding

## 2025-06-04 NOTE — TELEPHONE ENCOUNTER
"REASON FOR CONVERSATION: No Triage Call (Dental work)    SYMPTOMS: tooth pain, may need a root canal; asking when/if it can be done since she had an Afib ablation done May 7th.    Also on eliquis 5mg BID.    OTHER HEALTH INFORMATION: Has HFU June 9th with Jose Raul Levi PA-C.    PROTOCOL DISPOSITION: Callback by PCP Today    CARE ADVICE PROVIDED: will send message to provider for recommendations.    PRACTICE FOLLOW-UP: please contact patient with recommendations (also has a HFU on Monday, can be discussed then if needed)        Reason for Disposition   Nursing judgment     When can Jacklyn have a root canal done since she had an ablation for afib May 7th? She is also on Eliquis 5mg BID.    Answer Assessment - Initial Assessment Questions  1. REASON FOR CALL: \"What is the main reason for your call?\" or \"How can I best help you?\"      I had an ablation for afib May 7th and I think Dr Marin said something about having to wait a while before any other procedures can be done. I may need a root canal. Would this be possible?    2. SYMPTOMS : \"Do you have any symptoms?\"       Tooth pain    3. OTHER QUESTIONS: \"Do you have any other questions?\"      I am on blood thinners as well.     How long should Jacklyn wait after having an ablation to have dental work such as a root canal done?    Protocols used: Information Only Call - No Triage-Adult-[place]    "

## 2025-06-09 ENCOUNTER — OFFICE VISIT (OUTPATIENT)
Dept: CARDIOLOGY CLINIC | Facility: CLINIC | Age: 80
End: 2025-06-09
Payer: MEDICARE

## 2025-06-09 VITALS
SYSTOLIC BLOOD PRESSURE: 138 MMHG | HEART RATE: 55 BPM | DIASTOLIC BLOOD PRESSURE: 68 MMHG | HEIGHT: 64 IN | BODY MASS INDEX: 28.08 KG/M2

## 2025-06-09 DIAGNOSIS — I12.9 HYPERTENSIVE NEPHROSCLEROSIS, STAGE 1 THROUGH STAGE 4 OR UNSPECIFIED CHRONIC KIDNEY DISEASE: ICD-10-CM

## 2025-06-09 DIAGNOSIS — I34.0 MITRAL VALVE INSUFFICIENCY, UNSPECIFIED ETIOLOGY: ICD-10-CM

## 2025-06-09 DIAGNOSIS — I48.11 LONGSTANDING PERSISTENT ATRIAL FIBRILLATION (HCC): Primary | ICD-10-CM

## 2025-06-09 DIAGNOSIS — E22.2 SIADH (SYNDROME OF INAPPROPRIATE ADH PRODUCTION) (HCC): ICD-10-CM

## 2025-06-09 DIAGNOSIS — N18.31 CKD STAGE 3A, GFR 45-59 ML/MIN (HCC): ICD-10-CM

## 2025-06-09 LAB
ATRIAL RATE: 55 BPM
P AXIS: 52 DEGREES
PR INTERVAL: 226 MS
QRS AXIS: 59 DEGREES
QRSD INTERVAL: 88 MS
QT INTERVAL: 432 MS
QTC INTERVAL: 414 MS
T WAVE AXIS: 51 DEGREES
VENTRICULAR RATE: 55 BPM

## 2025-06-09 PROCEDURE — 93000 ELECTROCARDIOGRAM COMPLETE: CPT

## 2025-06-09 PROCEDURE — 99214 OFFICE O/P EST MOD 30 MIN: CPT

## 2025-06-09 RX ORDER — AMIODARONE HYDROCHLORIDE 200 MG/1
200 TABLET ORAL DAILY
Qty: 90 TABLET | Refills: 0 | Status: SHIPPED | OUTPATIENT
Start: 2025-06-09 | End: 2025-09-07

## 2025-06-09 RX ORDER — AMIODARONE HYDROCHLORIDE 200 MG/1
200 TABLET ORAL DAILY
Qty: 90 TABLET | Refills: 0 | Status: SHIPPED | OUTPATIENT
Start: 2025-06-09 | End: 2025-06-09

## 2025-06-09 NOTE — Clinical Note
This patient's AST bumped up a little bit (17 to 62) whilst on Amio. She's been feeling great since her ablation and is maintaining sinus rhythm whilst on Amio. Given the slight bump in AST I advised she stop the amiodarone 3 months post-ablation (not 100% sure if due to amio, but wanted to play it safe). She'll be seeing you in November. Patient has palpitations in afib, but we did talk about long-term monitors - patient wanted to think about them more.

## 2025-06-09 NOTE — PATIENT INSTRUCTIONS
Please stop your amiodarone after 08/08/25 - contact the office with any cardiac complaints that occur once off this medication    In August you should call the office to discuss your symptoms whilst off the amiodarone

## 2025-06-10 ENCOUNTER — TELEPHONE (OUTPATIENT)
Age: 80
End: 2025-06-10

## 2025-06-10 NOTE — TELEPHONE ENCOUNTER
Emmy with [facility] called to inform that patient just had a root canal done today in their office.  The doctor would like to prescribe a mild steroid of dexamethasone 1.5 mg. 6 tablets to take every 2 hrs until finish as he suspects patient to have some pain and inflammation but before prescribing it he wants a verbal clearance from Dr. Wilkinson that its okay for the patient. Emmy can be reached at [phone].

## 2025-06-17 DIAGNOSIS — Z98.890 STATUS POST ABLATION OF ATRIAL FLUTTER: ICD-10-CM

## 2025-06-17 DIAGNOSIS — I48.92 ATRIAL FLUTTER, UNSPECIFIED TYPE (HCC): Primary | ICD-10-CM

## 2025-06-17 DIAGNOSIS — Z86.79 STATUS POST ABLATION OF ATRIAL FLUTTER: ICD-10-CM

## 2025-07-03 DIAGNOSIS — G44.209 MUSCLE TENSION HEADACHE: ICD-10-CM

## 2025-07-03 DIAGNOSIS — F51.01 PRIMARY INSOMNIA: ICD-10-CM

## 2025-07-03 RX ORDER — ZOLPIDEM TARTRATE 5 MG/1
5 TABLET ORAL
Qty: 30 TABLET | Refills: 3 | Status: SHIPPED | OUTPATIENT
Start: 2025-07-03

## 2025-07-03 RX ORDER — BUTALBITAL, ACETAMINOPHEN AND CAFFEINE 50; 325; 40 MG/1; MG/1; MG/1
1 TABLET ORAL EVERY 6 HOURS PRN
Qty: 120 TABLET | Refills: 3 | Status: SHIPPED | OUTPATIENT
Start: 2025-07-03

## 2025-07-25 ENCOUNTER — TELEPHONE (OUTPATIENT)
Age: 80
End: 2025-07-25

## 2025-07-25 ENCOUNTER — NURSE TRIAGE (OUTPATIENT)
Age: 80
End: 2025-07-25

## 2025-07-25 DIAGNOSIS — J01.01 ACUTE RECURRENT MAXILLARY SINUSITIS: Primary | ICD-10-CM

## 2025-07-25 RX ORDER — AZITHROMYCIN 250 MG/1
TABLET, FILM COATED ORAL
Qty: 6 TABLET | Refills: 0 | Status: SHIPPED | OUTPATIENT
Start: 2025-07-25 | End: 2025-07-30

## 2025-07-25 NOTE — TELEPHONE ENCOUNTER
"REASON FOR CONVERSATION: Sinusitis    SYMPTOMS: Sinus congestion and pain around eyes and nose. Started last night, no fever clear discharge. Rosa earache or shortness of breath.    OTHER HEALTH INFORMATION: Has had this before and was prescribed Z pack, she is nervous to take anything over the counter due to her afib.     PROTOCOL DISPOSITION: See Today or Tomorrow in Office    CARE ADVICE PROVIDED: Attempt to warm call office clerical, but cannot connect. No available appointments to be seen today as per patients request, she would like to be seen if able, or have Martell called in to [facility].    PRACTICE FOLLOW-UP: Please review and call Jacklyn if able to schedule her today to be seen or what Dr Wilkinson would recommend. [phone].           Reason for Disposition   Patient wants to be seen    Answer Assessment - Initial Assessment Questions  1. LOCATION: \"Where does it hurt?\"       Under eye pain   2. ONSET: \"When did the sinus pain start?\"  (e.g., hours, days)       Last night  3. SEVERITY: \"How bad is the pain?\"   (Scale 0-10; or none, mild, moderate or severe)      Mild pain under both eyes in sinus area  4. RECURRENT SYMPTOM: \"Have you ever had sinus problems before?\" If Yes, ask: \"When was the last time?\" and \"What happened that time?\"       Yes this has happened before  5. NASAL CONGESTION: \"Is the nose blocked?\" If Yes, ask: \"Can you open it or must you breathe through your mouth?\"      Nasal congestion yes, but able to breathe  6. NASAL DISCHARGE: \"Do you have discharge from your nose?\" If so ask, \"What color?\"      No colored discharge  7. FEVER: \"Do you have a fever?\" If Yes, ask: \"What is it, how was it measured, and when did it start?\"       No fever present  8. OTHER SYMPTOMS: \"Do you have any other symptoms?\" (e.g., sore throat, cough, earache, difficulty breathing)      No cough, no ear ache no sore throat no difficulty breathing  9. PREGNANCY: \"Is there any chance you are pregnant?\" \"When was " "your last menstrual period?\"      no    Protocols used: Sinus Pain or Congestion-Adult-[place]    "

## 2025-07-25 NOTE — TELEPHONE ENCOUNTER
Patient c/o head congestion and sinus pain. Patient requesting an appointment today with PCP.  No appointments available and a warm transfer to nurse triage was completed for further assistance.

## 2025-08-02 ENCOUNTER — APPOINTMENT (OUTPATIENT)
Dept: LAB | Facility: HOSPITAL | Age: 80
End: 2025-08-02
Payer: MEDICARE

## 2025-08-02 DIAGNOSIS — I48.11 LONGSTANDING PERSISTENT ATRIAL FIBRILLATION (HCC): ICD-10-CM

## 2025-08-02 LAB
ALBUMIN SERPL BCG-MCNC: 4.8 G/DL (ref 3.5–5)
ALP SERPL-CCNC: 113 U/L (ref 34–104)
ALT SERPL W P-5'-P-CCNC: 14 U/L (ref 7–52)
AST SERPL W P-5'-P-CCNC: 17 U/L (ref 13–39)
BILIRUB DIRECT SERPL-MCNC: 0.08 MG/DL (ref 0–0.2)
BILIRUB SERPL-MCNC: 0.4 MG/DL (ref 0.2–1)
PROT SERPL-MCNC: 7.3 G/DL (ref 6.4–8.4)
T4 FREE SERPL-MCNC: 1.05 NG/DL (ref 0.61–1.12)
TSH SERPL DL<=0.05 MIU/L-ACNC: 7.74 UIU/ML (ref 0.45–4.5)

## 2025-08-02 PROCEDURE — 36415 COLL VENOUS BLD VENIPUNCTURE: CPT

## 2025-08-02 PROCEDURE — 84439 ASSAY OF FREE THYROXINE: CPT

## 2025-08-02 PROCEDURE — 80076 HEPATIC FUNCTION PANEL: CPT

## 2025-08-02 PROCEDURE — 84443 ASSAY THYROID STIM HORMONE: CPT

## 2025-08-04 ENCOUNTER — OFFICE VISIT (OUTPATIENT)
Dept: FAMILY MEDICINE CLINIC | Facility: CLINIC | Age: 80
End: 2025-08-04
Payer: MEDICARE

## 2025-08-04 ENCOUNTER — TELEPHONE (OUTPATIENT)
Age: 80
End: 2025-08-04

## 2025-08-04 ENCOUNTER — TRANSCRIBE ORDERS (OUTPATIENT)
Dept: CARDIOLOGY CLINIC | Facility: HOSPITAL | Age: 80
End: 2025-08-04

## 2025-08-04 VITALS
SYSTOLIC BLOOD PRESSURE: 130 MMHG | RESPIRATION RATE: 16 BRPM | WEIGHT: 162 LBS | HEIGHT: 64 IN | TEMPERATURE: 97 F | DIASTOLIC BLOOD PRESSURE: 84 MMHG | HEART RATE: 76 BPM | BODY MASS INDEX: 27.66 KG/M2

## 2025-08-04 DIAGNOSIS — Z98.890 STATUS POST ABLATION OF ATRIAL FLUTTER: ICD-10-CM

## 2025-08-04 DIAGNOSIS — E26.9 HYPERALDOSTERONISM (HCC): ICD-10-CM

## 2025-08-04 DIAGNOSIS — R55 SYNCOPE, UNSPECIFIED SYNCOPE TYPE: Primary | ICD-10-CM

## 2025-08-04 DIAGNOSIS — R07.9 CHEST PAIN, UNSPECIFIED TYPE: ICD-10-CM

## 2025-08-04 DIAGNOSIS — R07.89 OTHER CHEST PAIN: ICD-10-CM

## 2025-08-04 DIAGNOSIS — I10 ESSENTIAL HYPERTENSION: ICD-10-CM

## 2025-08-04 DIAGNOSIS — R00.1 SYMPTOMATIC BRADYCARDIA: Primary | ICD-10-CM

## 2025-08-04 DIAGNOSIS — Z86.79 STATUS POST ABLATION OF ATRIAL FLUTTER: ICD-10-CM

## 2025-08-04 DIAGNOSIS — E03.8 SUBCLINICAL HYPOTHYROIDISM: ICD-10-CM

## 2025-08-04 PROCEDURE — 99214 OFFICE O/P EST MOD 30 MIN: CPT | Performed by: FAMILY MEDICINE

## 2025-08-04 PROCEDURE — G2211 COMPLEX E/M VISIT ADD ON: HCPCS | Performed by: FAMILY MEDICINE

## 2025-08-04 RX ORDER — LEVOTHYROXINE SODIUM 25 UG/1
25 TABLET ORAL
Qty: 100 TABLET | Refills: 3 | Status: SHIPPED | OUTPATIENT
Start: 2025-08-04

## 2025-08-05 RX ORDER — LOSARTAN POTASSIUM 100 MG/1
100 TABLET ORAL EVERY MORNING
Qty: 90 TABLET | Refills: 1 | Status: SHIPPED | OUTPATIENT
Start: 2025-08-05

## 2025-08-13 ENCOUNTER — HOSPITAL ENCOUNTER (OUTPATIENT)
Dept: NUCLEAR MEDICINE | Facility: HOSPITAL | Age: 80
Discharge: HOME | End: 2025-08-13
Attending: FAMILY MEDICINE

## 2025-08-13 ENCOUNTER — HOSPITAL ENCOUNTER (EMERGENCY)
Facility: HOSPITAL | Age: 80
Discharge: HOME | End: 2025-08-13
Attending: EMERGENCY MEDICINE | Admitting: HOSPITALIST
Payer: MEDICARE

## 2025-08-14 ENCOUNTER — TELEPHONE (OUTPATIENT)
Age: 80
End: 2025-08-14

## 2025-08-15 ENCOUNTER — APPOINTMENT (OUTPATIENT)
Dept: LAB | Facility: HOSPITAL | Age: 80
End: 2025-08-15
Payer: MEDICARE

## 2025-08-20 DIAGNOSIS — E26.9 HYPERALDOSTERONISM (HCC): ICD-10-CM

## 2025-08-21 RX ORDER — SPIRONOLACTONE 50 MG/1
TABLET, FILM COATED ORAL
Qty: 90 TABLET | Refills: 1 | Status: SHIPPED | OUTPATIENT
Start: 2025-08-21

## (undated) DEVICE — TOTAL HIP CDS: Brand: MEDLINE INDUSTRIES, INC.

## (undated) DEVICE — COVER,TABLE,44X90,STERILE: Brand: MEDLINE

## (undated) DEVICE — Device

## (undated) DEVICE — NEEDLE 18 G X 1 1/2 SAFETY

## (undated) DEVICE — BLADE SAW HALL MICROPOWER OSC 1.27MM X 19.5MM X 86MM

## (undated) DEVICE — PINNACLE INTRODUCER SHEATH: Brand: PINNACLE

## (undated) DEVICE — CHLORAPREP HI-LITE 10.5ML ORANGE

## (undated) DEVICE — 4-PORT MANIFOLD: Brand: NEPTUNE 2

## (undated) DEVICE — OCTA,PERSEID,2-2-2-2-2,D-CURVE: Brand: OCTARAY MAPPING CATHETER

## (undated) DEVICE — LUBRICANT SURGILUBE TUBE 4 OZ  FLIP TOP

## (undated) DEVICE — SUT VICRYL 2-0 CP-1 27 IN J266H

## (undated) DEVICE — PADDING CAST 6IN COTTON STRL

## (undated) DEVICE — GAUZE SPONGES,16 PLY: Brand: CURITY

## (undated) DEVICE — DRAIN CHANNEL RND FULL FLUTED 19FR W/TROCAR

## (undated) DEVICE — PLASTIC ADHESIVE BANDAGE: Brand: CURITY

## (undated) DEVICE — SUT VICRYL 1 CP 27 IN J196H

## (undated) DEVICE — Device: Brand: THERMOCOOL SMARTTOUCH SF

## (undated) DEVICE — STRAP LITHOTOMY CANDY CANE

## (undated) DEVICE — CEMENT BOWL VACUUM MIXING

## (undated) DEVICE — NEEDLE 18 G X 1 1/2

## (undated) DEVICE — GARMENT,MEDLINE,DVT,INT,CALF,FOAM,MED: Brand: MEDLINE

## (undated) DEVICE — ABDOMINAL PAD: Brand: DERMACEA

## (undated) DEVICE — NEEDLE SPINAL 22G X 3.5IN  QUINCKE

## (undated) DEVICE — NO-SCRATCH ™ SMALL WHITNEY CURETTE ™ IS A SINGLE-USE, PLASTIC CURETTE FOR QUICKLY APPLYING, MANIPULATING AND REMOVING BONE CEMENT DURING HIP AND KNEE REPLACEMENT SURGERY. THE PLASTIC IS SOFTER THAN STEEL INSTRUMENTS, REDUCING THE RISK OF DAMAGING THE PROSTHESIS WITH METAL INSTRUMENTS.  THE CURETTE’S 6MM TIP REMOVES EXCESS CEMENT FROM REPLACEMENT HIPS AND KNEES. EASY-TO-MANEUVER, THE SMALL BLUE CURETTE LETS YOU REMOVE CEMENT FROM ALL EDGES OF THE PROSTHESIS.NO-SCRATCH WHITNEY SMALL CURETTE FEATURES:SAFER THAN STEEL- MADE OF PLASTIC - STURDY YET SOFTER THAN SURGICAL STEEL.HANDIER- EACH TOOL HAS A MOLDED-IN THUMB INDENTATION INSTANTLY ORIENTING THE TOOL.- EASIER TO MANEUVER IN HARD TO SEE PLACES.- COLOR-CODED FOR EASY IDENTIFICATION.FASTER- COMES INDIVIDUALLY PACKAGED IN STERILE, PEEL OPEN POUCH, READY TO GO.- APPLIES, MANIPULATES, OR REMOVES CEMENT WITH FINGERTIP PRECISION.ECONOMICAL- THE COST OF A SINGLE REVISION DWARFS THE COST OF A SINGLE-USE CURETTE. - DISPOSABLE – THERE’S NO NEED TO WASTE TIME REMOVING HARDENED CEMENT OR RE-STERILIZING TOOLS.- LESS EXPENSIVE TO BUY AND INVENTORY - ORDER ONLY THE TOOL YOU USE.- PACKAGED 25 INDIVIDUALLY WRAPPED TOOLS TO A CARTON FOR CONVENIENT SHELF STORAGE.: Brand: WHITNEY NO-SCRATCH CURETTE (SMALL)

## (undated) DEVICE — SURGICAL GOWN, XL SMARTSLEEVE: Brand: CONVERTORS

## (undated) DEVICE — FRAZIER SUCTION INSTRUMENT 18 FR W/OBTURATOR, NO CONTROL VENT: Brand: FRAZIER

## (undated) DEVICE — ABDUCTION PILLOW FOAM POSITIONER: Brand: CARDINAL HEALTH

## (undated) DEVICE — 3M™ MICROFOAM™ TAPE 1528-4: Brand: 3M™ MICROFOAM™

## (undated) DEVICE — GLOVE INDICATOR UNDERGLOVE SZ 8 GREEN

## (undated) DEVICE — BLADE OSC STD/WIDE 0.89MM THCK

## (undated) DEVICE — READY WET SKIN SCRUB TRAY-LF: Brand: MEDLINE INDUSTRIES, INC.

## (undated) DEVICE — COMFORT HOOD -75 EA/BX: Brand: CARDINAL HEALTH

## (undated) DEVICE — FLEXIBLE ADHESIVE BANDAGE,X-LARGE: Brand: CURITY

## (undated) DEVICE — CAPIT HIP UPCHRG  GRIPTION CUP

## (undated) DEVICE — INTENDED FOR TISSUE SEPARATION, AND OTHER PROCEDURES THAT REQUIRE A SHARP SURGICAL BLADE TO PUNCTURE OR CUT.: Brand: BARD-PARKER ® CARBON RIB-BACK BLADES

## (undated) DEVICE — SYRINGE 5ML LL

## (undated) DEVICE — CAPIT HIP MOP -METAL ON POLY

## (undated) DEVICE — BAG DECANTER

## (undated) DEVICE — GLOVE INDICATOR UNDERGLOVE SZ 7.5 GREEN

## (undated) DEVICE — NEEDLE 25G X 1 1/2

## (undated) DEVICE — SPONGE LAP 18 X 18 IN STRL RFD

## (undated) DEVICE — PENCIL ROCKER SWITCH CAUTERY HAND CONTROL

## (undated) DEVICE — ACE WRAP 6 IN XL STERILE

## (undated) DEVICE — Device: Brand: DECANAV

## (undated) DEVICE — SUT VICRYL 0 CP-1 27 IN J267H

## (undated) DEVICE — 3M™ DURAPORE™ SURGICAL TAPE 1538-3, 3 INCH X 10 YARD (7,5CM X 9,1M), 4 ROLLS/BOX: Brand: 3M™ DURAPORE™

## (undated) DEVICE — SYRINGE 3ML LL

## (undated) DEVICE — 3M™ STERI-DRAPE™ U-DRAPE 1015: Brand: STERI-DRAPE™

## (undated) DEVICE — ASTOUND IMPERVIOUS SURGICAL GOWN: Brand: CONVERTORS

## (undated) DEVICE — TUBING SUCTION 5MM X 12 FT

## (undated) DEVICE — SYRINGE 50ML LL

## (undated) DEVICE — PULSAVAC TIP INTRAMED BRUSH

## (undated) DEVICE — ROSEN CURVED WIRE GUIDE: Brand: ROSEN

## (undated) DEVICE — POOLE SUCTION INSTRUMENT WITH REMOVABLE SHEATH AND PREATTACHED 6' (1.8 M) CLEAR PLASTIC TUBING: Brand: POOLE

## (undated) DEVICE — Device: Brand: REFERENCE PATCH CARTO 3

## (undated) DEVICE — GLOVE SRG BIOGEL 7.5

## (undated) DEVICE — THE SIMPULSE SOLO SYSTEM WITH ULTREX RETRACTABLE SPLASH SHIELD TIP: Brand: SIMPULSE SOLO

## (undated) DEVICE — GLOVE SRG BIOGEL 8

## (undated) DEVICE — COBAN 6 IN STERILE

## (undated) DEVICE — Device: Brand: SMARTABLATE

## (undated) DEVICE — MEDI-VAC YANK SUCT HNDL W/TPRD BULBOUS TIP: Brand: CARDINAL HEALTH

## (undated) DEVICE — STAPLER SKIN 35 WIDE ULC APPOSE

## (undated) DEVICE — CABLE CATH CONNECTION FARASTAR

## (undated) DEVICE — 3M™ IOBAN™ 2 ANTIMICROBIAL INCISE DRAPE 6651EZ: Brand: IOBAN™ 2

## (undated) DEVICE — TIBURON SPLIT SHEET: Brand: CONVERTORS

## (undated) DEVICE — CLIP ON PATIENT PROTECTIVE PADS FOR USE WITH THE ULP (6 PER CASE): Brand: CLIP-ON™ PATIENT PROTECTIVE PADS

## (undated) DEVICE — NEEDLE SPINAL 25G X 3.5 IN QUINCKE

## (undated) DEVICE — HOOD: Brand: FLYTE, SURGICOOL

## (undated) DEVICE — SOUNDSTAR ECO 8F G CATHETER: Brand: SOUNDSTAR

## (undated) DEVICE — BLADE REAMER PATELLA 46MM

## (undated) DEVICE — EVACUATOR BULB 100CC SILICONE

## (undated) DEVICE — SINGLE-USE POLYPECTOMY SNARE: Brand: SENSATION SHORT THROW

## (undated) DEVICE — SCRUB SPONGE DURAPREP W/APPLI

## (undated) DEVICE — DRESSING XEROFORM 5 X 9

## (undated) DEVICE — SAW BLADE RECIPROCATING 179

## (undated) DEVICE — SYRINGE 10ML LL

## (undated) DEVICE — FAN SPRAY KIT: Brand: PULSAVAC®

## (undated) DEVICE — 1 X VERSACROSS CONNECT TRANSSEPTAL DILATOR;  1 X VERSACROSS RF WIRE (INCLUDING 1 X CONNECTOR CABLE (SINGLE USE)): Brand: VERSACROSS CONNECT ACCESS SOLUTION FOR FARADRIVE

## (undated) DEVICE — DGW .035 FC J3MM 150CM TEF: Brand: EMERALD

## (undated) DEVICE — SHEATH STEERABLE FARADRIVE

## (undated) DEVICE — GROUNDING PAD RFL

## (undated) DEVICE — BETHLEHEM UNIV TOTAL KNEE, KIT: Brand: CARDINAL HEALTH

## (undated) DEVICE — DRAPE TOWEL POLY LINED

## (undated) DEVICE — THREE-QUARTER SHEET: Brand: CONVERTORS

## (undated) DEVICE — SKIN MARKER DUAL TIP WITH RULER CAP, FLEXIBLE RULER AND LABELS: Brand: DEVON

## (undated) DEVICE — GLOVE INDICATOR PI UNDERGLOVE SZ 7.5 BLUE

## (undated) DEVICE — 2000CC GUARDIAN II: Brand: GUARDIAN

## (undated) DEVICE — CATH ABLATION FARAWAVE PULSED FIELD 31MM

## (undated) DEVICE — TRANSPOSAL ULTRAFLEX DUO/QUAD ULTRA CART MANIFOLD

## (undated) DEVICE — GLOVE SRG BIOGEL 7

## (undated) DEVICE — TOWEL SURG XR DETECT GREEN STRL RFD

## (undated) DEVICE — STOCKINETTE,IMPERVIOUS,12X48,STERILE: Brand: MEDLINE

## (undated) DEVICE — CAPIT KNEE GSF FLX CEM FEM/CEM TIB/FLX SURF/STD PAT - ZIMMER

## (undated) DEVICE — GLOVE INDICATOR PI UNDERGLOVE SZ 8 BLUE

## (undated) DEVICE — PAD GROUNDING IONIC RF DISP

## (undated) DEVICE — BLOOD CONSERVATION SYSTEM WITH QUICK DISCONNECT AND PVC DRAIN: Brand: CBCII CONSTAVAC